# Patient Record
Sex: FEMALE | Race: WHITE | Employment: OTHER | URBAN - METROPOLITAN AREA
[De-identification: names, ages, dates, MRNs, and addresses within clinical notes are randomized per-mention and may not be internally consistent; named-entity substitution may affect disease eponyms.]

---

## 2019-05-19 ENCOUNTER — HOSPITAL ENCOUNTER (OUTPATIENT)
Age: 54
Setting detail: OBSERVATION
Discharge: HOME | End: 2019-05-20
Attending: EMERGENCY MEDICINE | Admitting: INTERNAL MEDICINE
Payer: MEDICARE

## 2019-05-19 ENCOUNTER — APPOINTMENT (OUTPATIENT)
Dept: GENERAL RADIOLOGY | Age: 54
End: 2019-05-19
Payer: MEDICARE

## 2019-05-19 DIAGNOSIS — J45.901 EXACERBATION OF ASTHMA, UNSPECIFIED ASTHMA SEVERITY, UNSPECIFIED WHETHER PERSISTENT: Primary | ICD-10-CM

## 2019-05-19 DIAGNOSIS — Z78.9 FAILURE OF OUTPATIENT TREATMENT: ICD-10-CM

## 2019-05-19 DIAGNOSIS — R09.02 HYPOXIA: ICD-10-CM

## 2019-05-19 PROBLEM — J45.30 REACTIVE AIRWAY DISEASE, MILD PERSISTENT, UNCOMPLICATED: Status: ACTIVE | Noted: 2019-05-19

## 2019-05-19 LAB
A/G RATIO: 1.2 (ref 1.1–2.2)
ALBUMIN SERPL-MCNC: 4.2 G/DL (ref 3.4–5)
ALP BLD-CCNC: 110 U/L (ref 40–129)
ALT SERPL-CCNC: 19 U/L (ref 10–40)
ANION GAP SERPL CALCULATED.3IONS-SCNC: 11 MMOL/L (ref 3–16)
AST SERPL-CCNC: 21 U/L (ref 15–37)
BACTERIA: ABNORMAL /HPF
BASOPHILS ABSOLUTE: 0 K/UL (ref 0–0.2)
BASOPHILS RELATIVE PERCENT: 0.4 %
BILIRUB SERPL-MCNC: 0.3 MG/DL (ref 0–1)
BILIRUBIN URINE: ABNORMAL
BLOOD, URINE: NEGATIVE
BUN BLDV-MCNC: 13 MG/DL (ref 7–20)
CALCIUM SERPL-MCNC: 9.4 MG/DL (ref 8.3–10.6)
CHLORIDE BLD-SCNC: 108 MMOL/L (ref 99–110)
CLARITY: CLEAR
CO2: 25 MMOL/L (ref 21–32)
COLOR: YELLOW
CREAT SERPL-MCNC: <0.5 MG/DL (ref 0.6–1.1)
EKG ATRIAL RATE: 416 BPM
EKG DIAGNOSIS: NORMAL
EKG Q-T INTERVAL: 414 MS
EKG QRS DURATION: 88 MS
EKG QTC CALCULATION (BAZETT): 443 MS
EKG R AXIS: 17 DEGREES
EKG T AXIS: 9 DEGREES
EKG VENTRICULAR RATE: 69 BPM
EOSINOPHILS ABSOLUTE: 0 K/UL (ref 0–0.6)
EOSINOPHILS RELATIVE PERCENT: 0.6 %
EPITHELIAL CELLS, UA: ABNORMAL /HPF
GFR AFRICAN AMERICAN: >60
GFR NON-AFRICAN AMERICAN: >60
GLOBULIN: 3.5 G/DL
GLUCOSE BLD-MCNC: 114 MG/DL (ref 70–99)
GLUCOSE URINE: NEGATIVE MG/DL
HCT VFR BLD CALC: 40.9 % (ref 36–48)
HEMOGLOBIN: 13.7 G/DL (ref 12–16)
KETONES, URINE: ABNORMAL MG/DL
LEUKOCYTE ESTERASE, URINE: NEGATIVE
LYMPHOCYTES ABSOLUTE: 1.9 K/UL (ref 1–5.1)
LYMPHOCYTES RELATIVE PERCENT: 25 %
MAGNESIUM: 1.9 MG/DL (ref 1.8–2.4)
MCH RBC QN AUTO: 31.7 PG (ref 26–34)
MCHC RBC AUTO-ENTMCNC: 33.5 G/DL (ref 31–36)
MCV RBC AUTO: 94.7 FL (ref 80–100)
MICROSCOPIC EXAMINATION: YES
MONOCYTES ABSOLUTE: 0.5 K/UL (ref 0–1.3)
MONOCYTES RELATIVE PERCENT: 6.3 %
MUCUS: ABNORMAL /LPF
NEUTROPHILS ABSOLUTE: 5.2 K/UL (ref 1.7–7.7)
NEUTROPHILS RELATIVE PERCENT: 67.7 %
NITRITE, URINE: NEGATIVE
PDW BLD-RTO: 15 % (ref 12.4–15.4)
PH UA: 6 (ref 5–8)
PLATELET # BLD: 259 K/UL (ref 135–450)
PMV BLD AUTO: 7.2 FL (ref 5–10.5)
POTASSIUM REFLEX MAGNESIUM: 3.5 MMOL/L (ref 3.5–5.1)
PRO-BNP: 515 PG/ML (ref 0–124)
PROTEIN UA: ABNORMAL MG/DL
RBC # BLD: 4.32 M/UL (ref 4–5.2)
RBC UA: ABNORMAL /HPF (ref 0–2)
SODIUM BLD-SCNC: 144 MMOL/L (ref 136–145)
SPECIFIC GRAVITY UA: 1.02 (ref 1–1.03)
TOTAL PROTEIN: 7.7 G/DL (ref 6.4–8.2)
URINE TYPE: ABNORMAL
UROBILINOGEN, URINE: 0.2 E.U./DL
WBC # BLD: 7.7 K/UL (ref 4–11)
WBC UA: ABNORMAL /HPF (ref 0–5)

## 2019-05-19 PROCEDURE — 94761 N-INVAS EAR/PLS OXIMETRY MLT: CPT

## 2019-05-19 PROCEDURE — 71046 X-RAY EXAM CHEST 2 VIEWS: CPT

## 2019-05-19 PROCEDURE — 83880 ASSAY OF NATRIURETIC PEPTIDE: CPT

## 2019-05-19 PROCEDURE — G0378 HOSPITAL OBSERVATION PER HR: HCPCS

## 2019-05-19 PROCEDURE — 83735 ASSAY OF MAGNESIUM: CPT

## 2019-05-19 PROCEDURE — 81001 URINALYSIS AUTO W/SCOPE: CPT

## 2019-05-19 PROCEDURE — 96376 TX/PRO/DX INJ SAME DRUG ADON: CPT

## 2019-05-19 PROCEDURE — 94669 MECHANICAL CHEST WALL OSCILL: CPT

## 2019-05-19 PROCEDURE — 2580000003 HC RX 258: Performed by: INTERNAL MEDICINE

## 2019-05-19 PROCEDURE — 87086 URINE CULTURE/COLONY COUNT: CPT

## 2019-05-19 PROCEDURE — 6370000000 HC RX 637 (ALT 250 FOR IP): Performed by: INTERNAL MEDICINE

## 2019-05-19 PROCEDURE — 6360000002 HC RX W HCPCS: Performed by: PHYSICIAN ASSISTANT

## 2019-05-19 PROCEDURE — 94640 AIRWAY INHALATION TREATMENT: CPT

## 2019-05-19 PROCEDURE — 96372 THER/PROPH/DIAG INJ SC/IM: CPT

## 2019-05-19 PROCEDURE — 99285 EMERGENCY DEPT VISIT HI MDM: CPT

## 2019-05-19 PROCEDURE — 6360000002 HC RX W HCPCS: Performed by: EMERGENCY MEDICINE

## 2019-05-19 PROCEDURE — 93005 ELECTROCARDIOGRAM TRACING: CPT | Performed by: PHYSICIAN ASSISTANT

## 2019-05-19 PROCEDURE — 80053 COMPREHEN METABOLIC PANEL: CPT

## 2019-05-19 PROCEDURE — 96374 THER/PROPH/DIAG INJ IV PUSH: CPT

## 2019-05-19 PROCEDURE — 6370000000 HC RX 637 (ALT 250 FOR IP): Performed by: PHYSICIAN ASSISTANT

## 2019-05-19 PROCEDURE — 93010 ELECTROCARDIOGRAM REPORT: CPT | Performed by: INTERNAL MEDICINE

## 2019-05-19 PROCEDURE — 6360000002 HC RX W HCPCS: Performed by: INTERNAL MEDICINE

## 2019-05-19 PROCEDURE — 85025 COMPLETE CBC W/AUTO DIFF WBC: CPT

## 2019-05-19 RX ORDER — ALBUTEROL SULFATE 2.5 MG/3ML
5 SOLUTION RESPIRATORY (INHALATION) ONCE
Status: COMPLETED | OUTPATIENT
Start: 2019-05-19 | End: 2019-05-19

## 2019-05-19 RX ORDER — TOPIRAMATE 25 MG/1
50 TABLET ORAL 2 TIMES DAILY
Status: DISCONTINUED | OUTPATIENT
Start: 2019-05-19 | End: 2019-05-20 | Stop reason: HOSPADM

## 2019-05-19 RX ORDER — OXYBUTYNIN CHLORIDE 5 MG/1
5 TABLET ORAL 2 TIMES DAILY
COMMUNITY
End: 2020-04-28 | Stop reason: SDUPTHER

## 2019-05-19 RX ORDER — OXYBUTYNIN CHLORIDE 5 MG/1
5 TABLET ORAL 2 TIMES DAILY
Status: DISCONTINUED | OUTPATIENT
Start: 2019-05-19 | End: 2019-05-20 | Stop reason: HOSPADM

## 2019-05-19 RX ORDER — ALBUTEROL SULFATE 2.5 MG/3ML
2.5 SOLUTION RESPIRATORY (INHALATION) EVERY 6 HOURS PRN
Status: DISCONTINUED | OUTPATIENT
Start: 2019-05-19 | End: 2019-05-20 | Stop reason: HOSPADM

## 2019-05-19 RX ORDER — IPRATROPIUM BROMIDE AND ALBUTEROL SULFATE 2.5; .5 MG/3ML; MG/3ML
1 SOLUTION RESPIRATORY (INHALATION)
Status: DISCONTINUED | OUTPATIENT
Start: 2019-05-19 | End: 2019-05-19

## 2019-05-19 RX ORDER — ASPIRIN 81 MG/1
81 TABLET, CHEWABLE ORAL DAILY
Status: ON HOLD | COMMUNITY
End: 2021-09-13 | Stop reason: HOSPADM

## 2019-05-19 RX ORDER — OXYCODONE HYDROCHLORIDE 5 MG/1
5 TABLET ORAL EVERY 4 HOURS PRN
Status: DISCONTINUED | OUTPATIENT
Start: 2019-05-19 | End: 2019-05-20 | Stop reason: HOSPADM

## 2019-05-19 RX ORDER — INDOMETHACIN 50 MG/1
50 CAPSULE ORAL
COMMUNITY
End: 2019-06-11 | Stop reason: ALTCHOICE

## 2019-05-19 RX ORDER — TOPIRAMATE 25 MG/1
100 CAPSULE, COATED PELLETS ORAL NIGHTLY
COMMUNITY
End: 2021-03-16

## 2019-05-19 RX ORDER — ASPIRIN 81 MG/1
81 TABLET, CHEWABLE ORAL DAILY
Status: DISCONTINUED | OUTPATIENT
Start: 2019-05-19 | End: 2019-05-20 | Stop reason: HOSPADM

## 2019-05-19 RX ORDER — INDOMETHACIN 25 MG/1
50 CAPSULE ORAL
Status: DISCONTINUED | OUTPATIENT
Start: 2019-05-19 | End: 2019-05-20 | Stop reason: HOSPADM

## 2019-05-19 RX ORDER — TIZANIDINE 4 MG/1
4 TABLET ORAL EVERY 6 HOURS PRN
Status: DISCONTINUED | OUTPATIENT
Start: 2019-05-19 | End: 2019-05-20 | Stop reason: HOSPADM

## 2019-05-19 RX ORDER — DULOXETIN HYDROCHLORIDE 60 MG/1
120 CAPSULE, DELAYED RELEASE ORAL DAILY
Status: DISCONTINUED | OUTPATIENT
Start: 2019-05-19 | End: 2019-05-20 | Stop reason: HOSPADM

## 2019-05-19 RX ORDER — IPRATROPIUM BROMIDE AND ALBUTEROL SULFATE 2.5; .5 MG/3ML; MG/3ML
1 SOLUTION RESPIRATORY (INHALATION) ONCE
Status: COMPLETED | OUTPATIENT
Start: 2019-05-19 | End: 2019-05-19

## 2019-05-19 RX ORDER — ALBUTEROL SULFATE 90 UG/1
2 AEROSOL, METERED RESPIRATORY (INHALATION) EVERY 6 HOURS PRN
COMMUNITY
End: 2020-02-20 | Stop reason: SDUPTHER

## 2019-05-19 RX ORDER — SODIUM CHLORIDE 0.9 % (FLUSH) 0.9 %
10 SYRINGE (ML) INJECTION PRN
Status: DISCONTINUED | OUTPATIENT
Start: 2019-05-19 | End: 2019-05-20 | Stop reason: HOSPADM

## 2019-05-19 RX ORDER — GABAPENTIN 400 MG/1
400 CAPSULE ORAL 4 TIMES DAILY
Status: DISCONTINUED | OUTPATIENT
Start: 2019-05-19 | End: 2019-05-20 | Stop reason: HOSPADM

## 2019-05-19 RX ORDER — METHYLPREDNISOLONE SODIUM SUCCINATE 125 MG/2ML
125 INJECTION, POWDER, LYOPHILIZED, FOR SOLUTION INTRAMUSCULAR; INTRAVENOUS ONCE
Status: COMPLETED | OUTPATIENT
Start: 2019-05-19 | End: 2019-05-19

## 2019-05-19 RX ORDER — MORPHINE SULFATE 30 MG/1
30 TABLET, FILM COATED, EXTENDED RELEASE ORAL EVERY 12 HOURS SCHEDULED
Status: DISCONTINUED | OUTPATIENT
Start: 2019-05-19 | End: 2019-05-20 | Stop reason: HOSPADM

## 2019-05-19 RX ORDER — METHYLPREDNISOLONE SODIUM SUCCINATE 40 MG/ML
40 INJECTION, POWDER, LYOPHILIZED, FOR SOLUTION INTRAMUSCULAR; INTRAVENOUS EVERY 6 HOURS
Status: DISCONTINUED | OUTPATIENT
Start: 2019-05-19 | End: 2019-05-20 | Stop reason: HOSPADM

## 2019-05-19 RX ORDER — ALBUTEROL SULFATE 2.5 MG/3ML
2.5 SOLUTION RESPIRATORY (INHALATION) EVERY 6 HOURS PRN
Status: ON HOLD | COMMUNITY
End: 2019-05-19

## 2019-05-19 RX ORDER — ACETAMINOPHEN 325 MG/1
650 TABLET ORAL EVERY 4 HOURS PRN
Status: DISCONTINUED | OUTPATIENT
Start: 2019-05-19 | End: 2019-05-20 | Stop reason: HOSPADM

## 2019-05-19 RX ORDER — SODIUM CHLORIDE 0.9 % (FLUSH) 0.9 %
10 SYRINGE (ML) INJECTION EVERY 12 HOURS SCHEDULED
Status: DISCONTINUED | OUTPATIENT
Start: 2019-05-19 | End: 2019-05-20 | Stop reason: HOSPADM

## 2019-05-19 RX ORDER — PREDNISONE 20 MG/1
40 TABLET ORAL DAILY
Status: DISCONTINUED | OUTPATIENT
Start: 2019-05-22 | End: 2019-05-20 | Stop reason: HOSPADM

## 2019-05-19 RX ORDER — PANTOPRAZOLE SODIUM 40 MG/1
40 TABLET, DELAYED RELEASE ORAL
Status: DISCONTINUED | OUTPATIENT
Start: 2019-05-20 | End: 2019-05-20 | Stop reason: HOSPADM

## 2019-05-19 RX ORDER — DOXYCYCLINE HYCLATE 100 MG
100 TABLET ORAL EVERY 12 HOURS
Status: DISCONTINUED | OUTPATIENT
Start: 2019-05-19 | End: 2019-05-20 | Stop reason: HOSPADM

## 2019-05-19 RX ORDER — IPRATROPIUM BROMIDE AND ALBUTEROL SULFATE 2.5; .5 MG/3ML; MG/3ML
1 SOLUTION RESPIRATORY (INHALATION) EVERY 4 HOURS
Status: DISCONTINUED | OUTPATIENT
Start: 2019-05-19 | End: 2019-05-20 | Stop reason: HOSPADM

## 2019-05-19 RX ORDER — ONDANSETRON 2 MG/ML
4 INJECTION INTRAMUSCULAR; INTRAVENOUS EVERY 6 HOURS PRN
Status: DISCONTINUED | OUTPATIENT
Start: 2019-05-19 | End: 2019-05-20 | Stop reason: HOSPADM

## 2019-05-19 RX ORDER — GABAPENTIN 400 MG/1
400 CAPSULE ORAL 2 TIMES DAILY
Status: ON HOLD | COMMUNITY
End: 2021-09-13 | Stop reason: HOSPADM

## 2019-05-19 RX ORDER — MAGNESIUM 30 MG
30 TABLET ORAL DAILY
COMMUNITY
End: 2022-01-19 | Stop reason: CLARIF

## 2019-05-19 RX ADMIN — IPRATROPIUM BROMIDE AND ALBUTEROL SULFATE 1 AMPULE: .5; 3 SOLUTION RESPIRATORY (INHALATION) at 16:38

## 2019-05-19 RX ADMIN — TOPIRAMATE 50 MG: 25 TABLET, FILM COATED ORAL at 22:37

## 2019-05-19 RX ADMIN — IPRATROPIUM BROMIDE AND ALBUTEROL SULFATE 1 AMPULE: .5; 3 SOLUTION RESPIRATORY (INHALATION) at 19:44

## 2019-05-19 RX ADMIN — OXYBUTYNIN CHLORIDE 5 MG: 5 TABLET ORAL at 22:37

## 2019-05-19 RX ADMIN — MORPHINE SULFATE 30 MG: 30 TABLET, FILM COATED, EXTENDED RELEASE ORAL at 22:37

## 2019-05-19 RX ADMIN — METHYLPREDNISOLONE SODIUM SUCCINATE 40 MG: 40 INJECTION, POWDER, FOR SOLUTION INTRAMUSCULAR; INTRAVENOUS at 18:53

## 2019-05-19 RX ADMIN — GABAPENTIN 400 MG: 400 CAPSULE ORAL at 22:38

## 2019-05-19 RX ADMIN — IPRATROPIUM BROMIDE AND ALBUTEROL SULFATE 1 AMPULE: .5; 3 SOLUTION RESPIRATORY (INHALATION) at 12:15

## 2019-05-19 RX ADMIN — Medication 10 ML: at 22:38

## 2019-05-19 RX ADMIN — DOXYCYCLINE HYCLATE 100 MG: 100 TABLET, COATED ORAL at 18:53

## 2019-05-19 RX ADMIN — IPRATROPIUM BROMIDE AND ALBUTEROL SULFATE 1 AMPULE: .5; 3 SOLUTION RESPIRATORY (INHALATION) at 23:42

## 2019-05-19 RX ADMIN — ALBUTEROL SULFATE 5 MG: 2.5 SOLUTION RESPIRATORY (INHALATION) at 13:19

## 2019-05-19 RX ADMIN — INDOMETHACIN 50 MG: 25 CAPSULE ORAL at 18:53

## 2019-05-19 RX ADMIN — ENOXAPARIN SODIUM 40 MG: 40 INJECTION SUBCUTANEOUS at 18:53

## 2019-05-19 RX ADMIN — METHYLPREDNISOLONE SODIUM SUCCINATE 125 MG: 125 INJECTION, POWDER, FOR SOLUTION INTRAMUSCULAR; INTRAVENOUS at 12:15

## 2019-05-19 ASSESSMENT — PAIN DESCRIPTION - LOCATION: LOCATION: BACK

## 2019-05-19 ASSESSMENT — PAIN DESCRIPTION - PAIN TYPE: TYPE: CHRONIC PAIN

## 2019-05-19 ASSESSMENT — PAIN SCALES - GENERAL
PAINLEVEL_OUTOF10: 2
PAINLEVEL_OUTOF10: 5

## 2019-05-19 NOTE — ED NOTES
Ambulated pt approximately 70 feet. Pt started out at 94% on room air and a heart rate in the 70's. At end of walk pt was 88% on room air and heart rate jumped into 140's. Pt was very out of breath at end of walk.      Payal Grossman RN  05/19/19 1903

## 2019-05-19 NOTE — ED NOTES
Nursing report given to [facility] from 2W, pt to floor per wheel chair.      Payal Grossman RN  05/19/19 5533

## 2019-05-19 NOTE — PROGRESS NOTES
4 Eyes Skin Assessment     The patient is being assess for   Admission    I agree that 2 RN's have performed a thorough Head to Toe Skin Assessment on the patient. ALL assessment sites listed below have been assessed. Areas assessed by both nurses:   [x]   Head, Face, and Ears   [x]   Shoulders, Back, and Chest, Abdomen  [x]   Arms, Elbows, and Hands   [x]   Coccyx, Sacrum, and Ischium  [x]   Legs, Feet, and Heels        Scattered abrasions and bruises, no further skin issues     **SHARE this note so that the co-signing nurse is able to place an eSignature**    Co-signer eSignature: Electronically signed by Jorden Wilson RN on 5/19/19 at 7:41 PM    Does the Patient have Skin Breakdown?   No          George Prevention initiated:  Yes   Wound Care Orders initiated:  No      [facility] nurse consulted for Pressure Injury (Stage 3,4, Unstageable, DTI, NWPT, Complex wounds)and New or Established Ostomies:  Yes      Primary Nurse eSignature: Electronically signed by Daniel Tompkins RN on 5/19/19 at 6:07 PM

## 2019-05-19 NOTE — ED NOTES
1400- Perfect serve sent to Dr. Jenae Hudson for admission     1418- Call was returned by Jenae Maza and spoke to Dr. Flory Caceres  05/19/19 [place] [id number]  05/19/19 1415

## 2019-05-19 NOTE — PROGRESS NOTES
RESPIRATORY THERAPY ASSESSMENT    Name:  Luis Alberto Mendez  Medical Record Number:  [id number]  Age: 52 y.o. Gender: female  : 1965  Today's Date:  2019  Room:  0218/0218-01    Assessment     Is the patient being admitted for a COPD or Asthma exacerbation? Yes   (If yes the patient will be seen every 4 hours for the first 24 hours and then reassessed)    Patient Admission Diagnosis      Allergies  Allergies   Allergen Reactions    Iodides Anaphylaxis     Rash, hives, swelling    Bactrim [Sulfamethoxazole-Trimethoprim]      Renal failure       Minimum Predicted Vital Capacity:     Not performed SOB          Actual Vital Capacity:      NA              Pulmonary History:Asthma  Home Oxygen Therapy:  room air  Home Respiratory Therapy: Proair prn-   Current Respiratory Therapy:  duoneb q4 w/a  Treatment Type: HHN, Vibratory mucous clearing therapy or intervention  Medications: Albuterol/Ipratropium    Respiratory Severity Index(RSI)   Patients with orders for inhalation medications, oxygen, or any therapeutic treatment modality will be placed on Respiratory Protocol. They will be assessed with the first treatment and at least every 72 hours thereafter. The following severity scale will be used to determine frequency of treatment intervention.     Smoking History: Mild Exacerbation = 3    Social History  Social History     Tobacco Use    Smoking status: Former Smoker     Last attempt to quit:      Years since quittin.3    Smokeless tobacco: Never Used   Substance Use Topics    Alcohol use: Yes     Comment: 2 drinks/day    Drug use: No       Recent Surgical History: None = 0  Past Surgical History  Past Surgical History:   Procedure Laterality Date    CARPAL TUNNEL RELEASE      JOINT REPLACEMENT Bilateral     ROTATOR CUFF REPAIR      3x right, 2x left       Level of Consciousness: Alert, Oriented, and Cooperative = 0    Level of Activity: Mostly sedentary, minimal walking = 2    Respiratory d. Can demonstrate a 2-3 second inspiratory hold  4. Bronchodilators will be administered via Hand Held Nebulizer [facility]) to patients when ANY of the following criteria are met  a. Incognizant or uncooperative          b. Patients treated with HHN at Home        c. Unable to demonstrate proper use of MDI with spacer     d. RR > 30 bpm   5. Bronchodilators will be delivered via Metered Dose Inhaler (MDI), HHN, Aerogen to intubated patients on mechanical ventilation. 6. Inhalation medication orders will be delivered and/or substituted as outlined below. Aerosolized Medications Ordering and Administration Guidelines:    1. All Medications will be ordered by a physician, and their frequency and/or modality will be adjusted as defined by the patients Respiratory Severity Index (RSI) score. 2. If the patient does not have documented COPD, consider discontinuing anticholinergics when RSI is less than 9.  3. If the bronchospasm worsens (increased RSI), then the bronchodilator frequency can be increased to a maximum of every 4 hours. If greater than every 4 hours is required, the physician will be contacted. 4. If the bronchospasm improves, the frequency of the bronchodilator can be decreased, based on the patient's RSI, but not less than home treatment regimen frequency. 5. Bronchodilator(s) will be discontinued if patient has a RSI less than 9 and has received no scheduled or as needed treatment for 72  Hrs. Patients Ordered on a Mucolytic Agent:    1. Must always be administered with a bronchodilator. 2. Discontinue if patient experiences worsened bronchospasm, or secretions have lessened to the point that the patient is able to clear them with a cough. Anti-inflammatory and Combination Medications:    1.  If the patient lacks prior history of lung disease, is not using inhaled anti-inflammatory medication at home, and lacks wheezing by examination or by history for at least 24 hours, contact physician for possible discontinuation.

## 2019-05-19 NOTE — ED PROVIDER NOTES
Emergency Department Attending Note    Nicolette Reeves DO    Date of ED VIsit: 5/19/2019    CHIEF COMPLAINT  Shortness of Breath (pt c/o cough and shortness of breath for 6 months, pt states she has asthma, pt concerned about CA due to her father having lung CA)      HISTORY OF PRESENT ILLNESS  Alecia Nesbitt is a 52 y.o. female  With Vital signs of BP (!) 167/91   Pulse 76   Temp 98.3 °F (36.8 °C) (Oral)   Resp 16   Ht 5' 3\" (1.6 m)   Wt (!) 340 lb (154.2 kg)   LMP 12/24/2010   SpO2 95%   BMI 60.23 kg/m²  who presents to the ED with a complaint of worsening dyspnea and cough this week. She has been having intermittent cough for the past several months, more nighttime workups with shortness of breath. She has a history of asthma, but was previously just seasonal.  Chills. She has not had any chest pain. She went to her primary little less than a week ago and received a course of steroids which she has been taking. She has been using nebulizers with minimal relief approximately every 3 hours. .  No other complaints, modifying factors or associated symptoms. I have reviewed the following from the nursing documentation. Past Medical History:   Diagnosis Date    Acute renal failure (ARF) ([facility] [address] May 4, 2015    d/t IV dye    Arthritis     Degenerative disc disease     GERD (gastroesophageal reflux disease)     Hypertension      Past Surgical History:   Procedure Laterality Date    CARPAL TUNNEL RELEASE      JOINT REPLACEMENT Bilateral     ROTATOR CUFF REPAIR      3x right, 2x left     History reviewed. No pertinent family history.   Social History     Socioeconomic History    Marital status:      Spouse name: Not on file    Number of children: Not on file    Years of education: Not on file    Highest education level: Not on file   Occupational History    Not on file   Social Needs    Financial resource strain: Not on file    Food insecurity:     Worry: Not on file     Inability: Not on file    Transportation needs:     Medical: Not on file     Non-medical: Not on file   Tobacco Use    Smoking status: Former Smoker    Smokeless tobacco: Never Used   Substance and Sexual Activity    Alcohol use: Yes     Comment: 2 drinks/day    Drug use: No    Sexual activity: Yes     Partners: Male   Lifestyle    Physical activity:     Days per week: Not on file     Minutes per session: Not on file    Stress: Not on file   Relationships    Social connections:     Talks on phone: Not on file     Gets together: Not on file     Attends [employer] witness service: Not on file     Active member of club or organization: Not on file     Attends meetings of clubs or organizations: Not on file     Relationship status: Not on file    Intimate partner violence:     Fear of current or ex partner: Not on file     Emotionally abused: Not on file     Physically abused: Not on file     Forced sexual activity: Not on file   Other Topics Concern    Not on file   Social History Narrative    Not on file     No current facility-administered medications for this encounter. Current Outpatient Medications   Medication Sig Dispense Refill    morphine-naltrexone (EMBEDA) 30-1.2 MG CPCR Take 1 capsule by mouth every 12 hours.  aspirin 81 MG chewable tablet Take 81 mg by mouth daily      gabapentin (NEURONTIN) 400 MG capsule Take 400 mg by mouth 4 times daily.       indomethacin (INDOCIN) 50 MG capsule Take 50 mg by mouth 3 times daily (with meals)      magnesium 30 MG tablet Take 30 mg by mouth daily      oxybutynin (DITROPAN) 5 MG tablet Take 5 mg by mouth 2 times daily      albuterol (PROVENTIL) (2.5 MG/3ML) 0.083% nebulizer solution Take 2.5 mg by nebulization every 6 hours as needed for Wheezing      topiramate (TOPAMAX SPRINKLE) 25 MG capsule Take 50 mg by mouth 2 times daily Indications: 50 mg at noon, 100 mg at HS      tiZANidine (ZANAFLEX) 4 MG tablet Take 4 mg by mouth every 6 hours as needed      Leukocyte Esterase, Urine Negative   Microscopic Examination YES   Urine Type Not Specified [WL]   1449 Magnesium:    Magnesium 1.90 [WL]   1449 Comprehensive Metabolic Panel w/ Reflex to MG(!):    Sodium 144   Potassium 3.5   Chloride 108   CO2 25   Anion Gap 11   Glucose 114(!)   BUN 13   Creatinine <0.5(!)   GFR Non- >60   GFR African American >60   Calcium 9.4   Total Protein 7.7   Albumin 4.2   Albumin/Globulin Ratio 1.2   Bilirubin 0.3   Alk Phos 110   ALT 19   AST 21   Globulin 3.5 [WL]   1449 Brain Natriuretic Peptide(!):    Pro-(!) [WL]   1449 Atrial fibrillation at 69. Normal axis. . No acute ST-T changes. Compared to prior May 4, atrial fibrillation   CBC Auto Differential:    WBC 7.7   RBC 4.32   Hemoglobin Quant 13.7   Hematocrit 40.9   MCV 94.7   MCH 31.7   MCHC 33.5   RDW 15.0   Platelet Count 793   MPV 7.2   Neutrophils % 67.7   Lymphocyte % 25.0   Monocytes % 6.3   Eosinophils % 0.6   Basophils % 0.4   Neutrophils # 5.2   Lymphocytes # 1.9   Monocytes # 0.5   Eosinophils # 0.0   Basophils # 0.0 [WL]   1449 XR CHEST STANDARD (2 VW) [WL]   1450 Patient with asthma exacerbation, hypoxia, felt or days of outpatient steroid therapy. She'll be admitted for further stabilization. She was hypoxic to 80% with ambulation. She is significantly tachycardic at that time as well. [WL]      ED Course User Index  [WL] Juan M Chambers,        Old records were reviewed when applicable.  The ED course and plan were reviewed and results discussed with the patient, hospitalist    CLINICAL IMPRESSION and DISPOSITION  Alecia Nesbitt was stable and diagnosed with asthma exacerbation, hypoxia, failed outpatient therapy    Patient was treated with  steroids, nebs      CRITICAL CARE TIME:   N/A                      Juan M Chambers DO  05/19/19 2901

## 2019-05-19 NOTE — PLAN OF CARE
Acute asthma exacerbation  Failed outpatient treatment  Hypoxic and tachycardic with minimal ambulation

## 2019-05-19 NOTE — PROGRESS NOTES
Patient currently awake in bed, call light and personal belongings within reach. No needs voiced at this time.

## 2019-05-19 NOTE — PROGRESS NOTES
Patient admitted to [address] [place] [address]. Patient oriented to room, staff, and call light. Patient updated on current plan of care. Patient voices no concerns at this time. Patient currently awake in bed, call light and personal belongings within reach.

## 2019-05-19 NOTE — ED PROVIDER NOTES
[facility] ED  eMERGENCY dEPARTMENT eNCOUnter      Pt Name: Zuhair Lagunas  MRN: [id number]  Date of evaluation: 5/19/2019      Chief Complaint:    Chief Complaint   Patient presents with    Shortness of Breath     pt c/o cough and shortness of breath for 6 months, pt states she has asthma, pt concerned about CA due to her father having lung CA       HPI:  This is a  52 y.o. female morbidly obese, who presents to the emergency department with past medical history significant for history of acute renal failure secondary to contrast-induced nephropathy as well as former smoker now presents with acute on chronic shortness of breath. Patient states that she has seasonal asthma and allergy symptoms which appear worsening over the last 1-2 weeks and this concerned her propped her presentation emergency department today. She denies any fever chills nausea vomiting denies any chest pain does describe ongoing shortness of breath at rest and this was some what refractory to her home regimen.   She describes 6 out of 10 intermittent shortness of breath concerning to her without other radiation aggravating or relieving factors times one day      Physical Exam: (Pertinent Negatives and Positives)  ED Triage Vitals [05/19/19 1137]   BP Temp Temp Source Pulse Resp SpO2 Height Weight   (!) 167/91 98.3 °F (36.8 °C) Oral 76 16 95 % 5' 3\" (1.6 m) (!) 340 lb (154.2 kg)     Noted hypertension otherwise non-tachycardic afebrile nontachypneic satting well on room air  Patient does have active inspiratory and expiratory wheeze    PLAN:  LABS: Labs Reviewed - No data to display  Radiology Studies:   No orders to display       Patient will be evaluated by the Main ED provider however as the triage provider I will initiate her laboratory studies as well as initial imaging studies for workup of shortness of breath in the setting of morbid obesity as well as former smoker with history of contrast-induced

## 2019-05-20 ENCOUNTER — TELEPHONE (OUTPATIENT)
Dept: PULMONOLOGY | Age: 54
End: 2019-05-20

## 2019-05-20 ENCOUNTER — APPOINTMENT (OUTPATIENT)
Dept: GENERAL RADIOLOGY | Age: 54
End: 2019-05-20
Payer: MEDICARE

## 2019-05-20 VITALS
OXYGEN SATURATION: 99 % | TEMPERATURE: 97.7 F | HEIGHT: 63 IN | RESPIRATION RATE: 16 BRPM | WEIGHT: 293 LBS | SYSTOLIC BLOOD PRESSURE: 154 MMHG | HEART RATE: 70 BPM | BODY MASS INDEX: 51.91 KG/M2 | DIASTOLIC BLOOD PRESSURE: 88 MMHG

## 2019-05-20 DIAGNOSIS — J45.909 UNCOMPLICATED ASTHMA, UNSPECIFIED ASTHMA SEVERITY, UNSPECIFIED WHETHER PERSISTENT: ICD-10-CM

## 2019-05-20 DIAGNOSIS — R09.02 HYPOXIA: Primary | ICD-10-CM

## 2019-05-20 PROBLEM — J45.41 ALLERGIC ASTHMA, MODERATE PERSISTENT, WITH ACUTE EXACERBATION: Status: ACTIVE | Noted: 2019-05-20

## 2019-05-20 PROBLEM — E66.812 CLASS 2 OBESITY DUE TO EXCESS CALORIES IN ADULT: Status: ACTIVE | Noted: 2019-05-20

## 2019-05-20 PROBLEM — E66.09 CLASS 2 OBESITY DUE TO EXCESS CALORIES IN ADULT: Status: ACTIVE | Noted: 2019-05-20

## 2019-05-20 LAB
HCT VFR BLD CALC: 38.9 % (ref 36–48)
HEMOGLOBIN: 13 G/DL (ref 12–16)
MCH RBC QN AUTO: 31.6 PG (ref 26–34)
MCHC RBC AUTO-ENTMCNC: 33.4 G/DL (ref 31–36)
MCV RBC AUTO: 94.4 FL (ref 80–100)
PDW BLD-RTO: 15.6 % (ref 12.4–15.4)
PLATELET # BLD: 242 K/UL (ref 135–450)
PMV BLD AUTO: 7.7 FL (ref 5–10.5)
RBC # BLD: 4.12 M/UL (ref 4–5.2)
URINE CULTURE, ROUTINE: NORMAL
WBC # BLD: 7.7 K/UL (ref 4–11)

## 2019-05-20 PROCEDURE — 94640 AIRWAY INHALATION TREATMENT: CPT

## 2019-05-20 PROCEDURE — 6360000002 HC RX W HCPCS: Performed by: INTERNAL MEDICINE

## 2019-05-20 PROCEDURE — 96376 TX/PRO/DX INJ SAME DRUG ADON: CPT

## 2019-05-20 PROCEDURE — 36415 COLL VENOUS BLD VENIPUNCTURE: CPT

## 2019-05-20 PROCEDURE — 71046 X-RAY EXAM CHEST 2 VIEWS: CPT

## 2019-05-20 PROCEDURE — G0378 HOSPITAL OBSERVATION PER HR: HCPCS

## 2019-05-20 PROCEDURE — 94761 N-INVAS EAR/PLS OXIMETRY MLT: CPT

## 2019-05-20 PROCEDURE — 85027 COMPLETE CBC AUTOMATED: CPT

## 2019-05-20 PROCEDURE — 2580000003 HC RX 258: Performed by: INTERNAL MEDICINE

## 2019-05-20 PROCEDURE — 94669 MECHANICAL CHEST WALL OSCILL: CPT

## 2019-05-20 PROCEDURE — 6370000000 HC RX 637 (ALT 250 FOR IP): Performed by: INTERNAL MEDICINE

## 2019-05-20 PROCEDURE — 96372 THER/PROPH/DIAG INJ SC/IM: CPT

## 2019-05-20 PROCEDURE — 99217 PR OBSERVATION CARE DISCHARGE MANAGEMENT: CPT | Performed by: INTERNAL MEDICINE

## 2019-05-20 PROCEDURE — 99222 1ST HOSP IP/OBS MODERATE 55: CPT | Performed by: INTERNAL MEDICINE

## 2019-05-20 RX ORDER — BENZONATATE 200 MG/1
200 CAPSULE ORAL 3 TIMES DAILY PRN
Qty: 20 CAPSULE | Refills: 0 | Status: SHIPPED | OUTPATIENT
Start: 2019-05-20 | End: 2019-06-11 | Stop reason: ALTCHOICE

## 2019-05-20 RX ORDER — PREDNISONE 20 MG/1
TABLET ORAL
Qty: 15 TABLET | Refills: 0 | Status: SHIPPED | OUTPATIENT
Start: 2019-05-20 | End: 2019-06-11 | Stop reason: ALTCHOICE

## 2019-05-20 RX ADMIN — GABAPENTIN 400 MG: 400 CAPSULE ORAL at 13:24

## 2019-05-20 RX ADMIN — IPRATROPIUM BROMIDE AND ALBUTEROL SULFATE 1 AMPULE: .5; 3 SOLUTION RESPIRATORY (INHALATION) at 10:55

## 2019-05-20 RX ADMIN — TOPIRAMATE 50 MG: 25 TABLET, FILM COATED ORAL at 08:21

## 2019-05-20 RX ADMIN — ENOXAPARIN SODIUM 40 MG: 40 INJECTION SUBCUTANEOUS at 08:21

## 2019-05-20 RX ADMIN — METHYLPREDNISOLONE SODIUM SUCCINATE 40 MG: 40 INJECTION, POWDER, FOR SOLUTION INTRAMUSCULAR; INTRAVENOUS at 00:30

## 2019-05-20 RX ADMIN — DOXYCYCLINE HYCLATE 100 MG: 100 TABLET, COATED ORAL at 04:43

## 2019-05-20 RX ADMIN — IPRATROPIUM BROMIDE AND ALBUTEROL SULFATE 1 AMPULE: .5; 3 SOLUTION RESPIRATORY (INHALATION) at 03:04

## 2019-05-20 RX ADMIN — MAGNESIUM OXIDE TAB 400 MG (241.3 MG ELEMENTAL MG) 400 MG: 400 (241.3 MG) TAB at 08:21

## 2019-05-20 RX ADMIN — INDOMETHACIN 50 MG: 25 CAPSULE ORAL at 11:49

## 2019-05-20 RX ADMIN — Medication 10 ML: at 11:50

## 2019-05-20 RX ADMIN — METHYLPREDNISOLONE SODIUM SUCCINATE 40 MG: 40 INJECTION, POWDER, FOR SOLUTION INTRAMUSCULAR; INTRAVENOUS at 06:01

## 2019-05-20 RX ADMIN — TIZANIDINE 4 MG: 4 TABLET ORAL at 01:37

## 2019-05-20 RX ADMIN — INDOMETHACIN 50 MG: 25 CAPSULE ORAL at 08:20

## 2019-05-20 RX ADMIN — DULOXETINE HYDROCHLORIDE 120 MG: 60 CAPSULE, DELAYED RELEASE ORAL at 08:21

## 2019-05-20 RX ADMIN — OXYBUTYNIN CHLORIDE 5 MG: 5 TABLET ORAL at 08:21

## 2019-05-20 RX ADMIN — ASPIRIN 81 MG 81 MG: 81 TABLET ORAL at 08:21

## 2019-05-20 RX ADMIN — PANTOPRAZOLE SODIUM 40 MG: 40 TABLET, DELAYED RELEASE ORAL at 06:01

## 2019-05-20 RX ADMIN — GABAPENTIN 400 MG: 400 CAPSULE ORAL at 08:21

## 2019-05-20 RX ADMIN — METHYLPREDNISOLONE SODIUM SUCCINATE 40 MG: 40 INJECTION, POWDER, FOR SOLUTION INTRAMUSCULAR; INTRAVENOUS at 11:50

## 2019-05-20 RX ADMIN — MORPHINE SULFATE 30 MG: 30 TABLET, FILM COATED, EXTENDED RELEASE ORAL at 08:21

## 2019-05-20 RX ADMIN — OXYCODONE HYDROCHLORIDE 5 MG: 5 TABLET ORAL at 01:37

## 2019-05-20 RX ADMIN — Medication 10 ML: at 08:22

## 2019-05-20 RX ADMIN — IPRATROPIUM BROMIDE AND ALBUTEROL SULFATE 1 AMPULE: .5; 3 SOLUTION RESPIRATORY (INHALATION) at 07:38

## 2019-05-20 ASSESSMENT — PAIN SCALES - GENERAL
PAINLEVEL_OUTOF10: 3
PAINLEVEL_OUTOF10: 7
PAINLEVEL_OUTOF10: 4
PAINLEVEL_OUTOF10: 3

## 2019-05-20 ASSESSMENT — PAIN DESCRIPTION - PAIN TYPE: TYPE: CHRONIC PAIN

## 2019-05-20 ASSESSMENT — PAIN DESCRIPTION - LOCATION: LOCATION: BACK

## 2019-05-20 NOTE — PROGRESS NOTES
H/p dictation id [id number]. Date of service 05/19/2019. Reactive airway disease. Asthma exacerbation. Chronic pain. Chronic narcotic use. Morbid obesity.

## 2019-05-20 NOTE — PROGRESS NOTES
PM assessment completed, see flow sheet. Pt is alert and oriented. No complaints voiced. Pt denies needs at this time. SR up x 2, and bed in low position. Call light is within reach.

## 2019-05-20 NOTE — PLAN OF CARE
Problem: Falls - Risk of:  Goal: Will remain free from falls  Description  Will remain free from falls  Outcome: Ongoing     Problem: Daily Care:  Goal: Daily care needs are met  Description  Daily care needs are met  Outcome: Ongoing     Problem: Pain:  Goal: Patient's pain/discomfort is manageable  Description  Patient's pain/discomfort is manageable  Outcome: Ongoing

## 2019-05-20 NOTE — H&P
[address]                 [address] [place] [address]                              HISTORY AND PHYSICAL    PATIENT NAME: Miroslava Mccracken                      :        1965  MED REC NO:   [id number]                          ROOM:       3035  ACCOUNT NO:   [de-identified]                           ADMIT DATE: 2019  PROVIDER:     Kaycee Linares MD    I obtained a history and performed physical exam on the patient on the  medical floor on 2019. CHIEF COMPLAINT:  Cough with shortness of breath. HISTORY OF PRESENT ILLNESS:  A 24-year-old morbidly obese   female presented to the hospital with a chief complaint of what she  describes as months of persistent cough that she states keeps her up at  night, more when she lies flat in bed along with persistent shortness of  breath. That happened more so in the past two weeks. The patient notes  that she has gone to her doctor many times for this, but nobody has been  able to find out why she is coughing so much. No fevers or chills. No  other constitutional symptoms. The shortness of breath and her chest  tightness is also more when she lies flat in bed without any nausea or  vomiting. PAST MEDICAL HISTORY:  1.  Morbid obesity with a BMI of 59.2 kg/m2.  2.  Gastroesophageal reflux disease. 3.  Hypertension, but patient states that it is now treated and she does  not need any anti-hypertensive medications. PAST SURGICAL HISTORY:  1. Carpal tunnel release. 2.  Joint replacement. 3.  Rotator cuff repair. FAMILY HISTORY:  Lung cancer in the father. SOCIAL HISTORY:  Lives at home. Stopped smoking around 20 years or so,  according to her. CURRENT MEDICATIONS:  The patient's home medication list has been  reviewed. It includes Morphine, naltrexone, aspirin, gabapentin,  indomethacin, magnesium, oxybutynin, Topamax, albuterol, tizanidine,  oxycodone, Cymbalta, omeprazole. The patient does have chronic pain and  chronic prescription of narcotic dependent/use. ALLERGIC HISTORY:  IODIZED, BACTRIM. REVIEW OF SYSTEMS:  The patient's review of systems is significant for  the shortness of breath with the cough and per the history of present  illness. All other systems have been reviewed and are negative for the  history of present illness. PHYSICAL EXAMINATION:  VITAL SIGNS:  Temperature 98.3, respiratory rate 16, pulse 96, blood  pressure 161/91, and saturating 95%. CNS:  Alert, awake, and oriented. PSYCH:  The patient is cooperative. EYES:  Pupils are reactive to light. ENT:  Extraocular muscle movements are intact. RESPIRATORY SYSTEM:  The patient at the time of my exam does not have  any rales, rubs, rhonchi, or wheezes. Breath sounds are vesicular and  clear bilaterally. CVS:  S1, S2 are heard. No murmurs or rubs. ABDOMEN:  Soft. Morbidly obese. MUSCULOSKELETAL:  No acute deformity. SKIN:  Without rashes or lesions. DIAGNOSTIC DATA:  EKG independently reviewed by me shows normal sinus  rhythm with a ventricular rate of 69 beats per minute. UA shows 4+  mucus, 1+ of bacteria traits. No evidence of active UTI. Magnesium  1.90. BNP is 515. BUN 13, creatinine is less than 0.5, glucose 114. CBC showed white count of 7.7. The chest x-ray shows no significant change. Clear lungs. CONSULTATIONS:  1. Pulmonology. 2.  Previous medical record review shows a 2D echo from May 2015 that  shows LVF of 55% with mild pulmonary arterial hypertension. IMPRESSION:  1. Reactive airway disease, persistent mild with asthma exacerbation. 2.  Morbid obesity with the BMI of 59 kg/m2. 3.  Chronic pain with chronic narcotic dependence/use. PLAN OF CARE:  The patient has been admitted to observation. The  patient will be continued on oral steroids, breathing treatment, and  supplemental oxygen.   The patient has been started on oral doxycycline;  however, I do not believe the patient is without any evidence of active  infection at this point in time. I think it may be discontinued if  there is no further evidence of infection. The patient's OARRS report was accessed. The patient's home dose for  her narcotic analgesic will be continued per her OARRS report. Pulmonology consultation has been requested, and we will follow the  recommendations. The patient will be continued on 40 mg twice a day  subcutaneously for Lovenox for DVT prophylaxis. CODE STATUS:  Full. EXPECTED LENGTH OF STAY:  Less than two midnights based on the plan of  care above. DISPOSITION:  Observation.       Patricia Martinez MD    D: 05/20/2019 2:34:25       T: 05/20/2019 4:46:07     [email or web address]  Job#: [id number]     Doc#: [id number]    CC:

## 2019-05-20 NOTE — DISCHARGE SUMMARY
Name:  Shoaib Lujan  Room:  0218/0218-01  MRN:    [id number]    Discharge Summary      This discharge summary is in conjunction with a complete physical exam done on the day of discharge. Discharging Physician: Dr. Dave Kirby: 5/19/2019  Discharge:  5/20/2019    HPI taken from admission H&P:      A 64-year-old morbidly obese   female presented to the hospital with a chief complaint of what she  describes as months of persistent cough that she states keeps her up at  night, more when she lies flat in bed along with persistent shortness of  breath. That happened more so in the past two weeks. The patient notes  that she has gone to her doctor many times for this, but nobody has been  able to find out why she is coughing so much. No fevers or chills. No  other constitutional symptoms. The shortness of breath and her chest  tightness is also more when she lies flat in bed without any nausea or  vomiting. Diagnoses this Admission and Hospital Course     Asthma with exacerbation  - treated with IV steroids and inhaled bronchodilators  - improved  - dc on prednisone and inhalers, see med list below    Chronic pain with opiate dependence    Morbid Obesity  - Body mass index is 59.38 kg/m². - Complicating assessment and treatment. Placing patient at risk for multiple co-morbidities as well as early death and contributing to the patient's presentation.   - Counseled on weight loss. Procedures (Please Review Full Report for Details)  None     Consults    Pulm     Physical Exam at Discharge:    BP (!) 154/88   Pulse 70   Temp 97.7 °F (36.5 °C) (Oral)   Resp 16   Ht 5' 3\" (1.6 m)   Wt (!) 335 lb 3.2 oz (152 kg)   LMP 12/24/2010   SpO2 99%   BMI 59.38 kg/m²     Gen: No distress. Alert. Eyes: PERRL. No sclera icterus. No conjunctival injection. ENT: No discharge. Pharynx clear. Neck: No JVD. Trachea midline. Resp: No accessory muscle use. No crackles. No wheezes.  No patient:  Prednisone  Use: treat asthma and COPD     Side effects: upset stomach, high blood sugars, weight     gain, mood changes, and increased chances of infection        CHANGE how you take these medications    EMBEDA 30-1.2 MG Cpcr  Generic drug:  morphine-naltrexone  What changed:  Another medication with the same name was removed. Continue taking this medication, and follow the directions you see here. CONTINUE taking these medications    albuterol sulfate  (90 Base) MCG/ACT inhaler     aspirin 81 MG chewable tablet     DULoxetine 60 MG extended release capsule  Commonly known as:  CYMBALTA     gabapentin 400 MG capsule  Commonly known as:  NEURONTIN     indomethacin 50 MG capsule  Commonly known as:  INDOCIN     magnesium 30 MG tablet     omeprazole 20 MG delayed release capsule  Commonly known as:  PRILOSEC     oxybutynin 5 MG tablet  Commonly known as:  DITROPAN     oxyCODONE 5 MG immediate release tablet  Commonly known as:  ROXICODONE     tiZANidine 4 MG tablet  Commonly known as:  ZANAFLEX     topiramate 25 MG capsule  Commonly known as:  TOPAMAX SPRINKLE           Where to Get Your Medications      These medications were sent to [address], [address]  [place] [address] [place] [id number]    Phone:  [phone]   · benzonatate 200 MG capsule  · fluticasone-salmeterol 250-50 MCG/DOSE Aepb  · predniSONE 20 MG tablet           Discharged in stable condition to home     Follow Up: Follow up with PCP in 1 week      DIANA Chaudhry.

## 2019-05-20 NOTE — TELEPHONE ENCOUNTER
Inpatient Notes   Received:  Today   Message Contents   MD Isaias Armenta MA             Please arrange f/u with our office in 8 weeks with PFTs

## 2019-05-20 NOTE — PROGRESS NOTES
Bedside report given to Judi DOBBINS at this time. Pt in stable condition and has no needs at this time.  Racquel Cardona RN

## 2019-05-20 NOTE — PROGRESS NOTES
Nutrition Education    Type and Reason for Visit: Positive Nutrition Screen(+ screen for patient requested to see a dietitian and diet education)    Patient stated that she desired weight loss nutrition education because she has had 2 hip replacements, she needs 2 knee replacements, and her back is not good either. Patient wants to lose weight so she can have a better quality of life. Patient reported that she has never been a small size, she has always been on the larger side, even as a child. This RD and patient reviewed: Mediterranean Nutrition, Portion Distortion, Label Reading, Low Impact Physical Activity, and Sample Menus. Patient asked a lot of good questions during education session and she was very engaged in conversation. Encouraged patient to call/email with any nutrition-related questions/concerns after d/c, if need be. · Verbally reviewed following information with PATIENT. · Written educational materials provided. · Contact name and number provided. · Refer to Patient Education activity for more details.     Electronically signed by Martine Goncalves RD, DELICIA on 5/20/19 at 2:59 PM    Contact Number: [phone]

## 2019-05-20 NOTE — CONSULTS
Patient is being seen at the request of Dr. Mel Quintero for a consultation for asthma with acute exacerbation    HISTORY OF PRESENT ILLNESS: The patient is a 53 yo woman with hx of chronic pain syndrome, obesity who was in the [facility] with chief complaint of persistent cough over the last 6 months. Patient noted that her cough has been worse over the last 2 weeks and is largely nonproductive. She has been treated with nasal Astelin for possible postnasal drip without relief. She is not on any lisinopril or other ACE inhibitor. She is a distant former smoker approximately 15 years ×1.5 packs per day, quit in her mid 35s. She has used inhaled bronchodilators in the past but is not on any maintenance inhaled cortisone steroid. She says she has been having associated wheezing and shortness of breath with any exertion. She estimates she cannot walk across a room without stopping due to shortness of breath. PAST MEDICAL HISTORY:  Past Medical History:   Diagnosis Date    Acute renal failure (ARF) ([facility] [address] May 4, 2015    d/t IV dye    Arthritis     Asthma     DDD (degenerative disc disease), cervical     Degenerative disc disease     GERD (gastroesophageal reflux disease)     Hypertension     Scoliosis     Spinal stenosis      PAST SURGICAL HISTORY:  Past Surgical History:   Procedure Laterality Date    CARPAL TUNNEL RELEASE      JOINT REPLACEMENT Bilateral     ROTATOR CUFF REPAIR      3x right, 2x left       FAMILY HISTORY:  family history is not on file. SOCIAL HISTORY:   reports that she quit smoking about 20 years ago.  She has never used smokeless tobacco.    Scheduled Meds:   [START ON 5/21/2019] enoxaparin  40 mg Subcutaneous Daily    pantoprazole  40 mg Oral QAM AC    DULoxetine  120 mg Oral Daily    morphine  30 mg Oral 2 times per day    aspirin  81 mg Oral Daily    gabapentin  400 mg Oral 4x Daily    indomethacin  50 mg Oral TID WC    magnesium oxide  400 mg Oral Daily  oxybutynin  5 mg Oral BID    topiramate  50 mg Oral BID    sodium chloride flush  10 mL Intravenous 2 times per day    methylPREDNISolone  40 mg Intravenous Q6H    Followed by   Leodis Brittle ON 5/22/2019] predniSONE  40 mg Oral Daily    doxycycline hyclate  100 mg Oral Q12H    ipratropium-albuterol  1 ampule Inhalation Q4H     Continuous Infusions:    PRN Meds:  tiZANidine, oxyCODONE, albuterol, sodium chloride flush, magnesium hydroxide, ondansetron, acetaminophen    ALLERGIES:  Patient is allergic to iodides and bactrim [sulfamethoxazole-trimethoprim]. REVIEW OF SYSTEMS:  Constitutional: Negative for fever  HENT: Negative for sore throat  Eyes: Negative for redness   Respiratory: + for dyspnea,++  cough  Cardiovascular: Negative for chest pain  Gastrointestinal: Negative for vomiting, diarrhea   Genitourinary: Negative for hematuria   Musculoskeletal: Negative for arthralgias   Skin: Negative for rash  Neurological: Negative for syncope  Hematological: Negative for adenopathy  Psychiatric/Behavorial: Negative for anxiety    PHYSICAL EXAM:  Blood pressure (!) 154/88, pulse 70, temperature 97.7 °F (36.5 °C), temperature source Oral, resp. rate 16, height 5' 3\" (1.6 m), weight (!) 335 lb 3.2 oz (152 kg), last menstrual period 12/24/2010, SpO2 99 %.' on RA  Gen: No distress. Obese. Eyes: PERRL. No sclera icterus. No conjunctival injection. ENT: No discharge. Pharynx clear. Neck: Trachea midline. No obvious mass. Resp: No accessory muscle use. No crackles. Scattered exp wheezes. No rhonchi. No dullness on percussion. CV: Regular rate. Regular rhythm. No murmur or rub. No edema. Peripheral pulses are 2+. Capillary refill is less than 3 seconds. GI: Non-tender. Non-distended. No hernia. Skin: Warm and dry. No nodule on exposed extremities. Lymph: No cervical LAD. No supraclavicular LAD. M/S: No cyanosis. No joint deformity. No clubbing. Neuro: Awake. Alert. Moves all four extremities.    Psych: Oriented x 3. No anxiety. LABS:  CBC:   Recent Labs     05/19/19  1200 05/20/19  0557   WBC 7.7 7.7   HGB 13.7 13.0   HCT 40.9 38.9   MCV 94.7 94.4    242     BMP:   Recent Labs     05/19/19  1200      K 3.5      CO2 25   BUN 13   CREATININE <0.5*     LIVER PROFILE:   Recent Labs     05/19/19  1200   AST 21   ALT 19   BILITOT 0.3   ALKPHOS 110     PT/INR: No results for input(s): PROTIME, INR in the last 72 hours. APTT: No results for input(s): APTT in the last 72 hours. UA:  Recent Labs     05/19/19  1310   COLORU Yellow   PHUR 6.0   WBCUA 3-5   RBCUA 0-2   MUCUS 4+*   BACTERIA 1+*   CLARITYU Clear   SPECGRAV 1.025   LEUKOCYTESUR Negative   UROBILINOGEN 0.2   BILIRUBINUR SMALL*   BLOODU Negative   GLUCOSEU Negative     No results for input(s): PHART, NED3JGR, PO2ART in the last 72 hours. Chest imaging was reviewed by me and showed: There is bibasilar atelectasis.  The lungs are mildly hyperexpanded.  The  cardiac silhouette is within normal limits.  There is no pneumothorax or  pleural effusion. Mclaughlin Racer is mild dextroscoliosis of the thoracic spine. ASSESSMENT:  ·  Probable moderate persistent asthma with acute exacerbation  · Obesity  · Chronic pain syndrome  · Hypoxia- improving  · Former smoker    PLAN:   Continue inhaled bronchodilator therapy (albuterol)  Recommend Start inhaled corticosteroid twice daily- flovent or Qvar  Outpatient PFTs  - Advised to avoid smoking.  - Asthma education. · - Peak flow meter monitoring. Advised to keep a diary and will provide with Asthma action plan next visit.

## 2019-05-29 ENCOUNTER — OFFICE VISIT (OUTPATIENT)
Dept: INTERNAL MEDICINE CLINIC | Age: 54
End: 2019-05-29

## 2019-05-29 VITALS — WEIGHT: 293 LBS | HEIGHT: 63 IN | BODY MASS INDEX: 51.91 KG/M2

## 2019-05-29 DIAGNOSIS — K21.9 GASTROESOPHAGEAL REFLUX DISEASE WITHOUT ESOPHAGITIS: ICD-10-CM

## 2019-05-29 DIAGNOSIS — Z09 HOSPITAL DISCHARGE FOLLOW-UP: ICD-10-CM

## 2019-05-29 DIAGNOSIS — M19.90 ARTHRITIS: ICD-10-CM

## 2019-05-29 DIAGNOSIS — Z83.2 FAMILY HISTORY OF FACTOR V LEIDEN MUTATION: ICD-10-CM

## 2019-05-29 DIAGNOSIS — Z86.718 HISTORY OF DVT (DEEP VEIN THROMBOSIS): ICD-10-CM

## 2019-05-29 DIAGNOSIS — J45.30 REACTIVE AIRWAY DISEASE, MILD PERSISTENT, UNCOMPLICATED: ICD-10-CM

## 2019-05-29 DIAGNOSIS — E66.01 MORBID OBESITY WITH BMI OF 60.0-69.9, ADULT (HCC): ICD-10-CM

## 2019-05-29 DIAGNOSIS — Z76.89 ENCOUNTER TO ESTABLISH CARE: Primary | ICD-10-CM

## 2019-05-29 DIAGNOSIS — R03.0 ELEVATED BP WITHOUT DIAGNOSIS OF HYPERTENSION: ICD-10-CM

## 2019-05-29 DIAGNOSIS — G89.4 CHRONIC PAIN SYNDROME: ICD-10-CM

## 2019-05-29 PROBLEM — J45.901 EXACERBATION OF ASTHMA: Status: RESOLVED | Noted: 2019-05-19 | Resolved: 2019-05-29

## 2019-05-29 PROCEDURE — 1111F DSCHRG MED/CURRENT MED MERGE: CPT | Performed by: PHYSICIAN ASSISTANT

## 2019-05-29 PROCEDURE — 99214 OFFICE O/P EST MOD 30 MIN: CPT | Performed by: PHYSICIAN ASSISTANT

## 2019-05-29 NOTE — PROGRESS NOTES
Chief Complaint   Patient presents with   [address] 1905 Carlos Chadwick is a 52 y.o. female who presents to [facility] care    -Previously saw Dr. Sedrick Telles  -Has several orthopedic chronic issues and is in pain management  -Has been coughing for the past 6 months, was admitted at [facility] as cough worsened and became dyspneic. Seen by pulm. Suspected asthma.  dc'd on prednisone and Advair. She is seeing pulm for PFT soon. Breathing better. 3 days left of prednisone     Sees Dr. Fide Linares- GYN  HPV +- due for repeat pap now, has appt scheduled    Mammogram was normal- per her report- in 2018. Will see Annia Yoder soon who orders her mammograms    C scope and EGD 2017  Findings:  Duodenum: Normal - biopsied  Stomach: Normal  Esophagus: Normal  Colon: as above  Retroflexion: as above    Small bowel biopsy:  -          Benign small bowel mucosa with no significant diagnostic abnormality;       see comment. Comment(s)  The small bowel biopsy shows normal villous architecture and no blunting or  atrophy.  There is no significant increase of mucosal inflammation or  intraepithelial lymphocytes.  No histiocytic infiltrate is seen and no parasitic  organisms are identified.      Review of Systems      Allergies  Iodides and Bactrim [sulfamethoxazole-trimethoprim]      Vitals  Ht 5' 3\" (1.6 m)   Wt (!) 344 lb (156 kg)   LMP 12/24/2010   BMI 60.94 kg/m²     Current Medications  Current Outpatient Medications   Medication Sig Dispense Refill    predniSONE (DELTASONE) 20 MG tablet 2 qd- 3 days, 1 q/2 qd- 3 days,1 qd- 3 days,1/2 qd- 3 days 15 tablet 0    fluticasone-salmeterol (ADVAIR DISKUS) 250-50 MCG/DOSE AEPB Inhale 1 puff into the lungs every 12 hours 1 Inhaler 0    benzonatate (TESSALON) 200 MG capsule Take 1 capsule by mouth 3 times daily as needed for Cough 20 capsule 0    morphine-naltrexone (EMBEDA) 30-1.2 MG CPCR Take 1 capsule by mouth every 12 hours.       aspirin 81 MG chewable tablet Take 81 mg by mouth daily      gabapentin (NEURONTIN) 400 MG capsule Take 400 mg by mouth 4 times daily.  indomethacin (INDOCIN) 50 MG capsule Take 50 mg by mouth 3 times daily (with meals)      magnesium 30 MG tablet Take 30 mg by mouth daily      oxybutynin (DITROPAN) 5 MG tablet Take 5 mg by mouth 2 times daily      topiramate (TOPAMAX SPRINKLE) 25 MG capsule Take 100 mg by mouth nightly Indications: 50 mg at noon, 100 mg at HS       albuterol sulfate  (90 Base) MCG/ACT inhaler Inhale 2 puffs into the lungs every 6 hours as needed for Wheezing      tiZANidine (ZANAFLEX) 4 MG tablet Take 4-8 mg by mouth nightly       oxyCODONE (ROXICODONE) 5 MG immediate release tablet Take 5 mg by mouth every 12 hours as needed for Pain.  DULoxetine (CYMBALTA) 60 MG capsule Take 120 mg by mouth daily       omeprazole (PRILOSEC) 20 MG capsule Take 20 mg by mouth daily. No current facility-administered medications for this visit.         Past Medical History  Past Medical History:   Diagnosis Date    Acute renal failure (ARF) ([facility] [address] May 4, 2015    d/t IV dye    Arthritis     Asthma     DDD (degenerative disc disease), cervical     Degenerative disc disease     GERD (gastroesophageal reflux disease)     Hypertension     Scoliosis     Spinal stenosis        Social History  Social History     Socioeconomic History    Marital status:      Spouse name: Not on file    Number of children: Not on file    Years of education: Not on file    Highest education level: Not on file   Occupational History    Not on file   Social Needs    Financial resource strain: Not on file    Food insecurity:     Worry: Not on file     Inability: Not on file    Transportation needs:     Medical: Not on file     Non-medical: Not on file   Tobacco Use    Smoking status: Former Smoker     Last attempt to quit:      Years since quittin.4    Smokeless tobacco: Never Used   Substance and Sexual reviewed. Assessment/Plan     1. Encounter to establish care  2. Hospital discharge follow-up    3. Reactive airway disease, mild persistent, uncomplicated  - no issues. Has pulm appt and PFTs scheduled   - OK DISCHARGE MEDS RECONCILED W/ CURRENT OUTPATIENT MED LIST    4. History of DVT (deep vein thrombosis)  - treated with eliquis remotely, no recurrence     5. Gastroesophageal reflux disease without esophagitis  - on PPI    6. Arthritis  - multiple joints involved, follows with [facility], has had many operations  - takes indomethacin TID as it is the only NSAID that works    - weight loss would help- see below     7. Morbid obesity with BMI of 60.0-69.9, adult [facility])  - referral to bariatric surgery   - oClt Daley MD, Bariatric Surgery, [facility]  - BASIC METABOLIC PANEL; Future  - TSH with Reflex; Future  - LIPID PANEL; Future    8. Elevated BP without diagnosis of hypertension  - well controlled during admission. Obese arm may lead to inaccurate readings. Repeat in 2 weeks     9. Family history of Factor V Leiden  - she states she is negative, but has remained on ASA 81 mg daily   - testing was done by prior PCP, will need to obtain results     10.  Chronic pain with opiate dependence  - sees pain management     Reviewed CBC and liver function from recent admission  Check fasting labs now   See GYN for PAP and mammogram  C scope in 2017 see HPI    Referral to bariatric surgery     Jacky Rascon PA-C  5/29/2019 5:33 PM

## 2019-05-31 ENCOUNTER — TELEPHONE (OUTPATIENT)
Dept: INTERNAL MEDICINE CLINIC | Age: 54
End: 2019-05-31

## 2019-05-31 RX ORDER — FUROSEMIDE 20 MG/1
20 TABLET ORAL DAILY
Qty: 5 TABLET | Refills: 0 | Status: SHIPPED | OUTPATIENT
Start: 2019-05-31 | End: 2019-06-01 | Stop reason: SDUPTHER

## 2019-05-31 NOTE — TELEPHONE ENCOUNTER
----- Message from Viet Agudelo PA-C sent at 5/31/2019  3:06 PM EDT -----  Contact: pt- [phone]  Can you please send rx to her pharmacy:    Furosemide 20 mg daily for 5 days, dispense 5 with zero refills    Tell her to eat a banana each day while taking the lasix.        ----- Message -----  From: Melody Aragon  Sent: 5/31/2019   9:37 AM  To: Viet Agudelo PA-C    She was a new office pt for you on 5-29-19-has new pt appt with dr chester on 8-12-19-she said she has gained 14lbs of fluid weight since she has been on the prednisone for her breathing- wanted to know if you would prescribe her furosemide,which she had been on previously-[facility] in [place] pharm on [address]-please let her know if you will do this at [phone]

## 2019-06-03 RX ORDER — FUROSEMIDE 20 MG/1
TABLET ORAL
Qty: 5 TABLET | Refills: 0 | Status: SHIPPED | OUTPATIENT
Start: 2019-06-03 | End: 2019-06-13 | Stop reason: SDUPTHER

## 2019-06-11 ENCOUNTER — OFFICE VISIT (OUTPATIENT)
Dept: INTERNAL MEDICINE CLINIC | Age: 54
End: 2019-06-11

## 2019-06-11 VITALS
BODY MASS INDEX: 51.91 KG/M2 | HEIGHT: 63 IN | RESPIRATION RATE: 18 BRPM | HEART RATE: 70 BPM | WEIGHT: 293 LBS | DIASTOLIC BLOOD PRESSURE: 75 MMHG | SYSTOLIC BLOOD PRESSURE: 120 MMHG

## 2019-06-11 DIAGNOSIS — L23.7 POISON IVY DERMATITIS: Primary | ICD-10-CM

## 2019-06-11 PROCEDURE — 99213 OFFICE O/P EST LOW 20 MIN: CPT | Performed by: INTERNAL MEDICINE

## 2019-06-11 RX ORDER — PREDNISONE 10 MG/1
TABLET ORAL
Qty: 18 TABLET | Refills: 0 | Status: SHIPPED | OUTPATIENT
Start: 2019-06-11 | End: 2019-07-09 | Stop reason: CLARIF

## 2019-06-11 NOTE — PROGRESS NOTES
Subjective:      Patient ID: Arin Nicole is a 52 y.o. female. HPI     52 y.o. female with known hx of asthma, morbid obesity , chronic pain syndrome on opiates here for new skin rash    Reports she came into contact with poison ivy few days ago and now has erythematous patchy itchy rash on face, neck and chest  Has similar hx in the past  Tried OTC med with no benefit    Has several orthopedic chronic issues and is in pain management  -Has been coughing for the past 6 months, was admitted at [facility] as cough worsened and became dyspneic. Seen by pulm. Suspected asthma.  dc'd on prednisone and Advair. She is seeing pulm for PFT soon. Breathing better    Hx of left LE DVT - 2015 - used NOAC for 6 months    Review of Systems    As above  INITIAL PLAN OF CARE CERTIFICATION:  Routed evaluation note to referring physician. Therapy evaluation and resulting plan of care must be approved. Please co-sign evaluation to indicate your approval.    Objective:   Physical Exam   Vitals:    06/11/19 1451   BP: 120/75   Pulse: 70   Resp: 18         General:  Awake, alert and oriented. Appears to be not in any distress  Mucous Membranes:  Pink , anicteric  Neck: No JVD, no carotid bruit, no thyromegaly  Chest:  Clear to auscultation bilaterally, no added sounds  Cardiovascular:  RRR S1S2 heard, no murmurs or gallops  Abdomen:  Soft, undistended, non tender, no organomegaly, BS present  Extremities: patchy erthematous macules on face, neck and chest noted  No edema or cyanosis. Distal pulses well felt  Neurological : grossly normal        Assessment:       Diagnosis Orders   1. Poison ivy dermatitis             Plan:       Poison IVY dermatitis   - start prednsione taper  - benadryl prn   - lasix as needed for prednisone induced peripheral edema        Reactive airway disease, mild persistent, uncomplicated  - no issues.   Has pulm appt and PFTs scheduled         History of DVT (deep vein thrombosis)  - treated with eliquis  In 2015- no recurrence      Gastroesophageal reflux disease without esophagitis  - on PPI      Arthritis  - multiple joints involved, follows with [facility], has had many operations  - takes indomethacin TID as it is the only NSAID that works    - weight loss would help- see below      Morbid obesity with BMI of 60.0-69.9, adult ([facility] [address]  - referral to bariatric surgery given last time                Dieter Yanez MD

## 2019-06-13 ENCOUNTER — TELEPHONE (OUTPATIENT)
Dept: INTERNAL MEDICINE CLINIC | Age: 54
End: 2019-06-13

## 2019-06-13 RX ORDER — FUROSEMIDE 20 MG/1
40 TABLET ORAL SEE ADMIN INSTRUCTIONS
Qty: 30 TABLET | Refills: 0 | Status: SHIPPED | OUTPATIENT
Start: 2019-06-13 | End: 2019-06-29 | Stop reason: SDUPTHER

## 2019-06-13 NOTE — TELEPHONE ENCOUNTER
----- Message from Ata Davis sent at 6/13/2019 11:08 AM EDT -----  Contact: [phone]      ----- Message -----  From: Camille Blackwood MD  Sent: 6/13/2019   9:54 AM  To: Abner Chakraborty    I changed to 40 mg prn edema    ----- Message -----  From: Abner Chakraborty  Sent: 6/12/2019  12:44 PM  To: Camille Blackwood MD    Pt thought you said you wanted her on a stronger dose of lasix 20 mg but nothing was sent in. Please advise.

## 2019-06-19 RX ORDER — INDOMETHACIN 50 MG/1
50 CAPSULE ORAL 3 TIMES DAILY PRN
Qty: 90 CAPSULE | Refills: 0 | Status: SHIPPED | OUTPATIENT
Start: 2019-06-19 | End: 2019-07-19 | Stop reason: SDUPTHER

## 2019-06-19 NOTE — TELEPHONE ENCOUNTER
----- Message from Kwaku Gomez MD sent at 6/19/2019  8:54 AM EDT -----  Contact: pt called [phone]  50 mg tid prn # 80 NRF   ----- Message -----  From: Mindi Rodriguez  Sent: 6/18/2019   2:47 PM  To: Kwaku Gomez MD    Pt is a new pt of yours -she's seen [place] and dr. Jaja Lewis. She needs a refill of her indomethacin. She would like that sent to [facility] in [facility] [address] (Phone). Next meredith 8/12.

## 2019-07-01 RX ORDER — FUROSEMIDE 20 MG/1
TABLET ORAL
Qty: 30 TABLET | Refills: 0 | Status: SHIPPED | OUTPATIENT
Start: 2019-07-01 | End: 2019-08-12 | Stop reason: SDUPTHER

## 2019-07-05 DIAGNOSIS — Z76.89 ENCOUNTER TO ESTABLISH CARE: ICD-10-CM

## 2019-07-05 DIAGNOSIS — E66.01 MORBID OBESITY WITH BMI OF 60.0-69.9, ADULT (HCC): ICD-10-CM

## 2019-07-06 LAB
ANION GAP SERPL CALCULATED.3IONS-SCNC: 15 MMOL/L (ref 3–16)
BUN BLDV-MCNC: 11 MG/DL (ref 7–20)
CALCIUM SERPL-MCNC: 9.6 MG/DL (ref 8.3–10.6)
CHLORIDE BLD-SCNC: 101 MMOL/L (ref 99–110)
CHOLESTEROL, TOTAL: 200 MG/DL (ref 0–199)
CO2: 25 MMOL/L (ref 21–32)
CREAT SERPL-MCNC: 0.5 MG/DL (ref 0.6–1.1)
GFR AFRICAN AMERICAN: >60
GFR NON-AFRICAN AMERICAN: >60
GLUCOSE BLD-MCNC: 103 MG/DL (ref 70–99)
HDLC SERPL-MCNC: 95 MG/DL (ref 40–60)
LDL CHOLESTEROL CALCULATED: 92 MG/DL
POTASSIUM SERPL-SCNC: 4 MMOL/L (ref 3.5–5.1)
SODIUM BLD-SCNC: 141 MMOL/L (ref 136–145)
TRIGL SERPL-MCNC: 66 MG/DL (ref 0–150)
TSH REFLEX: 1.98 UIU/ML (ref 0.27–4.2)
VLDLC SERPL CALC-MCNC: 13 MG/DL

## 2019-07-08 ENCOUNTER — HOSPITAL ENCOUNTER (OUTPATIENT)
Dept: PULMONOLOGY | Age: 54
Discharge: HOME | End: 2019-07-08
Payer: MEDICARE

## 2019-07-08 VITALS — OXYGEN SATURATION: 95 %

## 2019-07-08 LAB
DLCO %PRED: 88 %
DLCO PRED: NORMAL ML/MIN/MMHG
DLCO/VA %PRED: NORMAL %
DLCO/VA PRED: NORMAL ML/MIN/MMHG
DLCO/VA: NORMAL ML/MIN/MMHG
DLCO: NORMAL ML/MIN/MMHG
EXPIRATORY TIME: NORMAL SEC
FEF 25-75% %PRED-PRE: NORMAL L/SEC
FEF 25-75% PRED: NORMAL L/SEC
FEF 25-75%-PRE: NORMAL L/SEC
FEV1 %PRED-PRE: 84 %
FEV1 PRED: NORMAL L
FEV1/FVC %PRED-PRE: NORMAL %
FEV1/FVC PRED: NORMAL %
FEV1/FVC: 87 %
FEV1: NORMAL L
FVC %PRED-PRE: NORMAL %
FVC PRED: NORMAL L
FVC: NORMAL L
GAW %PRED: NORMAL %
GAW PRED: NORMAL L/S/CMH2O
GAW: NORMAL L/S/CMH2O
IC %PRED: NORMAL %
IC PRED: NORMAL L
IC: NORMAL L
MVV %PRED-PRE: NORMAL %
MVV PRED: NORMAL L/MIN
MVV-PRE: NORMAL L/MIN
PEF %PRED-PRE: NORMAL L/SEC
PEF PRED: NORMAL L/SEC
PEF-PRE: NORMAL L/SEC
RAW %PRED: NORMAL %
RAW PRED: NORMAL CMH2O/L/S
RAW: NORMAL CMH2O/L/S
RV %PRED: NORMAL %
RV PRED: NORMAL L
RV: NORMAL L
SVC %PRED: NORMAL %
SVC PRED: NORMAL L
SVC: NORMAL L
TLC %PRED: 81 %
TLC PRED: NORMAL L
TLC: NORMAL L
VA %PRED: NORMAL %
VA PRED: NORMAL L
VA: NORMAL L
VTG %PRED: NORMAL %
VTG PRED: NORMAL L
VTG: NORMAL L

## 2019-07-08 PROCEDURE — 94729 DIFFUSING CAPACITY: CPT

## 2019-07-08 PROCEDURE — 94727 GAS DIL/WSHOT DETER LNG VOL: CPT

## 2019-07-08 PROCEDURE — 94760 N-INVAS EAR/PLS OXIMETRY 1: CPT

## 2019-07-08 PROCEDURE — 94010 BREATHING CAPACITY TEST: CPT

## 2019-07-08 ASSESSMENT — PULMONARY FUNCTION TESTS
FEV1_PERCENT_PREDICTED_PRE: 84
FEV1/FVC: 87

## 2019-07-09 ENCOUNTER — OFFICE VISIT (OUTPATIENT)
Dept: PULMONOLOGY | Age: 54
End: 2019-07-09
Payer: MEDICARE

## 2019-07-09 VITALS
OXYGEN SATURATION: 93 % | WEIGHT: 293 LBS | DIASTOLIC BLOOD PRESSURE: 80 MMHG | HEIGHT: 63 IN | SYSTOLIC BLOOD PRESSURE: 138 MMHG | RESPIRATION RATE: 18 BRPM | HEART RATE: 76 BPM | BODY MASS INDEX: 51.91 KG/M2

## 2019-07-09 DIAGNOSIS — J45.40 MODERATE PERSISTENT ASTHMA WITHOUT COMPLICATION: Primary | ICD-10-CM

## 2019-07-09 PROCEDURE — 99214 OFFICE O/P EST MOD 30 MIN: CPT | Performed by: INTERNAL MEDICINE

## 2019-07-10 ENCOUNTER — TELEPHONE (OUTPATIENT)
Dept: INTERNAL MEDICINE CLINIC | Age: 54
End: 2019-07-10

## 2019-07-10 NOTE — TELEPHONE ENCOUNTER
----- Message from Mikaela Ya sent at 7/10/2019  1:07 PM EDT -----  Contact: Patient [phone]  Pt checking to see when she can come in and plans to call back. ----- Message -----  From: Cordelia Toribio MD  Sent: 7/10/2019  11:53 AM  To: Arlene Ahumada, MD    Make appt with Nicolasa Arguello   ----- Message -----  From: Arlene Ahumada, MD  Sent: 7/10/2019  10:15 AM  To: Cordelia Toribio MD        ----- Message -----  From: Maria Eugenia Baltazar  Sent: 7/9/2019   3:50 PM  To: Arlene Ahumada, MD    Patient saw you recently you did an EKG on patient and told her that she had an irregular heart beat, you asked her if she ever had chest pain and she told you no. Patient states that she is on Omeprazole for GERD and occasionally gets indigestion in her chest.  Patient states yesterday she had an intense chest pain that didn't seem like indigestion. Patient wants to know what you think? Should she schedule an appointment now?   Has an appointment scheduled with Dr Mary Jane Chavez for 8/12

## 2019-07-11 ENCOUNTER — OFFICE VISIT (OUTPATIENT)
Dept: INTERNAL MEDICINE CLINIC | Age: 54
End: 2019-07-11

## 2019-07-11 VITALS
HEIGHT: 63 IN | DIASTOLIC BLOOD PRESSURE: 60 MMHG | SYSTOLIC BLOOD PRESSURE: 115 MMHG | WEIGHT: 293 LBS | BODY MASS INDEX: 51.91 KG/M2 | HEART RATE: 70 BPM

## 2019-07-11 DIAGNOSIS — R07.9 CHEST PAIN, UNSPECIFIED TYPE: Primary | ICD-10-CM

## 2019-07-11 PROCEDURE — 99213 OFFICE O/P EST LOW 20 MIN: CPT | Performed by: PHYSICIAN ASSISTANT

## 2019-07-11 PROCEDURE — 93000 ELECTROCARDIOGRAM COMPLETE: CPT | Performed by: PHYSICIAN ASSISTANT

## 2019-07-11 ASSESSMENT — ENCOUNTER SYMPTOMS
TROUBLE SWALLOWING: 0
NAUSEA: 0
SHORTNESS OF BREATH: 1
SORE THROAT: 0
RHINORRHEA: 0
ABDOMINAL PAIN: 0
VOMITING: 0

## 2019-07-11 NOTE — PROGRESS NOTES
file   Relationships    Social connections:     Talks on phone: Not on file     Gets together: Not on file     Attends Taoist service: Not on file     Active member of club or organization: Not on file     Attends meetings of clubs or organizations: Not on file     Relationship status: Not on file    Intimate partner violence:     Fear of current or ex partner: Not on file     Emotionally abused: Not on file     Physically abused: Not on file     Forced sexual activity: Not on file   Other Topics Concern    Not on file   Social History Narrative    Not on file       Surgical History  Past Surgical History:   Procedure Laterality Date    CARPAL TUNNEL RELEASE      JOINT REPLACEMENT Bilateral     [address]      3x right, 2x left       Physical Exam   Constitutional: She is oriented to person, place, and time. She appears well-developed and well-nourished. HENT:   Head: Normocephalic and atraumatic. Eyes: Conjunctivae are normal. Right eye exhibits no discharge. Left eye exhibits no discharge. Neck: Normal range of motion. Neck supple. Cardiovascular: Normal rate and regular rhythm. Pulmonary/Chest: Effort normal and breath sounds normal. She exhibits no tenderness. Musculoskeletal: Normal range of motion. Neurological: She is alert and oriented to person, place, and time. Skin: Skin is warm and dry. Psychiatric: She has a normal mood and affect. Vitals reviewed. Assessment/Plan     1. Chest pain, unspecified type  - EKG is SR with PAC  - Stress test, myoview; Future  - go to ER for severe symptoms.   Further w/u pending results of stress susie t    Mitra Fields PA-C  7/11/2019 3:58 PM

## 2019-07-19 RX ORDER — INDOMETHACIN 50 MG/1
50 CAPSULE ORAL 3 TIMES DAILY PRN
Qty: 90 CAPSULE | Refills: 0 | Status: SHIPPED | OUTPATIENT
Start: 2019-07-19 | End: 2019-08-20 | Stop reason: SDUPTHER

## 2019-08-12 ENCOUNTER — OFFICE VISIT (OUTPATIENT)
Dept: INTERNAL MEDICINE CLINIC | Age: 54
End: 2019-08-12

## 2019-08-12 VITALS
HEART RATE: 84 BPM | WEIGHT: 293 LBS | DIASTOLIC BLOOD PRESSURE: 80 MMHG | BODY MASS INDEX: 51.91 KG/M2 | HEIGHT: 63 IN | SYSTOLIC BLOOD PRESSURE: 124 MMHG

## 2019-08-12 DIAGNOSIS — E66.01 MORBID OBESITY WITH BMI OF 60.0-69.9, ADULT (HCC): ICD-10-CM

## 2019-08-12 DIAGNOSIS — N39.41 URINARY INCONTINENCE, URGE: ICD-10-CM

## 2019-08-12 DIAGNOSIS — K21.9 GASTROESOPHAGEAL REFLUX DISEASE WITHOUT ESOPHAGITIS: ICD-10-CM

## 2019-08-12 DIAGNOSIS — J45.41 ALLERGIC ASTHMA, MODERATE PERSISTENT, WITH ACUTE EXACERBATION: Primary | ICD-10-CM

## 2019-08-12 PROCEDURE — 99214 OFFICE O/P EST MOD 30 MIN: CPT | Performed by: INTERNAL MEDICINE

## 2019-08-12 RX ORDER — FUROSEMIDE 20 MG/1
TABLET ORAL
Qty: 30 TABLET | Refills: 2 | Status: SHIPPED | OUTPATIENT
Start: 2019-08-12 | End: 2019-11-15 | Stop reason: SDUPTHER

## 2019-08-12 ASSESSMENT — ENCOUNTER SYMPTOMS
NAUSEA: 0
BLOOD IN STOOL: 0
VOMITING: 0
WHEEZING: 0
COUGH: 0
DIARRHEA: 0
ABDOMINAL PAIN: 0
SHORTNESS OF BREATH: 0
CHEST TIGHTNESS: 0

## 2019-08-12 NOTE — PROGRESS NOTES
Urinary incontinence, urge             Plan:      Asthma is stable. Continue inhalers. Sees pulm. Advised to cut down on calories. Lose weight. GERD is stable. Continue PPI. OA/chronic pain with opiate dependence  Continue indomethacin. Sees pain physician and takes multiple other meds. Urinary urge incontinence. Continue ditropan.     Last colonoscopy 11/2017    Mammogram 7/2018          Naomi Ibrahim MD

## 2019-08-21 RX ORDER — INDOMETHACIN 50 MG/1
CAPSULE ORAL
Qty: 90 CAPSULE | Refills: 0 | Status: SHIPPED | OUTPATIENT
Start: 2019-08-21 | End: 2019-09-20 | Stop reason: SDUPTHER

## 2019-09-18 ENCOUNTER — TELEPHONE (OUTPATIENT)
Dept: INTERNAL MEDICINE CLINIC | Age: 54
End: 2019-09-18

## 2019-09-18 NOTE — TELEPHONE ENCOUNTER
----- Message from Sandhya Woody MD sent at 9/18/2019  2:21 PM EDT -----  Contact: Patient [phone]  Ask her to find plastic surgeons in her insurance book  ----- Message -----  From: Leandra Newman MA  Sent: 9/18/2019   1:35 PM EDT  To: Sandhya Woody MD    Patient states she has lost a lot of weight and is starting to sag. She says she is going to need skin removal surgery at some point in time. Wants to know who she would see for this? Please advise.

## 2019-09-20 RX ORDER — INDOMETHACIN 50 MG/1
CAPSULE ORAL
Qty: 90 CAPSULE | Refills: 1 | Status: SHIPPED | OUTPATIENT
Start: 2019-09-20 | End: 2019-10-23 | Stop reason: SDUPTHER

## 2019-10-23 RX ORDER — INDOMETHACIN 50 MG/1
CAPSULE ORAL
Qty: 90 CAPSULE | Refills: 2 | Status: SHIPPED | OUTPATIENT
Start: 2019-10-23 | End: 2020-04-07

## 2019-11-15 RX ORDER — FUROSEMIDE 20 MG/1
TABLET ORAL
Qty: 30 TABLET | Refills: 2 | Status: SHIPPED | OUTPATIENT
Start: 2019-11-15 | End: 2020-02-17

## 2019-11-18 ENCOUNTER — OFFICE VISIT (OUTPATIENT)
Dept: INTERNAL MEDICINE CLINIC | Age: 54
End: 2019-11-18

## 2019-11-18 VITALS
HEART RATE: 80 BPM | WEIGHT: 293 LBS | BODY MASS INDEX: 51.91 KG/M2 | HEIGHT: 63 IN | SYSTOLIC BLOOD PRESSURE: 130 MMHG | DIASTOLIC BLOOD PRESSURE: 82 MMHG

## 2019-11-18 DIAGNOSIS — E66.01 MORBID OBESITY WITH BMI OF 60.0-69.9, ADULT (HCC): ICD-10-CM

## 2019-11-18 DIAGNOSIS — N39.41 URINARY INCONTINENCE, URGE: ICD-10-CM

## 2019-11-18 DIAGNOSIS — Z00.00 ROUTINE GENERAL MEDICAL EXAMINATION AT A HEALTH CARE FACILITY: Primary | ICD-10-CM

## 2019-11-18 DIAGNOSIS — G89.4 CHRONIC PAIN SYNDROME: ICD-10-CM

## 2019-11-18 DIAGNOSIS — J45.41 ALLERGIC ASTHMA, MODERATE PERSISTENT, WITH ACUTE EXACERBATION: ICD-10-CM

## 2019-11-18 PROBLEM — J45.30 REACTIVE AIRWAY DISEASE, MILD PERSISTENT, UNCOMPLICATED: Status: RESOLVED | Noted: 2019-05-19 | Resolved: 2019-11-18

## 2019-11-18 PROCEDURE — G0439 PPPS, SUBSEQ VISIT: HCPCS | Performed by: INTERNAL MEDICINE

## 2019-11-18 ASSESSMENT — PATIENT HEALTH QUESTIONNAIRE - PHQ9: SUM OF ALL RESPONSES TO PHQ QUESTIONS 1-9: 6

## 2019-11-18 ASSESSMENT — LIFESTYLE VARIABLES
HOW OFTEN DO YOU HAVE A DRINK CONTAINING ALCOHOL: 3
HOW MANY STANDARD DRINKS CONTAINING ALCOHOL DO YOU HAVE ON A TYPICAL DAY: 0
HOW OFTEN DO YOU HAVE SIX OR MORE DRINKS ON ONE OCCASION: 0
AUDIT-C TOTAL SCORE: 3

## 2019-11-26 ENCOUNTER — OFFICE VISIT (OUTPATIENT)
Dept: PULMONOLOGY | Age: 54
End: 2019-11-26
Payer: MEDICARE

## 2019-11-26 VITALS
BODY MASS INDEX: 51.91 KG/M2 | HEART RATE: 98 BPM | DIASTOLIC BLOOD PRESSURE: 90 MMHG | RESPIRATION RATE: 17 BRPM | WEIGHT: 293 LBS | OXYGEN SATURATION: 95 % | HEIGHT: 63 IN | SYSTOLIC BLOOD PRESSURE: 136 MMHG

## 2019-11-26 DIAGNOSIS — J45.20 INTERMITTENT ASTHMA WITHOUT COMPLICATION, UNSPECIFIED ASTHMA SEVERITY: Primary | ICD-10-CM

## 2019-11-26 PROCEDURE — 99213 OFFICE O/P EST LOW 20 MIN: CPT | Performed by: INTERNAL MEDICINE

## 2019-12-11 RX ORDER — OMEPRAZOLE 20 MG/1
20 CAPSULE, DELAYED RELEASE ORAL
Qty: 30 CAPSULE | Refills: 1 | Status: SHIPPED | OUTPATIENT
Start: 2019-12-11 | End: 2020-02-05

## 2019-12-20 ENCOUNTER — OFFICE VISIT (OUTPATIENT)
Dept: INTERNAL MEDICINE CLINIC | Age: 54
End: 2019-12-20

## 2019-12-20 VITALS
SYSTOLIC BLOOD PRESSURE: 124 MMHG | BODY MASS INDEX: 51.91 KG/M2 | DIASTOLIC BLOOD PRESSURE: 78 MMHG | HEART RATE: 68 BPM | HEIGHT: 63 IN | WEIGHT: 293 LBS

## 2019-12-20 DIAGNOSIS — Z01.818 PRE-OPERATIVE GENERAL PHYSICAL EXAMINATION: ICD-10-CM

## 2019-12-20 DIAGNOSIS — J45.41 ALLERGIC ASTHMA, MODERATE PERSISTENT, WITH ACUTE EXACERBATION: ICD-10-CM

## 2019-12-20 DIAGNOSIS — E66.01 MORBID OBESITY WITH BMI OF 60.0-69.9, ADULT (HCC): ICD-10-CM

## 2019-12-20 DIAGNOSIS — M17.11 PRIMARY OSTEOARTHRITIS OF RIGHT KNEE: Primary | ICD-10-CM

## 2019-12-20 PROCEDURE — 99214 OFFICE O/P EST MOD 30 MIN: CPT | Performed by: INTERNAL MEDICINE

## 2019-12-20 ASSESSMENT — PATIENT HEALTH QUESTIONNAIRE - PHQ9
SUM OF ALL RESPONSES TO PHQ9 QUESTIONS 1 & 2: 0
1. LITTLE INTEREST OR PLEASURE IN DOING THINGS: 0
SUM OF ALL RESPONSES TO PHQ QUESTIONS 1-9: 0
2. FEELING DOWN, DEPRESSED OR HOPELESS: 0
SUM OF ALL RESPONSES TO PHQ QUESTIONS 1-9: 0

## 2020-02-05 RX ORDER — OMEPRAZOLE 20 MG/1
CAPSULE, DELAYED RELEASE ORAL
Qty: 30 CAPSULE | Refills: 2 | Status: SHIPPED | OUTPATIENT
Start: 2020-02-05 | End: 2020-05-14

## 2020-02-17 RX ORDER — FUROSEMIDE 20 MG/1
TABLET ORAL
Qty: 30 TABLET | Refills: 1 | Status: SHIPPED | OUTPATIENT
Start: 2020-02-17 | End: 2020-04-27

## 2020-02-19 ENCOUNTER — TELEPHONE (OUTPATIENT)
Dept: INTERNAL MEDICINE CLINIC | Age: 55
End: 2020-02-19

## 2020-02-19 RX ORDER — AMOXICILLIN AND CLAVULANATE POTASSIUM 500; 125 MG/1; MG/1
1 TABLET, FILM COATED ORAL 3 TIMES DAILY
Qty: 15 TABLET | Refills: 0 | Status: SHIPPED | OUTPATIENT
Start: 2020-02-19 | End: 2020-02-24

## 2020-02-19 NOTE — TELEPHONE ENCOUNTER
----- Message from Tiff Paul MD sent at 2/19/2020  2:44 PM EST -----  Contact: [phone]  Augmentin 500 tid # 15   ----- Message -----  From: Matt Bajwa  Sent: 2/19/2020   2:05 PM EST  To: Tiff Paul MD    Pt called because her cat bit her last Thursday 2/13 on the back of her hand and it is swollen, there has been some drainage and its sore. She has not had a fever and she stated there is no streaking. The home care nurse checked her vitals today and everything was fine but advised her to call the office and let you know what had happened. Pt wanted to know if she should take an antibiotic for this? Please advise.      [address] [facility], [address] [place] [address],[facility] [address]

## 2020-02-20 RX ORDER — ALBUTEROL SULFATE 90 UG/1
2 AEROSOL, METERED RESPIRATORY (INHALATION) EVERY 6 HOURS PRN
Qty: 1 INHALER | Refills: 5 | Status: SHIPPED | OUTPATIENT
Start: 2020-02-20 | End: 2021-03-18

## 2020-04-07 RX ORDER — INDOMETHACIN 50 MG/1
CAPSULE ORAL
Qty: 90 CAPSULE | Refills: 0 | Status: SHIPPED | OUTPATIENT
Start: 2020-04-07 | End: 2020-04-18

## 2020-04-08 ENCOUNTER — TELEPHONE (OUTPATIENT)
Dept: INTERNAL MEDICINE CLINIC | Age: 55
End: 2020-04-08

## 2020-04-08 RX ORDER — BENZONATATE 100 MG/1
100 CAPSULE ORAL 3 TIMES DAILY PRN
Qty: 21 CAPSULE | Refills: 0 | Status: SHIPPED | OUTPATIENT
Start: 2020-04-08 | End: 2020-04-15

## 2020-04-17 ENCOUNTER — VIRTUAL VISIT (OUTPATIENT)
Dept: INTERNAL MEDICINE CLINIC | Age: 55
End: 2020-04-17

## 2020-04-17 PROCEDURE — 99213 OFFICE O/P EST LOW 20 MIN: CPT | Performed by: INTERNAL MEDICINE

## 2020-04-17 RX ORDER — METHYLPREDNISOLONE 4 MG/1
TABLET ORAL
Qty: 1 KIT | Refills: 0 | Status: SHIPPED | OUTPATIENT
Start: 2020-04-17 | End: 2020-06-15

## 2020-04-17 RX ORDER — AMOXICILLIN 500 MG/1
500 CAPSULE ORAL 3 TIMES DAILY
Qty: 15 CAPSULE | Refills: 0 | Status: SHIPPED | OUTPATIENT
Start: 2020-04-17 | End: 2020-04-22

## 2020-04-17 NOTE — PROGRESS NOTES
(ROXICODONE) 5 MG immediate release tablet Take 5 mg by mouth every 12 hours as needed for Pain. Historical Provider, MD   DULoxetine (CYMBALTA) 60 MG capsule Take 120 mg by mouth daily   Historical Provider, MD       Social History     Tobacco Use    Smoking status: Former Smoker     Packs/day: 1.50     Years: 20.00     Pack years: 30.00     Types: Cigarettes     Last attempt to quit:      Years since quittin.3    Smokeless tobacco: Never Used   Substance Use Topics    Alcohol use: Yes     Comment: 2 drinks/day    Drug use: No        Allergies   Allergen Reactions    Iodides Anaphylaxis     Rash, hives, swelling    Bactrim [Sulfamethoxazole-Trimethoprim]      Renal failure   ,   Past Medical History:   Diagnosis Date    [facility]    d/t IV dye    Arthritis     Asthma     DDD (degenerative disc disease), cervical     Degenerative disc disease     GERD (gastroesophageal reflux disease)     Hypertension     Scoliosis     Spinal stenosis        PHYSICAL EXAMINATION:  [ INSTRUCTIONS:  \"[x]\" Indicates a positive item  \"[]\" Indicates a negative item  -- DELETE ALL ITEMS NOT EXAMINED]  Vital Signs: (As obtained by patient/caregiver or practitioner observation)    Blood pressure-  Heart rate-    Respiratory rate-    Temperature-  Pulse oximetry-     Constitutional: [x] Appears well-developed and well-nourished [] No apparent distress      [] Abnormal-   Mental status  [x] Alert and awake  [x] Oriented to person/place/time [x]Able to follow commands      Eyes:  EOM    [x]  Normal  [] Abnormal-  Sclera  [x]  Normal  [] Abnormal -         Discharge [x]  None visible  [] Abnormal -    HENT:   [x] Normocephalic, atraumatic.   [] Abnormal   [] Mouth/Throat: Mucous membranes are moist.     External Ears [x] Normal  [] Abnormal-     Neck: [] No visualized mass     Pulmonary/Chest: [x] Respiratory effort normal.  [] No visualized signs of difficulty breathing or respiratory Patient and provider were located at their individual homes. --Dav James MD on 4/17/2020 at 1:57 PM    An electronic signature was used to authenticate this note.

## 2020-04-18 RX ORDER — INDOMETHACIN 50 MG/1
CAPSULE ORAL
Qty: 90 CAPSULE | Refills: 0 | Status: SHIPPED | OUTPATIENT
Start: 2020-04-18 | End: 2020-06-11

## 2020-04-27 RX ORDER — FUROSEMIDE 20 MG/1
TABLET ORAL
Qty: 30 TABLET | Refills: 0 | Status: SHIPPED | OUTPATIENT
Start: 2020-04-27 | End: 2020-05-28

## 2020-04-28 RX ORDER — FLUCONAZOLE 100 MG/1
100 TABLET ORAL DAILY
Qty: 3 TABLET | Refills: 0 | Status: SHIPPED | OUTPATIENT
Start: 2020-04-28 | End: 2020-05-01

## 2020-04-28 RX ORDER — OXYBUTYNIN CHLORIDE 5 MG/1
5 TABLET ORAL 2 TIMES DAILY
Qty: 180 TABLET | Refills: 0 | Status: SHIPPED | OUTPATIENT
Start: 2020-04-28 | End: 2020-07-24

## 2020-05-14 RX ORDER — OMEPRAZOLE 20 MG/1
CAPSULE, DELAYED RELEASE ORAL
Qty: 30 CAPSULE | Refills: 1 | Status: SHIPPED | OUTPATIENT
Start: 2020-05-14 | End: 2020-07-13

## 2020-05-28 RX ORDER — FUROSEMIDE 20 MG/1
TABLET ORAL
Qty: 30 TABLET | Refills: 1 | Status: SHIPPED | OUTPATIENT
Start: 2020-05-28 | End: 2020-08-17

## 2020-06-11 RX ORDER — INDOMETHACIN 50 MG/1
CAPSULE ORAL
Qty: 90 CAPSULE | Refills: 0 | Status: SHIPPED | OUTPATIENT
Start: 2020-06-11 | End: 2020-08-17

## 2020-06-15 ENCOUNTER — VIRTUAL VISIT (OUTPATIENT)
Dept: INTERNAL MEDICINE CLINIC | Age: 55
End: 2020-06-15

## 2020-06-15 PROCEDURE — 99214 OFFICE O/P EST MOD 30 MIN: CPT | Performed by: INTERNAL MEDICINE

## 2020-06-15 RX ORDER — AMOXICILLIN 500 MG/1
500 CAPSULE ORAL 3 TIMES DAILY
Qty: 21 CAPSULE | Refills: 0 | Status: SHIPPED | OUTPATIENT
Start: 2020-06-15 | End: 2020-06-22

## 2020-06-15 RX ORDER — FLUCONAZOLE 150 MG/1
150 TABLET ORAL ONCE
Qty: 1 TABLET | Refills: 0 | Status: SHIPPED | OUTPATIENT
Start: 2020-06-15 | End: 2020-06-15

## 2020-06-15 ASSESSMENT — ENCOUNTER SYMPTOMS
VOMITING: 0
DIARRHEA: 0
COUGH: 0
BLOOD IN STOOL: 0
WHEEZING: 0
SHORTNESS OF BREATH: 0
NAUSEA: 0
ABDOMINAL PAIN: 0
CHEST TIGHTNESS: 0

## 2020-06-15 NOTE — PROGRESS NOTES
6/15/2020    TELEHEALTH EVALUATION -- Audio/Visual (During [facility] public health emergency)    HPI:    Chelly Nesbitt (:  1965) has requested an audio/video evaluation for the following concern(s):    Is here for follow up. She has a prior h/o DVT,reactive airway disease,morbid obesity,OA,chronic pain and GERD. She sees a pain physician and is on Embeda,percocet,topamax,tizanidine,gabapentin and Cymbalta.     Asthma is stable on inhalers. uses albuterol infrequently. C/o sorethroat with white patches in the tonsils. n ofevers  She knows it is not thrush as she had that before        Review of Systems   Constitutional: Negative for fatigue, fever and unexpected weight change. Respiratory: Negative for cough, chest tightness, shortness of breath and wheezing. Cardiovascular: Negative for chest pain, palpitations and leg swelling. Gastrointestinal: Negative for abdominal pain, blood in stool, diarrhea, nausea and vomiting. Neurological: Negative for light-headedness. Prior to Visit Medications    Medication Sig Taking?  Authorizing Provider   indomethacin (INDOCIN) 50 MG capsule TAKE ONE CAPSULE BY MOUTH THREE TIMES A DAY AS NEEDED FOR PAIN  Renetta Wu MD   furosemide (LASIX) 20 MG tablet TAKE TWO TABLETS BY MOUTH DAILY AS NEEDED  Renetta Wu MD   omeprazole (PRILOSEC) 20 MG delayed release capsule TAKE ONE CAPSULE BY MOUTH EVERY MORNING Mj Grande MD   oxybutynin (DITROPAN) 5 MG tablet Take 1 tablet by mouth 2 times daily Take 5 mg by mouth 2 times daily  Renetta Wu MD   fluticasone-salmeterol (ADVAIR DISKUS) 250-50 MCG/DOSE AEPB Inhale 1 puff into the lungs every 12 hours  Jonna Boss MD   albuterol sulfate  (90 Base) MCG/ACT inhaler Inhale 2 puffs into the lungs every 6 hours as needed for Wheezing or Shortness of Breath  Cesar Mantilla MD   aspirin 81 MG chewable tablet Take 81 mg by mouth daily

## 2020-06-25 ENCOUNTER — TELEPHONE (OUTPATIENT)
Dept: INTERNAL MEDICINE CLINIC | Age: 55
End: 2020-06-25

## 2020-06-29 ENCOUNTER — OFFICE VISIT (OUTPATIENT)
Dept: INTERNAL MEDICINE CLINIC | Age: 55
End: 2020-06-29

## 2020-06-29 VITALS
WEIGHT: 293 LBS | SYSTOLIC BLOOD PRESSURE: 142 MMHG | HEART RATE: 80 BPM | BODY MASS INDEX: 51.91 KG/M2 | DIASTOLIC BLOOD PRESSURE: 78 MMHG | HEIGHT: 63 IN

## 2020-06-29 PROBLEM — J45.41 ALLERGIC ASTHMA, MODERATE PERSISTENT, WITH ACUTE EXACERBATION: Status: RESOLVED | Noted: 2019-05-20 | Resolved: 2020-06-29

## 2020-06-29 PROCEDURE — 99214 OFFICE O/P EST MOD 30 MIN: CPT | Performed by: INTERNAL MEDICINE

## 2020-07-13 RX ORDER — OMEPRAZOLE 20 MG/1
CAPSULE, DELAYED RELEASE ORAL
Qty: 30 CAPSULE | Refills: 2 | Status: SHIPPED | OUTPATIENT
Start: 2020-07-13 | End: 2020-08-27 | Stop reason: SDUPTHER

## 2020-07-24 RX ORDER — OXYBUTYNIN CHLORIDE 5 MG/1
TABLET ORAL
Qty: 180 TABLET | Refills: 0 | Status: SHIPPED | OUTPATIENT
Start: 2020-07-24 | End: 2020-10-21

## 2020-08-17 RX ORDER — FUROSEMIDE 20 MG/1
TABLET ORAL
Qty: 30 TABLET | Refills: 0 | Status: SHIPPED | OUTPATIENT
Start: 2020-08-17 | End: 2020-08-27 | Stop reason: SDUPTHER

## 2020-08-17 RX ORDER — INDOMETHACIN 50 MG/1
CAPSULE ORAL
Qty: 90 CAPSULE | Refills: 0 | Status: SHIPPED | OUTPATIENT
Start: 2020-08-17 | End: 2020-09-17

## 2020-08-27 RX ORDER — FUROSEMIDE 20 MG/1
TABLET ORAL
Qty: 180 TABLET | Refills: 0 | Status: SHIPPED | OUTPATIENT
Start: 2020-08-27 | End: 2021-02-15

## 2020-08-27 RX ORDER — OMEPRAZOLE 20 MG/1
CAPSULE, DELAYED RELEASE ORAL
Qty: 90 CAPSULE | Refills: 0 | Status: SHIPPED | OUTPATIENT
Start: 2020-08-27 | End: 2021-01-12

## 2020-09-17 RX ORDER — INDOMETHACIN 50 MG/1
CAPSULE ORAL
Qty: 90 CAPSULE | Refills: 0 | Status: SHIPPED | OUTPATIENT
Start: 2020-09-17 | End: 2020-10-27

## 2020-09-18 ENCOUNTER — TELEPHONE (OUTPATIENT)
Dept: INTERNAL MEDICINE CLINIC | Age: 55
End: 2020-09-18

## 2020-09-18 NOTE — TELEPHONE ENCOUNTER
----- Message from Markus Hansen MD sent at 9/18/2020  1:55 PM EDT -----  Contact: ju- [phone]   ----- Message -----  From: Cortes Gamboa  Sent: 9/17/2020   3:32 PM EDT  To: Markus Hansen MD    Okay to do?  ----- Message -----  From: Uri Aragon  Sent: 9/17/2020   2:36 PM EDT  To: Cortes Gamboa    She is requesting  a new script for a handicap placard- hers will be expiring in nov.-last appt- 6-29-20-if so,she would like it mailed to her -please let her know at [phone]

## 2020-10-21 RX ORDER — OXYBUTYNIN CHLORIDE 5 MG/1
TABLET ORAL
Qty: 60 TABLET | Refills: 0 | Status: SHIPPED | OUTPATIENT
Start: 2020-10-21 | End: 2020-11-23

## 2020-10-22 ENCOUNTER — TELEPHONE (OUTPATIENT)
Dept: BARIATRICS/WEIGHT MGMT | Age: 55
End: 2020-10-22

## 2020-10-22 NOTE — TELEPHONE ENCOUNTER
Seminar Date: online    Presenter: xavi    Insurance Type: a[employer]na medicare    BWLS Covered:y    Medically Supervised Diet Req:n    Length of time:     Has patient had prev bariatric or gastric surgeries : n    Is patient's BMI lower than 35 : n    If yes, BMI :    Does patient have dx of diabetes :    *If previous bariatric or gastric surgeries, please do not schedule until speaking with the provider*     appt 10/27/20

## 2020-10-27 ENCOUNTER — TELEPHONE (OUTPATIENT)
Dept: PULMONOLOGY | Age: 55
End: 2020-10-27

## 2020-10-27 ENCOUNTER — OFFICE VISIT (OUTPATIENT)
Dept: BARIATRICS/WEIGHT MGMT | Age: 55
End: 2020-10-27
Payer: MEDICARE

## 2020-10-27 VITALS
WEIGHT: 293 LBS | OXYGEN SATURATION: 98 % | HEIGHT: 63 IN | DIASTOLIC BLOOD PRESSURE: 84 MMHG | RESPIRATION RATE: 18 BRPM | BODY MASS INDEX: 51.91 KG/M2 | HEART RATE: 62 BPM | SYSTOLIC BLOOD PRESSURE: 145 MMHG

## 2020-10-27 PROBLEM — Z13.21 ENCOUNTER FOR VITAMIN DEFICIENCY SCREENING: Status: ACTIVE | Noted: 2020-10-27

## 2020-10-27 PROBLEM — R06.83 SNORING: Status: ACTIVE | Noted: 2020-10-27

## 2020-10-27 PROBLEM — Z01.818 PREOPERATIVE CLEARANCE: Status: ACTIVE | Noted: 2020-10-27

## 2020-10-27 PROBLEM — K43.9 VENTRAL HERNIA WITHOUT OBSTRUCTION OR GANGRENE: Status: ACTIVE | Noted: 2020-10-27

## 2020-10-27 PROCEDURE — 99205 OFFICE O/P NEW HI 60 MIN: CPT | Performed by: SURGERY

## 2020-10-27 RX ORDER — INDOMETHACIN 50 MG/1
CAPSULE ORAL
Qty: 90 CAPSULE | Refills: 0 | Status: SHIPPED | OUTPATIENT
Start: 2020-10-27 | End: 2020-12-03

## 2020-10-27 NOTE — PROGRESS NOTES
Esther Preston is a 60 y.o. female with a date of birth of 1965. Vitals:    10/27/20 1136   BP: (!) 152/75   Pulse: 62   Resp: 18   SpO2: 98%    BMI: Body mass index is 62.18 kg/m². Obesity Classification: Class III    Weight History: Wt Readings from Last 3 Encounters:   10/27/20 (!) 351 lb (159.2 kg)   06/29/20 (!) 317 lb (143.8 kg)   12/20/19 298 lb (135.2 kg)       Patient's lowest adult weight was 275 lbs at age 14. Patient's highest adult weight was 460 lbs at age 20. Patient has participated in the following weight loss programs: Atkins Diet, , LF, Grapefruit, [address], [place], Calorie Restriction, Keto and LC. Patient has participated in meal replacement/liquid diets. Patient has participated in weight loss medications. Patient is not lactose intolerant. Patient does not have Congregation/cultural food concerns. Patient does not have food allergies. Patient does tolerate artificial sweeteners. 24 hour recall/food frequency chart:  Gets up 8-9a  Breakfast: no.   Snack: no.   Lunch: no.   Snack: 2p - CC   Dinner: yes. brauswager & crackers  Snack: no.   Drinks throughout the day: water / unsweetened ice tea / rarely coffee  Do you drink alcohol? Yes. - occasionally, 1x/wk 2 vodka sodas OR rum w/ splash of coke    Patient does not meet the criteria for binge eating disorder. Patient does not have grazing. Patient does not have night eating. Patient does not have a history of emotional eating or eating out of boredom. Surgery  Patient does feel confident in her ability to make these changes. The patient's expectations of post-surgical eating habits - voices understanding. Patient states she does understand the consequences of not complying with post-op food guidelines. Patient states she does understands the long term changes in food intake that will be necessary for all occasions after surgery for the rest of her life.       Patient is deemed nutritionally appropriate to proceed. Goals  Weight: 150 lb  Health Improvement: back pain / maintain hip & knee replacements    Assessment  Nutritional Needs: RMR=(9.99 x 160) + (6.25 x 160) - (4.92 x 55 y.o.) -161 = 2157 kcal x 1.4 (sedentary activity factor)= 3020 kcal - 1000 (for 2 lb weight loss/week)= 2020 kcal.    Plan  Plan/Recommendations: Start presurgical guidelines. Goals:   -Eat 4-5 times daily  -Avoid high fat and high sugar foods  -Include protein with all meals and snacks  -Avoid carbonation and caffeine  -Avoid calorie containing beverages  -Increase physical activity as tolerated    PES Statement:  Overweight/Obesity related to lack of exercise, sedentary lifestyle, unhealthy eating habits, and unsuccessful diet attempts as evidenced by BMI. Body mass index is 62.18 kg/m². Will follow up as necessary.     Neto Jones

## 2020-10-27 NOTE — TELEPHONE ENCOUNTER
Pt called office stating that she saw Dr. Izzy Kat today and needs preop pulm and sleep clearance. Pt asking to have this done prior to her follow up with Dr. Izzy Kat 11/24/20. Please advise, as no available openings. OV 11/26/19:    Assessment:  · Asthma, moderate intermittent        · Chronic pain syndrome  · Remote h/o DVT, provoked  · Heterozygous mutation in prothrombin gene  · H/O tobacco abuse, 20 pack year, quit 2004     Plan:   · Advair 250/50 BID has been stopped.   Can use this seasonally as needed  · Albuterol on an as-needed basis  · ASA 81 mg daily per WARFASA trial, h/o DVT  · See me PRN

## 2020-10-27 NOTE — PATIENT INSTRUCTIONS
Patient received dietary handouts and education. Pre-operative work up Ordered:    - [employer]. - Psych Evaluation.   - Cardiac Clearance. - Pulmonary Clearance. - Sleep Study & CPAP Compliance. - EGD (Upper Endoscopy). - Support Group Attendance. - Obtain letter of medical necessity (PCP Letter). - Quit Smoking,  Alcohol, Caffeine and Carbonated Drinks  - Obtain records for Weight History 2 yrs. - F/U in 4 weeks. - Hematology Clearance            Patient advised that its their responsibility to follow up for studies, referrals and/or labs ordered today.

## 2020-10-27 NOTE — PROGRESS NOTES
[facility]) Physicians   [facility]  Roderick Melendrez MD, [address] [facility], [address] [place] [address]    [place]  [address] . Phone: [phone]  Fax: [phone]       Chief Complaint   Patient presents with    Bariatric, Initial Visit     NP, WLS, Medicare           HPI:    Ra Koenig is a very pleasant 60 y.o. obese female ,   Body mass index is 62.18 kg/m². And multiple medical problems who is presenting for weight loss surgery evaluation and consultation by Dr. Marco A Mcneil. Patient has been struggling for several years now with obesity. Patient feels the weight is an obstacle to achieve and perform things in daily living as well risk on health. Tries to diet, and exercise but can't keep the weight off. Patient tried Surya Polite Diet, , LF, Grapefruit, Cabbage Soup, [place], Calorie Restriction, Keto and LC. Patient has participated in meal replacement/liquid diets. Patient has participated in weight loss medications and other regimens, but with no sustainable weight loss. Patient  is very determined to lose weight and be healthy, and is interested in surgical weight loss for future weight loss. .    Otherwise patient denies any nausea, vomiting, fevers, chills, shortness of breath, chest pain, constipation or urinary symptoms.         Obesity related problems Mary Crystalia is dealing with:  Patient Active Problem List   Diagnosis    Arthritis    Chronic GERD    Umbilical abnormality    History of DVT (deep vein thrombosis)    Chronic pain syndrome    Morbid obesity with BMI of 60.0-69.9, adult ([facility] [address]    Family history of factor V Leiden mutation    Urinary incontinence, urge    Preoperative clearance    Snoring    Encounter for vitamin deficiency screening    Ventral hernia without obstruction or gangrene           Pain Assessment   Denies any abdominal pain     Past Medical History:   Diagnosis Date    Acute renal failure (ARF) ([facility] [address] May 4,     d/t IV dye    Arthritis     Asthma     DDD (degenerative disc disease), cervical     Degenerative disc disease     GERD (gastroesophageal reflux disease)     Hypertension     Scoliosis     Spinal stenosis      Past Surgical History:   Procedure Laterality Date    CARPAL TUNNEL RELEASE      JOINT REPLACEMENT Bilateral     ROTATOR CUFF REPAIR      3x right, 2x left     Family History   Problem Relation Age of Onset    Other Sister     Stroke Sister     Arthritis Sister     Asthma Sister     Mental Illness Sister     Other Brother     High Blood Pressure Brother     Diabetes Brother     Arthritis Brother     Cancer Sister     Arthritis Sister     Arthritis Mother     High Blood Pressure Father     Arthritis Father      Social History     Tobacco Use    Smoking status: Former Smoker     Packs/day: 1.50     Years: 20.00     Pack years: 30.00     Types: Cigarettes     Last attempt to quit:      Years since quittin.8    Smokeless tobacco: Never Used   Substance Use Topics    Alcohol use: Yes     Comment: 2 drinks/day     I counseled the patient on the importance of not smoking and risks of ETOH. Allergies   Allergen Reactions    Iodides Anaphylaxis     Rash, hives, swelling     Vitals:    10/27/20 1136 10/27/20 1203   BP: (!) 152/75 (!) 145/84   Pulse: 62    Resp: 18    SpO2: 98%    Weight: (!) 351 lb (159.2 kg)    Height: 5' 3\" (1.6 m)        Body mass index is 62.18 kg/m².       Current Outpatient Medications:     oxybutynin (DITROPAN) 5 MG tablet, TAKE ONE TABLET BY MOUTH TWICE A DAY, Disp: 60 tablet, Rfl: 0    indomethacin (INDOCIN) 50 MG capsule, TAKE ONE CAPSULE BY MOUTH THREE TIMES A DAY AS NEEDED FOR PAIN, Disp: 90 capsule, Rfl: 0    furosemide (LASIX) 20 MG tablet, TAKE TWO TABLETS BY MOUTH DAILY AS NEEDED, Disp: 180 tablet, Rfl: 0    omeprazole (PRILOSEC) 20 MG delayed release capsule, TAKE ONE CAPSULE BY MOUTH EVERY MORNING BEFORE BREAKFAST, Disp: 90 capsule, Rfl: 0    fluticasone-salmeterol (ADVAIR DISKUS) 250-50 MCG/DOSE AEPB, Inhale 1 puff into the lungs every 12 hours, Disp: 1 Inhaler, Rfl: 5    albuterol sulfate  (90 Base) MCG/ACT inhaler, Inhale 2 puffs into the lungs every 6 hours as needed for Wheezing or Shortness of Breath, Disp: 1 Inhaler, Rfl: 5    aspirin 81 MG chewable tablet, Take 81 mg by mouth daily, Disp: , Rfl:     gabapentin (NEURONTIN) 400 MG capsule, Take 400 mg by mouth 4 times daily. , Disp: , Rfl:     magnesium 30 MG tablet, Take 30 mg by mouth daily, Disp: , Rfl:     topiramate (TOPAMAX SPRINKLE) 25 MG capsule, Take 100 mg by mouth nightly Indications: 50 mg at noon, 100 mg at HS , Disp: , Rfl:     tiZANidine (ZANAFLEX) 4 MG tablet, Take 4-8 mg by mouth nightly , Disp: , Rfl:     oxyCODONE (ROXICODONE) 5 MG immediate release tablet, Take 5 mg by mouth every 12 hours as needed for Pain. , Disp: , Rfl:     DULoxetine (CYMBALTA) 60 MG capsule, Take 120 mg by mouth daily , Disp: , Rfl:       Review of Systems - History obtained from the patient  General ROS: overweight   Psychological ROS: negative  Ophthalmic ROS: negative  Neurological ROS: negative  ENT ROS: negative  Allergy and Immunology ROS: negative  Hematological and Lymphatic ROS: negative  Endocrine ROS: overweight  Breast ROS: negative  Respiratory ROS: negative  Cardiovascular ROS: negative  Gastrointestinal ROS:negative  Genito-Urinary ROS: negative  Musculoskeletal ROS: weight effects on joints  Skin ROS: negative    Physical Exam   Constitutional: Patient is oriented to person, place, and time. Vital signs are normal. Patient  appears well-developed and well-nourished. Patient  is active and cooperative. Non-toxic appearance. No distress. HENT:   Head: Normocephalic and atraumatic. Head is without laceration. Right Ear: External ear normal. No lacerations. No drainage, swelling or tenderness. Left Ear: External ear normal. No lacerations.  No drainage, swelling or tenderness. Nose: Nose normal. No nose lacerations or nasal deformity. Mouth/Throat: Patient is wearing mask due to Covid-19 pandemic precautions, following CDC and health authorities guidelines. Eyes: Conjunctivae, EOM and lids are normal. Pupils are equal, round, and reactive to light. Right eye exhibits no discharge. No foreign body present in the right eye. Left eye exhibits no discharge. No foreign body present in the left eye. No scleral icterus. Neck: Trachea normal and normal range of motion. Neck supple. No JVD present. No tracheal tenderness present. Carotid bruit is not present. No rigidity. No tracheal deviation and no edema present. No thyromegaly present. Cardiovascular: Normal rate, regular rhythm, normal heart sounds, intact distal pulses and normal pulses. Pulmonary/Chest: Effort normal and breath sounds normal. No stridor. No respiratory distress. Patient  has no wheezes. Patient has no rales. Patient exhibits no tenderness and no crepitus. Abdominal: Soft. Normal appearance and bowel sounds are normal. Patient exhibits no distension, no abdominal bruit, no ascites and no mass. There is no hepatosplenomegaly. There is no tenderness. There is no rigidity, no rebound, no guarding and no CVA tenderness. No hernia. Hernia confirmed negative in the ventral area. Musculoskeletal: Normal range of motion. Patient exhibits no edema or tenderness. Lymphadenopathy:        Head (right side): No submental, no submandibular, no preauricular, no posterior auricular and no occipital adenopathy present. Head (left side): No submental, no submandibular, no preauricular, no posterior auricular and no occipital adenopathy present. Patient  has no cervical adenopathy. Right: No supraclavicular adenopathy present. Left: No supraclavicular adenopathy present. Neurological: Patient is alert and oriented to person, place, and time. Patient has normal strength.  Coordination and gait normal. GCS eye subscore is 4. GCS verbal subscore is 5. GCS motor subscore is 6. Skin: Skin is warm and dry. No abrasion and no rash noted. Patient  is not diaphoretic. No cyanosis or erythema. Psychiatric: Patient has a normal mood and affect. speech is normal and behavior is normal. Cognition and memory are normal.         Mau Lane was seen today for bariatric, initial visit. Diagnoses and all orders for this visit:    Morbid obesity with BMI of 60.0-69.9, adult ([facility] [address]  -     CBC Auto Differential; Future  -     Comprehensive Metabolic Panel; Future  -     Hemoglobin A1C; Future  -     Iron and TIBC; Future  -     Lipid Panel; Future  -     TSH with Reflex; Future  -     Vitamin A; Future  -     Vitamin B1, Whole Blood; Future  -     Vitamin B12 & Folate; Future  -     Vitamin D 25 Hydroxy; Future  -     Vitamin E; Future  -     Protime-INR; Future  -     Ambulatory referral to Cardiology  -     Ambulatory referral to Sleep Medicine  -     Georgia Luke MD, Pulmonary, [facility]     Kylie Lake MD, Oncology, [facility]    Preoperative clearance  -     CBC Auto Differential; Future  -     Comprehensive Metabolic Panel; Future  -     Hemoglobin A1C; Future  -     Iron and TIBC; Future  -     Lipid Panel; Future  -     TSH with Reflex; Future  -     Vitamin A; Future  -     Vitamin B1, Whole Blood; Future  -     Vitamin B12 & Folate; Future  -     Vitamin D 25 Hydroxy; Future  -     Vitamin E; Future  -     Protime-INR; Future  -     Ambulatory referral to Cardiology  -     Ambulatory referral to Sleep Medicine  -     Georgia Luke MD, Pulmonary, [facility] Kylie Chung MD, Oncology, [facility]    History of DVT (deep vein thrombosis)  -     CBC Auto Differential; Future  -     Comprehensive Metabolic Panel; Future  -     Hemoglobin A1C; Future  -     Iron and TIBC; Future  -     Lipid Panel; Future  -     TSH with Reflex;  Future  -     Vitamin A; Future  -     Vitamin E; Future  -     Protime-INR; Future  -     Ambulatory referral to Cardiology  -     Ambulatory referral to Sleep Medicine  -     Marcella Hardin MD, Pulmonary, [facility]  -     Karen Lake MD, Oncology, [facility]    Ventral hernia without obstruction or gangrene  -     Karen Lake MD, Oncology, [facility]          A/P  Wilner Nesbitt is a very pleasant 60 y.o. female with Obesity,  Body mass index is 62.18 kg/m². and multiple obesity related co-morbidities. Wilner Nesbitt is very motivated to lose weight and being more healthy. We discussed how her weight affects her overall health including:  Patient Active Problem List   Diagnosis    Arthritis    Chronic GERD    Umbilical abnormality    History of DVT (deep vein thrombosis)    Chronic pain syndrome    Morbid obesity with BMI of 60.0-69.9, adult ([address]    Family history of factor V Leiden mutation    Urinary incontinence, urge    Preoperative clearance    Snoring    Encounter for vitamin deficiency screening    Ventral hernia without obstruction or gangrene      The patient underwent extensive dietary counseling. I have reviewed, discussed and agree with the dietary plan. Medical weight loss and different surgical options were discussed in details with patient. Peng Davis is interested in surgical weight loss for future weight loss. Patient is interested in Laparoscopic Sleeve Gastrectomy, which I believe is an excellent option. We will proceed with pre-operative work up labs and studies. Will also petition patient's  insurance for approval for this procedure. I advised the patient that we can't guarantee final insurance approval.    Patient received dietary handouts and education. Patient advised that its their responsibility to follow up for studies, referrals and/or labs ordered today.    Also discussed in details the importance of follow up, as well following the recommendations and completing the whole program to improve outcomes when it comes to healthier lifestyle as well weight loss. Patient also advised about risks and benefits being on a strict dietary regimen as well using supplements. Patient agrees and wants to proceed with weight loss planning. I had a long discussion with the patient given her extensive medical history and limited mobility. I advised her that she is definitely at the higher risk of complications after bariatric surgery. Patient still wants to proceed with it. I believe the benefits outweighs the risk. However patient is going have to stay as mobile as she can as well get preoperative clearances from pulmonary, cardiology as well as hematology given her prior history of DVT and if there is a need for perioperative Lovenox. Obesity as a disease is considered a high risk to patients overall health and should therefore be considered a high risk disease state. Now with Covid-19 pandemic, CDC and health authorities does classify obese patients as vulnerable and high risk as well. Which makes weight loss a priority for improvement of their wellbeing and overall health. CDC has issued the following statement as far Obese patients being at Increased Risk of being critically ill from SARS-Cov-2  \"Severe obesity increases the risk of a serious breathing problem called acute respiratory distress syndrome (ARDS), which is a major complication of TGRLO-20 and can cause difficulties with a doctors ability to provide respiratory support for seriously ill patients. People living with severe obesity can have multiple serious chronic diseases and underlying health conditions that can increase the risk of severe illness from COVID-19. \"       Patient Instructions   Patient received dietary handouts and education. Pre-operative work up Ordered:    - [employer]. - Psych Evaluation.   - Cardiac Clearance. - Pulmonary Clearance.    - Sleep Study & CPAP Compliance. - EGD (Upper Endoscopy). - Support Group Attendance. - Obtain letter of medical necessity (PCP Letter). - Quit Smoking,  Alcohol, Caffeine and Carbonated Drinks  - Obtain records for Weight History 2 yrs. - F/U in 4 weeks. - Hematology Clearance            Patient advised that its their responsibility to follow up for studies, referrals and/or labs ordered today. Please note that some or all of this report was generated using voice recognition software. Please notify me in case of any questions about the content of this document, as some errors in transcription may have occurred .

## 2020-10-30 ENCOUNTER — TELEPHONE (OUTPATIENT)
Dept: BARIATRICS/WEIGHT MGMT | Age: 55
End: 2020-10-30

## 2020-11-02 ENCOUNTER — OFFICE VISIT (OUTPATIENT)
Dept: CARDIOLOGY CLINIC | Age: 55
End: 2020-11-02
Payer: MEDICARE

## 2020-11-02 VITALS
WEIGHT: 293 LBS | DIASTOLIC BLOOD PRESSURE: 80 MMHG | HEART RATE: 73 BPM | BODY MASS INDEX: 51.91 KG/M2 | SYSTOLIC BLOOD PRESSURE: 120 MMHG | HEIGHT: 63 IN | OXYGEN SATURATION: 98 %

## 2020-11-02 PROCEDURE — 99204 OFFICE O/P NEW MOD 45 MIN: CPT | Performed by: INTERNAL MEDICINE

## 2020-11-02 PROCEDURE — 93000 ELECTROCARDIOGRAM COMPLETE: CPT | Performed by: INTERNAL MEDICINE

## 2020-11-02 ASSESSMENT — ENCOUNTER SYMPTOMS
CONSTIPATION: 0
PHOTOPHOBIA: 0
NAUSEA: 0
RHINORRHEA: 1
COUGH: 0
ABDOMINAL PAIN: 0
EYE PAIN: 0
BLOOD IN STOOL: 0
BACK PAIN: 1
DIARRHEA: 0
VOMITING: 0
SORE THROAT: 0
SHORTNESS OF BREATH: 1

## 2020-11-02 NOTE — LETTER
[address] Cardiology - [address]  Phone: [phone]  Fax: [phone]    Rambo Tillman DO        November 2, 2020     [address] [place] [id number]      Dr. Denilson Mirza. [address] [place] 1965 may proceed with weight loss surgery as planned. No further cardiac testing is necessary. If you have any questions or concerns, please don't hesitate to call.     Sincerely,        Bindu Allen, DO

## 2020-11-02 NOTE — LETTER
disease), cervical, Degenerative disc disease, GERD (gastroesophageal reflux disease), Hypertension, Scoliosis, and Spinal stenosis. Surgical History:   has a past surgical history that includes Rotator cuff repair; Carpal tunnel release; and joint replacement (Bilateral). Social History:   reports that she quit smoking about 21 years ago. Her smoking use included cigarettes. She has a 30.00 pack-year smoking history. She has never used smokeless tobacco. She reports previous alcohol use. She reports that she does not use drugs. Family History:  No evidence for sudden cardiac death or premature CAD. Home Medications:  Reviewed and are listed in nursing record. and/or listed below  Current Outpatient Medications   Medication Sig Dispense Refill    indomethacin (INDOCIN) 50 MG capsule TAKE ONE CAPSULE BY MOUTH THREE TIMES A DAY AS NEEDED FOR PAIN 90 capsule 0    oxybutynin (DITROPAN) 5 MG tablet TAKE ONE TABLET BY MOUTH TWICE A DAY 60 tablet 0    furosemide (LASIX) 20 MG tablet TAKE TWO TABLETS BY MOUTH DAILY AS NEEDED 180 tablet 0    omeprazole (PRILOSEC) 20 MG delayed release capsule TAKE ONE CAPSULE BY MOUTH EVERY MORNING BEFORE BREAKFAST 90 capsule 0    fluticasone-salmeterol (ADVAIR DISKUS) 250-50 MCG/DOSE AEPB Inhale 1 puff into the lungs every 12 hours 1 Inhaler 5    albuterol sulfate  (90 Base) MCG/ACT inhaler Inhale 2 puffs into the lungs every 6 hours as needed for Wheezing or Shortness of Breath 1 Inhaler 5    aspirin 81 MG chewable tablet Take 81 mg by mouth daily      gabapentin (NEURONTIN) 400 MG capsule Take 400 mg by mouth 4 times daily.       magnesium 30 MG tablet Take 30 mg by mouth daily      topiramate (TOPAMAX SPRINKLE) 25 MG capsule Take 100 mg by mouth nightly Indications: 50 mg at noon, 100 mg at HS       tiZANidine (ZANAFLEX) 4 MG tablet Take 4-8 mg by mouth nightly       oxyCODONE (ROXICODONE) 5 MG immediate release tablet Take 5 mg by mouth every 12 hours as needed for Pain.  DULoxetine (CYMBALTA) 60 MG capsule Take 120 mg by mouth daily        No current facility-administered medications for this visit. Allergies: Iodides     Review of Systems:   A 14 point review of symptoms completed. Pertinent positives identified in the HPI, all other review of symptoms negative as below. Review of Systems   Constitutional: Negative for chills and fever. HENT: Positive for rhinorrhea. Negative for congestion and sore throat. Eyes: Negative for photophobia, pain and visual disturbance. Respiratory: Positive for shortness of breath. Negative for cough. Cardiovascular: Negative for chest pain and leg swelling. Positive for rare palpitations. Gastrointestinal: Negative for abdominal pain, blood in stool, constipation, diarrhea, nausea and vomiting. Endocrine: Negative for cold intolerance and heat intolerance. Genitourinary: Negative for difficulty urinating, dysuria and hematuria. Musculoskeletal: Positive for arthralgias, back pain and myalgias. Skin: Negative for rash and wound. Allergic/Immunologic: Negative for environmental allergies and food allergies. Neurological: Negative for dizziness and light-headedness. Hematological: Negative for adenopathy. Does not bruise/bleed easily. Psychiatric/Behavioral: Negative for dysphoric mood. The patient is not nervous/anxious. Objective:   PHYSICAL EXAM:    Vitals:    11/02/20 1425   BP: 120/80   Pulse: 73   SpO2: 98%    Weight: (!) 354 lb (160.6 kg)     Wt Readings from Last 3 Encounters:   11/02/20 (!) 354 lb (160.6 kg)   10/27/20 (!) 351 lb (159.2 kg)   06/29/20 (!) 317 lb (143.8 kg)       General: Adult female in no acute distress. Pleasant and interactive on exam.  HEENT: Normocephalic, atraumatic, non-icteric, hearing intact, nares normal, mucous membranes moist.  Neck: Thick, trachea midline. No adenopathy. No thyromegaly. No JVD. Heart: Regular rate and rhythm.  Normal S1 and S2.  Grade I/VI holosystolic murmur. No rubs or gallops. Lungs: Normal respiratory effort. Clear to auscultation bilaterally. No wheezes, rales, or rhonchi. Abdomen: Soft, non-tender. Normoactive bowel sounds. No masses or organomegaly. Skin: No rashes, wounds, or lesions. Pulses: 2+ and symmetric. Extremities: No clubbing, cyanosis, or edema. Musculoskeletal: 5/5 strength in upper and lower extremities. Psych: Normal mood and affect. Neuro: Alert and oriented to person, place, and time. No focal deficits noted. LABS   CBC:      Lab Results   Component Value Date    WBC 7.7 05/20/2019    RBC 4.12 05/20/2019    HGB 13.0 05/20/2019    HCT 38.9 05/20/2019    MCV 94.4 05/20/2019    RDW 15.6 05/20/2019     05/20/2019     CMP:  Lab Results   Component Value Date     07/05/2019    K 4.0 07/05/2019    K 3.5 05/19/2019     07/05/2019    CO2 25 07/05/2019    BUN 11 07/05/2019    CREATININE 0.5 07/05/2019    GFRAA >60 07/05/2019    AGRATIO 1.2 05/19/2019    LABGLOM >60 07/05/2019    GLUCOSE 103 07/05/2019    PROT 7.7 05/19/2019    CALCIUM 9.6 07/05/2019    BILITOT 0.3 05/19/2019    ALKPHOS 110 05/19/2019    AST 21 05/19/2019    ALT 19 05/19/2019     PT/INR:   No results found for: PTINR  Liver:  No components found for: CHLPL  Lab Results   Component Value Date    ALT 19 05/19/2019    AST 21 05/19/2019    ALKPHOS 110 05/19/2019    BILITOT 0.3 05/19/2019     No results found for: LABA1C  Lipids:         Lab Results   Component Value Date    TRIG 66 07/05/2019            Lab Results   Component Value Date    HDL 95 (H) 07/05/2019            Lab Results   Component Value Date    LDLCALC 92 07/05/2019            Lab Results   Component Value Date    LABVLDL 13 07/05/2019         CARDIAC DATA   EKG 11/2/20:  Normal sinus rhythm, non-specific ST changes. ECHO:  TTE /2015:   Summary   Normal left ventricle size, wall thickness and systolic function with an   estimated ejection fraction of 55%. No regional wall motion abnormalities   are seen. There is mild tricuspid regurgitation. Systolic pulmonary artery pressure (SPAP) is estimated at 45mmHg consistent   with mild pulmonary hypertension (RA pressure 8 mmHg). STRESS TEST: N/A    CARDIAC CATH: N/A      Assessment:     1. Pre-operative cardiac risk assessment - RCRI is 0. Exercise capacity is somewhat limited by chronic pain issues but she is able to perform 4 METs. Overall, she is likely low risk for an adverse perioperative cardiovascular event in the setting of an intermediate risk laparoscopic sleeve gastrectomy. 2. Morbid obesity  3. Body habitus concerning for obstructive sleep apnea  4. Mild tricuspid regurgitation  5. Remote DVT  6. GERD  7. Chronic pain syndrome    Patient Plan:  1. No additional cardiac evaluation is currently indicated prior to proceeding with planned gastric sleeve procedure. 2. Continue current medications as previously prescribed. 3. Has already been referred for gastric sleeve surgery and should be started on CPAP if found to have sleep apnea. 4. Encouraged continued pre-operative efforts at weight loss through diet and regular physical exercise for at least 30 minutes 3-5 times per week. 5. Follow-up as needed. It is a pleasure to assist in the care of Shania Jacobs. Please call with any questions. It is a pleasure to assist in the care of Juan Gonzalez. Stem. Please call with any questions. The scribes documentation has been prepared under my direction and personally reviewed by me in its entirety. I confirm that the note above accurately reflects all work, treatment, procedures, and medical decision making performed by me. I, Dr. Haseeb Mcnair, personally performed the services described in this documentation as scribed by Kyle Mcintosh RN in my presence, and it is both accurate and complete to the best of our ability.        Haseeb Mcnair, [address] [facility]  [phone] [facility]

## 2020-11-02 NOTE — PATIENT INSTRUCTIONS
Patient Plan:  1. I do not feel that any further testing is needed at this time. 2. Okay to proceed with bariatric surgery as planned. 3. Follow up with me as needed.

## 2020-11-02 NOTE — PROGRESS NOTES
disease), cervical, Degenerative disc disease, GERD (gastroesophageal reflux disease), Hypertension, Scoliosis, and Spinal stenosis. Surgical History:   has a past surgical history that includes Rotator cuff repair; Carpal tunnel release; and joint replacement (Bilateral). Social History:   reports that she quit smoking about 21 years ago. Her smoking use included cigarettes. She has a 30.00 pack-year smoking history. She has never used smokeless tobacco. She reports previous alcohol use. She reports that she does not use drugs. Family History:  No evidence for sudden cardiac death or premature CAD. Home Medications:  Reviewed and are listed in nursing record. and/or listed below  Current Outpatient Medications   Medication Sig Dispense Refill    indomethacin (INDOCIN) 50 MG capsule TAKE ONE CAPSULE BY MOUTH THREE TIMES A DAY AS NEEDED FOR PAIN 90 capsule 0    oxybutynin (DITROPAN) 5 MG tablet TAKE ONE TABLET BY MOUTH TWICE A DAY 60 tablet 0    furosemide (LASIX) 20 MG tablet TAKE TWO TABLETS BY MOUTH DAILY AS NEEDED 180 tablet 0    omeprazole (PRILOSEC) 20 MG delayed release capsule TAKE ONE CAPSULE BY MOUTH EVERY MORNING BEFORE BREAKFAST 90 capsule 0    fluticasone-salmeterol (ADVAIR DISKUS) 250-50 MCG/DOSE AEPB Inhale 1 puff into the lungs every 12 hours 1 Inhaler 5    albuterol sulfate  (90 Base) MCG/ACT inhaler Inhale 2 puffs into the lungs every 6 hours as needed for Wheezing or Shortness of Breath 1 Inhaler 5    aspirin 81 MG chewable tablet Take 81 mg by mouth daily      gabapentin (NEURONTIN) 400 MG capsule Take 400 mg by mouth 4 times daily.       magnesium 30 MG tablet Take 30 mg by mouth daily      topiramate (TOPAMAX SPRINKLE) 25 MG capsule Take 100 mg by mouth nightly Indications: 50 mg at noon, 100 mg at HS       tiZANidine (ZANAFLEX) 4 MG tablet Take 4-8 mg by mouth nightly       oxyCODONE (ROXICODONE) 5 MG immediate release tablet Take 5 mg by mouth every 12 hours as needed for Pain.  DULoxetine (CYMBALTA) 60 MG capsule Take 120 mg by mouth daily        No current facility-administered medications for this visit. Allergies: Iodides     Review of Systems:   A 14 point review of symptoms completed. Pertinent positives identified in the HPI, all other review of symptoms negative as below. Review of Systems   Constitutional: Negative for chills and fever. HENT: Positive for rhinorrhea. Negative for congestion and sore throat. Eyes: Negative for photophobia, pain and visual disturbance. Respiratory: Positive for shortness of breath. Negative for cough. Cardiovascular: Negative for chest pain and leg swelling. Positive for rare palpitations. Gastrointestinal: Negative for abdominal pain, blood in stool, constipation, diarrhea, nausea and vomiting. Endocrine: Negative for cold intolerance and heat intolerance. Genitourinary: Negative for difficulty urinating, dysuria and hematuria. Musculoskeletal: Positive for arthralgias, back pain and myalgias. Skin: Negative for rash and wound. Allergic/Immunologic: Negative for environmental allergies and food allergies. Neurological: Negative for dizziness and light-headedness. Hematological: Negative for adenopathy. Does not bruise/bleed easily. Psychiatric/Behavioral: Negative for dysphoric mood. The patient is not nervous/anxious. Objective:   PHYSICAL EXAM:    Vitals:    11/02/20 1425   BP: 120/80   Pulse: 73   SpO2: 98%    Weight: (!) 354 lb (160.6 kg)     Wt Readings from Last 3 Encounters:   11/02/20 (!) 354 lb (160.6 kg)   10/27/20 (!) 351 lb (159.2 kg)   06/29/20 (!) 317 lb (143.8 kg)       General: Adult female in no acute distress. Pleasant and interactive on exam.  HEENT: Normocephalic, atraumatic, non-icteric, hearing intact, nares normal, mucous membranes moist.  Neck: Thick, trachea midline. No adenopathy. No thyromegaly. No JVD. Heart: Regular rate and rhythm.  Normal S1 No regional wall motion abnormalities   are seen. There is mild tricuspid regurgitation. Systolic pulmonary artery pressure (SPAP) is estimated at 45mmHg consistent   with mild pulmonary hypertension (RA pressure 8 mmHg). STRESS TEST: N/A    CARDIAC CATH: N/A      Assessment:     1. Pre-operative cardiac risk assessment - RCRI is 0. Exercise capacity is somewhat limited by chronic pain issues but she is able to perform 4 METs. Overall, she is likely low risk for an adverse perioperative cardiovascular event in the setting of an intermediate risk laparoscopic sleeve gastrectomy. 2. Morbid obesity  3. Body habitus concerning for obstructive sleep apnea  4. Mild tricuspid regurgitation  5. Remote DVT  6. GERD  7. Chronic pain syndrome    Patient Plan:  1. No additional cardiac evaluation is currently indicated prior to proceeding with planned gastric sleeve procedure. 2. Continue current medications as previously prescribed. 3. Has already been referred for gastric sleeve surgery and should be started on CPAP if found to have sleep apnea. 4. Encouraged continued pre-operative efforts at weight loss through diet and regular physical exercise for at least 30 minutes 3-5 times per week. 5. Follow-up as needed. It is a pleasure to assist in the care of Angel Hamilton. Please call with any questions. It is a pleasure to assist in the care of Mamie Narayan. Stem. Please call with any questions. The scribes documentation has been prepared under my direction and personally reviewed by me in its entirety. I confirm that the note above accurately reflects all work, treatment, procedures, and medical decision making performed by me. I, Dr. Anders Cardoso, personally performed the services described in this documentation as scribed by Castillo Rodriguez RN in my presence, and it is both accurate and complete to the best of our ability.        Anders Cardoso, [address] [facility]  [phone] [place] Office  [phone] [facility] No

## 2020-11-06 ENCOUNTER — TELEPHONE (OUTPATIENT)
Dept: PULMONOLOGY | Age: 55
End: 2020-11-06

## 2020-11-06 NOTE — TELEPHONE ENCOUNTER
Patient cancelled appointment on 11/11/20 with Dr. Christy April for pre op clearance. Reason: pt can not keep appt due to personal reasons    Patient did not reschedule appointment. Patient has an appt with Dr. Liya Silva on 11/24/20 and wants to have her clearance before then. Patient has an appt with Dr. David Castro on 11/18/20 at 1:30 and was hoping to do it the same day    Last OV 11/26/19      Assessment:  · Asthma, moderate intermittent        · Chronic pain syndrome  · Remote h/o DVT, provoked  · Heterozygous mutation in prothrombin gene  · H/O tobacco abuse, 20 pack year, quit 2004     Plan:   · Advair 250/50 BID has been stopped.   Can use this seasonally as needed  · Albuterol on an as-needed basis  · ASA 81 mg daily per WARFASA trial, h/o DVT  · See me PRN

## 2020-11-18 ENCOUNTER — OFFICE VISIT (OUTPATIENT)
Dept: INTERNAL MEDICINE CLINIC | Age: 55
End: 2020-11-18

## 2020-11-18 ENCOUNTER — TELEPHONE (OUTPATIENT)
Dept: PULMONOLOGY | Age: 55
End: 2020-11-18

## 2020-11-18 ENCOUNTER — OFFICE VISIT (OUTPATIENT)
Dept: PULMONOLOGY | Age: 55
End: 2020-11-18
Payer: MEDICARE

## 2020-11-18 VITALS
DIASTOLIC BLOOD PRESSURE: 70 MMHG | WEIGHT: 293 LBS | TEMPERATURE: 98 F | BODY MASS INDEX: 51.91 KG/M2 | HEART RATE: 92 BPM | SYSTOLIC BLOOD PRESSURE: 122 MMHG | OXYGEN SATURATION: 96 % | RESPIRATION RATE: 18 BRPM | HEIGHT: 63 IN

## 2020-11-18 VITALS
HEIGHT: 63 IN | BODY MASS INDEX: 51.91 KG/M2 | SYSTOLIC BLOOD PRESSURE: 136 MMHG | WEIGHT: 293 LBS | HEART RATE: 72 BPM | DIASTOLIC BLOOD PRESSURE: 78 MMHG | TEMPERATURE: 97.9 F

## 2020-11-18 PROCEDURE — 99214 OFFICE O/P EST MOD 30 MIN: CPT | Performed by: INTERNAL MEDICINE

## 2020-11-18 ASSESSMENT — SLEEP AND FATIGUE QUESTIONNAIRES
HOW LIKELY ARE YOU TO NOD OFF OR FALL ASLEEP WHILE SITTING AND READING: 0
HOW LIKELY ARE YOU TO NOD OFF OR FALL ASLEEP IN A CAR, WHILE STOPPED FOR A FEW MINUTES IN TRAFFIC: 0
HOW LIKELY ARE YOU TO NOD OFF OR FALL ASLEEP WHILE WATCHING TV: 0
HOW LIKELY ARE YOU TO NOD OFF OR FALL ASLEEP WHEN YOU ARE A PASSENGER IN A CAR FOR AN HOUR WITHOUT A BREAK: 0
HOW LIKELY ARE YOU TO NOD OFF OR FALL ASLEEP WHILE LYING DOWN TO REST IN THE AFTERNOON WHEN CIRCUMSTANCES PERMIT: 1
ESS TOTAL SCORE: 2
HOW LIKELY ARE YOU TO NOD OFF OR FALL ASLEEP WHILE SITTING QUIETLY AFTER LUNCH WITHOUT ALCOHOL: 1
HOW LIKELY ARE YOU TO NOD OFF OR FALL ASLEEP WHILE SITTING AND TALKING TO SOMEONE: 0
HOW LIKELY ARE YOU TO NOD OFF OR FALL ASLEEP WHILE SITTING INACTIVE IN A PUBLIC PLACE: 0
NECK CIRCUMFERENCE (INCHES): 17.25

## 2020-11-18 NOTE — PROGRESS NOTES
Subjective:      Philomena Bolton is a 60 y.o. female who presents to the office today for a preoperative consultation at the request of surgeon Dr. Gabriele Hauser who plans on performing gastric sleeve procedure  .    Planned anesthesia is general.     Past Medical History:   Diagnosis Date    Acute renal failure (ARF) ([address] May 4, 2015    d/t IV dye    Arthritis     Asthma     DDD (degenerative disc disease), cervical     Degenerative disc disease     GERD (gastroesophageal reflux disease)     Hypertension     Scoliosis     Spinal stenosis      Past Surgical History:   Procedure Laterality Date    CARPAL TUNNEL RELEASE      JOINT REPLACEMENT Bilateral     ROTATOR CUFF REPAIR      3x right, 2x left     Family History   Problem Relation Age of Onset    Other Sister     Stroke Sister     Arthritis Sister     Asthma Sister     Mental Illness Sister     Other Brother     High Blood Pressure Brother     Diabetes Brother     Arthritis Brother     Cancer Sister     Arthritis Sister     Arthritis Mother     High Blood Pressure Father     Arthritis Father      Social History     Socioeconomic History    Marital status:      Spouse name: None    Number of children: None    Years of education: None    Highest education level: None   Occupational History    None   Social Needs    Financial resource strain: None    Food insecurity     Worry: None     Inability: None    Transportation needs     Medical: None     Non-medical: None   Tobacco Use    Smoking status: Former Smoker     Packs/day: 1.50     Years: 20.00     Pack years: 30.00     Types: Cigarettes     Last attempt to quit:      Years since quittin.8    Smokeless tobacco: Never Used   Substance and Sexual Activity    Alcohol use: Not Currently     Comment: 2 drinks/day    Drug use: No    Sexual activity: Yes     Partners: Male   Lifestyle    Physical activity     Days per week: None     Minutes per session: None    Stress: None Relationships    Social connections     Talks on phone: None     Gets together: None     Attends Worship service: None     Active member of club or organization: None     Attends meetings of clubs or organizations: None     Relationship status: None    Intimate partner violence     Fear of current or ex partner: None     Emotionally abused: None     Physically abused: None     Forced sexual activity: None   Other Topics Concern    None   Social History Narrative    None     Current Outpatient Medications   Medication Sig Dispense Refill    indomethacin (INDOCIN) 50 MG capsule TAKE ONE CAPSULE BY MOUTH THREE TIMES A DAY AS NEEDED FOR PAIN 90 capsule 0    oxybutynin (DITROPAN) 5 MG tablet TAKE ONE TABLET BY MOUTH TWICE A DAY 60 tablet 0    furosemide (LASIX) 20 MG tablet TAKE TWO TABLETS BY MOUTH DAILY AS NEEDED 180 tablet 0    omeprazole (PRILOSEC) 20 MG delayed release capsule TAKE ONE CAPSULE BY MOUTH EVERY MORNING BEFORE BREAKFAST 90 capsule 0    fluticasone-salmeterol (ADVAIR DISKUS) 250-50 MCG/DOSE AEPB Inhale 1 puff into the lungs every 12 hours 1 Inhaler 5    albuterol sulfate  (90 Base) MCG/ACT inhaler Inhale 2 puffs into the lungs every 6 hours as needed for Wheezing or Shortness of Breath 1 Inhaler 5    aspirin 81 MG chewable tablet Take 81 mg by mouth daily      gabapentin (NEURONTIN) 400 MG capsule Take 400 mg by mouth 4 times daily.  magnesium 30 MG tablet Take 30 mg by mouth daily      topiramate (TOPAMAX SPRINKLE) 25 MG capsule Take 100 mg by mouth nightly Indications: 50 mg at noon, 100 mg at HS       tiZANidine (ZANAFLEX) 4 MG tablet Take 4-8 mg by mouth nightly       oxyCODONE (ROXICODONE) 5 MG immediate release tablet Take 5 mg by mouth every 12 hours as needed for Pain.  DULoxetine (CYMBALTA) 60 MG capsule Take 120 mg by mouth daily        No current facility-administered medications for this visit.       Allergies   Allergen Reactions    Iodides Anaphylaxis Rash, hives, swelling     Review of Systems  Constitutional: negative for fevers  Respiratory: negative for cough and shortness of breath  Cardiovascular: negative for chest pain, irregular heart beat and palpitations  Gastrointestinal: negative for abdominal pain, diarrhea, nausea and vomiting  Genitourinary:negative for dysuria and hematuria     Planned anesthesia: general.  Known anesthesia problems: no  Bleeding risk:  low  Personal or FH of DVT/PE:  Yes  Patient objection to receiving blood products: no     Objective:      /78   Temp 97.9 °F (36.6 °C)   Ht 5' 3\" (1.6 m)   Wt (!) 351 lb (159.2 kg)   LMP 12/24/2010   BMI 62.18 kg/m²     General Appearance:  Alert, cooperative, no distress, appears stated age   Head:  Normocephalic, without obvious abnormality, atraumatic   Eyes:  PERRL, conjunctiva/corneas clear, EOM's intact,   Ears:  Normal TM's and external ear canals, both ears       Throat: Lips, mucosa, and tongue normal; teeth and gums normal   Neck: Supple, symmetrical, trachea midline, no adenopathy;  thyroid: not enlarged, symmetric, no tenderness/mass/nodules; no carotid bruit or JVD       Lungs:   Clear to auscultation bilaterally, respirations unlabored       Heart:  Regular rate and rhythm, S1 and S2 normal, no murmur, rub, or gallop   Abdomen:   Soft, non-tender, bowel sounds active all four quadrants,  no masses, no organomegaly   Pelvic: Deferred   Extremities: Extremities normal, atraumatic, no cyanosis or edema       Skin: Skin color, texture, turgor normal, no rashes or lesions   Lymph nodes: Cervical, supraclavicular, and axillary nodes normal                    Assessment:       Diagnosis Orders   1. Morbid obesity ([address]     2. Preop general physical exam     3. Family history of factor V Leiden mutation     4. History of DVT (deep vein thrombosis)           60 y.o. female with planned surgery as above.          Plan:   She already obtained cardiology clearance  Will need DVT prophylaxis - lovenox 40 mg bid SC  Patient medically cleared for above planned procedure.

## 2020-11-18 NOTE — TELEPHONE ENCOUNTER
[facility] to call [facility] to scheduled HST. Watch for appt. Plan: 11/18/20  · Advair 250/50 BID seasonally, fall generally   · Albuterol on an as-needed basis  · ASA 81 mg daily per WARFASA trial, h/o DVT  · HST   · Will need preop clearance level after HST if negative. Please make reminder to look for result and send note to [id number] [id number].

## 2020-11-18 NOTE — PROGRESS NOTES
Pulmonary Follow-up Note  Chief Complaint: shortness of breath, cough  Referring Provider: hospital f/u    Interval history:   Asthma is well controlled. Uses advair BID during fall, which is her allergic season. Other times of year, often comes off completely. Rare use of albuterol     One episode of witnessed sleep apnea after upper and lower endoscopy. Presenting history:  Seen by me remotely in ICU for reaction to bone scan dye per patient, umbilical bleed. Recently seen by [facility] in hospital for chronic cough. reports that she quit smoking about 21 years ago. Her smoking use included cigarettes. She has a 30.00 pack-year smoking history. She has never used smokeless tobacco.     Review of Systems:  [place] is 2     Physical Exam:  Blood pressure 122/70, pulse 92, temperature 98 °F (36.7 °C), temperature source Temporal, resp. rate 18, height 5' 3\" (1.6 m), weight (!) 351 lb (159.2 kg), last menstrual period 12/24/2010, SpO2 96 %. Constitutional:  No acute distress. HENT:  Oropharynx is clear and moist. Class I airway  Eyes: Pupils equal, round, and reactive to light. No scleral icterus. Neck: No tracheal deviation present.  17.25 inches  Cardiovascular: Normal heart sounds. No appreciable lower extremity edema. Pulmonary/Chest: No wheezes. No rhonchi. No rales. No decreased breath sounds. No accessory muscle usage or stridor. Musculoskeletal: No cyanosis. No clubbing. Skin: Skin is warm and dry. Psychiatric: Normal mood and affect.      Data:   5/20/19 CXR no acute abnormality    7/4/15 Acute left popliteal DVT    7/9/19 PFT FEV1 2.17 L 84% TLC 4.12 L 81% DLCO 20.25 88%    Assessment:  · Asthma, moderate intermittent  · Witnessed sleep apnea      · Chronic pain syndrome  · Remote h/o DVT, provoked  · Heterozygous mutation in prothrombin gene  · H/O tobacco abuse, 20 pack year, quit 2004    Plan:   · Advair 250/50 BID seasonally, fall generally   · Albuterol on an as-needed basis  · ASA 81 mg daily per WARFASA trial, h/o DVT  · HST   · Will need preop clearance level after HST if negative. Please make reminder to look for result and send note to Rafat Mckeon.

## 2020-11-23 RX ORDER — OXYBUTYNIN CHLORIDE 5 MG/1
TABLET ORAL
Qty: 60 TABLET | Refills: 0 | Status: SHIPPED | OUTPATIENT
Start: 2020-11-23 | End: 2020-12-28

## 2020-11-24 ENCOUNTER — TELEMEDICINE (OUTPATIENT)
Dept: BARIATRICS/WEIGHT MGMT | Age: 55
End: 2020-11-24
Payer: MEDICARE

## 2020-11-24 PROCEDURE — 99213 OFFICE O/P EST LOW 20 MIN: CPT | Performed by: SURGERY

## 2020-11-24 NOTE — PROGRESS NOTES
Lavena Laughter Stem stable / unchanged. Breakfast: greek yogurt    Snack: none    Lunch: salad with cottage cheese OR turkey/ham     Snack: small apple with pb    Dinner: same as lunch    Snack: fruit or yogurt    Is pt consuming smaller portions? yes she is mindful of portions    Is pt consuming at least 64 oz of fluids per day? yes water, uns iced tea - up to 64oz    Is pt consuming carbonated, caffeinated, or sugary beverages? yes she is drinking some uns tea with caffeine    Has pt sampled Unjury and/or Nectar protein?  Yes; tried and tolerated    Exercise: none organized because of back pain    Plan/Recommendations: eliminate caffeine    Handouts: none    Chin Pardo

## 2020-11-24 NOTE — PROGRESS NOTES
3x right, 2x left     Family History   Problem Relation Age of Onset    Other Sister     Stroke Sister    [place] G[place] Arthritis Sister     Asthma Sister     Mental Illness Sister     Other Brother     High Blood Pressure Brother     Diabetes Brother     Arthritis Brother     Cancer Sister     Arthritis Sister     Arthritis Mother     High Blood Pressure Father     Arthritis Father      Social History     Tobacco Use    Smoking status: Former Smoker     Packs/day: 1.50     Years: 20.00     Pack years: 30.00     Types: Cigarettes     Last attempt to quit:      Years since quittin.9    Smokeless tobacco: Never Used   Substance Use Topics    Alcohol use: Not Currently     Comment: 2 drinks/day     I counseled the patient on the importance of not smoking and risks of ETOH. Allergies   Allergen Reactions    Iodides Anaphylaxis     Rash, hives, swelling     There were no vitals filed for this visit. There is no height or weight on file to calculate BMI.     Lab Results   Component Value Date    WBC 7.7 2019    RBC 4.12 2019    HGB 13.0 2019    HCT 38.9 2019    MCV 94.4 2019    MCH 31.6 2019    MCHC 33.4 2019    MPV 7.7 2019    NEUTOPHILPCT 67.7 2019    LYMPHOPCT 25.0 2019    MONOPCT 6.3 2019    EOSRELPCT 0.6 2019    BASOPCT 0.4 2019    NEUTROABS 5.2 2019    LYMPHSABS 1.9 2019    MONOSABS 0.5 2019    EOSABS 0.0 2019     Lab Results   Component Value Date     2019    K 4.0 2019    K 3.5 2019     2019    CO2 25 2019    ANIONGAP 15 2019    GLUCOSE 103 2019    BUN 11 2019    CREATININE 0.5 2019    LABGLOM >60 2019    GFRAA >60 2019    CALCIUM 9.6 2019    PROT 7.7 2019    LABALBU 4.2 2019    AGRATIO 1.2 2019    BILITOT 0.3 2019    ALKPHOS 110 2019    ALT 2019    AST 2019    GLOB 3.5 05/19/2019     Lab Results   Component Value Date    CHOL 200 07/05/2019    TRIG 66 07/05/2019    HDL 95 07/05/2019    LDLCALC 92 07/05/2019    LABVLDL 13 07/05/2019     Lab Results   Component Value Date    TSHREFLEX 1.98 07/05/2019     No results found for: IRON, TIBC, LABIRON  No results found for: [id number], FOLATE  No results found for: VITD25  No results found for: LABA1C, EAG      Current Outpatient Medications:     oxybutynin (DITROPAN) 5 MG tablet, TAKE ONE TABLET BY MOUTH TWICE A DAY, Disp: 60 tablet, Rfl: 0    indomethacin (INDOCIN) 50 MG capsule, TAKE ONE CAPSULE BY MOUTH THREE TIMES A DAY AS NEEDED FOR PAIN, Disp: 90 capsule, Rfl: 0    furosemide (LASIX) 20 MG tablet, TAKE TWO TABLETS BY MOUTH DAILY AS NEEDED, Disp: 180 tablet, Rfl: 0    omeprazole (PRILOSEC) 20 MG delayed release capsule, TAKE ONE CAPSULE BY MOUTH EVERY MORNING BEFORE BREAKFAST, Disp: 90 capsule, Rfl: 0    fluticasone-salmeterol (ADVAIR DISKUS) 250-50 MCG/DOSE AEPB, Inhale 1 puff into the lungs every 12 hours, Disp: 1 Inhaler, Rfl: 5    albuterol sulfate  (90 Base) MCG/ACT inhaler, Inhale 2 puffs into the lungs every 6 hours as needed for Wheezing or Shortness of Breath, Disp: 1 Inhaler, Rfl: 5    aspirin 81 MG chewable tablet, Take 81 mg by mouth daily, Disp: , Rfl:     gabapentin (NEURONTIN) 400 MG capsule, Take 400 mg by mouth 4 times daily. , Disp: , Rfl:     magnesium 30 MG tablet, Take 30 mg by mouth daily, Disp: , Rfl:     topiramate (TOPAMAX SPRINKLE) 25 MG capsule, Take 100 mg by mouth nightly Indications: 50 mg at noon, 100 mg at HS , Disp: , Rfl:     tiZANidine (ZANAFLEX) 4 MG tablet, Take 4-8 mg by mouth nightly , Disp: , Rfl:     oxyCODONE (ROXICODONE) 5 MG immediate release tablet, Take 5 mg by mouth every 12 hours as needed for Pain. , Disp: , Rfl:     DULoxetine (CYMBALTA) 60 MG capsule, Take 120 mg by mouth daily , Disp: , Rfl:     Review of Systems - History obtained from the patient  General 50% of the time was spent in counseling, answering questions, addressing concerns, reviewing labs and/or other studies with the patient as well as coordinating care and discussing treatment plan with dietitian. Elina Geronimo is overall doing well. She already saw Dr. Brady Turk for pulmonary clearance and sleep study was ordered for home. Patient also had her cardiac clearance done. Evaluation scheduled for December 7. Try the protein sample and tolerated it without any problems. Still working on scheduling her hematology follow-up. Will schedule her endoscopy today. RTC in 4 weeks  Obtain rest of pre-op work up / clearances  Healthy Diet and Exercise      Patient advised that its their responsibility to follow up for their care, studies, referrals and/or labs ordered today. Pursuant to the emergency declaration under the [facility], [facility]5 waiver authority and the [employer] Act, this Virtual Visit was conducted, with patient's consent, to reduce the patient's risk of exposure to COVID-19 and provide continuity of care for an established patient. Services were provided through a video synchronous discussion virtually to substitute for in-person clinic visit. Please note that some or all of this report was generated using voice recognition software. Please notify me in case of any questions about the content of this document, as some errors in transcription may have occurred .

## 2020-11-26 PROBLEM — Z13.21 ENCOUNTER FOR VITAMIN DEFICIENCY SCREENING: Status: RESOLVED | Noted: 2020-10-27 | Resolved: 2020-11-26

## 2020-11-26 PROBLEM — Z01.818 PREOPERATIVE CLEARANCE: Status: RESOLVED | Noted: 2020-10-27 | Resolved: 2020-11-26

## 2020-12-03 RX ORDER — INDOMETHACIN 50 MG/1
CAPSULE ORAL
Qty: 90 CAPSULE | Refills: 0 | Status: SHIPPED | OUTPATIENT
Start: 2020-12-03 | End: 2021-01-14 | Stop reason: SDUPTHER

## 2020-12-07 ENCOUNTER — OFFICE VISIT (OUTPATIENT)
Dept: PRIMARY CARE CLINIC | Age: 55
End: 2020-12-07

## 2020-12-07 ENCOUNTER — HOSPITAL ENCOUNTER (OUTPATIENT)
Dept: SLEEP CENTER | Age: 55
Discharge: HOME | End: 2020-12-09
Payer: MEDICARE

## 2020-12-07 PROCEDURE — 95806 SLEEP STUDY UNATT&RESP EFFT: CPT

## 2020-12-07 NOTE — PATIENT INSTRUCTIONS

## 2020-12-07 NOTE — PROGRESS NOTES
Augustina Nesbitt received a viral test for COVID-19. They were educated on isolation and quarantine as appropriate. For any symptoms, they were directed to seek care from their PCP, given contact information to establish with a doctor, directed to an urgent care or the emergency room.

## 2020-12-08 ENCOUNTER — HOSPITAL ENCOUNTER (OUTPATIENT)
Age: 55
Discharge: HOME | End: 2020-12-08
Payer: MEDICARE

## 2020-12-08 LAB
BASOPHILS ABSOLUTE: 0.1 K/UL (ref 0–0.2)
BASOPHILS RELATIVE PERCENT: 1.1 %
EOSINOPHILS ABSOLUTE: 0.2 K/UL (ref 0–0.6)
EOSINOPHILS RELATIVE PERCENT: 3.4 %
HCT VFR BLD CALC: 39.1 % (ref 36–48)
HEMOGLOBIN: 12.8 G/DL (ref 12–16)
INR BLD: 1.02 (ref 0.86–1.14)
LYMPHOCYTES ABSOLUTE: 1.1 K/UL (ref 1–5.1)
LYMPHOCYTES RELATIVE PERCENT: 18.3 %
MCH RBC QN AUTO: 30 PG (ref 26–34)
MCHC RBC AUTO-ENTMCNC: 32.8 G/DL (ref 31–36)
MCV RBC AUTO: 91.7 FL (ref 80–100)
MONOCYTES ABSOLUTE: 0.5 K/UL (ref 0–1.3)
MONOCYTES RELATIVE PERCENT: 7.8 %
NEUTROPHILS ABSOLUTE: 4 K/UL (ref 1.7–7.7)
NEUTROPHILS RELATIVE PERCENT: 69.4 %
PDW BLD-RTO: 15.1 % (ref 12.4–15.4)
PLATELET # BLD: 254 K/UL (ref 135–450)
PMV BLD AUTO: 7.8 FL (ref 5–10.5)
PROTHROMBIN TIME: 11.8 SEC (ref 10–13.2)
RBC # BLD: 4.27 M/UL (ref 4–5.2)
SARS-COV-2: NOT DETECTED
WBC # BLD: 5.8 K/UL (ref 4–11)

## 2020-12-08 PROCEDURE — 83550 IRON BINDING TEST: CPT

## 2020-12-08 PROCEDURE — 82746 ASSAY OF FOLIC ACID SERUM: CPT

## 2020-12-08 PROCEDURE — 84446 ASSAY OF VITAMIN E: CPT

## 2020-12-08 PROCEDURE — 83036 HEMOGLOBIN GLYCOSYLATED A1C: CPT

## 2020-12-08 PROCEDURE — 36415 COLL VENOUS BLD VENIPUNCTURE: CPT

## 2020-12-08 PROCEDURE — 84443 ASSAY THYROID STIM HORMONE: CPT

## 2020-12-08 PROCEDURE — 82607 VITAMIN B-12: CPT

## 2020-12-08 PROCEDURE — 84425 ASSAY OF VITAMIN B-1: CPT

## 2020-12-08 PROCEDURE — 85025 COMPLETE CBC W/AUTO DIFF WBC: CPT

## 2020-12-08 PROCEDURE — 83540 ASSAY OF IRON: CPT

## 2020-12-08 PROCEDURE — 80053 COMPREHEN METABOLIC PANEL: CPT

## 2020-12-08 PROCEDURE — 85610 PROTHROMBIN TIME: CPT

## 2020-12-08 PROCEDURE — 82306 VITAMIN D 25 HYDROXY: CPT

## 2020-12-08 PROCEDURE — 84590 ASSAY OF VITAMIN A: CPT

## 2020-12-08 PROCEDURE — 80061 LIPID PANEL: CPT

## 2020-12-09 LAB
A/G RATIO: 1.1 (ref 1.1–2.2)
ALBUMIN SERPL-MCNC: 4 G/DL (ref 3.4–5)
ALP BLD-CCNC: 115 U/L (ref 40–129)
ALT SERPL-CCNC: 9 U/L (ref 10–40)
ANION GAP SERPL CALCULATED.3IONS-SCNC: 12 MMOL/L (ref 3–16)
AST SERPL-CCNC: 14 U/L (ref 15–37)
BILIRUB SERPL-MCNC: 0.3 MG/DL (ref 0–1)
BUN BLDV-MCNC: 17 MG/DL (ref 7–20)
CALCIUM SERPL-MCNC: 9.5 MG/DL (ref 8.3–10.6)
CHLORIDE BLD-SCNC: 105 MMOL/L (ref 99–110)
CHOLESTEROL, TOTAL: 180 MG/DL (ref 0–199)
CO2: 22 MMOL/L (ref 21–32)
CREAT SERPL-MCNC: <0.5 MG/DL (ref 0.6–1.1)
ESTIMATED AVERAGE GLUCOSE: 99.7 MG/DL
FOLATE: 10.56 NG/ML (ref 4.78–24.2)
GFR AFRICAN AMERICAN: >60
GFR NON-AFRICAN AMERICAN: >60
GLOBULIN: 3.6 G/DL
GLUCOSE BLD-MCNC: 94 MG/DL (ref 70–99)
HBA1C MFR BLD: 5.1 %
HDLC SERPL-MCNC: 66 MG/DL (ref 40–60)
IRON SATURATION: 18 % (ref 15–50)
IRON: 52 UG/DL (ref 37–145)
LDL CHOLESTEROL CALCULATED: 104 MG/DL
POTASSIUM SERPL-SCNC: 3.8 MMOL/L (ref 3.5–5.1)
SODIUM BLD-SCNC: 139 MMOL/L (ref 136–145)
TOTAL IRON BINDING CAPACITY: 293 UG/DL (ref 260–445)
TOTAL PROTEIN: 7.6 G/DL (ref 6.4–8.2)
TRIGL SERPL-MCNC: 49 MG/DL (ref 0–150)
TSH REFLEX: 1.45 UIU/ML (ref 0.27–4.2)
VITAMIN B-12: 339 PG/ML (ref 211–911)
VITAMIN D 25-HYDROXY: 17.3 NG/ML
VLDLC SERPL CALC-MCNC: 10 MG/DL

## 2020-12-10 ENCOUNTER — TELEPHONE (OUTPATIENT)
Dept: INTERNAL MEDICINE CLINIC | Age: 55
End: 2020-12-10

## 2020-12-10 ASSESSMENT — ENCOUNTER SYMPTOMS
GASTROINTESTINAL NEGATIVE: 1
EYES NEGATIVE: 1
RESPIRATORY NEGATIVE: 1
ALLERGIC/IMMUNOLOGIC NEGATIVE: 1

## 2020-12-10 NOTE — PATIENT INSTRUCTIONS
? Frying foods adds too much fat and calories, but you could use an air fryer as it requires significantly less oil. Baking, broiling, or grilling meats add flavor without unhealthy fats. Using cooking oil spray and spray butter products are also healthy options that will aid in your weight loss. Foods high in added sugars are often also high in calories and low in nutrients. Eating habits after surgery need to be a long-term change. Eating habits are often so ingrained that it can be difficult to change. It is important to practice new eating habits prior to surgery to mentally prepare yourself for the challenge ahead. Also, remember that overall health, age, and genetics make each person's weight loss progress different. Do not compare your progress (pre- or post-operatively), the amount you eat, or your exercise to other patients. In addition, it is the responsibility of the patient to schedule and follow up on labs and tests completed during the pre-surgical period. Results will be reviewed at each visit. **IT IS IMPORTANT TO KNOW THAT YOU MUST COMPLETE ALL REQUIRED DIETARY CHANGES, TESTS, CLEARANCES AND ACTIVITIES BEFORE A SURGERY DATE CAN BE GIVEN. IF YOU HAVE NOT MET ANY OF THESE REQUIREMENTS THEN YOU WILL NOT BE GIVEN A SURGERY DATE UNTIL THEY HAVE BEEN MET TO OUR SATISFACTION. THIS IS NOT ONLY DONE TO HELP YOU BE SUCCESSFUL AFTER SURGERY, BUT TO BE SAFE AS WELL.**    Patient received dietary handouts and education.     Plan/Recommendations:   - Continue plan

## 2020-12-10 NOTE — TELEPHONE ENCOUNTER
----- Message from Mabel Tamez MD sent at 12/9/2020  3:28 PM EST -----  Contact: Pt- [phone] [facility] [place]   ----- Message -----  From: Verner Prince  Sent: 12/9/2020   2:45 PM EST  To: Mabel Tamez MD    Pt called stating that she is approved for gastric sleeve surgery and her insurance said it was covered. Her surgeon, Dr. Rajan Maxwell, said he would like a letter of medical necessity faxed over to him. His office is faxing over 2 sample letters of what needs to be in the letter. Dr. Rajan Maxwell said you could call his office if needed. Please advise.       Pt- [phone]

## 2020-12-11 ENCOUNTER — PATIENT MESSAGE (OUTPATIENT)
Dept: BARIATRICS/WEIGHT MGMT | Age: 55
End: 2020-12-11

## 2020-12-11 LAB
ALPHA-TOCOPHEROL: 5.4 MG/L (ref 5.5–18)
GAMMA-TOCOPHEROL: 2.7 MG/L (ref 0–6)
RETINYL PALMITATE: <0.02 MG/L (ref 0–0.1)
VITAMIN A LEVEL: 0.39 MG/L (ref 0.3–1.2)
VITAMIN A, INTERP: NORMAL

## 2020-12-12 LAB — VITAMIN B1 WHOLE BLOOD: 165 NMOL/L (ref 70–180)

## 2020-12-16 ENCOUNTER — TELEMEDICINE (OUTPATIENT)
Dept: BARIATRICS/WEIGHT MGMT | Age: 55
End: 2020-12-16
Payer: MEDICARE

## 2020-12-16 VITALS — BODY MASS INDEX: 51.91 KG/M2 | WEIGHT: 293 LBS | HEIGHT: 63 IN

## 2020-12-16 PROCEDURE — 99213 OFFICE O/P EST LOW 20 MIN: CPT | Performed by: NURSE PRACTITIONER

## 2020-12-16 RX ORDER — ERGOCALCIFEROL 1.25 MG/1
50000 CAPSULE ORAL WEEKLY
Qty: 4 CAPSULE | Refills: 2 | Status: SHIPPED | OUTPATIENT
Start: 2020-12-16 | End: 2021-03-08 | Stop reason: ALTCHOICE

## 2020-12-16 ASSESSMENT — ENCOUNTER SYMPTOMS: BACK PAIN: 1

## 2020-12-16 NOTE — PROGRESS NOTES
Ary London Stem lost 3 lbs over past ~ month, per pt report. Is pt eating at least 4 times everyday? yes     B- greek yogurt   L- bed of greens w/ scoop of LF CC OR chicken & grape tomatoes  S- small apple w/ tbsp PB  D- similar to lunch OR protein (chicken OR tenderloin OR lean beef) w/ zucchini or squash or green beans    Is pt eating a lean protein source with all meals and snacks? yes    Has pt decreased their portions using the plate method? yes - very mindful & using 9\" plate    Is pt choosing low fat/sugar free options? yes    Is pt drinking at least 64 oz of clear liquids everyday? yes - water     Has pt stopped drinking carbonation, caffeinated, and sugar sweetened beverages? yes    Has pt sampled Unjury and/or Nectar protein? yes - tried & tolerated    Participating in intentional exercise? goes up and down the stairs more, doing laundry more frequently    Plan/Recommendations:   - Continue plan  - Complete Support Group    Handouts: emailed SG: Characteristics of Successful Dieters    Due to the COVID-19 restrictions on close contact interactions the patient's visit was conducted via telephone in jason of a face to face visit. The patient is here through telemedicine for their 3rd presurgical visit.     Ronald Kitchen Patient with DISC CUPPING C/W glaucoma.

## 2020-12-23 ENCOUNTER — OFFICE VISIT (OUTPATIENT)
Dept: PRIMARY CARE CLINIC | Age: 55
End: 2020-12-23
Payer: MEDICARE

## 2020-12-23 LAB — SARS-COV-2: NOT DETECTED

## 2020-12-23 PROCEDURE — 99211 OFF/OP EST MAY X REQ PHY/QHP: CPT | Performed by: NURSE PRACTITIONER

## 2020-12-23 NOTE — PATIENT INSTRUCTIONS
People: As much as possible, you should stay in a specific room and away from other people in your home. Also, you should use a separate bathroom, if available. Animals: You should restrict contact with pets and other animals while you are sick with COVID-19, just like you would around other people. Although there have not been reports of pets or other animals becoming sick with COVID-19, it is still recommended that people sick with COVID-19 limit contact with animals until more information is known about the virus. When possible, have another member of your household care for your animals while you are sick. If you are sick with COVID-19, avoid contact with your pet, including petting, snuggling, being kissed or licked, and sharing food. If you must care for your pet or be around animals while you are sick, wash your hands before and after you interact with pets and wear a facemask. Call ahead before visiting your doctor  If you have a medical appointment, call the healthcare provider and tell them that you have or may have COVID-19. This will help the healthcare providers office take steps to keep other people from getting infected or exposed. Wear a facemask  You should wear a facemask when you are around other people (e.g., sharing a room or vehicle) or pets and before you enter a healthcare providers office. If you are not able to wear a facemask (for example, because it causes trouble breathing), then people who live with you should not stay in the same room with you, or they should wear a facemask if they enter your room. Cover your coughs and sneezes  Cover your mouth and nose with a tissue when you cough or sneeze. Throw used tissues in a lined trash can. Immediately wash your hands with soap and water for at least 20 seconds or, if soap and water are not available, clean your hands with an alcohol-based hand  that contains at least 60% alcohol.   Clean your hands often

## 2020-12-28 ENCOUNTER — ANESTHESIA EVENT (OUTPATIENT)
Dept: ENDOSCOPY | Age: 55
End: 2020-12-28
Payer: MEDICARE

## 2020-12-28 ENCOUNTER — HOSPITAL ENCOUNTER (OUTPATIENT)
Age: 55
Setting detail: OUTPATIENT SURGERY
Discharge: HOME | End: 2020-12-28
Attending: SURGERY | Admitting: SURGERY
Payer: MEDICARE

## 2020-12-28 ENCOUNTER — ANESTHESIA (OUTPATIENT)
Dept: ENDOSCOPY | Age: 55
End: 2020-12-28
Payer: MEDICARE

## 2020-12-28 VITALS
BODY MASS INDEX: 51.91 KG/M2 | OXYGEN SATURATION: 97 % | HEART RATE: 73 BPM | SYSTOLIC BLOOD PRESSURE: 119 MMHG | WEIGHT: 293 LBS | RESPIRATION RATE: 17 BRPM | HEIGHT: 63 IN | TEMPERATURE: 97.2 F | DIASTOLIC BLOOD PRESSURE: 79 MMHG

## 2020-12-28 VITALS
DIASTOLIC BLOOD PRESSURE: 57 MMHG | SYSTOLIC BLOOD PRESSURE: 107 MMHG | OXYGEN SATURATION: 100 % | RESPIRATION RATE: 12 BRPM

## 2020-12-28 PROCEDURE — 43239 EGD BIOPSY SINGLE/MULTIPLE: CPT | Performed by: SURGERY

## 2020-12-28 PROCEDURE — 3609012400 HC EGD TRANSORAL BIOPSY SINGLE/MULTIPLE: Performed by: SURGERY

## 2020-12-28 PROCEDURE — 7100000000 HC PACU RECOVERY - FIRST 15 MIN: Performed by: SURGERY

## 2020-12-28 PROCEDURE — 88305 TISSUE EXAM BY PATHOLOGIST: CPT

## 2020-12-28 PROCEDURE — 6360000002 HC RX W HCPCS: Performed by: NURSE ANESTHETIST, CERTIFIED REGISTERED

## 2020-12-28 PROCEDURE — 7100000011 HC PHASE II RECOVERY - ADDTL 15 MIN: Performed by: SURGERY

## 2020-12-28 PROCEDURE — 88342 IMHCHEM/IMCYTCHM 1ST ANTB: CPT

## 2020-12-28 PROCEDURE — 7100000010 HC PHASE II RECOVERY - FIRST 15 MIN: Performed by: SURGERY

## 2020-12-28 PROCEDURE — 2500000003 HC RX 250 WO HCPCS: Performed by: NURSE ANESTHETIST, CERTIFIED REGISTERED

## 2020-12-28 PROCEDURE — 2709999900 HC NON-CHARGEABLE SUPPLY: Performed by: SURGERY

## 2020-12-28 PROCEDURE — 2580000003 HC RX 258: Performed by: SURGERY

## 2020-12-28 PROCEDURE — 3700000000 HC ANESTHESIA ATTENDED CARE: Performed by: SURGERY

## 2020-12-28 PROCEDURE — 7100000001 HC PACU RECOVERY - ADDTL 15 MIN: Performed by: SURGERY

## 2020-12-28 PROCEDURE — 3700000001 HC ADD 15 MINUTES (ANESTHESIA): Performed by: SURGERY

## 2020-12-28 RX ORDER — GLYCOPYRROLATE 1 MG/5 ML
SYRINGE (ML) INTRAVENOUS PRN
Status: DISCONTINUED | OUTPATIENT
Start: 2020-12-28 | End: 2020-12-28 | Stop reason: SDUPTHER

## 2020-12-28 RX ORDER — PROPOFOL 10 MG/ML
INJECTION, EMULSION INTRAVENOUS PRN
Status: DISCONTINUED | OUTPATIENT
Start: 2020-12-28 | End: 2020-12-28 | Stop reason: SDUPTHER

## 2020-12-28 RX ORDER — OXYBUTYNIN CHLORIDE 5 MG/1
TABLET ORAL
Qty: 60 TABLET | Refills: 1 | Status: SHIPPED | OUTPATIENT
Start: 2020-12-28 | End: 2021-02-15

## 2020-12-28 RX ORDER — LIDOCAINE HYDROCHLORIDE 20 MG/ML
INJECTION, SOLUTION EPIDURAL; INFILTRATION; INTRACAUDAL; PERINEURAL PRN
Status: DISCONTINUED | OUTPATIENT
Start: 2020-12-28 | End: 2020-12-28 | Stop reason: SDUPTHER

## 2020-12-28 RX ORDER — KETAMINE HYDROCHLORIDE 10 MG/ML
INJECTION, SOLUTION INTRAMUSCULAR; INTRAVENOUS PRN
Status: DISCONTINUED | OUTPATIENT
Start: 2020-12-28 | End: 2020-12-28 | Stop reason: SDUPTHER

## 2020-12-28 RX ORDER — SODIUM CHLORIDE 9 MG/ML
INJECTION, SOLUTION INTRAVENOUS CONTINUOUS
Status: DISCONTINUED | OUTPATIENT
Start: 2020-12-28 | End: 2020-12-28 | Stop reason: HOSPADM

## 2020-12-28 RX ADMIN — KETAMINE HYDROCHLORIDE 10 MG: 10 INJECTION, SOLUTION INTRAMUSCULAR; INTRAVENOUS at 13:27

## 2020-12-28 RX ADMIN — PROPOFOL 50 MG: 10 INJECTION, EMULSION INTRAVENOUS at 13:26

## 2020-12-28 RX ADMIN — PROPOFOL 50 MG: 10 INJECTION, EMULSION INTRAVENOUS at 13:27

## 2020-12-28 RX ADMIN — SODIUM CHLORIDE: 9 INJECTION, SOLUTION INTRAVENOUS at 11:59

## 2020-12-28 RX ADMIN — PROPOFOL 50 MG: 10 INJECTION, EMULSION INTRAVENOUS at 13:29

## 2020-12-28 RX ADMIN — PROPOFOL 50 MG: 10 INJECTION, EMULSION INTRAVENOUS at 13:28

## 2020-12-28 RX ADMIN — LIDOCAINE HYDROCHLORIDE 100 MG: 20 INJECTION, SOLUTION EPIDURAL; INFILTRATION; INTRACAUDAL; PERINEURAL at 13:25

## 2020-12-28 RX ADMIN — Medication 0.2 MG: at 13:24

## 2020-12-28 RX ADMIN — PROPOFOL 100 MG: 10 INJECTION, EMULSION INTRAVENOUS at 13:25

## 2020-12-28 RX ADMIN — KETAMINE HYDROCHLORIDE 10 MG: 10 INJECTION, SOLUTION INTRAMUSCULAR; INTRAVENOUS at 13:25

## 2020-12-28 ASSESSMENT — COPD QUESTIONNAIRES: CAT_SEVERITY: MODERATE

## 2020-12-28 NOTE — PROGRESS NOTES
Pt arrived from OR to PACU bay 8. Report received from OR staff. Pt arouses easily to voice. RA, NSR, VSS. Will continue to monitor.

## 2020-12-28 NOTE — H&P
Department of General Surgery - Adult [address] [facility]   [employer]  Attending Pre-operative History and Physical      DIAGNOSIS:  Obesity    INDICATION:  Pre-op    PROCEDURE:  EGD    CHIEF COMPLAINT:  Obesity    History Obtained From:  patient    HISTORY OF PRESENT ILLNESS:    The patient is a 60 y.o. female with significant past medical history of   Patient Active Problem List   Diagnosis    Arthritis    Chronic GERD    Umbilical abnormality    History of DVT (deep vein thrombosis)    Chronic pain syndrome    Morbid obesity with BMI of 60.0-69.9, adult ([facility] [address]    Family history of factor V Leiden mutation    Urinary incontinence, urge    Snoring    Ventral hernia without obstruction or gangrene      who presents for pre-op EGD    Past Medical History:        Diagnosis Date    Acute renal failure (ARF) ([facility] [address] May 4, 2015    d/t IV dye    Arthritis     Asthma     DDD (degenerative disc disease), cervical     Degenerative disc disease     GERD (gastroesophageal reflux disease)     Hypertension     Scoliosis     Spinal stenosis      Past Surgical History:        Procedure Laterality Date    CARPAL TUNNEL RELEASE      JOINT REPLACEMENT Bilateral     ROTATOR CUFF REPAIR      3x right, 2x left     Medications Prior to Admission:   Medications Prior to Admission: oxybutynin (DITROPAN) 5 MG tablet, TAKE ONE TABLET BY MOUTH TWICE A DAY  vitamin D (ERGOCALCIFEROL) 1.25 MG (83644 UT) CAPS capsule, Take 1 capsule by mouth once a week  indomethacin (INDOCIN) 50 MG capsule, TAKE ONE CAPSULE BY MOUTH THREE TIMES A DAY AS NEEDED FOR PAIN  furosemide (LASIX) 20 MG tablet, TAKE TWO TABLETS BY MOUTH DAILY AS NEEDED  omeprazole (PRILOSEC) 20 MG delayed release capsule, TAKE ONE CAPSULE BY MOUTH EVERY MORNING BEFORE BREAKFAST  fluticasone-salmeterol (ADVAIR DISKUS) 250-50 MCG/DOSE AEPB, Inhale 1 puff into the lungs every 12 hours BP (!) 140/68   Pulse 79   Temp 98.4 °F (36.9 °C) (Temporal)   Resp 16   Ht 5' 3\" (1.6 m)   Wt (!) 349 lb (158.3 kg)   LMP 12/24/2010   SpO2 98%   BMI 61.82 kg/m²  I      Physical Exam   Vitals Reviewed   Constitutional: Patient is oriented to person, place, and time. Patient appears well-developed and well-nourished. Patient is active and cooperative. Non-toxic appearance. No distress. HENT:   Head: Normocephalic and atraumatic. Head is without abrasion and without laceration. Hair is normal.   Right Ear: External ear normal. No lacerations. No drainage, swelling . Left Ear: External ear normal. No lacerations. No drainage, swelling. Nose: Nose normal. No nose lacerations or nasal deformity. Eyes: Conjunctivae, EOM and lids are normal. Right eye exhibits no discharge. No foreign body present in the right eye. Left eye exhibits no discharge. No foreign body present in the left eye. No scleral icterus. Neck: Trachea normal and normal range of motion. No JVD present. Pulmonary/Chest: Effort normal. No accessory muscle usage or stridor. No apnea. No respiratory distress. Cardiovascular: Normal rate and no JVD. Abdominal: Normal appearance. Patient exhibits no distension. Musculoskeletal: Normal range of motion. Patient exhibits no edema. Neurological: Patient is alert and oriented to person, place, and time. Patient has normal strength. GCS eye subscore is 4. GCS verbal subscore is 5. GCS motor subscore is 6. Skin: Skin is warm and dry. No abrasion and no rash noted. Patient is not diaphoretic. No cyanosis or erythema. Psychiatric: Patient has a normal mood and affect.  Speech is normal and behavior is normal. Cognition and memory are normal.       DATA:  CBC:   Lab Results   Component Value Date    WBC 5.8 12/08/2020    RBC 4.27 12/08/2020    HGB 12.8 12/08/2020    HCT 39.1 12/08/2020    MCV 91.7 12/08/2020    MCH 30.0 12/08/2020    MCHC 32.8 12/08/2020    RDW 15.1 12/08/2020  12/08/2020    MPV 7.8 12/08/2020     CMP:    Lab Results   Component Value Date     12/08/2020    K 3.8 12/08/2020    K 3.5 05/19/2019     12/08/2020    CO2 22 12/08/2020    BUN 17 12/08/2020    CREATININE <0.5 12/08/2020    GFRAA >60 12/08/2020    AGRATIO 1.1 12/08/2020    LABGLOM >60 12/08/2020    GLUCOSE 94 12/08/2020    PROT 7.6 12/08/2020    LABALBU 4.0 12/08/2020    CALCIUM 9.5 12/08/2020    BILITOT 0.3 12/08/2020    ALKPHOS 115 12/08/2020    AST 14 12/08/2020    ALT 9 12/08/2020       ASSESSMENT AND PLAN:    1. Patient is a 60 y.o. female with above specified procedure planned EGD with deep sedation  2. Procedure options, risks and benefits reviewed with patient. Patient expresses understanding.

## 2020-12-28 NOTE — ANESTHESIA PRE PROCEDURE
Department of Anesthesiology  Preprocedure Note       Name:  Esther Preston   Age:  60 y.o.  :  1965                                          MRN:  [id number]         Date:  2020      Surgeon: Talib Morales): Amalia Cochran MD    Procedure: Procedure(s):  EGD ESOPHAGOGASTRODUODENOSCOPY    Medications prior to admission:   Prior to Admission medications    Medication Sig Start Date End Date Taking? Authorizing Provider   oxybutynin (DITROPAN) 5 MG tablet TAKE ONE TABLET BY MOUTH TWICE A DAY 20  Yes Sonia Telles MD   vitamin D (ERGOCALCIFEROL) 1.25 MG (77778 UT) CAPS capsule Take 1 capsule by mouth once a week 20  Yes BANDAR Bess - CNP   indomethacin (INDOCIN) 50 MG capsule TAKE ONE CAPSULE BY MOUTH THREE TIMES A DAY AS NEEDED FOR PAIN 12/3/20  Yes Sonia Telles MD   furosemide (LASIX) 20 MG tablet TAKE TWO TABLETS BY MOUTH DAILY AS NEEDED 20  Yes Sonia Telles MD   omeprazole (PRILOSEC) 20 MG delayed release capsule TAKE ONE CAPSULE BY MOUTH EVERY MORNING BEFORE BREAKFAST 20  Yes Sonia Telles MD   fluticasone-salmeterol (ADVAIR DISKUS) 250-50 MCG/DOSE AEPB Inhale 1 puff into the lungs every 12 hours 20  Yes Missael Coronado MD   albuterol sulfate  (90 Base) MCG/ACT inhaler Inhale 2 puffs into the lungs every 6 hours as needed for Wheezing or Shortness of Breath 20  Yes Avelino Cash MD   aspirin 81 MG chewable tablet Take 81 mg by mouth daily   Yes Historical Provider, MD   gabapentin (NEURONTIN) 400 MG capsule Take 400 mg by mouth 4 times daily.    Yes Historical Provider, MD   magnesium 30 MG tablet Take 30 mg by mouth daily   Yes Historical Provider, MD   topiramate (TOPAMAX SPRINKLE) 25 MG capsule Take 100 mg by mouth nightly Indications: 50 mg at noon, 100 mg at HS    Yes Historical Provider, MD   tiZANidine (ZANAFLEX) 4 MG tablet Take 4-8 mg by mouth nightly    Yes Historical Provider, MD   oxyCODONE 12/28/20 1147   BP:  (!) 140/68   Pulse:  79   Resp:  16   Temp:  98.4 °F (36.9 °C)   TempSrc:  Temporal   SpO2:  98%   Weight: (!) 349 lb (158.3 kg)    Height: 5' 3\" (1.6 m)                                               BP Readings from Last 3 Encounters:   12/28/20 (!) 140/68   11/18/20 136/78   11/18/20 122/70       NPO Status: Time of last liquid consumption: 2300                        Time of last solid consumption: 2000                        Date of last liquid consumption: 12/27/20                        Date of last solid food consumption: 12/26/20    BMI:   Wt Readings from Last 3 Encounters:   12/28/20 (!) 349 lb (158.3 kg)   12/16/20 (!) 348 lb (157.9 kg)   11/18/20 (!) 351 lb (159.2 kg)     Body mass index is 61.82 kg/m². CBC:   Lab Results   Component Value Date    WBC 5.8 12/08/2020    RBC 4.27 12/08/2020    HGB 12.8 12/08/2020    HCT 39.1 12/08/2020    MCV 91.7 12/08/2020    RDW 15.1 12/08/2020     12/08/2020       CMP:   Lab Results   Component Value Date     12/08/2020    K 3.8 12/08/2020    K 3.5 05/19/2019     12/08/2020    CO2 22 12/08/2020    BUN 17 12/08/2020    CREATININE <0.5 12/08/2020    GFRAA >60 12/08/2020    AGRATIO 1.1 12/08/2020    LABGLOM >60 12/08/2020    GLUCOSE 94 12/08/2020    PROT 7.6 12/08/2020    CALCIUM 9.5 12/08/2020    BILITOT 0.3 12/08/2020    ALKPHOS 115 12/08/2020    AST 14 12/08/2020    ALT 9 12/08/2020       POC Tests: No results for input(s): POCGLU, POCNA, POCK, POCCL, POCBUN, POCHEMO, POCHCT in the last 72 hours.     Coags:   Lab Results   Component Value Date    PROTIME 11.8 12/08/2020    INR 1.02 12/08/2020       HCG (If Applicable): No results found for: PREGTESTUR, PREGSERUM, HCG, HCGQUANT     ABGs:   Lab Results   Component Value Date    PHART 7.204 05/04/2015    PO2ART 61.1 05/04/2015    VVY3SXJ 51.1 05/04/2015    ZTO2GXY 19.7 05/04/2015    BEART -8.2 05/04/2015    [id number] 85.8 05/04/2015        Type & Screen (If Applicable):  No results found for: Trinity Bleacher    Drug/Infectious Status (If Applicable):  No results found for: HIV, HEPCAB    COVID-19 Screening (If Applicable):   Lab Results   Component Value Date    COVID19 Not Detected 12/23/2020         Anesthesia Evaluation  Patient summary reviewed and Nursing notes reviewed  Airway: Mallampati: II  TM distance: >3 FB   Neck ROM: full  Mouth opening: > = 3 FB Dental:          Pulmonary:   (+) COPD: moderate,  asthma:                            Cardiovascular:  Exercise tolerance: good (>4 METS),   (+) hypertension:,                   Neuro/Psych:               GI/Hepatic/Renal:   (+) GERD: well controlled, morbid obesity          Endo/Other:                      ROS comment: Factor v deficiency Abdominal:           Vascular:   + DVT, . Anesthesia Plan      MAC     ASA 2       Induction: intravenous. MIPS: Prophylactic antiemetics administered. Anesthetic plan and risks discussed with patient. Plan discussed with CRNA.                   Yelitza Soliz MD   12/28/2020

## 2020-12-28 NOTE — ANESTHESIA POSTPROCEDURE EVALUATION
Department of Anesthesiology  Postprocedure Note    Patient: Jennifer Gorman  MRN: [id number]  YOB: 1965  Date of evaluation: 12/28/2020  Time:  1:39 PM     Procedure Summary     Date: 12/28/20 Room / Location: [address] [place] [address]    Anesthesia Start: 1320 Anesthesia Stop: 1125    Procedure: EGD BIOPSY (N/A ) Diagnosis: (GERD K21.9)    Surgeons: Linda Watson MD Responsible Provider: Mirella Iverson MD    Anesthesia Type: MAC ASA Status: 2          Anesthesia Type: MAC    Starr Phase I: Starr Score: 9    Starr Phase II:      Last vitals: Reviewed and per EMR flowsheets.        Anesthesia Post Evaluation    Patient location during evaluation: PACU  Patient participation: complete - patient participated  Level of consciousness: awake and alert  Pain score: 2  Airway patency: patent  Nausea & Vomiting: no vomiting  Complications: no  Cardiovascular status: blood pressure returned to baseline  Respiratory status: acceptable  Hydration status: euvolemic

## 2020-12-28 NOTE — PROGRESS NOTES
Reviewed pt problem list, history, H&P and assessment preoperatively. Scope verified using 2 person system. Family in waiting room.

## 2021-01-12 RX ORDER — OMEPRAZOLE 20 MG/1
CAPSULE, DELAYED RELEASE ORAL
Qty: 30 CAPSULE | Refills: 0 | Status: SHIPPED | OUTPATIENT
Start: 2021-01-12 | End: 2021-02-09

## 2021-01-14 RX ORDER — INDOMETHACIN 50 MG/1
CAPSULE ORAL
Qty: 90 CAPSULE | Refills: 1 | Status: SHIPPED | OUTPATIENT
Start: 2021-01-14 | End: 2021-03-22

## 2021-01-14 NOTE — TELEPHONE ENCOUNTER
----- Message from [employer] sent at 1/14/2021 11:30 AM EST -----  Contact: [phone]  Patient is requesting refill for indomethacin (INDOCIN) 50 MG capsule. She is also asking if you can give her refills on this medication.  Thank you    [place]ey [address] [place] [address]

## 2021-01-21 PROBLEM — G47.33 OBSTRUCTIVE SLEEP APNEA: Status: ACTIVE | Noted: 2021-01-21

## 2021-01-21 ASSESSMENT — ENCOUNTER SYMPTOMS
EYES NEGATIVE: 1
ALLERGIC/IMMUNOLOGIC NEGATIVE: 1
BACK PAIN: 1
RESPIRATORY NEGATIVE: 1
GASTROINTESTINAL NEGATIVE: 1

## 2021-01-21 NOTE — PROGRESS NOTES
[employer]   [facility]    1/25/2021    TELEHEALTH EVALUATION -- Audio/Visual (During JRTRB-98 public health emergency)    Subjective:      Patient ID: Mary Alice Hoff is a 60 y.o. female has requested an audio/video evaluation. HPI    Due to the COVID-19 restrictions on close contact interactions the patient's monthly presurgical visit was conducted via audio/video in jason of a face to face visit. Patient has consented to have this visit conducted via audio/video and I am conducting it from the office. The patient is here through telemedicine for their bariatric surgery presurgical visit for future weight loss. She has made several attempts at weight loss in the past without success and now wishes to pursue bariatric surgery. She is working to change her dietary behaviors and lose weight to improve comorbid conditions such as obstructive sleep apnea and GERD. Namoi Nesbitt is a 60 y.o. female with Body mass index is 59.87 kg/m².     Past Medical History:   Diagnosis Date    Acute renal failure (ARF) ([facility] [address] May 4, 2015    d/t IV dye    Arthritis     Asthma     DDD (degenerative disc disease), cervical     Degenerative disc disease     GERD (gastroesophageal reflux disease)     Hypertension     Scoliosis     Spinal stenosis      Past Surgical History:   Procedure Laterality Date    CARPAL TUNNEL RELEASE      JOINT REPLACEMENT Bilateral     ROTATOR CUFF REPAIR      3x right, 2x left    UPPER GASTROINTESTINAL ENDOSCOPY N/A 12/28/2020    EGD BIOPSY performed by Charity Rose MD at [address]     Family History   Problem Relation Age of Onset    Other Sister     Stroke Sister     Arthritis Sister     Asthma Sister     Mental Illness Sister     Other Brother     High Blood Pressure Brother     Diabetes Brother     Arthritis Brother     Cancer Sister     Arthritis Sister     Arthritis Mother     High Blood Pressure Father     Arthritis Father Social History     Tobacco Use    Smoking status: Former Smoker     Packs/day: 1.50     Years: 20.00     Pack years: 30.00     Types: Cigarettes     Quit date:      Years since quittin.0    Smokeless tobacco: Never Used   Substance Use Topics    Alcohol use: Not Currently     Comment: 2 drinks/day     I counseled the patient on the importance of not smoking and risks of ETOH. Allergies   Allergen Reactions    Iodides Anaphylaxis     Rash, hives, swelling     Vitals:    21 1552   Temp: 97.1 °F (36.2 °C)   Weight: (!) 338 lb (153.3 kg)   Height: 5' 3\" (1.6 m)     Body mass index is 59.87 kg/m². Current Outpatient Medications:     indomethacin (INDOCIN) 50 MG capsule, TAKE ONE CAPSULE BY MOUTH THREE TIMES A DAY AS NEEDED FOR PAIN, Disp: 90 capsule, Rfl: 1    omeprazole (PRILOSEC) 20 MG delayed release capsule, TAKE ONE CAPSULE BY MOUTH EVERY MORNING BEFORE BREAKFAST, Disp: 30 capsule, Rfl: 0    oxybutynin (DITROPAN) 5 MG tablet, TAKE ONE TABLET BY MOUTH TWICE A DAY, Disp: 60 tablet, Rfl: 1    vitamin D (ERGOCALCIFEROL) 1.25 MG (85818 UT) CAPS capsule, Take 1 capsule by mouth once a week, Disp: 4 capsule, Rfl: 2    furosemide (LASIX) 20 MG tablet, TAKE TWO TABLETS BY MOUTH DAILY AS NEEDED, Disp: 180 tablet, Rfl: 0    fluticasone-salmeterol (ADVAIR DISKUS) 250-50 MCG/DOSE AEPB, Inhale 1 puff into the lungs every 12 hours, Disp: 1 Inhaler, Rfl: 5    albuterol sulfate  (90 Base) MCG/ACT inhaler, Inhale 2 puffs into the lungs every 6 hours as needed for Wheezing or Shortness of Breath, Disp: 1 Inhaler, Rfl: 5    aspirin 81 MG chewable tablet, Take 81 mg by mouth daily, Disp: , Rfl:     gabapentin (NEURONTIN) 400 MG capsule, Take 400 mg by mouth 4 times daily. , Disp: , Rfl:     magnesium 30 MG tablet, Take 30 mg by mouth daily, Disp: , Rfl:     topiramate (TOPAMAX SPRINKLE) 25 MG capsule, Take 100 mg by mouth nightly Indications: 50 mg at noon, 100 mg at HS , Disp: , Rfl:   tiZANidine (ZANAFLEX) 4 MG tablet, Take 4-8 mg by mouth nightly , Disp: , Rfl:     oxyCODONE (ROXICODONE) 5 MG immediate release tablet, Take 5 mg by mouth every 12 hours as needed for Pain. , Disp: , Rfl:     DULoxetine (CYMBALTA) 60 MG capsule, Take 120 mg by mouth daily , Disp: , Rfl:     Lab Results   Component Value Date    WBC 5.8 12/08/2020    RBC 4.27 12/08/2020    HGB 12.8 12/08/2020    HCT 39.1 12/08/2020    MCV 91.7 12/08/2020    MCH 30.0 12/08/2020    MCHC 32.8 12/08/2020    MPV 7.8 12/08/2020    NEUTOPHILPCT 69.4 12/08/2020    LYMPHOPCT 18.3 12/08/2020    MONOPCT 7.8 12/08/2020    EOSRELPCT 3.4 12/08/2020    BASOPCT 1.1 12/08/2020    NEUTROABS 4.0 12/08/2020    LYMPHSABS 1.1 12/08/2020    MONOSABS 0.5 12/08/2020    EOSABS 0.2 12/08/2020     Lab Results   Component Value Date     12/08/2020    K 3.8 12/08/2020    K 3.5 05/19/2019     12/08/2020    CO2 22 12/08/2020    ANIONGAP 12 12/08/2020    GLUCOSE 94 12/08/2020    BUN 17 12/08/2020    CREATININE <0.5 12/08/2020    LABGLOM >60 12/08/2020    GFRAA >60 12/08/2020    CALCIUM 9.5 12/08/2020    PROT 7.6 12/08/2020    LABALBU 4.0 12/08/2020    AGRATIO 1.1 12/08/2020    BILITOT 0.3 12/08/2020    ALKPHOS 115 12/08/2020    ALT 9 12/08/2020    AST 14 12/08/2020    GLOB 3.6 12/08/2020     Lab Results   Component Value Date    CHOL 180 12/08/2020    TRIG 49 12/08/2020    HDL 66 12/08/2020    LDLCALC 104 12/08/2020    LABVLDL 10 12/08/2020     Lab Results   Component Value Date    TSHREFLEX 1.45 12/08/2020     Lab Results   Component Value Date    IRON 52 12/08/2020    TIBC 293 12/08/2020    LABIRON 18 12/08/2020     Lab Results   Component Value Date    KACCLJUB33 339 12/08/2020    FOLATE 10.56 12/08/2020     Lab Results   Component Value Date    VITD25 17.3 12/08/2020     Lab Results   Component Value Date    LABA1C 5.1 12/08/2020    EAG 99.7 12/08/2020       Review of Systems   Constitutional: Negative. HENT: Negative. Eyes: Negative. Respiratory: Negative. Cardiovascular: Negative. Gastrointestinal: Negative. Endocrine: Negative. Genitourinary: Negative. Musculoskeletal: Positive for arthralgias and back pain. Skin: Negative. Allergic/Immunologic: Negative. Neurological: Negative. Hematological: Negative. Psychiatric/Behavioral: Negative. PHYSICAL EXAMINATION:    Constitutional: [x] Appears well-developed and well-nourished [x] No apparent distress      [] Abnormal-   Mental status  [x] Alert and awake  [x] Oriented to person/place/time [x]Able to follow commands      Eyes:  EOM    [x]  Normal  [] Abnormal-  Sclera  [x]  Normal  [] Abnormal -         Discharge [x]  None visible  [] Abnormal -    HENT:   [x] Normocephalic, atraumatic. [] Abnormal     Neck: [x] No visualized mass     Pulmonary/Chest: [x] Respiratory effort normal.  [x] No visualized signs of difficulty breathing or respiratory distress        [] Abnormal-      Musculoskeletal:   [] Normal gait with no signs of ataxia         [x] Normal range of motion of neck        [] Abnormal-     Neurological:        [x] No Facial Asymmetry (Cranial nerve 7 motor function) (limited exam to video visit)          [x] No gaze palsy        [] Abnormal-         Skin:        [x] No significant exanthematous lesions or discoloration noted on facial skin         [] Abnormal-            Psychiatric:       [x] Normal Affect [x] No Hallucinations        [] Abnormal-     Other pertinent observable physical exam findings-     Due to this being a TeleHealth encounter, evaluation of the following organ systems is limited: Vitals/Constitutional/EENT/Resp/CV/GI//MS/Neuro/Skin/Heme-Lymph-Imm.     Assessment and Plan: Patient is here for their 4th presurgery visit for sleeve via telemedicine, down 10 lbs. The patient's current Body mass index is 59.87 kg/m². (1/25/21). She is making dietary and behavior modifications. She talked with the registered dietitian for continued follow up. I agree with recommendations and plan. She is not exercising due to back pain. Encouraged physical activity as able. Patient is postmenopausal so did not need to receive instruction that it is recommended to avoid pregnancy following bariatric surgery for at least 2 years to allow them to have stable weight loss and to help avoid increased risk of vitamin deficiencies and malnutrition. Spoke with patient regarding recent EGD biopsy results which showed stomach, antrum, biopsy: mild chronic gastritis. Negative for H. pylori-like organisms on H&E and immunohistochemical stained sections. Negative for intestinal metaplasia. . Patient will continue her Prilosec as prescribed. Discussed preop work up which still needs sleep clearance (scheduled 2/9/2021) and support group (scheduled for Zoom one on 1/28/2021). If the patient is able to maintain consistency with their dietary behaviors and has completed the remainder of their preoperative requirements by their next visit they should be ready for a surgery date. We will see her back in 1 month for continued follow up or through telemedicine. Total encounter time: 21 minutes, including any number of the following: Bariatric Preoperative work up/protocols, review of labs, imaging, provider notes, outside hospital records, performing examination/evaluation, counseling patient and/or family, ordering medications/tests, placing referrals and communication with referring physicians, coordination of care; discussing exercise and physical activity; discussing dietary plan/recall with the patient as well with registered dietitian and documentation in the EHR. Of note, the above was done during same day of the actual patient encounter. An electronic signature was used to authenticate this note. Pursuant to the emergency declaration under the [facility], [facility] waiver authority and the [employer] and [employer] Act, this Virtual  Visit was conducted, with patient's consent, to reduce the patient's risk of exposure to COVID-19 and provide continuity of care for an established patient. Services were provided through a video synchronous discussion virtually to substitute for in-person clinic visit. Obesity, as a disease, is considered high risk to a patients overall health and should therefore be considered a high risk disease state. Now with Covid-19 pandemic, CDC and health authorities do classify obese patients as vulnerable and high risk as well.

## 2021-01-25 ENCOUNTER — TELEMEDICINE (OUTPATIENT)
Dept: BARIATRICS/WEIGHT MGMT | Age: 56
End: 2021-01-25
Payer: MEDICARE

## 2021-01-25 VITALS — TEMPERATURE: 97.1 F | WEIGHT: 293 LBS | HEIGHT: 63 IN | BODY MASS INDEX: 51.91 KG/M2

## 2021-01-25 DIAGNOSIS — K21.9 CHRONIC GERD: Primary | ICD-10-CM

## 2021-01-25 DIAGNOSIS — E66.01 MORBID OBESITY WITH BMI OF 50.0-59.9, ADULT (HCC): ICD-10-CM

## 2021-01-25 DIAGNOSIS — G47.33 OBSTRUCTIVE SLEEP APNEA: ICD-10-CM

## 2021-01-25 PROCEDURE — 99213 OFFICE O/P EST LOW 20 MIN: CPT | Performed by: NURSE PRACTITIONER

## 2021-01-26 ENCOUNTER — TELEPHONE (OUTPATIENT)
Dept: PULMONOLOGY | Age: 56
End: 2021-01-26

## 2021-01-26 NOTE — TELEPHONE ENCOUNTER
the provision of medical care and treatment via Telehealth and the Service and you may not be able to receive diagnosis and/or treatment through the Service for every condition for which you seek diagnosis and/or treatment. 11. There are potential risks to the use of Telehealth, including but not limited to the risks described in this Telehealth Consent. 12. Your Provider(s) have discussed the use of Telehealth and the Service with you, including the benefits and risks of such and you have provided oral consent to your Provider(s) for the use of Telehealth and the Service. 15. You understand that it is your duty to provide your Provider(s) truthful, accurate and complete information, including all relevant information regarding care that you may have received or may be receiving from other healthcare providers outside of the Service. 14. You understand that each of your Provider(s) may determine in his or sole discretion that your condition is not suitable for diagnosis and/or treatment using the Service, and that you may need to seek medical care and treatment a specialist or other healthcare provider, outside of the Service. 15. You understand that you are fully responsible for payment for all services provided by Provider(s) or through use of the Service and that you may not be able to use third-party insurance. 16. You represent that (a) you have read this Telehealth Consent carefully, (b) you understand the risks and benefits of the Service and the use of Telehealth in the medical care and treatment provided to you by Provider(s) using the Service, and (c) you have the legal capacity and authority to provide this consent for yourself and/or the minor for which you are consenting under applicable federal and state laws, including laws relating to the age of [de-identified] and/or parental/guardian consent.    17. You give your informed consent to the use of Telehealth by Provider(s) using the Service under the terms described in the Terms of Service and this Telehealth Consent. The patient was read the following statement and has consented to the visit as of 1/26/21. The patient has been scheduled for their first telehealth visit on 2/9/21 with Dr Christiano Gutierrez.

## 2021-01-29 ENCOUNTER — TELEPHONE (OUTPATIENT)
Dept: BARIATRICS/WEIGHT MGMT | Age: 56
End: 2021-01-29

## 2021-01-29 NOTE — TELEPHONE ENCOUNTER
Patient was unable to log in in time for the 1/28/21 support group. Can patient please get this sent to email. [email or web address]

## 2021-02-04 ENCOUNTER — TELEPHONE (OUTPATIENT)
Dept: INTERNAL MEDICINE CLINIC | Age: 56
End: 2021-02-04

## 2021-02-04 RX ORDER — BENZONATATE 100 MG/1
100 CAPSULE ORAL 3 TIMES DAILY PRN
Qty: 21 CAPSULE | Refills: 0 | Status: SHIPPED | OUTPATIENT
Start: 2021-02-04 | End: 2021-02-11

## 2021-02-04 NOTE — TELEPHONE ENCOUNTER
----- Message from Andi Santos MD sent at 2/4/2021  3:05 PM EST -----  Contact: [facility] [phone]  Tessalon perles 100 tid prn # 21  ----- Message -----  From: Juliette Arriola  Sent: 2/4/2021   1:26 PM EST  To: Andi Santos MD    Patient states she had cold symptoms, runny nose, stuffy head and cough 3 weeks ago. All symptoms have cleared up except the cough. It is dry and non-productive. No fever or any other symptoms. She is asking is there something you can prescribe her for the cough?  Thank you     [employer]

## 2021-02-09 ENCOUNTER — TELEPHONE (OUTPATIENT)
Dept: PULMONOLOGY | Age: 56
End: 2021-02-09

## 2021-02-09 ENCOUNTER — VIRTUAL VISIT (OUTPATIENT)
Dept: PULMONOLOGY | Age: 56
End: 2021-02-09
Payer: MEDICARE

## 2021-02-09 DIAGNOSIS — G47.33 OSA (OBSTRUCTIVE SLEEP APNEA): Primary | ICD-10-CM

## 2021-02-09 PROCEDURE — 99442 PR PHYS/QHP TELEPHONE EVALUATION 11-20 MIN: CPT | Performed by: INTERNAL MEDICINE

## 2021-02-09 RX ORDER — PREDNISONE 20 MG/1
40 TABLET ORAL DAILY
Qty: 10 TABLET | Refills: 0 | Status: SHIPPED | OUTPATIENT
Start: 2021-02-09 | End: 2021-02-14

## 2021-02-09 RX ORDER — OMEPRAZOLE 20 MG/1
CAPSULE, DELAYED RELEASE ORAL
Qty: 30 CAPSULE | Refills: 0 | Status: SHIPPED | OUTPATIENT
Start: 2021-02-09 | End: 2021-03-10

## 2021-02-09 ASSESSMENT — SLEEP AND FATIGUE QUESTIONNAIRES
HOW LIKELY ARE YOU TO NOD OFF OR FALL ASLEEP WHILE LYING DOWN TO REST IN THE AFTERNOON WHEN CIRCUMSTANCES PERMIT: 1
HOW LIKELY ARE YOU TO NOD OFF OR FALL ASLEEP WHILE SITTING INACTIVE IN A PUBLIC PLACE: 0
ESS TOTAL SCORE: 5
HOW LIKELY ARE YOU TO NOD OFF OR FALL ASLEEP IN A CAR, WHILE STOPPED FOR A FEW MINUTES IN TRAFFIC: 0
HOW LIKELY ARE YOU TO NOD OFF OR FALL ASLEEP WHEN YOU ARE A PASSENGER IN A CAR FOR AN HOUR WITHOUT A BREAK: 1
HOW LIKELY ARE YOU TO NOD OFF OR FALL ASLEEP WHILE SITTING AND READING: 1

## 2021-02-09 NOTE — PROGRESS NOTES
Pulmonary Follow-up Note  Chief Complaint: shortness of breath, cough  Referring Provider: hospital f/u    Interval history:   Asthma is well controlled. Uses advair BID during fall, which is her allergic season. Other times of year, often comes off completely. Rare use of albuterol     Had HST which showed severe FLORIAN. Started CPAP and she thinks it is [place]. \"  It wakes her up sometimes. Presenting history:  Seen by me remotely in ICU for reaction to bone scan dye per patient, umbilical bleed. Recently seen by [facility] in hospital for chronic cough. reports that she quit smoking about 22 years ago. Her smoking use included cigarettes. She has a 30.00 pack-year smoking history. She has never used smokeless tobacco.     Review of Systems:  [place] is 5    Physical Exam:  Last menstrual period 12/24/2010.    VIRTUAL  PHONE        Data:   5/20/19 CXR no acute abnormality    7/4/15 Acute left popliteal DVT    7/9/19 PFT FEV1 2.17 L 84% TLC 4.12 L 81% DLCO 20.25 88%    HST 12/7/20 AHI 38    Assessment:  · Asthma, moderate intermittent  · Severe FLORIAN      · Chronic pain syndrome  · Remote h/o DVT, provoked  · Heterozygous mutation in prothrombin gene  · H/O tobacco abuse, 20 pack year, quit 2004    Plan:   · Advair 250/50 BID seasonally, fall generally   · Albuterol on an as-needed basis  · ASA 81 mg daily per WARFASA trial, h/o DVT  · Change APAP to 7-10, download in 30 days  · Okay to proceed with bariatric surgery, Dr. Mary Beth Ramirez   · See me in Sept/Oct for asthma, FLORIAN Jacobo Nieves is a 60 y.o. female evaluated via telephone on 2/9/2021. Consent: She and/or health care decision maker is aware that that she may receive a bill for this telephone service, depending on her insurance coverage, and has provided verbal consent to proceed: YES. I communicated with the patient and/or health care decision maker about: see interval history above. Details of this discussion including any medical advice provided: see plan above. Total Time: minutes: 11-20 minutes. I affirm this is a Patient Initiated Episode with a Patient who has not had a related appointment within my department in the past 7 days or scheduled within the next 24 hours. Patient identification was verified at the start of the visit: YES  Pursuant to the emergency declaration under the [facility], [address] [place] [address] waiver authority and the [employer] and [employer] Act, this Telephone Visit was conducted with patient's (and/or legal guardian's) consent, to reduce the patient's risk of exposure to COVID-19 and provide necessary medical care.   The patient (and/or legal guardian) his advised to contact this office for worsening conditions or problems, and seek emergency medical treatment and/or call 911 if urgent care needed,

## 2021-02-09 NOTE — TELEPHONE ENCOUNTER
Request compliance report in 30 days from [facility] 3/10/21.     Plan:   · Advair 250/50 BID seasonally, fall generally   · Albuterol on an as-needed basis  · ASA 81 mg daily per WARFASA trial, h/o DVT  · Change APAP to 7-10, download in 30 days  · Okay to proceed with bariatric surgery, Dr. Marcella Levy   · See me in Sept/Oct for asthma, FLORIAN

## 2021-02-09 NOTE — LETTER
[facility] Pulmonary, Critical Care, and Sleep  [address] [facility] [address]  Phone: [phone]  Fax: [phone]        February 9, 2021       Patient: Paola Harper   MR Number: [id number]   YOB: 1965   Date of Visit: 2/9/2021       Dear Dr. Arlet Aguilar:    [facility] sees me for mild intermittent asthma and severe Obstructive Sleep Apnea. She has started on CPAP and is doing well with regards to FLORIAN. Her asthma is generally well controlled -- she uses advair seasonally when allergic symptoms trigger her to have more issues with breathing. From a pulmonary perspective, she is cleared for surgery. .For patients with Obstructive Sleep Apnea undergoing general anesthesia, I recommend that airway extubation be performed only when the patient is fully awake and alert. The patient should be maintained in the semiupright or lateral, not supine, position and should undergo continuous cardiorespiratory monitoring. Immediate application of CPAP with or without a nasopharyngeal airway will help prevent upper airway collapse. Systemic opioids and benzodiazepines should be used cautiously because of their ability to depress respiration. If you have questions, please do not hesitate to call me. I look forward to following Barber Littlejohn along with you.     Sincerely,        Ac Keane MD

## 2021-02-12 ASSESSMENT — ENCOUNTER SYMPTOMS
ALLERGIC/IMMUNOLOGIC NEGATIVE: 1
RESPIRATORY NEGATIVE: 1
GASTROINTESTINAL NEGATIVE: 1
EYES NEGATIVE: 1

## 2021-02-12 NOTE — PATIENT INSTRUCTIONS
Goals in preparing for bariatric surgery  You should be giving up all beverages that have carbonation, sugar, and caffeine (Refer to the approved liquids list provided at initial visit). ? You should be drinking 64 ounces of low calorie (5 calories or less per serving) fluids per day. Suggestions include:  o Water (you may add fresh lemon or lime)  o Crystal Light  o Xavier Liquid Water Enhancer  o Propel Zero  o Powerade Zero/Gatorade Zero  o Isopure  o [id number]  o SOBE Lifewater Zero  o Vitamin Water Zero  o Sugar Free Fede-Aid  You should be eating 4-6 times per day. ? Three small meals plus 1-2 snacks per day is your goal. This balances your calories and nutrients evenly throughout the day and helps to boost your metabolism. Refer to the snack list provided at your initial visit. Aim for a protein at every snack, plus a fruit, vegetable or starch. You should be eating protein at every meal and snack. ? Protein is typically found in animal sources, i.e. chicken, lean beef, lean pork, fish, seafood and eggs. It is also found in low-fat dairy sources such as skim or 1% milk, low-fat yogurt, low-fat cheese, and low-fat cottage cheese. Plant based sources of protein include peanut butter, beans, and soy. You should be utilizing the 9-inch plate method. ? Eating on a smaller plate will help you control portion size, but what you put on your plate counts also. Make ¼ of your plate lean protein, ¼ carbohydrate (fruit, grain or starchy vegetables) and ½ the plate non-starchy vegetables. You should eliminate caffeine. ? Caffeine is dehydrating. After surgery, it's very important to stay hydrated. Giving up caffeine before surgery will help you focus on the changes necessary to be successful after surgery. There are many decaffeinated coffee and tea products available in grocery stores. You should eliminate alcohol. ? You must abstain from alcohol prior to surgery and for at least the first 6-9 months after surgery. Although you may have alcohol in the future it is important to understand, as a bariatric surgery patient, there is a risk of it negatively affecting your weight loss goals and it can pose a risk for addiction. You should be reducing added fat and sugar in your diet. ? Frying foods adds too much fat and calories, but you could use an air fryer as it requires significantly less oil. Baking, broiling, or grilling meats add flavor without unhealthy fats. Using cooking oil spray and spray butter products are also healthy options that will aid in your weight loss. Foods high in added sugars are often also high in calories and low in nutrients. If you are a smoker or use e-cigarettes/vaping, you must eliminate. ? You must be nicotine free and/or not vaping for at least 8 weeks before your surgery can be scheduled. You will be screened for nicotine through lab work. Eating habits after surgery need to be a long-term change. Eating habits are often so ingrained that it can be difficult to change. It is important to practice new eating habits prior to surgery to mentally prepare yourself for the challenge ahead. Also, remember that overall health, age, and genetics make each person's weight loss progress different. Do not compare your progress (pre- or post-operatively), the amount you eat, or your exercise to other patients. In addition, it is the responsibility of the patient to schedule and follow up on labs and tests completed during the pre-surgical period. Results will be reviewed at each visit.

## 2021-02-12 NOTE — PROGRESS NOTES
[facility]) Physicians   [employer]    2/15/2021    TELEHEALTH EVALUATION -- Audio/Visual (During YTDHX-30 public health emergency)    Subjective:      Patient ID: Maureen Hairston is a 60 y.o. female has requested an audio/video evaluation. HPI    Due to the COVID-19 restrictions on close contact interactions the patient's monthly presurgical visit was conducted via audio/video in jason of a face to face visit. Patient has consented to have this visit conducted via audio/video and I am conducting it from the office. The patient is here through telemedicine for their bariatric surgery presurgical visit for future weight loss. She has made several attempts at weight loss in the past without success and now wishes to pursue bariatric surgery. She is working to change her dietary behaviors and lose weight to improve comorbid conditions such as obstructive sleep apnea and GERD. Maureen Hairston is a 60 y.o. female with Body mass index is 59.34 kg/m².     Past Medical History:   Diagnosis Date    Acute renal failure (ARF) ([facility] [address] May 4, 2015    d/t IV dye    Arthritis     Asthma     DDD (degenerative disc disease), cervical     Degenerative disc disease     GERD (gastroesophageal reflux disease)     Hypertension     Scoliosis     Sleep apnea     Spinal stenosis      Past Surgical History:   Procedure Laterality Date    CARPAL TUNNEL RELEASE      JOINT REPLACEMENT Bilateral     ROTATOR CUFF REPAIR      3x right, 2x left    UPPER GASTROINTESTINAL ENDOSCOPY N/A 12/28/2020    EGD BIOPSY performed by Crow Wilcox MD at [address] [place] [address]     Family History   Problem Relation Age of Onset    Other Sister     Stroke Sister     Arthritis Sister     Asthma Sister     Mental Illness Sister     Other Brother     High Blood Pressure Brother     Diabetes Brother     Arthritis Brother     Cancer Sister     Arthritis Sister     Arthritis Mother     High Blood Pressure Father  Arthritis Father      Social History     Tobacco Use    Smoking status: Former Smoker     Packs/day: 1.50     Years: 20.00     Pack years: 30.00     Types: Cigarettes     Quit date:      Years since quittin.1    Smokeless tobacco: Never Used   Substance Use Topics    Alcohol use: Not Currently     Comment: 2 drinks/day     I counseled the patient on the importance of not smoking and risks of ETOH. Allergies   Allergen Reactions    Iodides Anaphylaxis     Rash, hives, swelling     Vitals:    02/15/21 1558   Weight: (!) 335 lb (152 kg)   Height: 5' 3\" (1.6 m)     Body mass index is 59.34 kg/m². Current Outpatient Medications:     oxybutynin (DITROPAN) 5 MG tablet, TAKE ONE TABLET BY MOUTH TWICE A DAY, Disp: 60 tablet, Rfl: 0    furosemide (LASIX) 20 MG tablet, TAKE TWO TABLETS BY MOUTH DAILY AS NEEDED, Disp: 180 tablet, Rfl: 0    omeprazole (PRILOSEC) 20 MG delayed release capsule, TAKE ONE CAPSULE BY MOUTH EVERY MORNING BEFORE BREAKFAST, Disp: 30 capsule, Rfl: 0    indomethacin (INDOCIN) 50 MG capsule, TAKE ONE CAPSULE BY MOUTH THREE TIMES A DAY AS NEEDED FOR PAIN, Disp: 90 capsule, Rfl: 1    vitamin D (ERGOCALCIFEROL) 1.25 MG (74985 UT) CAPS capsule, Take 1 capsule by mouth once a week, Disp: 4 capsule, Rfl: 2    fluticasone-salmeterol (ADVAIR DISKUS) 250-50 MCG/DOSE AEPB, Inhale 1 puff into the lungs every 12 hours, Disp: 1 Inhaler, Rfl: 5    albuterol sulfate  (90 Base) MCG/ACT inhaler, Inhale 2 puffs into the lungs every 6 hours as needed for Wheezing or Shortness of Breath, Disp: 1 Inhaler, Rfl: 5    aspirin 81 MG chewable tablet, Take 81 mg by mouth daily, Disp: , Rfl:     gabapentin (NEURONTIN) 400 MG capsule, Take 400 mg by mouth 4 times daily. , Disp: , Rfl:     magnesium 30 MG tablet, Take 30 mg by mouth daily, Disp: , Rfl:     topiramate (TOPAMAX SPRINKLE) 25 MG capsule, Take 100 mg by mouth nightly Indications: 50 mg at noon, 100 mg at HS , Disp: , Rfl:   tiZANidine (ZANAFLEX) 4 MG tablet, Take 4-8 mg by mouth nightly , Disp: , Rfl:     oxyCODONE (ROXICODONE) 5 MG immediate release tablet, Take 5 mg by mouth every 12 hours as needed for Pain. , Disp: , Rfl:     DULoxetine (CYMBALTA) 60 MG capsule, Take 120 mg by mouth daily , Disp: , Rfl:     Lab Results   Component Value Date    WBC 5.8 12/08/2020    RBC 4.27 12/08/2020    HGB 12.8 12/08/2020    HCT 39.1 12/08/2020    MCV 91.7 12/08/2020    MCH 30.0 12/08/2020    MCHC 32.8 12/08/2020    MPV 7.8 12/08/2020    NEUTOPHILPCT 69.4 12/08/2020    LYMPHOPCT 18.3 12/08/2020    MONOPCT 7.8 12/08/2020    EOSRELPCT 3.4 12/08/2020    BASOPCT 1.1 12/08/2020    NEUTROABS 4.0 12/08/2020    LYMPHSABS 1.1 12/08/2020    MONOSABS 0.5 12/08/2020    EOSABS 0.2 12/08/2020     Lab Results   Component Value Date     12/08/2020    K 3.8 12/08/2020    K 3.5 05/19/2019     12/08/2020    CO2 22 12/08/2020    ANIONGAP 12 12/08/2020    GLUCOSE 94 12/08/2020    BUN 17 12/08/2020    CREATININE <0.5 12/08/2020    LABGLOM >60 12/08/2020    GFRAA >60 12/08/2020    CALCIUM 9.5 12/08/2020    PROT 7.6 12/08/2020    LABALBU 4.0 12/08/2020    AGRATIO 1.1 12/08/2020    BILITOT 0.3 12/08/2020    ALKPHOS 115 12/08/2020    ALT 9 12/08/2020    AST 14 12/08/2020    GLOB 3.6 12/08/2020     Lab Results   Component Value Date    CHOL 180 12/08/2020    TRIG 49 12/08/2020    HDL 66 12/08/2020    LDLCALC 104 12/08/2020    LABVLDL 10 12/08/2020     Lab Results   Component Value Date    TSHREFLEX 1.45 12/08/2020     Lab Results   Component Value Date    IRON 52 12/08/2020    TIBC 293 12/08/2020    LABIRON 18 12/08/2020     Lab Results   Component Value Date    UDWZDTBH01 339 12/08/2020    FOLATE 10.56 12/08/2020     Lab Results   Component Value Date    VITD25 17.3 12/08/2020     Lab Results   Component Value Date    LABA1C 5.1 12/08/2020    EAG 99.7 12/08/2020       Review of Systems   Constitutional: Negative. HENT: Negative. Eyes: Negative. Respiratory: Negative. Cardiovascular: Negative. Gastrointestinal: Negative. Endocrine: Negative. Genitourinary: Negative. Musculoskeletal: Positive for arthralgias and back pain. Skin: Negative. Allergic/Immunologic: Negative. Neurological: Negative. Hematological: Negative. Psychiatric/Behavioral: Negative. PHYSICAL EXAMINATION:    Constitutional: [x] Appears well-developed and well-nourished [x] No apparent distress      [] Abnormal-   Mental status  [x] Alert and awake  [x] Oriented to person/place/time [x]Able to follow commands      Eyes:  EOM    [x]  Normal  [] Abnormal-  Sclera  [x]  Normal  [] Abnormal -         Discharge [x]  None visible  [] Abnormal -    HENT:   [x] Normocephalic, atraumatic. [] Abnormal     Neck: [x] No visualized mass     Pulmonary/Chest: [x] Respiratory effort normal.  [x] No visualized signs of difficulty breathing or respiratory distress        [] Abnormal-      Musculoskeletal:   [] Normal gait with no signs of ataxia         [x] Normal range of motion of neck        [] Abnormal-     Neurological:        [x] No Facial Asymmetry (Cranial nerve 7 motor function) (limited exam to video visit)          [x] No gaze palsy        [] Abnormal-         Skin:        [x] No significant exanthematous lesions or discoloration noted on facial skin         [] Abnormal-            Psychiatric:       [x] Normal Affect [x] No Hallucinations        [] Abnormal-     Other pertinent observable physical exam findings-     Due to this being a TeleHealth encounter, evaluation of the following organ systems is limited: Vitals/Constitutional/EENT/Resp/CV/GI//MS/Neuro/Skin/Heme-Lymph-Imm.     Assessment and Plan: Patient is here for their 6th presurgery visit for sleeve via telemedicine, down 3 lbs. The patient's current Body mass index is 59.34 kg/m². (2/15/21). She is making good dietary and behavior modifications. She talked with the registered dietitian for continued follow up. I agree with recommendations and plan. She is not exercising due to weather, but made goal to utilize stairs in house and walk as weather permits. Encouraged physical activity as able. Patient is postmenopausal so did not need to receive instruction that it is recommended to avoid pregnancy following bariatric surgery for at least 2 years to allow them to have stable weight loss and to help avoid increased risk of vitamin deficiencies and malnutrition. Discussed preop work up which is complete. Dietitian and I have spoken and agree the patient is ready for a surgical date. Have cautioned patient about weight gain going forward and maintaining healthy behaviors as to not risk surgery being cancelled or denied by insurance. Medications preoperatively and postoperatively reviewed. Did discuss with patient that she will need to avoid steroids moving forward, but we could give her a date based on her last dose of steroids (had prednisone from 2/9/2021-2/14/2021). Patient's earliest date could be the week of April 12th. (April 12th or 14th). She could come in March for preoperative class and then surgery in April. We will see her back in 1 month for continued follow up or through telemedicine.

## 2021-02-15 ENCOUNTER — TELEMEDICINE (OUTPATIENT)
Dept: BARIATRICS/WEIGHT MGMT | Age: 56
End: 2021-02-15
Payer: MEDICARE

## 2021-02-15 VITALS — BODY MASS INDEX: 51.91 KG/M2 | HEIGHT: 63 IN | WEIGHT: 293 LBS

## 2021-02-15 DIAGNOSIS — E66.01 MORBID OBESITY WITH BMI OF 50.0-59.9, ADULT (HCC): ICD-10-CM

## 2021-02-15 DIAGNOSIS — G47.33 OBSTRUCTIVE SLEEP APNEA: ICD-10-CM

## 2021-02-15 DIAGNOSIS — K21.9 CHRONIC GERD: Primary | ICD-10-CM

## 2021-02-15 PROCEDURE — 99213 OFFICE O/P EST LOW 20 MIN: CPT | Performed by: NURSE PRACTITIONER

## 2021-02-15 RX ORDER — OXYBUTYNIN CHLORIDE 5 MG/1
TABLET ORAL
Qty: 60 TABLET | Refills: 0 | Status: SHIPPED | OUTPATIENT
Start: 2021-02-15 | End: 2021-03-15

## 2021-02-15 RX ORDER — FUROSEMIDE 20 MG/1
TABLET ORAL
Qty: 180 TABLET | Refills: 0 | Status: SHIPPED | OUTPATIENT
Start: 2021-02-15 | End: 2021-10-25 | Stop reason: SDUPTHER

## 2021-02-15 ASSESSMENT — ENCOUNTER SYMPTOMS: BACK PAIN: 1

## 2021-02-15 NOTE — PROGRESS NOTES
Juan Pablo Nesbitt lost 3 lbs over 3 weeks with self-reported wt 335#. Is pt eating at least 4 times everyday? Eating 4x day - having to remind self  B - greek yogurt  L - CC with salad   S - apple with PB  D - chix breast with veggies and sometimes slice of rye bread / open faced sandwich with lean turkey or ham    Is pt eating a lean protein source with all meals and snacks? yes    Has pt decreased their portions using the plate method? yes, being mindful of portions    Is pt choosing low fat/sugar free options? yes    Is pt drinking at least 64 oz of clear liquids everyday? Trying - 50-64oz water     Has pt stopped drinking carbonation, caffeinated, and sugar sweetened beverages? no    Has pt sampled Unjury and/or Nectar protein? Yes, tried and tolerated    Participating in intentional exercise? None structured    Plan/Recommendations:   - Continue to eat 4-5x day focusing on protein and produce  - Consistent with 64oz fluids daily  - Try to move more throughout day - stairs / body weight exercises    Handouts: none    Due to the COVID-19 restrictions on close contact interactions the patient's visit was conducted via telephone in jason of a face to face visit. The patient is here through telemedicine for their pre-surgical visit.       [address]

## 2021-02-15 NOTE — Clinical Note
Palmer Pa,  This patient is ready for a surgery date. However, she had steroids recently for some bronchitis/asthma flare up so earliest could not be until week of April 12th. Please give her a call and get her scheduled for preoperative class and surgery so she has something to look forward to and make plans.    Thank you,  Angus Lomas

## 2021-02-25 ASSESSMENT — ENCOUNTER SYMPTOMS
SHORTNESS OF BREATH: 0
COUGH: 0
GASTROINTESTINAL NEGATIVE: 1
EYES NEGATIVE: 1
ALLERGIC/IMMUNOLOGIC NEGATIVE: 1
RESPIRATORY NEGATIVE: 1

## 2021-02-25 NOTE — PROGRESS NOTES
[facility]) Physicians   [employer]    Subjective:      Patient ID: Maureen Hairston is a 60 y.o. female    HPI    The patient is here for their bariatric surgery preoperative education group visit. She has made several attempts at weight loss in the past without success and now has been scheduled to have bariatric surgery on 2021 for future weight loss. She is working to change her dietary behaviors and lose weight to improve comorbid conditions such as obstructive sleep apnea and GERD. Mary Nesbitt is a very pleasant 60 y.o. female with Body mass index is 59.34 kg/m².     Past Medical History:   Diagnosis Date    Acute renal failure (ARF) ([facility] [place] [address] May 4, 2015    d/t IV dye    Arthritis     Asthma     DDD (degenerative disc disease), cervical     Degenerative disc disease     GERD (gastroesophageal reflux disease)     Hypertension     Scoliosis     Sleep apnea     Spinal stenosis      Past Surgical History:   Procedure Laterality Date    CARPAL TUNNEL RELEASE      JOINT REPLACEMENT Bilateral     ROTATOR CUFF REPAIR      3x right, 2x left    UPPER GASTROINTESTINAL ENDOSCOPY N/A 2020    EGD BIOPSY performed by Crow Wilcox MD at [address] [place] [address]     Family History   Problem Relation Age of Onset    Other Sister     Stroke Sister     Arthritis Sister     Asthma Sister     Mental Illness Sister     Other Brother     High Blood Pressure Brother     Diabetes Brother     Arthritis Brother     Cancer Sister     Arthritis Sister     Arthritis Mother     High Blood Pressure Father     Arthritis Father      Social History     Tobacco Use    Smoking status: Former Smoker     Packs/day: 1.50     Years: 20.00     Pack years: 30.00     Types: Cigarettes     Quit date:      Years since quittin.2    Smokeless tobacco: Never Used   Substance Use Topics    Alcohol use: Not Currently     Comment: 2 drinks/day     I counseled the patient on the importance of not smoking and risks of ETOH. Allergies   Allergen Reactions    Iodides Anaphylaxis     Rash, hives, swelling     Vitals:    03/11/21 1253   Weight: (!) 335 lb (152 kg)   Height: 5' 3\" (1.6 m)     Body mass index is 59.34 kg/m². Current Outpatient Medications:     omeprazole (PRILOSEC) 20 MG delayed release capsule, TAKE ONE CAPSULE BY MOUTH EVERY MORNING BEFORE BREAKFAST, Disp: 30 capsule, Rfl: 0    Cholecalciferol 50 MCG (2000 UT) TABS, Take 1 tablet by mouth daily Take 1 tablet by mouth daily. , Disp: 90 tablet, Rfl: 2    oxybutynin (DITROPAN) 5 MG tablet, TAKE ONE TABLET BY MOUTH TWICE A DAY, Disp: 60 tablet, Rfl: 0    furosemide (LASIX) 20 MG tablet, TAKE TWO TABLETS BY MOUTH DAILY AS NEEDED, Disp: 180 tablet, Rfl: 0    indomethacin (INDOCIN) 50 MG capsule, TAKE ONE CAPSULE BY MOUTH THREE TIMES A DAY AS NEEDED FOR PAIN, Disp: 90 capsule, Rfl: 1    fluticasone-salmeterol (ADVAIR DISKUS) 250-50 MCG/DOSE AEPB, Inhale 1 puff into the lungs every 12 hours, Disp: 1 Inhaler, Rfl: 5    albuterol sulfate  (90 Base) MCG/ACT inhaler, Inhale 2 puffs into the lungs every 6 hours as needed for Wheezing or Shortness of Breath, Disp: 1 Inhaler, Rfl: 5    aspirin 81 MG chewable tablet, Take 81 mg by mouth daily, Disp: , Rfl:     gabapentin (NEURONTIN) 400 MG capsule, Take 400 mg by mouth 4 times daily. , Disp: , Rfl:     magnesium 30 MG tablet, Take 30 mg by mouth daily, Disp: , Rfl:     topiramate (TOPAMAX SPRINKLE) 25 MG capsule, Take 100 mg by mouth nightly Indications: 50 mg at noon, 100 mg at HS , Disp: , Rfl:     tiZANidine (ZANAFLEX) 4 MG tablet, Take 4-8 mg by mouth nightly , Disp: , Rfl:     oxyCODONE (ROXICODONE) 5 MG immediate release tablet, Take 5 mg by mouth every 12 hours as needed for Pain. , Disp: , Rfl:     DULoxetine (CYMBALTA) 60 MG capsule, Take 120 mg by mouth daily , Disp: , Rfl:     Lab Results   Component Value Date    WBC 5.8 12/08/2020    RBC 4.27 12/08/2020    HGB 12.8 12/08/2020    HCT 39.1 12/08/2020    MCV 91.7 12/08/2020    MCH 30.0 12/08/2020    MCHC 32.8 12/08/2020    MPV 7.8 12/08/2020    NEUTOPHILPCT 69.4 12/08/2020    LYMPHOPCT 18.3 12/08/2020    MONOPCT 7.8 12/08/2020    EOSRELPCT 3.4 12/08/2020    BASOPCT 1.1 12/08/2020    NEUTROABS 4.0 12/08/2020    LYMPHSABS 1.1 12/08/2020    MONOSABS 0.5 12/08/2020    EOSABS 0.2 12/08/2020     Lab Results   Component Value Date     12/08/2020    K 3.8 12/08/2020    K 3.5 05/19/2019     12/08/2020    CO2 22 12/08/2020    ANIONGAP 12 12/08/2020    GLUCOSE 94 12/08/2020    BUN 17 12/08/2020    CREATININE <0.5 12/08/2020    LABGLOM >60 12/08/2020    GFRAA >60 12/08/2020    CALCIUM 9.5 12/08/2020    PROT 7.6 12/08/2020    LABALBU 4.0 12/08/2020    AGRATIO 1.1 12/08/2020    BILITOT 0.3 12/08/2020    ALKPHOS 115 12/08/2020    ALT 9 12/08/2020    AST 14 12/08/2020    GLOB 3.6 12/08/2020     Lab Results   Component Value Date    CHOL 180 12/08/2020    TRIG 49 12/08/2020    HDL 66 12/08/2020    LDLCALC 104 12/08/2020    LABVLDL 10 12/08/2020     Lab Results   Component Value Date    TSHREFLEX 1.45 12/08/2020     Lab Results   Component Value Date    IRON 52 12/08/2020    TIBC 293 12/08/2020    LABIRON 18 12/08/2020     Lab Results   Component Value Date    CIOMIUNG16 339 12/08/2020    FOLATE 10.56 12/08/2020     Lab Results   Component Value Date    VITD25 17.3 12/08/2020     Lab Results   Component Value Date    LABA1C 5.1 12/08/2020    EAG 99.7 12/08/2020       Review of Systems   Constitutional: Negative. Negative for chills, fatigue and fever. HENT: Negative. Eyes: Negative. Respiratory: Negative. Negative for cough and shortness of breath. Cardiovascular: Negative. Gastrointestinal: Negative. Endocrine: Negative. Genitourinary: Negative. Musculoskeletal: Negative. Skin: Negative. Allergic/Immunologic: Negative. Neurological: Negative. Hematological: Negative.     Psychiatric/Behavioral: Negative. Objective:     Physical Exam  Vitals signs reviewed. Constitutional:       Appearance: She is well-developed. HENT:      Head: Normocephalic and atraumatic. Eyes:      Conjunctiva/sclera: Conjunctivae normal.      Pupils: Pupils are equal, round, and reactive to light. Neck:      Musculoskeletal: Normal range of motion and neck supple. Pulmonary:      Effort: Pulmonary effort is normal.   Abdominal:      Palpations: Abdomen is soft. Musculoskeletal: Normal range of motion. Skin:     General: Skin is warm and dry. Neurological:      Mental Status: She is alert and oriented to person, place, and time. Psychiatric:         Behavior: Behavior normal.         Thought Content: Thought content normal.         Judgment: Judgment normal.       Assessment and Plan:   Patient is here for their preoperative education group visit for sleeve gastrectomy. The patient with no change in weight today. The patient's current Body mass index is 59.34 kg/m². (3/11/21). She is making good dietary and behavior modifications and is considered to be a good surgical candidate. Patient has a diagnosis of obstructive sleep apnea and uses a CPAP for sleep. Discussed that weight loss can assist with improving sleep apnea symptoms. Explained to patient to make sure they bring their CPAP with them to the hospital for their surgery. Patient has a diagnosis of chronic GERD and takes a PPI. Discussed the benefits of weight loss and dietary changes on acid reflux. However, they will most likely need to continue their medication short term after surgery as they adjust to the new diet. Discussed the fact that they will be required to crush or open their medications for the first two weeks after surgery and reviewed those medications that can not be crushed.     Patient is postmenopausal so did not need to receive instruction that it is recommended to avoid pregnancy following bariatric surgery for at least 2 years to allow them to have stable weight loss and to help avoid increased risk of vitamin deficiencies and malnutrition. Patient received instructions from the registered dietitian in reference to the two week preoperative diet and the four phases of their postoperative diet. In addition I reviewed these instructions and stressed the importance of following these recommendations for their safety. Patient completed the preoperative class where they were provided with education related to their bariatric surgery, common surgical complications, medications preoperatively & postoperatively, special concerns related to bariatric surgery postoperatively, vitamin supplementation, patient agreement, PAT & scheduling, hospital course, wellness discovery program and what to do in the case of an emergency postoperatively. The dietitians reviewed all preoperative and postoperative diet instructions. Patient was given the opportunity to ask questions during the group visit and these questions were answered by myself and/or the dietitian. I spent a total of 40 minutes on the day of the visit and over half of that time was spent counseling the patient on proper dietary behaviors, exercise and surgery protocols.

## 2021-03-03 NOTE — PROGRESS NOTES
Patient Name:   Nemesio Nesbitt       Type of Surgery:  Sleeve         Date of Surgery:  4/12/21       Start Pre-Op Diet On:  3/30/21       Start Clear Liquids On:  4/11/21       If you are having a gastric bypass, start Bowel Prep between 2-4pm the day prior to surgery. NPO after midnight the night before your surgery! Take morning medications with a small amount of water.     NOTES:_______________________________________  ______________________________________________

## 2021-03-08 DIAGNOSIS — E55.9 VITAMIN D DEFICIENCY: Primary | ICD-10-CM

## 2021-03-08 NOTE — PROGRESS NOTES
Patient completed weekly course of Vitamin D 50,000 IU. Started patient on Vitamin D 2,000 IU daily.   Thank you,  SUZETTE TomlinsonC

## 2021-03-10 ENCOUNTER — TELEPHONE (OUTPATIENT)
Dept: BARIATRICS/WEIGHT MGMT | Age: 56
End: 2021-03-10

## 2021-03-10 RX ORDER — OMEPRAZOLE 20 MG/1
CAPSULE, DELAYED RELEASE ORAL
Qty: 30 CAPSULE | Refills: 0 | Status: SHIPPED | OUTPATIENT
Start: 2021-03-10 | End: 2021-04-07

## 2021-03-11 ENCOUNTER — OFFICE VISIT (OUTPATIENT)
Dept: BARIATRICS/WEIGHT MGMT | Age: 56
End: 2021-03-11
Payer: MEDICARE

## 2021-03-11 VITALS — HEIGHT: 63 IN | BODY MASS INDEX: 51.91 KG/M2 | WEIGHT: 293 LBS

## 2021-03-11 DIAGNOSIS — G47.33 OBSTRUCTIVE SLEEP APNEA: ICD-10-CM

## 2021-03-11 DIAGNOSIS — K21.9 CHRONIC GERD: Primary | ICD-10-CM

## 2021-03-11 DIAGNOSIS — E66.01 MORBID OBESITY WITH BMI OF 50.0-59.9, ADULT (HCC): ICD-10-CM

## 2021-03-11 PROCEDURE — 99214 OFFICE O/P EST MOD 30 MIN: CPT | Performed by: NURSE PRACTITIONER

## 2021-03-11 NOTE — TELEPHONE ENCOUNTER
Pt L/M returning call at 4:45 pm.  Pt states that she is doing well, but the cushion is rattling and can't get it to settle. Pt is going to try to adjust the mask and will let office know if this doesn't help.

## 2021-03-15 RX ORDER — OXYBUTYNIN CHLORIDE 5 MG/1
TABLET ORAL
Qty: 60 TABLET | Refills: 0 | Status: ON HOLD | OUTPATIENT
Start: 2021-03-15 | End: 2021-04-13

## 2021-03-16 RX ORDER — TOPIRAMATE 100 MG/1
100 TABLET, FILM COATED ORAL NIGHTLY
Status: ON HOLD | COMMUNITY
End: 2021-08-30 | Stop reason: ALTCHOICE

## 2021-03-16 NOTE — PROGRESS NOTES
Name_______________________________________Printed:____________________  Date and time of surgery___4/12/21 @ 0830_____________________Arrival Time:___0630 main hosp_____________   1. The instructions given regarding when and if a patient needs to stop oral intake prior to surgery varies. Follow the specific instructions you were given                  __x_Nothing to eat or to drink after Midnight the night before.                   ____Carbo loading or ERAS instructions will be given to select patients-if you have been given those instructions -please do the following                           The evening before your surgery after dinner before midnight drink 40 ounces of gatorade. If you are diabetic use sugar free. The morning of surgery drink 40 ounces of water. This needs to be finished 3 hours prior to your surgery start time. 2. Take the following pills with a small sip of water on the morning of surgery_____prilosec_____________________________________________                  Do not take blood pressure medications ending in pril or sartan the mikayla prior to surgery or the morning of surgery_   3. Aspirin, Ibuprofen, Advil, Naproxen, Vitamin E and other Anti-inflammatory products and supplements should be stopped for 5 -7days before surgery or as directed by your physician. 4. Check with your Doctor regarding stopping Plavix, Coumadin,Eliquis, Lovenox,Effient,Pradaxa,Xarelto, Fragmin or other blood thinners and follow their instructions. 5. Do not smoke, and do not drink any alcoholic beverages 24 hours prior to surgery. This includes NA Beer. Refrain from the usage of any recreational drugs. 6. You may brush your teeth and gargle the morning of surgery. DO NOT SWALLOW WATER   7. You MUST make arrangements for a responsible adult to stay on site while you are here and take you home after your surgery. You will not be allowed to leave alone or drive yourself home.   It is strongly suggested someone stay with you the first 24 hrs. Your surgery will be cancelled if you do not have a ride home. 8. A parent/legal guardian must accompany a child scheduled for surgery and plan to stay at the hospital until the child is discharged. Please do not bring other children with you. 9. Please wear simple, loose fitting clothing to the hospital.  [place] not bring valuables (money, credit cards, checkbooks, etc.) Do not wear any makeup (including no eye makeup) or nail polish on your fingers or toes. 10. DO NOT wear any jewelry or piercings on day of surgery. All body piercing jewelry must be removed. 11. If you have ___dentures, they will be removed before going to the OR; we will provide you a container. If you wear ___contact lenses or __x_glasses, they will be removed; please bring a case for them. 12. Please see your family doctor/pediatrician for a history & physical and/or concerning medications. Bring any test results/reports from your physician's office. PCP___cammy_______________Phone___________H&P Appt. Date___3/18____             13 If you  have a Living Will and Durable Power of  for Healthcare, please bring in a copy. 15. Notify your Surgeon if you develop any illness between now and surgery  time, cough, cold, fever, sore throat, nausea, vomiting, etc.  Please notify your surgeon if you experience dizziness, shortness of breath or blurred vision between now & the time of your surgery             15. DO NOT shave your operative site 96 hours prior to surgery. For face & neck surgery, men may use an electric razor 48 hours prior to surgery. 16. Shower the night before or morning of surgery using an antibacterial soap or as you have been instructed. 17. To provide excellent care visitors will be limited to one in the room at any given time. 18.  Please bring picture ID and insurance card.              19.  Visit our web site for additional information:  [email or web address]              20.During flu season no children under the age of 15 are permitted in the hospital for the safety of all patients. 21. If you take a long acting insulin in the evening only  take half of your usual  dose the night  before your procedure              22. If you use a c-pap please bring DOS if staying overnight,             23.For your convenience [facility] has a pharmacy on site to fill your prescriptions. 24. If you use oxygen and have a portable tank please bring it  with you the DOS             25. Bring a complete list of all your medications with name and dose include any supplements. 26. Other__________________________________________   *Please call pre admission testing if you any further questions   Lexi Salgado   [address]    [phone]  [phone]   [facility]    __ [employer] _____  _x_ Scheduled _4/6__ Where _mff_   __ Other __________      Valere Montserrat POLICY(subject to change)    There is a one visitor policy at [facility] for all surgeries and endoscopies. Whether the visitor can stay or will be asked to wait in the car will depend on the current policy and if social distancing can be maintained. The policy is subject to change at any time. Please make sure the visitor has a cell phone that is on,charged and able to accept calls, as this may be the way that the staff communicates with them. Pain management is NO VISITOR policyThe patients ride is expected to remain in the car with a cell phone for communication. If the ride is leaving the hospital grounds please make sure they are back in time for pickup. Have the patient inform the staff on arrival what their rides plans are while the patient is in the facility. At the MAIN there is one visitor allowed. Please note that the visitor policy is subject to change. All above information reviewed with patient in person or by phone. Patient verbalizes understanding. All questions and concerns addressed.                                                                                                  Patient/Rep_____pt_______________                                                                                                                                    PRE OP INSTRUCTIONS

## 2021-03-18 ENCOUNTER — OFFICE VISIT (OUTPATIENT)
Dept: INTERNAL MEDICINE CLINIC | Age: 56
End: 2021-03-18

## 2021-03-18 VITALS
SYSTOLIC BLOOD PRESSURE: 108 MMHG | HEART RATE: 72 BPM | WEIGHT: 293 LBS | HEIGHT: 63 IN | TEMPERATURE: 97.9 F | DIASTOLIC BLOOD PRESSURE: 76 MMHG | BODY MASS INDEX: 51.91 KG/M2

## 2021-03-18 DIAGNOSIS — G47.33 OBSTRUCTIVE SLEEP APNEA: ICD-10-CM

## 2021-03-18 DIAGNOSIS — Z01.818 PREOP GENERAL PHYSICAL EXAM: ICD-10-CM

## 2021-03-18 DIAGNOSIS — J45.909 MODERATE ASTHMA WITHOUT COMPLICATION, UNSPECIFIED WHETHER PERSISTENT: ICD-10-CM

## 2021-03-18 DIAGNOSIS — E66.01 MORBID OBESITY WITH BMI OF 50.0-59.9, ADULT (HCC): Primary | ICD-10-CM

## 2021-03-18 PROCEDURE — 99214 OFFICE O/P EST MOD 30 MIN: CPT | Performed by: INTERNAL MEDICINE

## 2021-03-18 RX ORDER — NYSTATIN 100000 [USP'U]/G
POWDER TOPICAL
Qty: 1 BOTTLE | Refills: 1 | Status: ON HOLD
Start: 2021-03-18 | End: 2021-09-13 | Stop reason: HOSPADM

## 2021-03-18 NOTE — PROGRESS NOTES
Subjective:      Jose Martin Middleton is a 60 y.o. female who presents to the office today for a preoperative consultation at the request of surgeon Dr. Grabiel Perez who plans on performing a laparoscopic sleeve gastrectomy . Planned anesthesia is general.      Past Medical History:   Diagnosis Date    Acute renal failure (ARF) ([address] May 4, 2015    d/t IV dye    Arthritis     Asthma     DDD (degenerative disc disease), cervical     Degenerative disc disease     Family history of factor V deficiency     brother and sister; pt tested does not have    GERD (gastroesophageal reflux disease)     Hypertension     no longer has after quitting stressful job    Scoliosis     Sleep apnea     uses c-pap    Spinal stenosis      Past Surgical History:   Procedure Laterality Date    CARPAL TUNNEL RELEASE      JOINT REPLACEMENT Bilateral     hips and knees    ROTATOR CUFF REPAIR      3x right, 2x left    UPPER GASTROINTESTINAL ENDOSCOPY N/A 12/28/2020    EGD BIOPSY performed by Chikis Mccray MD at [address] [place] [address]     Family History   Problem Relation Age of Onset    Other Sister     Stroke Sister     Arthritis Sister     Asthma Sister     Mental Illness Sister     Other Brother     High Blood Pressure Brother     Diabetes Brother     Arthritis Brother     Cancer Sister     Arthritis Sister     Arthritis Mother     High Blood Pressure Father     Arthritis Father      Social History     Socioeconomic History    Marital status:       Spouse name: None    Number of children: None    Years of education: None    Highest education level: None   Occupational History    None   Social Needs    Financial resource strain: None    Food insecurity     Worry: None     Inability: None    Transportation needs     Medical: None     Non-medical: None   Tobacco Use    Smoking status: Former Smoker     Packs/day: 1.50     Years: 20.00     Pack years: 30.00     Types: Cigarettes     Quit date: 1999 Years since quittin.2    Smokeless tobacco: Never Used   Substance and Sexual Activity    Alcohol use: Not Currently     Comment: 2 drinks/day    Drug use: No    Sexual activity: Yes     Partners: Male   Lifestyle    Physical activity     Days per week: None     Minutes per session: None    Stress: None   Relationships    Social connections     Talks on phone: None     Gets together: None     Attends Congregation service: None     Active member of club or organization: None     Attends meetings of clubs or organizations: None     Relationship status: None    Intimate partner violence     Fear of current or ex partner: None     Emotionally abused: None     Physically abused: None     Forced sexual activity: None   Other Topics Concern    None   Social History Narrative    None     Current Outpatient Medications   Medication Sig Dispense Refill    topiramate (TOPAMAX) 100 MG tablet Take 100 mg by mouth nightly      oxybutynin (DITROPAN) 5 MG tablet TAKE ONE TABLET BY MOUTH TWICE A DAY 60 tablet 0    omeprazole (PRILOSEC) 20 MG delayed release capsule TAKE ONE CAPSULE BY MOUTH EVERY MORNING BEFORE BREAKFAST 30 capsule 0    Cholecalciferol 50 MCG (2000 UT) TABS Take 1 tablet by mouth daily Take 1 tablet by mouth daily. 90 tablet 2    furosemide (LASIX) 20 MG tablet TAKE TWO TABLETS BY MOUTH DAILY AS NEEDED 180 tablet 0    indomethacin (INDOCIN) 50 MG capsule TAKE ONE CAPSULE BY MOUTH THREE TIMES A DAY AS NEEDED FOR PAIN 90 capsule 1    aspirin 81 MG chewable tablet Take 81 mg by mouth daily      gabapentin (NEURONTIN) 400 MG capsule Take 400 mg by mouth 3 times daily.  magnesium 30 MG tablet Take 30 mg by mouth daily      tiZANidine (ZANAFLEX) 4 MG tablet Take 4-8 mg by mouth nightly       oxyCODONE (ROXICODONE) 5 MG immediate release tablet Take 5 mg by mouth every 12 hours as needed for Pain.        DULoxetine (CYMBALTA) 60 MG capsule Take 120 mg by mouth daily        No current facility-administered medications for this visit. Allergies   Allergen Reactions    Iodides Anaphylaxis     Rash, hives, swelling     Review of Systems  Constitutional: negative for chills and fevers  Respiratory: negative for cough and shortness of breath  Cardiovascular: negative for chest pain, irregular heart beat and palpitations  Gastrointestinal: negative for abdominal pain, diarrhea, nausea and vomiting  Genitourinary:negative for dysuria and nocturia     Planned anesthesia: general.  Known anesthesia problems: no  Bleeding risk:  no  Personal or FH of DVT/PE:  yes  Patient objection to receiving blood products: no     Objective:      /76   Pulse 72   Temp 97.9 °F (36.6 °C)   Ht 5' 3\" (1.6 m)   Wt (!) 336 lb (152.4 kg)   LMP 12/24/2010   BMI 59.52 kg/m²     General Appearance:  Alert, cooperative, no distress, appears stated age   Head:  Normocephalic, without obvious abnormality, atraumatic   Eyes:  PERRL, conjunctiva/corneas clear, EOM's intact   Ears:  Normal TM's and external ear canals, both ears       Throat: Lips, mucosa, and tongue normal; teeth and gums normal   Neck: Supple, symmetrical, trachea midline, no adenopathy;  thyroid: not enlarged, symmetric, no tenderness/mass/nodules; no carotid bruit or JVD       Lungs:   Clear to auscultation bilaterally, respirations unlabored       Heart:  Regular rate and rhythm, S1 and S2 normal, no murmur, rub, or gallop   Abdomen:   Soft, non-tender, bowel sounds active all four quadrants,  no masses, no organomegaly   Pelvic: Deferred   Extremities: Extremities normal, atraumatic, no cyanosis or edema   Pulses: 2+ and symmetric   Skin: Skin color, texture, turgor normal, no rashes or lesions   Lymph nodes: Cervical, supraclavicular, and axillary nodes normal                    Assessment:       Diagnosis Orders   1. Morbid obesity with BMI of 50.0-59.9, adult ([facility])  EKG 12 Lead   2. Preop general physical exam  EKG 12 Lead   3.  Obstructive sleep apnea     4. Moderate asthma without complication, unspecified whether persistent           60 y.o. female with planned surgery as above.          Plan:   H/o DVT in the past. Lovenox shots after surgery for prophylaxis  Patient medically cleared for above planned procedure pending labs and EKG

## 2021-03-22 RX ORDER — INDOMETHACIN 50 MG/1
CAPSULE ORAL
Qty: 90 CAPSULE | Refills: 0 | Status: ON HOLD
Start: 2021-03-22 | End: 2021-04-13 | Stop reason: HOSPADM

## 2021-04-06 ENCOUNTER — OFFICE VISIT (OUTPATIENT)
Dept: PRIMARY CARE CLINIC | Age: 56
End: 2021-04-06
Payer: MEDICARE

## 2021-04-06 ENCOUNTER — HOSPITAL ENCOUNTER (OUTPATIENT)
Age: 56
Discharge: HOME | End: 2021-04-06
Payer: MEDICARE

## 2021-04-06 DIAGNOSIS — Z01.818 PREOP TESTING: ICD-10-CM

## 2021-04-06 DIAGNOSIS — Z20.828 EXPOSURE TO SARS-ASSOCIATED CORONAVIRUS: Primary | ICD-10-CM

## 2021-04-06 DIAGNOSIS — E66.01 MORBID OBESITY WITH BMI OF 50.0-59.9, ADULT (HCC): ICD-10-CM

## 2021-04-06 DIAGNOSIS — Z01.818 PREOP GENERAL PHYSICAL EXAM: ICD-10-CM

## 2021-04-06 LAB
A/G RATIO: 1 (ref 1.1–2.2)
ABO/RH: NORMAL
ALBUMIN SERPL-MCNC: 4.3 G/DL (ref 3.4–5)
ALP BLD-CCNC: 109 U/L (ref 40–129)
ALT SERPL-CCNC: 8 U/L (ref 10–40)
ANION GAP SERPL CALCULATED.3IONS-SCNC: 13 MMOL/L (ref 3–16)
ANTIBODY SCREEN: NORMAL
AST SERPL-CCNC: 16 U/L (ref 15–37)
BILIRUB SERPL-MCNC: 0.4 MG/DL (ref 0–1)
BUN BLDV-MCNC: 22 MG/DL (ref 7–20)
CALCIUM SERPL-MCNC: 9.6 MG/DL (ref 8.3–10.6)
CHLORIDE BLD-SCNC: 100 MMOL/L (ref 99–110)
CO2: 23 MMOL/L (ref 21–32)
CREAT SERPL-MCNC: 0.6 MG/DL (ref 0.6–1.1)
EKG ATRIAL RATE: 71 BPM
EKG DIAGNOSIS: NORMAL
EKG P AXIS: 41 DEGREES
EKG P-R INTERVAL: 136 MS
EKG Q-T INTERVAL: 384 MS
EKG QRS DURATION: 90 MS
EKG QTC CALCULATION (BAZETT): 417 MS
EKG R AXIS: 13 DEGREES
EKG T AXIS: 39 DEGREES
EKG VENTRICULAR RATE: 71 BPM
GFR AFRICAN AMERICAN: >60
GFR NON-AFRICAN AMERICAN: >60
GLOBULIN: 4.3 G/DL
GLUCOSE BLD-MCNC: 99 MG/DL (ref 70–99)
HCT VFR BLD CALC: 42.2 % (ref 36–48)
HEMOGLOBIN: 14.3 G/DL (ref 12–16)
MCH RBC QN AUTO: 29.7 PG (ref 26–34)
MCHC RBC AUTO-ENTMCNC: 33.9 G/DL (ref 31–36)
MCV RBC AUTO: 87.6 FL (ref 80–100)
PDW BLD-RTO: 14.8 % (ref 12.4–15.4)
PLATELET # BLD: 280 K/UL (ref 135–450)
PMV BLD AUTO: 8.4 FL (ref 5–10.5)
POTASSIUM SERPL-SCNC: 3.8 MMOL/L (ref 3.5–5.1)
RBC # BLD: 4.81 M/UL (ref 4–5.2)
SARS-COV-2: NOT DETECTED
SODIUM BLD-SCNC: 136 MMOL/L (ref 136–145)
TOTAL PROTEIN: 8.6 G/DL (ref 6.4–8.2)
WBC # BLD: 7.2 K/UL (ref 4–11)

## 2021-04-06 PROCEDURE — 86900 BLOOD TYPING SEROLOGIC ABO: CPT

## 2021-04-06 PROCEDURE — 80053 COMPREHEN METABOLIC PANEL: CPT

## 2021-04-06 PROCEDURE — 86850 RBC ANTIBODY SCREEN: CPT

## 2021-04-06 PROCEDURE — 85027 COMPLETE CBC AUTOMATED: CPT

## 2021-04-06 PROCEDURE — 93005 ELECTROCARDIOGRAM TRACING: CPT

## 2021-04-06 PROCEDURE — 86901 BLOOD TYPING SEROLOGIC RH(D): CPT

## 2021-04-06 PROCEDURE — 93010 ELECTROCARDIOGRAM REPORT: CPT | Performed by: INTERNAL MEDICINE

## 2021-04-06 PROCEDURE — 99211 OFF/OP EST MAY X REQ PHY/QHP: CPT | Performed by: NURSE PRACTITIONER

## 2021-04-06 NOTE — PROGRESS NOTES
Hilary López Laz received a viral test for COVID-19. They were educated on isolation and quarantine as appropriate. For any symptoms, they were directed to seek care from their PCP, given contact information to establish with a doctor, directed to an urgent care or the emergency room.

## 2021-04-07 RX ORDER — OMEPRAZOLE 20 MG/1
CAPSULE, DELAYED RELEASE ORAL
Qty: 30 CAPSULE | Refills: 0 | Status: SHIPPED | OUTPATIENT
Start: 2021-04-07 | End: 2021-05-07

## 2021-04-12 ENCOUNTER — ANESTHESIA (OUTPATIENT)
Dept: OPERATING ROOM | Age: 56
DRG: 620 | End: 2021-04-12
Payer: MEDICARE

## 2021-04-12 ENCOUNTER — HOSPITAL ENCOUNTER (INPATIENT)
Age: 56
LOS: 1 days | Discharge: HOME | DRG: 620 | End: 2021-04-13
Attending: SURGERY | Admitting: SURGERY
Payer: MEDICARE

## 2021-04-12 ENCOUNTER — ANESTHESIA EVENT (OUTPATIENT)
Dept: OPERATING ROOM | Age: 56
DRG: 620 | End: 2021-04-12
Payer: MEDICARE

## 2021-04-12 VITALS
TEMPERATURE: 96.1 F | DIASTOLIC BLOOD PRESSURE: 66 MMHG | SYSTOLIC BLOOD PRESSURE: 124 MMHG | RESPIRATION RATE: 2 BRPM | OXYGEN SATURATION: 100 %

## 2021-04-12 DIAGNOSIS — Z01.818 PREOP TESTING: Primary | ICD-10-CM

## 2021-04-12 DIAGNOSIS — Z98.84 S/P LAPAROSCOPIC SLEEVE GASTRECTOMY: ICD-10-CM

## 2021-04-12 PROBLEM — K43.6 INCARCERATED VENTRAL HERNIA: Status: ACTIVE | Noted: 2020-10-27

## 2021-04-12 LAB
ABO/RH: NORMAL
ANTIBODY SCREEN: NORMAL
GLUCOSE BLD-MCNC: 92 MG/DL (ref 70–99)
PERFORMED ON: NORMAL

## 2021-04-12 PROCEDURE — 0DB64ZZ EXCISION OF STOMACH, PERCUTANEOUS ENDOSCOPIC APPROACH: ICD-10-PCS | Performed by: SURGERY

## 2021-04-12 PROCEDURE — 6360000002 HC RX W HCPCS: Performed by: SURGERY

## 2021-04-12 PROCEDURE — 3700000000 HC ANESTHESIA ATTENDED CARE: Performed by: SURGERY

## 2021-04-12 PROCEDURE — 2709999900 HC NON-CHARGEABLE SUPPLY: Performed by: SURGERY

## 2021-04-12 PROCEDURE — 6370000000 HC RX 637 (ALT 250 FOR IP): Performed by: NURSE ANESTHETIST, CERTIFIED REGISTERED

## 2021-04-12 PROCEDURE — 3700000001 HC ADD 15 MINUTES (ANESTHESIA): Performed by: SURGERY

## 2021-04-12 PROCEDURE — 94761 N-INVAS EAR/PLS OXIMETRY MLT: CPT

## 2021-04-12 PROCEDURE — C1776 JOINT DEVICE (IMPLANTABLE): HCPCS | Performed by: SURGERY

## 2021-04-12 PROCEDURE — 7100000001 HC PACU RECOVERY - ADDTL 15 MIN: Performed by: SURGERY

## 2021-04-12 PROCEDURE — 2500000003 HC RX 250 WO HCPCS: Performed by: NURSE ANESTHETIST, CERTIFIED REGISTERED

## 2021-04-12 PROCEDURE — 86900 BLOOD TYPING SEROLOGIC ABO: CPT

## 2021-04-12 PROCEDURE — P9045 ALBUMIN (HUMAN), 5%, 250 ML: HCPCS | Performed by: NURSE ANESTHETIST, CERTIFIED REGISTERED

## 2021-04-12 PROCEDURE — 2580000003 HC RX 258: Performed by: SURGERY

## 2021-04-12 PROCEDURE — 6360000002 HC RX W HCPCS: Performed by: NURSE ANESTHETIST, CERTIFIED REGISTERED

## 2021-04-12 PROCEDURE — 7100000000 HC PACU RECOVERY - FIRST 15 MIN: Performed by: SURGERY

## 2021-04-12 PROCEDURE — 2720000010 HC SURG SUPPLY STERILE: Performed by: SURGERY

## 2021-04-12 PROCEDURE — 88307 TISSUE EXAM BY PATHOLOGIST: CPT

## 2021-04-12 PROCEDURE — 36415 COLL VENOUS BLD VENIPUNCTURE: CPT

## 2021-04-12 PROCEDURE — 6370000000 HC RX 637 (ALT 250 FOR IP): Performed by: SURGERY

## 2021-04-12 PROCEDURE — 86901 BLOOD TYPING SEROLOGIC RH(D): CPT

## 2021-04-12 PROCEDURE — C9113 INJ PANTOPRAZOLE SODIUM, VIA: HCPCS | Performed by: SURGERY

## 2021-04-12 PROCEDURE — 3600000005 HC SURGERY LEVEL 5 BASE: Performed by: SURGERY

## 2021-04-12 PROCEDURE — 1200000000 HC SEMI PRIVATE

## 2021-04-12 PROCEDURE — 3600000015 HC SURGERY LEVEL 5 ADDTL 15MIN: Performed by: SURGERY

## 2021-04-12 PROCEDURE — 6360000002 HC RX W HCPCS: Performed by: ANESTHESIOLOGY

## 2021-04-12 PROCEDURE — 6370000000 HC RX 637 (ALT 250 FOR IP)

## 2021-04-12 PROCEDURE — 43775 LAP SLEEVE GASTRECTOMY: CPT | Performed by: SURGERY

## 2021-04-12 PROCEDURE — 2700000000 HC OXYGEN THERAPY PER DAY

## 2021-04-12 PROCEDURE — 86850 RBC ANTIBODY SCREEN: CPT

## 2021-04-12 RX ORDER — SODIUM CHLORIDE 9 MG/ML
25 INJECTION, SOLUTION INTRAVENOUS PRN
Status: DISCONTINUED | OUTPATIENT
Start: 2021-04-12 | End: 2021-04-13 | Stop reason: HOSPADM

## 2021-04-12 RX ORDER — SCOLOPAMINE TRANSDERMAL SYSTEM 1 MG/1
1 PATCH, EXTENDED RELEASE TRANSDERMAL ONCE
Status: DISCONTINUED | OUTPATIENT
Start: 2021-04-12 | End: 2021-04-12

## 2021-04-12 RX ORDER — ROCURONIUM BROMIDE 10 MG/ML
INJECTION, SOLUTION INTRAVENOUS PRN
Status: DISCONTINUED | OUTPATIENT
Start: 2021-04-12 | End: 2021-04-12 | Stop reason: SDUPTHER

## 2021-04-12 RX ORDER — FENTANYL CITRATE 50 UG/ML
INJECTION, SOLUTION INTRAMUSCULAR; INTRAVENOUS PRN
Status: DISCONTINUED | OUTPATIENT
Start: 2021-04-12 | End: 2021-04-12 | Stop reason: SDUPTHER

## 2021-04-12 RX ORDER — HYDRALAZINE HYDROCHLORIDE 20 MG/ML
10 INJECTION INTRAMUSCULAR; INTRAVENOUS
Status: DISCONTINUED | OUTPATIENT
Start: 2021-04-12 | End: 2021-04-13 | Stop reason: HOSPADM

## 2021-04-12 RX ORDER — SODIUM CHLORIDE 0.9 % (FLUSH) 0.9 %
10 SYRINGE (ML) INJECTION EVERY 12 HOURS SCHEDULED
Status: DISCONTINUED | OUTPATIENT
Start: 2021-04-12 | End: 2021-04-13 | Stop reason: HOSPADM

## 2021-04-12 RX ORDER — SUCCINYLCHOLINE/SOD CL,ISO/PF 200MG/10ML
SYRINGE (ML) INTRAVENOUS PRN
Status: DISCONTINUED | OUTPATIENT
Start: 2021-04-12 | End: 2021-04-12 | Stop reason: SDUPTHER

## 2021-04-12 RX ORDER — LIDOCAINE HYDROCHLORIDE 10 MG/ML
0.5 INJECTION, SOLUTION EPIDURAL; INFILTRATION; INTRACAUDAL; PERINEURAL ONCE
Status: DISCONTINUED | OUTPATIENT
Start: 2021-04-12 | End: 2021-04-12 | Stop reason: HOSPADM

## 2021-04-12 RX ORDER — NALOXONE HYDROCHLORIDE 0.4 MG/ML
0.4 INJECTION, SOLUTION INTRAMUSCULAR; INTRAVENOUS; SUBCUTANEOUS PRN
Status: DISCONTINUED | OUTPATIENT
Start: 2021-04-12 | End: 2021-04-13

## 2021-04-12 RX ORDER — PROPOFOL 10 MG/ML
INJECTION, EMULSION INTRAVENOUS PRN
Status: DISCONTINUED | OUTPATIENT
Start: 2021-04-12 | End: 2021-04-12 | Stop reason: SDUPTHER

## 2021-04-12 RX ORDER — HYDRALAZINE HYDROCHLORIDE 20 MG/ML
5 INJECTION INTRAMUSCULAR; INTRAVENOUS EVERY 10 MIN PRN
Status: DISCONTINUED | OUTPATIENT
Start: 2021-04-12 | End: 2021-04-12 | Stop reason: HOSPADM

## 2021-04-12 RX ORDER — MAGNESIUM HYDROXIDE 1200 MG/15ML
LIQUID ORAL CONTINUOUS PRN
Status: COMPLETED | OUTPATIENT
Start: 2021-04-12 | End: 2021-04-12

## 2021-04-12 RX ORDER — DIPHENHYDRAMINE HYDROCHLORIDE 50 MG/ML
12.5 INJECTION INTRAMUSCULAR; INTRAVENOUS EVERY 6 HOURS PRN
Status: DISCONTINUED | OUTPATIENT
Start: 2021-04-12 | End: 2021-04-13 | Stop reason: HOSPADM

## 2021-04-12 RX ORDER — APREPITANT 80 MG/1
80 CAPSULE ORAL ONCE
Status: COMPLETED | OUTPATIENT
Start: 2021-04-12 | End: 2021-04-12

## 2021-04-12 RX ORDER — BUPIVACAINE HYDROCHLORIDE AND EPINEPHRINE 5; 5 MG/ML; UG/ML
INJECTION, SOLUTION PERINEURAL
Status: COMPLETED | OUTPATIENT
Start: 2021-04-12 | End: 2021-04-12

## 2021-04-12 RX ORDER — LABETALOL HYDROCHLORIDE 5 MG/ML
5 INJECTION, SOLUTION INTRAVENOUS EVERY 10 MIN PRN
Status: DISCONTINUED | OUTPATIENT
Start: 2021-04-12 | End: 2021-04-12 | Stop reason: HOSPADM

## 2021-04-12 RX ORDER — SCOLOPAMINE TRANSDERMAL SYSTEM 1 MG/1
1 PATCH, EXTENDED RELEASE TRANSDERMAL
Status: DISCONTINUED | OUTPATIENT
Start: 2021-04-12 | End: 2021-04-13 | Stop reason: HOSPADM

## 2021-04-12 RX ORDER — KETAMINE HCL IN NACL, ISO-OSM 100MG/10ML
SYRINGE (ML) INJECTION PRN
Status: DISCONTINUED | OUTPATIENT
Start: 2021-04-12 | End: 2021-04-12 | Stop reason: SDUPTHER

## 2021-04-12 RX ORDER — SODIUM CHLORIDE 0.9 % (FLUSH) 0.9 %
10 SYRINGE (ML) INJECTION PRN
Status: DISCONTINUED | OUTPATIENT
Start: 2021-04-12 | End: 2021-04-13 | Stop reason: HOSPADM

## 2021-04-12 RX ORDER — LIDOCAINE 4 G/G
1 PATCH TOPICAL DAILY
Status: DISCONTINUED | OUTPATIENT
Start: 2021-04-12 | End: 2021-04-13 | Stop reason: HOSPADM

## 2021-04-12 RX ORDER — HYDROMORPHONE HCL IN WATER/PF 6 MG/30 ML
PATIENT CONTROLLED ANALGESIA SYRINGE INTRAVENOUS CONTINUOUS
Status: DISCONTINUED | OUTPATIENT
Start: 2021-04-12 | End: 2021-04-13

## 2021-04-12 RX ORDER — HYOSCYAMINE SULFATE 0.5 MG/ML
250 INJECTION, SOLUTION SUBCUTANEOUS EVERY 4 HOURS PRN
Status: DISCONTINUED | OUTPATIENT
Start: 2021-04-12 | End: 2021-04-13 | Stop reason: HOSPADM

## 2021-04-12 RX ORDER — LIDOCAINE HYDROCHLORIDE 40 MG/ML
SOLUTION TOPICAL PRN
Status: DISCONTINUED | OUTPATIENT
Start: 2021-04-12 | End: 2021-04-12 | Stop reason: SDUPTHER

## 2021-04-12 RX ORDER — GLYCOPYRROLATE 1 MG/5 ML
SYRINGE (ML) INTRAVENOUS PRN
Status: DISCONTINUED | OUTPATIENT
Start: 2021-04-12 | End: 2021-04-12 | Stop reason: SDUPTHER

## 2021-04-12 RX ORDER — SODIUM CHLORIDE, SODIUM LACTATE, POTASSIUM CHLORIDE, CALCIUM CHLORIDE 600; 310; 30; 20 MG/100ML; MG/100ML; MG/100ML; MG/100ML
INJECTION, SOLUTION INTRAVENOUS CONTINUOUS
Status: DISCONTINUED | OUTPATIENT
Start: 2021-04-12 | End: 2021-04-12

## 2021-04-12 RX ORDER — LABETALOL HYDROCHLORIDE 5 MG/ML
10 INJECTION, SOLUTION INTRAVENOUS
Status: DISCONTINUED | OUTPATIENT
Start: 2021-04-12 | End: 2021-04-13 | Stop reason: HOSPADM

## 2021-04-12 RX ORDER — ALBUMIN, HUMAN INJ 5% 5 %
SOLUTION INTRAVENOUS PRN
Status: DISCONTINUED | OUTPATIENT
Start: 2021-04-12 | End: 2021-04-12 | Stop reason: SDUPTHER

## 2021-04-12 RX ORDER — METOCLOPRAMIDE HYDROCHLORIDE 5 MG/ML
10 INJECTION INTRAMUSCULAR; INTRAVENOUS EVERY 6 HOURS PRN
Status: DISCONTINUED | OUTPATIENT
Start: 2021-04-12 | End: 2021-04-13 | Stop reason: HOSPADM

## 2021-04-12 RX ORDER — ONDANSETRON 2 MG/ML
INJECTION INTRAMUSCULAR; INTRAVENOUS PRN
Status: DISCONTINUED | OUTPATIENT
Start: 2021-04-12 | End: 2021-04-12 | Stop reason: SDUPTHER

## 2021-04-12 RX ORDER — OXYCODONE HYDROCHLORIDE AND ACETAMINOPHEN 5; 325 MG/1; MG/1
1 TABLET ORAL
Status: DISCONTINUED | OUTPATIENT
Start: 2021-04-12 | End: 2021-04-12 | Stop reason: HOSPADM

## 2021-04-12 RX ORDER — SODIUM CHLORIDE 9 MG/ML
10 INJECTION INTRAVENOUS DAILY
Status: DISCONTINUED | OUTPATIENT
Start: 2021-04-12 | End: 2021-04-13 | Stop reason: HOSPADM

## 2021-04-12 RX ORDER — PANTOPRAZOLE SODIUM 40 MG/10ML
40 INJECTION, POWDER, LYOPHILIZED, FOR SOLUTION INTRAVENOUS DAILY
Status: DISCONTINUED | OUTPATIENT
Start: 2021-04-12 | End: 2021-04-13 | Stop reason: HOSPADM

## 2021-04-12 RX ORDER — HYDROMORPHONE HYDROCHLORIDE 1 MG/ML
0.5 INJECTION, SOLUTION INTRAMUSCULAR; INTRAVENOUS; SUBCUTANEOUS
Status: DISCONTINUED | OUTPATIENT
Start: 2021-04-12 | End: 2021-04-13 | Stop reason: HOSPADM

## 2021-04-12 RX ORDER — SODIUM CHLORIDE, SODIUM LACTATE, POTASSIUM CHLORIDE, CALCIUM CHLORIDE 600; 310; 30; 20 MG/100ML; MG/100ML; MG/100ML; MG/100ML
INJECTION, SOLUTION INTRAVENOUS CONTINUOUS
Status: DISCONTINUED | OUTPATIENT
Start: 2021-04-12 | End: 2021-04-13 | Stop reason: HOSPADM

## 2021-04-12 RX ORDER — ONDANSETRON 2 MG/ML
4 INJECTION INTRAMUSCULAR; INTRAVENOUS EVERY 6 HOURS PRN
Status: DISCONTINUED | OUTPATIENT
Start: 2021-04-12 | End: 2021-04-13 | Stop reason: HOSPADM

## 2021-04-12 RX ORDER — METHYLENE BLUE 10 MG/ML
INJECTION INTRAVENOUS
Status: COMPLETED | OUTPATIENT
Start: 2021-04-12 | End: 2021-04-12

## 2021-04-12 RX ORDER — ONDANSETRON 2 MG/ML
4 INJECTION INTRAMUSCULAR; INTRAVENOUS
Status: DISCONTINUED | OUTPATIENT
Start: 2021-04-12 | End: 2021-04-12 | Stop reason: HOSPADM

## 2021-04-12 RX ORDER — DEXAMETHASONE SODIUM PHOSPHATE 4 MG/ML
INJECTION, SOLUTION INTRA-ARTICULAR; INTRALESIONAL; INTRAMUSCULAR; INTRAVENOUS; SOFT TISSUE PRN
Status: DISCONTINUED | OUTPATIENT
Start: 2021-04-12 | End: 2021-04-12 | Stop reason: SDUPTHER

## 2021-04-12 RX ORDER — MAGNESIUM SULFATE HEPTAHYDRATE 500 MG/ML
INJECTION, SOLUTION INTRAMUSCULAR; INTRAVENOUS PRN
Status: DISCONTINUED | OUTPATIENT
Start: 2021-04-12 | End: 2021-04-12 | Stop reason: SDUPTHER

## 2021-04-12 RX ORDER — MIDAZOLAM HYDROCHLORIDE 1 MG/ML
INJECTION INTRAMUSCULAR; INTRAVENOUS PRN
Status: DISCONTINUED | OUTPATIENT
Start: 2021-04-12 | End: 2021-04-12 | Stop reason: SDUPTHER

## 2021-04-12 RX ORDER — LIDOCAINE HYDROCHLORIDE 20 MG/ML
INJECTION, SOLUTION EPIDURAL; INFILTRATION; INTRACAUDAL; PERINEURAL PRN
Status: DISCONTINUED | OUTPATIENT
Start: 2021-04-12 | End: 2021-04-12 | Stop reason: SDUPTHER

## 2021-04-12 RX ORDER — MEPERIDINE HYDROCHLORIDE 25 MG/ML
12.5 INJECTION INTRAMUSCULAR; INTRAVENOUS; SUBCUTANEOUS EVERY 5 MIN PRN
Status: DISCONTINUED | OUTPATIENT
Start: 2021-04-12 | End: 2021-04-12 | Stop reason: HOSPADM

## 2021-04-12 RX ORDER — PROMETHAZINE HYDROCHLORIDE 25 MG/ML
6.25 INJECTION, SOLUTION INTRAMUSCULAR; INTRAVENOUS EVERY 6 HOURS PRN
Status: DISCONTINUED | OUTPATIENT
Start: 2021-04-12 | End: 2021-04-13 | Stop reason: HOSPADM

## 2021-04-12 RX ORDER — HYDROMORPHONE HCL 110MG/55ML
0.5 PATIENT CONTROLLED ANALGESIA SYRINGE INTRAVENOUS EVERY 5 MIN PRN
Status: DISCONTINUED | OUTPATIENT
Start: 2021-04-12 | End: 2021-04-12 | Stop reason: HOSPADM

## 2021-04-12 RX ORDER — HYDROMORPHONE HCL 110MG/55ML
PATIENT CONTROLLED ANALGESIA SYRINGE INTRAVENOUS PRN
Status: DISCONTINUED | OUTPATIENT
Start: 2021-04-12 | End: 2021-04-12 | Stop reason: SDUPTHER

## 2021-04-12 RX ADMIN — HYDROMORPHONE HYDROCHLORIDE 0.5 MG: 2 INJECTION, SOLUTION INTRAMUSCULAR; INTRAVENOUS; SUBCUTANEOUS at 10:29

## 2021-04-12 RX ADMIN — CEFAZOLIN SODIUM 3000 MG: 10 INJECTION, POWDER, FOR SOLUTION INTRAVENOUS at 18:04

## 2021-04-12 RX ADMIN — APREPITANT 80 MG: 80 CAPSULE ORAL at 08:01

## 2021-04-12 RX ADMIN — HYDROMORPHONE HYDROCHLORIDE 0.6 MG: 2 INJECTION, SOLUTION INTRAMUSCULAR; INTRAVENOUS; SUBCUTANEOUS at 10:02

## 2021-04-12 RX ADMIN — HYDRALAZINE HYDROCHLORIDE 5 MG: 20 INJECTION INTRAMUSCULAR; INTRAVENOUS at 10:59

## 2021-04-12 RX ADMIN — HYDROMORPHONE HYDROCHLORIDE 0.5 MG: 2 INJECTION, SOLUTION INTRAMUSCULAR; INTRAVENOUS; SUBCUTANEOUS at 10:21

## 2021-04-12 RX ADMIN — LIDOCAINE HYDROCHLORIDE 100 MG: 20 INJECTION, SOLUTION EPIDURAL; INFILTRATION; INTRACAUDAL; PERINEURAL at 08:32

## 2021-04-12 RX ADMIN — HYDROMORPHONE HYDROCHLORIDE 0.2 MG: 2 INJECTION, SOLUTION INTRAMUSCULAR; INTRAVENOUS; SUBCUTANEOUS at 09:51

## 2021-04-12 RX ADMIN — LIDOCAINE HYDROCHLORIDE 20 MG: 20 INJECTION, SOLUTION EPIDURAL; INFILTRATION; INTRACAUDAL; PERINEURAL at 09:50

## 2021-04-12 RX ADMIN — Medication 3000 MG: at 08:26

## 2021-04-12 RX ADMIN — Medication 10 MG: at 09:14

## 2021-04-12 RX ADMIN — LIDOCAINE HYDROCHLORIDE 3 ML: 40 SOLUTION TOPICAL at 08:34

## 2021-04-12 RX ADMIN — ROCURONIUM BROMIDE 10 MG: 10 INJECTION, SOLUTION INTRAVENOUS at 09:16

## 2021-04-12 RX ADMIN — HYDRALAZINE HYDROCHLORIDE 5 MG: 20 INJECTION INTRAMUSCULAR; INTRAVENOUS at 11:09

## 2021-04-12 RX ADMIN — Medication 0.2 MG: at 08:49

## 2021-04-12 RX ADMIN — HYDROMORPHONE HYDROCHLORIDE 0.2 MG: 2 INJECTION, SOLUTION INTRAMUSCULAR; INTRAVENOUS; SUBCUTANEOUS at 09:37

## 2021-04-12 RX ADMIN — MIDAZOLAM 1 MG: 1 INJECTION INTRAMUSCULAR; INTRAVENOUS at 10:02

## 2021-04-12 RX ADMIN — HYDRALAZINE HYDROCHLORIDE 5 MG: 20 INJECTION INTRAMUSCULAR; INTRAVENOUS at 10:28

## 2021-04-12 RX ADMIN — Medication 120 MG: at 08:33

## 2021-04-12 RX ADMIN — ALBUMIN (HUMAN) 250 ML: 12.5 INJECTION, SOLUTION INTRAVENOUS at 08:57

## 2021-04-12 RX ADMIN — MAGNESIUM SULFATE HEPTAHYDRATE 1 G: 500 INJECTION, SOLUTION INTRAMUSCULAR; INTRAVENOUS at 08:41

## 2021-04-12 RX ADMIN — Medication 10 MG: at 09:47

## 2021-04-12 RX ADMIN — HYDROMORPHONE HYDROCHLORIDE 0.4 MG: 2 INJECTION, SOLUTION INTRAMUSCULAR; INTRAVENOUS; SUBCUTANEOUS at 08:58

## 2021-04-12 RX ADMIN — SODIUM CHLORIDE, POTASSIUM CHLORIDE, SODIUM LACTATE AND CALCIUM CHLORIDE: 600; 310; 30; 20 INJECTION, SOLUTION INTRAVENOUS at 08:01

## 2021-04-12 RX ADMIN — FENTANYL CITRATE 50 MCG: 50 INJECTION, SOLUTION INTRAMUSCULAR; INTRAVENOUS at 08:49

## 2021-04-12 RX ADMIN — DEXAMETHASONE SODIUM PHOSPHATE 8 MG: 4 INJECTION, SOLUTION INTRAMUSCULAR; INTRAVENOUS at 08:41

## 2021-04-12 RX ADMIN — HYDRALAZINE HYDROCHLORIDE 5 MG: 20 INJECTION INTRAMUSCULAR; INTRAVENOUS at 10:42

## 2021-04-12 RX ADMIN — FENTANYL CITRATE 50 MCG: 50 INJECTION, SOLUTION INTRAMUSCULAR; INTRAVENOUS at 08:32

## 2021-04-12 RX ADMIN — SUGAMMADEX 300 MG: 100 INJECTION, SOLUTION INTRAVENOUS at 09:45

## 2021-04-12 RX ADMIN — Medication 10 MG: at 10:21

## 2021-04-12 RX ADMIN — PROPOFOL 200 MG: 10 INJECTION, EMULSION INTRAVENOUS at 08:32

## 2021-04-12 RX ADMIN — ROCURONIUM BROMIDE 40 MG: 10 INJECTION, SOLUTION INTRAVENOUS at 08:41

## 2021-04-12 RX ADMIN — ONDANSETRON 4 MG: 2 INJECTION INTRAMUSCULAR; INTRAVENOUS at 08:41

## 2021-04-12 RX ADMIN — ENOXAPARIN SODIUM 40 MG: 40 INJECTION SUBCUTANEOUS at 08:01

## 2021-04-12 RX ADMIN — SODIUM CHLORIDE 10 ML: 9 INJECTION INTRAMUSCULAR; INTRAVENOUS; SUBCUTANEOUS at 15:37

## 2021-04-12 RX ADMIN — PANTOPRAZOLE SODIUM 40 MG: 40 INJECTION, POWDER, FOR SOLUTION INTRAVENOUS at 15:37

## 2021-04-12 RX ADMIN — Medication 20 MG: at 08:42

## 2021-04-12 RX ADMIN — SODIUM CHLORIDE, POTASSIUM CHLORIDE, SODIUM LACTATE AND CALCIUM CHLORIDE: 600; 310; 30; 20 INJECTION, SOLUTION INTRAVENOUS at 15:37

## 2021-04-12 RX ADMIN — HYDROMORPHONE HYDROCHLORIDE 0.4 MG: 2 INJECTION, SOLUTION INTRAMUSCULAR; INTRAVENOUS; SUBCUTANEOUS at 09:46

## 2021-04-12 RX ADMIN — MIDAZOLAM 1 MG: 1 INJECTION INTRAMUSCULAR; INTRAVENOUS at 08:27

## 2021-04-12 RX ADMIN — HYDROMORPHONE HYDROCHLORIDE 0.2 MG: 2 INJECTION, SOLUTION INTRAMUSCULAR; INTRAVENOUS; SUBCUTANEOUS at 09:04

## 2021-04-12 RX ADMIN — ROCURONIUM BROMIDE 10 MG: 10 INJECTION, SOLUTION INTRAVENOUS at 08:32

## 2021-04-12 RX ADMIN — PROPOFOL 30 MG: 10 INJECTION, EMULSION INTRAVENOUS at 09:40

## 2021-04-12 RX ADMIN — Medication 10 MG: at 08:55

## 2021-04-12 ASSESSMENT — PULMONARY FUNCTION TESTS
PIF_VALUE: 23
PIF_VALUE: 19
PIF_VALUE: 22
PIF_VALUE: 26
PIF_VALUE: 14
PIF_VALUE: 31
PIF_VALUE: 2
PIF_VALUE: 28
PIF_VALUE: 30
PIF_VALUE: 19
PIF_VALUE: 30
PIF_VALUE: 33
PIF_VALUE: 3
PIF_VALUE: 31
PIF_VALUE: 26
PIF_VALUE: 29
PIF_VALUE: 32
PIF_VALUE: 28
PIF_VALUE: 22
PIF_VALUE: 29
PIF_VALUE: 19
PIF_VALUE: 23
PIF_VALUE: 29
PIF_VALUE: 32
PIF_VALUE: 0
PIF_VALUE: 19
PIF_VALUE: 2
PIF_VALUE: 21
PIF_VALUE: 29
PIF_VALUE: 19
PIF_VALUE: 26
PIF_VALUE: 25
PIF_VALUE: 31
PIF_VALUE: 29
PIF_VALUE: 1
PIF_VALUE: 32
PIF_VALUE: 31
PIF_VALUE: 29
PIF_VALUE: 33
PIF_VALUE: 30
PIF_VALUE: 21
PIF_VALUE: 31
PIF_VALUE: 23
PIF_VALUE: 29
PIF_VALUE: 23
PIF_VALUE: 22
PIF_VALUE: 31
PIF_VALUE: 29
PIF_VALUE: 31
PIF_VALUE: 29
PIF_VALUE: 30
PIF_VALUE: 24

## 2021-04-12 ASSESSMENT — PAIN SCALES - GENERAL: PAINLEVEL_OUTOF10: 7

## 2021-04-12 ASSESSMENT — COPD QUESTIONNAIRES: CAT_SEVERITY: MODERATE

## 2021-04-12 NOTE — PROGRESS NOTES
Incentive Spirometry education and demonstration completed by Respiratory Therapy Yes      Response to education: Very Good     Teaching Time: 5 minutes    Minimum Predicted Vital Capacity - 570 mL. Patient's Actual Vital Capacity - 1200   mL. Turning over to Nursing for routine follow-up Yes.     Comments: continue spirometer q2h w/a    Electronically signed by Ernie Casas RCP on 4/12/2021 at 4:29 PM

## 2021-04-12 NOTE — OP NOTE
Operative Note      Patient: Mannie Phipps  YOB: 1965  MRN: [id number]    Date of Procedure: 4/12/2021      [facility]) Physicians   [facility]  [address] [place] [id number] . Phone: [phone]  Fax: [phone]             Procedure Note    Indications: The patient was evaluated by our multidisciplinary team and was found to be a good candidate for weight loss surgery for future weight loss. Body mass index is 57.91 kg/m². Howie Brittle Risks and benefits explained and Ermalene Pale Stem wants to proceed. Pre-operative Diagnosis:   Patient Active Problem List   Diagnosis    Arthritis    Chronic GERD    Umbilical abnormality    History of DVT (deep vein thrombosis)    Chronic pain syndrome    Morbid obesity with BMI of 50.0-59.9, adult ([address]    Family history of factor V Leiden mutation    Urinary incontinence, urge    Snoring    Incarcerated ventral hernia    Obstructive sleep apnea         Post-operative Diagnosis:   Same      Procedure:    - Laparoscopic Sleeve Gastrectomy. Surgeon: Mima Krishnamurthy MD, FACS. Anesthesia: General endotracheal anesthesia        Procedure Details   The patient was seen again in the Holding Room. The risks, benefits, complications, treatment options, and expected outcomes were discussed with the patient and/or family. Including but not limited to; The possibilities of reaction to medication, pulmonary aspiration, perforation of viscus, bleeding, recurrent infection, strictures, leaks, failure to lose weight, regaining weight,  malnutrition, the need for additional procedures, failure to diagnose a condition, and creating a complication requiring transfusion or operation were discussed. There was concurrence with the proposed plan and informed consent was obtained. The site of surgery was properly noted/marked.  The patient was taken to Operating Room, identified as Mannie Phipps and the procedure verified as Laparoscopic Sleeve Gastrectomy & other indicated procedures. A Time Out was held and the above information confirmed. The patient was placed in the supine position and general anesthesia was induced, along with placement of orogastric tube, Venodyne boots. Lovenox SQ, Emend and IV Antibiotics given pre-operatively. All pressure points were padded properly. Patient prepped and draped in sterile fashion. A left upper quadrant incision was made and a veres needle was inserted after confirming with saline drop test.  After adequate pneumoperitoneum was obtained, a 5 mm trocar was inserted. This was followed by a endoscope which confirmed intra-abdominal placement and there was no injury on initial trocar placement. Additional ports were placed in the standard positions under direct vision. The abdomen was initially explored and noted known ventral hernia in the periumbilical region with incarcerated omentum. After examining the hernia I believe the opening is more than 4 cm and primary repair would not hold given the patient high BMI as well. I think the best option would be fixing it once her BMI is less than 40. As well, going to definitely need mesh repair. A liver retractor was inserted through a small incision in the upper midline, lifted the liver upward, and was then secured to the OR table. The pylorus was identified and measurement was taken approximately 4-6 cm from the pylorus along the greater curvature of the stomach. The harmonic scalpel / ligasure was used to take down the attachments and short gastric vessels along the greater curve of the stomach. This was continued until all attachments were taken down and continued to the gastro-esophageal junction. A 34 Vietnamese dilator was placed along the lesser curvature and into the pylorus. A stapler was fired along the dilator to create an appropriate sized gastric sleeve pouch.  Purple followed Tan firings were used to create the sleeve. The staple line looked very healthy and no bleeding from staple line. The stomach was confirmed to be completely divided with uniform shape,  no twist in the sleeve and wide patent incisura. A 2-0 vicryl suture was used to over-sew and imbricate the sleeve staple line. The staple line was completely over-sewn. The dilator was removed and an Edlich tube was advanced across the sleeve to ensure patency mainly at incisura, then retracted to just above the GE junction. The stomach distal to the staple line was clamped and methylene blue saline was injected under pressure confirming No obstruction (flow noted to duodenum) and No leak. The abdomen was carefully inspected and there was no bleeding or any other abnormality. The stomach was brought out through the RUQ incision. Hemostasis was confirmed. Snow applied along suture line and/or biopsy sites to ensure hemostasis. The 15 and 12 port sites was closed using 0.0 Vicryl  suture at the level of the fascia and that was done under direct vision with the laparoscope to ensure proper closure and nothing entrapped. Local Anesthetic used at port sites. The trocar site skin wounds were closed using 4.0 Vicryl after copious Irrigation of the wounds. Dermabond / or steri strips applied. Instrument, sponge, and needle counts were correct at the conclusion of the case. Findings:  As above. Estimated Blood Loss:  Minimal           Drains: none           Total IV Fluids: 750 ml           Specimens: Stomach (Subtotal)            Complications:  None; patient tolerated the procedure well. Disposition: PACU - hemodynamically stable. Condition: stable    Attending Attestation: I was present and scrubbed for the entire procedure.             Electronically signed by Mg Valdez MD on 4/12/2021 at 9:52 AM

## 2021-04-12 NOTE — PROGRESS NOTES
Pt arrived to pre op, assessment completed, vitals stable. Pre op meds given, pt had 9 lb weight loss. Teaching / education initiated regarding perioperative experience, expectations, and pain management during stay. Patient verbalized understanding.

## 2021-04-12 NOTE — ANESTHESIA PRE PROCEDURE
Department of Anesthesiology  Preprocedure Note       Name:  Aletha Gomez   Age:  60 y.o.  :  1965                                          MRN:  [id number]         Date:  2021      Surgeon: Noemy Camejo): Sheliah Sandhoff, MD    Procedure: Procedure(s):  LAPAROSCOPIC SLEEVE GASTRECTOMY - ETHICON    Medications prior to admission:   Prior to Admission medications    Medication Sig Start Date End Date Taking? Authorizing Provider   omeprazole (PRILOSEC) 20 MG delayed release capsule TAKE ONE CAPSULE BY MOUTH EVERY MORNING BEFORE BREAKFAST 21   Milagro Goldberg MD   indomethacin (INDOCIN) 50 MG capsule TAKE ONE CAPSULE BY MOUTH THREE TIMES A DAY AS NEEDED FOR PAIN 3/22/21   Milagro Goldberg MD   nystatin (MYCOSTATIN) 458791 UNIT/GM powder Apply 3 times daily. 3/18/21   Milagro Goldberg MD   topiramate (TOPAMAX) 100 MG tablet Take 100 mg by mouth nightly    Historical Provider, MD   oxybutynin (DITROPAN) 5 MG tablet TAKE ONE TABLET BY MOUTH TWICE A DAY 3/15/21   Milagro Goldberg MD   Cholecalciferol 50 MCG (2000 UT) TABS Take 1 tablet by mouth daily Take 1 tablet by mouth daily. 3/8/21   BANDAR Salcido - CNP   furosemide (LASIX) 20 MG tablet TAKE TWO TABLETS BY MOUTH DAILY AS NEEDED 2/15/21   Milagro Goldberg MD   aspirin 81 MG chewable tablet Take 81 mg by mouth daily    Historical Provider, MD   gabapentin (NEURONTIN) 400 MG capsule Take 400 mg by mouth 3 times daily. Historical Provider, MD   magnesium 30 MG tablet Take 30 mg by mouth daily    Historical Provider, MD   tiZANidine (ZANAFLEX) 4 MG tablet Take 4-8 mg by mouth nightly     Historical Provider, MD   oxyCODONE (ROXICODONE) 5 MG immediate release tablet Take 5 mg by mouth every 12 hours as needed for Pain.      Historical Provider, MD   DULoxetine (CYMBALTA) 60 MG capsule Take 120 mg by mouth daily     Historical Provider, MD       Current medications:    No current  ROTATOR CUFF REPAIR      3x right, 2x left    UPPER GASTROINTESTINAL ENDOSCOPY N/A 2020    EGD BIOPSY performed by Mg Valdez MD at [address] History:    Social History     Tobacco Use    Smoking status: Former Smoker     Packs/day: 1.50     Years: 20.00     Pack years: 30.00     Types: Cigarettes     Quit date:      Years since quittin.2    Smokeless tobacco: Never Used   Substance Use Topics    Alcohol use: Not Currently     Comment: 2 drinks/day                                Counseling given: Not Answered      Vital Signs (Current): There were no vitals filed for this visit.                                            BP Readings from Last 3 Encounters:   21 133/63   21 108/76   20 119/79       NPO Status:                                                                                 BMI:   Wt Readings from Last 3 Encounters:   21 (!) 326 lb 14.4 oz (148.3 kg)   21 (!) 336 lb (152.4 kg)   21 (!) 335 lb (152 kg)     There is no height or weight on file to calculate BMI.    CBC:   Lab Results   Component Value Date    WBC 7.2 2021    RBC 4.81 2021    HGB 14.3 2021    HCT 42.2 2021    MCV 87.6 2021    RDW 14.8 2021     2021       CMP:   Lab Results   Component Value Date     2021    K 3.8 2021    K 3.5 2019     2021    CO2 23 2021    BUN 22 2021    CREATININE 0.6 2021    GFRAA >60 2021    AGRATIO 1.0 2021    LABGLOM >60 2021    GLUCOSE 99 2021    PROT 8.6 2021    CALCIUM 9.6 2021    BILITOT 0.4 2021    ALKPHOS 109 2021    AST 16 2021    ALT 8 2021       POC Tests:   Recent Labs     21  0748   POCGLU 92       Coags:   Lab Results   Component Value Date    PROTIME 11.8 2020    INR 1.02 2020       HCG (If Applicable): No results found for: PREGTESTUR, PREGSERUM, HCG, HCGQUANT     ABGs:   Lab Results   Component Value Date    PHART 7.204 05/04/2015    PO2ART 61.1 05/04/2015    AHH9DUW 51.1 05/04/2015    TSP3OEL 19.7 05/04/2015    BEART -8.2 05/04/2015    V6OYRBOV 85.8 05/04/2015        Type & Screen (If Applicable):  No results found for: LABABO, LABRH    Drug/Infectious Status (If Applicable):  No results found for: HIV, HEPCAB    COVID-19 Screening (If Applicable):   Lab Results   Component Value Date    COVID19 Not Detected 04/06/2021         Anesthesia Evaluation  Patient summary reviewed and Nursing notes reviewed  Airway: Mallampati: II  TM distance: >3 FB   Neck ROM: full  Mouth opening: > = 3 FB Dental:          Pulmonary:   (+) COPD: moderate,  asthma:                            Cardiovascular:  Exercise tolerance: good (>4 METS),   (+) hypertension:,                   Neuro/Psych:               GI/Hepatic/Renal:   (+) GERD: well controlled, morbid obesity          Endo/Other:                      ROS comment: Factor v deficiency Abdominal:           Vascular:   + DVT, . Anesthesia Plan      general     ASA 2       Induction: intravenous. MIPS: Prophylactic antiemetics administered. Anesthetic plan and risks discussed with patient. Plan discussed with CRNA.                   Jolene Toure MD   4/12/2021

## 2021-04-12 NOTE — ANESTHESIA POSTPROCEDURE EVALUATION
Department of Anesthesiology  Postprocedure Note    Patient: Nikita Leonardo  MRN: [id number]  YOB: 1965  Date of evaluation: 4/12/2021  Time:  10:04 AM     Procedure Summary     Date: 04/12/21 Room / Location: [address]    Anesthesia Start: 3670 Anesthesia Stop:     Procedure: [address] (N/A Abdomen) Diagnosis: (E66.01  MORBID OBESITY)    Surgeons: Kaitlyn Edwards MD Responsible Provider: Yajaira Gomez MD    Anesthesia Type: general ASA Status: 2          Anesthesia Type: No value filed. Starr Phase I: Starr Score: 10    Starr Phase II:      Last vitals: Reviewed and per EMR flowsheets.        Anesthesia Post Evaluation    Patient location during evaluation: PACU  Patient participation: complete - patient participated  Level of consciousness: awake  Airway patency: patent  Nausea & Vomiting: no nausea and no vomiting  Complications: no  Cardiovascular status: blood pressure returned to baseline  Respiratory status: acceptable  Hydration status: euvolemic

## 2021-04-12 NOTE — H&P
TABLETS BY MOUTH DAILY AS NEEDED  aspirin 81 MG chewable tablet, Take 81 mg by mouth daily  gabapentin (NEURONTIN) 400 MG capsule, Take 400 mg by mouth 3 times daily. magnesium 30 MG tablet, Take 30 mg by mouth daily  tiZANidine (ZANAFLEX) 4 MG tablet, Take 4-8 mg by mouth nightly   oxyCODONE (ROXICODONE) 5 MG immediate release tablet, Take 5 mg by mouth every 12 hours as needed for Pain. DULoxetine (CYMBALTA) 60 MG capsule, Take 120 mg by mouth daily     Allergies: Iodides    Social History:   TOBACCO:   reports that she quit smoking about 22 years ago. Her smoking use included cigarettes. She has a 30.00 pack-year smoking history. She has never used smokeless tobacco.  ETOH:   reports previous alcohol use. Family History:       Problem Relation Age of Onset    Other Sister     Stroke Sister     Arthritis Sister     Asthma Sister     Mental Illness Sister     Other Brother     High Blood Pressure Brother     Diabetes Brother     Arthritis Brother     Cancer Sister     Arthritis Sister     Arthritis Mother     High Blood Pressure Father     Arthritis Father          REVIEW OF SYSTEMS:    Review of Systems - History obtained from the patient  General ROS: obesity  Psychological ROS: negative  Ophthalmic ROS: negative  Neurological ROS: negative  ENT ROS: negative  Allergy and Immunology ROS: negative  Hematological and Lymphatic ROS: negative  Endocrine ROS: negative  Breast ROS: negative  Respiratory ROS: negative  Cardiovascular ROS: negative  Gastrointestinal ROS:negative  Genito-Urinary ROS: negative  Musculoskeletal ROS: negative   Skin ROS: negative      PHYSICAL EXAM:      /63   Pulse 74   Temp 97.7 °F (36.5 °C) (Temporal)   Resp 16   Ht 5' 3\" (1.6 m)   Wt (!) 326 lb 14.4 oz (148.3 kg)   LMP 12/24/2010   SpO2 95%   BMI 57.91 kg/m²  I      Physical Exam   Vitals Reviewed   Constitutional: Patient is oriented to person, place, and time.  Patient appears well-developed and well-nourished. Patient is active and cooperative. Non-toxic appearance. No distress. HENT:   Head: Normocephalic and atraumatic. Head is without abrasion and without laceration. Hair is normal.   Right Ear: External ear normal. No lacerations. No drainage, swelling . Left Ear: External ear normal. No lacerations. No drainage, swelling. Nose: Nose normal. No nose lacerations or nasal deformity. Eyes: Conjunctivae, EOM and lids are normal. Right eye exhibits no discharge. No foreign body present in the right eye. Left eye exhibits no discharge. No foreign body present in the left eye. No scleral icterus. Neck: Trachea normal and normal range of motion. No JVD present. Pulmonary/Chest: Effort normal. No accessory muscle usage or stridor. No apnea. No respiratory distress. Cardiovascular: Normal rate and no JVD. Abdominal: Normal appearance. Patient exhibits no distension. Musculoskeletal: Normal range of motion. Patient exhibits no edema. Neurological: Patient is alert and oriented to person, place, and time. Patient has normal strength. GCS eye subscore is 4. GCS verbal subscore is 5. GCS motor subscore is 6. Skin: Skin is warm and dry. No abrasion and no rash noted. Patient is not diaphoretic. No cyanosis or erythema. Psychiatric: Patient has a normal mood and affect.  Speech is normal and behavior is normal. Cognition and memory are normal.       DATA:  CBC:   Lab Results   Component Value Date    WBC 7.2 04/06/2021    RBC 4.81 04/06/2021    HGB 14.3 04/06/2021    HCT 42.2 04/06/2021    MCV 87.6 04/06/2021    MCH 29.7 04/06/2021    MCHC 33.9 04/06/2021    RDW 14.8 04/06/2021     04/06/2021    MPV 8.4 04/06/2021     CMP:    Lab Results   Component Value Date     04/06/2021    K 3.8 04/06/2021    K 3.5 05/19/2019     04/06/2021    CO2 23 04/06/2021    BUN 22 04/06/2021    CREATININE 0.6 04/06/2021    GFRAA >60 04/06/2021    AGRATIO 1.0 04/06/2021    LABGLOM >60 04/06/2021 GLUCOSE 99 04/06/2021    PROT 8.6 04/06/2021    LABALBU 4.3 04/06/2021    CALCIUM 9.6 04/06/2021    BILITOT 0.4 04/06/2021    ALKPHOS 109 04/06/2021    AST 16 04/06/2021    ALT 8 04/06/2021       ASSESSMENT AND PLAN:      Nikita Leonardo is a 60 y.o. female with Body mass index is 57.91 kg/m². ,  patient is deemed appropriate candidate for weight loss surgery by our multidisciplinary team.       Following The Metabolic and Bariatric Surgery Accreditation and Quality Improvement Program [facility]), and [employer] (ACS) recommendations, the Bariatric Surgical Risk/Benefit Calculator was used for Nikita Leonardo. Report was discussed with Edie Sheldon and patient wishes to proceed with surgery, fully understanding the risks and benefits. Of note, [facility] Bariatric Surgical Risk/Benefit Calculator estimates the chance of an unfavorable outcome (such as a complication or death), the chance of remission of weight-related comorbidities, and the patient's BMI, weight change, and percent total weight change after surgery. These quantities are estimated based upon information the patient gives the healthcare provider about prior health history. The estimates are calculated using data from a large number of patients who had a primary bariatric surgical procedure similar to the one the patient may have. Please note the risk percentages, remission percentages, BMI, weight change, and percent total weight change provided to you by the risk/benefit calculator are only estimates. These estimates only take certain information into account. There may be other factors that are not included in the estimate which may increase or decrease the risk of a complication, the chance of remission of a weight-related comorbidity, or the amount of weight the patient loses. These estimates are not a guarantee of results.  A complication after surgery may happen even if the risk is low, a weight-related comorbidity may not go into remission even if the chances are high, and a patient may lose more or less weight than predicted. This information is not intended to replace the advice of a doctor or healthcare provider about the diagnosis, treatment, or potential outcomes. The Provider is not responsible for medical decisions that may be made by the patient based on the risk/benefit calculator estimates, since these estimates are provided for informational purposes. Patients should always consult their doctor or other health care provider before deciding on a treatment plan. Both open and laparoscopic approach were explained in details. Benefits and risks explained. Informed consent obtained. Risks including but not limited to; Infection, bleeding, gastric leak or obstruction, persistent nausea, vomiting, or reflux, injury to surrounding structures, risks of anesthesia, stricture, delayed gastric emptying, staple line leak, incisional hernia, malnutrition , hair loss, and/ or Conversion to Open surgery may be necessary. Failure to lose or maintain weight loss, Gallstones or Kidney Stones, Deep Venous Thrombosis , pulmonary embolism and / or death. With obesity and/or Fatty liver , I may elect to perform liver core biopsy to have  Baseline idea on liver pathology if there is abnormal Liver Functions or if there is hepatomegaly &/or lesion, risks include but not limited to bile leak, liver hematoma or failure to diagnose a condition. Riley Apley understands that there may be a need to perform other urgent or necessary procedures that were unanticipated. [facility] consent to the performance of any additional procedures determined during the original procedure to be in their best interests and where delay might cause additional harm or put patient at risk for additional anesthesia risk for required by a second procedure and that will be determined by the surgeon.     I did explain thoroughly to the patient that compliance with pre- and

## 2021-04-12 NOTE — PROGRESS NOTES
Patient assessment, H&P, problem list, and check list has been reviewed. PreOp diet confirmed with patient.

## 2021-04-13 ENCOUNTER — APPOINTMENT (OUTPATIENT)
Dept: GENERAL RADIOLOGY | Age: 56
DRG: 620 | End: 2021-04-13
Attending: SURGERY
Payer: MEDICARE

## 2021-04-13 VITALS
WEIGHT: 293 LBS | SYSTOLIC BLOOD PRESSURE: 121 MMHG | BODY MASS INDEX: 51.91 KG/M2 | DIASTOLIC BLOOD PRESSURE: 74 MMHG | HEIGHT: 63 IN | OXYGEN SATURATION: 96 % | TEMPERATURE: 97.9 F | HEART RATE: 72 BPM | RESPIRATION RATE: 16 BRPM

## 2021-04-13 LAB
ANION GAP SERPL CALCULATED.3IONS-SCNC: 14 MMOL/L (ref 3–16)
BUN BLDV-MCNC: 13 MG/DL (ref 7–20)
CALCIUM SERPL-MCNC: 9.2 MG/DL (ref 8.3–10.6)
CHLORIDE BLD-SCNC: 106 MMOL/L (ref 99–110)
CO2: 20 MMOL/L (ref 21–32)
CREAT SERPL-MCNC: 0.6 MG/DL (ref 0.6–1.1)
GFR AFRICAN AMERICAN: >60
GFR NON-AFRICAN AMERICAN: >60
GLUCOSE BLD-MCNC: 101 MG/DL (ref 70–99)
HCT VFR BLD CALC: 39.2 % (ref 36–48)
HEMOGLOBIN: 12.6 G/DL (ref 12–16)
MCH RBC QN AUTO: 28.8 PG (ref 26–34)
MCHC RBC AUTO-ENTMCNC: 32.1 G/DL (ref 31–36)
MCV RBC AUTO: 89.5 FL (ref 80–100)
PDW BLD-RTO: 15.6 % (ref 12.4–15.4)
PLATELET # BLD: 307 K/UL (ref 135–450)
PMV BLD AUTO: 8.3 FL (ref 5–10.5)
POTASSIUM SERPL-SCNC: 3.9 MMOL/L (ref 3.5–5.1)
RBC # BLD: 4.39 M/UL (ref 4–5.2)
SODIUM BLD-SCNC: 140 MMOL/L (ref 136–145)
WBC # BLD: 10.6 K/UL (ref 4–11)

## 2021-04-13 PROCEDURE — APPSS180 APP SPLIT SHARED TIME > 60 MINUTES: Performed by: NURSE PRACTITIONER

## 2021-04-13 PROCEDURE — 6370000000 HC RX 637 (ALT 250 FOR IP): Performed by: NURSE PRACTITIONER

## 2021-04-13 PROCEDURE — C9113 INJ PANTOPRAZOLE SODIUM, VIA: HCPCS | Performed by: SURGERY

## 2021-04-13 PROCEDURE — 74240 X-RAY XM UPR GI TRC 1CNTRST: CPT

## 2021-04-13 PROCEDURE — 6360000002 HC RX W HCPCS: Performed by: SURGERY

## 2021-04-13 PROCEDURE — 2580000003 HC RX 258: Performed by: SURGERY

## 2021-04-13 PROCEDURE — 80048 BASIC METABOLIC PNL TOTAL CA: CPT

## 2021-04-13 PROCEDURE — 99024 POSTOP FOLLOW-UP VISIT: CPT | Performed by: NURSE PRACTITIONER

## 2021-04-13 PROCEDURE — 85027 COMPLETE CBC AUTOMATED: CPT

## 2021-04-13 PROCEDURE — 94761 N-INVAS EAR/PLS OXIMETRY MLT: CPT

## 2021-04-13 PROCEDURE — 36415 COLL VENOUS BLD VENIPUNCTURE: CPT

## 2021-04-13 PROCEDURE — 6370000000 HC RX 637 (ALT 250 FOR IP): Performed by: SURGERY

## 2021-04-13 RX ORDER — ONDANSETRON 4 MG/1
8 TABLET, ORALLY DISINTEGRATING ORAL EVERY 8 HOURS PRN
Qty: 30 TABLET | Refills: 0 | Status: SHIPPED | OUTPATIENT
Start: 2021-04-13 | End: 2021-05-25

## 2021-04-13 RX ORDER — OXYCODONE HYDROCHLORIDE AND ACETAMINOPHEN 5; 325 MG/1; MG/1
1 TABLET ORAL EVERY 6 HOURS PRN
Qty: 28 TABLET | Refills: 0 | Status: SHIPPED | OUTPATIENT
Start: 2021-04-13 | End: 2021-04-20

## 2021-04-13 RX ORDER — PROMETHAZINE HYDROCHLORIDE 6.25 MG/5ML
12.5 SYRUP ORAL EVERY 6 HOURS PRN
Status: DISCONTINUED | OUTPATIENT
Start: 2021-04-13 | End: 2021-04-13 | Stop reason: HOSPADM

## 2021-04-13 RX ORDER — OXYCODONE HYDROCHLORIDE AND ACETAMINOPHEN 5; 325 MG/1; MG/1
2 TABLET ORAL EVERY 4 HOURS PRN
Status: DISCONTINUED | OUTPATIENT
Start: 2021-04-13 | End: 2021-04-13 | Stop reason: HOSPADM

## 2021-04-13 RX ORDER — OXYBUTYNIN CHLORIDE 5 MG/1
TABLET ORAL
Qty: 60 TABLET | Refills: 1 | Status: SHIPPED | OUTPATIENT
Start: 2021-04-13 | End: 2021-06-11

## 2021-04-13 RX ORDER — ONDANSETRON 4 MG/1
8 TABLET, ORALLY DISINTEGRATING ORAL EVERY 8 HOURS PRN
Qty: 30 TABLET | Refills: 1 | Status: SHIPPED | OUTPATIENT
Start: 2021-04-13 | End: 2021-05-25

## 2021-04-13 RX ORDER — ONDANSETRON 8 MG/1
8 TABLET, ORALLY DISINTEGRATING ORAL EVERY 8 HOURS PRN
Status: DISCONTINUED | OUTPATIENT
Start: 2021-04-13 | End: 2021-04-13 | Stop reason: HOSPADM

## 2021-04-13 RX ORDER — OXYCODONE HYDROCHLORIDE AND ACETAMINOPHEN 5; 325 MG/1; MG/1
1 TABLET ORAL EVERY 4 HOURS PRN
Status: DISCONTINUED | OUTPATIENT
Start: 2021-04-13 | End: 2021-04-13 | Stop reason: HOSPADM

## 2021-04-13 RX ADMIN — SODIUM CHLORIDE 10 ML: 9 INJECTION INTRAMUSCULAR; INTRAVENOUS; SUBCUTANEOUS at 09:04

## 2021-04-13 RX ADMIN — OXYCODONE AND ACETAMINOPHEN 2 TABLET: 5; 325 TABLET ORAL at 15:15

## 2021-04-13 RX ADMIN — Medication 10 ML: at 01:38

## 2021-04-13 RX ADMIN — ENOXAPARIN SODIUM 40 MG: 40 INJECTION SUBCUTANEOUS at 08:58

## 2021-04-13 RX ADMIN — CEFAZOLIN SODIUM 3000 MG: 10 INJECTION, POWDER, FOR SOLUTION INTRAVENOUS at 02:32

## 2021-04-13 RX ADMIN — PANTOPRAZOLE SODIUM 40 MG: 40 INJECTION, POWDER, FOR SOLUTION INTRAVENOUS at 08:58

## 2021-04-13 RX ADMIN — ENOXAPARIN SODIUM 40 MG: 40 INJECTION SUBCUTANEOUS at 01:39

## 2021-04-13 RX ADMIN — DIPHENHYDRAMINE HYDROCHLORIDE 12.5 MG: 50 INJECTION, SOLUTION INTRAMUSCULAR; INTRAVENOUS at 08:58

## 2021-04-13 NOTE — PROGRESS NOTES
2200: Shift assessment complete. Meds given per MAR. Marquita Yo Pt alert and orientated x4  Dressing: to abdomen clean dry and intact  VS: Bp elevated, see flowsheet  Neurovascular Assessment: WNL  Nausea/vomiting: Pt denies at this time  Pain: PCA PUMP in place  Diet: NPO   Patient expressed no other needs at this time, will continue to educate patient as needed. Fall precautions in place, hourly rounding, call light and belongings in reach, bed in lowest position, wheels locked in place, side rails up x 2, walkways free of clutter    The care plan and education has been reviewed and mutually agreed upon with the patient.

## 2021-04-13 NOTE — PROGRESS NOTES
CLINICAL PHARMACY NOTE: MEDS TO [address] Select Patient?: No  Total # of Prescriptions Filled: 3   The following medications were delivered to the patient:  · Oxycodone 5/325mg  · Ondansetron ODT 4mg  · Enoxaparin 40mg/0.4ml  Total # of Interventions Completed: 0  Time Spent (min): 30    Additional Documentation:  Medications delivered- patient signed  Linda Villagomez

## 2021-04-13 NOTE — PROGRESS NOTES
Pt ambulated the hill x1. Walked about 50 feet. Pt felt short of breath. Assisted pt back to bed. Pt had no other needs at this time.

## 2021-04-13 NOTE — PROGRESS NOTES
[facility]) Physicians   General & Laparoscopic Surgery  [employer]       Pt seen and examined    S/p laparoscopic sleeve gastrectomy     The patient is ambulating some in hill. Pain is well controlled. There is no nausea and/or vomiting. There are no other complaints or questions. Vitals:    04/13/21 0422 04/13/21 0624 04/13/21 0845 04/13/21 0930   BP: (!) 159/78 132/72 120/78    Pulse: 83 71 71    Resp: 18 19 19 17   Temp: 97.3 °F (36.3 °C) 98.2 °F (36.8 °C) 97.9 °F (36.6 °C)    TempSrc: Infrared Infrared Oral    SpO2: 91% 93% 92% 96%   Weight:       Height:         Abdomen is soft, appropriately tender, incisions stable with no erythema. Breath sounds are clear bilaterally. Bowel sounds are hypoactive in all quadrants.     Data  CBC:   Lab Results   Component Value Date    WBC 10.6 04/13/2021    RBC 4.39 04/13/2021    HGB 12.6 04/13/2021    HCT 39.2 04/13/2021    MCV 89.5 04/13/2021    MCH 28.8 04/13/2021    MCHC 32.1 04/13/2021    RDW 15.6 04/13/2021     04/13/2021    MPV 8.3 04/13/2021     BMP:    Lab Results   Component Value Date     04/13/2021    K 3.9 04/13/2021    K 3.5 05/19/2019     04/13/2021    CO2 20 04/13/2021    BUN 13 04/13/2021    LABALBU 4.3 04/06/2021    CREATININE 0.6 04/13/2021    CALCIUM 9.2 04/13/2021    GFRAA >60 04/13/2021    LABGLOM >60 04/13/2021    GLUCOSE 101 04/13/2021     Current Inpatient Medications    Current Facility-Administered Medications: diphenhydrAMINE (BENADRYL) injection 12.5 mg, 12.5 mg, Intravenous, Q6H PRN  hydrALAZINE (APRESOLINE) injection 10 mg, 10 mg, Intravenous, Q2H PRN  hyoscyamine (LEVSIN) 500 MCG/ML injection 250 mcg, 250 mcg, Intravenous, Q4H PRN  labetalol (NORMODYNE;TRANDATE) injection 10 mg, 10 mg, Intravenous, Q2H PRN  lidocaine 4 % external patch 1 patch, 1 patch, Transdermal, Daily  HYDROmorphone HCl (DILAUDID) 0.2 mg/mL PCA 50 mL, , Intravenous, Continuous  naloxone (NARCAN) injection 0.4 mg, 0.4 mg, NP-C

## 2021-04-13 NOTE — PROGRESS NOTES
04/12/21 2204   Incentive Spirometry Tx   Treatment Tolerance Well   Incentive Spirometry Goal (mL) 524 mL   Incentive Spirometry Achieved (mL) 2000 mL

## 2021-04-13 NOTE — PROGRESS NOTES
Incentive Spirometry education and demonstration completed by Respiratory Therapy Yes      Response to education: Excellent     Teaching Time: 5 minutes    Minimum Predicted Vital Capacity - 514 mL. Patient's Actual Vital Capacity - 2000 mL. Turning over to Nursing for routine follow-up Yes. Comments: .     Electronically signed by Preet King RCP on 4/12/2021 at 10:05 PM

## 2021-04-15 ENCOUNTER — TELEPHONE (OUTPATIENT)
Dept: BARIATRICS/WEIGHT MGMT | Age: 56
End: 2021-04-15

## 2021-04-15 NOTE — TELEPHONE ENCOUNTER
Patient is s/p sleeve 4/12/21 Lucien. She calls the office c/o a huge itching rash on her back spreading down near her buttock. She stated it just appeared last night. Denies n/v/f. Patient requesting call back from a provider. Did advise patient to use Benadryl.      # [phone]

## 2021-04-16 ENCOUNTER — TELEPHONE (OUTPATIENT)
Dept: INTERNAL MEDICINE CLINIC | Age: 56
End: 2021-04-16

## 2021-04-17 RX ORDER — OFLOXACIN 3 MG/ML
1 SOLUTION/ DROPS OPHTHALMIC 4 TIMES DAILY
Qty: 1 BOTTLE | Refills: 0 | Status: SHIPPED | OUTPATIENT
Start: 2021-04-17 | End: 2021-04-24

## 2021-04-19 ENCOUNTER — TELEPHONE (OUTPATIENT)
Dept: BARIATRICS/WEIGHT MGMT | Age: 56
End: 2021-04-19

## 2021-04-19 ENCOUNTER — TELEPHONE (OUTPATIENT)
Dept: INTERNAL MEDICINE CLINIC | Age: 56
End: 2021-04-19

## 2021-04-19 RX ORDER — FLUCONAZOLE 100 MG/1
100 TABLET ORAL DAILY
Qty: 5 TABLET | Refills: 0 | Status: SHIPPED | OUTPATIENT
Start: 2021-04-19 | End: 2021-04-24

## 2021-04-19 NOTE — TELEPHONE ENCOUNTER
Surgery Type: Sleeve gastrectomy    Surgery Date: 4/12/21    Surgeon: Dr Harjeet Castro    The patient was contacted to follow up on their recent bariatric surgery. The following topics were reviewed:    [x] Hydration is Adequate 64oz - water, decaf tea           --Patient is getting at least 48-64 oz of fluids a day, not including protein shakes. [x]Consuming Adequate Protein - 2 Nectar with 8oz skim milk daily         [x]Consuming 2 protein shakes a day                [x]Consuming 60-80 grams of protein a day    [x] Food intake is appropriate - Trying pureed foods 1-2x day, 2oz at a time - has had SF applesauce, yogurt  [x]Adequately pureeing foods, so that there are no chunks left. [x]Taking in 1-2 oz at a time  [x] Eating 4-6 times a day  [x] Following the 30-30-30 rule  [x] Reminded patient to keep food diary to bring to their 2 week follow up appointment. - tracking in [facility]    [x] Pain relief techniques utilized   [x] Taking pain medication as prescribed - used as needed  [] Utilizing Lidoderm patches (if prescribed)  [x]Taking Tylenol instead of prescription pain medication - reviewed crushed or liquids as option  [] Wearing abdominal binder - stopped using  [x] Using ice for incisional pain    [x] Activity is appropriate   [x] Walking 10 minutes out of every hour  [x]Avoiding heavy lifting (>10lbs)  [] Utilizing their incentive spirometer - has not been using     [] Issues with Nausea/Vomiting/Reflux - denies issues, taking Prilosec daily  [] Using Zofran PRN for nausea/vomiting   [x]Taking Prilosec for reflux    [] Issues with Constipation - pt has had BM  []Tried Colace  []Tried Miralax    Overall, pt is doing well, meeting hydration and protein goals. Encouraged pt to try a variety of pureed foods - can consume purees 3-4x day no more than 2oz at a time. Pt not wearing abdominal binder d/t uncomfortable sitting with it on - encouraged to wear when up and about and as tolerated.  Pt not using IS - reviewed and encouraged 10 breaths every hour, no more than 2 at once. Reminded pt to start Fusion today. All questions and concerns. Patient was asked to please fill out hospital survey if she receives one in the mail.

## 2021-04-27 ENCOUNTER — OFFICE VISIT (OUTPATIENT)
Dept: BARIATRICS/WEIGHT MGMT | Age: 56
End: 2021-04-27

## 2021-04-27 VITALS
DIASTOLIC BLOOD PRESSURE: 78 MMHG | SYSTOLIC BLOOD PRESSURE: 137 MMHG | OXYGEN SATURATION: 95 % | HEART RATE: 76 BPM | WEIGHT: 293 LBS | RESPIRATION RATE: 18 BRPM | BODY MASS INDEX: 51.91 KG/M2 | HEIGHT: 63 IN

## 2021-04-27 DIAGNOSIS — Z98.84 S/P LAPAROSCOPIC SLEEVE GASTRECTOMY: Primary | ICD-10-CM

## 2021-04-27 PROCEDURE — 99024 POSTOP FOLLOW-UP VISIT: CPT | Performed by: SURGERY

## 2021-04-27 NOTE — PROGRESS NOTES
Dietary Assessment Note      Vitals:   Vitals:    21 1008   Height: 5' 3\" (1.6 m)    Patient lost 23 lbs over past 6 weeks. Total Weight Loss: 39 lbs    Labs reviewed:     Results for Michael Schuster (MRN [id number]) as of 2021 10:15   Ref. Range 2021 05:59   Sodium Latest Ref Range: 136 - 145 mmol/L 140   Potassium Latest Ref Range: 3.5 - 5.1 mmol/L 3.9   Chloride Latest Ref Range: 99 - 110 mmol/L 106   CO2 Latest Ref Range: 21 - 32 mmol/L 20 (L)   BUN Latest Ref Range: 7 - 20 mg/dL 13   Creatinine Latest Ref Range: 0.6 - 1.1 mg/dL 0.6   Anion Gap Latest Ref Range: 3 - 16  14   GFR Non- Latest Ref Range: >60  >60   GFR  Latest Ref Range: >60  >60   Glucose Latest Ref Range: 70 - 99 mg/dL 101 (H)   Calcium Latest Ref Range: 8.3 - 10.6 mg/dL 9.2     Protein intake: 60-80 grams/day     Fluid intake: 48-64 oz/day    Multivitamin/mineral intake: 4 fusion     Calcium intake: none    Other: none    Exercise: Walking with cane     Nutrition Assessment: 2 week s/p sleeve post-op visit. Eats 1-2 pureed foods per day. Breakfast: Nectar shake made with 8 oz skim milk     Snack: nothing     Lunch: FF cottage cheese OR lemon pepper tuna with miracle whip and tsp of vinaigrette OR yogurt     Snack: Nectar shake made with 8 oz skim milk     Dinner: sometimes - FF cottage cheese OR lemon pepper tuna with miracle whip and tsp of vinaigrette     Snack: nothing     Fluids: mostly water, unsweetened decaf tea     Is pt consuming only full liquid/Pureed foods?  Yes     Amount able to eat per sittin oz/sitting      Following  rule: Yes    Food Intolerances/issues: None     Client Concerns: none     Goals:   Start Phase 3 at 3 weeks post op - handout provided and reviewed  Eat pureed food 2 x day consistently     Plan: F/U per Provider    Adilene Billing

## 2021-04-27 NOTE — PROGRESS NOTES
The patient is a 60 y.o. female who returns today for follow up.    Neda Johns Stem is s/p     Laparoscopic sleeve gastrectomy    We discussed how her weight affects her overall health including:  Patient Active Problem List   Diagnosis    Arthritis    Chronic GERD    Umbilical abnormality    History of DVT (deep vein thrombosis)    Chronic pain syndrome    Morbid obesity with BMI of 50.0-59.9, adult ([address]    Family history of factor V Leiden mutation    Urinary incontinence, urge    Snoring    Incarcerated ventral hernia    Obstructive sleep apnea    S/P laparoscopic sleeve gastrectomy        Vitals:    04/27/21 1008   BP: 137/78   Pulse: 76   Resp: 18   SpO2: 95%   Weight: (!) 312 lb (141.5 kg)   Height: 5' 3\" (1.6 m)       Lab Results   Component Value Date    WBC 10.6 04/13/2021    RBC 4.39 04/13/2021    HGB 12.6 04/13/2021    HCT 39.2 04/13/2021    MCV 89.5 04/13/2021    MCH 28.8 04/13/2021    MCHC 32.1 04/13/2021    MPV 8.3 04/13/2021    NEUTOPHILPCT 69.4 12/08/2020    LYMPHOPCT 18.3 12/08/2020    MONOPCT 7.8 12/08/2020    EOSRELPCT 3.4 12/08/2020    BASOPCT 1.1 12/08/2020    NEUTROABS 4.0 12/08/2020    LYMPHSABS 1.1 12/08/2020    MONOSABS 0.5 12/08/2020    EOSABS 0.2 12/08/2020     Lab Results   Component Value Date     04/13/2021    K 3.9 04/13/2021    K 3.5 05/19/2019     04/13/2021    CO2 20 04/13/2021    ANIONGAP 14 04/13/2021    GLUCOSE 101 04/13/2021    BUN 13 04/13/2021    CREATININE 0.6 04/13/2021    LABGLOM >60 04/13/2021    GFRAA >60 04/13/2021    CALCIUM 9.2 04/13/2021    PROT 8.6 04/06/2021    LABALBU 4.3 04/06/2021    AGRATIO 1.0 04/06/2021    BILITOT 0.4 04/06/2021    ALKPHOS 109 04/06/2021    ALT 8 04/06/2021    AST 16 04/06/2021    GLOB 4.3 04/06/2021     Lab Results   Component Value Date    CHOL 180 12/08/2020    TRIG 49 12/08/2020    HDL 66 12/08/2020    LDLCALC 104 12/08/2020    LABVLDL 10 12/08/2020     Lab Results   Component Value Date    TSHREFLEX 1.45 12/08/2020

## 2021-05-06 ENCOUNTER — TELEPHONE (OUTPATIENT)
Dept: INTERNAL MEDICINE CLINIC | Age: 56
End: 2021-05-06

## 2021-05-06 DIAGNOSIS — G89.4 CHRONIC PAIN SYNDROME: Primary | ICD-10-CM

## 2021-05-06 NOTE — TELEPHONE ENCOUNTER
----- Message from Jimmy Isabel MD sent at 5/6/2021  3:44 PM EDT -----  Contact: Mely Sarah [phone]    ----- Message -----  From: Gino Valadez  Sent: 5/6/2021   2:43 PM EDT  To: Jimmy Isabel MD    Patient states her pain management doctor, Dr. Peggy Berger has been suspended for 6 months by the Board of Medicine. Patient is asking for a new referral for a pain management doctor. She also wanted to inform you she has had her Gastric Sleeve and is down 46 lbs.  Thank you

## 2021-05-07 RX ORDER — OMEPRAZOLE 20 MG/1
CAPSULE, DELAYED RELEASE ORAL
Qty: 30 CAPSULE | Refills: 0 | Status: SHIPPED | OUTPATIENT
Start: 2021-05-07 | End: 2021-06-07

## 2021-05-25 ENCOUNTER — TELEMEDICINE (OUTPATIENT)
Dept: BARIATRICS/WEIGHT MGMT | Age: 56
End: 2021-05-25

## 2021-05-25 DIAGNOSIS — E66.01 MORBID OBESITY WITH BMI OF 50.0-59.9, ADULT (HCC): ICD-10-CM

## 2021-05-25 DIAGNOSIS — K21.9 CHRONIC GERD: ICD-10-CM

## 2021-05-25 DIAGNOSIS — Z98.84 S/P LAPAROSCOPIC SLEEVE GASTRECTOMY: Primary | ICD-10-CM

## 2021-05-25 DIAGNOSIS — G47.33 OBSTRUCTIVE SLEEP APNEA: ICD-10-CM

## 2021-05-25 PROCEDURE — 99024 POSTOP FOLLOW-UP VISIT: CPT | Performed by: NURSE PRACTITIONER

## 2021-05-25 ASSESSMENT — ENCOUNTER SYMPTOMS
EYES NEGATIVE: 1
GASTROINTESTINAL NEGATIVE: 1
RESPIRATORY NEGATIVE: 1

## 2021-05-25 NOTE — PROGRESS NOTES
[facility]) Physicians   [facility]    5/25/2021    TELEHEALTH EVALUATION -- Audio/Visual (During [place] [employer] public health emergency)    Subjective:      Patient ID: Cira Luna is a 60 y.o. female    HPI    6 weeks s/p sleeve gastrectomy    Cira Luna is a 60 y.o. female , current BMI of 52.8, current weight of 298 pounds. Due to the COVID-19 restrictions on close contact interactions the patient's visit was conducted via audio/video in jason of a face to face visit. Patient has consented to have this visit conducted via audio/video. The patient is here through telemedicine for their post op bariatric surgery visit. Patient denies any nausea, vomiting, fevers, chills, shortness of breath, chest pain, constipation or urinary symptoms. Denies any heartburn nor dysphagia. She does report some feelings of generalized weakness. Denies dizziness, chest pain, shortness of breath, persistent hiccups or shoulder pain.      Past Medical History:   Diagnosis Date    Acute [facility] failure ([facility]F) ([facility] May 4, 2015    d/t IV dye    Arthritis     Asthma     DDD (degenerative disc disease), cervical     Deep vein thrombosis (DVT) ([facility] 07/2015    Degenerative disc disease     Family history of factor V deficiency     brother and sister; pt tested does not have    GERD (gastroesophageal reflux disease)     Hypertension     no longer has after quitting stressful job    Scoliosis     Sleep apnea     uses c-pap    Spinal stenosis      Past Surgical History:   Procedure Laterality Date    CARPAL TUNNEL RELEASE      JOINT REPLACEMENT Bilateral     hips and knees    ROTATOR CUFF REPAIR      3x right, 2x left    SLEEVE GASTRECTOMY N/A 4/12/2021    LAPAROSCOPIC SLEEVE GASTRECTOMY - ETHICON performed by Mg Valdez MD at [facility] N/A 12/28/2020    EGD BIOPSY performed by Mg Valdez MD at [address] [place] [address]     Family History   Problem Relation Age of Onset    Lab Results   Component Value Date    VITD25 17.3 12/08/2020     Lab Results   Component Value Date    LABA1C 5.1 12/08/2020    EAG 99.7 12/08/2020       Review of Systems   Constitutional:        Generalized weakness   HENT: Negative. Eyes: Negative. Respiratory: Negative. Cardiovascular: Negative. Gastrointestinal: Negative. Skin: Negative. Neurological: Negative. PHYSICAL EXAMINATION:    Constitutional: [x] Appears well-developed and well-nourished [x] No apparent distress      [] Abnormal-   Mental status  [x] Alert and awake  [x] Oriented to person/place/time [x]Able to follow commands      Eyes:  EOM    [x]  Normal  [] Abnormal-  Sclera  [x]  Normal  [] Abnormal -         Discharge [x]  None visible  [] Abnormal -    HENT:   [x] Normocephalic, atraumatic. [] Abnormal   [x] Mouth/Throat: Mucous membranes are moist.     External Ears [x] Normal  [] Abnormal-     Neck: [x] No visualized mass     Pulmonary/Chest: [x] Respiratory effort normal.  [x] No visualized signs of difficulty breathing or respiratory distress        [] Abnormal-      Musculoskeletal:   [] Normal gait with no signs of ataxia         [x] Normal range of motion of neck        [] Abnormal-     Neurological:        [x] No Facial Asymmetry (Cranial nerve 7 motor function) (limited exam to video visit)          [x] No gaze palsy        [] Abnormal-         Skin:        [x] No significant exanthematous lesions or discoloration noted on facial skin         [x] Abnormal- surgical incisions on abdomen healing well. Left incision reported has blister that opened while she was in the shower. I advised her to place PSO on this incision daily for 7 days.            Psychiatric:       [x] Normal Affect [x] No Hallucinations        [] Abnormal-     Other pertinent observable physical exam findings-     Due to this being a TeleHealth encounter, evaluation of the following organ systems is limited: Vitals/Constitutional/EENT/Resp/CV/GI//MS/Neuro/Skin/Heme-Lymph-Imm. Assessment and Plan:   Patient is here via telemedicine and is 6 weeks s/p sleeve gastrectomy, down 14lbs with a total weight loss of 53lbs. She is doing well, denies n/v/dysphagia or reflux. She is tolerating diet, getting adequate protein. She is low on fluids, not keeping track some days. She may also be low on protein on days that she does not utilize a protein shake. I believe her feelings of weakness are likely from being low on fluid/protein. She was encouraged to keep track of her intakes and ensure she is meeting her goals. She denies any functional reason why she cannot meet these goals. Bowels and bladder functioning. She is taking vitamins as instructed. She did speak with the registered dietitian for continued follow up. I agree with recommendations and plan. She is exercising with walking, encouraged her to continue physical activity and the importance of exercise and weight loss. Incisions healing well. She will apply PSO to left incision where blister was daily for 7 days. If appears red, warm or has drainage, she was advised to call our office. No lifting restrictions, she declined to use the Zostel. We will see her back in 8 weeks for continued follow up or via telemedicine depending on COVID-19 restrictions at the time of their next appointment. Total encounter time: 22 minutes, including any number of the following: Bariatric Pre/Post operative work up/protocols, review of labs, imaging, provider notes, outside hospital records, performing examination/evaluation, counseling patient and/or family, ordering medications/tests, placing referrals and communication with referring physicians, coordination of care; discussing dietary plan/recall with the patient as well with registered dietitian and documentation in the EHR. Of note, the above was done during same day of the actual patient encounter.        An electronic

## 2021-05-25 NOTE — PATIENT INSTRUCTIONS
Diet tips to help make you successful postoperatively  Eating habits after surgery will have to be a permanent and long-term change. Eating habits are so ingrained that it can be difficult to change. It is important to maintain these new eating habits after surgery. Also remember that overall health, age, and genetics make each persons weight loss progress different. Do not compare your progress, the amount you eat, or exercise to other patients.  Protein first at every meal- Eat the protein portion of your meal first. Eating protein helps the body feel full and sends a signal to stop eating. Protein is very important in building tissue in the body.  Eat at least 4 times per day- This includes protein supplements and small meals with a high amount of protein   Chewing your food thoroughly- Eating too quickly and improper chewing can cause pain and vomiting after surgery.  Slowing down the speed at which you eat- Refill your fork only after you swallow. Adopt a new pattern of eating by taking a bite of food and putting your utensil down between bites. This will help to reduce the feeling of food being stuck.    Drink water and start drinking fluids slowly- Drink at least 48 ounces per day minimum. Sip fluids as if they were hot beverages. If you find it difficult to stop gulping liquids, try using a sippy cup or a sport top water bottle.  Make sure you are eating meals without drinking fluids- After surgery you will not be allowed to drink fluids 30 minutes before, during, or 30 minutes after your meal (30/30/30 rule). This will be a life-long behavior change. The reason for the rule is to keep food from passing through your smaller stomach more rapidly. This will cause you to feel hungry shortly after your meal.   Continue to avoid caffeine and carbonated beverages- Caffeine acts as a diuretic and can be dehydrating as well as irritating to the lining of the stomach.  Carbonated beverages release gas and can expand the stomach.  Continue to keep temptation from your kitchen- Keep your pantry and kitchen cabinets cleaned out of those dangerous foods that might tempt you after surgery (chips, cookies, candy, etc.).  Continue to increase your exercise program- Increase your daily physical activity. Aim for 5-6 days per week for 30 minutes. Walking is an easy way to get started with exercising. Exercise is going to be a regular part of your life after surgery.  Make sure you have a good support system- There will be many changes and adjustments to make after surgery. It is important to have a supportive friend, family member or co-worker, etc. with whom you can talk. Continue to attend [facility]) Weight Management support groups as they can be helpful in maintaining behaviors. In addition, it is the responsibility of the patient to schedule and follow up on labs and tests completed after surgery. Results will be reviewed at each visit. Patient received dietary handouts and education.

## 2021-05-25 NOTE — PROGRESS NOTES
Dietary Assessment Note      Vitals: Patient lost 14 lbs over ~ 1 month. Total Weight Loss: 53 lbs    Labs reviewed: no new lab studies available for review at time of visit    Protein intake: 60-80g    Fluid intake: Patient not tracking as much - ~ 40oz     Multivitamin/mineral intake: Yes - 4 Fusion    Calcium intake: none    Other: Vitamin D3    Exercise: Yes - walking with cane    Nutrition Assessment: 6 weeks post-op visit. Pt reports not having much of appetite, a little weak. Wakes 8a  B - 10a - carb master yogurt   S - noon - Protein Shake with 8oz skim  L - 2p -1/4c CC + sometimes peaches / pears  D - 7p - 2oz packet tuna + couple Triscuits    Amount able to eat per sitting: ~ 4oz    Following 30/30/30 rule: Yes    Food Intolerances/issues: chinese food - lean pork and more all dente veggies    Client Concerns: none    Goals:  - Try to keep a consistent meal schedule, eating 5x day focusing on protein foods first  - Track intake to 800-1000kcal daily and protein to 60-80g daily  - Increase fluids to 48oz daily  - Phase 4 diet emailed and reviewed    Plan: F/U at 14 weeks post-op and per Provider    Due to the COVID-19 restrictions on close contact interactions the patient's visit was conducted via telephone in jason of a face to face visit. The patient is here through telemedicine for their post-op visit.       Johnson Castro RD, LD

## 2021-06-07 RX ORDER — OMEPRAZOLE 20 MG/1
CAPSULE, DELAYED RELEASE ORAL
Qty: 30 CAPSULE | Refills: 0 | Status: SHIPPED | OUTPATIENT
Start: 2021-06-07 | End: 2021-07-12

## 2021-06-11 RX ORDER — OXYBUTYNIN CHLORIDE 5 MG/1
TABLET ORAL
Qty: 60 TABLET | Refills: 0 | Status: SHIPPED | OUTPATIENT
Start: 2021-06-11 | End: 2021-07-12

## 2021-06-29 ENCOUNTER — TELEPHONE (OUTPATIENT)
Dept: INTERNAL MEDICINE CLINIC | Age: 56
End: 2021-06-29

## 2021-06-29 RX ORDER — INDOMETHACIN 50 MG/1
CAPSULE ORAL
Qty: 90 CAPSULE | Refills: 0 | Status: SHIPPED | OUTPATIENT
Start: 2021-06-29 | End: 2021-08-05

## 2021-07-09 ENCOUNTER — OFFICE VISIT (OUTPATIENT)
Dept: INTERNAL MEDICINE CLINIC | Age: 56
End: 2021-07-09

## 2021-07-09 VITALS
SYSTOLIC BLOOD PRESSURE: 126 MMHG | HEIGHT: 63 IN | BODY MASS INDEX: 50.14 KG/M2 | HEART RATE: 76 BPM | DIASTOLIC BLOOD PRESSURE: 84 MMHG | WEIGHT: 283 LBS

## 2021-07-09 DIAGNOSIS — E66.01 MORBID OBESITY WITH BMI OF 50.0-59.9, ADULT (HCC): ICD-10-CM

## 2021-07-09 DIAGNOSIS — J45.30 MILD PERSISTENT ASTHMA WITHOUT COMPLICATION: Primary | ICD-10-CM

## 2021-07-09 DIAGNOSIS — K21.9 CHRONIC GERD: ICD-10-CM

## 2021-07-09 DIAGNOSIS — M19.91 PRIMARY OSTEOARTHRITIS, UNSPECIFIED SITE: ICD-10-CM

## 2021-07-09 DIAGNOSIS — Z12.31 ENCOUNTER FOR SCREENING MAMMOGRAM FOR BREAST CANCER: ICD-10-CM

## 2021-07-09 DIAGNOSIS — N39.41 URINARY INCONTINENCE, URGE: ICD-10-CM

## 2021-07-09 PROCEDURE — 99214 OFFICE O/P EST MOD 30 MIN: CPT | Performed by: INTERNAL MEDICINE

## 2021-07-09 RX ORDER — INDOMETHACIN 50 MG/1
CAPSULE ORAL
Qty: 90 CAPSULE | Refills: 1 | Status: CANCELLED | OUTPATIENT
Start: 2021-07-09

## 2021-07-09 ASSESSMENT — ENCOUNTER SYMPTOMS
VOMITING: 0
SHORTNESS OF BREATH: 0
WHEEZING: 0
CHEST TIGHTNESS: 0
BLOOD IN STOOL: 0
COUGH: 0
NAUSEA: 0
ABDOMINAL PAIN: 0
DIARRHEA: 0

## 2021-07-09 NOTE — PROGRESS NOTES
Shaquille Nesbitt (:  1965) is a 60 y.o. female,Established patient, here for evaluation of the following chief complaint(s):  Follow-up         ASSESSMENT/PLAN:      Diagnosis Orders   1. Mild persistent asthma without complication     2. Chronic GERD     3. Morbid obesity with BMI of 50.0-59.9, adult ([facility]     4. Urinary incontinence, urge     5. Primary osteoarthritis, unspecified site     6. Encounter for screening mammogram for breast cancer  Mammography screening bilateral         Asthma is stable. Continue inhalers. Sees pulm.     Morbid obesity  S/p gastric sleeve procedure   Advised to cut down on calories. Lose weight.     GERD is stable. Continue PPI.     OA/chronic pain with opiate dependence  Continue indomethacin. continue PPI  Sees pain physician and takes multiple other meds.     Pedal edema  Continue lasix prn     Urinary urge incontinence. Continue ditropan. Stable.     Last colonoscopy 2017     Schedule mammogram- referral made      Subjective   SUBJECTIVE/OBJECTIVE:  HPI  Is here for follow up. She has a prior h/o DVT,reactive airway disease,morbid obesity,OA,chronic pain and GERD. She sees a pain physician and is on oxycodone,topamax,tizanidine,gabapentin and Cymbalta. She also takes indomethacin.     Asthma is stable on inhalers. uses albuterol  Very infrequently. Had gastric sleeve procedure 2021         Review of Systems   Constitutional: Negative for fatigue, fever and unexpected weight change. Respiratory: Negative for cough, chest tightness, shortness of breath and wheezing. Cardiovascular: Negative for chest pain, palpitations and leg swelling. Gastrointestinal: Negative for abdominal pain, blood in stool, diarrhea, nausea and vomiting. Genitourinary: Negative for hematuria. Neurological: Negative for light-headedness. Objective   Physical Exam  Constitutional:       Appearance: She is well-developed.    HENT:      Head: Normocephalic and atraumatic. Eyes:      Pupils: Pupils are equal, round, and reactive to light. Neck:      Thyroid: No thyromegaly. Cardiovascular:      Rate and Rhythm: Normal rate and regular rhythm. Heart sounds: Normal heart sounds. No murmur heard. No friction rub. No gallop. Comments: No carotid bruit  Pulmonary:      Effort: Pulmonary effort is normal. No respiratory distress. Breath sounds: Normal breath sounds. No wheezing or rales. Chest:      Chest wall: No tenderness. Abdominal:      General: Bowel sounds are normal. There is no distension. Palpations: Abdomen is soft. There is no mass. Tenderness: There is no abdominal tenderness. There is no guarding or rebound. Musculoskeletal:      Cervical back: Normal range of motion and neck supple. Neurological:      Mental Status: She is alert and oriented to person, place, and time. An electronic signature was used to authenticate this note.     --Gloria Menendez MD

## 2021-07-21 ENCOUNTER — TELEMEDICINE (OUTPATIENT)
Dept: BARIATRICS/WEIGHT MGMT | Age: 56
End: 2021-07-21
Payer: MEDICARE

## 2021-07-21 DIAGNOSIS — E66.01 MORBID OBESITY WITH BMI OF 45.0-49.9, ADULT (HCC): ICD-10-CM

## 2021-07-21 DIAGNOSIS — Z98.84 S/P LAPAROSCOPIC SLEEVE GASTRECTOMY: Primary | ICD-10-CM

## 2021-07-21 DIAGNOSIS — R53.83 FATIGUE, UNSPECIFIED TYPE: ICD-10-CM

## 2021-07-21 DIAGNOSIS — K21.9 CHRONIC GERD: ICD-10-CM

## 2021-07-21 PROCEDURE — 99214 OFFICE O/P EST MOD 30 MIN: CPT | Performed by: NURSE PRACTITIONER

## 2021-07-21 ASSESSMENT — ENCOUNTER SYMPTOMS
EYES NEGATIVE: 1
GASTROINTESTINAL NEGATIVE: 1
RESPIRATORY NEGATIVE: 1

## 2021-07-21 NOTE — PROGRESS NOTES
[facility]) Physicians   [employer]    7/21/2021    TELEHEALTH EVALUATION -- Audio/Visual (During GUQJS-34 public health emergency)    Subjective:      Patient ID: Karin Candelario is a 60 y.o. female    HPI    12 weeks s/p sleeve gastrectomy    Karin Candelario is a 60 y.o. female , current BMI of 49.1, current weight of 277 pounds. Due to the COVID-19 restrictions on close contact interactions the patient's visit was conducted via audio/video in jason of a face to face visit. Patient has consented to have this visit conducted via audio/video. The patient is here through telemedicine for their post op bariatric surgery visit. Patient denies any nausea, vomiting, fevers, chills, shortness of breath, chest pain, constipation or urinary symptoms. Denies any heartburn nor dysphagia. She is still experiencing significant fatigue and some weakness in her legs. She denies any balance issues. She is not having any other issues related to her sleeve.     Past Medical History:   Diagnosis Date    Acute renal failure (ARF) ([facility] [address] May 4, 2015    d/t IV dye    Arthritis     Asthma     DDD (degenerative disc disease), cervical     Deep vein thrombosis (DVT) ([facility] 07/2015    Degenerative disc disease     Family history of factor V deficiency     brother and sister; pt tested does not have    GERD (gastroesophageal reflux disease)     Hypertension     no longer has after quitting stressful job    Scoliosis     Sleep apnea     uses c-pap    Spinal stenosis      Past Surgical History:   Procedure Laterality Date    CARPAL TUNNEL RELEASE      JOINT REPLACEMENT Bilateral     hips and knees    ROTATOR CUFF REPAIR      3x right, 2x left    SLEEVE GASTRECTOMY N/A 4/12/2021    LAPAROSCOPIC SLEEVE GASTRECTOMY - ETHICON performed by Cecy Fortune MD at [facility] N/A 12/28/2020    EGD BIOPSY performed by Cecy Fortune MD at [address] [facility] ENDOSCOPY     Family History   Problem Relation Age of Onset    Other Sister     Stroke Sister     Arthritis Sister     Asthma Sister     Mental Illness Sister     Other Brother     High Blood Pressure Brother     Diabetes Brother     Arthritis Brother     Cancer Sister     Arthritis Sister     Arthritis Mother     High Blood Pressure Father     Arthritis Father      Social History     Tobacco Use    Smoking status: Former Smoker     Packs/day: 1.50     Years: 20.00     Pack years: 30.00     Types: Cigarettes     Quit date:      Years since quittin.5    Smokeless tobacco: Never Used   Substance Use Topics    Alcohol use: Not Currently     Comment: 2 drinks/day     I counseled the patient on the importance of not smoking and risks of ETOH. Allergies   Allergen Reactions    Iodides Anaphylaxis     Rash, hives, swelling    Of note on patient's admission 2021 she stated that allergy to iodides was actually her mother and allergy for shellfish was based on her father's allergies and not something she has herself experienced.  Scopolamine Hives     There were no vitals filed for this visit. There is no height or weight on file to calculate BMI. Current Outpatient Medications:     oxybutynin (DITROPAN) 5 MG tablet, TAKE ONE TABLET BY MOUTH TWICE A DAY, Disp: 60 tablet, Rfl: 5    omeprazole (PRILOSEC) 20 MG delayed release capsule, TAKE ONE CAPSULE BY MOUTH EVERY MORNING BEFORE BREAKFAST, Disp: 30 capsule, Rfl: 5    indomethacin (INDOCIN) 50 MG capsule, TAKE ONE CAPSULE BY MOUTH THREE TIMES A DAY AS NEEDED FOR PAIN, Disp: 90 capsule, Rfl: 0    nystatin (MYCOSTATIN) 208599 UNIT/GM powder, Apply 3 times daily. , Disp: 1 Bottle, Rfl: 1    topiramate (TOPAMAX) 100 MG tablet, Take 100 mg by mouth nightly, Disp: , Rfl:     Cholecalciferol 50 MCG (2000) TABS, Take 1 tablet by mouth daily Take 1 tablet by mouth daily. , Disp: 90 tablet, Rfl: 2    furosemide (LASIX) 20 MG tablet, TAKE TWO TABLETS BY MOUTH DAILY AS NEEDED, Disp: 180 tablet, Rfl: 0    aspirin 81 MG chewable tablet, Take 81 mg by mouth daily, Disp: , Rfl:     gabapentin (NEURONTIN) 400 MG capsule, Take 400 mg by mouth 3 times daily.  , Disp: , Rfl:     magnesium 30 MG tablet, Take 30 mg by mouth daily, Disp: , Rfl:     tiZANidine (ZANAFLEX) 4 MG tablet, Take 4-8 mg by mouth nightly , Disp: , Rfl:     DULoxetine (CYMBALTA) 60 MG capsule, Take 120 mg by mouth daily , Disp: , Rfl:     Lab Results   Component Value Date    WBC 10.6 04/13/2021    RBC 4.39 04/13/2021    HGB 12.6 04/13/2021    HCT 39.2 04/13/2021    MCV 89.5 04/13/2021    MCH 28.8 04/13/2021    MCHC 32.1 04/13/2021    MPV 8.3 04/13/2021    NEUTOPHILPCT 69.4 12/08/2020    LYMPHOPCT 18.3 12/08/2020    MONOPCT 7.8 12/08/2020    EOSRELPCT 3.4 12/08/2020    BASOPCT 1.1 12/08/2020    NEUTROABS 4.0 12/08/2020    LYMPHSABS 1.1 12/08/2020    MONOSABS 0.5 12/08/2020    EOSABS 0.2 12/08/2020     Lab Results   Component Value Date     04/13/2021    K 3.9 04/13/2021    K 3.5 05/19/2019     04/13/2021    CO2 20 04/13/2021    ANIONGAP 14 04/13/2021    GLUCOSE 101 04/13/2021    BUN 13 04/13/2021    CREATININE 0.6 04/13/2021    LABGLOM >60 04/13/2021    GFRAA >60 04/13/2021    CALCIUM 9.2 04/13/2021    PROT 8.6 04/06/2021    LABALBU 4.3 04/06/2021    AGRATIO 1.0 04/06/2021    BILITOT 0.4 04/06/2021    ALKPHOS 109 04/06/2021    ALT 8 04/06/2021    AST 16 04/06/2021    GLOB 4.3 04/06/2021     Lab Results   Component Value Date    CHOL 180 12/08/2020    TRIG 49 12/08/2020    HDL 66 12/08/2020    LDLCALC 104 12/08/2020    LABVLDL 10 12/08/2020     Lab Results   Component Value Date    TSHREFLEX 1.45 12/08/2020     Lab Results   Component Value Date    IRON 52 12/08/2020    TIBC 293 12/08/2020    LABIRON 18 12/08/2020     Lab Results   Component Value Date    DDUOBOZX57 339 12/08/2020    FOLATE 10.56 12/08/2020     Lab Results   Component Value Date    VITD25 17.3 12/08/2020     Lab Results   Component Value Date LABA1C 5.1 12/08/2020    EAG 99.7 12/08/2020       Review of Systems   Constitutional: Positive for fatigue. HENT: Negative. Eyes: Negative. Respiratory: Negative. Cardiovascular: Negative. Gastrointestinal: Negative. Skin: Negative. Neurological: Negative. PHYSICAL EXAMINATION:    Constitutional: [x] Appears well-developed and well-nourished [x] No apparent distress      [] Abnormal-   Mental status  [x] Alert and awake  [x] Oriented to person/place/time [x]Able to follow commands      Eyes:  EOM    [x]  Normal  [] Abnormal-  Sclera  [x]  Normal  [] Abnormal -         Discharge [x]  None visible  [] Abnormal -    HENT:   [x] Normocephalic, atraumatic. [] Abnormal   [x] Mouth/Throat: Mucous membranes are moist.     External Ears [x] Normal  [] Abnormal-     Neck: [x] No visualized mass     Pulmonary/Chest: [x] Respiratory effort normal.  [x] No visualized signs of difficulty breathing or respiratory distress        [] Abnormal-      Musculoskeletal:   [] Normal gait with no signs of ataxia         [x] Normal range of motion of neck        [] Abnormal-     Neurological:        [x] No Facial Asymmetry (Cranial nerve 7 motor function) (limited exam to video visit)          [x] No gaze palsy        [] Abnormal-         Skin:        [x] No significant exanthematous lesions or discoloration noted on facial skin         [] Abnormal-            Psychiatric:       [x] Normal Affect [x] No Hallucinations        [] Abnormal-     Other pertinent observable physical exam findings-     Due to this being a TeleHealth encounter, evaluation of the following organ systems is limited: Vitals/Constitutional/EENT/Resp/CV/GI//MS/Neuro/Skin/Heme-Lymph-Imm. Assessment and Plan:   Patient is here via telemedicine and is 14 weeks s/p sleeve gastrectomy, down 21lbs with a total weight loss of 74lbs. She is doing well, denies n/v/dysphagia or reflux. She is tolerating diet, getting adequate fluids and protein. She is going to work on increasing her calories by adding another eating instance. She will continue to track her intakes. She has had some alcohol since her surgery. We discussed importance of avoiding alcohol until she is 1 year post op. She is not currently exercising due to fatigue. She is taking vitamins as instructed. She would like to switch vitamins, we will provide her with list of alternatives. She did speak with the registered dietitian for continued follow up. I agree with recommendations and plan. In regards to fatigue, hopefully she will see improvement with increase in calories. We will also check labs. Labs were ordered at this visit. Patient understands it is their responsibility to have these completed and to obtain results from our office. she gives me permission to leave test results on her voicemail. If labs do not show cause of fatigue, I will have her follow up with her PCP for further evaluation. We will see her back in 2 months for continued follow up or via telemedicine depending on COVID-19 restrictions at the time of their next appointment. Chronic GERD:   [x] Continue to make dietary and lifestyle modifications per our recommendations. [] Continue PPI. [] Continue H2 Blocker  [] Wean PPI. Take every other day for two weeks. If no issues with heartburn/reflux you may decrease to every third day for two weeks. If no issues with heartburn/reflux you may stop the Prilosec. Recommend that you get OTC Pepcid to take should you have occasional heartburn/reflux. Obesity:  [x] Continue to make dietary and lifestyle modifications per our recommendations. [x] Plan for Future laparoscopic sleeve gastrectomy. [x] Return for follow-up next month.       This visit included any number of the following: Bariatric Pre/Post operative work up/protocols, review of labs, imaging, provider notes, outside hospital records, performing examination/evaluation, counseling patient and/or family, ordering medications/tests, placing referrals and communication with referring physicians, coordination of care; discussing dietary plan/recall with the patient as well with registered dietitian and documentation in the EHR. Of note, the above was done during same day of the actual patient encounter. An electronic signature was used to authenticate this note. Pursuant to the emergency declaration under the [address] [place] [address], [facility] waiver authority and the [employer] Act, this Virtual  Visit was conducted, with patient's consent, to reduce the patient's risk of exposure to COVID-19 and provide continuity of care for an established patient. Services were provided through a video synchronous discussion virtually to substitute for in-person clinic visit.

## 2021-07-21 NOTE — PATIENT INSTRUCTIONS
Diet tips to help make you successful postoperatively  Eating habits after surgery will have to be a permanent and long-term change. Eating habits are so ingrained that it can be difficult to change. It is important to maintain these new eating habits after surgery. Also remember that overall health, age, and genetics make each persons weight loss progress different. Do not compare your progress, the amount you eat, or exercise to other patients.  Protein first at every meal- Eat the protein portion of your meal first. Eating protein helps the body feel full and sends a signal to stop eating. Protein is very important in building tissue in the body.  Eat at least 4 times per day- This includes protein supplements and small meals with a high amount of protein   Chewing your food thoroughly- Eating too quickly and improper chewing can cause pain and vomiting after surgery.  Slowing down the speed at which you eat- Refill your fork only after you swallow. Adopt a new pattern of eating by taking a bite of food and putting your utensil down between bites. This will help to reduce the feeling of food being stuck.    Drink water and start drinking fluids slowly- Drink at least 48 ounces per day minimum. Sip fluids as if they were hot beverages. If you find it difficult to stop gulping liquids, try using a sippy cup or a sport top water bottle.  Make sure you are eating meals without drinking fluids- After surgery you will not be allowed to drink fluids 30 minutes before, during, or 30 minutes after your meal (30/30/30 rule). This will be a life-long behavior change. The reason for the rule is to keep food from passing through your smaller stomach more rapidly. This will cause you to feel hungry shortly after your meal.   Continue to avoid caffeine and carbonated beverages- Caffeine acts as a diuretic and can be dehydrating as well as irritating to the lining of the stomach.  Carbonated beverages release gas and can expand the stomach.  Continue to keep temptation from your kitchen- Keep your pantry and kitchen cabinets cleaned out of those dangerous foods that might tempt you after surgery (chips, cookies, candy, etc.).  Continue to increase your exercise program- Increase your daily physical activity. Aim for 5-6 days per week for 30 minutes. Walking is an easy way to get started with exercising. Exercise is going to be a regular part of your life after surgery.  Make sure you have a good support system- There will be many changes and adjustments to make after surgery. It is important to have a supportive friend, family member or co-worker, etc. with whom you can talk. Continue to attend [facility]) Weight Management support groups as they can be helpful in maintaining behaviors. In addition, it is the responsibility of the patient to schedule and follow up on labs and tests completed after surgery. Results will be reviewed at each visit. Patient received dietary handouts and education.

## 2021-07-26 ENCOUNTER — HOSPITAL ENCOUNTER (OUTPATIENT)
Age: 56
Discharge: HOME | End: 2021-07-26
Payer: MEDICARE

## 2021-07-26 DIAGNOSIS — R53.83 FATIGUE, UNSPECIFIED TYPE: ICD-10-CM

## 2021-07-26 DIAGNOSIS — E66.01 MORBID OBESITY WITH BMI OF 45.0-49.9, ADULT (HCC): ICD-10-CM

## 2021-07-26 DIAGNOSIS — Z98.84 S/P LAPAROSCOPIC SLEEVE GASTRECTOMY: ICD-10-CM

## 2021-07-26 LAB
A/G RATIO: 1.1 (ref 1.1–2.2)
ALBUMIN SERPL-MCNC: 3.9 G/DL (ref 3.4–5)
ALP BLD-CCNC: 94 U/L (ref 40–129)
ALT SERPL-CCNC: 13 U/L (ref 10–40)
ANION GAP SERPL CALCULATED.3IONS-SCNC: 18 MMOL/L (ref 3–16)
AST SERPL-CCNC: 16 U/L (ref 15–37)
BASOPHILS ABSOLUTE: 0 K/UL (ref 0–0.2)
BASOPHILS RELATIVE PERCENT: 0.5 %
BILIRUB SERPL-MCNC: 0.3 MG/DL (ref 0–1)
BUN BLDV-MCNC: 18 MG/DL (ref 7–20)
CALCIUM SERPL-MCNC: 9.4 MG/DL (ref 8.3–10.6)
CHLORIDE BLD-SCNC: 99 MMOL/L (ref 99–110)
CO2: 25 MMOL/L (ref 21–32)
CREAT SERPL-MCNC: 0.6 MG/DL (ref 0.6–1.1)
EOSINOPHILS ABSOLUTE: 0.2 K/UL (ref 0–0.6)
EOSINOPHILS RELATIVE PERCENT: 4.2 %
FOLATE: 17.25 NG/ML (ref 4.78–24.2)
GFR AFRICAN AMERICAN: >60
GFR NON-AFRICAN AMERICAN: >60
GLOBULIN: 3.5 G/DL
GLUCOSE BLD-MCNC: 91 MG/DL (ref 70–99)
HCT VFR BLD CALC: 41.7 % (ref 36–48)
HEMOGLOBIN: 14 G/DL (ref 12–16)
IRON SATURATION: 31 % (ref 15–50)
IRON: 56 UG/DL (ref 37–145)
LYMPHOCYTES ABSOLUTE: 1.2 K/UL (ref 1–5.1)
LYMPHOCYTES RELATIVE PERCENT: 23.5 %
MCH RBC QN AUTO: 30.6 PG (ref 26–34)
MCHC RBC AUTO-ENTMCNC: 33.6 G/DL (ref 31–36)
MCV RBC AUTO: 91.3 FL (ref 80–100)
MONOCYTES ABSOLUTE: 0.6 K/UL (ref 0–1.3)
MONOCYTES RELATIVE PERCENT: 10.9 %
NEUTROPHILS ABSOLUTE: 3.2 K/UL (ref 1.7–7.7)
NEUTROPHILS RELATIVE PERCENT: 60.9 %
PDW BLD-RTO: 16.5 % (ref 12.4–15.4)
PLATELET # BLD: 175 K/UL (ref 135–450)
PMV BLD AUTO: 9 FL (ref 5–10.5)
POTASSIUM SERPL-SCNC: 3.9 MMOL/L (ref 3.5–5.1)
RBC # BLD: 4.57 M/UL (ref 4–5.2)
SODIUM BLD-SCNC: 142 MMOL/L (ref 136–145)
TOTAL IRON BINDING CAPACITY: 181 UG/DL (ref 260–445)
TOTAL PROTEIN: 7.4 G/DL (ref 6.4–8.2)
TSH REFLEX: 2 UIU/ML (ref 0.27–4.2)
VITAMIN B-12: 744 PG/ML (ref 211–911)
VITAMIN D 25-HYDROXY: 68 NG/ML
WBC # BLD: 5.3 K/UL (ref 4–11)

## 2021-07-26 PROCEDURE — 82306 VITAMIN D 25 HYDROXY: CPT

## 2021-07-26 PROCEDURE — 85025 COMPLETE CBC W/AUTO DIFF WBC: CPT

## 2021-07-26 PROCEDURE — 84443 ASSAY THYROID STIM HORMONE: CPT

## 2021-07-26 PROCEDURE — 36415 COLL VENOUS BLD VENIPUNCTURE: CPT

## 2021-07-26 PROCEDURE — 82746 ASSAY OF FOLIC ACID SERUM: CPT

## 2021-07-26 PROCEDURE — 80053 COMPREHEN METABOLIC PANEL: CPT

## 2021-07-26 PROCEDURE — 82607 VITAMIN B-12: CPT

## 2021-07-26 PROCEDURE — 83550 IRON BINDING TEST: CPT

## 2021-07-26 PROCEDURE — 83540 ASSAY OF IRON: CPT

## 2021-07-27 ENCOUNTER — TELEPHONE (OUTPATIENT)
Dept: BARIATRICS/WEIGHT MGMT | Age: 56
End: 2021-07-27

## 2021-07-28 NOTE — TELEPHONE ENCOUNTER
Patient received VM. She just wanted to update [address] Denilson Roger NP, since starting new regime with 2 MVI daily & calcium supplement her fatigue has greatly improved. Pt reports feeling better with lots of energy.

## 2021-07-28 NOTE — TELEPHONE ENCOUNTER
I left voicemail for patient with lab results, okay per last office visit. I explained that labs overall looked good and did not give explanation for her fatigue. She was advised to reach out to her PCP to schedule an appt to discuss her fatigue, if fatigue is still continuing after making dietary changes discussed at [address] [place] [address]. she was encouraged to call our office with any questions or concerns.

## 2021-08-20 ENCOUNTER — TELEPHONE (OUTPATIENT)
Dept: INTERNAL MEDICINE CLINIC | Age: 56
End: 2021-08-20

## 2021-08-20 RX ORDER — METHYLPREDNISOLONE 4 MG/1
TABLET ORAL
Qty: 1 KIT | Refills: 0 | Status: SHIPPED | OUTPATIENT
Start: 2021-08-20 | End: 2021-08-26

## 2021-08-30 ENCOUNTER — TELEPHONE (OUTPATIENT)
Dept: PULMONOLOGY | Age: 56
End: 2021-08-30

## 2021-08-30 ENCOUNTER — HOSPITAL ENCOUNTER (INPATIENT)
Age: 56
LOS: 14 days | Discharge: NURSING, REHAB OR LONG-TERM CARE | DRG: 329 | End: 2021-09-13
Attending: EMERGENCY MEDICINE | Admitting: INTERNAL MEDICINE
Payer: MEDICARE

## 2021-08-30 ENCOUNTER — APPOINTMENT (OUTPATIENT)
Dept: CT IMAGING | Age: 56
DRG: 329 | End: 2021-08-30
Payer: MEDICARE

## 2021-08-30 DIAGNOSIS — K35.20 ACUTE APPENDICITIS WITH PERFORATION AND GENERALIZED PERITONITIS, UNSPECIFIED WHETHER ABSCESS PRESENT, UNSPECIFIED WHETHER GANGRENE PRESENT: Primary | ICD-10-CM

## 2021-08-30 PROBLEM — K37 APPENDICITIS: Status: ACTIVE | Noted: 2021-08-30

## 2021-08-30 LAB
A/G RATIO: 1.2 (ref 1.1–2.2)
ABO/RH: NORMAL
ALBUMIN SERPL-MCNC: 3.7 G/DL (ref 3.4–5)
ALP BLD-CCNC: 96 U/L (ref 40–129)
ALT SERPL-CCNC: 17 U/L (ref 10–40)
ANION GAP SERPL CALCULATED.3IONS-SCNC: 16 MMOL/L (ref 3–16)
ANISOCYTOSIS: ABNORMAL
ANTIBODY SCREEN: NORMAL
AST SERPL-CCNC: 15 U/L (ref 15–37)
BASOPHILS ABSOLUTE: 0 K/UL (ref 0–0.2)
BASOPHILS RELATIVE PERCENT: 0 %
BILIRUB SERPL-MCNC: 0.7 MG/DL (ref 0–1)
BILIRUBIN URINE: ABNORMAL
BLOOD, URINE: NEGATIVE
BUN BLDV-MCNC: 14 MG/DL (ref 7–20)
CALCIUM SERPL-MCNC: 9.1 MG/DL (ref 8.3–10.6)
CHLORIDE BLD-SCNC: 101 MMOL/L (ref 99–110)
CLARITY: CLEAR
CO2: 23 MMOL/L (ref 21–32)
COLOR: YELLOW
CREAT SERPL-MCNC: <0.5 MG/DL (ref 0.6–1.1)
EKG ATRIAL RATE: 95 BPM
EKG DIAGNOSIS: NORMAL
EKG P AXIS: 53 DEGREES
EKG P-R INTERVAL: 130 MS
EKG Q-T INTERVAL: 450 MS
EKG QRS DURATION: 94 MS
EKG QTC CALCULATION (BAZETT): 565 MS
EKG R AXIS: -13 DEGREES
EKG T AXIS: -18 DEGREES
EKG VENTRICULAR RATE: 95 BPM
EOSINOPHILS ABSOLUTE: 0.3 K/UL (ref 0–0.6)
EOSINOPHILS RELATIVE PERCENT: 2 %
GFR AFRICAN AMERICAN: >60
GFR NON-AFRICAN AMERICAN: >60
GLOBULIN: 3.2 G/DL
GLUCOSE BLD-MCNC: 115 MG/DL (ref 70–99)
GLUCOSE URINE: NEGATIVE MG/DL
HCT VFR BLD CALC: 43.2 % (ref 36–48)
HEMATOLOGY PATH CONSULT: YES
HEMOGLOBIN: 14.3 G/DL (ref 12–16)
KETONES, URINE: 15 MG/DL
LACTIC ACID: 1.8 MMOL/L (ref 0.4–2)
LEUKOCYTE ESTERASE, URINE: NEGATIVE
LIPASE: 9 U/L (ref 13–60)
LYMPHOCYTES ABSOLUTE: 0.7 K/UL (ref 1–5.1)
LYMPHOCYTES RELATIVE PERCENT: 5 %
MAGNESIUM: 1.5 MG/DL (ref 1.8–2.4)
MCH RBC QN AUTO: 31.5 PG (ref 26–34)
MCHC RBC AUTO-ENTMCNC: 33.2 G/DL (ref 31–36)
MCV RBC AUTO: 95.1 FL (ref 80–100)
MICROSCOPIC EXAMINATION: ABNORMAL
MONOCYTES ABSOLUTE: 3.3 K/UL (ref 0–1.3)
MONOCYTES RELATIVE PERCENT: 23 %
NEUTROPHILS ABSOLUTE: 10 K/UL (ref 1.7–7.7)
NEUTROPHILS RELATIVE PERCENT: 70 %
NITRITE, URINE: NEGATIVE
PDW BLD-RTO: 16.1 % (ref 12.4–15.4)
PH UA: 6 (ref 5–8)
PLATELET # BLD: 156 K/UL (ref 135–450)
PLATELET SLIDE REVIEW: ADEQUATE
PMV BLD AUTO: 8.2 FL (ref 5–10.5)
POTASSIUM REFLEX MAGNESIUM: 3.1 MMOL/L (ref 3.5–5.1)
PROTEIN UA: NEGATIVE MG/DL
RBC # BLD: 4.55 M/UL (ref 4–5.2)
SARS-COV-2, NAAT: NOT DETECTED
SLIDE REVIEW: ABNORMAL
SODIUM BLD-SCNC: 140 MMOL/L (ref 136–145)
SPECIFIC GRAVITY UA: <=1.005 (ref 1–1.03)
TOTAL PROTEIN: 6.9 G/DL (ref 6.4–8.2)
TOXIC GRANULATION: PRESENT
URINE REFLEX TO CULTURE: ABNORMAL
URINE TYPE: ABNORMAL
UROBILINOGEN, URINE: 0.2 E.U./DL
WBC # BLD: 14.3 K/UL (ref 4–11)

## 2021-08-30 PROCEDURE — 85025 COMPLETE CBC W/AUTO DIFF WBC: CPT

## 2021-08-30 PROCEDURE — 96366 THER/PROPH/DIAG IV INF ADDON: CPT

## 2021-08-30 PROCEDURE — 80053 COMPREHEN METABOLIC PANEL: CPT

## 2021-08-30 PROCEDURE — 74176 CT ABD & PELVIS W/O CONTRAST: CPT

## 2021-08-30 PROCEDURE — 6370000000 HC RX 637 (ALT 250 FOR IP): Performed by: EMERGENCY MEDICINE

## 2021-08-30 PROCEDURE — 86850 RBC ANTIBODY SCREEN: CPT

## 2021-08-30 PROCEDURE — 86901 BLOOD TYPING SEROLOGIC RH(D): CPT

## 2021-08-30 PROCEDURE — 1200000000 HC SEMI PRIVATE

## 2021-08-30 PROCEDURE — 87635 SARS-COV-2 COVID-19 AMP PRB: CPT

## 2021-08-30 PROCEDURE — 99222 1ST HOSP IP/OBS MODERATE 55: CPT | Performed by: PHYSICIAN ASSISTANT

## 2021-08-30 PROCEDURE — 2580000003 HC RX 258: Performed by: NURSE PRACTITIONER

## 2021-08-30 PROCEDURE — 83690 ASSAY OF LIPASE: CPT

## 2021-08-30 PROCEDURE — 96365 THER/PROPH/DIAG IV INF INIT: CPT

## 2021-08-30 PROCEDURE — C9113 INJ PANTOPRAZOLE SODIUM, VIA: HCPCS | Performed by: NURSE PRACTITIONER

## 2021-08-30 PROCEDURE — 86900 BLOOD TYPING SEROLOGIC ABO: CPT

## 2021-08-30 PROCEDURE — 6360000002 HC RX W HCPCS: Performed by: NURSE PRACTITIONER

## 2021-08-30 PROCEDURE — 2580000003 HC RX 258: Performed by: EMERGENCY MEDICINE

## 2021-08-30 PROCEDURE — 83735 ASSAY OF MAGNESIUM: CPT

## 2021-08-30 PROCEDURE — 99223 1ST HOSP IP/OBS HIGH 75: CPT | Performed by: SURGERY

## 2021-08-30 PROCEDURE — 99283 EMERGENCY DEPT VISIT LOW MDM: CPT

## 2021-08-30 PROCEDURE — 96375 TX/PRO/DX INJ NEW DRUG ADDON: CPT

## 2021-08-30 PROCEDURE — 93005 ELECTROCARDIOGRAM TRACING: CPT | Performed by: EMERGENCY MEDICINE

## 2021-08-30 PROCEDURE — 83605 ASSAY OF LACTIC ACID: CPT

## 2021-08-30 PROCEDURE — 6360000002 HC RX W HCPCS

## 2021-08-30 PROCEDURE — 93010 ELECTROCARDIOGRAM REPORT: CPT | Performed by: INTERNAL MEDICINE

## 2021-08-30 PROCEDURE — 6370000000 HC RX 637 (ALT 250 FOR IP): Performed by: NURSE PRACTITIONER

## 2021-08-30 PROCEDURE — 6360000002 HC RX W HCPCS: Performed by: EMERGENCY MEDICINE

## 2021-08-30 PROCEDURE — 36415 COLL VENOUS BLD VENIPUNCTURE: CPT

## 2021-08-30 PROCEDURE — 81003 URINALYSIS AUTO W/O SCOPE: CPT

## 2021-08-30 RX ORDER — ACETAMINOPHEN 325 MG/1
650 TABLET ORAL EVERY 6 HOURS PRN
Status: DISCONTINUED | OUTPATIENT
Start: 2021-08-30 | End: 2021-09-13 | Stop reason: HOSPADM

## 2021-08-30 RX ORDER — ACETAMINOPHEN 650 MG/1
650 SUPPOSITORY RECTAL EVERY 6 HOURS PRN
Status: DISCONTINUED | OUTPATIENT
Start: 2021-08-30 | End: 2021-09-13 | Stop reason: HOSPADM

## 2021-08-30 RX ORDER — ACETAMINOPHEN 500 MG
1000 TABLET ORAL ONCE
Status: COMPLETED | OUTPATIENT
Start: 2021-08-30 | End: 2021-08-30

## 2021-08-30 RX ORDER — MORPHINE SULFATE 4 MG/ML
INJECTION, SOLUTION INTRAMUSCULAR; INTRAVENOUS
Status: COMPLETED
Start: 2021-08-30 | End: 2021-08-30

## 2021-08-30 RX ORDER — SODIUM CHLORIDE, SODIUM LACTATE, POTASSIUM CHLORIDE, CALCIUM CHLORIDE 600; 310; 30; 20 MG/100ML; MG/100ML; MG/100ML; MG/100ML
1000 INJECTION, SOLUTION INTRAVENOUS ONCE
Status: COMPLETED | OUTPATIENT
Start: 2021-08-30 | End: 2021-08-30

## 2021-08-30 RX ORDER — SODIUM CHLORIDE 0.9 % (FLUSH) 0.9 %
5-40 SYRINGE (ML) INJECTION EVERY 12 HOURS SCHEDULED
Status: DISCONTINUED | OUTPATIENT
Start: 2021-08-30 | End: 2021-09-13 | Stop reason: HOSPADM

## 2021-08-30 RX ORDER — POTASSIUM CHLORIDE 20 MEQ/1
TABLET, EXTENDED RELEASE ORAL
Status: DISPENSED
Start: 2021-08-30 | End: 2021-08-31

## 2021-08-30 RX ORDER — MAGNESIUM SULFATE 1 G/100ML
1000 INJECTION INTRAVENOUS ONCE
Status: COMPLETED | OUTPATIENT
Start: 2021-08-30 | End: 2021-08-30

## 2021-08-30 RX ORDER — GABAPENTIN 400 MG/1
400 CAPSULE ORAL 3 TIMES DAILY
Status: DISCONTINUED | OUTPATIENT
Start: 2021-08-30 | End: 2021-09-06

## 2021-08-30 RX ORDER — MORPHINE SULFATE 4 MG/ML
4 INJECTION, SOLUTION INTRAMUSCULAR; INTRAVENOUS
Status: DISCONTINUED | OUTPATIENT
Start: 2021-08-30 | End: 2021-09-01

## 2021-08-30 RX ORDER — KETOROLAC TROMETHAMINE 30 MG/ML
30 INJECTION, SOLUTION INTRAMUSCULAR; INTRAVENOUS EVERY 6 HOURS PRN
Status: DISCONTINUED | OUTPATIENT
Start: 2021-08-30 | End: 2021-09-01

## 2021-08-30 RX ORDER — SODIUM CHLORIDE 9 MG/ML
25 INJECTION, SOLUTION INTRAVENOUS PRN
Status: DISCONTINUED | OUTPATIENT
Start: 2021-08-30 | End: 2021-09-13 | Stop reason: HOSPADM

## 2021-08-30 RX ORDER — OXYBUTYNIN CHLORIDE 5 MG/1
5 TABLET ORAL 2 TIMES DAILY
Status: DISCONTINUED | OUTPATIENT
Start: 2021-08-30 | End: 2021-09-06

## 2021-08-30 RX ORDER — SODIUM CHLORIDE 9 MG/ML
INJECTION, SOLUTION INTRAVENOUS CONTINUOUS
Status: DISCONTINUED | OUTPATIENT
Start: 2021-08-30 | End: 2021-09-03

## 2021-08-30 RX ORDER — SODIUM CHLORIDE 0.9 % (FLUSH) 0.9 %
5-40 SYRINGE (ML) INJECTION PRN
Status: DISCONTINUED | OUTPATIENT
Start: 2021-08-30 | End: 2021-09-13 | Stop reason: HOSPADM

## 2021-08-30 RX ORDER — ONDANSETRON 4 MG/1
4 TABLET, ORALLY DISINTEGRATING ORAL EVERY 8 HOURS PRN
Status: DISCONTINUED | OUTPATIENT
Start: 2021-08-30 | End: 2021-09-06

## 2021-08-30 RX ORDER — MORPHINE SULFATE 2 MG/ML
2 INJECTION, SOLUTION INTRAMUSCULAR; INTRAVENOUS
Status: DISCONTINUED | OUTPATIENT
Start: 2021-08-30 | End: 2021-09-01

## 2021-08-30 RX ORDER — TIZANIDINE 4 MG/1
4 TABLET ORAL NIGHTLY
Status: DISCONTINUED | OUTPATIENT
Start: 2021-08-30 | End: 2021-09-05

## 2021-08-30 RX ORDER — TOPIRAMATE 100 MG/1
100 TABLET, FILM COATED ORAL NIGHTLY
Status: DISCONTINUED | OUTPATIENT
Start: 2021-08-30 | End: 2021-09-13 | Stop reason: HOSPADM

## 2021-08-30 RX ORDER — POTASSIUM CHLORIDE 20 MEQ/1
40 TABLET, EXTENDED RELEASE ORAL PRN
Status: DISCONTINUED | OUTPATIENT
Start: 2021-08-30 | End: 2021-09-13 | Stop reason: HOSPADM

## 2021-08-30 RX ORDER — MAGNESIUM SULFATE IN WATER 40 MG/ML
2000 INJECTION, SOLUTION INTRAVENOUS PRN
Status: DISCONTINUED | OUTPATIENT
Start: 2021-08-30 | End: 2021-09-06

## 2021-08-30 RX ORDER — PANTOPRAZOLE SODIUM 40 MG/10ML
40 INJECTION, POWDER, LYOPHILIZED, FOR SOLUTION INTRAVENOUS DAILY
Status: DISCONTINUED | OUTPATIENT
Start: 2021-08-30 | End: 2021-09-02

## 2021-08-30 RX ORDER — MORPHINE SULFATE 4 MG/ML
4 INJECTION, SOLUTION INTRAMUSCULAR; INTRAVENOUS
Status: DISCONTINUED | OUTPATIENT
Start: 2021-08-30 | End: 2021-08-30

## 2021-08-30 RX ORDER — POTASSIUM CHLORIDE 7.45 MG/ML
10 INJECTION INTRAVENOUS PRN
Status: DISCONTINUED | OUTPATIENT
Start: 2021-08-30 | End: 2021-09-13 | Stop reason: HOSPADM

## 2021-08-30 RX ORDER — ONDANSETRON 2 MG/ML
4 INJECTION INTRAMUSCULAR; INTRAVENOUS EVERY 6 HOURS PRN
Status: DISCONTINUED | OUTPATIENT
Start: 2021-08-30 | End: 2021-09-06

## 2021-08-30 RX ORDER — MAGNESIUM 30 MG
30 TABLET ORAL DAILY
Status: DISCONTINUED | OUTPATIENT
Start: 2021-08-30 | End: 2021-08-30 | Stop reason: CLARIF

## 2021-08-30 RX ORDER — POTASSIUM CHLORIDE 20 MEQ/1
40 TABLET, EXTENDED RELEASE ORAL ONCE
Status: DISCONTINUED | OUTPATIENT
Start: 2021-08-30 | End: 2021-08-30

## 2021-08-30 RX ORDER — DULOXETIN HYDROCHLORIDE 60 MG/1
120 CAPSULE, DELAYED RELEASE ORAL DAILY
Status: DISCONTINUED | OUTPATIENT
Start: 2021-08-30 | End: 2021-09-06

## 2021-08-30 RX ORDER — POLYETHYLENE GLYCOL 3350 17 G/17G
17 POWDER, FOR SOLUTION ORAL DAILY PRN
Status: DISCONTINUED | OUTPATIENT
Start: 2021-08-30 | End: 2021-09-06

## 2021-08-30 RX ADMIN — OXYBUTYNIN CHLORIDE 5 MG: 5 TABLET ORAL at 20:40

## 2021-08-30 RX ADMIN — SODIUM CHLORIDE, POTASSIUM CHLORIDE, SODIUM LACTATE AND CALCIUM CHLORIDE 1000 ML: 600; 310; 30; 20 INJECTION, SOLUTION INTRAVENOUS at 13:43

## 2021-08-30 RX ADMIN — SODIUM CHLORIDE: 9 INJECTION, SOLUTION INTRAVENOUS at 15:39

## 2021-08-30 RX ADMIN — MORPHINE SULFATE 4 MG: 4 INJECTION INTRAVENOUS at 15:02

## 2021-08-30 RX ADMIN — POTASSIUM CHLORIDE 40 MEQ: 1500 TABLET, EXTENDED RELEASE ORAL at 15:36

## 2021-08-30 RX ADMIN — MORPHINE SULFATE 4 MG: 4 INJECTION INTRAVENOUS at 13:15

## 2021-08-30 RX ADMIN — ACETAMINOPHEN 650 MG: 325 TABLET ORAL at 17:11

## 2021-08-30 RX ADMIN — MAGNESIUM GLUCONATE 500 MG ORAL TABLET 100 MG: 500 TABLET ORAL at 17:12

## 2021-08-30 RX ADMIN — OXYBUTYNIN CHLORIDE 5 MG: 5 TABLET ORAL at 15:57

## 2021-08-30 RX ADMIN — ENOXAPARIN SODIUM 40 MG: 40 INJECTION SUBCUTANEOUS at 15:58

## 2021-08-30 RX ADMIN — PIPERACILLIN SODIUM AND TAZOBACTAM SODIUM 3375 MG: 3; .375 INJECTION, POWDER, LYOPHILIZED, FOR SOLUTION INTRAVENOUS at 20:44

## 2021-08-30 RX ADMIN — MORPHINE SULFATE 4 MG: 4 INJECTION, SOLUTION INTRAMUSCULAR; INTRAVENOUS at 15:02

## 2021-08-30 RX ADMIN — GABAPENTIN 400 MG: 400 CAPSULE ORAL at 20:40

## 2021-08-30 RX ADMIN — KETOROLAC TROMETHAMINE 30 MG: 30 INJECTION, SOLUTION INTRAMUSCULAR; INTRAVENOUS at 15:40

## 2021-08-30 RX ADMIN — MAGNESIUM SULFATE HEPTAHYDRATE 1000 MG: 1 INJECTION, SOLUTION INTRAVENOUS at 13:16

## 2021-08-30 RX ADMIN — GABAPENTIN 400 MG: 400 CAPSULE ORAL at 15:57

## 2021-08-30 RX ADMIN — TIZANIDINE 4 MG: 4 TABLET ORAL at 20:40

## 2021-08-30 RX ADMIN — PANTOPRAZOLE SODIUM 40 MG: 40 INJECTION, POWDER, FOR SOLUTION INTRAVENOUS at 15:58

## 2021-08-30 RX ADMIN — ACETAMINOPHEN 1000 MG: 500 TABLET ORAL at 13:16

## 2021-08-30 RX ADMIN — MORPHINE SULFATE 4 MG: 4 INJECTION INTRAVENOUS at 20:49

## 2021-08-30 RX ADMIN — DULOXETINE HYDROCHLORIDE 120 MG: 60 CAPSULE, DELAYED RELEASE ORAL at 15:57

## 2021-08-30 RX ADMIN — PIPERACILLIN SODIUM AND TAZOBACTAM SODIUM 3375 MG: 3; .375 INJECTION, POWDER, LYOPHILIZED, FOR SOLUTION INTRAVENOUS at 12:53

## 2021-08-30 ASSESSMENT — ENCOUNTER SYMPTOMS
DIARRHEA: 0
VOMITING: 0
WHEEZING: 0
ABDOMINAL PAIN: 1
SHORTNESS OF BREATH: 0
BACK PAIN: 0
COUGH: 0
RHINORRHEA: 0
ABDOMINAL DISTENTION: 0
PHOTOPHOBIA: 0
NAUSEA: 1

## 2021-08-30 ASSESSMENT — PAIN DESCRIPTION - ORIENTATION: ORIENTATION: RIGHT

## 2021-08-30 ASSESSMENT — PAIN SCALES - GENERAL
PAINLEVEL_OUTOF10: 4
PAINLEVEL_OUTOF10: 6
PAINLEVEL_OUTOF10: 4
PAINLEVEL_OUTOF10: 8
PAINLEVEL_OUTOF10: 7
PAINLEVEL_OUTOF10: 6
PAINLEVEL_OUTOF10: 5

## 2021-08-30 ASSESSMENT — PAIN DESCRIPTION - LOCATION: LOCATION: ABDOMEN

## 2021-08-30 ASSESSMENT — PAIN DESCRIPTION - PAIN TYPE: TYPE: ACUTE PAIN

## 2021-08-30 NOTE — ED NOTES
Report given to [facility]. No further questions at this time. Pt to go up in stretcher per RN.        Abhinav Mims RN  08/30/21 [id number]

## 2021-08-30 NOTE — ED NOTES
1411- Perfect serve sent to Dr. Marlene Davalos for admission      Call was returned by Edith Grimaldo NP and spoke to Dr. Jim Valentine  08/30/21 [address] [place] [address]  08/30/21 1427

## 2021-08-30 NOTE — ED NOTES
Report given to [facility]. Pt is moving to room 21 when ready.      [facility]  08/30/21 [id number]

## 2021-08-30 NOTE — ED PROVIDER NOTES
Emergency Department Provider Note  Location: [place] [address] ED  8/30/2021     Patient Identification  Gael Blanca is a 60 y.o. female    Chief Complaint  Abdominal Pain (c/o RLQ pain and fever that started yesterday morning. patient reports normal BM yesterday)          HPI  (History provided by patient)  Patient is a 77-year-old female history of gastric sleeve, who presents with right lower quadrant pain nausea and fevers. Symptoms started yesterday. Patient reports she has a moderate to severe right lower quadrant pain that is exacerbated with some movements and was painful going over bumps on the right ovary. Patient reports \"I knew something was wrong I spike a fever. I never get fevers\". Nausea no vomiting passing stool passing flatus. No chest pain or shortness of breath or back pain no pelvic symptoms. No urinary symptoms. I have reviewed the following nursing documentation:  Allergies: Allergies   Allergen Reactions    Iodides Anaphylaxis     Rash, hives, swelling    Of note on patient's admission 4/12/2021 she stated that allergy to iodides was actually her mother and allergy for shellfish was based on her father's allergies and not something she has herself experienced.  Scopolamine Hives       Past medical history:  has a past medical history of Acute renal failure (ARF) ([facility] [place] [address] (May 4, 2015), Arthritis, Asthma, DDD (degenerative disc disease), cervical, Deep vein thrombosis (DVT) ([facility] [place] [address] (07/2015), Degenerative disc disease, Family history of factor V deficiency, GERD (gastroesophageal reflux disease), Hypertension, Scoliosis, Sleep apnea, and Spinal stenosis. Past surgical history:  has a past surgical history that includes Rotator cuff repair; Carpal tunnel release; Upper gastrointestinal endoscopy (N/A, 12/28/2020); joint replacement (Bilateral); and Sleeve Gastrectomy (N/A, 4/12/2021).     Home medications:   Prior to Admission medications    Medication Sig Start Date End Date Taking? Authorizing Provider   indomethacin (INDOCIN) 50 MG capsule TAKE ONE CAPSULE BY MOUTH THREE TIMES A DAY AS NEEDED FOR PAIN 8/5/21   Mabel Tamez MD   oxybutynin (DITROPAN) 5 MG tablet TAKE ONE TABLET BY MOUTH TWICE A DAY 7/12/21   Mabel Tamez MD   omeprazole (PRILOSEC) 20 MG delayed release capsule TAKE ONE CAPSULE BY MOUTH EVERY MORNING BEFORE BREAKFAST 7/12/21   Mabel Tamez MD   nystatin (MYCOSTATIN) 767408 UNIT/GM powder Apply 3 times daily. 3/18/21   Mabel Tamez MD   topiramate (TOPAMAX) 100 MG tablet Take 100 mg by mouth nightly    Historical Provider, MD   Cholecalciferol 50 MCG (2000 UT) TABS Take 1 tablet by mouth daily Take 1 tablet by mouth daily. 3/8/21   BANDAR Starks CNP   furosemide (LASIX) 20 MG tablet TAKE TWO TABLETS BY MOUTH DAILY AS NEEDED 2/15/21   Mabel Tamez MD   aspirin 81 MG chewable tablet Take 81 mg by mouth daily    Historical Provider, MD   gabapentin (NEURONTIN) 400 MG capsule Take 400 mg by mouth 3 times daily. Historical Provider, MD   magnesium 30 MG tablet Take 30 mg by mouth daily    Historical Provider, MD   tiZANidine (ZANAFLEX) 4 MG tablet Take 4-8 mg by mouth nightly     Historical Provider, MD   DULoxetine (CYMBALTA) 60 MG capsule Take 120 mg by mouth daily     Historical Provider, MD       Social history:  reports that she quit smoking about 22 years ago. Her smoking use included cigarettes. She has a 30.00 pack-year smoking history. She has never used smokeless tobacco. She reports previous alcohol use. She reports that she does not use drugs.     Family history:    Family History   Problem Relation Age of Onset    Other Sister     Stroke Sister     Arthritis Sister     Asthma Sister     Mental Illness Sister     Other Brother     High Blood Pressure Brother     Diabetes Brother     Arthritis Brother     Cancer Sister     Arthritis Sister     Arthritis Mother  High Blood Pressure Father     Arthritis Father          ROS  Review of Systems   Constitutional: Positive for chills and fever. HENT: Negative for congestion and rhinorrhea. Eyes: Negative for photophobia and visual disturbance. Respiratory: Negative for cough, shortness of breath and wheezing. Cardiovascular: Negative for chest pain and palpitations. Gastrointestinal: Positive for abdominal pain and nausea. Negative for abdominal distention, diarrhea and vomiting. Genitourinary: Negative for dysuria and hematuria. Musculoskeletal: Negative for back pain and neck pain. Skin: Negative for rash and wound. Neurological: Negative for syncope and weakness. Psychiatric/Behavioral: Negative for agitation and confusion. Exam  ED Triage Vitals [08/30/21 1043]   BP Temp Temp Source Pulse Resp SpO2 Height Weight   113/79 100.4 °F (38 °C) Oral 93 16 96 % 5' 3\" (1.6 m) 265 lb (120.2 kg)       Physical Exam  Vitals and nursing note reviewed. Constitutional:       General: She is not in acute distress. Appearance: She is well-developed. She is obese. She is not ill-appearing. HENT:      Head: Normocephalic and atraumatic. Nose: Nose normal. No congestion. Eyes:      Extraocular Movements: Extraocular movements intact. Pupils: Pupils are equal, round, and reactive to light. Cardiovascular:      Rate and Rhythm: Normal rate and regular rhythm. Heart sounds: No murmur heard. Pulmonary:      Effort: Pulmonary effort is normal.      Breath sounds: Normal breath sounds. Abdominal:      General: There is no distension. Palpations: Abdomen is soft. Tenderness: There is abdominal tenderness. Comments: Obese abdomen there is right lower quadrant tenderness without guarding there is no rebound. Negative Lerner sign no CVA tenderness   Musculoskeletal:         General: No deformity. Normal range of motion.       Cervical back: Normal range of motion and neck supple. Skin:     General: Skin is warm. Findings: No rash. Neurological:      Mental Status: She is alert and oriented to person, place, and time. Motor: No abnormal muscle tone.       Coordination: Coordination normal.   Psychiatric:         Mood and Affect: Mood normal.         Behavior: Behavior normal.           ED Course    ED Medication Orders (From admission, onward)    Start Ordered     Status Ordering Provider    08/30/21 2100 08/30/21 1443  tiZANidine (ZANAFLEX) tablet 4 mg  NIGHTLY      Acknowledged JOJO BALLARD    08/30/21 2100 08/30/21 1443  topiramate (TOPAMAX) tablet 100 mg  NIGHTLY      Acknowledged JOJO BALLARD    08/30/21 2100 08/30/21 1443  sodium chloride flush 0.9 % injection 5-40 mL  2 times per day      Acknowledged JOJO BALLARD    08/30/21 2100 08/30/21 1443  piperacillin-tazobactam (ZOSYN) 3,375 mg in sodium chloride 0.9 % 100 mL IVPB extended infusion (mini-bag)  EVERY 8 HOURS     Question:  Antimicrobial Indications  Answer:  Intra-Abdominal Infection    Acknowledged Lamar Ponce    08/30/21 1530 08/30/21 1523  magnesium oxide (MAG-OX) tablet 100 mg  DAILY      Acknowledged JOJO BALLARD    08/30/21 1451 08/30/21 1451  potassium chloride (KLOR-CON M) 20 MEQ extended release tablet     Note to Pharmacy: Rod Tomlinson: cabinet override    Ordered     08/30/21 1445 08/30/21 1443  DULoxetine (CYMBALTA) extended release capsule 120 mg  DAILY      Last MAR action: Given - by Quinton Bank on 08/30/21 at [address] [place] [address]    08/30/21 1445 08/30/21 1443  gabapentin (NEURONTIN) capsule 400 mg  3 TIMES DAILY      Last MAR action: Given - by Dwight Bank on 08/30/21 at [address] [place] [address]    08/30/21 1445 08/30/21 1443  pantoprazole (PROTONIX) injection 40 mg  DAILY      Last MAR action: Given - by Quinton Bank on 08/30/21 at [address] [place] [address]    08/30/21 1445 08/30/21 1443  oxybutynin (DITROPAN) tablet 5 mg  2 TIMES DAILY      Last MAR action: Given - by Sadie Hall on 08/30/21 at [address] [place] [address]    08/30/21 1445 08/30/21 1443  enoxaparin (LOVENOX) injection 40 mg  DAILY      Last MAR action: Given - by Sadie Hall on 08/30/21 at [address] [place] [address], [place]    08/30/21 1445 08/30/21 1443  0.9 % sodium chloride infusion  CONTINUOUS      Last MAR action: New Bag - by Sadie Hall on 08/30/21 at [facility]    08/30/21 1443 08/30/21 1443  morphine (PF) injection 2 mg  EVERY 2 HOURS PRN      Last MAR action: See Alternative - by Karl Gitelman on 08/30/21 at [address] [place] [address]    08/30/21 1443 08/30/21 1443  morphine sulfate (PF) injection 4 mg  EVERY 2 HOURS PRN      Last MAR action: Given - by Karl Gitelman on 08/30/21 at [place] [address]    08/30/21 1443 08/30/21 1443  [Held by provider]  ondansetron (ZOFRAN-ODT) disintegrating tablet 4 mg  EVERY 8 HOURS PRN     (Held by provider since Mon 8/30/2021 at 1513 by BANDAR Hutchins CNP. Hold Reason: Other. Hold Comments: QTc = 565 (8/30/21))    Last MAR action: Held by provider - by Tierra Merida on 08/30/21 at [address] [place] [address], [address] [place] [address],  [address]    08/30/21 1443 08/30/21 1443  [Held by provider]  ondansetron (ZOFRAN) injection 4 mg  EVERY 6 HOURS PRN     (Held by provider since Mon 8/30/2021 at 21  by BANDAR Hutchins CNP. Hold Reason: Other. Hold Comments: Qtc = 565 (8/30/21))    Last MAR action: Held by provider - by Tierra Merida on 08/30/21 at [address] [place] [address]    08/30/21 1443 08/30/21 1443  acetaminophen (TYLENOL) tablet 650 mg  EVERY 6 HOURS PRN      Acknowledged NELLIE [place]    08/30/21 1443 08/30/21 1443  acetaminophen (TYLENOL) suppository 650 mg  EVERY 6 HOURS PRN      Acknowledged JOJO BALLARD    08/30/21 1443 08/30/21 1443  potassium chloride (KLOR-CON M) extended release tablet 40 mEq  PRN      Last MAR action: [facility]    08/30/21 1443 08/30/21 1443  potassium bicarb-citric acid (EFFER-K) effervescent tablet 40 mEq  PRN      Last MAR action: See Alternative - by Jacoboe Nishant on 08/30/21 at [address] HATTIE PatelLEY    08/30/21 1443 08/30/21 1443  potassium chloride 10 mEq/100 mL IVPB (Peripheral Line)  PRN      Last MAR action: See Alternative - by Avcarlyne Halo on 08/30/21 at [address] JOJO Patel    08/30/21 1443 08/30/21 1443  sodium chloride flush 0.9 % injection 5-40 mL  PRN      Acknowledged JOJO BALLARD    08/30/21 1443 08/30/21 1443  polyethylene glycol (GLYCOLAX) packet 17 g  DAILY PRN      Acknowledged JOJO BALLARD    08/30/21 1443 08/30/21 1443  0.9 % sodium chloride infusion  PRN      Acknowledged JOJO BALLARD    08/30/21 1443 08/30/21 1443  magnesium sulfate 2000 mg in 50 mL IVPB premix  PRN      Acknowledged JOJO BALLARD    08/30/21 1423 08/30/21 1454  ketorolac (TORADOL) injection 30 mg  EVERY 6 HOURS PRN      Last MAR action: Given - by Michael Dillard on 08/30/21 at [place] [id number], GABRIELLA HARRIS    08/30/21 1300 08/30/21 1246  lactated ringers infusion 1,000 mL  ONCE      Last MAR action: Stopped - by Michael Dillard on 08/30/21 at [address] [facility], DORIS     08/30/21 1300 08/30/21 1246  piperacillin-tazobactam (ZOSYN) 3,375 mg in sodium chloride 0.9 % 100 mL IVPB (mini-bag)  ONCE     Question:  Antimicrobial Indications  Answer:  Intra-Abdominal Infection    Last MAR action: Stopped - by Fernando eVlasquez on 08/30/21 at [address] [facility], DORIS L    08/30/21 1300 08/30/21 1248  magnesium sulfate 1000 mg in dextrose 5% 100 mL IVPB  ONCE      Last MAR action: Stopped - by Fernando Velasquez [facility]30[facility]    08/30/21 1300 08/30/21 1248  acetaminophen (TYLENOL) tablet 1,000 mg  ONCE      Last MAR action: Given - by Fernando Velasquez on 08/30/21 at 1316 DORIS MARISCAL          EKG  Sinus rhythm with sinus arrhythmia versus PACs, rate 95 normal axis QT prolongation 565, no diagnostic ischemic changes noted.       Radiology  CT ABDOMEN PELVIS WO CONTRAST Additional Contrast? Radiologist Recommendation (iodine allergy)    Result Date: 8/30/2021  EXAMINATION: CT OF THE ABDOMEN AND PELVIS WITHOUT CONTRAST 8/30/2021 12:02 pm TECHNIQUE: CT of the abdomen and pelvis was performed without the administration of intravenous contrast. Multiplanar reformatted images are provided for review. Dose modulation, iterative reconstruction, and/or weight based adjustment of the mA/kV was utilized to reduce the radiation dose to as low as reasonably achievable. COMPARISON: None. HISTORY: ORDERING SYSTEM PROVIDED HISTORY: rlq pain, iodine allergy TECHNOLOGIST PROVIDED HISTORY: Reason for exam:->rlq pain, iodine allergy Additional Contrast?->Radiologist Recommendation Decision Support Exception - unselect if not a suspected or confirmed emergency medical condition->Emergency Medical Condition (MA) Reason for Exam: RLQ PAIN SINCE YESTERDAY, GETTING WORSE, FEVERS FINDINGS: Lower Chest:  The lung bases are clear. The base of the heart is normal. Organs: The liver and biliary ducts are normal.  Increased density in the dependent portion of the gallbladder lumen may represent underlying sludge or stones. No inflammation. The pancreas and spleen are unremarkable with calcified granulomata in the spleen. Kidneys and adrenal glands are normal. GI/Bowel: Prior surgical change of sleeve gastrectomy. The duodenum and small bowel are normal.  Abnormal appendix. The appendix is dilated measuring 18 mm with severe mucosal thickening. There is an appendicoliths within the lumen near the base. Severe surrounding inflammation with a mild amount of free fluid in a tiny focus of free air. Pattern indicates perforated appendicitis. The colon is normal. Pelvis: Decompressed bladder. Pelvic structures are obscured due to artifact from hip prostheses. Peritoneum/Retroperitoneum: Umbilical hernia containing peritoneal fat only. Normal aorta. No lymph node enlargement.  Bones/Soft Tissues: No significant skeletal abnormalities. 1. Acute appendicitis with focal perforation. Tiny amount of free air with no focal fluid to suggest abscess. Severe inflammatory changes in the right lower quadrant. 2. Probable cholelithiasis. No evidence of acute cholecystitis.          Labs  Results for orders placed or performed during the hospital encounter of 08/30/21   COVID-19, Rapid    Specimen: Nasopharyngeal Swab; Throat   Result Value Ref Range    SARS-CoV-2, NAAT Not Detected Not Detected   CBC auto differential   Result Value Ref Range    WBC 14.3 (H) 4.0 - 11.0 K/uL    RBC 4.55 4.00 - 5.20 M/uL    Hemoglobin 14.3 12.0 - 16.0 g/dL    Hematocrit 43.2 36.0 - 48.0 %    MCV 95.1 80.0 - 100.0 fL    MCH 31.5 26.0 - 34.0 pg    MCHC 33.2 31.0 - 36.0 g/dL    RDW 16.1 (H) 12.4 - 15.4 %    Platelets 849 861 - 297 K/uL    MPV 8.2 5.0 - 10.5 fL    PLATELET SLIDE REVIEW Adequate     SLIDE REVIEW see below     Path Consult Yes     Neutrophils % 70.0 %    Lymphocytes % 5.0 %    Monocytes % 23.0 %    Eosinophils % 2.0 %    Basophils % 0.0 %    Neutrophils Absolute 10.0 (H) 1.7 - 7.7 K/uL    Lymphocytes Absolute 0.7 (L) 1.0 - 5.1 K/uL    Monocytes Absolute 3.3 (H) 0.0 - 1.3 K/uL    Eosinophils Absolute 0.3 0.0 - 0.6 K/uL    Basophils Absolute 0.0 0.0 - 0.2 K/uL    Toxic Granulation Present (A)     Anisocytosis Occasional (A)    Comprehensive Metabolic Panel w/ Reflex to MG   Result Value Ref Range    Sodium 140 136 - 145 mmol/L    Potassium reflex Magnesium 3.1 (L) 3.5 - 5.1 mmol/L    Chloride 101 99 - 110 mmol/L    CO2 23 21 - 32 mmol/L    Anion Gap 16 3 - 16    Glucose 115 (H) 70 - 99 mg/dL    BUN 14 7 - 20 mg/dL    CREATININE <0.5 (L) 0.6 - 1.1 mg/dL    GFR Non-African American >60 >60    GFR African American >60 >60    Calcium 9.1 8.3 - 10.6 mg/dL    Total Protein 6.9 6.4 - 8.2 g/dL    Albumin 3.7 3.4 - 5.0 g/dL    Albumin/Globulin Ratio 1.2 1.1 - 2.2    Total Bilirubin 0.7 0.0 - 1.0 mg/dL    Alkaline Phosphatase 96 40 - Her pain is adequately controlled. She has been given antimedic and antipyretic. Discussed the case with surgery, Tricia who reports that she has discussed the case with surgeon and secondary perforation and they would like to hold on operative management for now and recommend admission to medicine service. I then discussed the case with medicine who will admit for further management. She is hemodynamically stable at the time of admission. Clinical Impression:  1. Acute appendicitis with perforation and generalized peritonitis, unspecified whether abscess present, unspecified whether gangrene present          Disposition:  Admit to med/surg floor in guarded condition. Blood pressure 129/77, pulse 92, temperature 100.4 °F (38 °C), temperature source Oral, resp. rate 20, height 5' 3\" (1.6 m), weight 265 lb (120.2 kg), last menstrual period 12/24/2010, SpO2 95 %. Patient was given scripts for the following medications. I counseled patient how to take these medications. New Prescriptions    No medications on file       Disposition referral (if applicable):  No follow-up provider specified. Total critical care time is 30 minutes, which excludes separately billable procedures and updating family. Time spent is specifically for management of the presenting complaint and symptoms initially, direct bedside care, reevaluation, review of records, and consultation. There was a high probability of clinically significant life-threatening deterioration in the patient's condition, which required my urgent intervention. This chart was generated in part by using Dragon Dictation system and may contain errors related to that system including errors in grammar, punctuation, and spelling, as well as words and phrases that may be inappropriate. If there are any questions or concerns please feel free to contact the dictating provider for clarification.      Shannon Aguilar MD   Acute Care [facility]       Michael GARZA Sonia Medina MD  08/30/21 3938

## 2021-08-30 NOTE — H&P
Hospital Medicine History & Physical      PCP: Rene Mahoney MD    Date of Admission: 8/30/2021    Date of Service: Pt seen/examined on 8/30/2021    Chief Complaint:    Chief Complaint   Patient presents with    Abdominal Pain     c/o RLQ pain and fever that started yesterday morning. patient reports normal BM yesterday         History Of Present Illness: The patient is a 60 y.o. female with a PMH of DVT, asthma, GERD, FLORIAN, morbid obesity, history of laparoscopic sleeve gastrectomy who presented to [facility] ED with complaint of RLQ abdominal pain that started yesterday. Initially thought symptoms were related to gas pains. She had a BM yesterday which was soft, brown stool. Symptoms did not get any better. Woke up early this morning with sharp right lower quadrant abdominal pain and a fever. Denies any nausea or vomiting. Reports shaking chills. She decided to come to the ER. Work-up in the ED showed: Patient's temperature was 100.4. Blood pressures were stable. Potassium and magnesium were low. She had a leukocytosis of 14.3. Urine was negative. Covid was negative. CT of the abdomen showed acute appendicitis with focal perforation and inflammatory changes in the right lower quadrant. Patient was admitted to the medical surgical unit and general surgery was consulted.     Past Medical History:        Diagnosis Date    Acute renal failure (ARF) ([address] May 4, 2015    d/t IV dye    Arthritis     Asthma     DDD (degenerative disc disease), cervical     Deep vein thrombosis (DVT) ([facility] [address] 07/2015    Degenerative disc disease     Family history of factor V deficiency     brother and sister; pt tested does not have    GERD (gastroesophageal reflux disease)     Hypertension     no longer has after quitting stressful job    Scoliosis     Sleep apnea     uses c-pap    Spinal stenosis        Past Surgical History:        Procedure Laterality Date    CARPAL TUNNEL RELEASE      JOINT REPLACEMENT Bilateral     hips and knees    ROTATOR CUFF REPAIR      3x right, 2x left    SLEEVE GASTRECTOMY N/A 4/12/2021    LAPAROSCOPIC SLEEVE GASTRECTOMY - ETHICON performed by Kelly Rausch MD at [facility] 12/28/2020    EGD BIOPSY performed by Kelly Rausch MD at [facility]       Medications Prior to Admission:    Prior to Admission medications    Medication Sig Start Date End Date Taking? Authorizing Provider   indomethacin (INDOCIN) 50 MG capsule TAKE ONE CAPSULE BY MOUTH THREE TIMES A DAY AS NEEDED FOR PAIN 8/5/21   Jesus Rasmussen MD   oxybutynin (DITROPAN) 5 MG tablet TAKE ONE TABLET BY MOUTH TWICE A DAY 7/12/21   Jesus Rasmussen MD   omeprazole (PRILOSEC) 20 MG delayed release capsule TAKE ONE CAPSULE BY MOUTH EVERY MORNING BEFORE BREAKFAST 7/12/21   Jesus Rasmussen MD   nystatin (MYCOSTATIN) 165052 UNIT/GM powder Apply 3 times daily. 3/18/21   Jesus Rasmussen MD   topiramate (TOPAMAX) 100 MG tablet Take 100 mg by mouth nightly    Historical Provider, MD   Cholecalciferol 50 MCG (2000 UT) TABS Take 1 tablet by mouth daily Take 1 tablet by mouth daily. 3/8/21   [facility], APRN - CNP   furosemide (LASIX) 20 MG tablet TAKE TWO TABLETS BY MOUTH DAILY AS NEEDED 2/15/21   Jesus Rasmussen MD   aspirin 81 MG chewable tablet Take 81 mg by mouth daily    Historical Provider, MD   gabapentin (NEURONTIN) 400 MG capsule Take 400 mg by mouth 3 times daily. Historical Provider, MD   magnesium 30 MG tablet Take 30 mg by mouth daily    Historical Provider, MD   tiZANidine (ZANAFLEX) 4 MG tablet Take 4-8 mg by mouth nightly     Historical Provider, MD   DULoxetine (CYMBALTA) 60 MG capsule Take 120 mg by mouth daily     Historical Provider, MD       Allergies: Iodides and Scopolamine    Social History:  The patient currently lives at home. TOBACCO:   reports that she quit smoking about 22 years ago.  Her smoking use included cigarettes. She has a 30.00 pack-year smoking history. She has never used smokeless tobacco.  ETOH:   reports previous alcohol use. Family History:   Positive as follows:        Problem Relation Age of Onset    Other Sister     Stroke Sister     Arthritis Sister     Asthma Sister     Mental Illness Sister     Other Brother     High Blood Pressure Brother     Diabetes Brother     Arthritis Brother     Cancer Sister     Arthritis Sister     Arthritis Mother     High Blood Pressure Father     Arthritis Father        REVIEW OF SYSTEMS:     Constitutional: Negative for fever, + chills   HENT: Negative for sore throat   Eyes: Negative for redness   Respiratory: Negative  for dyspnea, cough   Cardiovascular: Negative for chest pain   Gastrointestinal: Negative for vomiting, diarrhea, + RLQ Abdominal pain   Genitourinary: Negative for hematuria   Musculoskeletal: Negative for arthralgias   Skin: Negative for rash   Neurological: Negative for syncope   Hematological: Negative for adenopathy   Psychiatric/Behavorial: Negative for anxiety    PHYSICAL EXAM:    /69   Pulse 90   Temp 100.4 °F (38 °C) (Oral)   Resp 12   Ht 5' 3\" (1.6 m)   Wt 265 lb (120.2 kg)   LMP 12/24/2010   SpO2 (!) 89%   BMI 46.94 kg/m²   Gen: No distress. Alert. Middle-aged,  female, morbidly obese, resting comfortably in ER bed  Eyes: No sclera icterus. No conjunctival injection. Neck: Trachea midline. Resp: No accessory muscle use. No crackles. No wheezes. No rhonchi. Limited to anterior and lateral auscultation secondary to pain, on RA  CV: Regular rate. Regular rhythm. No murmur. No rub. No edema. Peripheral Pulses: +2 palpable, equal bilaterally   GI: Soft, obese, significant RLQ tenderness. . Non-distended. Normal bowel sounds. Laparoscopic scars noted  Skin: Warm and dry. No rash on exposed extremities. Neuro: Awake. Grossly nonfocal    Psych: Oriented x 4.  No anxiety or agitation. CBC:   Recent Labs     08/30/21  1040   WBC 14.3*   HGB 14.3   HCT 43.2   MCV 95.1        BMP:   Recent Labs     08/30/21  1040      K 3.1*      CO2 23   BUN 14   CREATININE <0.5*     LIVER PROFILE:   Recent Labs     08/30/21  1040   AST 15   ALT 17   LIPASE 9.0*   BILITOT 0.7   ALKPHOS 96     UA:  Recent Labs     08/30/21  1117   COLORU Yellow   PHUR 6.0   CLARITYU Clear   SPECGRAV <=1.005   LEUKOCYTESUR Negative   UROBILINOGEN 0.2   BILIRUBINUR SMALL*   BLOODU Negative   GLUCOSEU Negative      U/A:    Lab Results   Component Value Date    COLORU Yellow 08/30/2021    WBCUA 3-5 05/19/2019    RBCUA 0-2 05/19/2019    MUCUS 4+ 05/19/2019    BACTERIA 1+ 05/19/2019    CLARITYU Clear 08/30/2021    SPECGRAV <=1.005 08/30/2021    LEUKOCYTESUR Negative 08/30/2021    BLOODU Negative 08/30/2021    GLUCOSEU Negative 08/30/2021       ABG    Lab Results   Component Value Date    GEG6OSG 19.7 05/04/2015    BEART -8.2 05/04/2015    D7DDAIEX 85.8 05/04/2015    PHART 7.204 05/04/2015    HTA0LDD 51.1 05/04/2015    PO2ART 61.1 05/04/2015    YYY6LDB 21.3 05/04/2015       CULTURES    SARS-COV-2 - Rapid: Not detected     EKG:  I have reviewed the EKG with the following interpretation:     Sinus rhythm with Premature supraventricular complexes rate of 95  Inferior infarct , age undetermined  Cannot rule out Anterior infarct , age undetermined  Abnormal ECG  No previous ECGs available    RADIOLOGY    CT ABDOMEN PELVIS WO CONTRAST Additional Contrast? Radiologist Recommendation (iodine allergy)   Final Result   1. Acute appendicitis with focal perforation. Tiny amount of free air with   no focal fluid to suggest abscess. Severe inflammatory changes in the right   lower quadrant. 2. Probable cholelithiasis. No evidence of acute cholecystitis.            ASSESSMENT/PLAN:    Acute Appendicitis with Focal Perforation  - CT showed appendicitis with focal perforation, tiny amount of free air with no focal fluid, severe inflammatory changed in RLQ  - Admitted to Med Surg  - clear liquid diet for now, NPO after midnight  - start IVF, abx: Zosyn D#1, PRN - Morphine and Toradol  - Gen Sx consult    Hypokalemia  Hypomagnesemia  - replete and repeat BMP & Mg tomorrow    Prolonged QTc  - 565  - replete electrolytes, avoid QT prolonging agents  - repeat EKG, tele    Leukocytosis  - 2/2 acute appendicitis  - mgmt as above  - repeat CBC tomorrow    Hx of DVT  - in 2015  - hold ASA, on prophylactic lovenox here    GERD  - cont Protonix    Chronic Pain Syndrome  - cont Cymbalta and Neurontin    FLORIAN  - Continue home CPAP    Morbid Obesity  S/p Laparoscopic Sleeve Gastrectomy  - Body mass index is 46.94 kg/m². - Complicating assessment and treatment. Placing patient at risk for multiple co-morbidities as well as early death and contributing to the patient's presentation.   - Counseled on weight loss.      DVT Prophylaxis: Lovenox  Diet: Diet NPO  Code Status: Full Code    HÉCTOR Reyes PA-C 3:43 PM 8/30/2021

## 2021-08-30 NOTE — ED NOTES
1312 Phoned answering service  [id number] EUNICE Clarke returned call      Debo Boudreaux RN  08/30/21 0886

## 2021-08-30 NOTE — CONSULTS
Surgery Consult Note     Tricia Reyes, APRN - CNP, CNP  Pt Name: Karin Candelario  MRN: [id number]  YOB: 1965  Date of evaluation: 8/30/2021  Primary Care Physician: Ronal Handy MD  Patient evaluated at the request of  Dr. Martha Garcia  Reason for evaluation: Acute appe with perf   IMPRESSIONS:   1. CT abd and pelvis: acute appe with focal perforation, tiny amount of free air with no focal fluid collection to suggest abscess, severe inflammatory changes in the RLQ. Appendix measuring 1.8 cm with severe mucosal thickening and appendicoliths at the base. Cecum appears inflamed upon review. 2. ABD: morbidly obese, yeast in groin folds, no N/V, + flatus, + flatus, unsure of last BM, no distention  3. GERD: prilosec  4. DVT 2015: on baby aspirin only  5. Chronic back pain  6. Morbidly obese s/p lap gastric sleeve 4/21  7. does not have any pertinent problems on file. PLANS:   1. Labs in the am   2. Zosyn  3. IVF  4. Morphine/Toradol PRN  5. DVT proph Lovenox   6. Pt with perf appendicitis. Admit for IV antibiotics, IV pain control and serial abdominal examinations. Goals are to cool off the acute infection in hopes of greatly reducing the risk of an open procedure which is more risky. The patient is aware if she fails to improve or worsens then surgical intervention may be warranted. She agrees with the plan of care. SUBJECTIVE:   History of Chief Complaint:    Karin Candelario is a 60 y.o. female who presented to the hospital with severe, persistent RLQ abdominal pain which started yesterday morning. The patient states Saturday night she ate some lettuce. She felt fine. She went to bed and woke yesterday morning with right sided abdominal pain. She thought it was an ovarian cyst or gas. She tolerated the pain. She denies nausea or vomiting. She states she started getting chills and had a fever of 100.1.  She states the pain became more focal in the RLQ and she became concerned it was appendicitis. She denies having pain like this before. In the ER, the patient was noted to have a leukocytosis. A CT of the abdomen and pelvis was performed and demonstrated findings of acute perforated appendicitis. We were consulted to treat this patient's acute appendicitis. Past Medical History   has a past medical history of Acute renal failure (ARF) ([facility] [address] Arthritis, Asthma, DDD (degenerative disc disease), cervical, Deep vein thrombosis (DVT) ([facility] [address] Degenerative disc disease, Family history of factor V deficiency, GERD (gastroesophageal reflux disease), Hypertension, Scoliosis, Sleep apnea, and Spinal stenosis. Past Surgical History   has a past surgical history that includes Rotator cuff repair; Carpal tunnel release; Upper gastrointestinal endoscopy (N/A, 12/28/2020); joint replacement (Bilateral); and Sleeve Gastrectomy (N/A, 4/12/2021). Medications  Prior to Admission medications    Medication Sig Start Date End Date Taking? Authorizing Provider   indomethacin (INDOCIN) 50 MG capsule TAKE ONE CAPSULE BY MOUTH THREE TIMES A DAY AS NEEDED FOR PAIN 8/5/21   Brady Valles MD   oxybutynin (DITROPAN) 5 MG tablet TAKE ONE TABLET BY MOUTH TWICE A DAY 7/12/21   Brady Valles MD   omeprazole (PRILOSEC) 20 MG delayed release capsule TAKE ONE CAPSULE BY MOUTH EVERY MORNING BEFORE BREAKFAST 7/12/21   Brady Valles MD   nystatin (MYCOSTATIN) 566279 UNIT/GM powder Apply 3 times daily. 3/18/21   Brady Valles MD   topiramate (TOPAMAX) 100 MG tablet Take 100 mg by mouth nightly    Historical Provider, MD   Cholecalciferol 50 MCG (2000 UT) TABS Take 1 tablet by mouth daily Take 1 tablet by mouth daily.  3/8/21   BANDAR Vallecillo - CNP   furosemide (LASIX) 20 MG tablet TAKE TWO TABLETS BY MOUTH DAILY AS NEEDED 2/15/21   Brady Valles MD   aspirin 81 MG chewable tablet Take 81 mg by mouth daily    Historical Provider, MD   gabapentin (NEURONTIN) 400 MG capsule Take 400 mg by mouth 3 times daily. Historical Provider, MD   magnesium 30 MG tablet Take 30 mg by mouth daily    Historical Provider, MD   tiZANidine (ZANAFLEX) 4 MG tablet Take 4-8 mg by mouth nightly     Historical Provider, MD   DULoxetine (CYMBALTA) 60 MG capsule Take 120 mg by mouth daily     Historical Provider, MD    Scheduled Meds:   potassium chloride  40 mEq Oral Once     Continuous Infusions:   lactated ringers 1,000 mL (08/30/21 1343)     PRN Meds:.morphine    Allergies  is allergic to iodides and scopolamine. Family History  family history includes Arthritis in her brother, father, mother, sister, and sister; Asthma in her sister; Cancer in her sister; Diabetes in her brother; High Blood Pressure in her brother and father; Mental Illness in her sister; Other in her brother and sister; Stroke in her sister. Social History   reports that she quit smoking about 22 years ago. Her smoking use included cigarettes. She has a 30.00 pack-year smoking history. She has never used smokeless tobacco. She reports previous alcohol use. She reports that she does not use drugs. Review of Systems:  General positive for  fevers and chills  HEENT Denies any diplopia, tinnitus or vertigo  Resp Denies any shortness of breath, cough or wheezing  Cardiac Denies any chest pain, palpitations, claudication or edema  GI RLQ abdominal pain. Denies any melena, hematochezia, hematemesis or pyrosis   Denies any frequency, urgency, hesitancy or incontinence  Heme Denies bruising or bleeding easily  Endocrine Denies any history of diabetes or thyroid disease  Neuro Denies any focal motor or sensory deficits    OBJECTIVE:   VITALS:  height is 5' 3\" (1.6 m) and weight is 265 lb (120.2 kg). Her oral temperature is 100.4 °F (38 °C). Her blood pressure is 103/69 and her pulse is 90. Her respiration is 12 and oxygen saturation is 89% (abnormal).    CONSTITUTIONAL: Alert and oriented times 3, no acute distress and cooperative to examination with proper mood and affect. SKIN: Skin color, texture, turgor normal. No rashes or lesions. , yeast in groin folds  HEENT: Head is normocephalic, atraumatic. EOMI, PERRLA. NECK: supple, symmetrical, trachea midline. CHEST/LUNGS: chest symmetric with normal A/P diameter, normal respiratory rate and rhythm, lungs clear to auscultation without wheezes, rales or rhonchi. No accessory muscle use. CARDIOVASCULAR: Tachy rate, regular rhythm Normal S1 and S2.  ABDOMEN: obese Laparoscopic scar(s) present. Abnormal bowel sounds: absent. As above. umbilical hernia, without bowel involvement. Tenderness: RLQ. RECTAL: deferred, not clinically indicated  NEUROLOGIC: There are no focalizing motor or sensory deficits. CN II-XII are grossly intact. Lorean Snare EXTREMITIES: no cyanosis, no clubbing and no edema.     LABS:     Recent Labs     08/30/21  1040 08/30/21  1117   WBC 14.3*  --    HGB 14.3  --    HCT 43.2  --      --      --    K 3.1*  --      --    CO2 23  --    BUN 14  --    CREATININE <0.5*  --    MG 1.50*  --    CALCIUM 9.1  --    AST 15  --    ALT 17  --    BILITOT 0.7  --    NITRU  --  Negative   COLORU  --  Yellow     Recent Labs     08/30/21  1040   ALKPHOS 96   ALT 17   AST 15   BILITOT 0.7   LABALBU 3.7   LIPASE 9.0*     CBC with Differential:    Lab Results   Component Value Date    WBC 14.3 08/30/2021    RBC 4.55 08/30/2021    HGB 14.3 08/30/2021    HCT 43.2 08/30/2021     08/30/2021    MCV 95.1 08/30/2021    MCH 31.5 08/30/2021    MCHC 33.2 08/30/2021    RDW 16.1 08/30/2021    LYMPHOPCT 5.0 08/30/2021    MONOPCT 23.0 08/30/2021    BASOPCT 0.0 08/30/2021    MONOSABS 3.3 08/30/2021    LYMPHSABS 0.7 08/30/2021    EOSABS 0.3 08/30/2021    BASOSABS 0.0 08/30/2021     CMP:    Lab Results   Component Value Date     08/30/2021    K 3.1 08/30/2021     08/30/2021    CO2 23 08/30/2021    BUN 14 08/30/2021    CREATININE <0.5 08/30/2021    GFRAA >60 08/30/2021 AGRATIO 1.2 08/30/2021    LABGLOM >60 08/30/2021    GLUCOSE 115 08/30/2021    PROT 6.9 08/30/2021    LABALBU 3.7 08/30/2021    CALCIUM 9.1 08/30/2021    BILITOT 0.7 08/30/2021    ALKPHOS 96 08/30/2021    AST 15 08/30/2021    ALT 17 08/30/2021       RADIOLOGY:   I have personally reviewed the following films:    CT scan abd/pelvis: Appendicitis, See radiology report    Thank you for the interesting evaluation. Further recommendations to follow. Electronically signed by BANDAR Mccrary CNP on 8/30/2021 at 2:24 PM    Patient seen and examined. I agree with the assessment and plan from ANNI Odom. Patient with moderate RLQ tenderness. She developed pain in RLQ for past 2 days with associated N/V. Labs reviewed. CT with marked RLQ inflammation and dilated appendix and incarcerated ventral hernia. Recommend observation with IV antibiotics, IVF and serial exams. Would like to cool off acute process and transition to PO antibiotics with consideration of interval appendectomy. If labs, vitals or exam worsen then will need to consider surgery this admission. All questions answered. Greater than 50% visit spent counseling about diagnosis, treatment plan and expected course.         Rashad Cates MD

## 2021-08-30 NOTE — TELEPHONE ENCOUNTER
Patient cancelled appointment on 9/1/21 with Dr. Nikia Grove for Asthma f/u. Reason: pt LM on VM to cancel appt. Patient did not reschedule appointment. Appointment rescheduled for Left message asking patient to return call to office.      Last OV 2/9/21    Assessment:  · Asthma, moderate intermittent  · Severe FLORIAN        · Chronic pain syndrome  · Remote h/o DVT, provoked  · Heterozygous mutation in prothrombin gene  · H/O tobacco abuse, 20 pack year, quit 2004     Plan:   · Advair 250/50 BID seasonally, fall generally   · Albuterol on an as-needed basis  · ASA 81 mg daily per WARFASA trial, h/o DVT  · Change APAP to 7-10, download in 30 days  · Okay to proceed with bariatric surgery, Dr. Leslie Maher   · See me in Sept/Oct for asthma, FLORIAN

## 2021-08-31 PROBLEM — G89.29 OTHER CHRONIC PAIN: Status: ACTIVE | Noted: 2019-05-29

## 2021-08-31 LAB
ANION GAP SERPL CALCULATED.3IONS-SCNC: 16 MMOL/L (ref 3–16)
BUN BLDV-MCNC: 22 MG/DL (ref 7–20)
CALCIUM SERPL-MCNC: 8.1 MG/DL (ref 8.3–10.6)
CHLORIDE BLD-SCNC: 103 MMOL/L (ref 99–110)
CO2: 16 MMOL/L (ref 21–32)
CREAT SERPL-MCNC: 1.1 MG/DL (ref 0.6–1.1)
GFR AFRICAN AMERICAN: >60
GFR NON-AFRICAN AMERICAN: 51
GLUCOSE BLD-MCNC: 102 MG/DL (ref 70–99)
HCT VFR BLD CALC: 45.7 % (ref 36–48)
HEMATOLOGY PATH CONSULT: NORMAL
HEMOGLOBIN: 14.8 G/DL (ref 12–16)
MAGNESIUM: 1.5 MG/DL (ref 1.8–2.4)
MCH RBC QN AUTO: 32.5 PG (ref 26–34)
MCHC RBC AUTO-ENTMCNC: 32.4 G/DL (ref 31–36)
MCV RBC AUTO: 100.3 FL (ref 80–100)
PDW BLD-RTO: 16.8 % (ref 12.4–15.4)
PLATELET # BLD: 113 K/UL (ref 135–450)
PMV BLD AUTO: 8.3 FL (ref 5–10.5)
POTASSIUM REFLEX MAGNESIUM: 3.5 MMOL/L (ref 3.5–5.1)
RBC # BLD: 4.56 M/UL (ref 4–5.2)
SODIUM BLD-SCNC: 135 MMOL/L (ref 136–145)
WBC # BLD: 10 K/UL (ref 4–11)

## 2021-08-31 PROCEDURE — 6370000000 HC RX 637 (ALT 250 FOR IP): Performed by: NURSE PRACTITIONER

## 2021-08-31 PROCEDURE — 99232 SBSQ HOSP IP/OBS MODERATE 35: CPT | Performed by: INTERNAL MEDICINE

## 2021-08-31 PROCEDURE — 1200000000 HC SEMI PRIVATE

## 2021-08-31 PROCEDURE — 85027 COMPLETE CBC AUTOMATED: CPT

## 2021-08-31 PROCEDURE — 36415 COLL VENOUS BLD VENIPUNCTURE: CPT

## 2021-08-31 PROCEDURE — 99232 SBSQ HOSP IP/OBS MODERATE 35: CPT | Performed by: SURGERY

## 2021-08-31 PROCEDURE — 80048 BASIC METABOLIC PNL TOTAL CA: CPT

## 2021-08-31 PROCEDURE — 6360000002 HC RX W HCPCS: Performed by: NURSE PRACTITIONER

## 2021-08-31 PROCEDURE — C9113 INJ PANTOPRAZOLE SODIUM, VIA: HCPCS | Performed by: NURSE PRACTITIONER

## 2021-08-31 PROCEDURE — 83735 ASSAY OF MAGNESIUM: CPT

## 2021-08-31 PROCEDURE — 2580000003 HC RX 258: Performed by: NURSE PRACTITIONER

## 2021-08-31 RX ADMIN — OXYBUTYNIN CHLORIDE 5 MG: 5 TABLET ORAL at 09:48

## 2021-08-31 RX ADMIN — MORPHINE SULFATE 4 MG: 4 INJECTION INTRAVENOUS at 17:30

## 2021-08-31 RX ADMIN — SODIUM CHLORIDE: 9 INJECTION, SOLUTION INTRAVENOUS at 16:41

## 2021-08-31 RX ADMIN — MORPHINE SULFATE 4 MG: 4 INJECTION INTRAVENOUS at 08:52

## 2021-08-31 RX ADMIN — TOPIRAMATE 100 MG: 100 TABLET, FILM COATED ORAL at 20:19

## 2021-08-31 RX ADMIN — KETOROLAC TROMETHAMINE 30 MG: 30 INJECTION, SOLUTION INTRAMUSCULAR; INTRAVENOUS at 16:41

## 2021-08-31 RX ADMIN — GABAPENTIN 400 MG: 400 CAPSULE ORAL at 13:08

## 2021-08-31 RX ADMIN — TIZANIDINE 4 MG: 4 TABLET ORAL at 20:19

## 2021-08-31 RX ADMIN — SODIUM CHLORIDE, PRESERVATIVE FREE 10 ML: 5 INJECTION INTRAVENOUS at 20:19

## 2021-08-31 RX ADMIN — SODIUM CHLORIDE 25 ML: 9 INJECTION, SOLUTION INTRAVENOUS at 20:16

## 2021-08-31 RX ADMIN — SODIUM CHLORIDE, PRESERVATIVE FREE 10 ML: 5 INJECTION INTRAVENOUS at 09:48

## 2021-08-31 RX ADMIN — SODIUM CHLORIDE: 9 INJECTION, SOLUTION INTRAVENOUS at 00:45

## 2021-08-31 RX ADMIN — GABAPENTIN 400 MG: 400 CAPSULE ORAL at 20:19

## 2021-08-31 RX ADMIN — PANTOPRAZOLE SODIUM 40 MG: 40 INJECTION, POWDER, FOR SOLUTION INTRAVENOUS at 09:48

## 2021-08-31 RX ADMIN — PIPERACILLIN SODIUM AND TAZOBACTAM SODIUM 3375 MG: 3; .375 INJECTION, POWDER, LYOPHILIZED, FOR SOLUTION INTRAVENOUS at 05:13

## 2021-08-31 RX ADMIN — PIPERACILLIN SODIUM AND TAZOBACTAM SODIUM 3375 MG: 3; .375 INJECTION, POWDER, LYOPHILIZED, FOR SOLUTION INTRAVENOUS at 20:18

## 2021-08-31 RX ADMIN — SODIUM CHLORIDE: 9 INJECTION, SOLUTION INTRAVENOUS at 08:42

## 2021-08-31 RX ADMIN — MORPHINE SULFATE 4 MG: 4 INJECTION INTRAVENOUS at 05:07

## 2021-08-31 RX ADMIN — MAGNESIUM GLUCONATE 500 MG ORAL TABLET 100 MG: 500 TABLET ORAL at 09:48

## 2021-08-31 RX ADMIN — PIPERACILLIN SODIUM AND TAZOBACTAM SODIUM 3375 MG: 3; .375 INJECTION, POWDER, LYOPHILIZED, FOR SOLUTION INTRAVENOUS at 13:08

## 2021-08-31 RX ADMIN — MORPHINE SULFATE 4 MG: 4 INJECTION INTRAVENOUS at 00:48

## 2021-08-31 RX ADMIN — OXYBUTYNIN CHLORIDE 5 MG: 5 TABLET ORAL at 20:19

## 2021-08-31 RX ADMIN — GABAPENTIN 400 MG: 400 CAPSULE ORAL at 09:48

## 2021-08-31 RX ADMIN — ENOXAPARIN SODIUM 40 MG: 40 INJECTION SUBCUTANEOUS at 09:49

## 2021-08-31 RX ADMIN — KETOROLAC TROMETHAMINE 30 MG: 30 INJECTION, SOLUTION INTRAMUSCULAR; INTRAVENOUS at 09:49

## 2021-08-31 RX ADMIN — MAGNESIUM SULFATE HEPTAHYDRATE 2000 MG: 40 INJECTION, SOLUTION INTRAVENOUS at 09:00

## 2021-08-31 RX ADMIN — DULOXETINE HYDROCHLORIDE 120 MG: 60 CAPSULE, DELAYED RELEASE ORAL at 09:48

## 2021-08-31 ASSESSMENT — PAIN SCALES - GENERAL
PAINLEVEL_OUTOF10: 5
PAINLEVEL_OUTOF10: 6
PAINLEVEL_OUTOF10: 4
PAINLEVEL_OUTOF10: 7
PAINLEVEL_OUTOF10: 7
PAINLEVEL_OUTOF10: 3
PAINLEVEL_OUTOF10: 7

## 2021-08-31 ASSESSMENT — PAIN - FUNCTIONAL ASSESSMENT
PAIN_FUNCTIONAL_ASSESSMENT: ACTIVITIES ARE NOT PREVENTED
PAIN_FUNCTIONAL_ASSESSMENT: ACTIVITIES ARE NOT PREVENTED

## 2021-08-31 ASSESSMENT — PAIN DESCRIPTION - LOCATION
LOCATION: ABDOMEN

## 2021-08-31 ASSESSMENT — PAIN DESCRIPTION - PROGRESSION
CLINICAL_PROGRESSION: GRADUALLY IMPROVING
CLINICAL_PROGRESSION: NOT CHANGED

## 2021-08-31 ASSESSMENT — PAIN DESCRIPTION - DESCRIPTORS
DESCRIPTORS: DISCOMFORT;SHARP
DESCRIPTORS: SHARP
DESCRIPTORS: DISCOMFORT

## 2021-08-31 ASSESSMENT — PAIN DESCRIPTION - PAIN TYPE
TYPE: ACUTE PAIN

## 2021-08-31 ASSESSMENT — PAIN DESCRIPTION - FREQUENCY
FREQUENCY: CONTINUOUS
FREQUENCY: CONTINUOUS

## 2021-08-31 ASSESSMENT — PAIN DESCRIPTION - ORIENTATION
ORIENTATION: RIGHT;LOWER
ORIENTATION: RIGHT

## 2021-08-31 ASSESSMENT — PAIN DESCRIPTION - ONSET
ONSET: ON-GOING
ONSET: ON-GOING

## 2021-08-31 NOTE — PLAN OF CARE
Problem: Pain:  Goal: Pain level will decrease  Description: Pain level will decrease  Outcome: Ongoing  Goal: Control of acute pain  Description: Control of acute pain  Outcome: Ongoing     Problem: Skin Integrity:  Goal: Will show no infection signs and symptoms  Description: Will show no infection signs and symptoms  Outcome: Ongoing

## 2021-08-31 NOTE — ACP (ADVANCE CARE PLANNING)
Advance Care Planning   Healthcare Decision Maker:    Primary Decision Maker: Mini Araujo Domestic Partner - [phone]    Click here to complete Healthcare Decision Makers including selection of the Healthcare Decision Maker Relationship (ie \"Primary\").

## 2021-08-31 NOTE — PROGRESS NOTES
Bedside report given to [facility]  pt in stable condition no needs at this time.  Call light within reach

## 2021-08-31 NOTE — FLOWSHEET NOTE
08/30/21 2030   Vital Signs   Temp 100.2 °F (37.9 °C)   Temp Source Oral   Pulse 118   Heart Rate Source Monitor   Resp 18   /68   BP Location Right upper arm   Patient Position Semi fowlers   Level of Consciousness Alert (0)   MEWS Score 3   Oxygen Therapy   SpO2 95 %   O2 Device None (Room air)   Pt A/O x 4 assessment completed. Meds given per MAR. Pt refused Topamax pt does not take anymore. PRN Morphine given pain 7/10. Ice pack placed behind neck and fan at bedside. Pt aware of NPO status at midnight. Pt denies any other needs.  Call light within reach

## 2021-08-31 NOTE — PROGRESS NOTES
AM assessment complete. Replaced mag per protocol. Gave PRN pain med for pain level 7/10 located RLQ. See mar/pain flowsheet. Spoke with [employer] r/t NPO status. Bed locked/lowest position. Call light within reach.

## 2021-08-31 NOTE — CARE COORDINATION
Case Management Assessment  Initial Evaluation      Patient Name: Jannie Nesbitt  YOB: 1965  Diagnosis: Appendicitis [K37]  Acute appendicitis with perforation and generalized peritonitis, unspecified whether abscess present, unspecified whether gangrene present [K35.20]  Date / Time: 8/30/2021 10:47 AM    Admission status/Date: 8/30/21 inpt  Chart Reviewed: Yes      Patient Interviewed: Yes   Family Interviewed:  No      Hospitalization in the last 30 days:  No      Health Care Decision Maker :   Primary Decision Maker: Ed Grande - Domestic Partner - [phone]    (CM - must 1st enter selection under Navigator - emergency contact- [phone] Relationship and pick relationship)   Who do you trust or have selected to make healthcare decisions for you      Met with:  patient  Interview conducted  (bedside/phone):    Current PCP:  Mahesh Langley required for SNF : Y         3 night stay required -  [employer]  Support Systems/Care Needs: Spouse/Significant Other, Family Members, Friends/Neighbors  Transportation: self    Meal Preparation: self    Housing  Living Arrangements:  Lives 1 story home  Steps: 3  Intent for return to present living arrangements: Yes  Identified Issues: [address] : No Agency:(Services)  Type of Home Care Services: None  Passport/Waiver : No  :                      Phone Number:    Passport/Waiver Services: N/A          Durable Medical Equiptment   DME Provider:   Equipment:   Walker___Cane___RTS___ BSC___Shower Chair___Hospital Bed___W/C____Other________  02 at ____Liter(s)---wears(frequency)_______ HHN _x__ CPAP_x__ BiPap___   N/A____      Home O2 Use :  No    If No for home O2---if presently on O2 during hospitalization:  No  if yes CM to follow for potential DC O2 need  Informed of need for care provider to bring portable home O2 tank on day of discharge for

## 2021-08-31 NOTE — PROGRESS NOTES
Admit: 2021    Name:  Vel Yeung  Room:  0225/0225-02  MRN:    [id number]     Daily Progress Note for 2021   Acute appendicitis    Interval History:   abd pain better  Fevers resolving     Scheduled Meds:   DULoxetine  120 mg Oral Daily    gabapentin  400 mg Oral TID    pantoprazole  40 mg IntraVENous Daily    tiZANidine  4 mg Oral Nightly    oxybutynin  5 mg Oral BID    topiramate  100 mg Oral Nightly    sodium chloride flush  5-40 mL IntraVENous 2 times per day    enoxaparin  40 mg SubCUTAneous Daily    piperacillin-tazobactam  3,375 mg IntraVENous Q8H    magnesium oxide  100 mg Oral Daily       Continuous Infusions:   sodium chloride      sodium chloride 125 mL/hr at 21 0842       PRN Meds:  morphine **OR** morphine, sodium chloride flush, sodium chloride, [Held by provider] ondansetron **OR** [Held by provider] ondansetron, polyethylene glycol, acetaminophen **OR** acetaminophen, potassium chloride **OR** potassium alternative oral replacement **OR** potassium chloride, magnesium sulfate, ketorolac                  Objective:     Temp  Av °F (37.8 °C)  Min: 98.1 °F (36.7 °C)  Max: 101.3 °F (38.5 °C)  Pulse  Av.3  Min: 90  Max: 120  BP  Min: 84/56  Max: 133/89  SpO2  Av.3 %  Min: 89 %  Max: 95 %  No data found. Intake/Output Summary (Last 24 hours) at 2021 1317  Last data filed at 2021 1100  Gross per 24 hour   Intake 4120.06 ml   Output 600 ml   Net 3520.06 ml       Physical Exam:  General:  Awake, alert and oriented. Appears to be not in any distress  Mucous Membranes:  Pink , anicteric  Neck: No JVD, no carotid bruit, no thyromegaly  Chest:  Clear to auscultation bilaterally, no added sounds  Cardiovascular:  RRR S1S2 heard, no murmurs or gallops  Abdomen:  Soft, undistended, right LQ tenderness  no organomegaly, BS present  Extremities: No edema or cyanosis.  Distal pulses well felt  Neurological : no focal deficits    Lab Data:  CBC:   Recent Labs 08/30/21  1040 08/31/21  0547   WBC 14.3* 10.0   RBC 4.55 4.56   HGB 14.3 14.8   HCT 43.2 45.7   MCV 95.1 100.3*   RDW 16.1* 16.8*    113*     BMP:   Recent Labs     08/30/21  1040 08/31/21  0547    135*   K 3.1* 3.5    103   CO2 23 16*   BUN 14 22*   CREATININE <0.5* 1.1     BNP: No results for input(s): BNP in the last 72 hours. PT/INR: No results for input(s): PROTIME, INR in the last 72 hours. APTT:No results for input(s): APTT in the last 72 hours. CARDIAC ENZYMES: No results for input(s): CKMB, CKMBINDEX, TROPONINI in the last 72 hours. Invalid input(s): CKTOTAL;3  FASTING LIPID PANEL:  Lab Results   Component Value Date    CHOL 180 12/08/2020    HDL 66 12/08/2020    TRIG 49 12/08/2020     LIVER PROFILE:   Recent Labs     08/30/21  1040   AST 15   ALT 17   BILITOT 0.7   ALKPHOS 96         CT ABDOMEN PELVIS WO CONTRAST Additional Contrast? Radiologist Recommendation (iodine allergy)   Final Result   1. Acute appendicitis with focal perforation. Tiny amount of free air with   no focal fluid to suggest abscess. Severe inflammatory changes in the right   lower quadrant. 2. Probable cholelithiasis. No evidence of acute cholecystitis.                Assessment & Plan:     Patient Active Problem List    Diagnosis Date Noted    Appendicitis 08/30/2021    Leukocytosis     Hypomagnesemia     Hypokalemia     S/P laparoscopic sleeve gastrectomy 04/12/2021    Obstructive sleep apnea 01/21/2021    Snoring 10/27/2020    Incarcerated ventral hernia 10/27/2020    Urinary incontinence, urge 08/12/2019    History of DVT (deep vein thrombosis) 05/29/2019    Chronic pain syndrome 05/29/2019    Morbid obesity with BMI of 50.0-59.9, adult ([facility] [address] 05/29/2019    Family history of factor V Leiden mutation 43/85/1774    Umbilical abnormality     Arthritis     Chronic GERD      Acute Appendicitis with Focal Perforation  - CT showed appendicitis with focal perforation, tiny amount of free air with no focal fluid, severe inflammatory changed in RLQ  - Admitted to Med Surg  - clear liquid diet for now, NPO after midnight  - start IVF, abx: Zosyn D#2, PRN - Morphine and Toradol  - Gen Sx consult  Fevers and leukocytosis resolving     Hypokalemia  Hypomagnesemia  repleted      Prolonged QTc  - 565  - replete electrolytes, avoid QT prolonging agents  - repeat EKG, tele     Leukocytosis  - 2/2 acute appendicitis  - mgmt as above    Low platelets   will follow closely      Hx of DVT  - in 2015  - hold ASA, on prophylactic lovenox here     GERD  - cont Protonix     Chronic Pain Syndrome  - cont Cymbalta and Neurontin     FLORIAN  - Continue home CPAP     Morbid Obesity  S/p Laparoscopic Sleeve Gastrectomy  - Body mass index is 46.94 kg/m². - Complicating assessment and treatment.  Placing patient at risk for multiple co-morbidities as well as early death and contributing to the patient's presentation.   - Counseled on weight loss.      DVT Prophylaxis: Lovenox  Diet: clears  Code Status: Full Code      Holly Chaudhary MD

## 2021-08-31 NOTE — PROGRESS NOTES
General Surgery - Tricia Moss Pro, APRN - CNP, CNP  Daily Progress Note    Pt Name: Kiarra Davila  Medical Record Number: [id number]  Date of Birth 1965   Today's Date: 8/31/2021    ASSESSMENT  1. CT 8/30: acute appendicitis with focal perforation, tiny amount of free air no focal fluid collection to suggest abscess, severe inflammatory changes in the RLQ. Appendix measuring 1.8cm with severe mucosal thickening and appendicoliths at the base, Cecum appears inflamed upon review. 2. ABD: morbidly obese, yeast in groin folds, no N/V, no flatus, no BM, no distention, + RLQ tenderness which appears unchanged on assessment. 3. Leuks 14.3->10  4. Mg 1.5  5. K+ 3.1->3.5  6. VS: fever curve improving, slightly tachy  7. Pt states \"my pain is worse in the RLQ but, I'd like to give th antibiotics another day. \"    PLAN  1. Zosyn  2. Pain control   3. OOB/Up to chair  4. Clear liquids: go slow, sips, NPO after midnight  5. Pt appears to be slightly improved overall. Continue current therapy. Pt is aware if she worsens or fails to improve then surgical intervention may be warranted. She verbalized understanding. Hodan Santiago has slightly improved from yesterday. Pain is somewhat well controlled. She has no nausea and no vomiting. She has not passed flatus and has not had a bowel movement. She is tolerating clear liquids. Current activity is up with assistance    OBJECTIVE  VITALS:  height is 5' 3\" (1.6 m) and weight is 265 lb (120.2 kg). Her oral temperature is 98.1 °F (36.7 °C). Her blood pressure is 117/61 and her pulse is 102. Her respiration is 17 and oxygen saturation is 92%.    VITALS:  /61   Pulse 102   Temp 98.1 °F (36.7 °C) (Oral)   Resp 17   Ht 5' 3\" (1.6 m)   Wt 265 lb (120.2 kg)   LMP 12/24/2010   SpO2 92%   BMI 46.94 kg/m²   INTAKE/OUTPUT:    Intake/Output Summary (Last 24 hours) at 8/31/2021 1235  Last data filed at 8/31/2021 1100  Gross per 24 hour   Intake 4120.06 ml   Output 600 ml   Net 3520.06 ml     GENERAL: alert, cooperative, no distress    I/O last 3 completed shifts: In: 4120.1 [P.O.:880; I.V.:3148.7; IV Piggyback:91.3]  Out: -   I/O this shift:  In: -   Out: [address]     08/30/21  1040 08/30/21  1040 08/30/21  1117 08/31/21  0547   WBC 14.3*   < >  --  10.0   HGB 14.3   < >  --  14.8   HCT 43.2   < >  --  45.7      < >  --  113*      < >  --  135*   K 3.1*   < >  --  3.5      < >  --  103   CO2 23   < >  --  16*   BUN 14   < >  --  22*   CREATININE <0.5*   < >  --  1.1   MG 1.50*   < >  --  1.50*   CALCIUM 9.1   < >  --  8.1*   AST 15  --   --   --    ALT 17  --   --   --    BILITOT 0.7  --   --   --    NITRU  --   --  Negative  --    COLORU  --   --  Yellow  --     < > = values in this interval not displayed. CBC with Differential:    Lab Results   Component Value Date    WBC 10.0 08/31/2021    RBC 4.56 08/31/2021    HGB 14.8 08/31/2021    HCT 45.7 08/31/2021     08/31/2021    .3 08/31/2021    MCH 32.5 08/31/2021    MCHC 32.4 08/31/2021    RDW 16.8 08/31/2021    LYMPHOPCT 5.0 08/30/2021    MONOPCT 23.0 08/30/2021    BASOPCT 0.0 08/30/2021    MONOSABS 3.3 08/30/2021    LYMPHSABS 0.7 08/30/2021    EOSABS 0.3 08/30/2021    BASOSABS 0.0 08/30/2021     CMP:    Lab Results   Component Value Date     08/31/2021    K 3.5 08/31/2021     08/31/2021    CO2 16 08/31/2021    BUN 22 08/31/2021    CREATININE 1.1 08/31/2021    GFRAA >60 08/31/2021    AGRATIO 1.2 08/30/2021    LABGLOM 51 08/31/2021    GLUCOSE 102 08/31/2021    PROT 6.9 08/30/2021    LABALBU 3.7 08/30/2021    CALCIUM 8.1 08/31/2021    BILITOT 0.7 08/30/2021    ALKPHOS 96 08/30/2021    AST 15 08/30/2021    ALT 17 08/30/2021         BANDAR Atkins CNP  Electronically signed 8/31/2021 at 12:16 PM     Patient seen and examined. I agree with the assessment and plan from ANNI Odom. Patient feels better. Still with fever.   Abdomen tender but stable. Continue current treatment with antibiotics and plan for interval appendectomy of continues to improve. Case discussed with Dr. Fernando Eisenberg.     Yoni Zheng MD

## 2021-09-01 LAB
ANION GAP SERPL CALCULATED.3IONS-SCNC: 14 MMOL/L (ref 3–16)
BASOPHILS ABSOLUTE: 0.1 K/UL (ref 0–0.2)
BASOPHILS RELATIVE PERCENT: 0.5 %
BUN BLDV-MCNC: 29 MG/DL (ref 7–20)
CALCIUM SERPL-MCNC: 8.2 MG/DL (ref 8.3–10.6)
CHLORIDE BLD-SCNC: 99 MMOL/L (ref 99–110)
CO2: 19 MMOL/L (ref 21–32)
CREAT SERPL-MCNC: 1.2 MG/DL (ref 0.6–1.1)
EOSINOPHILS ABSOLUTE: 0.2 K/UL (ref 0–0.6)
EOSINOPHILS RELATIVE PERCENT: 1.8 %
GFR AFRICAN AMERICAN: 56
GFR NON-AFRICAN AMERICAN: 46
GLUCOSE BLD-MCNC: 89 MG/DL (ref 70–99)
HCT VFR BLD CALC: 38.1 % (ref 36–48)
HEMOGLOBIN: 12.4 G/DL (ref 12–16)
LYMPHOCYTES ABSOLUTE: 0.6 K/UL (ref 1–5.1)
LYMPHOCYTES RELATIVE PERCENT: 5 %
MAGNESIUM: 1.9 MG/DL (ref 1.8–2.4)
MCH RBC QN AUTO: 31.7 PG (ref 26–34)
MCHC RBC AUTO-ENTMCNC: 32.5 G/DL (ref 31–36)
MCV RBC AUTO: 97.4 FL (ref 80–100)
MONOCYTES ABSOLUTE: 1 K/UL (ref 0–1.3)
MONOCYTES RELATIVE PERCENT: 8 %
NEUTROPHILS ABSOLUTE: 10.6 K/UL (ref 1.7–7.7)
NEUTROPHILS RELATIVE PERCENT: 84.7 %
PDW BLD-RTO: 16.4 % (ref 12.4–15.4)
PLATELET # BLD: 124 K/UL (ref 135–450)
PMV BLD AUTO: 8.4 FL (ref 5–10.5)
POTASSIUM SERPL-SCNC: 4.4 MMOL/L (ref 3.5–5.1)
RBC # BLD: 3.91 M/UL (ref 4–5.2)
SODIUM BLD-SCNC: 132 MMOL/L (ref 136–145)
WBC # BLD: 12.5 K/UL (ref 4–11)

## 2021-09-01 PROCEDURE — C9113 INJ PANTOPRAZOLE SODIUM, VIA: HCPCS | Performed by: NURSE PRACTITIONER

## 2021-09-01 PROCEDURE — 6360000002 HC RX W HCPCS: Performed by: NURSE PRACTITIONER

## 2021-09-01 PROCEDURE — 83735 ASSAY OF MAGNESIUM: CPT

## 2021-09-01 PROCEDURE — 99233 SBSQ HOSP IP/OBS HIGH 50: CPT | Performed by: INTERNAL MEDICINE

## 2021-09-01 PROCEDURE — 6370000000 HC RX 637 (ALT 250 FOR IP): Performed by: NURSE PRACTITIONER

## 2021-09-01 PROCEDURE — 2580000003 HC RX 258: Performed by: NURSE PRACTITIONER

## 2021-09-01 PROCEDURE — 36415 COLL VENOUS BLD VENIPUNCTURE: CPT

## 2021-09-01 PROCEDURE — 1200000000 HC SEMI PRIVATE

## 2021-09-01 PROCEDURE — 2580000003 HC RX 258: Performed by: INTERNAL MEDICINE

## 2021-09-01 PROCEDURE — 6360000002 HC RX W HCPCS: Performed by: INTERNAL MEDICINE

## 2021-09-01 PROCEDURE — 99232 SBSQ HOSP IP/OBS MODERATE 35: CPT | Performed by: SURGERY

## 2021-09-01 PROCEDURE — 85025 COMPLETE CBC W/AUTO DIFF WBC: CPT

## 2021-09-01 PROCEDURE — 80048 BASIC METABOLIC PNL TOTAL CA: CPT

## 2021-09-01 RX ORDER — PROCHLORPERAZINE EDISYLATE 5 MG/ML
10 INJECTION INTRAMUSCULAR; INTRAVENOUS EVERY 6 HOURS PRN
Status: DISCONTINUED | OUTPATIENT
Start: 2021-09-01 | End: 2021-09-13 | Stop reason: HOSPADM

## 2021-09-01 RX ORDER — 0.9 % SODIUM CHLORIDE 0.9 %
500 INTRAVENOUS SOLUTION INTRAVENOUS ONCE
Status: COMPLETED | OUTPATIENT
Start: 2021-09-01 | End: 2021-09-01

## 2021-09-01 RX ORDER — MORPHINE SULFATE 2 MG/ML
2 INJECTION, SOLUTION INTRAMUSCULAR; INTRAVENOUS
Status: DISCONTINUED | OUTPATIENT
Start: 2021-09-01 | End: 2021-09-09

## 2021-09-01 RX ORDER — MORPHINE SULFATE 2 MG/ML
1 INJECTION, SOLUTION INTRAMUSCULAR; INTRAVENOUS
Status: DISCONTINUED | OUTPATIENT
Start: 2021-09-01 | End: 2021-09-09

## 2021-09-01 RX ORDER — 0.9 % SODIUM CHLORIDE 0.9 %
1000 INTRAVENOUS SOLUTION INTRAVENOUS ONCE
Status: COMPLETED | OUTPATIENT
Start: 2021-09-01 | End: 2021-09-01

## 2021-09-01 RX ORDER — 0.9 % SODIUM CHLORIDE 0.9 %
1000 INTRAVENOUS SOLUTION INTRAVENOUS ONCE
Status: DISCONTINUED | OUTPATIENT
Start: 2021-09-01 | End: 2021-09-01

## 2021-09-01 RX ORDER — CALCIUM CARBONATE 200(500)MG
500 TABLET,CHEWABLE ORAL 3 TIMES DAILY PRN
Status: DISCONTINUED | OUTPATIENT
Start: 2021-09-01 | End: 2021-09-06

## 2021-09-01 RX ADMIN — PIPERACILLIN SODIUM AND TAZOBACTAM SODIUM 3375 MG: 3; .375 INJECTION, POWDER, LYOPHILIZED, FOR SOLUTION INTRAVENOUS at 12:58

## 2021-09-01 RX ADMIN — SODIUM CHLORIDE 500 ML: 9 INJECTION, SOLUTION INTRAVENOUS at 10:39

## 2021-09-01 RX ADMIN — SODIUM CHLORIDE 500 ML: 9 INJECTION, SOLUTION INTRAVENOUS at 13:02

## 2021-09-01 RX ADMIN — SODIUM CHLORIDE, PRESERVATIVE FREE 10 ML: 5 INJECTION INTRAVENOUS at 19:56

## 2021-09-01 RX ADMIN — SODIUM CHLORIDE: 9 INJECTION, SOLUTION INTRAVENOUS at 23:00

## 2021-09-01 RX ADMIN — PIPERACILLIN SODIUM AND TAZOBACTAM SODIUM 3375 MG: 3; .375 INJECTION, POWDER, LYOPHILIZED, FOR SOLUTION INTRAVENOUS at 04:51

## 2021-09-01 RX ADMIN — PIPERACILLIN SODIUM AND TAZOBACTAM SODIUM 3375 MG: 3; .375 INJECTION, POWDER, LYOPHILIZED, FOR SOLUTION INTRAVENOUS at 19:58

## 2021-09-01 RX ADMIN — GABAPENTIN 400 MG: 400 CAPSULE ORAL at 19:56

## 2021-09-01 RX ADMIN — OXYBUTYNIN CHLORIDE 5 MG: 5 TABLET ORAL at 20:03

## 2021-09-01 RX ADMIN — SODIUM CHLORIDE: 9 INJECTION, SOLUTION INTRAVENOUS at 10:39

## 2021-09-01 RX ADMIN — ENOXAPARIN SODIUM 40 MG: 40 INJECTION SUBCUTANEOUS at 10:47

## 2021-09-01 RX ADMIN — SODIUM CHLORIDE 1000 ML: 9 INJECTION, SOLUTION INTRAVENOUS at 03:48

## 2021-09-01 RX ADMIN — SODIUM CHLORIDE: 9 INJECTION, SOLUTION INTRAVENOUS at 01:04

## 2021-09-01 RX ADMIN — TIZANIDINE 4 MG: 4 TABLET ORAL at 19:56

## 2021-09-01 RX ADMIN — TOPIRAMATE 100 MG: 100 TABLET, FILM COATED ORAL at 19:56

## 2021-09-01 RX ADMIN — PANTOPRAZOLE SODIUM 40 MG: 40 INJECTION, POWDER, FOR SOLUTION INTRAVENOUS at 10:46

## 2021-09-01 RX ADMIN — MORPHINE SULFATE 2 MG: 2 INJECTION, SOLUTION INTRAMUSCULAR; INTRAVENOUS at 23:06

## 2021-09-01 RX ADMIN — KETOROLAC TROMETHAMINE 30 MG: 30 INJECTION, SOLUTION INTRAMUSCULAR; INTRAVENOUS at 01:08

## 2021-09-01 RX ADMIN — PROCHLORPERAZINE EDISYLATE 10 MG: 5 INJECTION INTRAMUSCULAR; INTRAVENOUS at 16:52

## 2021-09-01 RX ADMIN — CALCIUM CARBONATE (ANTACID) CHEW TAB 500 MG 500 MG: 500 CHEW TAB at 14:55

## 2021-09-01 ASSESSMENT — PAIN DESCRIPTION - PROGRESSION
CLINICAL_PROGRESSION: GRADUALLY WORSENING
CLINICAL_PROGRESSION: GRADUALLY WORSENING

## 2021-09-01 ASSESSMENT — PAIN SCALES - GENERAL
PAINLEVEL_OUTOF10: 7
PAINLEVEL_OUTOF10: 7
PAINLEVEL_OUTOF10: 6

## 2021-09-01 ASSESSMENT — PAIN DESCRIPTION - PAIN TYPE
TYPE: ACUTE PAIN
TYPE: ACUTE PAIN

## 2021-09-01 ASSESSMENT — PAIN DESCRIPTION - FREQUENCY
FREQUENCY: CONTINUOUS
FREQUENCY: CONTINUOUS

## 2021-09-01 ASSESSMENT — PAIN DESCRIPTION - LOCATION
LOCATION: ABDOMEN

## 2021-09-01 ASSESSMENT — PAIN DESCRIPTION - ONSET
ONSET: AWAKENED FROM SLEEP
ONSET: ON-GOING

## 2021-09-01 ASSESSMENT — PAIN DESCRIPTION - DESCRIPTORS
DESCRIPTORS: STABBING
DESCRIPTORS: STABBING

## 2021-09-01 ASSESSMENT — PAIN DESCRIPTION - ORIENTATION
ORIENTATION: MID
ORIENTATION: MID

## 2021-09-01 NOTE — PROGRESS NOTES
Bedside report given and pt care transferred to [facility]. Pt denies needs at this time. Call light within reach.

## 2021-09-01 NOTE — PROGRESS NOTES
General Surgery - Tricia Fox Re, APRN - CNP, CNP  Daily Progress Note    Pt Name: Philomena Bolton  Medical Record Number: [id number]  Date of Birth 1965   Today's Date: 9/1/2021    ASSESSMENT  1. CT abd and pelvis: acute appendicitis with focal perforation, tiny amount of free air, no focal fluid collection, severe inflammatory changes in the RLQ. Appendix measuring 1.8cm with severe mucosal thickening and appendicoliths at the base. Cecum appears inflamed up on review. 2. ABD: obese, soft, no distention, + RLQ abdominal pain which appears unchanged from yesterday, no N/V, no flatus, no BM  3. Leuks: 14.3->10->12.5  4. Cr: 1.2, /450  5. VS: fever curve improved, hypotension s/p fluid bolus is improved (last /78)  6. Pt reports overall she \"feels better\"  7. Morbidly obese s/p gastric sleeve 4/21    PLAN  1. Full liquids  2. Zosyn  3. DVT proph; lovenox  4. OOB/ambulate   5. IVF   6. Strict I&Os  7. Pain control   8. Labs in the am  9. Pt with perforated appendicitis who appears to be slightly improved. Continue current therapy. Reevaluate in the am.     Lashae Davis has improved in some ways and worsened in others from yesterday. Pain is well controlled. She has no nausea and no vomiting. She has not passed flatus and has not had a bowel movement. She is tolerating clear liquids. Current activity is up with assistance    OBJECTIVE  VITALS:  height is 5' 3\" (1.6 m) and weight is 265 lb (120.2 kg). Her oral temperature is 97.7 °F (36.5 °C). Her blood pressure is 114/78 and her pulse is 86. Her respiration is 16 and oxygen saturation is 98%.    VITALS:  /78   Pulse 86   Temp 97.7 °F (36.5 °C) (Oral)   Resp 16   Ht 5' 3\" (1.6 m)   Wt 265 lb (120.2 kg)   LMP 12/24/2010   SpO2 98%   BMI 46.94 kg/m²   INTAKE/OUTPUT:    Intake/Output Summary (Last 24 hours) at 9/1/2021 1603  Last data filed at 9/1/2021 1328  Gross per 24 hour   Intake 3165.65 ml   Output 450 ml   Net 2715.65 ml GENERAL: alert, cooperative, no distress    I/O last 3 completed shifts: In: 3165.7 [P.O.:290; I.V.:2527; IV Piggyback:348.7]  Out: 450 [Urine:450]  No intake/output data recorded. LABS  Recent Labs     08/30/21  1040 08/30/21  1117 08/31/21  0547 09/01/21  0950   WBC 14.3*  --    < > 12.5*   HGB 14.3  --    < > 12.4   HCT 43.2  --    < > 38.1     --    < > 124*     --    < > 132*   K 3.1*  --    < > 4.4     --    < > 99   CO2 23  --    < > 19*   BUN 14  --    < > 29*   CREATININE <0.5*  --    < > 1.2*   MG 1.50*  --    < > 1.90   CALCIUM 9.1  --    < > 8.2*   AST 15  --   --   --    ALT 17  --   --   --    BILITOT 0.7  --   --   --    NITRU  --  Negative  --   --    COLORU  --  Yellow  --   --     < > = values in this interval not displayed. CBC with Differential:    Lab Results   Component Value Date    WBC 12.5 09/01/2021    RBC 3.91 09/01/2021    HGB 12.4 09/01/2021    HCT 38.1 09/01/2021     09/01/2021    MCV 97.4 09/01/2021    MCH 31.7 09/01/2021    MCHC 32.5 09/01/2021    RDW 16.4 09/01/2021    LYMPHOPCT 5.0 09/01/2021    MONOPCT 8.0 09/01/2021    BASOPCT 0.5 09/01/2021    MONOSABS 1.0 09/01/2021    LYMPHSABS 0.6 09/01/2021    EOSABS 0.2 09/01/2021    BASOSABS 0.1 09/01/2021     CMP:    Lab Results   Component Value Date     09/01/2021    K 4.4 09/01/2021    K 3.5 08/31/2021    CL 99 09/01/2021    CO2 19 09/01/2021    BUN 29 09/01/2021    CREATININE 1.2 09/01/2021    GFRAA 56 09/01/2021    AGRATIO 1.2 08/30/2021    LABGLOM 46 09/01/2021    GLUCOSE 89 09/01/2021    PROT 6.9 08/30/2021    LABALBU 3.7 08/30/2021    CALCIUM 8.2 09/01/2021    BILITOT 0.7 08/30/2021    ALKPHOS 96 08/30/2021    AST 15 08/30/2021    ALT 17 08/30/2021         BANDAR Ugarte - CNP  Electronically signed 9/1/2021 at 3:50 PM     Patient seen and examined. I agree with the assessment and plan from ANNI Odom. Patient continues to feel better. Afebrile. Abdomen soft.   Mild RLQ tenderness. WBC elevated slightly. Continue with current plan of observation and antibiotics. Surgery would be involved with her body habitus, CT findings and incarcerated ventral hernia.     Deepthi Cedillo MD

## 2021-09-01 NOTE — PROGRESS NOTES
Pt resting. Resp e/e. Shift assessment completed and charted. No needs. Will monitor.  Alwin Riedel, RN

## 2021-09-01 NOTE — CARE COORDINATION
INTERDISCIPLINARY PLAN OF CARE CONFERENCE    Date/Time: 9/1/2021 4:11 PM  Completed by: Brianna Foy RN, Case Management      Patient Name:  Jenny Nesbitt  YOB: 1965  Admitting Diagnosis: Appendicitis [K37]  Acute appendicitis with perforation and generalized peritonitis, unspecified whether abscess present, unspecified whether gangrene present [K35.20]     Admit Date/Time:  8/30/2021 10:47 AM    Chart reviewed. Interdisciplinary team contacted or reviewed plan related to patient progress and discharge plans. Disciplines included Case Management, Nursing, and Dietitian. Current Status: IP 08/30/2021  PT/OT recommendation for discharge plan of care: needs order    Expected D/C Disposition:  TBD  Confirmed plan with patient   Discharge Plan Comments: Chart reviewed. Met with pt at bedside and explained the role of the CM. Sx consult, possible sx tomorrow. Unsure for DCP needs. +CM following.       Home O2 in place on admit: No  Pt informed of need to bring portable home O2 tank on day of discharge for nursing to connect prior to leaving:  No  Verbalized agreement/Understanding:  Not Indicated

## 2021-09-01 NOTE — PROGRESS NOTES
Admit: 2021    Name:  Josefa Enciso  Room:  [facility]50225-02  MRN:    [id number]     Daily Progress Note for 2021   Acute appendicitis    Interval History:   abd pain better  Fevers resolving   Tolerating clears     BP low last night   Recd 500 ml Fluid bolus    Scheduled Meds:   DULoxetine  120 mg Oral Daily    gabapentin  400 mg Oral TID    pantoprazole  40 mg IntraVENous Daily    tiZANidine  4 mg Oral Nightly    oxybutynin  5 mg Oral BID    topiramate  100 mg Oral Nightly    sodium chloride flush  5-40 mL IntraVENous 2 times per day    enoxaparin  40 mg SubCUTAneous Daily    piperacillin-tazobactam  3,375 mg IntraVENous Q8H    magnesium oxide  100 mg Oral Daily       Continuous Infusions:   sodium chloride Stopped (21 0100)    sodium chloride 125 mL/hr at 21 0110       PRN Meds:  morphine **OR** morphine, sodium chloride flush, sodium chloride, [Held by provider] ondansetron **OR** [Held by provider] ondansetron, polyethylene glycol, acetaminophen **OR** acetaminophen, potassium chloride **OR** potassium alternative oral replacement **OR** potassium chloride, magnesium sulfate, ketorolac                  Objective:     Temp  Av.3 °F (36.3 °C)  Min: 97.1 °F (36.2 °C)  Max: 97.7 °F (36.5 °C)  Pulse  Av.6  Min: 83  Max: 111  BP  Min: 72/44  Max: 92/62  SpO2  Av.5 %  Min: 94 %  Max: 96 %  Patient Vitals for the past 4 hrs:   BP Temp Temp src Pulse Resp SpO2   21 0813 92/62 97.7 °F (36.5 °C) Oral 83 16 96 %         Intake/Output Summary (Last 24 hours) at 2021 0915  Last data filed at 2021 0313  Gross per 24 hour   Intake 3715.65 ml   Output 600 ml   Net 3115.65 ml       Physical Exam:  General:  Awake, alert and oriented.  Appears to be not in any distress  Mucous Membranes:  Pink , anicteric  Neck: No JVD, no carotid bruit, no thyromegaly  Chest:  Clear to auscultation bilaterally, no added sounds  Cardiovascular:  RRR S1S2 heard, no murmurs or gallops  Abdomen:  Soft, undistended, right LQ tenderness  no organomegaly, BS present  Extremities: No edema or cyanosis. Distal pulses well felt  Neurological : no focal deficits    Lab Data:  CBC:   Recent Labs     08/30/21  1040 08/31/21  0547   WBC 14.3* 10.0   RBC 4.55 4.56   HGB 14.3 14.8   HCT 43.2 45.7   MCV 95.1 100.3*   RDW 16.1* 16.8*    113*     BMP:   Recent Labs     08/30/21  1040 08/31/21  0547    135*   K 3.1* 3.5    103   CO2 23 16*   BUN 14 22*   CREATININE <0.5* 1.1     BNP: No results for input(s): BNP in the last 72 hours. PT/INR: No results for input(s): PROTIME, INR in the last 72 hours. APTT:No results for input(s): APTT in the last 72 hours. CARDIAC ENZYMES: No results for input(s): CKMB, CKMBINDEX, TROPONINI in the last 72 hours. Invalid input(s): CKTOTAL;3  FASTING LIPID PANEL:  Lab Results   Component Value Date    CHOL 180 12/08/2020    HDL 66 12/08/2020    TRIG 49 12/08/2020     LIVER PROFILE:   Recent Labs     08/30/21  1040   AST 15   ALT 17   BILITOT 0.7   ALKPHOS 96         CT ABDOMEN PELVIS WO CONTRAST Additional Contrast? Radiologist Recommendation (iodine allergy)   Final Result   1. Acute appendicitis with focal perforation. Tiny amount of free air with   no focal fluid to suggest abscess. Severe inflammatory changes in the right   lower quadrant. 2. Probable cholelithiasis. No evidence of acute cholecystitis.                Assessment & Plan:     Patient Active Problem List    Diagnosis Date Noted    Appendicitis 08/30/2021    Leukocytosis     Hypomagnesemia     Hypokalemia     S/P laparoscopic sleeve gastrectomy 04/12/2021    Obstructive sleep apnea 01/21/2021    Snoring 10/27/2020    Incarcerated ventral hernia 10/27/2020    Urinary incontinence, urge 08/12/2019    History of DVT (deep vein thrombosis) 05/29/2019    Other chronic pain 05/29/2019    Morbid obesity ([address] 05/29/2019    Family history of factor V Leiden mutation 95/92/1086    Umbilical abnormality     Arthritis     Chronic GERD      Acute Appendicitis with Focal Perforation  - CT showed appendicitis with focal perforation, tiny amount of free air with no focal fluid, severe inflammatory changed in RLQ  - Admitted to Med Surg  - clear liquid diet for now, NPO after midnight  - start IVF, abx: Zosyn D#3, PRN - Morphine and Toradol  - Gen Sx consult  Fevers and leukocytosis resolving  Labs from today pending  Recd IV fluid bolus last night  Repeat fluid bolus this am     Hypokalemia  Hypomagnesemia  repleted      Prolonged QTc  - 565  - replete electrolytes, avoid QT prolonging agents  - repeat EKG, tele     Leukocytosis  - 2/2 acute appendicitis  Resolving     Low platelets   will follow closely      Hx of DVT  - in 2015  - hold ASA, on prophylactic lovenox here     GERD  - cont Protonix     Chronic Pain Syndrome  - cont Cymbalta and Neurontin     FLORIAN  - Continue home CPAP     Morbid Obesity  S/p Laparoscopic Sleeve Gastrectomy  - Body mass index is 46.94 kg/m². - Complicating assessment and treatment.  Placing patient at risk for multiple co-morbidities as well as early death and contributing to the patient's presentation.   - Counseled on weight loss.      DVT Prophylaxis: Lovenox  Diet: clears  Code Status: Full Code      Mir Nieves MD

## 2021-09-01 NOTE — PROGRESS NOTES
AM assessment complete. NPO. Gave lovenox/protonix. Started  ml bolus. A/O x 4. Denies any pain. Up in chair. Bed locked/lowest position. Call light within reach.

## 2021-09-01 NOTE — PROGRESS NOTES
Pt BP low stayed in room to assist. Took a series of VS continue to go up. Reported this to [place]. Pt remains A/O x4 and no s/s of pain or distress.

## 2021-09-01 NOTE — PROGRESS NOTES
Physician Progress Note      PATIENT:               Jean Paul Vásquez  CSN #:                  [id number]  :                       1965  ADMIT DATE:       2021 10:47 AM  DISCH DATE:  RESPONDING  PROVIDER #:        Cleopatra Viera MD          QUERY TEXT:    Pt admitted with perforated appendicitis. Pt noted to have wbc-14.3,   fever-100.4 up to 101.3, and tachycardia-93 to 101. If possible, please   document in the progress notes and discharge summary if you are evaluating and   /or treating any of the following: The medical record reflects the following:  Risk Factors: perforated appendicitis, morbid obesity  Clinical Indicators: wbc-14.3, fever-100.4 up to 101.3, and tachycardia-93 to   101  Treatment: lactic, continuous IVF, Tylenol, surgical consult, IV Zosyn    Thank you for your assistance,  Dania Marina RN,BSN,CCDS,CRCR  Options provided:  -- Sepsis, present on admission  -- Sepsis, present on admission, now resolved  -- Sepsis, not present on admission  -- Acute perforated appendicitis without Sepsis  -- Other - I will add my own diagnosis  -- Disagree - Not applicable / Not valid  -- Disagree - Clinically unable to determine / Unknown  -- Refer to Clinical Documentation Reviewer    PROVIDER RESPONSE TEXT:    This patient has sepsis that was not present on admission.     Query created by: Ely Jones on 2021 7:32 AM      Electronically signed by:  Cleopatra Viera MD 2021 9:14 AM

## 2021-09-01 NOTE — FLOWSHEET NOTE
09/01/21 0452   Vital Signs   Pulse 86   Heart Rate Source Monitor   BP (!) 91/59   BP Location Left leg   BP after bolus.  Grabiel Victoria, RN

## 2021-09-02 ENCOUNTER — APPOINTMENT (OUTPATIENT)
Dept: GENERAL RADIOLOGY | Age: 56
DRG: 329 | End: 2021-09-02
Payer: MEDICARE

## 2021-09-02 LAB
ANION GAP SERPL CALCULATED.3IONS-SCNC: 15 MMOL/L (ref 3–16)
BUN BLDV-MCNC: 22 MG/DL (ref 7–20)
CALCIUM SERPL-MCNC: 8.7 MG/DL (ref 8.3–10.6)
CHLORIDE BLD-SCNC: 104 MMOL/L (ref 99–110)
CO2: 18 MMOL/L (ref 21–32)
CREAT SERPL-MCNC: 0.9 MG/DL (ref 0.6–1.1)
GFR AFRICAN AMERICAN: >60
GFR NON-AFRICAN AMERICAN: >60
GLUCOSE BLD-MCNC: 86 MG/DL (ref 70–99)
GLUCOSE BLD-MCNC: 87 MG/DL (ref 70–99)
GLUCOSE BLD-MCNC: 87 MG/DL (ref 70–99)
HCT VFR BLD CALC: 39.9 % (ref 36–48)
HEMOGLOBIN: 13.1 G/DL (ref 12–16)
MCH RBC QN AUTO: 31.9 PG (ref 26–34)
MCHC RBC AUTO-ENTMCNC: 32.8 G/DL (ref 31–36)
MCV RBC AUTO: 97.2 FL (ref 80–100)
PDW BLD-RTO: 16.3 % (ref 12.4–15.4)
PERFORMED ON: NORMAL
PERFORMED ON: NORMAL
PLATELET # BLD: 174 K/UL (ref 135–450)
PMV BLD AUTO: 8.4 FL (ref 5–10.5)
POTASSIUM SERPL-SCNC: 3.7 MMOL/L (ref 3.5–5.1)
RBC # BLD: 4.11 M/UL (ref 4–5.2)
SODIUM BLD-SCNC: 137 MMOL/L (ref 136–145)
WBC # BLD: 13 K/UL (ref 4–11)

## 2021-09-02 PROCEDURE — 74019 RADEX ABDOMEN 2 VIEWS: CPT

## 2021-09-02 PROCEDURE — 2580000003 HC RX 258: Performed by: NURSE PRACTITIONER

## 2021-09-02 PROCEDURE — 1200000000 HC SEMI PRIVATE

## 2021-09-02 PROCEDURE — C9113 INJ PANTOPRAZOLE SODIUM, VIA: HCPCS | Performed by: INTERNAL MEDICINE

## 2021-09-02 PROCEDURE — 80048 BASIC METABOLIC PNL TOTAL CA: CPT

## 2021-09-02 PROCEDURE — C9113 INJ PANTOPRAZOLE SODIUM, VIA: HCPCS | Performed by: NURSE PRACTITIONER

## 2021-09-02 PROCEDURE — 85027 COMPLETE CBC AUTOMATED: CPT

## 2021-09-02 PROCEDURE — 6370000000 HC RX 637 (ALT 250 FOR IP): Performed by: NURSE PRACTITIONER

## 2021-09-02 PROCEDURE — 99232 SBSQ HOSP IP/OBS MODERATE 35: CPT | Performed by: INTERNAL MEDICINE

## 2021-09-02 PROCEDURE — 6360000002 HC RX W HCPCS: Performed by: INTERNAL MEDICINE

## 2021-09-02 PROCEDURE — 6360000002 HC RX W HCPCS: Performed by: NURSE PRACTITIONER

## 2021-09-02 PROCEDURE — 36415 COLL VENOUS BLD VENIPUNCTURE: CPT

## 2021-09-02 PROCEDURE — 99232 SBSQ HOSP IP/OBS MODERATE 35: CPT | Performed by: SURGERY

## 2021-09-02 RX ORDER — PANTOPRAZOLE SODIUM 40 MG/10ML
40 INJECTION, POWDER, LYOPHILIZED, FOR SOLUTION INTRAVENOUS 2 TIMES DAILY
Status: DISCONTINUED | OUTPATIENT
Start: 2021-09-02 | End: 2021-09-08

## 2021-09-02 RX ADMIN — SODIUM CHLORIDE: 9 INJECTION, SOLUTION INTRAVENOUS at 19:54

## 2021-09-02 RX ADMIN — PIPERACILLIN SODIUM AND TAZOBACTAM SODIUM 3375 MG: 3; .375 INJECTION, POWDER, LYOPHILIZED, FOR SOLUTION INTRAVENOUS at 04:22

## 2021-09-02 RX ADMIN — ENOXAPARIN SODIUM 40 MG: 40 INJECTION SUBCUTANEOUS at 09:20

## 2021-09-02 RX ADMIN — SODIUM CHLORIDE: 9 INJECTION, SOLUTION INTRAVENOUS at 12:43

## 2021-09-02 RX ADMIN — PIPERACILLIN SODIUM AND TAZOBACTAM SODIUM 3375 MG: 3; .375 INJECTION, POWDER, LYOPHILIZED, FOR SOLUTION INTRAVENOUS at 19:55

## 2021-09-02 RX ADMIN — SODIUM CHLORIDE, PRESERVATIVE FREE 10 ML: 5 INJECTION INTRAVENOUS at 09:19

## 2021-09-02 RX ADMIN — CALCIUM CARBONATE (ANTACID) CHEW TAB 500 MG 500 MG: 500 CHEW TAB at 12:49

## 2021-09-02 RX ADMIN — SODIUM CHLORIDE: 9 INJECTION, SOLUTION INTRAVENOUS at 04:25

## 2021-09-02 RX ADMIN — CALCIUM CARBONATE (ANTACID) CHEW TAB 500 MG 500 MG: 500 CHEW TAB at 04:24

## 2021-09-02 RX ADMIN — SODIUM CHLORIDE, PRESERVATIVE FREE 10 ML: 5 INJECTION INTRAVENOUS at 19:53

## 2021-09-02 RX ADMIN — SODIUM CHLORIDE 25 ML: 9 INJECTION, SOLUTION INTRAVENOUS at 12:45

## 2021-09-02 RX ADMIN — PANTOPRAZOLE SODIUM 40 MG: 40 INJECTION, POWDER, FOR SOLUTION INTRAVENOUS at 19:53

## 2021-09-02 RX ADMIN — PIPERACILLIN SODIUM AND TAZOBACTAM SODIUM 3375 MG: 3; .375 INJECTION, POWDER, LYOPHILIZED, FOR SOLUTION INTRAVENOUS at 12:46

## 2021-09-02 RX ADMIN — PANTOPRAZOLE SODIUM 40 MG: 40 INJECTION, POWDER, FOR SOLUTION INTRAVENOUS at 09:19

## 2021-09-02 RX ADMIN — PROCHLORPERAZINE EDISYLATE 10 MG: 5 INJECTION INTRAMUSCULAR; INTRAVENOUS at 12:50

## 2021-09-02 NOTE — PROGRESS NOTES
Admit: 2021    Name:  Julita Baron  Room:  [address] [place], [place] [address]-  MRN:    [id number]     Daily Progress Note for 2021   Acute appendicitis    Interval History:   Started vomiting yesterday  abd pain is better     Recd 1 L IV fluid bolus yesterday    Scheduled Meds:   DULoxetine  120 mg Oral Daily    gabapentin  400 mg Oral TID    pantoprazole  40 mg IntraVENous Daily    tiZANidine  4 mg Oral Nightly    oxybutynin  5 mg Oral BID    topiramate  100 mg Oral Nightly    sodium chloride flush  5-40 mL IntraVENous 2 times per day    enoxaparin  40 mg SubCUTAneous Daily    piperacillin-tazobactam  3,375 mg IntraVENous Q8H    magnesium oxide  100 mg Oral Daily       Continuous Infusions:   sodium chloride Stopped (21 0100)    sodium chloride 125 mL/hr at 21 0426       PRN Meds:  morphine **OR** morphine, calcium carbonate, prochlorperazine, sodium chloride flush, sodium chloride, [Held by provider] ondansetron **OR** [Held by provider] ondansetron, polyethylene glycol, acetaminophen **OR** acetaminophen, potassium chloride **OR** potassium alternative oral replacement **OR** potassium chloride, magnesium sulfate                  Objective:     Temp  Av.7 °F (36.5 °C)  Min: 97.5 °F (36.4 °C)  Max: 98.3 °F (36.8 °C)  Pulse  Av  Min: 72  Max: 92  BP  Min: 92/59  Max: 114/78  SpO2  Av.4 %  Min: 91 %  Max: 98 %  Patient Vitals for the past 4 hrs:   BP Temp Temp src Pulse Resp SpO2   21 0745 104/66 97.5 °F (36.4 °C) Oral 72 16 97 %         Intake/Output Summary (Last 24 hours) at 2021 0912  Last data filed at 2021 0426  Gross per 24 hour   Intake 5056.07 ml   Output 1600 ml   Net 3456.07 ml       Physical Exam:  General:  Awake, alert and oriented.  Appears to be not in any distress  Mucous Membranes:  Pink , anicteric  Neck: No JVD, no carotid bruit, no thyromegaly  Chest:  Clear to auscultation bilaterally, no added sounds  Cardiovascular:  RRR S1S2 heard, no murmurs or gallops  Abdomen:  Soft, undistended, right LQ tenderness  no organomegaly, BS present  Extremities: No edema or cyanosis. Distal pulses well felt  Neurological : no focal deficits    Lab Data:  CBC:   Recent Labs     08/31/21  0547 09/01/21  0950 09/02/21  0608   WBC 10.0 12.5* 13.0*   RBC 4.56 3.91* 4.11   HGB 14.8 12.4 13.1   HCT 45.7 38.1 39.9   .3* 97.4 97.2   RDW 16.8* 16.4* 16.3*   * 124* 174     BMP:   Recent Labs     08/31/21  0547 09/01/21  0950 09/02/21  0608   * 132* 137   K 3.5 4.4 3.7    99 104   CO2 16* 19* 18*   BUN 22* 29* 22*   CREATININE 1.1 1.2* 0.9     BNP: No results for input(s): BNP in the last 72 hours. PT/INR: No results for input(s): PROTIME, INR in the last 72 hours. APTT:No results for input(s): APTT in the last 72 hours. CARDIAC ENZYMES: No results for input(s): CKMB, CKMBINDEX, TROPONINI in the last 72 hours. Invalid input(s): CKTOTAL;3  FASTING LIPID PANEL:  Lab Results   Component Value Date    CHOL 180 12/08/2020    HDL 66 12/08/2020    TRIG 49 12/08/2020     LIVER PROFILE:   Recent Labs     08/30/21  1040   AST 15   ALT 17   BILITOT 0.7   ALKPHOS 96         CT ABDOMEN PELVIS WO CONTRAST Additional Contrast? Radiologist Recommendation (iodine allergy)   Final Result   1. Acute appendicitis with focal perforation. Tiny amount of free air with   no focal fluid to suggest abscess. Severe inflammatory changes in the right   lower quadrant. 2. Probable cholelithiasis. No evidence of acute cholecystitis.                Assessment & Plan:     Patient Active Problem List    Diagnosis Date Noted    Hypotension due to hypovolemia     Appendicitis 08/30/2021    Leukocytosis     Hypomagnesemia     Hypokalemia     S/P laparoscopic sleeve gastrectomy 04/12/2021    Obstructive sleep apnea 01/21/2021    Snoring 10/27/2020    Incarcerated ventral hernia 10/27/2020    Urinary incontinence, urge 08/12/2019    History of DVT (deep vein thrombosis) 05/29/2019    Other chronic pain 05/29/2019    Morbid obesity ([address] 05/29/2019    Family history of factor V Leiden mutation 19/10/2682    Umbilical abnormality     Arthritis     Chronic GERD      Acute Appendicitis with Focal Perforation  - CT showed appendicitis with focal perforation, tiny amount of free air with no focal fluid, severe inflammatory changed in RLQ  - Admitted to Med Surg  - clear liquid diet for now, NPO after midnight  - start IVF, abx: Zosyn D#3, PRN - Morphine and Toradol  - Gen Sx consult  Fevers  resolving  Recd IV fluid boluses yesterday  BP better today  Vomiting now  Mild leucocytosis again  NPO     Hypokalemia  Hypomagnesemia  repleted      Prolonged QTc  - 565  - replete electrolytes, avoid QT prolonging agents  - repeat EKG, tele     Leukocytosis  - 2/2 acute appendicitis  Resolving     Low platelets   will follow closely   Resolved      Hx of DVT  - in 2015  - hold ASA, on prophylactic lovenox here     GERD  - cont Protonix     Chronic Pain Syndrome  - cont Cymbalta and Neurontin     FLORIAN  - Continue home CPAP     Morbid Obesity  S/p Laparoscopic Sleeve Gastrectomy  - Body mass index is 46.94 kg/m². - Complicating assessment and treatment.  Placing patient at risk for multiple co-morbidities as well as early death and contributing to the patient's presentation.   - Counseled on weight loss.      DVT Prophylaxis: Lovenox  Diet: NPO  Code Status: Full [place] MD

## 2021-09-02 NOTE — PROGRESS NOTES
Pt resting. Resp e/e. Shift assessment completed and charted. No needs. Will monitor.  Richar Rojas RN

## 2021-09-02 NOTE — PLAN OF CARE
Problem: Pain:  Description: Pain management should include both nonpharmacologic and pharmacologic interventions.   Goal: Pain level will decrease  Description: Pain level will decrease  Outcome: Ongoing  Goal: Control of acute pain  Description: Control of acute pain  Outcome: Ongoing  Goal: Control of chronic pain  Description: Control of chronic pain  Outcome: Ongoing     Problem: Skin Integrity:  Goal: Will show no infection signs and symptoms  Description: Will show no infection signs and symptoms  Outcome: Ongoing  Goal: Absence of new skin breakdown  Description: Absence of new skin breakdown  Outcome: Ongoing     Problem: Falls - Risk of:  Goal: Will remain free from falls  Description: Will remain free from falls  Outcome: Ongoing  Goal: Absence of physical injury  Description: Absence of physical injury  Outcome: Ongoing     Problem: Nausea/Vomiting:  Goal: Absence of nausea/vomiting  Description: Absence of nausea/vomiting  Outcome: Ongoing  Goal: Able to drink  Description: Able to drink  Outcome: Ongoing  Goal: Able to eat  Description: Able to eat  Outcome: Ongoing  Goal: Ability to achieve adequate nutritional intake will improve  Description: Ability to achieve adequate nutritional intake will improve  Outcome: Ongoing

## 2021-09-02 NOTE — PROGRESS NOTES
16f ng placed in l nares, per md order. p tolerated well, immediately extracted 500 cc green liquid. Ng to low wall suction. Madeline Daugherty RN\

## 2021-09-02 NOTE — PROGRESS NOTES
Am assessment completed. See flowsheet. Pt up in chair at this time. Pt denies needs at this time. Call light within reach. Estefania Wilcox RN\

## 2021-09-03 ENCOUNTER — APPOINTMENT (OUTPATIENT)
Dept: GENERAL RADIOLOGY | Age: 56
DRG: 329 | End: 2021-09-03
Payer: MEDICARE

## 2021-09-03 LAB
ALBUMIN SERPL-MCNC: 2.6 G/DL (ref 3.4–5)
ANION GAP SERPL CALCULATED.3IONS-SCNC: 18 MMOL/L (ref 3–16)
BUN BLDV-MCNC: 11 MG/DL (ref 7–20)
CALCIUM SERPL-MCNC: 8.8 MG/DL (ref 8.3–10.6)
CHLORIDE BLD-SCNC: 103 MMOL/L (ref 99–110)
CO2: 16 MMOL/L (ref 21–32)
CREAT SERPL-MCNC: 0.7 MG/DL (ref 0.6–1.1)
GFR AFRICAN AMERICAN: >60
GFR NON-AFRICAN AMERICAN: >60
GLUCOSE BLD-MCNC: 101 MG/DL (ref 70–99)
GLUCOSE BLD-MCNC: 104 MG/DL (ref 70–99)
GLUCOSE BLD-MCNC: 113 MG/DL (ref 70–99)
GLUCOSE BLD-MCNC: 97 MG/DL (ref 70–99)
HCT VFR BLD CALC: 39.8 % (ref 36–48)
HEMOGLOBIN: 13 G/DL (ref 12–16)
LACTIC ACID: 0.9 MMOL/L (ref 0.4–2)
MAGNESIUM: 1.7 MG/DL (ref 1.8–2.4)
MCH RBC QN AUTO: 32 PG (ref 26–34)
MCHC RBC AUTO-ENTMCNC: 32.8 G/DL (ref 31–36)
MCV RBC AUTO: 97.7 FL (ref 80–100)
PDW BLD-RTO: 17.1 % (ref 12.4–15.4)
PERFORMED ON: ABNORMAL
PERFORMED ON: ABNORMAL
PERFORMED ON: NORMAL
PHOSPHORUS: 3.7 MG/DL (ref 2.5–4.9)
PLATELET # BLD: 206 K/UL (ref 135–450)
PMV BLD AUTO: 8.3 FL (ref 5–10.5)
POTASSIUM SERPL-SCNC: 3.5 MMOL/L (ref 3.5–5.1)
RBC # BLD: 4.07 M/UL (ref 4–5.2)
SODIUM BLD-SCNC: 137 MMOL/L (ref 136–145)
WBC # BLD: 10.9 K/UL (ref 4–11)

## 2021-09-03 PROCEDURE — 80069 RENAL FUNCTION PANEL: CPT

## 2021-09-03 PROCEDURE — 83605 ASSAY OF LACTIC ACID: CPT

## 2021-09-03 PROCEDURE — 74019 RADEX ABDOMEN 2 VIEWS: CPT

## 2021-09-03 PROCEDURE — 6370000000 HC RX 637 (ALT 250 FOR IP): Performed by: NURSE PRACTITIONER

## 2021-09-03 PROCEDURE — C9113 INJ PANTOPRAZOLE SODIUM, VIA: HCPCS | Performed by: INTERNAL MEDICINE

## 2021-09-03 PROCEDURE — 2500000003 HC RX 250 WO HCPCS: Performed by: INTERNAL MEDICINE

## 2021-09-03 PROCEDURE — 83735 ASSAY OF MAGNESIUM: CPT

## 2021-09-03 PROCEDURE — 1200000000 HC SEMI PRIVATE

## 2021-09-03 PROCEDURE — 6360000002 HC RX W HCPCS: Performed by: INTERNAL MEDICINE

## 2021-09-03 PROCEDURE — 2580000003 HC RX 258: Performed by: NURSE PRACTITIONER

## 2021-09-03 PROCEDURE — 36415 COLL VENOUS BLD VENIPUNCTURE: CPT

## 2021-09-03 PROCEDURE — 6360000002 HC RX W HCPCS: Performed by: NURSE PRACTITIONER

## 2021-09-03 PROCEDURE — 99232 SBSQ HOSP IP/OBS MODERATE 35: CPT | Performed by: SURGERY

## 2021-09-03 PROCEDURE — 99232 SBSQ HOSP IP/OBS MODERATE 35: CPT | Performed by: INTERNAL MEDICINE

## 2021-09-03 PROCEDURE — 2580000003 HC RX 258: Performed by: INTERNAL MEDICINE

## 2021-09-03 PROCEDURE — 85027 COMPLETE CBC AUTOMATED: CPT

## 2021-09-03 RX ADMIN — MORPHINE SULFATE 2 MG: 2 INJECTION, SOLUTION INTRAMUSCULAR; INTRAVENOUS at 05:43

## 2021-09-03 RX ADMIN — PIPERACILLIN SODIUM AND TAZOBACTAM SODIUM 3375 MG: 3; .375 INJECTION, POWDER, LYOPHILIZED, FOR SOLUTION INTRAVENOUS at 05:40

## 2021-09-03 RX ADMIN — TOPIRAMATE 100 MG: 100 TABLET, FILM COATED ORAL at 20:37

## 2021-09-03 RX ADMIN — SODIUM CHLORIDE 25 ML: 9 INJECTION, SOLUTION INTRAVENOUS at 20:45

## 2021-09-03 RX ADMIN — SODIUM CHLORIDE: 9 INJECTION, SOLUTION INTRAVENOUS at 05:39

## 2021-09-03 RX ADMIN — PIPERACILLIN SODIUM AND TAZOBACTAM SODIUM 3375 MG: 3; .375 INJECTION, POWDER, LYOPHILIZED, FOR SOLUTION INTRAVENOUS at 14:04

## 2021-09-03 RX ADMIN — POTASSIUM CHLORIDE: 2 INJECTION, SOLUTION, CONCENTRATE INTRAVENOUS at 10:20

## 2021-09-03 RX ADMIN — DULOXETINE HYDROCHLORIDE 120 MG: 60 CAPSULE, DELAYED RELEASE ORAL at 08:20

## 2021-09-03 RX ADMIN — OXYBUTYNIN CHLORIDE 5 MG: 5 TABLET ORAL at 08:21

## 2021-09-03 RX ADMIN — SODIUM CHLORIDE, PRESERVATIVE FREE 10 ML: 5 INJECTION INTRAVENOUS at 20:38

## 2021-09-03 RX ADMIN — PIPERACILLIN SODIUM AND TAZOBACTAM SODIUM 3375 MG: 3; .375 INJECTION, POWDER, LYOPHILIZED, FOR SOLUTION INTRAVENOUS at 20:46

## 2021-09-03 RX ADMIN — ENOXAPARIN SODIUM 40 MG: 40 INJECTION SUBCUTANEOUS at 08:21

## 2021-09-03 RX ADMIN — PANTOPRAZOLE SODIUM 40 MG: 40 INJECTION, POWDER, FOR SOLUTION INTRAVENOUS at 20:38

## 2021-09-03 RX ADMIN — GABAPENTIN 400 MG: 400 CAPSULE ORAL at 08:21

## 2021-09-03 RX ADMIN — OXYBUTYNIN CHLORIDE 5 MG: 5 TABLET ORAL at 20:37

## 2021-09-03 RX ADMIN — GABAPENTIN 400 MG: 400 CAPSULE ORAL at 14:08

## 2021-09-03 RX ADMIN — MAGNESIUM GLUCONATE 500 MG ORAL TABLET 100 MG: 500 TABLET ORAL at 08:21

## 2021-09-03 RX ADMIN — GABAPENTIN 400 MG: 400 CAPSULE ORAL at 20:37

## 2021-09-03 RX ADMIN — TIZANIDINE 4 MG: 4 TABLET ORAL at 20:37

## 2021-09-03 RX ADMIN — PANTOPRAZOLE SODIUM 40 MG: 40 INJECTION, POWDER, FOR SOLUTION INTRAVENOUS at 08:21

## 2021-09-03 ASSESSMENT — PAIN - FUNCTIONAL ASSESSMENT: PAIN_FUNCTIONAL_ASSESSMENT: ACTIVITIES ARE NOT PREVENTED

## 2021-09-03 ASSESSMENT — PAIN SCALES - GENERAL
PAINLEVEL_OUTOF10: 4
PAINLEVEL_OUTOF10: 7
PAINLEVEL_OUTOF10: 0

## 2021-09-03 ASSESSMENT — PAIN DESCRIPTION - ORIENTATION: ORIENTATION: RIGHT;LOWER

## 2021-09-03 ASSESSMENT — PAIN DESCRIPTION - ONSET: ONSET: GRADUAL

## 2021-09-03 ASSESSMENT — PAIN DESCRIPTION - PAIN TYPE: TYPE: ACUTE PAIN

## 2021-09-03 ASSESSMENT — PAIN DESCRIPTION - PROGRESSION: CLINICAL_PROGRESSION: GRADUALLY WORSENING

## 2021-09-03 ASSESSMENT — PAIN DESCRIPTION - DESCRIPTORS: DESCRIPTORS: ACHING

## 2021-09-03 ASSESSMENT — PAIN DESCRIPTION - LOCATION: LOCATION: ABDOMEN

## 2021-09-03 ASSESSMENT — PAIN DESCRIPTION - FREQUENCY: FREQUENCY: INTERMITTENT

## 2021-09-03 NOTE — PROGRESS NOTES
Occupational/Physical Therapy  Orders received, patient held per MD this date. Per Dr Donna Hays, will be more appropriate to initiate therapy tomorrow.   Cam Zapien, OTR/L 0324  DICK SaundersT

## 2021-09-03 NOTE — PLAN OF CARE
Problem: Pain:  Goal: Pain level will decrease  Outcome: Ongoing  Goal: Control of acute pain  Outcome: Ongoing  Goal: Control of chronic pain  Outcome: Ongoing  Goal: Patient's pain/discomfort is manageable  Outcome: Ongoing     Problem: Skin Integrity:  Goal: Will show no infection signs and symptoms  Outcome: Ongoing  Goal: Absence of new skin breakdown  Outcome: Ongoing     Problem: Falls - Risk of:  Goal: Will remain free from falls  Outcome: Ongoing  Goal: Absence of physical injury  Outcome: Ongoing     Problem: Nausea/Vomiting:  Goal: Absence of nausea/vomiting  Outcome: Ongoing  Goal: Able to drink  Outcome: Ongoing  Goal: Able to eat  Outcome: Ongoing  Goal: Ability to achieve adequate nutritional intake will improve  Outcome: Ongoing     Problem: Infection:  Goal: Will remain free from infection  Outcome: Ongoing     Problem: Safety:  Goal: Free from accidental physical injury  Outcome: Ongoing  Goal: Free from intentional harm  Outcome: Ongoing     Problem: Daily Care:  Goal: Daily care needs are met  Outcome: Ongoing     Problem: Skin Integrity:  Goal: Skin integrity will stabilize  Outcome: Ongoing     Problem: Discharge Planning:  Goal: Patients continuum of care needs are met  Outcome: Ongoing

## 2021-09-03 NOTE — FLOWSHEET NOTE
09/03/21 0800   Vital Signs   Temp 96.7 °F (35.9 °C)   Temp Source Oral   Pulse 88   Heart Rate Source Monitor   Resp 18   /83   BP Location Left lower arm   Patient Position Semi fowlers   Level of Consciousness Responds to Voice (1)   MEWS Score 1   Patient Currently in Pain Denies   Oxygen Therapy   SpO2 95 %   O2 Device None (Room air)   Patient alert and oriented. Denies any pain or discomfort. NG to LWS with 750 ml green colored liquid out for nights. No complain of indigestion. Sitting up in bed watching TV. Call light within reach. Bed in low position. Will continue to monitor.

## 2021-09-03 NOTE — CARE COORDINATION
INTERDISCIPLINARY PLAN OF CARE CONFERENCE    Date/Time: 9/3/2021 1:18 PM  Completed by: Hiram Arthur RN, Case Management      Patient Name:  Nelson Nesbitt  YOB: 1965  Admitting Diagnosis: Appendicitis [K37]  Acute appendicitis with perforation and generalized peritonitis, unspecified whether abscess present, unspecified whether gangrene present [K35.20]     Admit Date/Time:  8/30/2021 10:47 AM    Chart reviewed. Interdisciplinary team contacted or reviewed plan related to patient progress and discharge plans. Disciplines included Case Management, Nursing, and Dietitian. Current Status: Stable  PT/OT recommendation for discharge plan of care: pending    Expected D/C Disposition:  TBD  Confirmed plan with patient and/or family Yes confirmed with: (name) spouse  Met with: patient and spouse at bedside  Discharge Plan Comments:  Reviewed chart and spoke with pt and spouse at bedside. Discussed poss need for STR and pt will consider. Waiting PT/OTm evals to determine need. SNF list given. Will follow up in AM for pt decision re: STR. Will follow.     Home O2 in place on admit: No  Pt informed of need to bring portable home O2 tank on day of discharge for nursing to connect prior to leaving:  Not Indicated  Verbalized agreement/Understanding:  Not Indicated

## 2021-09-03 NOTE — PROGRESS NOTES
Admit: 2021    Name:  Zac Mota  Room:  0225/0225-02  MRN:    [id number]     Daily Progress Note for 9/3/2021   Acute appendicitis with abscess     Interval History:   Started vomiting yesterday  abd pain is better     Recd 1 L IV fluid bolus yesterday    9/3  NG placed for ileus  abd pain better, but patient feels very fatigued     Scheduled Meds:   pantoprazole  40 mg IntraVENous BID    DULoxetine  120 mg Oral Daily    gabapentin  400 mg Oral TID    tiZANidine  4 mg Oral Nightly    oxybutynin  5 mg Oral BID    topiramate  100 mg Oral Nightly    sodium chloride flush  5-40 mL IntraVENous 2 times per day    enoxaparin  40 mg SubCUTAneous Daily    piperacillin-tazobactam  3,375 mg IntraVENous Q8H    magnesium oxide  100 mg Oral Daily       Continuous Infusions:   sodium chloride 25 mL (21 1245)    sodium chloride 125 mL/hr at 21 0545       PRN Meds:  morphine **OR** morphine, calcium carbonate, prochlorperazine, sodium chloride flush, sodium chloride, [Held by provider] ondansetron **OR** [Held by provider] ondansetron, polyethylene glycol, acetaminophen **OR** acetaminophen, potassium chloride **OR** potassium alternative oral replacement **OR** potassium chloride, magnesium sulfate                  Objective:     Temp  Av.3 °F (36.3 °C)  Min: 96.7 °F (35.9 °C)  Max: 98.2 °F (36.8 °C)  Pulse  Av  Min: 79  Max: 93  BP  Min: 113/63  Max: 127/83  SpO2  Av.5 %  Min: 93 %  Max: 98 %  Patient Vitals for the past 4 hrs:   BP Temp Temp src Pulse Resp SpO2   21 0800 127/83 96.7 °F (35.9 °C) Oral 88 18 95 %         Intake/Output Summary (Last 24 hours) at 9/3/2021 0915  Last data filed at 9/3/2021 0545  Gross per 24 hour   Intake 3142.99 ml   Output 2000 ml   Net 1142.99 ml       Physical Exam:  General:  Awake, alert and oriented.  Appears to be not in any distress  Mucous Membranes:  Pink , anicteric  Neck: No JVD, no carotid bruit, no thyromegaly  Chest:  Clear to auscultation bilaterally, no added sounds  Cardiovascular:  RRR S1S2 heard, no murmurs or gallops  Abdomen:  Soft, undistended, mild right LQ tenderness  no organomegaly, BS present  Extremities: No edema or cyanosis. Distal pulses well felt  Neurological : no focal deficits    Lab Data:  CBC:   Recent Labs     09/01/21  0950 09/02/21  0608 09/03/21  0551   WBC 12.5* 13.0* 10.9   RBC 3.91* 4.11 4.07   HGB 12.4 13.1 13.0   HCT 38.1 39.9 39.8   MCV 97.4 97.2 97.7   RDW 16.4* 16.3* 17.1*   * 174 206     BMP:   Recent Labs     09/01/21  0950 09/02/21  0608 09/03/21  0551   * 137 137   K 4.4 3.7 3.5   CL 99 104 103   CO2 19* 18* 16*   PHOS  --   --  3.7   BUN 29* 22* 11   CREATININE 1.2* 0.9 0.7     BNP: No results for input(s): BNP in the last 72 hours. PT/INR: No results for input(s): PROTIME, INR in the last 72 hours. APTT:No results for input(s): APTT in the last 72 hours. CARDIAC ENZYMES: No results for input(s): CKMB, CKMBINDEX, TROPONINI in the last 72 hours. Invalid input(s): CKTOTAL;3  FASTING LIPID PANEL:  Lab Results   Component Value Date    CHOL 180 12/08/2020    HDL 66 12/08/2020    TRIG 49 12/08/2020     LIVER PROFILE:   No results for input(s): AST, ALT, ALB, BILIDIR, BILITOT, ALKPHOS in the last 72 hours. XR ABDOMEN (2 VIEWS)   Final Result   Findings may be related to ileus or obstruction. CT ABDOMEN PELVIS WO CONTRAST Additional Contrast? Radiologist Recommendation (iodine allergy)   Final Result   1. Acute appendicitis with focal perforation. Tiny amount of free air with   no focal fluid to suggest abscess. Severe inflammatory changes in the right   lower quadrant. 2. Probable cholelithiasis. No evidence of acute cholecystitis.                Assessment & Plan:     Patient Active Problem List    Diagnosis Date Noted    Hypotension due to hypovolemia     Appendicitis 08/30/2021    Leukocytosis     Hypomagnesemia     Hypokalemia     S/P laparoscopic sleeve gastrectomy 04/12/2021    Obstructive sleep apnea 01/21/2021    Snoring 10/27/2020    Incarcerated ventral hernia 10/27/2020    Urinary incontinence, urge 08/12/2019    History of DVT (deep vein thrombosis) 05/29/2019    Other chronic pain 05/29/2019    Morbid obesity ([address] 05/29/2019    Family history of factor V Leiden mutation 89/92/2957    Umbilical abnormality     Arthritis     Chronic GERD      Acute Appendicitis with Focal Perforation  - CT showed appendicitis with focal perforation, tiny amount of free air with no focal fluid, severe inflammatory changed in RLQ  - Admitted to Med Surg  - clear liquid diet for now, NPO after midnight  - start IVF, abx: Zosyn D#3, PRN - Morphine and Toradol  - Gen Sx consult  Fevers  resolving  Recd IV fluid boluses . BP better now   Vomiting now  AXR- ileus   NPO,NG suction-leukocytosis resolved. Metabolic acidosis. With mild anion gap. Check lactate level. Start bicarb containing IV fluids.     Hypokalemia  Hypomagnesemia  repleted      Prolonged QTc  - 565  - replete electrolytes, avoid QT prolonging agents  - repeat EKG, tele     Leukocytosis  - 2/2 acute appendicitis  Resolving     Low platelets   will follow closely   Resolved      Hx of DVT  - in 2015  - hold ASA, on prophylactic lovenox here     GERD  - cont Protonix     Chronic Pain Syndrome  - cont Cymbalta and Neurontin     FLORIAN  - Continue home CPAP     Morbid Obesity  S/p Laparoscopic Sleeve Gastrectomy  - Body mass index is 46.94 kg/m². - Complicating assessment and treatment.  Placing patient at risk for multiple co-morbidities as well as early death and contributing to the patient's presentation.   - Counseled on weight loss.      DVT Prophylaxis: Lovenox  Diet: NPO-NG  Code Status: Full Emilie Elizabeth MD

## 2021-09-03 NOTE — PROGRESS NOTES
General Surgery - Tricia Esquivel Arm, APRN - CNP, CNP  Daily Progress Note    Pt Name: Ra Koenig  Medical Record Number: [id number]  Date of Birth 1965   Today's Date: 9/3/2021    ASSESSMENT  1. ABD xray this am: NGT needs to be advance 10 cm, ileus  2. CT abd and pelvis 8/30: acute appendicitis with focal perforation, tiny amount of free air, no focal fluid collection, severe inflammatory changes in the RLQ. Appendix measuring 1.8 cm with severe mucosal thickening and appendicoliths at the base. Cecum appears inflamed up on review. 3. ABD: obese, soft, no distention, + RLQ abdominal pain which is improvedpalpation, + mild nausea, no V since NGT placed yesterday, + flatus, no BM  4. Leuks: 14.3->10->12.5->13->10.9  5. LA normal  6. Cr: 1.7, UO good per pt  7. VSS: hypotension resolved, afebtile  8. Pt reports overall she \"feels better but, is frustrated\"  9. Morbidly obese s/p gastric sleeve 4/21    PLAN  1. NPO  2. NGT to CLWS  3. Zosyn  4. DVT proph; lovenox  5. OOB/ambulate   6.   7. Strict I&Os  8. Pain control   9. Labs in the am  10. PT/OT: ambulate/OOB  11. Pt with perforated appendicitis with ileus. She appears to be clinically improving. Conitnue currently therapy. Plans would be to transition pt to oral antibiotics with plans for interval lap appe. Pt is agreeable with the plan of care. Jazmín Sandhu has from yesterday. Pain is well controlled. She has mild nausea without vomiting. She has passed flatus buthas not had a bowel movement. She is NPO with NGT in place. Current activity is up with assistance    OBJECTIVE  VITALS:  height is 5' 3\" (1.6 m) and weight is 265 lb (120.2 kg). Her oral temperature is 96.7 °F (35.9 °C). Her blood pressure is 127/83 and her pulse is 88. Her respiration is 18 and oxygen saturation is 95%.    VITALS:  /83   Pulse 88   Temp 96.7 °F (35.9 °C) (Oral)   Resp 18   Ht 5' 3\" (1.6 m)   Wt 265 lb (120.2 kg)   LMP 12/24/2010   SpO2 95% BMI 46.94 kg/m²   INTAKE/OUTPUT:      Intake/Output Summary (Last 24 hours) at 9/3/2021 1154  Last data filed at 9/3/2021 0545  Gross per 24 hour   Intake 3142.99 ml   Output 2000 ml   Net 1142.99 ml     GENERAL: alert, cooperative, no distress    I/O last 3 completed shifts: In: 3143 [P.O.:60; I.V.:2762.8; IV Piggyback:320.2]  Out: 2000 [Urine:250; Emesis/NG output:1750]  No intake/output data recorded.     LABS  Recent Labs     09/03/21  0551   WBC 10.9   HGB 13.0   HCT 39.8         K 3.5      CO2 16*   BUN 11   CREATININE 0.7   MG 1.70*   PHOS 3.7   CALCIUM 8.8     CBC with Differential:    Lab Results   Component Value Date    WBC 10.9 09/03/2021    RBC 4.07 09/03/2021    HGB 13.0 09/03/2021    HCT 39.8 09/03/2021     09/03/2021    MCV 97.7 09/03/2021    MCH 32.0 09/03/2021    MCHC 32.8 09/03/2021    RDW 17.1 09/03/2021    LYMPHOPCT 5.0 09/01/2021    MONOPCT 8.0 09/01/2021    BASOPCT 0.5 09/01/2021    MONOSABS 1.0 09/01/2021    LYMPHSABS 0.6 09/01/2021    EOSABS 0.2 09/01/2021    BASOSABS 0.1 09/01/2021     CMP:    Lab Results   Component Value Date     09/03/2021    K 3.5 09/03/2021    K 3.5 08/31/2021     09/03/2021    CO2 16 09/03/2021    BUN 11 09/03/2021    CREATININE 0.7 09/03/2021    GFRAA >60 09/03/2021    AGRATIO 1.2 08/30/2021    LABGLOM >60 09/03/2021    GLUCOSE 104 09/03/2021    PROT 6.9 08/30/2021    LABALBU 2.6 09/03/2021    CALCIUM 8.8 09/03/2021    BILITOT 0.7 08/30/2021    ALKPHOS 96 08/30/2021    AST 15 08/30/2021    ALT 17 08/30/2021         BANDAR Garay - CNP  Electronically signed 9/3/2021 at 11:54 AM

## 2021-09-03 NOTE — PROGRESS NOTES
Patient NG advanced 10 cm as ordered from a 51 to 61 cm. NG to LWS with light green colored liquid in tubing. Patient without complains of.WIll continue to monitor.

## 2021-09-03 NOTE — FLOWSHEET NOTE
09/03/21 1345   Vital Signs   Temp 96.7 °F (35.9 °C)   Temp Source Oral   Pulse 76   Heart Rate Source Monitor   Resp 18   /85   BP Location Left lower arm   Patient Position Semi fowlers   Level of Consciousness Alert (0)   MEWS Score 1   Patient Currently in Pain Denies   Oxygen Therapy   SpO2 95 %   O2 Device None (Room air)   Patient resting in bed no complains of. NG to [facility] with returns of light green color liquid this shift. VSS. Will continue to monitor.

## 2021-09-04 ENCOUNTER — APPOINTMENT (OUTPATIENT)
Dept: GENERAL RADIOLOGY | Age: 56
DRG: 329 | End: 2021-09-04
Payer: MEDICARE

## 2021-09-04 LAB
ANION GAP SERPL CALCULATED.3IONS-SCNC: 16 MMOL/L (ref 3–16)
BUN BLDV-MCNC: 7 MG/DL (ref 7–20)
CALCIUM SERPL-MCNC: 8.7 MG/DL (ref 8.3–10.6)
CHLORIDE BLD-SCNC: 108 MMOL/L (ref 99–110)
CO2: 15 MMOL/L (ref 21–32)
CREAT SERPL-MCNC: <0.5 MG/DL (ref 0.6–1.1)
EKG ATRIAL RATE: 93 BPM
EKG DIAGNOSIS: NORMAL
EKG P AXIS: 0 DEGREES
EKG P-R INTERVAL: 136 MS
EKG Q-T INTERVAL: 406 MS
EKG QRS DURATION: 90 MS
EKG QTC CALCULATION (BAZETT): 504 MS
EKG R AXIS: 94 DEGREES
EKG T AXIS: -86 DEGREES
EKG VENTRICULAR RATE: 93 BPM
GFR AFRICAN AMERICAN: >60
GFR NON-AFRICAN AMERICAN: >60
GLUCOSE BLD-MCNC: 119 MG/DL (ref 70–99)
GLUCOSE BLD-MCNC: 126 MG/DL (ref 70–99)
GLUCOSE BLD-MCNC: 134 MG/DL (ref 70–99)
GLUCOSE BLD-MCNC: 141 MG/DL (ref 70–99)
GLUCOSE BLD-MCNC: 156 MG/DL (ref 70–99)
HCT VFR BLD CALC: 40.2 % (ref 36–48)
HEMOGLOBIN: 12.8 G/DL (ref 12–16)
MCH RBC QN AUTO: 32.4 PG (ref 26–34)
MCHC RBC AUTO-ENTMCNC: 31.9 G/DL (ref 31–36)
MCV RBC AUTO: 101.7 FL (ref 80–100)
PDW BLD-RTO: 17.8 % (ref 12.4–15.4)
PERFORMED ON: ABNORMAL
PLATELET # BLD: 205 K/UL (ref 135–450)
PMV BLD AUTO: 7.7 FL (ref 5–10.5)
POTASSIUM SERPL-SCNC: 3.8 MMOL/L (ref 3.5–5.1)
RBC # BLD: 3.95 M/UL (ref 4–5.2)
SODIUM BLD-SCNC: 139 MMOL/L (ref 136–145)
WBC # BLD: 6.5 K/UL (ref 4–11)

## 2021-09-04 PROCEDURE — 2500000003 HC RX 250 WO HCPCS: Performed by: INTERNAL MEDICINE

## 2021-09-04 PROCEDURE — 2580000003 HC RX 258: Performed by: INTERNAL MEDICINE

## 2021-09-04 PROCEDURE — 97530 THERAPEUTIC ACTIVITIES: CPT

## 2021-09-04 PROCEDURE — 93005 ELECTROCARDIOGRAM TRACING: CPT | Performed by: INTERNAL MEDICINE

## 2021-09-04 PROCEDURE — 2580000003 HC RX 258: Performed by: SURGERY

## 2021-09-04 PROCEDURE — 85027 COMPLETE CBC AUTOMATED: CPT

## 2021-09-04 PROCEDURE — 6360000002 HC RX W HCPCS: Performed by: NURSE PRACTITIONER

## 2021-09-04 PROCEDURE — 74018 RADEX ABDOMEN 1 VIEW: CPT

## 2021-09-04 PROCEDURE — 80048 BASIC METABOLIC PNL TOTAL CA: CPT

## 2021-09-04 PROCEDURE — 6370000000 HC RX 637 (ALT 250 FOR IP): Performed by: NURSE PRACTITIONER

## 2021-09-04 PROCEDURE — 6360000002 HC RX W HCPCS: Performed by: SURGERY

## 2021-09-04 PROCEDURE — 93010 ELECTROCARDIOGRAM REPORT: CPT | Performed by: INTERNAL MEDICINE

## 2021-09-04 PROCEDURE — 2580000003 HC RX 258: Performed by: NURSE PRACTITIONER

## 2021-09-04 PROCEDURE — C9113 INJ PANTOPRAZOLE SODIUM, VIA: HCPCS | Performed by: INTERNAL MEDICINE

## 2021-09-04 PROCEDURE — 6360000002 HC RX W HCPCS: Performed by: INTERNAL MEDICINE

## 2021-09-04 PROCEDURE — 2500000003 HC RX 250 WO HCPCS: Performed by: SURGERY

## 2021-09-04 PROCEDURE — 97535 SELF CARE MNGMENT TRAINING: CPT

## 2021-09-04 PROCEDURE — 36415 COLL VENOUS BLD VENIPUNCTURE: CPT

## 2021-09-04 PROCEDURE — 99232 SBSQ HOSP IP/OBS MODERATE 35: CPT | Performed by: INTERNAL MEDICINE

## 2021-09-04 PROCEDURE — 97161 PT EVAL LOW COMPLEX 20 MIN: CPT

## 2021-09-04 PROCEDURE — 97165 OT EVAL LOW COMPLEX 30 MIN: CPT

## 2021-09-04 PROCEDURE — 99232 SBSQ HOSP IP/OBS MODERATE 35: CPT | Performed by: SURGERY

## 2021-09-04 PROCEDURE — 1200000000 HC SEMI PRIVATE

## 2021-09-04 RX ADMIN — MORPHINE SULFATE 2 MG: 2 INJECTION, SOLUTION INTRAMUSCULAR; INTRAVENOUS at 06:25

## 2021-09-04 RX ADMIN — PANTOPRAZOLE SODIUM 40 MG: 40 INJECTION, POWDER, FOR SOLUTION INTRAVENOUS at 08:24

## 2021-09-04 RX ADMIN — SODIUM CHLORIDE, PRESERVATIVE FREE 10 ML: 5 INJECTION INTRAVENOUS at 19:54

## 2021-09-04 RX ADMIN — GABAPENTIN 400 MG: 400 CAPSULE ORAL at 13:43

## 2021-09-04 RX ADMIN — POTASSIUM CHLORIDE: 2 INJECTION, SOLUTION, CONCENTRATE INTRAVENOUS at 13:38

## 2021-09-04 RX ADMIN — PANTOPRAZOLE SODIUM 40 MG: 40 INJECTION, POWDER, FOR SOLUTION INTRAVENOUS at 19:54

## 2021-09-04 RX ADMIN — PIPERACILLIN SODIUM AND TAZOBACTAM SODIUM 3375 MG: 3; .375 INJECTION, POWDER, LYOPHILIZED, FOR SOLUTION INTRAVENOUS at 13:41

## 2021-09-04 RX ADMIN — PIPERACILLIN SODIUM AND TAZOBACTAM SODIUM 3375 MG: 3; .375 INJECTION, POWDER, LYOPHILIZED, FOR SOLUTION INTRAVENOUS at 06:23

## 2021-09-04 RX ADMIN — TIZANIDINE 4 MG: 4 TABLET ORAL at 19:58

## 2021-09-04 RX ADMIN — GABAPENTIN 400 MG: 400 CAPSULE ORAL at 19:55

## 2021-09-04 RX ADMIN — POTASSIUM CHLORIDE: 2 INJECTION, SOLUTION, CONCENTRATE INTRAVENOUS at 06:22

## 2021-09-04 RX ADMIN — PIPERACILLIN SODIUM AND TAZOBACTAM SODIUM 3375 MG: 3; .375 INJECTION, POWDER, LYOPHILIZED, FOR SOLUTION INTRAVENOUS at 20:03

## 2021-09-04 RX ADMIN — MORPHINE SULFATE 2 MG: 2 INJECTION, SOLUTION INTRAMUSCULAR; INTRAVENOUS at 13:46

## 2021-09-04 RX ADMIN — TOPIRAMATE 100 MG: 100 TABLET, FILM COATED ORAL at 19:55

## 2021-09-04 RX ADMIN — ENOXAPARIN SODIUM 40 MG: 40 INJECTION SUBCUTANEOUS at 08:25

## 2021-09-04 RX ADMIN — MORPHINE SULFATE 2 MG: 2 INJECTION, SOLUTION INTRAMUSCULAR; INTRAVENOUS at 16:21

## 2021-09-04 RX ADMIN — OXYBUTYNIN CHLORIDE 5 MG: 5 TABLET ORAL at 19:55

## 2021-09-04 RX ADMIN — SODIUM CHLORIDE, PRESERVATIVE FREE 10 ML: 5 INJECTION INTRAVENOUS at 08:24

## 2021-09-04 ASSESSMENT — PAIN DESCRIPTION - ORIENTATION: ORIENTATION: RIGHT

## 2021-09-04 ASSESSMENT — PAIN SCALES - GENERAL
PAINLEVEL_OUTOF10: 4
PAINLEVEL_OUTOF10: 7
PAINLEVEL_OUTOF10: 7
PAINLEVEL_OUTOF10: 8
PAINLEVEL_OUTOF10: 7
PAINLEVEL_OUTOF10: 3

## 2021-09-04 ASSESSMENT — PAIN DESCRIPTION - LOCATION: LOCATION: ABDOMEN

## 2021-09-04 ASSESSMENT — PAIN DESCRIPTION - PAIN TYPE: TYPE: ACUTE PAIN

## 2021-09-04 NOTE — FLOWSHEET NOTE
09/03/21 1912   Vital Signs   Temp 96.9 °F (36.1 °C)   Temp Source Oral   Pulse 92   Heart Rate Source Monitor   Resp 16   /77   BP Location Left lower arm   Patient Position Semi fowlers   Level of Consciousness Alert (0)   MEWS Score 1   Oxygen Therapy   SpO2 96 %   O2 Device None (Room air)   Pt A/O assessment completed. Meds given per MAR. NG clamped for 30 mins at this time. Pt denies any pain or needs. Call light within reach and bed alarm on.

## 2021-09-04 NOTE — PROGRESS NOTES
AM assessment complete. Gave am meds due see mar. A/O x 4. Denies any pain. Ice given. Helen ARREOLA CLWS. Bed locked/lowest position. Bed check. Call light within reach.

## 2021-09-04 NOTE — PLAN OF CARE
Problem: Pain:  Goal: Pain level will decrease  Description: Pain level will decrease  9/4/2021 0843 by Renetta Cabrera RN  Outcome: Ongoing  9/3/2021 2117 by Kayla Huynh RN  Outcome: Ongoing  Goal: Control of acute pain  Description: Control of acute pain  9/4/2021 0843 by Renetta Cabrera RN  Outcome: Ongoing  9/3/2021 2117 by Kayla Huynh RN  Outcome: Ongoing  Goal: Control of chronic pain  Description: Control of chronic pain  Outcome: Ongoing  Goal: Patient's pain/discomfort is manageable  Description: Patient's pain/discomfort is manageable  Outcome: Ongoing     Problem: Skin Integrity:  Goal: Will show no infection signs and symptoms  Description: Will show no infection signs and symptoms  9/4/2021 0843 by Renetta Cabrera RN  Outcome: Ongoing  9/3/2021 2117 by Kayla Huynh RN  Outcome: Ongoing  Goal: Absence of new skin breakdown  Description: Absence of new skin breakdown  Outcome: Ongoing     Problem: Falls - Risk of:  Goal: Will remain free from falls  Description: Will remain free from falls  Outcome: Ongoing  Goal: Absence of physical injury  Description: Absence of physical injury  Outcome: Ongoing     Problem: Nausea/Vomiting:  Goal: Absence of nausea/vomiting  Description: Absence of nausea/vomiting  9/4/2021 0843 by Renetta Cabrera RN  Outcome: Ongoing  9/3/2021 2117 by Kayla Huynh RN  Outcome: Ongoing  Goal: Able to drink  Description: Able to drink  9/4/2021 0843 by Renetta Cabrera RN  Outcome: Ongoing  9/3/2021 2117 by Kayla Huynh RN  Outcome: Ongoing  Goal: Able to eat  Description: Able to eat  9/4/2021 0843 by Renetta Cabrera RN  Outcome: Ongoing  9/3/2021 2117 by Kayla Huynh RN  Outcome: Ongoing  Goal: Ability to achieve adequate nutritional intake will improve  Description: Ability to achieve adequate nutritional intake will improve  Outcome: Ongoing     Problem: Infection:  Goal: Will remain free from infection  Description: Will remain free from infection  Outcome: Ongoing     Problem:

## 2021-09-04 NOTE — PROGRESS NOTES
No JVD, no carotid bruit, no thyromegaly  Chest:  Clear to auscultation bilaterally, no added sounds  Cardiovascular:  RRR S1S2 heard, no murmurs or gallops  Abdomen:  Soft obese, , undistended, mild right LQ tenderness  no organomegaly, BS present  Extremities: right forearm swelling ++  No edema or cyanosis in LE. Distal pulses well felt  Neurological : no focal deficits    Lab Data:  CBC:   Recent Labs     09/02/21  0608 09/03/21  0551 09/04/21  0517   WBC 13.0* 10.9 6.5   RBC 4.11 4.07 3.95*   HGB 13.1 13.0 12.8   HCT 39.9 39.8 40.2   MCV 97.2 97.7 101.7*   RDW 16.3* 17.1* 17.8*    206 205     BMP:   Recent Labs     09/02/21 0608 09/03/21  0551 09/04/21  0517    137 139   K 3.7 3.5 3.8    103 108   CO2 18* 16* 15*   PHOS  --  3.7  --    BUN 22* 11 7   CREATININE 0.9 0.7 <0.5*     LIVER PROFILE:   No results for input(s): AST, ALT, ALB, BILIDIR, BILITOT, ALKPHOS in the last 72 hours. XR ABDOMEN (KUB) (SINGLE AP VIEW)   Final Result   Slightly decreased severity dilated small bowel loops over the past 24 hours. Nasogastric tube in good position. XR ABDOMEN (2 VIEWS)   Final Result   1. Recommend advancement of enteric tube 10 cm.   2. Nearly identical pattern small bowel with air-fluid levels that persist.   Underlying partial bowel obstruction or ileus may be considered clinically. XR ABDOMEN (2 VIEWS)   Final Result   Findings may be related to ileus or obstruction. CT ABDOMEN PELVIS WO CONTRAST Additional Contrast? Radiologist Recommendation (iodine allergy)   Final Result   1. Acute appendicitis with focal perforation. Tiny amount of free air with   no focal fluid to suggest abscess. Severe inflammatory changes in the right   lower quadrant. 2. Probable cholelithiasis. No evidence of acute cholecystitis.                Assessment & Plan:       Acute Appendicitis with Focal Perforation  - CT showed appendicitis with focal perforation, tiny amount of free air with no focal fluid, severe inflammatory changed in RLQ  - Admitted to Med Surg    - given IVF, abx: Zosyn D#4, PRN - Morphine and Toradol for pain control  - Gen Sx consulted, abx tx for now and eventual lap appe  Fevers  resolved  Hypotension improving with IVF   clinical condition worsened with development of ileus, NG placed and kept NPO  Improving ileus, had BM. Can clamp and start clears       Metabolic acidosis. With mild anion gap. Checked lactate level. Started bicarb containing IV fluids.     Hypokalemia  Hypomagnesemia  repleted as needed     Prolonged QTc  - 565  - repleted electrolytes, avoid QT prolonging agents  - repeat EKG improving qtc. Avoid zofran  - cut down cymbalta at dc     Leukocytosis  - 2/2 acute appendicitis  Resolved    TCP  - likely with acute infection   Resolved      Hx of DVT  - in 2015  - holding ASA, on prophylactic lovenox here     GERD  - cont Protonix     Chronic Pain Syndrome  - cont Cymbalta and Neurontin     FLORIAN  - Continue home CPAP     Morbid Obesity  S/p Laparoscopic Sleeve Gastrectomy  - Body mass index is 46.94 kg/m². - Complicating assessment and treatment.  Placing patient at risk for multiple co-morbidities as well as early death and contributing to the patient's presentation.   - Counseled on weight loss.      DVT Prophylaxis: Lovenox  Diet: NPO-NG  Code Status: Full Code      Bigg Moreno MD

## 2021-09-04 NOTE — PROGRESS NOTES
Inpatient Occupational Therapy  Evaluation and Treatment    Unit: [address] [place]  Date:  9/4/2021  Patient Name:    Kd Nesbitt  Admitting diagnosis:  Appendicitis [K37]  Acute appendicitis with perforation and generalized peritonitis, unspecified whether abscess present, unspecified whether gangrene present [K35.20]  Admit Date:  8/30/2021  Precautions/Restrictions/WB Status/ Lines/ Wounds/ Oxygen: fall risk, IV, bed/chair alarm, NGT, telemetry, NPO and glasses    Treatment Time:  830-910  Treatment Number: 1     Billable Treatment Time: 30 minutes   Total Treatment Time:   40   minutes    Patient Goals for Therapy:  \" to go to rehab \"      Discharge Recommendations: SNF  DME needs for discharge: defer to facility       Therapy recommendations for staff:  Assist of 1 with use of rolling walker for all transfers and ambulation within room    History of Present Illness: ED note, 8/30/2021, [place]:   \" 24-year-old female history of gastric sleeve, who presents with right lower quadrant pain nausea and fevers. Symptoms started yesterday. Patient reports she has a moderate to severe right lower quadrant pain that is exacerbated with some movements and was painful going over bumps on the right ovary. Patient reports \"I knew something was wrong I spike a fever. I never get fevers\". Nausea no vomiting passing stool passing flatus. No chest pain or shortness of breath or back pain no pelvic symptoms. No urinary symptoms. \"     PMHx: Acute renal failure (ARF) ([facility] (May 4, 2015), Arthritis, Asthma, DDD (degenerative disc disease), cervical, Deep vein thrombosis (DVT) ([facility] [address] (07/2015), Degenerative disc disease, Family history of factor V deficiency, GERD (gastroesophageal reflux disease), Hypertension, Scoliosis, Sleep apnea, and Spinal stenosis. Home Health S4 Level Recommendation:  NA    AM-PAC Score: AM-PAC Inpatient Daily Activity Raw Score: 16  Pt scored a 16/24 on the AM-PAC ADL Inpatient form.  Current research shows that an AM-PAC score of 17 or less is typically not associated with a discharge to the patient's home setting. Preadmission Environment:    Pt. Lives boyfriend Richie Garza). [employer] at home  Pt does not have access to  assistance. Home environment:  one story home  Steps to enter first floor:  3 steps to enter    Steps to second floor:  Full flight of 12-13 to basement (laundry)   Bathroom: tub/shower combo, shower chair (bench) , grab bars (suction cup)  and standard toilet  Equipment owned:  cane (SPC) and walker (Rollator)    Preadmission Status / PLOF:  History of falls   No  Pt. Able to drive   Yes  Pt Fully independent with ADL's  Yes  Pt. Required assistance from family for: Independent PTA    Pt sleeps in recliner. Pt. Fully independent for transfers and gait and walked with: Mychal Flick    Recently started using the rollator related to pain. Pain:  None at rest  5-6/10 in RLQ seated at EOB   7/10 standing, RLQ     Cognition:    A&O Person, Place, Time and Situation   Able to follow multi step directions, consistently. Subjective:  Pt supine in bed upon therapist arrival. Pt agreeable to work with therapy this date. Upper Extremity ROM:   WFL    Upper Extremity Strength:    BUE strength WFL, but not formally assessed w/ MMT    Upper Extremity Sensation:    WNL    Upper Extremity Proprioception:  WNL    Coordination and Tone:  WNL    Balance:  Functional Sitting Balance: WNL   Comments: Good at EOB while eating ice chips   Functional Standing Balance:WFL   Comments: Good with RW     Bed mobility:    Supine to sit:    Independent  Sit to supine:   Not Tested  Rolling:    Not Tested  Scooting in sitting:  Independent  Scooting to head of bed:   Not Tested   Bridging:   No    Transfers:    Sit to stand:  CGA and with rolling walker   Stand to sit:  CGA  Bed to chair:   CGA and with rolling walker   Standard toilet: Not Tested  Bed to [facility]:  Not Tested    Activities of Daily Living:   UB Dressing:   Not Tested  LB Dressing:    maximal assistance (75%) for socks  UB Bathing:  Not Tested  LB Bathing:  Not Tested  Feeding:  Independent to eat ice chips   Grooming:   SBA and seated  Toileting:  Not Tested    Activity Tolerance:   Pt completed therapy session with Pain noted with increased activity   BP (mmHg) HR (bpm) SpO2 (%) Comments   Supine, at rest 122/81 91 96 On room air   Seated at EOB  96 96      Positioning Needs:   Up in chair, call light and needs in reach. Exercise / Activities Initiated:   NT    Patient/Family Education:   Role of OT  Recommendations for DC    Assessment of Deficits: Pt seen for Occupational therapy evaluation in acute care setting. Pt demonstrated decreased Activity tolerance, ADLs, IADLs and Transfers. Pt functioning below baseline and will likely benefit from skilled occupational therapy services to maximize safety and independence. Goal(s): To be met in 3 Visits:  1). Bed to toilet: Independent    To be met in 5 Visits:  2). Upper Body Bathing:   Independent  3). Lower Body Bathing:   Independent  4). Upper Body Dressing:  Independent  5). Lower Body Dressing:  Independent  6). Pt to demonstrate UE exs x 15 reps with minimal cues    Rehabilitation Potential:  Good for goals listed above. Strengths for achieving goals include: Pt motivated, PLOF, Family Support and Pt cooperative  Barriers to achieving goals include:  Pain     Plan: To be seen 3-5 x/wk while in acute care setting for strengthening, transfer training, family/patient education and ADL/IADL retraining.       Ayah Hernandez, MOT, OTR/L   [id number]           If patient discharges from this facility prior to next visit, this note will serve as the Discharge Summary

## 2021-09-04 NOTE — PLAN OF CARE
Problem: Pain:  Goal: Pain level will decrease  Description: Pain level will decrease  9/3/2021 2117 by Leda Goldberg RN  Outcome: Ongoing  9/3/2021 1000 by Jessica Méndez RN  Outcome: Ongoing  Goal: Control of acute pain  Description: Control of acute pain  9/3/2021 2117 by Leda Goldberg RN  Outcome: Ongoing  9/3/2021 1000 by Jessica Méndez RN  Outcome: Ongoing  Goal: Control of chronic pain  Description: Control of chronic pain  9/3/2021 1000 by Jessica Méndez RN  Outcome: Ongoing  Goal: Patient's pain/discomfort is manageable  Description: Patient's pain/discomfort is manageable  9/3/2021 1000 by Jessica Méndez RN  Outcome: Ongoing     Problem: Skin Integrity:  Goal: Will show no infection signs and symptoms  Description: Will show no infection signs and symptoms  9/3/2021 2117 by Leda Goldberg RN  Outcome: Ongoing  9/3/2021 1000 by Jessica Méndez RN  Outcome: Ongoing  Goal: Absence of new skin breakdown  Description: Absence of new skin breakdown  9/3/2021 1000 by Jessica Méndez RN  Outcome: Ongoing     Problem: Falls - Risk of:  Goal: Will remain free from falls  Description: Will remain free from falls  9/3/2021 1000 by Jessica Méndez RN  Outcome: Ongoing  Goal: Absence of physical injury  Description: Absence of physical injury  9/3/2021 1000 by Jessica Méndez RN  Outcome: Ongoing     Problem: Nausea/Vomiting:  Goal: Absence of nausea/vomiting  Description: Absence of nausea/vomiting  9/3/2021 2117 by Leda Goldberg RN  Outcome: Ongoing  9/3/2021 1000 by Jessica Méndez RN  Outcome: Ongoing  Goal: Able to drink  Description: Able to drink  9/3/2021 2117 by Leda Goldberg RN  Outcome: Ongoing  9/3/2021 1000 by Jessica Méndez RN  Outcome: Ongoing  Goal: Able to eat  Description: Able to eat  9/3/2021 2117 by Leda Goldberg RN  Outcome: Ongoing  9/3/2021 1000 by Jessica Méndez RN  Outcome: Ongoing  Goal: Ability to achieve adequate nutritional intake will improve  Description: Ability to achieve adequate nutritional intake will improve  9/3/2021 1000 by Osvaldo Lee RN  Outcome: Ongoing     Problem: Infection:  Goal: Will remain free from infection  Description: Will remain free from infection  9/3/2021 1000 by Osvaldo Lee RN  Outcome: Ongoing     Problem: Safety:  Goal: Free from accidental physical injury  Description: Free from accidental physical injury  9/3/2021 1000 by Osvaldo Lee RN  Outcome: Ongoing  Goal: Free from intentional harm  Description: Free from intentional harm  9/3/2021 1000 by Osvaldo Lee RN  Outcome: Ongoing     Problem: Daily Care:  Goal: Daily care needs are met  Description: Daily care needs are met  9/3/2021 1000 by Osvaldo Lee RN  Outcome: Ongoing     Problem: Skin Integrity:  Goal: Skin integrity will stabilize  Description: Skin integrity will stabilize  9/3/2021 1000 by Osvaldo Lee RN  Outcome: Ongoing     Problem: Discharge Planning:  Goal: Patients continuum of care needs are met  Description: Patients continuum of care needs are met  9/3/2021 1000 by Osvaldo Lee RN  Outcome: Ongoing

## 2021-09-04 NOTE — PROGRESS NOTES
[facility] GENERAL SURGERY DAILY PROGRESS NOTE    SUBJECTIVE: Awake, alert    OBJECTIVE: CURRENT VITALS:  /77   Pulse 93   Temp 96.5 °F (35.8 °C) (Oral)   Resp 16   Ht 5' 3\" (1.6 m)   Wt 265 lb (120.2 kg)   LMP 12/24/2010   SpO2 93%   BMI 46.94 kg/m²          ABD: Soft. Much less tender. Had BM. LABS:    CBC: Recent Labs     09/02/21  0608 09/03/21  0551 09/04/21  0517   WBC 13.0* 10.9 6.5   RBC 4.11 4.07 3.95*   HGB 13.1 13.0 12.8   HCT 39.9 39.8 40.2   MCV 97.2 97.7 101.7*   RDW 16.3* 17.1* 17.8*    206 205     BMP:   Recent Labs     09/02/21  0608 09/03/21  0551 09/04/21  0517    137 139   K 3.7 3.5 3.8    103 108   CO2 18* 16* 15*   PHOS  --  3.7  --    BUN 22* 11 7   CREATININE 0.9 0.7 <0.5*     Recent Labs     09/03/21  0551   MG 1.70*       XRAYS: AXR some improved.         ASSESSMENT:   Perforated appendicitis  Ileus      PLAN:   Clamp NG  Full liquids  Ambulate  Antibiotics         Sue Del Angel MD

## 2021-09-04 NOTE — PROGRESS NOTES
Inpatient Physical Therapy Evaluation and Treatment    Unit: [facility]  Date:  9/4/2021  Patient Name:    Wilner Nesbitt  Admitting diagnosis:  Appendicitis [K37]  Acute appendicitis with perforation and generalized peritonitis, unspecified whether abscess present, unspecified whether gangrene present [K35.20]  Admit Date:  8/30/2021  Precautions/Restrictions/WB Status/ Lines/ Wounds/ Oxygen: Fall risk, Lines -IV and NG tube and Telemetry    Treatment Time:  8:30-9:10  Treatment Number:  1   Timed Code Treatment Minutes: 30 minutes  Total Treatment Minutes:  40  minutes    Patient Goals for Therapy: \" to go to rehab \"          Discharge Recommendations: SNF  DME needs for discharge: defer to facility       Therapy recommendation for EMS Transport: can transport by wheelchair    Therapy recommendations for staff:   Assist of 1 with use of rolling walker (RW) and gait belt for all transfers to/from [facility]  to/from [facility]  History Of Present Illness:     per Francisco New  \"The patient is a 60 y.o. female with a PMH of DVT, asthma, GERD, FLORIAN, morbid obesity, history of laparoscopic sleeve gastrectomy who presented to [facility] ED with complaint of RLQ abdominal pain that started yesterday. Initially thought symptoms were related to gas pains. She had a BM yesterday which was soft, brown stool. Symptoms did not get any better. Woke up early this morning with sharp right lower quadrant abdominal pain and a fever. Denies any nausea or vomiting. Reports shaking chills. She decided to come to the ER.     Work-up in the ED showed: Patient's temperature was 100.4. Blood pressures were stable. Potassium and magnesium were low. She had a leukocytosis of 14.3. Urine was negative. Covid was negative. CT of the abdomen showed acute appendicitis with focal perforation and inflammatory changes in the right lower quadrant. Patient was admitted to the medical surgical unit and general surgery was consulted. \"   PMH:     Acute renal failure (ARF) ([facility] [address] May 4, 2015     d/t IV dye    Arthritis      Asthma      DDD (degenerative disc disease), cervical      [facility]    Degenerative disc disease      Family history of factor V deficiency       brother and sister; pt tested does not have    GERD (gastroesophageal reflux disease)      Hypertension       no longer has after quitting stressful job    Scoliosis      Sleep apnea       uses c-pap    Spinal stenosis        Home Health S4 Level Recommendation:  NA  AM-PAC Mobility Score    AM-PAC Inpatient Mobility Raw Score : VANESSA Montoya scored a 16/24 on the AM-PAC short mobility form. Current research shows that an AM-PAC score of 17 or less is typically not associated with a discharge to the patient's home setting. If patient discharges prior to next session this note will serve as a discharge summary. Please see below for the latest assessment towards goals. Preadmission Environment:    Pt. Lives boyfriend Zeb Major). [employer] at home  Pt does not have access to 24/7 assistance. Home environment:  one story home  Steps to enter first floor:  3 steps to enter        Steps to second floor:  Full flight of 12-13 to basement (laundry)   Bathroom: tub/shower combo, shower chair (bench) , grab bars (suction cup)  and standard toilet  Equipment owned:  cane (SPC) and walker (Rollator)     Preadmission Status / PLOF:  History of falls             No  Pt. Able to drive          Yes  Pt Fully independent with ADL's         Yes  Pt. Required assistance from family for: Independent PTA    Pt sleeps in recliner. Pt. Fully independent for transfers and gait and walked with: Inge Estrella               Recently started using the rollator related to pain.      Pain:  None at rest  5-6/10 in RLQ seated at EOB   7/10 standing, RLQ     Cognition    A&O Person, Place, Time and Situation   Able to follow multistep directions    Subjective  Patient lying supine in bed with no family present. Pt agreeable to this PT eval & tx. Upper Extremity ROM/Strength  Please see OT evaluation. Lower Extremity ROM / Strength   AROM WFL: Yes, hip flexion limited by body habitus    WFL to complete bed mobility and transfers. Lower Extremity Sensation    [facility]    Lower Extremity Proprioception:   NT    Coordination and Tone  NT    Balance  Sitting:  Normal; Independent  Comments:     Standing: Fair ; CGA, unsteady  Comments: with SPC, improved with RW    Bed mobility:    Supine to sit: Independent  Sit to supine:                           Not Tested  Rolling:                                    Not Tested  Scooting in sitting:                   Independent  Scooting to head of bed:         Not Tested        Bridging: No     Transfers:    Sit to stand:                 CGA and with rolling walker   Stand to sit:                 CGA  Bed to chair:                CGA and with rolling walker   Standard toilet:            Not Tested  Bed to [facility]:                Not Tested    Gait gait deferred due to increased pain with trnasfer; pt ambulated 0 ft. Stair Training deferred, pt unsafe/ not appropriate to complete stairs at this time  Activity Tolerance:   Pt completed therapy session with Pain noted with increased activity    BP (mmHg) HR (bpm) SpO2 (%) Comments   Supine, at rest 122/81 91 96 On room air   Seated at EOB   96 96        Positioning Needs:       Pt reclined in chair, no alarm needed, positioned in proper neutral alignment and pressure relief provided. Call light provided and all needs within reach  able ot demonstrate recline feature independently    Exercises Initiated  Adi deferred secondary to treatment focus on functional mobility and pain  NA    Other  None.      Patient/Family Education   Pt educated on role of inpatient PT, POC, importance of continued activity, DC recommendations, safety awareness, transfer techniques and calling for assist with mobility. Assessment  Pt seen for Physical Therapy evaluation in acute care setting. Pt demonstrated decreased Activity tolerance, Balance and Safety as well as decreased independence with Ambulation, Bed Mobility  and Transfers. Recommending continued PT while in acute care while in acute care  Recommending SNF upon discharge as patient functioning well below baseline, demonstrates good rehab potential and unable to return home due to limited or no family support, inability to negotiate stairs to enter home/bedroom/bathroom, burden of care beyond caregiver ability and home environment not conducive to patient recovery. Goals : To be met in 3 visits:  1). Independent with LE Ex x 10 reps  2.) Bed to chair: SBA    To be met in 6 visits:  1). Supine to/from sit: SBA  2). Sit to/from stand: Independent  3). Bed to chair: Independent  4). Gait: Ambulate 125 ft.   with  Independent and use of LRAD (least restrictive assistive device)  5). Tolerate B LE exercises 3 sets of 10-15 reps  6). Ascend/descend 3 steps with CGA with use of no handrail and LRAD (least restrictive assistive device)    Rehabilitation Potential: Good  Strengths for achieving goals include:   Pt motivated, PLOF, Family Support and Pt cooperative   Barriers to achieving goals include:    Pain and Weakness    Plan    To be seen 3-5 x / week  while in acute care setting for therapeutic exercises, bed mobility, transfers, progressive gait training, balance training, and family/patient education. Signature: Elizabeth Butcher, PT #[id number]     If patient discharges from this facility prior to next visit, this note will serve as the Discharge Summary.

## 2021-09-05 ENCOUNTER — ANESTHESIA (OUTPATIENT)
Dept: OPERATING ROOM | Age: 56
DRG: 329 | End: 2021-09-05
Payer: MEDICARE

## 2021-09-05 ENCOUNTER — APPOINTMENT (OUTPATIENT)
Dept: CT IMAGING | Age: 56
DRG: 329 | End: 2021-09-05
Payer: MEDICARE

## 2021-09-05 ENCOUNTER — ANESTHESIA EVENT (OUTPATIENT)
Dept: OPERATING ROOM | Age: 56
DRG: 329 | End: 2021-09-05
Payer: MEDICARE

## 2021-09-05 VITALS
SYSTOLIC BLOOD PRESSURE: 75 MMHG | RESPIRATION RATE: 16 BRPM | OXYGEN SATURATION: 90 % | DIASTOLIC BLOOD PRESSURE: 60 MMHG

## 2021-09-05 PROBLEM — K35.32 PERFORATED APPENDICITIS: Status: ACTIVE | Noted: 2021-09-05

## 2021-09-05 LAB
ANION GAP SERPL CALCULATED.3IONS-SCNC: 14 MMOL/L (ref 3–16)
ANISOCYTOSIS: ABNORMAL
ATYPICAL LYMPHOCYTE RELATIVE PERCENT: 1 % (ref 0–6)
BANDED NEUTROPHILS RELATIVE PERCENT: 20 % (ref 0–7)
BASOPHILS ABSOLUTE: 0 K/UL (ref 0–0.2)
BASOPHILS RELATIVE PERCENT: 0 %
BUN BLDV-MCNC: 9 MG/DL (ref 7–20)
CALCIUM SERPL-MCNC: 8.9 MG/DL (ref 8.3–10.6)
CHLORIDE BLD-SCNC: 105 MMOL/L (ref 99–110)
CO2: 21 MMOL/L (ref 21–32)
CREAT SERPL-MCNC: 0.8 MG/DL (ref 0.6–1.1)
EKG ATRIAL RATE: 122 BPM
EKG ATRIAL RATE: 143 BPM
EKG DIAGNOSIS: NORMAL
EKG DIAGNOSIS: NORMAL
EKG P AXIS: 50 DEGREES
EKG P-R INTERVAL: 136 MS
EKG P-R INTERVAL: 152 MS
EKG Q-T INTERVAL: 272 MS
EKG Q-T INTERVAL: 312 MS
EKG QRS DURATION: 86 MS
EKG QRS DURATION: 90 MS
EKG QTC CALCULATION (BAZETT): 387 MS
EKG QTC CALCULATION (BAZETT): 481 MS
EKG R AXIS: -16 DEGREES
EKG R AXIS: -24 DEGREES
EKG T AXIS: 120 DEGREES
EKG T AXIS: 132 DEGREES
EKG VENTRICULAR RATE: 122 BPM
EKG VENTRICULAR RATE: 143 BPM
EOSINOPHILS ABSOLUTE: 0 K/UL (ref 0–0.6)
EOSINOPHILS RELATIVE PERCENT: 0 %
GFR AFRICAN AMERICAN: >60
GFR NON-AFRICAN AMERICAN: >60
GLUCOSE BLD-MCNC: 108 MG/DL (ref 70–99)
GLUCOSE BLD-MCNC: 128 MG/DL (ref 70–99)
HCT VFR BLD CALC: 44.6 % (ref 36–48)
HEMOGLOBIN: 14.6 G/DL (ref 12–16)
LYMPHOCYTES ABSOLUTE: 0.8 K/UL (ref 1–5.1)
LYMPHOCYTES RELATIVE PERCENT: 6 %
MAGNESIUM: 1.3 MG/DL (ref 1.8–2.4)
MCH RBC QN AUTO: 31.5 PG (ref 26–34)
MCHC RBC AUTO-ENTMCNC: 32.7 G/DL (ref 31–36)
MCV RBC AUTO: 96.6 FL (ref 80–100)
METAMYELOCYTES RELATIVE PERCENT: 1 %
MONOCYTES ABSOLUTE: 0.2 K/UL (ref 0–1.3)
MONOCYTES RELATIVE PERCENT: 2 %
MYELOCYTE PERCENT: 1 %
NEUTROPHILS ABSOLUTE: 10.6 K/UL (ref 1.7–7.7)
NEUTROPHILS RELATIVE PERCENT: 68 %
PDW BLD-RTO: 16.6 % (ref 12.4–15.4)
PERFORMED ON: ABNORMAL
PHOSPHORUS: 1.4 MG/DL (ref 2.5–4.9)
PLATELET # BLD: 350 K/UL (ref 135–450)
PLATELET SLIDE REVIEW: ABNORMAL
PMV BLD AUTO: 7.7 FL (ref 5–10.5)
POTASSIUM SERPL-SCNC: 4.3 MMOL/L (ref 3.5–5.1)
PROMYELOCYTES PERCENT: 1 %
RBC # BLD: 4.62 M/UL (ref 4–5.2)
SLIDE REVIEW: ABNORMAL
SODIUM BLD-SCNC: 140 MMOL/L (ref 136–145)
TOXIC GRANULATION: PRESENT
VACUOLATED NEUTROPHILS: PRESENT
WBC # BLD: 11.6 K/UL (ref 4–11)

## 2021-09-05 PROCEDURE — 6370000000 HC RX 637 (ALT 250 FOR IP): Performed by: NURSE PRACTITIONER

## 2021-09-05 PROCEDURE — 80048 BASIC METABOLIC PNL TOTAL CA: CPT

## 2021-09-05 PROCEDURE — 88307 TISSUE EXAM BY PATHOLOGIST: CPT

## 2021-09-05 PROCEDURE — 3700000001 HC ADD 15 MINUTES (ANESTHESIA): Performed by: SURGERY

## 2021-09-05 PROCEDURE — 2500000003 HC RX 250 WO HCPCS: Performed by: INTERNAL MEDICINE

## 2021-09-05 PROCEDURE — 6360000002 HC RX W HCPCS: Performed by: NURSE PRACTITIONER

## 2021-09-05 PROCEDURE — 2500000003 HC RX 250 WO HCPCS: Performed by: ANESTHESIOLOGY

## 2021-09-05 PROCEDURE — 74176 CT ABD & PELVIS W/O CONTRAST: CPT

## 2021-09-05 PROCEDURE — 49020 DRAINAGE ABDOM ABSCESS OPEN: CPT | Performed by: SURGERY

## 2021-09-05 PROCEDURE — 36415 COLL VENOUS BLD VENIPUNCTURE: CPT

## 2021-09-05 PROCEDURE — 84100 ASSAY OF PHOSPHORUS: CPT

## 2021-09-05 PROCEDURE — 3700000000 HC ANESTHESIA ATTENDED CARE: Performed by: SURGERY

## 2021-09-05 PROCEDURE — 99232 SBSQ HOSP IP/OBS MODERATE 35: CPT | Performed by: INTERNAL MEDICINE

## 2021-09-05 PROCEDURE — 2580000003 HC RX 258: Performed by: NURSE PRACTITIONER

## 2021-09-05 PROCEDURE — 0WQF0ZZ REPAIR ABDOMINAL WALL, OPEN APPROACH: ICD-10-PCS | Performed by: SURGERY

## 2021-09-05 PROCEDURE — 2709999900 HC NON-CHARGEABLE SUPPLY: Performed by: SURGERY

## 2021-09-05 PROCEDURE — 44160 REMOVAL OF COLON: CPT | Performed by: SURGERY

## 2021-09-05 PROCEDURE — 2580000003 HC RX 258: Performed by: SURGERY

## 2021-09-05 PROCEDURE — 2580000003 HC RX 258: Performed by: ANESTHESIOLOGY

## 2021-09-05 PROCEDURE — 3600000004 HC SURGERY LEVEL 4 BASE: Performed by: SURGERY

## 2021-09-05 PROCEDURE — 6360000002 HC RX W HCPCS: Performed by: INTERNAL MEDICINE

## 2021-09-05 PROCEDURE — 6360000002 HC RX W HCPCS: Performed by: SURGERY

## 2021-09-05 PROCEDURE — C1892 INTRO/SHEATH,FIXED,PEEL-AWAY: HCPCS | Performed by: SURGERY

## 2021-09-05 PROCEDURE — 1200000000 HC SEMI PRIVATE

## 2021-09-05 PROCEDURE — 93010 ELECTROCARDIOGRAM REPORT: CPT | Performed by: INTERNAL MEDICINE

## 2021-09-05 PROCEDURE — 3600000014 HC SURGERY LEVEL 4 ADDTL 15MIN: Performed by: SURGERY

## 2021-09-05 PROCEDURE — 2720000010 HC SURG SUPPLY STERILE: Performed by: SURGERY

## 2021-09-05 PROCEDURE — 2500000003 HC RX 250 WO HCPCS: Performed by: SURGERY

## 2021-09-05 PROCEDURE — 6360000002 HC RX W HCPCS: Performed by: ANESTHESIOLOGY

## 2021-09-05 PROCEDURE — 0WBF0ZZ EXCISION OF ABDOMINAL WALL, OPEN APPROACH: ICD-10-PCS | Performed by: SURGERY

## 2021-09-05 PROCEDURE — C9113 INJ PANTOPRAZOLE SODIUM, VIA: HCPCS | Performed by: INTERNAL MEDICINE

## 2021-09-05 PROCEDURE — C2626 INFUSION PUMP, NON-PROG,TEMP: HCPCS | Performed by: SURGERY

## 2021-09-05 PROCEDURE — 93005 ELECTROCARDIOGRAM TRACING: CPT | Performed by: INTERNAL MEDICINE

## 2021-09-05 PROCEDURE — 0DTF0ZZ RESECTION OF RIGHT LARGE INTESTINE, OPEN APPROACH: ICD-10-PCS | Performed by: SURGERY

## 2021-09-05 PROCEDURE — 85025 COMPLETE CBC W/AUTO DIFF WBC: CPT

## 2021-09-05 PROCEDURE — 83735 ASSAY OF MAGNESIUM: CPT

## 2021-09-05 PROCEDURE — 49561 PR REPAIR INCISIONAL HERNIA,STRANG: CPT | Performed by: SURGERY

## 2021-09-05 PROCEDURE — 99233 SBSQ HOSP IP/OBS HIGH 50: CPT | Performed by: SURGERY

## 2021-09-05 PROCEDURE — 05HM33Z INSERTION OF INFUSION DEVICE INTO RIGHT INTERNAL JUGULAR VEIN, PERCUTANEOUS APPROACH: ICD-10-PCS | Performed by: ANESTHESIOLOGY

## 2021-09-05 RX ORDER — PROMETHAZINE HYDROCHLORIDE 25 MG/ML
6.25 INJECTION, SOLUTION INTRAMUSCULAR; INTRAVENOUS
Status: DISCONTINUED | OUTPATIENT
Start: 2021-09-05 | End: 2021-09-06 | Stop reason: HOSPADM

## 2021-09-05 RX ORDER — MAGNESIUM HYDROXIDE 1200 MG/15ML
LIQUID ORAL CONTINUOUS PRN
Status: COMPLETED | OUTPATIENT
Start: 2021-09-05 | End: 2021-09-05

## 2021-09-05 RX ORDER — DEXAMETHASONE SODIUM PHOSPHATE 4 MG/ML
INJECTION, SOLUTION INTRA-ARTICULAR; INTRALESIONAL; INTRAMUSCULAR; INTRAVENOUS; SOFT TISSUE PRN
Status: DISCONTINUED | OUTPATIENT
Start: 2021-09-05 | End: 2021-09-06 | Stop reason: SDUPTHER

## 2021-09-05 RX ORDER — OXYCODONE HYDROCHLORIDE AND ACETAMINOPHEN 5; 325 MG/1; MG/1
1 TABLET ORAL PRN
Status: ACTIVE | OUTPATIENT
Start: 2021-09-05 | End: 2021-09-05

## 2021-09-05 RX ORDER — SODIUM CHLORIDE AND POTASSIUM CHLORIDE .9; .15 G/100ML; G/100ML
SOLUTION INTRAVENOUS CONTINUOUS
Status: DISCONTINUED | OUTPATIENT
Start: 2021-09-05 | End: 2021-09-06

## 2021-09-05 RX ORDER — DILTIAZEM HYDROCHLORIDE 5 MG/ML
10 INJECTION INTRAVENOUS ONCE
Status: COMPLETED | OUTPATIENT
Start: 2021-09-05 | End: 2021-09-05

## 2021-09-05 RX ORDER — BUPIVACAINE HYDROCHLORIDE 5 MG/ML
INJECTION, SOLUTION EPIDURAL; INTRACAUDAL PRN
Status: DISCONTINUED | OUTPATIENT
Start: 2021-09-05 | End: 2021-09-06 | Stop reason: HOSPADM

## 2021-09-05 RX ORDER — MEPERIDINE HYDROCHLORIDE 25 MG/ML
12.5 INJECTION INTRAMUSCULAR; INTRAVENOUS; SUBCUTANEOUS EVERY 5 MIN PRN
Status: DISCONTINUED | OUTPATIENT
Start: 2021-09-05 | End: 2021-09-06 | Stop reason: HOSPADM

## 2021-09-05 RX ORDER — ONDANSETRON 2 MG/ML
INJECTION INTRAMUSCULAR; INTRAVENOUS PRN
Status: DISCONTINUED | OUTPATIENT
Start: 2021-09-05 | End: 2021-09-06 | Stop reason: SDUPTHER

## 2021-09-05 RX ORDER — PROPOFOL 10 MG/ML
5-50 INJECTION, EMULSION INTRAVENOUS
Status: DISCONTINUED | OUTPATIENT
Start: 2021-09-05 | End: 2021-09-06

## 2021-09-05 RX ORDER — 0.9 % SODIUM CHLORIDE 0.9 %
1000 INTRAVENOUS SOLUTION INTRAVENOUS ONCE
Status: COMPLETED | OUTPATIENT
Start: 2021-09-05 | End: 2021-09-06

## 2021-09-05 RX ORDER — SUCCINYLCHOLINE CHLORIDE 20 MG/ML
INJECTION INTRAMUSCULAR; INTRAVENOUS PRN
Status: DISCONTINUED | OUTPATIENT
Start: 2021-09-05 | End: 2021-09-06 | Stop reason: SDUPTHER

## 2021-09-05 RX ORDER — HYDRALAZINE HYDROCHLORIDE 20 MG/ML
5 INJECTION INTRAMUSCULAR; INTRAVENOUS EVERY 10 MIN PRN
Status: DISCONTINUED | OUTPATIENT
Start: 2021-09-05 | End: 2021-09-06 | Stop reason: HOSPADM

## 2021-09-05 RX ORDER — LABETALOL HYDROCHLORIDE 5 MG/ML
5 INJECTION, SOLUTION INTRAVENOUS EVERY 10 MIN PRN
Status: DISCONTINUED | OUTPATIENT
Start: 2021-09-05 | End: 2021-09-06 | Stop reason: HOSPADM

## 2021-09-05 RX ORDER — MORPHINE SULFATE 2 MG/ML
1 INJECTION, SOLUTION INTRAMUSCULAR; INTRAVENOUS EVERY 5 MIN PRN
Status: DISCONTINUED | OUTPATIENT
Start: 2021-09-05 | End: 2021-09-06 | Stop reason: HOSPADM

## 2021-09-05 RX ORDER — DIPHENHYDRAMINE HYDROCHLORIDE 50 MG/ML
12.5 INJECTION INTRAMUSCULAR; INTRAVENOUS
Status: ACTIVE | OUTPATIENT
Start: 2021-09-05 | End: 2021-09-05

## 2021-09-05 RX ORDER — OXYCODONE HYDROCHLORIDE AND ACETAMINOPHEN 5; 325 MG/1; MG/1
2 TABLET ORAL PRN
Status: ACTIVE | OUTPATIENT
Start: 2021-09-05 | End: 2021-09-05

## 2021-09-05 RX ORDER — ONDANSETRON 2 MG/ML
4 INJECTION INTRAMUSCULAR; INTRAVENOUS PRN
Status: DISCONTINUED | OUTPATIENT
Start: 2021-09-05 | End: 2021-09-06 | Stop reason: HOSPADM

## 2021-09-05 RX ORDER — PROPOFOL 10 MG/ML
INJECTION, EMULSION INTRAVENOUS PRN
Status: DISCONTINUED | OUTPATIENT
Start: 2021-09-05 | End: 2021-09-06 | Stop reason: SDUPTHER

## 2021-09-05 RX ORDER — PROPOFOL 10 MG/ML
INJECTION, EMULSION INTRAVENOUS CONTINUOUS PRN
Status: DISCONTINUED | OUTPATIENT
Start: 2021-09-05 | End: 2021-09-06 | Stop reason: SDUPTHER

## 2021-09-05 RX ORDER — ROCURONIUM BROMIDE 10 MG/ML
INJECTION, SOLUTION INTRAVENOUS PRN
Status: DISCONTINUED | OUTPATIENT
Start: 2021-09-05 | End: 2021-09-06 | Stop reason: SDUPTHER

## 2021-09-05 RX ORDER — KETOROLAC TROMETHAMINE 30 MG/ML
15 INJECTION, SOLUTION INTRAMUSCULAR; INTRAVENOUS EVERY 8 HOURS PRN
Status: DISCONTINUED | OUTPATIENT
Start: 2021-09-05 | End: 2021-09-06

## 2021-09-05 RX ORDER — MORPHINE SULFATE 2 MG/ML
2 INJECTION, SOLUTION INTRAMUSCULAR; INTRAVENOUS EVERY 5 MIN PRN
Status: DISCONTINUED | OUTPATIENT
Start: 2021-09-05 | End: 2021-09-06 | Stop reason: HOSPADM

## 2021-09-05 RX ORDER — SODIUM CHLORIDE, SODIUM LACTATE, POTASSIUM CHLORIDE, CALCIUM CHLORIDE 600; 310; 30; 20 MG/100ML; MG/100ML; MG/100ML; MG/100ML
INJECTION, SOLUTION INTRAVENOUS CONTINUOUS PRN
Status: DISCONTINUED | OUTPATIENT
Start: 2021-09-05 | End: 2021-09-06 | Stop reason: SDUPTHER

## 2021-09-05 RX ADMIN — PIPERACILLIN SODIUM AND TAZOBACTAM SODIUM 3375 MG: 3; .375 INJECTION, POWDER, LYOPHILIZED, FOR SOLUTION INTRAVENOUS at 13:49

## 2021-09-05 RX ADMIN — NOREPINEPHRINE BITARTRATE 4 MCG/MIN: 1 INJECTION INTRAVENOUS at 23:29

## 2021-09-05 RX ADMIN — MAGNESIUM SULFATE HEPTAHYDRATE 2000 MG: 40 INJECTION, SOLUTION INTRAVENOUS at 17:52

## 2021-09-05 RX ADMIN — DEXAMETHASONE SODIUM PHOSPHATE 8 MG: 4 INJECTION, SOLUTION INTRAMUSCULAR; INTRAVENOUS at 21:50

## 2021-09-05 RX ADMIN — SODIUM CHLORIDE 25 ML: 9 INJECTION, SOLUTION INTRAVENOUS at 13:48

## 2021-09-05 RX ADMIN — Medication 270 ML: at 23:43

## 2021-09-05 RX ADMIN — POTASSIUM CHLORIDE AND SODIUM CHLORIDE: 900; 150 INJECTION, SOLUTION INTRAVENOUS at 11:23

## 2021-09-05 RX ADMIN — PANTOPRAZOLE SODIUM 40 MG: 40 INJECTION, POWDER, FOR SOLUTION INTRAVENOUS at 09:04

## 2021-09-05 RX ADMIN — MAGNESIUM SULFATE HEPTAHYDRATE 2000 MG: 40 INJECTION, SOLUTION INTRAVENOUS at 20:10

## 2021-09-05 RX ADMIN — ROCURONIUM BROMIDE 50 MG: 10 INJECTION, SOLUTION INTRAVENOUS at 22:52

## 2021-09-05 RX ADMIN — MAGNESIUM GLUCONATE 500 MG ORAL TABLET 100 MG: 500 TABLET ORAL at 09:19

## 2021-09-05 RX ADMIN — DILTIAZEM HYDROCHLORIDE 10 MG: 5 INJECTION INTRAVENOUS at 13:26

## 2021-09-05 RX ADMIN — DULOXETINE HYDROCHLORIDE 120 MG: 60 CAPSULE, DELAYED RELEASE ORAL at 09:04

## 2021-09-05 RX ADMIN — GABAPENTIN 400 MG: 400 CAPSULE ORAL at 14:09

## 2021-09-05 RX ADMIN — ENOXAPARIN SODIUM 40 MG: 40 INJECTION SUBCUTANEOUS at 09:09

## 2021-09-05 RX ADMIN — ROCURONIUM BROMIDE 10 MG: 10 INJECTION, SOLUTION INTRAVENOUS at 21:50

## 2021-09-05 RX ADMIN — PROPOFOL 10 MCG/KG/MIN: 10 INJECTION, EMULSION INTRAVENOUS at 22:58

## 2021-09-05 RX ADMIN — SODIUM CHLORIDE, PRESERVATIVE FREE 10 ML: 5 INJECTION INTRAVENOUS at 09:04

## 2021-09-05 RX ADMIN — PROPOFOL 180 MG: 10 INJECTION, EMULSION INTRAVENOUS at 21:50

## 2021-09-05 RX ADMIN — ROCURONIUM BROMIDE 40 MG: 10 INJECTION, SOLUTION INTRAVENOUS at 21:54

## 2021-09-05 RX ADMIN — SODIUM CHLORIDE 1000 ML: 9 INJECTION, SOLUTION INTRAVENOUS at 20:29

## 2021-09-05 RX ADMIN — PIPERACILLIN SODIUM AND TAZOBACTAM SODIUM 3375 MG: 3; .375 INJECTION, POWDER, LYOPHILIZED, FOR SOLUTION INTRAVENOUS at 21:38

## 2021-09-05 RX ADMIN — GABAPENTIN 400 MG: 400 CAPSULE ORAL at 09:04

## 2021-09-05 RX ADMIN — SODIUM CHLORIDE, POTASSIUM CHLORIDE, SODIUM LACTATE AND CALCIUM CHLORIDE: 600; 310; 30; 20 INJECTION, SOLUTION INTRAVENOUS at 21:50

## 2021-09-05 RX ADMIN — ONDANSETRON 4 MG: 2 INJECTION, SOLUTION INTRAMUSCULAR; INTRAVENOUS at 21:50

## 2021-09-05 RX ADMIN — KETOROLAC TROMETHAMINE 15 MG: 30 INJECTION, SOLUTION INTRAMUSCULAR; INTRAVENOUS at 14:09

## 2021-09-05 RX ADMIN — SUCCINYLCHOLINE CHLORIDE 160 MG: 20 INJECTION, SOLUTION INTRAMUSCULAR; INTRAVENOUS at 21:50

## 2021-09-05 RX ADMIN — POTASSIUM CHLORIDE: 2 INJECTION, SOLUTION, CONCENTRATE INTRAVENOUS at 05:21

## 2021-09-05 RX ADMIN — OXYBUTYNIN CHLORIDE 5 MG: 5 TABLET ORAL at 09:08

## 2021-09-05 RX ADMIN — MORPHINE SULFATE 2 MG: 2 INJECTION, SOLUTION INTRAMUSCULAR; INTRAVENOUS at 09:09

## 2021-09-05 RX ADMIN — ROCURONIUM BROMIDE 50 MG: 10 INJECTION, SOLUTION INTRAVENOUS at 22:28

## 2021-09-05 RX ADMIN — PIPERACILLIN SODIUM AND TAZOBACTAM SODIUM 3375 MG: 3; .375 INJECTION, POWDER, LYOPHILIZED, FOR SOLUTION INTRAVENOUS at 04:18

## 2021-09-05 RX ADMIN — ACETAMINOPHEN 650 MG: 325 TABLET ORAL at 13:57

## 2021-09-05 ASSESSMENT — PULMONARY FUNCTION TESTS
PIF_VALUE: 23
PIF_VALUE: 27
PIF_VALUE: 27
PIF_VALUE: 1
PIF_VALUE: 27
PIF_VALUE: 27
PIF_VALUE: 23
PIF_VALUE: 27
PIF_VALUE: 27
PIF_VALUE: 22
PIF_VALUE: 23
PIF_VALUE: 23
PIF_VALUE: 0
PIF_VALUE: 28
PIF_VALUE: 28
PIF_VALUE: 22
PIF_VALUE: 1
PIF_VALUE: 26
PIF_VALUE: 26
PIF_VALUE: 23
PIF_VALUE: 24
PIF_VALUE: 26
PIF_VALUE: 29
PIF_VALUE: 25
PIF_VALUE: 2
PIF_VALUE: 31
PIF_VALUE: 28
PIF_VALUE: 23
PIF_VALUE: 25
PIF_VALUE: 1
PIF_VALUE: 27
PIF_VALUE: 23
PIF_VALUE: 0
PIF_VALUE: 29
PIF_VALUE: 23
PIF_VALUE: 23
PIF_VALUE: 0
PIF_VALUE: 24
PIF_VALUE: 25
PIF_VALUE: 28
PIF_VALUE: 26
PIF_VALUE: 28
PIF_VALUE: 26
PIF_VALUE: 25
PIF_VALUE: 27
PIF_VALUE: 23
PIF_VALUE: 27
PIF_VALUE: 1
PIF_VALUE: 28
PIF_VALUE: 27
PIF_VALUE: 22
PIF_VALUE: 25
PIF_VALUE: 25
PIF_VALUE: 22
PIF_VALUE: 1
PIF_VALUE: 26
PIF_VALUE: 27
PIF_VALUE: 27
PIF_VALUE: 25
PIF_VALUE: 28
PIF_VALUE: 23
PIF_VALUE: 23
PIF_VALUE: 25
PIF_VALUE: 22
PIF_VALUE: 27
PIF_VALUE: 27
PIF_VALUE: 25
PIF_VALUE: 26
PIF_VALUE: 25
PIF_VALUE: 24
PIF_VALUE: 29
PIF_VALUE: 25
PIF_VALUE: 23
PIF_VALUE: 24
PIF_VALUE: 28
PIF_VALUE: 25
PIF_VALUE: 27
PIF_VALUE: 21
PIF_VALUE: 28
PIF_VALUE: 26
PIF_VALUE: 25
PIF_VALUE: 26
PIF_VALUE: 21
PIF_VALUE: 25
PIF_VALUE: 29
PIF_VALUE: 26
PIF_VALUE: 25
PIF_VALUE: 20
PIF_VALUE: 27
PIF_VALUE: 29
PIF_VALUE: 28
PIF_VALUE: 25
PIF_VALUE: 22
PIF_VALUE: 26
PIF_VALUE: 28
PIF_VALUE: 27
PIF_VALUE: 24
PIF_VALUE: 27
PIF_VALUE: 25
PIF_VALUE: 1
PIF_VALUE: 27
PIF_VALUE: 22
PIF_VALUE: 25
PIF_VALUE: 28
PIF_VALUE: 25
PIF_VALUE: 1
PIF_VALUE: 1
PIF_VALUE: 24
PIF_VALUE: 26
PIF_VALUE: 28
PIF_VALUE: 27
PIF_VALUE: 27
PIF_VALUE: 25
PIF_VALUE: 25
PIF_VALUE: 31
PIF_VALUE: 24
PIF_VALUE: 26
PIF_VALUE: 26
PIF_VALUE: 30
PIF_VALUE: 28
PIF_VALUE: 26
PIF_VALUE: 1
PIF_VALUE: 26
PIF_VALUE: 28
PIF_VALUE: 27
PIF_VALUE: 23
PIF_VALUE: 29
PIF_VALUE: 26
PIF_VALUE: 23
PIF_VALUE: 26
PIF_VALUE: 27
PIF_VALUE: 28
PIF_VALUE: 27

## 2021-09-05 ASSESSMENT — PAIN DESCRIPTION - LOCATION
LOCATION: ABDOMEN

## 2021-09-05 ASSESSMENT — PAIN DESCRIPTION - ONSET: ONSET: ON-GOING

## 2021-09-05 ASSESSMENT — PAIN DESCRIPTION - DESCRIPTORS
DESCRIPTORS: DISCOMFORT;CRAMPING
DESCRIPTORS: SHARP

## 2021-09-05 ASSESSMENT — PAIN - FUNCTIONAL ASSESSMENT: PAIN_FUNCTIONAL_ASSESSMENT: ACTIVITIES ARE NOT PREVENTED

## 2021-09-05 ASSESSMENT — PAIN SCALES - GENERAL
PAINLEVEL_OUTOF10: 7
PAINLEVEL_OUTOF10: 8
PAINLEVEL_OUTOF10: 3
PAINLEVEL_OUTOF10: 8
PAINLEVEL_OUTOF10: 3
PAINLEVEL_OUTOF10: 7

## 2021-09-05 ASSESSMENT — PAIN DESCRIPTION - PROGRESSION: CLINICAL_PROGRESSION: NOT CHANGED

## 2021-09-05 ASSESSMENT — PAIN DESCRIPTION - ORIENTATION: ORIENTATION: RIGHT;LOWER

## 2021-09-05 ASSESSMENT — PAIN DESCRIPTION - FREQUENCY: FREQUENCY: CONTINUOUS

## 2021-09-05 ASSESSMENT — PAIN DESCRIPTION - PAIN TYPE: TYPE: ACUTE PAIN

## 2021-09-05 NOTE — PROGRESS NOTES
Dr Adia Villanueva ok to give cardizem with BP 94/61. PCU Charge Bernardo Pedersen RN/Clincal at bedside for [employer]. See mar. Vitals in flowsheet. EKG complete. Clinical Mary Beth to notify Dr Adia Villanueva results.

## 2021-09-05 NOTE — PLAN OF CARE
Problem: Pain:  Goal: Pain level will decrease  Description: Pain level will decrease  9/5/2021 0924 by Denise Buck RN  Outcome: Ongoing  9/4/2021 2223 by Natan Solares RN  Outcome: Ongoing  Goal: Control of acute pain  Description: Control of acute pain  9/5/2021 0924 by Denise Buck RN  Outcome: Ongoing  9/4/2021 2223 by Natan Solares RN  Outcome: Ongoing  Goal: Control of chronic pain  Description: Control of chronic pain  Outcome: Ongoing  Goal: Patient's pain/discomfort is manageable  Description: Patient's pain/discomfort is manageable  Outcome: Ongoing     Problem: Skin Integrity:  Goal: Will show no infection signs and symptoms  Description: Will show no infection signs and symptoms  9/5/2021 0924 by Denise Buck RN  Outcome: Ongoing  9/4/2021 2223 by Natan Solares RN  Outcome: Ongoing  Goal: Absence of new skin breakdown  Description: Absence of new skin breakdown  9/5/2021 0924 by Denise Buck RN  Outcome: Ongoing  9/4/2021 2223 by Natan Solares RN  Outcome: Ongoing     Problem: Falls - Risk of:  Goal: Will remain free from falls  Description: Will remain free from falls  9/5/2021 0924 by Denise Buck RN  Outcome: Ongoing  9/4/2021 2223 by Natan Solares RN  Outcome: Ongoing  Goal: Absence of physical injury  Description: Absence of physical injury  Outcome: Ongoing     Problem: Nausea/Vomiting:  Goal: Absence of nausea/vomiting  Description: Absence of nausea/vomiting  9/5/2021 0924 by Denise Buck RN  Outcome: Ongoing  9/4/2021 2223 by Natan Solares RN  Outcome: Ongoing  Goal: Able to drink  Description: Able to drink  9/5/2021 0924 by Denise Buck RN  Outcome: Ongoing  9/4/2021 2223 by Natan Solares RN  Outcome: Ongoing  Goal: Able to eat  Description: Able to eat  9/5/2021 0924 by Denise Buck RN  Outcome: Ongoing  9/4/2021 2223 by Natan Solares RN  Outcome: Ongoing  Goal: Ability to achieve adequate nutritional intake will improve  Description: Ability to achieve adequate nutritional intake will improve  Outcome: Ongoing     Problem: Infection:  Goal: Will remain free from infection  Description: Will remain free from infection  Outcome: Ongoing     Problem: Safety:  Goal: Free from accidental physical injury  Description: Free from accidental physical injury  Outcome: Ongoing  Goal: Free from intentional harm  Description: Free from intentional harm  Outcome: Ongoing     Problem: Daily Care:  Goal: Daily care needs are met  Description: Daily care needs are met  Outcome: Ongoing     Problem: Skin Integrity:  Goal: Skin integrity will stabilize  Description: Skin integrity will stabilize  Outcome: Ongoing     Problem: Discharge Planning:  Goal: Patients continuum of care needs are met  Description: Patients continuum of care needs are met  Outcome: Ongoing

## 2021-09-05 NOTE — PROGRESS NOTES
AM assessment complete. Gave am meds due. PRN pain med given for pain level 8/10 RLQ. See mar/pain flowsheet. A/O x 4. [place] placed. Up in chair. Chair alarm/locked in position. Call light within reach.

## 2021-09-05 NOTE — FLOWSHEET NOTE
09/05/21 1945   Vital Signs   Temp 98.5 °F (36.9 °C)   Temp Source Oral   Pulse 98   Resp 16   BP (!) 81/59   BP Location Left lower arm   Level of Consciousness Alert (0)   MEWS Score 2   Oxygen Therapy   SpO2 93 %   O2 Device None (Room air)   Pt awake/alert. Perfectserve sent to Dr. Naren Harkins and Dr. Benavides Setting regarding low b/p and poor IV access. Pt c/o abd pain, unable to medicate at this time due to low b/p Pt aware. Pt has sisters and fiance at bedside prior to OR. Ice bag given to pt for abd pain. Pt's jewelry and her glasses were given to her fiance and her sister.   Katalina Hughes RN

## 2021-09-05 NOTE — PROGRESS NOTES
Per telephone order from Dr Ellen Chapman. Order CT ABD/Pelvis WO contrast stat. CT notified.  Start oral contrast.

## 2021-09-05 NOTE — PLAN OF CARE
achieve adequate nutritional intake will improve  Description: Ability to achieve adequate nutritional intake will improve  9/4/2021 0843 by Tor Sykes RN  Outcome: Ongoing     Problem: Infection:  Goal: Will remain free from infection  Description: Will remain free from infection  9/4/2021 0843 by Tor Sykes RN  Outcome: Ongoing     Problem: Safety:  Goal: Free from accidental physical injury  Description: Free from accidental physical injury  9/4/2021 0843 by Tor Sykes RN  Outcome: Ongoing  Goal: Free from intentional harm  Description: Free from intentional harm  9/4/2021 0843 by Tor Sykes RN  Outcome: Ongoing     Problem: Daily Care:  Goal: Daily care needs are met  Description: Daily care needs are met  9/4/2021 0843 by Tor Sykes RN  Outcome: Ongoing     Problem: Skin Integrity:  Goal: Skin integrity will stabilize  Description: Skin integrity will stabilize  9/4/2021 0843 by Tor Sykes RN  Outcome: Ongoing     Problem: Discharge Planning:  Goal: Patients continuum of care needs are met  Description: Patients continuum of care needs are met  9/4/2021 0843 by Tor Sykes RN  Outcome: Ongoing

## 2021-09-05 NOTE — PROGRESS NOTES
Bedside report given to Corbin RN  pt in stable condition no needs at this time.  Call light within reach

## 2021-09-05 NOTE — CARE COORDINATION
INTERDISCIPLINARY PLAN OF CARE CONFERENCE    Date/Time: 9/5/2021 9:15 AM  Completed by: Marietta Real RN, Case Management      Patient Name:  Magdiel Nesbitt  YOB: 1965  Admitting Diagnosis: Appendicitis [K37]  Acute appendicitis with perforation and generalized peritonitis, unspecified whether abscess present, unspecified whether gangrene present [K35.20]     Admit Date/Time:  8/30/2021 10:47 AM    Chart reviewed. Interdisciplinary team contacted or reviewed plan related to patient progress and discharge plans. Disciplines included Case Management, Nursing, and Dietitian. Current Status: IP 08/30/2021  PT/OT recommendation for discharge plan of care: STR    Expected D/C Disposition:  Skilled nursing facility  Confirmed plan with patient   Discharge Plan Comments: Chart reviewed. Met with pt at bedside and explained the role of the CM.  Plans to go to rehab and wishes to go to [facility] [place] as she has been there in the past.  Referral called to Nagi Velasquez @ [facility] [place]  Faxed face sheet, H/P, MAR and therapy notes for review: Yes  Bed available?: TBD  Home O2 in place on admit: No  Pt informed of need to bring portable home O2 tank on day of discharge for nursing to connect prior to leaving:  Not Indicated  Verbalized agreement/Understanding:  Not Indicated

## 2021-09-05 NOTE — FLOWSHEET NOTE
09/04/21 1943   Vital Signs   Temp 97.8 °F (36.6 °C)   Temp Source Oral   Pulse 106   Heart Rate Source Monitor   Resp 18   /88   BP Location Right lower arm   Patient Position Semi fowlers   Level of Consciousness Alert (0)   MEWS Score 2   Oxygen Therapy   SpO2 98 %   O2 Device None (Room air)   Pt A/O x 4 assessment completed. MG clamped. Meds given per MAR. Pt first requested pain medication and then changed her mind. Pt informed that it is available. Pt denies any needs. Call light within reach and bed alarm on.

## 2021-09-05 NOTE — PROGRESS NOTES
Called to 225 to evaluate patient with elevated HR of 157 on monitor. Appears to be sinus tach. Pt dyspneic upon my arrival to room. Pt stated she feels very tired. B/P 96/50. 12 lead EKG ordered. Dr Ginger Gutiérrez updated by charge RN and order given for Cardizem bolus 10 mg. Given by PCU charge. Pt's B/p 80/50 after bolus intially, HR back up to 122. Pt currently feeling poor . Dr Bautista Syed made aware of patient change.  Last B/p 98/52

## 2021-09-05 NOTE — PROGRESS NOTES
Admit: 2021    Name:  Heron Turner  Room:  [address] [place] [address]  MRN:    [id number]     Daily Progress Note for 2021   Acute appendicitis with abscess     Interval History:     Ms. Teodora Guadarrama seen up in chair, NG in place  Reports some chills this am   Reports pain only with bending or getting up   No fevers  BP stable  Has been passing flatus but no BM since yesterday        Scheduled Meds:   pantoprazole  40 mg IntraVENous BID    DULoxetine  120 mg Oral Daily    gabapentin  400 mg Oral TID    tiZANidine  4 mg Oral Nightly    oxybutynin  5 mg Oral BID    topiramate  100 mg Oral Nightly    sodium chloride flush  5-40 mL IntraVENous 2 times per day    enoxaparin  40 mg SubCUTAneous Daily    piperacillin-tazobactam  3,375 mg IntraVENous Q8H    magnesium oxide  100 mg Oral Daily       Continuous Infusions:   IV infusion builder 60 mL/hr at 21 0521    sodium chloride 25 mL (21)       PRN Meds:  ketorolac, morphine **OR** morphine, calcium carbonate, prochlorperazine, sodium chloride flush, sodium chloride, [Held by provider] ondansetron **OR** [Held by provider] ondansetron, polyethylene glycol, acetaminophen **OR** acetaminophen, potassium chloride **OR** potassium alternative oral replacement **OR** potassium chloride, magnesium sulfate                  Objective:     Temp  Av.2 °F (36.2 °C)  Min: 96.6 °F (35.9 °C)  Max: 97.8 °F (36.6 °C)  Pulse  Av.8  Min: 88  Max: 106  BP  Min: 100/61  Max: 133/72  SpO2  Av %  Min: 94 %  Max: 98 %  Patient Vitals for the past 4 hrs:   BP Temp Temp src Pulse Resp SpO2   21 0856 133/72 -- -- -- -- --   21 0733 104/70 96.8 °F (36 °C) Oral 96 16 96 %         Intake/Output Summary (Last 24 hours) at 2021 1037  Last data filed at 2021 1920  Gross per 24 hour   Intake 3612.97 ml   Output 250 ml   Net 3362.97 ml       Physical Exam:  General: middle aged morbidly obese female up in chair   Awake, alert and oriented.  Appears to be not in any distress  Mucous Membranes:  Pink , anicteric  NG in place   Neck: No JVD, no carotid bruit, no thyromegaly  Chest:  Clear to auscultation bilaterally, no added sounds  Cardiovascular:  RRR S1S2 heard, no murmurs or gallops  Abdomen:  Soft obese, , undistended, mild right LQ tenderness  no organomegaly, BS present  Extremities: right forearm swelling ++  No edema or cyanosis in LE. Distal pulses well felt  Neurological : no focal deficits    Lab Data:  CBC:   Recent Labs     09/03/21  0551 09/04/21  0517   WBC 10.9 6.5   RBC 4.07 3.95*   HGB 13.0 12.8   HCT 39.8 40.2   MCV 97.7 101.7*   RDW 17.1* 17.8*    205     BMP:   Recent Labs     09/03/21 0551 09/04/21  0517    139   K 3.5 3.8    108   CO2 16* 15*   PHOS 3.7  --    BUN 11 7   CREATININE 0.7 <0.5*     LIVER PROFILE:   No results for input(s): AST, ALT, ALB, BILIDIR, BILITOT, ALKPHOS in the last 72 hours. XR ABDOMEN (KUB) (SINGLE AP VIEW)   Final Result   Slightly decreased severity dilated small bowel loops over the past 24 hours. Nasogastric tube in good position. XR ABDOMEN (2 VIEWS)   Final Result   1. Recommend advancement of enteric tube 10 cm.   2. Nearly identical pattern small bowel with air-fluid levels that persist.   Underlying partial bowel obstruction or ileus may be considered clinically. XR ABDOMEN (2 VIEWS)   Final Result   Findings may be related to ileus or obstruction. CT ABDOMEN PELVIS WO CONTRAST Additional Contrast? Radiologist Recommendation (iodine allergy)   Final Result   1. Acute appendicitis with focal perforation. Tiny amount of free air with   no focal fluid to suggest abscess. Severe inflammatory changes in the right   lower quadrant. 2. Probable cholelithiasis. No evidence of acute cholecystitis.                Assessment & Plan:       Acute Appendicitis with Focal Perforation  - CT showed appendicitis with focal perforation, tiny amount of free air with no focal fluid, severe inflammatory changed in RLQ  - Admitted to Med Surg    - given IVF, abx: Zosyn D#4, PRN - Morphine and Toradol for pain control  - Gen Sx consulted, abx tx for now and eventual lap appe  Fevers  resolved  Hypotension improving with IVF   clinical condition worsened with development of ileus, NG placed and kept NPO  Improving ileus, had BM. For NG removal today   Advancing diet slowly per surgery       Metabolic acidosis. With mild anion gap. Checked lactate level. Improved with  bicarb containing IV fluids. Change to NS today      Hypokalemia  Hypomagnesemia  repleted as needed     Prolonged QTc  - 565  - repleted electrolytes, avoid QT prolonging agents  - repeat EKG improving qtc. Avoid zofran  - cut down cymbalta at dc     Leukocytosis  - 2/2 acute appendicitis  Resolved    TCP  - likely with acute infection   Resolved      Hx of DVT  - in 2015  - holding ASA, on prophylactic lovenox here     GERD  - cont Protonix     Chronic Pain Syndrome  - cont Cymbalta and Neurontin     FLORIAN  - Continue home CPAP     Morbid Obesity  S/p Laparoscopic Sleeve Gastrectomy  - Body mass index is 46.94 kg/m². - Complicating assessment and treatment.  Placing patient at risk for multiple co-morbidities as well as early death and contributing to the patient's presentation.   - Counseled on weight loss.      DVT Prophylaxis: Lovenox  Diet: clears  Code Status: Full Code      Luciano Hernandez MD

## 2021-09-05 NOTE — PROGRESS NOTES
[facility]    PATIENT NAME: Shaquille Nesbitt     TODAY'S DATE: 9/5/2021    CHIEF COMPLAINT: Abdominal pain    INTERVAL HISTORY/HPI:    Pt states she did well with liquids. Denies nausea. No bowel activity. She describes musculoskeletal pain with movement. REVIEW OF SYSTEMS:  Pertinent positives and negatives as per interval history section    OBJECTIVE:  VITALS:  /72   Pulse 96   Temp 96.8 °F (36 °C) (Oral)   Resp 16   Ht 5' 3\" (1.6 m)   Wt 265 lb (120.2 kg)   LMP 12/24/2010   SpO2 96%   BMI 46.94 kg/m²     INTAKE/OUTPUT:    I/O last 3 completed shifts: In: 3613 [P.O.:1000;  I.V.:2613]  Out: -   I/O this shift:  In: -   Out: 250 [Urine:250]    CONSTITUTIONAL:  awake and alert  LUNGS:  Respirations easy and unlabored, clear to auscultation  CARD:  regular rate and rhythm  ABDOMEN:   soft, non-distended, tenderness noted RLQ that is mild     Data:  CBC: Recent Labs     09/03/21  0551 09/04/21  0517   WBC 10.9 6.5   HGB 13.0 12.8   HCT 39.8 40.2    205     BMP:    Recent Labs     09/03/21  0551 09/04/21  0517    139   K 3.5 3.8    108   CO2 16* 15*   BUN 11 7   CREATININE 0.7 <0.5*   GLUCOSE 104* 134*       Mag:      Recent Labs     09/03/21  0551   MG 1.70*      Phos:     Recent Labs     09/03/21  0551   PHOS 3.7      ASSESSMENT:   Perforated appendicitis  Ileus        PLAN:   D/C NG  Continue full liquids  Ambulate  Antibiotics   Add Toradol       Electronically signed by Michael Olmos, [address]

## 2021-09-06 ENCOUNTER — APPOINTMENT (OUTPATIENT)
Dept: GENERAL RADIOLOGY | Age: 56
DRG: 329 | End: 2021-09-06
Payer: MEDICARE

## 2021-09-06 LAB
ANION GAP SERPL CALCULATED.3IONS-SCNC: 13 MMOL/L (ref 3–16)
BANDED NEUTROPHILS RELATIVE PERCENT: 28 % (ref 0–7)
BASE EXCESS ARTERIAL: -2.8 MMOL/L (ref -3–3)
BASE EXCESS ARTERIAL: -3.3 MMOL/L (ref -3–3)
BASOPHILS ABSOLUTE: 0 K/UL (ref 0–0.2)
BASOPHILS RELATIVE PERCENT: 0 %
BUN BLDV-MCNC: 14 MG/DL (ref 7–20)
CALCIUM SERPL-MCNC: 7.6 MG/DL (ref 8.3–10.6)
CARBOXYHEMOGLOBIN ARTERIAL: 0.1 % (ref 0–1.5)
CARBOXYHEMOGLOBIN ARTERIAL: 0.3 % (ref 0–1.5)
CHLORIDE BLD-SCNC: 106 MMOL/L (ref 99–110)
CO2: 18 MMOL/L (ref 21–32)
CREAT SERPL-MCNC: 0.8 MG/DL (ref 0.6–1.1)
EOSINOPHILS ABSOLUTE: 0 K/UL (ref 0–0.6)
EOSINOPHILS RELATIVE PERCENT: 0 %
GFR AFRICAN AMERICAN: >60
GFR NON-AFRICAN AMERICAN: >60
GLUCOSE BLD-MCNC: 106 MG/DL (ref 70–99)
GLUCOSE BLD-MCNC: 110 MG/DL (ref 70–99)
GLUCOSE BLD-MCNC: 110 MG/DL (ref 70–99)
GLUCOSE BLD-MCNC: 122 MG/DL (ref 70–99)
GLUCOSE BLD-MCNC: 126 MG/DL (ref 70–99)
GLUCOSE BLD-MCNC: 131 MG/DL (ref 70–99)
GLUCOSE BLD-MCNC: 154 MG/DL (ref 70–99)
HCO3 ARTERIAL: 20.6 MMOL/L (ref 21–29)
HCO3 ARTERIAL: 20.9 MMOL/L (ref 21–29)
HCT VFR BLD CALC: 42.8 % (ref 36–48)
HEMATOLOGY PATH CONSULT: NO
HEMOGLOBIN, ART, EXTENDED: 14.7 G/DL (ref 12–16)
HEMOGLOBIN, ART, EXTENDED: 14.8 G/DL (ref 12–16)
HEMOGLOBIN: 14 G/DL (ref 12–16)
LYMPHOCYTES ABSOLUTE: 3.2 K/UL (ref 1–5.1)
LYMPHOCYTES RELATIVE PERCENT: 13 %
MAGNESIUM: 1.7 MG/DL (ref 1.8–2.4)
MCH RBC QN AUTO: 31.5 PG (ref 26–34)
MCHC RBC AUTO-ENTMCNC: 32.7 G/DL (ref 31–36)
MCV RBC AUTO: 96.2 FL (ref 80–100)
METHEMOGLOBIN ARTERIAL: 0.1 %
METHEMOGLOBIN ARTERIAL: 0.3 %
MONOCYTES ABSOLUTE: 0.2 K/UL (ref 0–1.3)
MONOCYTES RELATIVE PERCENT: 1 %
NEUTROPHILS ABSOLUTE: 21 K/UL (ref 1.7–7.7)
NEUTROPHILS RELATIVE PERCENT: 58 %
O2 SAT, ARTERIAL: 96.4 %
O2 SAT, ARTERIAL: 97.8 %
O2 THERAPY: ABNORMAL
O2 THERAPY: ABNORMAL
PCO2 ARTERIAL: 32.1 MMHG (ref 35–45)
PCO2 ARTERIAL: 35.3 MMHG (ref 35–45)
PDW BLD-RTO: 16.7 % (ref 12.4–15.4)
PERFORMED ON: ABNORMAL
PH ARTERIAL: 7.39 (ref 7.35–7.45)
PH ARTERIAL: 7.42 (ref 7.35–7.45)
PHOSPHORUS: 4.8 MG/DL (ref 2.5–4.9)
PLATELET # BLD: 361 K/UL (ref 135–450)
PLATELET SLIDE REVIEW: ADEQUATE
PMV BLD AUTO: 7.9 FL (ref 5–10.5)
PO2 ARTERIAL: 100.6 MMHG (ref 75–108)
PO2 ARTERIAL: 85.3 MMHG (ref 75–108)
POTASSIUM SERPL-SCNC: 3.9 MMOL/L (ref 3.5–5.1)
RBC # BLD: 4.45 M/UL (ref 4–5.2)
SLIDE REVIEW: ABNORMAL
SODIUM BLD-SCNC: 137 MMOL/L (ref 136–145)
TCO2 ARTERIAL: 21.6 MMOL/L
TCO2 ARTERIAL: 22 MMOL/L
TOXIC GRANULATION: PRESENT
VACUOLATED NEUTROPHILS: PRESENT
WBC # BLD: 24.4 K/UL (ref 4–11)

## 2021-09-06 PROCEDURE — 82803 BLOOD GASES ANY COMBINATION: CPT

## 2021-09-06 PROCEDURE — 6360000002 HC RX W HCPCS: Performed by: SURGERY

## 2021-09-06 PROCEDURE — 99233 SBSQ HOSP IP/OBS HIGH 50: CPT | Performed by: INTERNAL MEDICINE

## 2021-09-06 PROCEDURE — 84100 ASSAY OF PHOSPHORUS: CPT

## 2021-09-06 PROCEDURE — 2500000003 HC RX 250 WO HCPCS: Performed by: SURGERY

## 2021-09-06 PROCEDURE — 1200000000 HC SEMI PRIVATE

## 2021-09-06 PROCEDURE — 99223 1ST HOSP IP/OBS HIGH 75: CPT | Performed by: INTERNAL MEDICINE

## 2021-09-06 PROCEDURE — 2700000000 HC OXYGEN THERAPY PER DAY

## 2021-09-06 PROCEDURE — 99024 POSTOP FOLLOW-UP VISIT: CPT | Performed by: SURGERY

## 2021-09-06 PROCEDURE — C9113 INJ PANTOPRAZOLE SODIUM, VIA: HCPCS | Performed by: SURGERY

## 2021-09-06 PROCEDURE — 93005 ELECTROCARDIOGRAM TRACING: CPT | Performed by: INTERNAL MEDICINE

## 2021-09-06 PROCEDURE — 71045 X-RAY EXAM CHEST 1 VIEW: CPT

## 2021-09-06 PROCEDURE — 36592 COLLECT BLOOD FROM PICC: CPT

## 2021-09-06 PROCEDURE — 85025 COMPLETE CBC W/AUTO DIFF WBC: CPT

## 2021-09-06 PROCEDURE — 6370000000 HC RX 637 (ALT 250 FOR IP): Performed by: INTERNAL MEDICINE

## 2021-09-06 PROCEDURE — 2580000003 HC RX 258: Performed by: INTERNAL MEDICINE

## 2021-09-06 PROCEDURE — 83735 ASSAY OF MAGNESIUM: CPT

## 2021-09-06 PROCEDURE — 80048 BASIC METABOLIC PNL TOTAL CA: CPT

## 2021-09-06 PROCEDURE — 2000000000 HC ICU R&B

## 2021-09-06 PROCEDURE — 6360000002 HC RX W HCPCS: Performed by: INTERNAL MEDICINE

## 2021-09-06 PROCEDURE — 2580000003 HC RX 258: Performed by: SURGERY

## 2021-09-06 PROCEDURE — 94761 N-INVAS EAR/PLS OXIMETRY MLT: CPT

## 2021-09-06 PROCEDURE — 74018 RADEX ABDOMEN 1 VIEW: CPT

## 2021-09-06 PROCEDURE — 94002 VENT MGMT INPAT INIT DAY: CPT

## 2021-09-06 RX ORDER — FLUCONAZOLE 2 MG/ML
200 INJECTION, SOLUTION INTRAVENOUS EVERY 24 HOURS
Status: DISCONTINUED | OUTPATIENT
Start: 2021-09-06 | End: 2021-09-13 | Stop reason: HOSPADM

## 2021-09-06 RX ORDER — 0.9 % SODIUM CHLORIDE 0.9 %
500 INTRAVENOUS SOLUTION INTRAVENOUS
Status: DISCONTINUED | OUTPATIENT
Start: 2021-09-06 | End: 2021-09-13 | Stop reason: HOSPADM

## 2021-09-06 RX ORDER — MAGNESIUM SULFATE IN WATER 40 MG/ML
2000 INJECTION, SOLUTION INTRAVENOUS ONCE
Status: DISCONTINUED | OUTPATIENT
Start: 2021-09-06 | End: 2021-09-06

## 2021-09-06 RX ORDER — MAGNESIUM SULFATE 1 G/100ML
1000 INJECTION INTRAVENOUS ONCE
Status: COMPLETED | OUTPATIENT
Start: 2021-09-06 | End: 2021-09-06

## 2021-09-06 RX ORDER — SODIUM CHLORIDE 9 MG/ML
INJECTION, SOLUTION INTRAVENOUS CONTINUOUS
Status: DISCONTINUED | OUTPATIENT
Start: 2021-09-06 | End: 2021-09-08

## 2021-09-06 RX ADMIN — PROPOFOL 10 MCG/KG/MIN: 10 INJECTION, EMULSION INTRAVENOUS at 02:23

## 2021-09-06 RX ADMIN — PIPERACILLIN SODIUM AND TAZOBACTAM SODIUM 3375 MG: 3; .375 INJECTION, POWDER, LYOPHILIZED, FOR SOLUTION INTRAVENOUS at 12:44

## 2021-09-06 RX ADMIN — SODIUM CHLORIDE, PRESERVATIVE FREE 10 ML: 5 INJECTION INTRAVENOUS at 10:05

## 2021-09-06 RX ADMIN — SODIUM CHLORIDE: 9 INJECTION, SOLUTION INTRAVENOUS at 17:46

## 2021-09-06 RX ADMIN — SODIUM CHLORIDE, PRESERVATIVE FREE 10 ML: 5 INJECTION INTRAVENOUS at 01:49

## 2021-09-06 RX ADMIN — ENOXAPARIN SODIUM 40 MG: 40 INJECTION SUBCUTANEOUS at 10:04

## 2021-09-06 RX ADMIN — POTASSIUM PHOSPHATE, MONOBASIC AND POTASSIUM PHOSPHATE, DIBASIC 30 MMOL: 224; 236 INJECTION, SOLUTION, CONCENTRATE INTRAVENOUS at 02:19

## 2021-09-06 RX ADMIN — SODIUM CHLORIDE 500 ML: 9 INJECTION, SOLUTION INTRAVENOUS at 12:25

## 2021-09-06 RX ADMIN — PANTOPRAZOLE SODIUM 40 MG: 40 INJECTION, POWDER, FOR SOLUTION INTRAVENOUS at 10:05

## 2021-09-06 RX ADMIN — MUPIROCIN: 20 OINTMENT TOPICAL at 12:36

## 2021-09-06 RX ADMIN — MORPHINE SULFATE 2 MG: 2 INJECTION, SOLUTION INTRAMUSCULAR; INTRAVENOUS at 14:24

## 2021-09-06 RX ADMIN — PIPERACILLIN SODIUM AND TAZOBACTAM SODIUM 3375 MG: 3; .375 INJECTION, POWDER, LYOPHILIZED, FOR SOLUTION INTRAVENOUS at 20:23

## 2021-09-06 RX ADMIN — PANTOPRAZOLE SODIUM 40 MG: 40 INJECTION, POWDER, FOR SOLUTION INTRAVENOUS at 01:48

## 2021-09-06 RX ADMIN — PANTOPRAZOLE SODIUM 40 MG: 40 INJECTION, POWDER, FOR SOLUTION INTRAVENOUS at 20:32

## 2021-09-06 RX ADMIN — MAGNESIUM SULFATE HEPTAHYDRATE 1000 MG: 1 INJECTION, SOLUTION INTRAVENOUS at 12:30

## 2021-09-06 RX ADMIN — SODIUM CHLORIDE: 9 INJECTION, SOLUTION INTRAVENOUS at 10:05

## 2021-09-06 RX ADMIN — FLUCONAZOLE, SODIUM CHLORIDE 200 MG: 2 INJECTION INTRAVENOUS at 02:27

## 2021-09-06 RX ADMIN — SODIUM CHLORIDE, PRESERVATIVE FREE 10 ML: 5 INJECTION INTRAVENOUS at 20:33

## 2021-09-06 RX ADMIN — PIPERACILLIN SODIUM AND TAZOBACTAM SODIUM 3375 MG: 3; .375 INJECTION, POWDER, LYOPHILIZED, FOR SOLUTION INTRAVENOUS at 04:41

## 2021-09-06 RX ADMIN — MORPHINE SULFATE 2 MG: 2 INJECTION, SOLUTION INTRAMUSCULAR; INTRAVENOUS at 18:14

## 2021-09-06 RX ADMIN — MUPIROCIN: 20 OINTMENT TOPICAL at 20:34

## 2021-09-06 RX ADMIN — SODIUM CHLORIDE: 9 INJECTION, SOLUTION INTRAVENOUS at 01:45

## 2021-09-06 RX ADMIN — SODIUM CHLORIDE 500 ML: 9 INJECTION, SOLUTION INTRAVENOUS at 18:56

## 2021-09-06 RX ADMIN — PROPOFOL 20 MCG/KG/MIN: 10 INJECTION, EMULSION INTRAVENOUS at 06:42

## 2021-09-06 ASSESSMENT — PAIN DESCRIPTION - FREQUENCY
FREQUENCY: CONTINUOUS
FREQUENCY: CONTINUOUS

## 2021-09-06 ASSESSMENT — PAIN DESCRIPTION - DESCRIPTORS
DESCRIPTORS: ACHING
DESCRIPTORS: ACHING

## 2021-09-06 ASSESSMENT — PAIN DESCRIPTION - LOCATION
LOCATION: ABDOMEN
LOCATION: ABDOMEN

## 2021-09-06 ASSESSMENT — PAIN SCALES - GENERAL
PAINLEVEL_OUTOF10: 8
PAINLEVEL_OUTOF10: 0
PAINLEVEL_OUTOF10: 7
PAINLEVEL_OUTOF10: 0
PAINLEVEL_OUTOF10: 0

## 2021-09-06 ASSESSMENT — PAIN DESCRIPTION - PAIN TYPE
TYPE: ACUTE PAIN
TYPE: ACUTE PAIN

## 2021-09-06 ASSESSMENT — PAIN - FUNCTIONAL ASSESSMENT: PAIN_FUNCTIONAL_ASSESSMENT: ACTIVITIES ARE NOT PREVENTED

## 2021-09-06 ASSESSMENT — PULMONARY FUNCTION TESTS
PIF_VALUE: 9.6
PIF_VALUE: 21
PIF_VALUE: 16

## 2021-09-06 ASSESSMENT — PAIN DESCRIPTION - ONSET: ONSET: ON-GOING

## 2021-09-06 ASSESSMENT — PAIN DESCRIPTION - ORIENTATION
ORIENTATION: INNER
ORIENTATION: INNER

## 2021-09-06 ASSESSMENT — PAIN DESCRIPTION - DIRECTION: RADIATING_TOWARDS: NON

## 2021-09-06 ASSESSMENT — PAIN DESCRIPTION - PROGRESSION: CLINICAL_PROGRESSION: NOT CHANGED

## 2021-09-06 NOTE — PROGRESS NOTES
Report given to surgery RN. Pt going to OR via bed. Gave OR RN, zosyn IVPB and potassium phosphate IVPB. Unable to give due to one IV site and multiple meds to give IV.   Jaquan Villagomez, RN

## 2021-09-06 NOTE — PROGRESS NOTES
Called earlier for patient with tachycardia and increased work of breathing. Repeat labs show new bandemia. Patient with fever. Repeat CT shows worsening intra abdominal fluid, inflammation and bubbles of free air. Recommend surgical intervention acutely given worsening imaging and worsening clinical condition. Spoke with patient and explained the situation and operative plan. Spoke with patient's Lee Actis.   Case discussed with Hospitalist team.

## 2021-09-06 NOTE — PROGRESS NOTES
Occupational Therapy/Physical Therapy Attempt    Unit: ICU   Date:  9/6/2021  Patient Name:    Jenny Nesbitt  Admitting diagnosis:  Appendicitis [K37]  Acute appendicitis with perforation and generalized peritonitis, unspecified whether abscess present, unspecified whether gangrene present [K35.20]  Admit Date:  8/30/2021    Attempt @ 734: Hold. Transferred to ICU from [facility] overnight. Will need new orders to resume therapy when appropriate. Thank you.      Musa Camargo, MOT, OTR/L   [id number]  Lizette Valdez, PT #[id number]    No Charge

## 2021-09-06 NOTE — PROGRESS NOTES
09/06/21 0254   Vent Information   Vent Type 980   Vent Mode AC/VC   Vt Ordered 400 mL   Rate Set 16 bmp   Peak Flow 60 L/min   Pressure Support 0 cmH20   FiO2  50 %  (decreased to 40)   SpO2 100 %   SpO2/FiO2 ratio 200   Sensitivity 3   PEEP/CPAP 5   I Time/ I Time % 0 s   Humidification Source Heated wire   Humidification Temp 37   Humidification Temp Measured 37   Circuit Condensation Drained   Vent Patient Data   High Peep/I Pressure 0   Peak Inspiratory Pressure 21 cmH2O   Mean Airway Pressure 9.8 cmH20   Rate Measured 22 br/min   Vt Exhaled 383 mL   Minute Volume 8.6 Liters   I:E Ratio 1:2.50   Plateau Pressure 18 [id number]   Cough/Sputum   Sputum How Obtained Endotracheal;Suctioned   Cough Productive   Sputum Amount Moderate   Sputum Color Cloudy   Tenacity Thick   Spontaneous Breathing Trial (SBT) RT Doc   Pulse 100   Breath Sounds   Right Upper Lobe Diminished   Right Middle Lobe Diminished   Right Lower Lobe Diminished   Left Upper Lobe Diminished   Left Lower Lobe Diminished   Additional Respiratory  Assessments   Resp 22   Position Semi-Hill's   Alarm Settings   High Pressure Alarm 40 cmH2O   Low Minute Volume Alarm 4 L/min   Apnea (secs) 20 secs   High Respiratory Rate 40 br/min   Low Exhaled Vt  300 mL   ETT (adult)   Placement Date/Time: 09/05/21 2150   Preoxygenation: Yes  Mask Ventilation: Mask ventilation not attempted (0)  Technique: Video laryngoscopy  Type: Cuffed  Tube Size: 8 mm  Laryngoscope: Nicolasa  Blade Size: 3  Location: Oral  Grade View: Full view o. ..    Secured at 24 cm   Measured From [address] [place] [address],[address] By Commercial tube asher   Site Condition Dry

## 2021-09-06 NOTE — PROGRESS NOTES
Patient assessment complete, abdominal incision dressings CDI, FATOUMATA full x2 emptied, On Q pain ball unclamped, Potassium phosphate started, diflucan started. Lung sounds clear, BS hypoactive. Able to awaken briefly to speech, opens eyes, MEWS -1.

## 2021-09-06 NOTE — BRIEF OP NOTE
Brief Postoperative Note      Patient: Gael Blanca  YOB: 1965  MRN: [id number]    Date of Procedure: 9/5/2021    Pre-Op Diagnosis: 1. PERFORATED APPENDICITIS  2. Incarcerated ventral incisional hernia    Post-Op Diagnosis: Same       Procedure(s):  RIGHT COLECTOMY, INCARCERATED VENTRAL INCISIONAL HERNIA REPAIR, OMPHALECTOMY, DRAINAGE OF INTRA ABDOMINAL ABSCESS. Surgeon(s):  Stephanie Todd MD    Assistant:  Surgical Assistant: Jasmyn Villanueva    Anesthesia: General    Estimated Blood Loss (mL): 239     Complications: None    Specimens:   ID Type Source Tests Collected by Time Destination   A : RIGHT COLON, TERMINAL ILEUM AND APPENDIX Tissue Tissue SURGICAL PATHOLOGY Stephanie Todd MD 9/5/2021 6174        Implants:  * No implants in log *      Drains:   Closed/Suction Drain Medial;Right RLQ Bulb (Active)       Closed/Suction Drain Medial;Left LLQ Bulb (Active)       NG/OG/NJ/NE Tube Nasogastric 18 fr Right nostril (Active)       Urethral Catheter Non-latex 16 fr (Active)       [REMOVED] NG/OG/NJ/NE Tube Nasogastric 16 fr Left nostril (Removed)   Surrounding Skin Dry; Intact 09/03/21 1843   Securement device Yes 09/03/21 1843   Status Clamped 09/04/21 2005   Placement Verified by X-Ray (repeat) 09/03/21 1843   NG/OG/NJ/NE External Measurement (cm) 61 cm 09/03/21 1843   Drainage Appearance Green 09/03/21 1843   Output (mL) 450 ml 09/04/21 0610       Findings: Perforated base of the appendix with marked intra abdominal contamination    Electronically signed by Gene Frausto MD on 9/5/2021 at 11:53 PM

## 2021-09-06 NOTE — PROGRESS NOTES
Patient to room, awaiting transfer in computer, patient sedated, skin cool, abdominal incision dressing CDI, FATOUMATA drain x2 full and drained, On Q pain ball in place, infante intact, yellow clear urine, arterial line to right radial artery, CVC line to right neck, on ventilator FiO2 50%, PEEP 5. On Levophed at 10mcg/min, propofol 10mcg/kg/min, magnesium finishing, LR infusing. Transferred to PCU bed, report received per OR nurse. Family in building, brings belongings to new room.

## 2021-09-06 NOTE — PROGRESS NOTES
Pt placed on SBT 5/5, FiO2 40%         09/06/21 0838   Vent Information   Vt Ordered 0 mL   Rate Set 0 bmp   Peak Flow 0 L/min   Pressure Support 5 cmH20   FiO2  40 %   SpO2 100 %   SpO2/FiO2 ratio 250   Sensitivity 2   PEEP/CPAP 5   I Time/ I Time % 0 s   Vent Patient Data   High Peep/I Pressure 0   Peak Inspiratory Pressure 9.6 cmH2O   Mean Airway Pressure 7.4 cmH20   Rate Measured 23 br/min   Vt Exhaled 140 mL   Minute Volume 6.81 Liters   I:E Ratio 1:4.70   Alarm Settings   High Pressure Alarm 40 cmH2O   Low Minute Volume Alarm 4 L/min   High Respiratory Rate 40 br/min

## 2021-09-06 NOTE — PROGRESS NOTES
Dr. Paredes Backbone @ bedside assessing pt for interdisciplinary rounds. All labs lines assessment POC, ABG results post SBT, Tmax, WBC and Mg reviewed. Per MD pt to be extubated, 1g Mg IVPG, and RN to check CVP. See new orders.

## 2021-09-06 NOTE — PLAN OF CARE
Problem: Pain:  Goal: Pain level will decrease  Description: Pain level will decrease  9/6/2021 1104 by Félix Higginbotham RN  Outcome: Ongoing  9/6/2021 0340 by Meron Burntet RN  Outcome: Ongoing  Goal: Control of acute pain  Description: Control of acute pain  9/6/2021 1104 by Félix Higginbotham RN  Outcome: Ongoing  9/6/2021 0340 by Meron Burnett RN  Outcome: Ongoing  Goal: Control of chronic pain  Description: Control of chronic pain  9/6/2021 1104 by Félix Higginbotham RN  Outcome: Ongoing  9/6/2021 0340 by Meron Burnett RN  Outcome: Ongoing  Goal: Patient's pain/discomfort is manageable  Description: Patient's pain/discomfort is manageable  9/6/2021 1104 by Félix Higginbotham RN  Outcome: Ongoing  9/6/2021 0340 by Meron Burnett RN  Outcome: Ongoing     Problem: Skin Integrity:  Goal: Will show no infection signs and symptoms  Description: Will show no infection signs and symptoms  9/6/2021 1104 by Félix Higginbotham RN  Outcome: Ongoing  9/6/2021 0340 by Meron Burnett RN  Outcome: Ongoing  Goal: Absence of new skin breakdown  Description: Absence of new skin breakdown  9/6/2021 1104 by Félix Higginbotham RN  Outcome: Ongoing  9/6/2021 0340 by Meron Burnett RN  Outcome: Ongoing     Problem: Falls - Risk of:  Goal: Will remain free from falls  Description: Will remain free from falls  9/6/2021 1104 by Félix Higginbotham RN  Outcome: Ongoing  9/6/2021 0340 by Meron Burnett RN  Outcome: Ongoing  Goal: Absence of physical injury  Description: Absence of physical injury  9/6/2021 1104 by Félix Higginbotham RN  Outcome: Ongoing  9/6/2021 0340 by Meron Burnett RN  Outcome: Ongoing     Problem: Nausea/Vomiting:  Goal: Absence of nausea/vomiting  Description: Absence of nausea/vomiting  9/6/2021 1104 by Félix Higginbotham RN  Outcome: Ongoing  9/6/2021 0340 by Meron Burnett RN  Outcome: Ongoing  Goal: Able to drink  Description: Able to drink  9/6/2021 1104 by Huong Billingsley RN  Outcome: Ongoing  9/6/2021 0340 by Amisha Rehman RN  Outcome: Ongoing  Goal: Able to eat  Description: Able to eat  9/6/2021 1104 by Huong Billingsley RN  Outcome: Ongoing  9/6/2021 0340 by Amisha Rehman RN  Outcome: Ongoing  Goal: Ability to achieve adequate nutritional intake will improve  Description: Ability to achieve adequate nutritional intake will improve  9/6/2021 1104 by Huong Billingsley RN  Outcome: Ongoing  9/6/2021 0340 by Amisha Rehman RN  Outcome: Ongoing     Problem: Infection:  Goal: Will remain free from infection  Description: Will remain free from infection  9/6/2021 1104 by Huong Billingsley RN  Outcome: Ongoing  9/6/2021 0340 by Amisha Rehman RN  Outcome: Ongoing     Problem: Safety:  Goal: Free from accidental physical injury  Description: Free from accidental physical injury  9/6/2021 1104 by Huong Billingsley RN  Outcome: Ongoing  9/6/2021 0340 by Amisha Rehman RN  Outcome: Ongoing  Goal: Free from intentional harm  Description: Free from intentional harm  9/6/2021 1104 by Huong Billingsley RN  Outcome: Ongoing  9/6/2021 0340 by Amisha Rehman RN  Outcome: Ongoing     Problem: Daily Care:  Goal: Daily care needs are met  Description: Daily care needs are met  9/6/2021 1104 by Huong Billingsley RN  Outcome: Ongoing  9/6/2021 0340 by Amisha Rehman RN  Outcome: Ongoing     Problem: Skin Integrity:  Goal: Skin integrity will stabilize  Description: Skin integrity will stabilize  9/6/2021 1104 by Huong Billingsley RN  Outcome: Ongoing  9/6/2021 0340 by Amisha Rehman RN  Outcome: Ongoing     Problem: Non-Violent Restraints  Goal: Removal from restraints as soon as assessed to be safe  9/6/2021 1104 by Huong Billingsley RN  Outcome: Ongoing  9/6/2021 0340 by Amisha Rehman RN  Outcome: Ongoing  Goal: No harm/injury to patient while restraints in use  9/6/2021 1104 by Huong Billingsley RN  Outcome: Ongoing  9/6/2021 0340 by Jose Cruz Pizano RN  Outcome: Ongoing  Goal: Patient's dignity will be maintained  9/6/2021 1104 by Yoni Moura RN  Outcome: Ongoing  9/6/2021 0340 by Jose Cruz Pizano RN  Outcome: Ongoing   High risk vesicant drug infusing:  _____x_____    Multiple incompatible medications infusing:  ____x_____    CVP Monitoring:  _________    Extremely difficult IV access challenge:  ____x____    Continued need for central line access:  _____x_____    Addressed with physician:  ___x_____    RIGHT PATIENT, RIGHT TIME, RIGHT LINE  Patient is not able to demonstrate the ability to move from a reclining position to an upright position within the recliner due to being intubated and sedated.

## 2021-09-06 NOTE — PROGRESS NOTES
Admit: 2021    Name:  Betzy Miranda  Room:  [address] [place] [address]  MRN:    [id number]     Daily Progress Note for 2021   Acute appendicitis with abscess     Interval History:     Worsened with development of more abd free air and fevers, chills with tachycardia  Underwent emergent right colectomy with abscess drainage   Remained on vent support overnight - now extubated    Pt seen awake, alert, no pain issues  On 4 L oxygen         Scheduled Meds:   fluconazole  200 mg IntraVENous Q24H    potassium phosphate IVPB  30 mmol IntraVENous Once    pantoprazole  40 mg IntraVENous BID    topiramate  100 mg Oral Nightly    sodium chloride flush  5-40 mL IntraVENous 2 times per day    enoxaparin  40 mg SubCUTAneous Daily    piperacillin-tazobactam  3,375 mg IntraVENous Q8H       Continuous Infusions:   sodium chloride 125 mL/hr at 21 0145    bupivacaine 0.5% 270 mL (21 2343)    propofol 20 mcg/kg/min (21 0642)    norepinephrine 10 mcg/min (21 0405)    sodium chloride 25 mL (21 1348)       PRN Meds:  morphine **OR** morphine, prochlorperazine, sodium chloride flush, sodium chloride, acetaminophen **OR** acetaminophen, potassium chloride **OR** potassium alternative oral replacement **OR** potassium chloride, magnesium sulfate                  Objective:     Temp  Av.2 °F (36.8 °C)  Min: 97.4 °F (36.3 °C)  Max: 100.4 °F (38 °C)  Pulse  Av  Min: 94  Max: 142  BP  Min: 75/60  Max: 133/72  SpO2  Av %  Min: 85 %  Max: 100 %  FiO2   Av.7 %  Min: 40 %  Max: 99 %  Patient Vitals for the past 4 hrs:   BP Temp Temp src Pulse Resp SpO2   21 0652 -- -- -- 99 22 98 %   21 0651 -- -- -- 103 22 98 %   21 0650 -- -- -- 105 21 97 %   21 0635 (!) 88/57 -- -- 98 22 97 %   21 0604 105/65 -- -- 109 20 --   21 -- -- -- 105 21 98 %   21 06 -- -- -- 102 22 --   21 0545 -- -- -- 100 21 99 %   21 0535 100/68 -- -- 102 23 -- 09/06/21 0530 -- -- -- 106 22 --   09/06/21 0515 -- -- -- 102 22 --   09/06/21 0508 103/79 -- -- 105 21 --   09/06/21 0505 85/71 -- -- 100 22 --   09/06/21 0500 -- -- -- 105 22 --   09/06/21 0459 -- -- -- 104 21 97 %   09/06/21 0412 107/82 -- -- 104 21 --   09/06/21 0411 -- 97.5 °F (36.4 °C) Axillary 99 21 --   09/06/21 0406 -- -- -- 103 20 --   09/06/21 0404 92/79 -- -- 101 21 --         Intake/Output Summary (Last 24 hours) at 9/6/2021 0759  Last data filed at 9/6/2021 0392  Gross per 24 hour   Intake 3480 ml   Output 1080 ml   Net 2400 ml       Physical Exam:  General: middle aged morbidly obese female  Off vent     Appears to be not in any distress  Mucous Membranes:  Pink , anicteric  NG in place   Neck: No JVD, no carotid bruit, no thyromegaly  Chest:  Clear to auscultation bilaterally, no added sounds  Cardiovascular:  RRR S1S2 heard, no murmurs or gallops  Abdomen:  Soft obese, surgical dressing dry , drains +   No edema or cyanosis in LE. Distal pulses well felt  Neurological : non focal off vent     Lab Data:  CBC:   Recent Labs     09/04/21 0517 09/05/21  1257 09/06/21  0600   WBC 6.5 11.6* 24.4*   RBC 3.95* 4.62 4.45   HGB 12.8 14.6 14.0   HCT 40.2 44.6 42.8   .7* 96.6 96.2   RDW 17.8* 16.6* 16.7*    350 361     BMP:   Recent Labs     09/04/21 0517 09/05/21  1257 09/06/21  0600    140 137   K 3.8 4.3 3.9    105 106   CO2 15* 21 18*   PHOS  --  1.4* 4.8   BUN 7 9 14   CREATININE <0.5* 0.8 0.8     LIVER PROFILE:   No results for input(s): AST, ALT, ALB, BILIDIR, BILITOT, ALKPHOS in the last 72 hours. XR ABDOMEN FOR NG/OG/NE TUBE PLACEMENT   Final Result   1. Nasogastric tube position is acceptable. 2.  Gas distended bowel loops in the left lower abdomen could relate to   obstruction or postsurgical ileus. XR CHEST PORTABLE   Final Result   1. Endotracheal tube, nasogastric tube, and right jugular venous line   placement. No pneumothorax.       2. Congestive changes and left basilar atelectasis/pneumonia. CT ABDOMEN PELVIS WO CONTRAST Additional Contrast? None   Final Result   Increasing pneumoperitoneum and increasing free fluid within the abdomen and   pelvis as compared to prior examination dated 08/30/2021 in this patient with   history of known perforated appendicitis. The appendix is less well   visualized on the current exam without contrast and evaluation for abscess is   also limited without contrast.      Fluid within the lumen of the esophagus; correlate for gastroesophageal   reflux. Dilated small bowel within the upper abdomen, suggestive of ileus. Radiographic follow-up may be obtained as warranted. XR ABDOMEN (KUB) (SINGLE AP VIEW)   Final Result   Slightly decreased severity dilated small bowel loops over the past 24 hours. Nasogastric tube in good position. XR ABDOMEN (2 VIEWS)   Final Result   1. Recommend advancement of enteric tube 10 cm.   2. Nearly identical pattern small bowel with air-fluid levels that persist.   Underlying partial bowel obstruction or ileus may be considered clinically. XR ABDOMEN (2 VIEWS)   Final Result   Findings may be related to ileus or obstruction. CT ABDOMEN PELVIS WO CONTRAST Additional Contrast? Radiologist Recommendation (iodine allergy)   Final Result   1. Acute appendicitis with focal perforation. Tiny amount of free air with   no focal fluid to suggest abscess. Severe inflammatory changes in the right   lower quadrant. 2. Probable cholelithiasis. No evidence of acute cholecystitis.                Assessment & Plan:       Acute Appendicitis with Focal Perforation  - CT showed appendicitis with focal perforation, tiny amount of free air with no focal fluid, severe inflammatory changed in RLQ  - initially mx with IVF, pain control and IV abx but worsened with fevers, chills and tachycardia  - s.p emergent laparotomy with  right colectomy with abscess drainage POD 1    - continue  abx: Zosyn D#5, diflucan D1  -  PRN - Morphine and Toradol for pain control  - Gen Sx  Managing  - await return of Bowel function to resume diet      Metabolic acidosis. With mild anion gap. Checked lactate level. Improved with  bicarb containing IV fluids.       Hypokalemia  Hypomagnesemia  repleted as needed     Prolonged QTc  - 565  - repleted electrolytes, avoid QT prolonging agents  - repeat EKG improving qtc. Avoid zofran  - cut down cymbalta at dc     Leukocytosis  - monitor, worsened post op likley with stress reaction    TCP  - likely with acute infection   Resolved      Hx of DVT  - in 2015  - holding ASA, on prophylactic lovenox here     GERD  - cont Protonix     Chronic Pain Syndrome  - cont Cymbalta and Neurontin     FLORIAN  - Continue home CPAP     Morbid Obesity  S/p Laparoscopic Sleeve Gastrectomy  - Body mass index is 46.94 kg/m². - Complicating assessment and treatment.  Placing patient at risk for multiple co-morbidities as well as early death and contributing to the patient's presentation.   - Counseled on weight loss.      DVT Prophylaxis: Lovenox  Diet: npo  Code Status: Full Code      Tyler Mireles MD

## 2021-09-06 NOTE — PROGRESS NOTES
New order for 1000 ml NS bolus started at this time. FSBS 108. CHG wipes completed at this time. Family in room.  Benjamin Kingston RN

## 2021-09-06 NOTE — PROGRESS NOTES
Comprehensive Nutrition Assessment    Type and Reason for Visit:  Initial, RD Nutrition Re-Screen/LOS    Nutrition Recommendations/Plan:   1. Continue NPO     Nutrition Assessment:    Pt. nutritionally compromised AEB she is LOS x 7 was admitted ith acute perf appendicitis and was progressing well with adequate tolerance of FL on 9/5 until she declined and had right colectomy. At risk for further nutrition compromise r/t NPO . Will continue NPO . Malnutrition Assessment:  Malnutrition Status: At risk for malnutrition (Comment)    Context:  Acute Illness     Findings of the 6 clinical characteristics of malnutrition:  Energy Intake:  Mild decrease in energy intake (Comment)  Weight Loss:  No significant weight loss     Body Fat Loss:  Unable to assess     Muscle Mass Loss:  Unable to assess    Fluid Accumulation:  Unable to assess     Strength:  Not Performed    Estimated Daily Nutrient Needs:  Energy (kcal):  0727-1283 based ~ 22-25 kcal/kg ibw; Weight Used for Energy Requirements:  Ideal     Protein (g):  73-84 based ~ 1.4-1.6 gr/kg ibw; Weight Used for Protein Requirements:  Ideal        Fluid (ml/day):  3068-9640; Method Used for Fluid Requirements:  1 ml/kcal      Nutrition Related Findings:  admitted with dx Acute appendicitis with perforation and generalized peritonitis, unspecified whether abscess present on 8/30; had beenimprovinga tolerating Full liquids on 9/5 ; had emergent right colectony late 9/5; extubated earlier today; FATOUMATA drin inplace;       Wounds:  Surgical Incision       Current Nutrition Therapies:    Diet NPO    Anthropometric Measures:  · Height: 5' 3\" (160 cm)  · Current Body Weight: 310 lb 10 oz (140.9 kg)   · Admission Body Weight: 265 lb (120.2 kg) (stated)    · Usual Body Weight: 326 lb (147.9 kg) (april 2021)     · Ideal Body Weight: 115 lbs; % Ideal Body Weight 270.1 %   · BMI: 55  · Adjusted Body Weight:  ; No Adjustment   · BMI Categories: Obese Class 3 (BMI 40.0 or greater)       Nutrition Diagnosis:   · Inadequate oral intake related to altered GI function, altered GI structure as evidenced by NPO or clear liquid status due to medical condition      Nutrition Interventions:   Food and/or Nutrient Delivery:  Continue NPO  Nutrition Education/Counseling:  No recommendation at this time   Coordination of Nutrition Care:  Continue to monitor while inpatient    Goals:  pt will adhere to NPO orders and will be advanced to PO diet once her normal GI function returns       Nutrition Monitoring and Evaluation:   Behavioral-Environmental Outcomes:  None Identified   Food/Nutrient Intake Outcomes:  Diet Advancement/Tolerance  Physical Signs/Symptoms Outcomes:  Biochemical Data, Nutrition Focused Physical Findings, Weight, GI Status     Discharge Planning:     Too soon to determine     Electronically signed by Ni Plummer RD, LD on 9/6/21 at 5:00 PM EDT    Contact: [phone]

## 2021-09-06 NOTE — ANESTHESIA PRE PROCEDURE
Department of Anesthesiology  Preprocedure Note       Name:  Sebastian Hodgson   Age:  60 y.o.  :  1965                                          MRN:  [id number]         Date:  2021      Surgeon: Luanne Vee):  Gisell Astudillo MD    Procedure: Procedure(s):  LAPAROTOMY EXPLORATORY    Medications prior to admission:   Prior to Admission medications    Medication Sig Start Date End Date Taking? Authorizing Provider   indomethacin (INDOCIN) 50 MG capsule TAKE ONE CAPSULE BY MOUTH THREE TIMES A DAY AS NEEDED FOR PAIN  Patient taking differently: 2 times daily (with meals) TAKE ONE CAPSULE BY MOUTH THREE TIMES A DAY AS NEEDED FOR PAIN 21  Yes Jacob Solomon MD   oxybutynin (DITROPAN) 5 MG tablet TAKE ONE TABLET BY MOUTH TWICE A DAY 21  Yes Jacob Solomon MD   omeprazole (PRILOSEC) 20 MG delayed release capsule TAKE ONE CAPSULE BY MOUTH EVERY MORNING BEFORE BREAKFAST 21  Yes Jacob Solomon MD   Cholecalciferol 50 MCG (2000 UT) TABS Take 1 tablet by mouth daily Take 1 tablet by mouth daily. 3/8/21  Yes BANDAR Mckeon - CNP   furosemide (LASIX) 20 MG tablet TAKE TWO TABLETS BY MOUTH DAILY AS NEEDED 2/15/21  Yes Jacob Solomon MD   aspirin 81 MG chewable tablet Take 81 mg by mouth daily   Yes Historical Provider, MD   gabapentin (NEURONTIN) 400 MG capsule Take 400 mg by mouth 2 times daily. Yes Historical Provider, MD   magnesium 30 MG tablet Take 30 mg by mouth daily   Yes Historical Provider, MD   tiZANidine (ZANAFLEX) 4 MG tablet Take 4-8 mg by mouth nightly    Yes Historical Provider, MD   DULoxetine (CYMBALTA) 60 MG capsule Take 120 mg by mouth daily    Yes Historical Provider, MD   nystatin (MYCOSTATIN) 011352 UNIT/GM powder Apply 3 times daily.  3/18/21   Jacob Solomon MD       Current medications:    Current Facility-Administered Medications   Medication Dose Route Frequency Provider Last Rate Last Admin    ketorolac (TORADOL) injection 15 mg  15 mg IntraVENous Q8H PRN Doug Massey MD   15 mg at 09/05/21 1409    0.9% NaCl with KCl 20 mEq infusion   IntraVENous Continuous Bigg Moreno MD 75 mL/hr at 09/05/21 1123 New Bag at 09/05/21 1123    iohexol (OMNIPAQUE 240) 240 MG/ML injection             potassium phosphate 30 mmol in dextrose 5 % 250 mL IVPB  30 mmol IntraVENous Once Doug Massey MD        0.9 % sodium chloride bolus  1,000 mL IntraVENous Once Doug Massey MD 1,000 mL/hr at 09/05/21 2029 1,000 mL at 09/05/21 2029    pantoprazole (PROTONIX) injection 40 mg  40 mg IntraVENous BID Reginald Osborn MD   40 mg at 09/05/21 0904    morphine (PF) injection 2 mg  2 mg IntraVENous Q2H PRN Tricia Martin APRN - CNP   2 mg at 09/05/21 1636    Or    morphine (PF) injection 1 mg  1 mg IntraVENous Q2H PRN Tricia Martin APRN - CNP        calcium carbonate (TUMS) chewable tablet 500 mg  500 mg Oral TID PRN Beverly Sandoval APRN - CNP   500 mg at 09/02/21 1249    prochlorperazine (COMPAZINE) injection 10 mg  10 mg IntraVENous Q6H PRN Reginald Osborn MD   10 mg at 09/02/21 1250    DULoxetine (CYMBALTA) extended release capsule 120 mg  120 mg Oral Daily Beverly Sandoval, APRN - CNP   120 mg at 09/05/21 7718    gabapentin (NEURONTIN) capsule 400 mg  400 mg Oral TID Beverly Sandoval, APRN - CNP   400 mg at 09/05/21 1409    oxybutynin (DITROPAN) tablet 5 mg  5 mg Oral BID Beverly Sandoval, APRN - CNP   5 mg at 09/05/21 0908    topiramate (TOPAMAX) tablet 100 mg  100 mg Oral Nightly Beverly Sandoval, APRN - CNP   100 mg at 09/04/21 1955    sodium chloride flush 0.9 % injection 5-40 mL  5-40 mL IntraVENous 2 times per day Beverly Sandoval APRN - CNP   10 mL at 09/05/21 0904    sodium chloride flush 0.9 % injection 5-40 mL  5-40 mL IntraVENous PRN Beverly Force, APRN - CNP        0.9 % sodium chloride infusion  25 mL IntraVENous PRN Beverly Force, APRN -  mL/hr at 09/05/21 1348 25 mL at 09/05/21 1348    enoxaparin (LOVENOX) injection 40 mg  40 mg SubCUTAneous Daily BANDAR Law CNP   40 mg at 09/05/21 0909    [Held by provider] ondansetron (ZOFRAN-ODT) disintegrating tablet 4 mg  4 mg Oral Q8H PRN BANDAR Law - ANNI        Or   René Nan by provider] ondansetron (ZOFRAN) injection 4 mg  4 mg IntraVENous Q6H PRN Adrian Swift APRN - ANNI        polyethylene glycol (GLYCOLAX) packet 17 g  17 g Oral Daily PRN Adrian Swift APRN - CNP        acetaminophen (TYLENOL) tablet 650 mg  650 mg Oral Q6H PRN BANDAR Law - CNP   650 mg at 09/05/21 1357    Or    acetaminophen (TYLENOL) suppository 650 mg  650 mg Rectal Q6H PRN Adrian Swift APRN - CNP        piperacillin-tazobactam (ZOSYN) 3,375 mg in sodium chloride 0.9 % 100 mL IVPB extended infusion (mini-bag)  3,375 mg IntraVENous Q8H BANDAR Law - CNP   Stopped at 09/05/21 1749    potassium chloride (KLOR-CON M) extended release tablet 40 mEq  40 mEq Oral PRN BANDAR Law - CNP   40 mEq at 08/30/21 1536    Or    potassium bicarb-citric acid (EFFER-K) effervescent tablet 40 mEq  40 mEq Oral PRN BANDAR Law - CNP        Or    potassium chloride 10 mEq/100 mL IVPB (Peripheral Line)  10 mEq IntraVENous PRN BANDAR Law - CNP        magnesium sulfate 2000 mg in 50 mL IVPB premix  2,000 mg IntraVENous PRN Adrian Swift APRN - CNP 25 mL/hr at 09/05/21 2010 2,000 mg at 09/05/21 2010    magnesium oxide (MAG-OX) tablet 100 mg  100 mg Oral Daily Adrian Swift APRN - CNP   100 mg at 09/05/21 5850       Allergies: Allergies   Allergen Reactions    Iodides Anaphylaxis     Rash, hives, swelling    Of note on patient's admission 4/12/2021 she stated that allergy to iodides was actually her mother and allergy for shellfish was based on her father's allergies and not something she has herself experienced.     Scopolamine Hives       Problem List:    Patient Active Problem List   Diagnosis Code    Arthritis M19.90    Chronic GERD G84.8    Umbilical abnormality M07.7    History of DVT (deep vein thrombosis) Z86.718    Other chronic pain G89.29    Morbid obesity (HCC) E66.01    Family history of factor V Leiden mutation Z83.2    Urinary incontinence, urge N39.41    Snoring R06.83    Incarcerated ventral hernia K43.6    Obstructive sleep apnea G47.33    S/P laparoscopic sleeve gastrectomy Z98.84    Appendicitis K37    Leukocytosis D72.829    Hypomagnesemia E83.42    Hypokalemia E87.6    Hypotension due to hypovolemia I95.89, E86.1    Right lower quadrant abdominal pain R10.31    Ileus ([facility]) K56.7       Past Medical History:        Diagnosis Date    Acute renal failure (ARF) ([facility] [address] May 4, 2015    d/t IV dye    Arthritis     Asthma     DDD (degenerative disc disease), cervical     Deep vein thrombosis (DVT) ([facility]) 2015    Degenerative disc disease     Family history of factor V deficiency     brother and sister; pt tested does not have    GERD (gastroesophageal reflux disease)     Hypertension     no longer has after quitting stressful job    Scoliosis     Sleep apnea     uses c-pap    Spinal stenosis        Past Surgical History:        Procedure Laterality Date    CARPAL TUNNEL RELEASE      JOINT REPLACEMENT Bilateral     hips and knees    ROTATOR CUFF REPAIR      3x right, 2x left    SLEEVE GASTRECTOMY N/A 2021    LAPAROSCOPIC SLEEVE GASTRECTOMY - ETHICON performed by Lima Lang MD at [address] N/A 2020    EGD BIOPSY performed by Lima Lang MD at [address]       Social History:    Social History     Tobacco Use    Smoking status: Former Smoker     Packs/day: 1.50     Years: 20.00     Pack years: 30.00     Types: Cigarettes     Quit date:      Years since quittin.6    Smokeless tobacco: Never Used   Substance Use Topics    Alcohol use: Not Currently     Comment: has had a few vodkas since 8 weeks post op                                Counseling given: Not Answered      Vital Signs (Current):   Vitals:    09/05/21 1355 09/05/21 1447 09/05/21 1841 09/05/21 1945   BP: 98/64  94/68 (!) 81/59   Pulse: 121   98   Resp: 18   16   Temp: 100.4 °F (38 °C) 99.4 °F (37.4 °C) 97.5 °F (36.4 °C) 98.5 °F (36.9 °C)   TempSrc: Axillary Oral Oral Oral   SpO2: 96%   93%   Weight:       Height:                                                  BP Readings from Last 3 Encounters:   09/05/21 (!) 81/59   07/09/21 126/84   04/27/21 137/78       NPO Status:                                                                                 BMI:   Wt Readings from Last 3 Encounters:   08/30/21 265 lb (120.2 kg)   07/09/21 283 lb (128.4 kg)   04/27/21 (!) 312 lb (141.5 kg)     Body mass index is 46.94 kg/m².     CBC:   Lab Results   Component Value Date    WBC 11.6 09/05/2021    RBC 4.62 09/05/2021    HGB 14.6 09/05/2021    HCT 44.6 09/05/2021    MCV 96.6 09/05/2021    RDW 16.6 09/05/2021     09/05/2021       CMP:   Lab Results   Component Value Date     09/05/2021    K 4.3 09/05/2021    K 3.5 08/31/2021     09/05/2021    CO2 21 09/05/2021    BUN 9 09/05/2021    CREATININE 0.8 09/05/2021    GFRAA >60 09/05/2021    AGRATIO 1.2 08/30/2021    LABGLOM >60 09/05/2021    GLUCOSE 128 09/05/2021    PROT 6.9 08/30/2021    CALCIUM 8.9 09/05/2021    BILITOT 0.7 08/30/2021    ALKPHOS 96 08/30/2021    AST 15 08/30/2021    ALT 17 08/30/2021       POC Tests:   Recent Labs     09/05/21 2031   POCGLU 108*       Coags:   Lab Results   Component Value Date    PROTIME 11.8 12/08/2020    INR 1.02 12/08/2020       HCG (If Applicable): No results found for: PREGTESTUR, PREGSERUM, HCG, HCGQUANT     ABGs:   Lab Results   Component Value Date    PHART 7.204 05/04/2015    PO2ART 61.1 05/04/2015    EWO3SIR 51.1 05/04/2015    KRF6SZB 19.7 05/04/2015    BEART -8.2 05/04/2015    Y6CJKIJE 85.8 05/04/2015        Type & Screen (If Applicable):  No results found for: LABABO, LABRH    Drug/Infectious Status (If Applicable):  No results found for: HIV, HEPCAB    COVID-19 Screening (If Applicable):   Lab Results   Component Value Date    COVID19 Not Detected 08/30/2021    COVID19 Not Detected 04/06/2021           Anesthesia Evaluation  Patient summary reviewed and Nursing notes reviewed no history of anesthetic complications:   Airway: Mallampati: II     Neck ROM: full   Dental:          Pulmonary:Negative Pulmonary ROS and normal exam    (+) sleep apnea:  asthma:                            Cardiovascular:Negative CV ROS    (+) hypertension:,                   Neuro/Psych:   Negative Neuro/Psych ROS              GI/Hepatic/Renal: Neg GI/Hepatic/Renal ROS  (+) GERD: well controlled, renal disease: ARF,      (-) hiatal hernia       Endo/Other: Negative Endo/Other ROS   (+) blood dyscrasia: Factor V, arthritis:., .                 Abdominal:             Vascular:   + DVT, . Other Findings:           Anesthesia Plan      general     ASA 4 - emergent     (I discussed with the patient the risks and benefits of PIV, general anesthesia, IV Narcotics, PACU. All questions were answered the patient agrees with the plan and wishes to proceed. Possible postop vent, patient understands and wishes to proceed.  )  Induction: intravenous. Pre-Operative Diagnosis: Appendicitis [K37]; Acute appendicitis with perforation and generalized peritonitis, unspecified whether abscess present, unspecified whether gangrene present [K35.20]    60 y.o.   BMI:  Body mass index is 46.94 kg/m².      Vitals:    09/05/21 1355 09/05/21 1447 09/05/21 1841 09/05/21 1945   BP: 98/64  94/68 (!) 81/59   Pulse: 121   98   Resp: 18   16   Temp: 100.4 °F (38 °C) 99.4 °F (37.4 °C) 97.5 °F (36.4 °C) 98.5 °F (36.9 °C)   TempSrc: Axillary Oral Oral Oral   SpO2: 96%   93%   Weight:       Height:           Allergies   Allergen Reactions    Iodides Anaphylaxis Rash, hives, swelling    Of note on patient's admission 2021 she stated that allergy to iodides was actually her mother and allergy for shellfish was based on her father's allergies and not something she has herself experienced.  Scopolamine Hives       Social History     Tobacco Use    Smoking status: Former Smoker     Packs/day: 1.50     Years: 20.00     Pack years: 30.00     Types: Cigarettes     Quit date:      Years since quittin.6    Smokeless tobacco: Never Used   Substance Use Topics    Alcohol use: Not Currently     Comment: has had a few vodkas since 8 weeks post op       LABS:    CBC  Lab Results   Component Value Date/Time    WBC 11.6 (H) 2021 12:57 PM    HGB 14.6 2021 12:57 PM    HCT 44.6 2021 12:57 PM     2021 12:57 PM     RENAL  Lab Results   Component Value Date/Time     2021 12:57 PM    K 4.3 2021 12:57 PM    K 3.5 2021 05:47 AM     2021 12:57 PM    CO2 21 2021 12:57 PM    BUN 9 2021 12:57 PM    CREATININE 0.8 2021 12:57 PM    GLUCOSE 128 (H) 2021 12:57 PM     COAGS  Lab Results   Component Value Date/Time    PROTIME 11.8 2020 04:10 PM    INR 1.02 2020 04:10 PM     Sinus tachycardia with Premature supraventricular complexesT wave abnormality, consider lateral ischemiaAbnormal ECGWhen compared with ECG of 05-SEP-2021 12:36, (unconfirmed)Fusion complexes are no longer PresentPremature ventricular complexes are no longer PresentConfirmed by Abilio Crespo MD, Colton Moss ([id number]) on 2021 6:08:15 PM    Summary   Normal left ventricle size, wall thickness and systolic function with an   estimated ejection fraction of 55%. No regional wall motion abnormalities   are seen. There is mild tricuspid regurgitation. Systolic pulmonary artery pressure (SPAP) is estimated at 45mmHg consistent   with mild pulmonary hypertension (RA pressure 8 mmHg).       Signature ------------------------------------------------------------------   Electronically signed by Terence Lerner MD, [facility]   (Interpreting physician) on 05/06/2015 at 11:58 AM    Janine Zuniga MD   9/5/2021

## 2021-09-06 NOTE — ANESTHESIA PROCEDURE NOTES
Central Venous Line:    A central venous line was placed using ultrasound guidance and surface landmarks, in the OR for the following indication(s): central venous access and CVP monitoring. 9/5/2021 10:03 PM9/5/2021 10:05 PM    Sterility preparation included the following: hand hygiene performed prior to procedure, maximum sterile barriers used and sterile technique used to drape from head to toe. The patient was placed in Trendelenburg position. The right internal jugular vein was prepped. The site was prepped with Chloraprep. A 7 Fr (size), 16 (length), triple lumen was placed. During the procedure, the following specific steps were taken: target vein identified, needle advanced into vein and blood aspirated and guidewire advanced into vein. Intravenous verification was obtained by ultrasound and venous blood return. Post insertion care included: all ports aspirated, all ports flushed easily, guidewire removed intact, Biopatch applied, line sutured in place and dressing applied. During the procedure the patient experienced: patient tolerated procedure well with no complications.       Anesthesia type: general..No  Staffing  Performed: Anesthesiologist   Anesthesiologist: Donaldo Estrella MD  Preanesthetic Checklist  Completed: patient identified, IV checked, site marked, risks and benefits discussed, surgical consent, monitors and equipment checked, pre-op evaluation, timeout performed, anesthesia consent given, oxygen available and patient being monitored

## 2021-09-06 NOTE — PROGRESS NOTES
[facility] GENERAL SURGERY DAILY PROGRESS NOTE    SUBJECTIVE: Awake, alert. Extubated. OBJECTIVE: CURRENT VITALS:  BP 85/75   Pulse 104   Temp 97.1 °F (36.2 °C) (Axillary)   Resp 14   Ht 5' 3\" (1.6 m)   Wt (!) 310 lb 10.1 oz (140.9 kg)   LMP 12/24/2010   SpO2 97%   BMI 55.03 kg/m²          ABD: Soft.   FATOUMATA serosanguinous      LABS:    CBC: Recent Labs     09/04/21  0517 09/05/21  1257 09/06/21  0600   WBC 6.5 11.6* 24.4*   RBC 3.95* 4.62 4.45   HGB 12.8 14.6 14.0   HCT 40.2 44.6 42.8   .7* 96.6 96.2   RDW 17.8* 16.6* 16.7*    350 361     BMP:   Recent Labs     09/04/21  0517 09/05/21  1257 09/06/21  0600    140 137   K 3.8 4.3 3.9    105 106   CO2 15* 21 18*   PHOS  --  1.4* 4.8   BUN 7 9 14   CREATININE <0.5* 0.8 0.8     Recent Labs     09/05/21  1257 09/06/21  0600   MG 1.30* 1.70*             ASSESSMENT:   POD 1 R colectomy      PLAN:   Weaning levophed  Continue IVF  Antibiotics  NG  NPO         Jennifer Gaines MD

## 2021-09-06 NOTE — PROGRESS NOTES
Dr. Quiroz Alert called back and recommended metoprolol instead of cardizem for tachycardia. Updated Dr. Royal Rebolledo via [employer].  Yolette Barraza RN

## 2021-09-06 NOTE — PROGRESS NOTES
09/06/21 0826   Vent Information   Vt Ordered 400 mL   Rate Set 16 bmp   Peak Flow 60 L/min   Pressure Support 0 cmH20   FiO2  40 %   Sensitivity 3   PEEP/CPAP 5   I Time/ I Time % 0 s   Humidification Source Heated wire   Humidification Temp Measured 37   Vent Patient Data   High Peep/I Pressure 0   Peak Inspiratory Pressure 16 cmH2O   Mean Airway Pressure 8.1 cmH20   Rate Measured 24 br/min   Vt Exhaled 458 mL   Minute Volume 10 Liters   I:E Ratio 1:2.50   Plateau Pressure 13 HUG34   Static Compliance 43 mL/cmH2O   Total PEEP 5 cmH20   Cough/Sputum   Sputum How Obtained Endotracheal;Suctioned   Cough Non-productive   Spontaneous Breathing Trial (SBT) RT Doc   Pulse 102   Breath Sounds   Right Upper Lobe Clear;Diminished   Right Middle Lobe Diminished   Right Lower Lobe Diminished   Left Upper Lobe Clear;Diminished   Left Lower Lobe Diminished   Additional Respiratory  Assessments   Resp (!) 0   Position Semi-Hill's   Oral Care Completed? Yes   Oral Care Mouth suctioned   Subglottic Suction Done? Yes   Cuff Pressure (cm H2O) 27 cm H2O   Alarm Settings   High Pressure Alarm 40 cmH2O   Low Minute Volume Alarm 4 L/min   High Respiratory Rate 40 br/min   ETT (adult)   Placement Date/Time: 09/05/21 2150   Preoxygenation: Yes  Mask Ventilation: Mask ventilation not attempted (0)  Technique: Video laryngoscopy  Type: Cuffed  Tube Size: 8 mm  Laryngoscope: Baldwin  Blade Size: 3  Location: Oral  Grade View: Full view o. ..    Secured at 24 cm   Measured From [address] [place] [address],[address] By [employer] tube asher

## 2021-09-06 NOTE — FLOWSHEET NOTE
09/06/21 1200   Vital Signs   Temp 100.4 °F (38 °C)   Temp Source Axillary   Pulse 101   Heart Rate Source Monitor   Resp 11   /78   MAP (mmHg) 85   Level of Consciousness Alert (0)   MEWS Score 2   Oxygen Therapy   SpO2 96 %   O2 Device Nasal cannula   O2 Flow Rate (L/min) 4 L/min   Pt reassessed see doc flow. NSR 91 w/ frequent PAC's on ICU bedside monitor. SPO2 99% on 4L O2 via NC with CSPO2 monitoring. Pt A&O x 4. PERRL. RIJ CVC WDL  PIV x 1 WDL. Art line WDL. NG to LCWS w/ green contents in tubing per MD order. Midline incision CDI pain bail x 1 WDL, FATOUMATA drains x 2 WDL serosanguinous drainage noted to both. CVP 2-4. Cash secured draining clear yellow urine. Pt repositioned for comfort. Will continue to closely monitor.

## 2021-09-06 NOTE — PROGRESS NOTES
Zeeshan Grewal in room, angry, wants to know what is going on, mad that the doctor was not communicating with him, mad that the family (sisters) were receiving information and he was not, upset that they had her belongings, states that he feel like he has been pushed pack and that he should be her decision maker and not the sisters, states they have been together 20 years, live together, no POA completed. Threatens to have patient taken to another hospital because of the care she has received and inconsideration from doctor. Charge nurse in room also talking with patient, calms, updated, has phone number to unit. Patient able to awaken and answer yes/no questions for him, indicates she is alright and than nods yes when he asks if it is okay for him to go home. Will continue to monitor, call light in reach.

## 2021-09-06 NOTE — PROGRESS NOTES
Patient resting, abdominal dressing CDI, FATOUMATA drains drainage starting to decrease, VS WNL, on propofol at 20mcg/kg/min and levophed 10mcg/min, saline at 125ml/hr, potassium phosphate infusing while am labs drawn. Will continue to monitor.

## 2021-09-06 NOTE — CONSULTS
Patient is being seen at the request of Kelsey Elena MD for a consultation for Intra abdominal sepsis from perforated appendicitis s/p emergent R colectomy    HISTORY OF PRESENT ILLNESS:   60years old admitted 8/30/2021 with acute appendicitis/focal perforation. Has been on IV Zosyn. Repeat CT imaging yesterday showed worsening intra-abdominal fluid with free air. Was taken to the OR underwent right colectomy, hernia repair, drainage of intra-abdominal abscess. Patient is intubated nonverbal unable to obtain further HPI. Propofol 20 mcg/kg/min    cc/hr  Levophed 2 mcg/min   PEEP 5  FiO2 40%      PAST MEDICAL HISTORY:  Past Medical History:   Diagnosis Date    Acute renal failure (ARF) ([facility] [address] May 4, 2015    d/t IV dye    Arthritis     Asthma     DDD (degenerative disc disease), cervical     Deep vein thrombosis (DVT) ([facility] [address] 07/2015    Degenerative disc disease     Family history of factor V deficiency     brother and sister; pt tested does not have    GERD (gastroesophageal reflux disease)     Hypertension     no longer has after quitting stressful job    Scoliosis     Sleep apnea     uses c-pap    Spinal stenosis      PAST SURGICAL HISTORY:  Past Surgical History:   Procedure Laterality Date    CARPAL TUNNEL RELEASE      JOINT REPLACEMENT Bilateral     hips and knees    ROTATOR CUFF REPAIR      3x right, 2x left    SLEEVE GASTRECTOMY N/A 4/12/2021    LAPAROSCOPIC SLEEVE GASTRECTOMY - ETHICON performed by Chitra Rose MD at [facility] 26 N/A 12/28/2020    EGD BIOPSY performed by Chitra Rose MD at [facility] 10:  family history includes Arthritis in her brother, father, mother, sister, and sister; Asthma in her sister; Cancer in her sister; Diabetes in her brother; High Blood Pressure in her brother and father; Mental Illness in her sister; Other in her brother and sister; Stroke in her sister.     SOCIAL HISTORY: on exposed extremities. Lymph: No cervical LAD. No supraclavicular LAD. M/S: No cyanosis. No joint deformity. No clubbing. Neuro: Intubated. Alert and awake. Followed commands. Psych: Noncommunicative unable to obtain      LABS:  CBC:   Recent Labs     09/04/21  0517 09/05/21  1257 09/06/21  0600   WBC 6.5 11.6* 24.4*   HGB 12.8 14.6 14.0   HCT 40.2 44.6 42.8   .7* 96.6 96.2    350 361     BMP:   Recent Labs     09/04/21  0517 09/05/21  1257 09/06/21  0600    140 137   K 3.8 4.3 3.9    105 106   CO2 15* 21 18*   PHOS  --  1.4* 4.8   BUN 7 9 14   CREATININE <0.5* 0.8 0.8     LIVER PROFILE: No results for input(s): AST, ALT, LIPASE, BILIDIR, BILITOT, ALKPHOS in the last 72 hours. Invalid input(s): AMYLASE,  ALB  PT/INR: No results for input(s): PROTIME, INR in the last 72 hours. APTT: No results for input(s): APTT in the last 72 hours. UA:No results for input(s): NITRITE, COLORU, PHUR, LABCAST, WBCUA, RBCUA, MUCUS, TRICHOMONAS, YEAST, BACTERIA, CLARITYU, SPECGRAV, LEUKOCYTESUR, UROBILINOGEN, BILIRUBINUR, BLOODU, GLUCOSEU, AMORPHOUS in the last 72 hours. Invalid input(s): Dolores Masters  Recent Labs     09/06/21  0600   PHART 7.391   WNS3PME 35.3   PO2ART 85.3       CXR 9/6 imaging was reviewed by me and showed:  Satisfactory ETT/CVC position  LLL ASD           ASSESSMENT:  · Septic shock   · Acute appendicitis with perforation and incarcerated ventral incisional hernia post right colectomy, hernia repair and drainage of intra-abdominal abscess 9/5/2021  · Electrolytes disorder  · Leukocytosis  · History of DVT in 2015  · Chronic pain syndrome  · Morbid obesity post gastric sleeve   · FLORIAN on CPAP    PLAN:  Mechanical ventilation as per my orders.  The ventilator was adjusted by me at the bedside for unstable, life threatening respiratory failure  Follow ABG and chest x-ray while on the ventilator  Supplemental oxygen to maintain SaO2 >92%; wean as tolerated  IV Propofol for sedation, target RASS -2, with daily spontaneous awakening trial   Fentanyl and Versed PRN, gtt as needed  Head of bed 30 degrees or higher at all times  Spontaneous breathing trial for extubation today   Closely monitory airways, clinical status, cardiac rhythm, vital signs, and urine output   IV fluid resuscitation with crystalloid targeting (30 mL/kg actual body weight) targeting CVP 8-12 and SBP>90  Maintenance IVF NS at 125 cc/hr  Broad spectrum antibiotics to include Zosyn and Diflucan   IV Levophed to keep MAP > 65 mmHg or SBP>90  Follow-up cultures  Electrolytes replacement   Blood sugar control, with goal 150-180  GI prophylaxis: PPI  DVT prophylaxis: Lovenox  MRSA prophylaxis: Bactroban   Code status: Full code

## 2021-09-06 NOTE — PLAN OF CARE
Nutrition Problem #1: Inadequate oral intake  Intervention: Food and/or Nutrient Delivery: Continue NPO  Nutritional Goals: pt will adhere to NPO orders and will be advanced to PO diet once her normal GI function returns

## 2021-09-06 NOTE — ANESTHESIA POSTPROCEDURE EVALUATION
Department of Anesthesiology  Postprocedure Note    Patient: Poncho Sebastian  MRN: [id number]  YOB: 1965  Date of evaluation: 9/6/2021  Time:  12:21 AM     Procedure Summary     Date: 09/05/21 Room / Location: [address] [place] [address] / [facility]    Anesthesia Start: 2134 Anesthesia Stop: 09/06/21 0016    Procedure: RIGHT COLECTOMY, INCARCERATED VENTRAL INCISIONAL HERNIA REPAIR, OMPHALECTOMY, DRAINAGE OF INTRA ABDOMINAL ABSCESS. (N/A Abdomen) Diagnosis: (APPENDICITIS)    Surgeons: Vasquez Garcia MD Responsible Provider: Daniel Garzon MD    Anesthesia Type: general ASA Status: 4 - Emergent          Anesthesia Type: general    Starr Phase I:      Starr Phase II:      Last vitals: Reviewed and per EMR flowsheets.        Anesthesia Post Evaluation    Comments: Postoperative Anesthesia Note    Name:    Poncho Sebastian  MRN:      [id number]    Patient Vitals in the past 12 hrs:  09/05/21 1945, BP:(!) 81/59, Temp:98.5 °F (36.9 °C), Temp src:Oral, Pulse:98, Resp:16, SpO2:93 %  09/05/21 1841, BP:94/68, Temp:97.5 °F (36.4 °C), Temp src:Oral  09/05/21 1447, Temp:99.4 °F (37.4 °C), Temp src:Oral  09/05/21 1355, BP:98/64, Temp:100.4 °F (38 °C), Temp src:Axillary, Pulse:121, Resp:18, SpO2:96 %  09/05/21 1335, BP:105/70, Pulse:116  09/05/21 1325, BP:94/61, Pulse:141  09/05/21 1254, Temp:97.4 °F (36.3 °C), Temp src:Oral, Pulse:104, Resp:20, SpO2:94 %     LABS:    CBC  Lab Results       Component                Value               Date/Time                  WBC                      11.6 (H)            09/05/2021 12:57 PM        HGB                      14.6                09/05/2021 12:57 PM        HCT                      44.6                09/05/2021 12:57 PM        PLT                      350                 09/05/2021 12:57 PM   RENAL  Lab Results       Component                Value               Date/Time                  NA                       140                 09/05/2021 12:57 PM        K 4.3                 09/05/2021 12:57 PM        K                        3.5                 08/31/2021 05:47 AM        CL                       105                 09/05/2021 12:57 PM        CO2                      21                  09/05/2021 12:57 PM        BUN                      9                   09/05/2021 12:57 PM        CREATININE               0.8                 09/05/2021 12:57 PM        GLUCOSE                  128 (H)             09/05/2021 12:57 PM   COAGS  Lab Results       Component                Value               Date/Time                  PROTIME                  11.8                12/08/2020 04:10 PM        INR                      1.02                12/08/2020 04:10 PM     Intake & Output:  @22YLXP@    Nausea & Vomiting:  No    Level of Consciousness:  Sedated on vent    Pain Assessment:  Adequate analgesia    Anesthesia Complications:  No apparent anesthetic complications    SUMMARY      Vital signs stable  OK to discharge from Stage I post anesthesia care.   Care transferred from Anesthesiology department on discharge from perioperative area

## 2021-09-06 NOTE — PROGRESS NOTES
Pt extubated to O2 at 4lpm NC         09/06/21 1135   Oxygen Therapy/Pulse Ox   O2 Therapy Oxygen   O2 Device Nasal cannula   O2 Flow Rate (L/min) 4 L/min   Resp 14   SpO2 97 %

## 2021-09-07 LAB
ANION GAP SERPL CALCULATED.3IONS-SCNC: 10 MMOL/L (ref 3–16)
BASOPHILS ABSOLUTE: 0.1 K/UL (ref 0–0.2)
BASOPHILS RELATIVE PERCENT: 0.5 %
BUN BLDV-MCNC: 17 MG/DL (ref 7–20)
CALCIUM SERPL-MCNC: 7.5 MG/DL (ref 8.3–10.6)
CHLORIDE BLD-SCNC: 110 MMOL/L (ref 99–110)
CO2: 21 MMOL/L (ref 21–32)
CREAT SERPL-MCNC: 0.7 MG/DL (ref 0.6–1.1)
EKG ATRIAL RATE: 104 BPM
EKG DIAGNOSIS: NORMAL
EKG P AXIS: 49 DEGREES
EKG P-R INTERVAL: 130 MS
EKG Q-T INTERVAL: 296 MS
EKG QRS DURATION: 90 MS
EKG QTC CALCULATION (BAZETT): 389 MS
EKG R AXIS: -17 DEGREES
EKG T AXIS: 116 DEGREES
EKG VENTRICULAR RATE: 104 BPM
EOSINOPHILS ABSOLUTE: 0 K/UL (ref 0–0.6)
EOSINOPHILS RELATIVE PERCENT: 0 %
GFR AFRICAN AMERICAN: >60
GFR NON-AFRICAN AMERICAN: >60
GLUCOSE BLD-MCNC: 105 MG/DL (ref 70–99)
GLUCOSE BLD-MCNC: 105 MG/DL (ref 70–99)
GLUCOSE BLD-MCNC: 106 MG/DL (ref 70–99)
GLUCOSE BLD-MCNC: 80 MG/DL (ref 70–99)
GLUCOSE BLD-MCNC: 84 MG/DL (ref 70–99)
GLUCOSE BLD-MCNC: 88 MG/DL (ref 70–99)
HCT VFR BLD CALC: 32.4 % (ref 36–48)
HEMOGLOBIN: 10.8 G/DL (ref 12–16)
LYMPHOCYTES ABSOLUTE: 0.9 K/UL (ref 1–5.1)
LYMPHOCYTES RELATIVE PERCENT: 4.6 %
MAGNESIUM: 1.9 MG/DL (ref 1.8–2.4)
MCH RBC QN AUTO: 32.2 PG (ref 26–34)
MCHC RBC AUTO-ENTMCNC: 33.3 G/DL (ref 31–36)
MCV RBC AUTO: 96.8 FL (ref 80–100)
MONOCYTES ABSOLUTE: 0.6 K/UL (ref 0–1.3)
MONOCYTES RELATIVE PERCENT: 3.3 %
NEUTROPHILS ABSOLUTE: 17.1 K/UL (ref 1.7–7.7)
NEUTROPHILS RELATIVE PERCENT: 91.6 %
PDW BLD-RTO: 17.2 % (ref 12.4–15.4)
PERFORMED ON: ABNORMAL
PERFORMED ON: ABNORMAL
PERFORMED ON: NORMAL
PLATELET # BLD: 229 K/UL (ref 135–450)
PMV BLD AUTO: 7.4 FL (ref 5–10.5)
POTASSIUM SERPL-SCNC: 3.8 MMOL/L (ref 3.5–5.1)
RBC # BLD: 3.34 M/UL (ref 4–5.2)
SODIUM BLD-SCNC: 141 MMOL/L (ref 136–145)
WBC # BLD: 18.7 K/UL (ref 4–11)

## 2021-09-07 PROCEDURE — 2580000003 HC RX 258: Performed by: SURGERY

## 2021-09-07 PROCEDURE — 6360000002 HC RX W HCPCS: Performed by: SURGERY

## 2021-09-07 PROCEDURE — 2580000003 HC RX 258: Performed by: INTERNAL MEDICINE

## 2021-09-07 PROCEDURE — 85025 COMPLETE CBC W/AUTO DIFF WBC: CPT

## 2021-09-07 PROCEDURE — C9113 INJ PANTOPRAZOLE SODIUM, VIA: HCPCS | Performed by: INTERNAL MEDICINE

## 2021-09-07 PROCEDURE — 97164 PT RE-EVAL EST PLAN CARE: CPT

## 2021-09-07 PROCEDURE — 6360000002 HC RX W HCPCS: Performed by: INTERNAL MEDICINE

## 2021-09-07 PROCEDURE — 97535 SELF CARE MNGMENT TRAINING: CPT

## 2021-09-07 PROCEDURE — 97110 THERAPEUTIC EXERCISES: CPT

## 2021-09-07 PROCEDURE — 83735 ASSAY OF MAGNESIUM: CPT

## 2021-09-07 PROCEDURE — 93010 ELECTROCARDIOGRAM REPORT: CPT | Performed by: INTERNAL MEDICINE

## 2021-09-07 PROCEDURE — C9113 INJ PANTOPRAZOLE SODIUM, VIA: HCPCS | Performed by: SURGERY

## 2021-09-07 PROCEDURE — 99233 SBSQ HOSP IP/OBS HIGH 50: CPT | Performed by: INTERNAL MEDICINE

## 2021-09-07 PROCEDURE — 97530 THERAPEUTIC ACTIVITIES: CPT

## 2021-09-07 PROCEDURE — 99024 POSTOP FOLLOW-UP VISIT: CPT | Performed by: NURSE PRACTITIONER

## 2021-09-07 PROCEDURE — 80048 BASIC METABOLIC PNL TOTAL CA: CPT

## 2021-09-07 PROCEDURE — 1200000000 HC SEMI PRIVATE

## 2021-09-07 PROCEDURE — 97168 OT RE-EVAL EST PLAN CARE: CPT

## 2021-09-07 PROCEDURE — 97161 PT EVAL LOW COMPLEX 20 MIN: CPT

## 2021-09-07 RX ADMIN — PIPERACILLIN SODIUM AND TAZOBACTAM SODIUM 3375 MG: 3; .375 INJECTION, POWDER, LYOPHILIZED, FOR SOLUTION INTRAVENOUS at 04:38

## 2021-09-07 RX ADMIN — MORPHINE SULFATE 2 MG: 2 INJECTION, SOLUTION INTRAMUSCULAR; INTRAVENOUS at 19:10

## 2021-09-07 RX ADMIN — PANTOPRAZOLE SODIUM 40 MG: 40 INJECTION, POWDER, FOR SOLUTION INTRAVENOUS at 20:21

## 2021-09-07 RX ADMIN — ENOXAPARIN SODIUM 40 MG: 40 INJECTION SUBCUTANEOUS at 08:48

## 2021-09-07 RX ADMIN — MUPIROCIN: 20 OINTMENT TOPICAL at 10:40

## 2021-09-07 RX ADMIN — SODIUM CHLORIDE, PRESERVATIVE FREE 10 ML: 5 INJECTION INTRAVENOUS at 20:21

## 2021-09-07 RX ADMIN — SODIUM CHLORIDE, PRESERVATIVE FREE 10 ML: 5 INJECTION INTRAVENOUS at 09:01

## 2021-09-07 RX ADMIN — SODIUM CHLORIDE: 9 INJECTION, SOLUTION INTRAVENOUS at 00:03

## 2021-09-07 RX ADMIN — PIPERACILLIN SODIUM AND TAZOBACTAM SODIUM 3375 MG: 3; .375 INJECTION, POWDER, LYOPHILIZED, FOR SOLUTION INTRAVENOUS at 13:29

## 2021-09-07 RX ADMIN — PIPERACILLIN SODIUM AND TAZOBACTAM SODIUM 3375 MG: 3; .375 INJECTION, POWDER, LYOPHILIZED, FOR SOLUTION INTRAVENOUS at 20:50

## 2021-09-07 RX ADMIN — FLUCONAZOLE, SODIUM CHLORIDE 200 MG: 2 INJECTION INTRAVENOUS at 00:39

## 2021-09-07 RX ADMIN — MORPHINE SULFATE 2 MG: 2 INJECTION, SOLUTION INTRAMUSCULAR; INTRAVENOUS at 04:34

## 2021-09-07 RX ADMIN — MORPHINE SULFATE 2 MG: 2 INJECTION, SOLUTION INTRAMUSCULAR; INTRAVENOUS at 17:15

## 2021-09-07 RX ADMIN — SODIUM CHLORIDE: 9 INJECTION, SOLUTION INTRAVENOUS at 16:42

## 2021-09-07 RX ADMIN — PANTOPRAZOLE SODIUM 40 MG: 40 INJECTION, POWDER, FOR SOLUTION INTRAVENOUS at 08:48

## 2021-09-07 ASSESSMENT — PAIN - FUNCTIONAL ASSESSMENT: PAIN_FUNCTIONAL_ASSESSMENT: PREVENTS OR INTERFERES SOME ACTIVE ACTIVITIES AND ADLS

## 2021-09-07 ASSESSMENT — PAIN DESCRIPTION - LOCATION
LOCATION: ABDOMEN
LOCATION: ABDOMEN
LOCATION: ABDOMEN;SHOULDER

## 2021-09-07 ASSESSMENT — PAIN DESCRIPTION - ONSET
ONSET: ON-GOING

## 2021-09-07 ASSESSMENT — PAIN SCALES - GENERAL
PAINLEVEL_OUTOF10: 3
PAINLEVEL_OUTOF10: 7
PAINLEVEL_OUTOF10: 8
PAINLEVEL_OUTOF10: 0
PAINLEVEL_OUTOF10: 0
PAINLEVEL_OUTOF10: 3
PAINLEVEL_OUTOF10: 8

## 2021-09-07 ASSESSMENT — PAIN DESCRIPTION - FREQUENCY
FREQUENCY: CONTINUOUS

## 2021-09-07 ASSESSMENT — PAIN DESCRIPTION - DESCRIPTORS
DESCRIPTORS: ACHING

## 2021-09-07 ASSESSMENT — PAIN DESCRIPTION - ORIENTATION
ORIENTATION: MID
ORIENTATION: MID
ORIENTATION: RIGHT

## 2021-09-07 ASSESSMENT — PAIN DESCRIPTION - PAIN TYPE
TYPE: SURGICAL PAIN

## 2021-09-07 NOTE — PROGRESS NOTES
Pulmonary & Critical Care Medicine ICU Progress Note  CC: sepsis    Events of Last 24 hours: patient feels better. Tolerated extubation well. Invasive Lines:   IV Line: CVC 9/5    MV:  none  Vent Mode: AC/VC Rate Set: 0 bmp/Vt Ordered: 0 mL/ /FiO2 : 40 %  Recent Labs     09/06/21  0600 09/06/21  1028   PHART 7.391 7.425   VUC0GUR 35.3 32.1*   PO2ART 85.3 100.6       IV:   sodium chloride 125 mL/hr at 09/07/21 0003    bupivacaine 0.5% 270 mL (09/05/21 2343)    norepinephrine Stopped (09/06/21 1246)    sodium chloride 25 mL (09/05/21 1348)       EXAM:  BP 99/62   Pulse 70   Temp 97.7 °F (36.5 °C) (Oral)   Resp 17   Ht 5' 3\" (1.6 m)   Wt (!) 310 lb 10.1 oz (140.9 kg)   LMP 12/24/2010   SpO2 95%   BMI 55.03 kg/m²  on RA  Tmax: 97.9F  CVP:      Intake/Output Summary (Last 24 hours) at 9/7/2021 0741  Last data filed at 9/7/2021 0411  Gross per 24 hour   Intake 4375.2 ml   Output 1900 ml   Net 2475.2 ml     General: No distress. Alert. Eyes: PERRL. No sclera icterus. No conjunctival injection. ENT: No discharge. Pharynx clear. Neck: Trachea midline. Resp: No accessory muscle use. No crackles. No wheezing. No rhonchi. No dullness on percussion. CV: Regular rate. Regular rhythm. No mumur or rub. No edema. GI: Non-tender. Non-distended. No masses. FATOUMATA drains in place with serous drainage. Skin: Warm and dry. No nodule on exposed extremities. No rash on exposed extremities. M/S: No cyanosis. No joint deformity. No clubbing.    Neuro: awake and alert, conversant, moves all extremities    Medications:   fluconazole  200 mg IntraVENous Q24H    mupirocin   Nasal BID    pantoprazole  40 mg IntraVENous BID    topiramate  100 mg Oral Nightly    sodium chloride flush  5-40 mL IntraVENous 2 times per day    enoxaparin  40 mg SubCUTAneous Daily    piperacillin-tazobactam  3,375 mg IntraVENous Q8H     PRN Meds:  sodium chloride, morphine **OR** morphine, prochlorperazine, sodium chloride flush, sodium chloride, acetaminophen **OR** acetaminophen, potassium chloride **OR** potassium alternative oral replacement **OR** potassium chloride    Results:  CBC:   Recent Labs     09/05/21  1257 09/06/21  0600 09/07/21  0445   WBC 11.6* 24.4* 18.7*   HGB 14.6 14.0 10.8*   HCT 44.6 42.8 32.4*   MCV 96.6 96.2 96.8    361 229     BMP:   Recent Labs     09/05/21  1257 09/06/21  0600 09/07/21  0445    137 141   K 4.3 3.9 3.8    106 110   CO2 21 18* 21   PHOS 1.4* 4.8  --    BUN 9 14 17   CREATININE 0.8 0.8 0.7     LIVER PROFILE: No results for input(s): AST, ALT, LIPASE, BILIDIR, BILITOT, ALKPHOS in the last 72 hours. Invalid input(s): AMYLASE,  ALB  PT/INR: No results for input(s): PROTIME, INR in the last 72 hours. APTT: No results for input(s): APTT in the last 72 hours. UA:No results for input(s): NITRITE, COLORU, PHUR, LABCAST, WBCUA, RBCUA, MUCUS, TRICHOMONAS, YEAST, BACTERIA, CLARITYU, SPECGRAV, LEUKOCYTESUR, UROBILINOGEN, BILIRUBINUR, BLOODU, GLUCOSEU, AMORPHOUS in the last 72 hours. Invalid input(s): KETONESU    Cultures:  None      Films:  No new       ASSESSMENT:  · Septic shock   · Acute appendicitis with perforation and incarcerated ventral incisional hernia post right colectomy, hernia repair and drainage of intra-abdominal abscess 9/5/2021  · Electrolytes disorder  · Leukocytosis  · History of DVT in 2015  · Chronic pain syndrome  · Morbid obesity post gastric sleeve   · FLORIAN on CPAP     PLAN:  · Supplemental oxygen to maintain SaO2 >92%; wean as tolerated  · Head of bed 30 degrees or higher at all times  · Maintenance IVF NS- reduce to 75ml/hr  · Broad spectrum antibiotics to include: D9 Zosyn and D3 Diflucan   · Electrolytes replacement   · Blood sugar control, with goal 150-180  · GI prophylaxis: PPI  · DVT prophylaxis: Lovenox  · MRSA prophylaxis: Bactroban   · Code status: Full code   · Ok to leave ICU.

## 2021-09-07 NOTE — PROGRESS NOTES
General Surgery - Tricia Hwang Gist, APRN - CNP, CNP  Daily Progress Note    Pt Name: Patricia Puga  Medical Record Number: [id number]  Date of Birth 1965   Today's Date: 9/7/2021    ASSESSMENT  1. POD #2 S/P Right colectomy, incarcerated ventral, incisional hernia repair, omphalectomy, drainage of intraabdominal abscess. 2. ABD: morbidly obese, midline dressing removed: staples intact and look good, pain ball in place, FATOUMATA x 2 in place, NGT, + flatus x 2, no BM, no N/V  3. Leuks improved 24.4->18.7 with resolution of left shift. 4. VS: off levo, temp 99.1  5. F/C:   6.  ml out  7. FATOUMATA x 2: 105/1170: both serous  8. Pt is tearful over ice pack, she states overall she \"feels better\" than before surgery. PLAN  1. NPO NGT to CLWS, may have ice chips  2. Pain control   3. IS q 1 hour while awake   4. PT/OT  5. DVT proph: Lovenox  6. GI proph: Protonix   7. IVF at 75 ml/hr  8. Zosyn and Diflucan  9. Continue infante for strict I&Os. 10. Pt looks good POD #2: OK to transfer out of ICU from a surgical standpoint. Awaiting better return of bowel function. Nonda Oquendo has slightly improved from yesterday. Pain is well controlled. She has no nausea and no vomiting. She has passed flatus and has not had a bowel movement. She is NPO with NGT in place. Current activity is up with assistance    OBJECTIVE  VITALS:  height is 5' 3\" (1.6 m) and weight is 310 lb 10.1 oz (140.9 kg) (abnormal). Her axillary temperature is 99.1 °F (37.3 °C). Her blood pressure is 101/60 and her pulse is 73. Her respiration is 24 and oxygen saturation is 93%.    VITALS:  /60   Pulse 73   Temp 99.1 °F (37.3 °C) (Axillary)   Resp 24   Ht 5' 3\" (1.6 m)   Wt (!) 310 lb 10.1 oz (140.9 kg)   LMP 12/24/2010   SpO2 93%   BMI 55.03 kg/m²   INTAKE/OUTPUT:    Intake/Output Summary (Last 24 hours) at 9/7/2021 1418  Last data filed at 9/7/2021 1255  Gross per 24 hour   Intake 7389.89 ml   Output 1842 ml   Net 5547.89 ml     URINARY CATHETER OUTPUT (Cash):     GENERAL: alert, cooperative, crying, no distress  I/O last 3 completed shifts: In: 4375.2 [P.O.:60; I.V.:4238.8; IV Piggyback:76.4]  Out: 1900 [Urine:675; Emesis/NG output:950; Drains:275]  I/O this shift:  In: 3044.7 [I.V.:2722.7; IV Piggyback:322]  Out: 202 [Urine:200; Drains:2]    LABS  Recent Labs     09/06/21  0600 09/06/21  0600 09/07/21  0445   WBC 24.4*   < > 18.7*   HGB 14.0   < > 10.8*   HCT 42.8   < > 32.4*      < > 229      < > 141   K 3.9   < > 3.8      < > 110   CO2 18*   < > 21   BUN 14   < > 17   CREATININE 0.8   < > 0.7   MG 1.70*   < > 1.90   PHOS 4.8  --   --    CALCIUM 7.6*   < > 7.5*    < > = values in this interval not displayed.      CBC with Differential:    Lab Results   Component Value Date    WBC 18.7 09/07/2021    RBC 3.34 09/07/2021    HGB 10.8 09/07/2021    HCT 32.4 09/07/2021     09/07/2021    MCV 96.8 09/07/2021    MCH 32.2 09/07/2021    MCHC 33.3 09/07/2021    RDW 17.2 09/07/2021    BANDSPCT 28 09/06/2021    METASPCT 1 09/05/2021    LYMPHOPCT 4.6 09/07/2021    PROMYELOPCT 1 09/05/2021    MONOPCT 3.3 09/07/2021    MYELOPCT 1 09/05/2021    BASOPCT 0.5 09/07/2021    MONOSABS 0.6 09/07/2021    LYMPHSABS 0.9 09/07/2021    EOSABS 0.0 09/07/2021    BASOSABS 0.1 09/07/2021     CMP:    Lab Results   Component Value Date     09/07/2021    K 3.8 09/07/2021    K 3.5 08/31/2021     09/07/2021    CO2 21 09/07/2021    BUN 17 09/07/2021    CREATININE 0.7 09/07/2021    GFRAA >60 09/07/2021    AGRATIO 1.2 08/30/2021    LABGLOM >60 09/07/2021    GLUCOSE 106 09/07/2021    PROT 6.9 08/30/2021    LABALBU 2.6 09/03/2021    CALCIUM 7.5 09/07/2021    BILITOT 0.7 08/30/2021    ALKPHOS 96 08/30/2021    AST 15 08/30/2021    ALT 17 08/30/2021         BANDAR Enriquez - CNP  Electronically signed 9/7/2021 at 2:11 PM

## 2021-09-07 NOTE — PROGRESS NOTES
Inpatient Physical Therapy Evaluation and Treatment    Unit: ICU  Date:  9/7/2021  Patient Name:    Mike Nesbitt  Admitting diagnosis:  Appendicitis [K37]  Acute appendicitis with perforation and generalized peritonitis, unspecified whether abscess present, unspecified whether gangrene present [K35.20]  Admit Date:  8/30/2021  Precautions/Restrictions/WB Status/ Lines/ Wounds/ Oxygen: Fall risk, Lines -IV, Cash catheter, NG tube, Drains (LLQ drain) and Pain ball, Telemetry, Continuous pulse oximetry and midline abdominal incision    Treatment Time: 1435 - 1510  Treatment Number:  1   Timed Code Treatment Minutes: 25 minutes  Total Treatment Minutes: 35 minutes    Patient Goals for Therapy: \" to go to rehab \"          Discharge Recommendations: SNF  DME needs for discharge: defer to facility       Therapy recommendation for EMS Transport: requires transport by cot due to need of 2 person assist for bed to chair transfers    Therapy recommendations for staff:   Assist of 2 for bed mobility and EOB sitting    History Of Present Illness:    per Elijah Luis (8/30/2021)  \"The patient is a 60 y.o. female with a PMH of DVT, asthma, GERD, FLORIAN, morbid obesity, history of laparoscopic sleeve gastrectomy who presented to [facility] ED with complaint of RLQ abdominal pain that started yesterday. Initially thought symptoms were related to gas pains. She had a BM yesterday which was soft, brown stool. Symptoms did not get any better. Woke up early this morning with sharp right lower quadrant abdominal pain and a fever. Denies any nausea or vomiting. Reports shaking chills. She decided to come to the ER. Work-up in the ED showed: Patient's temperature was 100.4. Blood pressures were stable. Potassium and magnesium were low. She had a leukocytosis of 14.3. Urine was negative. Covid was negative. CT of the abdomen showed acute appendicitis with focal perforation and inflammatory changes in the right lower quadrant.  Patient was admitted to the medical surgical unit and general surgery was consulted. Interval history by Sonido Zabala MD (9/7/2021):  Patient underwent an emergent appendectomy for perforated appendicitis. Right colectomy was done. Op findings included an incarcerated ventral incisional hernia. In addition to the right colectomy the incarcerated ventral incisional hernia was repaired on colectomy was performed and intra-abdominal abscesses drained. She is on the ventilator postop. She has been extubated. Her present she is awake and alert. Pain is well controlled. She is started on ice chips today.     Home Health S4 Level Recommendation:  NA  AM-PAC Mobility Score    AM-PAC Inpatient Mobility Raw Score : 8    Preadmission Environment:    Pt. Lives boyfriend Delta Babb). 2 [employer] at home  Pt does not have access to 24/7 assistance. Home environment:  one story home  Steps to enter first floor:  3 steps to enter        Steps to second floor:  Full flight of 12-13 to basement (laundry)   Bathroom: tub/shower combo, shower chair (bench) , grab bars (suction cup)  and standard toilet  Equipment owned:  cane (SPC) and walker (Rollator)     Preadmission Status / PLOF:  History of falls             No  Pt. Able to drive          Yes  Pt Fully independent with ADL's         Yes  Pt. Required assistance from family for: Independent PTA    Pt sleeps in recliner. Pt. Fully independent for transfers and gait and walked with: Jeremiah Murray   Recently started using the rollator related to pain post surgery in April 2021 (laparoscopic sleeve gastrectomy)    Pain:  Yes  Location: at the surgical site  Pain level: 0/10 at rest, with activity 8/10   Pain medications requested     Cognition    A&O x4   Able to follow 2 step commands    Subjective  Patient lying supine in bed with no family present. Pt agreeable to this PT eval & tx. Upper Extremity ROM/Strength  Please see OT evaluation.       Lower Extremity ROM / Strength   AROM WFL: Yes, hip flexion limited by body habitus  WFL to complete bed mobility and transfers. Lower Extremity Sensation    WFL    Lower Extremity Proprioception:   WFL    Coordination and Tone  WFL    Balance  Sitting:  Fair -; Mod A   Comments: ~15 min at the EOB    Standing: Not tested; Not Tested  Comments: Patient refused to participate due to moderate to severe abdominal pain post sitting and SOB. Bed mobility:  using bed rails and bed controls  Supine to sit: Max A x 2 person assist  Sit to supine:                           Max A x 2  Rolling: Max A using bed rails with log rolling technique. Scooting in sitting:                  Not tested  Scooting to head of bed:         Not Tested           Transfers:    Sit to stand:                 Did not test due to increased abdominal pain levels  Stand to sit:                 Did not test due to increased abdominal pain levels  Bed to chair:                Did not test due to increased abdominal pain levels    Gait gait deferred due to increased pain with trnasfer; pt ambulated 0 ft. Stair Training deferred, pt unsafe/ not appropriate to complete stairs at this time     Activity Tolerance:   Pt completed therapy session with Pain noted with increased activity    BP (mmHg) HR (bpm) SpO2 (%) Comments   Supine, at rest 94/61 81 94 On room air   Seated at /90 85 96% On room air       Positioning Needs:   Pt in bed, alarm set, positioned in proper neutral alignment and pressure relief provided. Call light provided and all needs within reach    Exercises Initiated  all completed bilaterally unless indicated and pain  Ankle Pumps x 10 reps  Heel slides x 10 reps  LAQ x 10 reps  marching x 10 reps    Other  None.      Patient/Family Education   Pt educated on role of inpatient PT, POC, importance of continued activity, DC recommendations, safety awareness, transfer techniques, pursed lip breathing, energy conservation, pacing activity and calling for assist with mobility. Assessment  Pt seen for Physical Therapy evaluation in acute care setting. Pt demonstrated decreased Activity tolerance, Balance and Safety as well as decreased independence with Ambulation, Bed Mobility  and Transfers. Recommending SNF upon discharge as patient functioning well below baseline, demonstrates good rehab potential and unable to return home due to limited or no family support, inability to negotiate stairs to enter home/bedroom/bathroom, burden of care beyond caregiver ability and home environment not conducive to patient recovery. Goals : To be met in 3 visits:  1). Independent with LE Ex x 10 reps  2.) Bed to chair: SBA    To be met in 6 visits:  1). Supine to/from sit: Min A   2). Sit to/from stand: Min A   3). Bed to chair: Mod A   4). Gait: Ambulate 50 ft.  with  Mod A  and use of LRAD (least restrictive assistive device)  5). Tolerate B LE exercises 3 sets of 10-15 reps  6). Ascend/descend 3 steps with Mod A  with use of no handrail and LRAD (least restrictive assistive device)    Rehabilitation Potential: Good  Strengths for achieving goals include:   Pt motivated, PLOF, Family Support and Pt cooperative   Barriers to achieving goals include:    Pain and Weakness    Plan    To be seen 3-5 x / week  while in acute care setting for therapeutic exercises, bed mobility, transfers, progressive gait training, balance training, and family/patient education. Signature: Amy Carr, MS PT, # [id number]    If patient discharges from this facility prior to next visit, this note will serve as the Discharge Summary.

## 2021-09-07 NOTE — PROGRESS NOTES
Patient seen with bare hands touching surgical incision. CHG wipes completed and education. Patient verbalized understanding.

## 2021-09-07 NOTE — PROGRESS NOTES
Inpatient Occupational Therapy Re Evaluation and Treatment    Unit: ICU  Date:  9/7/2021  Patient Name:    Rosalina Nesbitt  Admitting diagnosis:  Appendicitis [K37]  Acute appendicitis with perforation and generalized peritonitis, unspecified whether abscess present, unspecified whether gangrene present [K35.20]  Admit Date:  8/30/2021  Precautions/Restrictions/WB Status/ Lines/ Wounds/ Oxygen: fall risk, IV, bed/chair alarm, NGT, telemetry, NPO and ICU monitoring, infante, 2 drains    Treatment Time: 0162-0752  Treatment Number: 1     Billable Treatment Time: 38 minutes   Total Treatment Time:   48   minutes    Patient Goals for Therapy:  \" to go to rehab \"      Discharge Recommendations: SNF  DME needs for discharge: defer to facility       Therapy recommendations for staff:  Assist of 1 with use of rolling walker for all transfers and ambulation within room    History of Present Illness: ED note, 8/30/2021, [place]:   \" 57-year-old female history of gastric sleeve, who presents with right lower quadrant pain nausea and fevers. Symptoms started yesterday. Patient reports she has a moderate to severe right lower quadrant pain that is exacerbated with some movements and was painful going over bumps on the right ovary. Patient reports \"I knew something was wrong I spike a fever. I never get fevers\". Nausea no vomiting passing stool passing flatus. No chest pain or shortness of breath or back pain no pelvic symptoms. No urinary symptoms. \"     PMHx: Acute renal failure (ARF) ([facility] [address] (May 4, 2015), Arthritis, Asthma, DDD (degenerative disc disease), cervical, Deep vein thrombosis (DVT) ([facility] [address] (07/2015), Degenerative disc disease, Family history of factor V deficiency, GERD (gastroesophageal reflux disease), Hypertension, Scoliosis, Sleep apnea, and Spinal stenosis.     Home Health S4 Level Recommendation:  NA    AM-PAC Score: AM-PAC Inpatient Daily Activity Raw Score: 12  Pt scored a 16/24 on the AM-PAC ADL Inpatient form. Current research shows that an AM-PAC score of 17 or less is typically not associated with a discharge to the patient's home setting. Preadmission Environment:    Pt. Lives boyfriend Lesvia Bess). 2 [employer] at home  Pt does not have access to 24/7 assistance. Home environment:  one story home  Steps to enter first floor:  3 steps to enter    Steps to second floor:  Full flight of 12-13 to basement (laundry)   Bathroom: tub/shower combo, shower chair (bench) , grab bars (suction cup)  and standard toilet  Equipment owned:  cane (SPC) and walker (Rollator)    Preadmission Status / PLOF:  History of falls   No  Pt. Able to drive   Yes  Pt Fully independent with ADL's  Yes  Pt. Required assistance from family for: Independent PTA    Pt sleeps in recliner. Pt. Fully independent for transfers and gait and walked with: [employer] all the time   Recently started using the rollator related to pain. Pain:  Yesne at rest  Little pain at rest  Abdomen/ incisional area    Cognition:    A&O Person, Place, Time and Situation   Able to follow multi step directions, consistently. Subjective:  Pt supine in bed upon therapist arrival. Pt agreeable to work with therapy this date.      Upper Extremity ROM:   WFL    Upper Extremity Strength:    BUE strength WFL, but not formally assessed w/ MMT    Upper Extremity Sensation:    WNL    Upper Extremity Proprioception:  WNL    Coordination and Tone:  WNL    Balance:  Functional Sitting Balance:  Impaired, Min A initially, then CGA    Comments:   Functional Standing Balance:NT   Comments:     Bed mobility:    Supine to sit:   maximal assistance (75%) and 2 people  Sit to supine:   maximal assistance (75%) and 2 people  Rolling:    Not Tested, maximal assistance (75%) and 2 people  Scooting in sitting:  maximal assistance (75%)  Scooting to head of bed:   total assistance (100%) and 2 people   Bridging:   No    Transfers:    Sit to stand:  Not Tested  Stand to sit:  Not Tested  Bed to chair:   Not Tested  Standard toilet: Not Tested  Bed to [facility]:  Not Tested    Activities of Daily Living:   UB Dressing:   Not Tested  LB Dressing:    total assistance (100%) for socks  UB Bathing:  Not Tested  LB Bathing:  Not Tested  Feeding:  Independent to eat ice chips   Grooming:   Not Tested  Toileting:  Not Tested    Activity Tolerance:   Pt completed therapy session with Pain noted with increased activity   BP (mmHg) HR (bpm) SpO2 (%) Comments   Supine, at rest 94/61 81 94 On room air   Seated at /90 85 96% On room air     Positioning Needs: In bed, call light and needs in reach. Exercise / Activities Initiated:   NT    Patient/Family Education:   Role of OT  Recommendations for DC    Assessment of Deficits: Pt seen for Occupational therapy evaluation in acute care setting. Pt demonstrated decreased Activity tolerance, ADLs, IADLs and Transfers. Pt functioning below baseline and will likely benefit from skilled occupational therapy services to maximize safety and independence. Goal(s): To be met in 3 Visits:  1). Bed to toilet: minimal assistance (25%)    To be met in 5 Visits:  2). Upper Body Bathing:   minimal assistance (25%)  3). Lower Body Bathing:   maximal assistance (75%)  4). Upper Body Dressing:  minimal assistance (25%)  5). Lower Body Dressing:  maximal assistance (75%)  6). Pt to demonstrate UE exs x 15 reps with minimal cues    Rehabilitation Potential:  Good for goals listed above. Strengths for achieving goals include: Pt motivated, PLOF, Family Support and Pt cooperative  Barriers to achieving goals include:  Pain     Plan: To be seen 3-5 x/wk while in acute care setting for strengthening, transfer training, family/patient education and ADL/IADL retraining.     Talita Lora, OTR/L [id number]            If patient discharges from this facility prior to next visit, this note will serve as the Discharge Summary

## 2021-09-07 NOTE — PROGRESS NOTES
Patient resting in bed, extubated today, currently on room air, SaO2 94%, occationally cough non-productive, using pillow over abdomen to help with coughing discomfort, lung sounds clear, denies SOB. Abdomen dressings CDI, x2 FATOUMATA drains in place, drainage decreasing, On Q pain ball slowly infusing, patient states she feels like she may have passed some flatus once today while coughing, BS hypoactive. Urine output fair, bolus complete. Bruising noted right wrist patient states bruising has been there several days, was before the arterial line was placed, bruising noted in right axilla as well, will continue to monitor for changes. Denies need for pain medication at this time. Will continue to monitor.

## 2021-09-07 NOTE — CARE COORDINATION
INTERDISCIPLINARY PLAN OF CARE CONFERENCE    Date/Time: 9/7/2021 10:31 AM  Completed by:  MAGDALENA Pope. Case Management      Patient Name:  Angel Hamilton  YOB: 1965  Admitting Diagnosis: Appendicitis [K37]  Acute appendicitis with perforation and generalized peritonitis, unspecified whether abscess present, unspecified whether gangrene present [K35.20]     Admit Date/Time:  8/30/2021 10:47 AM    Chart reviewed. Interdisciplinary team contacted or reviewed plan related to patient progress and discharge plans. Disciplines included Case Management, Nursing, and Dietitian. Current Status:Ongoing. ICU. NG Tube  PT/OT recommendation for discharge plan of care: Will need new orders when appropriate. SNF on 09/04/2021    Expected D/C Disposition:  Skilled nursing facility  Confirmed plan with patient and/or family Yes confirmed with: (name) pt  Met with:pt    Discharge Plan Comments: Chart review completed. Pt remains in the ICU. SW notes referral was sent to [facility]) on 09/05/2021. Called and spoke with Jessica Woods, admissions at [facility] who stated they are able to accept pt when appropriate and believes [employer] is waiving precerts at this time. She is aware pt is now in the ICU and will need updated notes from PT/OT when appropriate. Met with pt at bedside. She was updated on the above and confirmed she plans on going to [facility] on discharge. She stated that in the past family has transported her to SNF for rehab and she may want this again if able. She is aware CM will continue to follow and address transportation to rehab; she stated agreement. She denied needs or questions for CM.     Home O2 in place on admit: No

## 2021-09-07 NOTE — PROGRESS NOTES
Admit: 2021    Name:  Gaurang Longo  Room:  [address] [place] [address]  MRN:    [id number]     This is my first encounter with the patient. Chart reviewed briefly. Daily Progress Note for 2021   Acute appendicitis with abscess     Interval History:     Patient underwent an emergent appendectomy for perforated appendicitis. Right colectomy was done. Op findings included an incarcerated ventral incisional hernia. In addition to the right colectomy the incarcerated ventral incisional hernia was repaired on colectomy was performed and intra-abdominal abscesses drained. She is on the ventilator postop. She has been extubated. Her present she is awake and alert. Pain is well controlled. She is started on ice chips today.       Scheduled Meds:   fluconazole  200 mg IntraVENous Q24H    mupirocin   Nasal BID    pantoprazole  40 mg IntraVENous BID    topiramate  100 mg Oral Nightly    sodium chloride flush  5-40 mL IntraVENous 2 times per day    enoxaparin  40 mg SubCUTAneous Daily    piperacillin-tazobactam  3,375 mg IntraVENous Q8H       Continuous Infusions:   sodium chloride 75 mL/hr at 21 1255    bupivacaine 0.5% 270 mL (21 2343)    norepinephrine Stopped (21 1246)    sodium chloride Stopped (21 1235)       PRN Meds:  sodium chloride, morphine **OR** morphine, prochlorperazine, sodium chloride flush, sodium chloride, acetaminophen **OR** acetaminophen, potassium chloride **OR** potassium alternative oral replacement **OR** potassium chloride          Objective:     Temp  Av.3 °F (36.8 °C)  Min: 97.7 °F (36.5 °C)  Max: 99.8 °F (37.7 °C)  Pulse  Av.9  Min: 68  Max: 94  BP  Min: 81/71  Max: 109/73  SpO2  Av.6 %  Min: 93 %  Max: 100 %  Patient Vitals for the past 4 hrs:   BP Temp Temp src Pulse Resp SpO2   21 1300 105/75 -- -- 77 22 97 %   21 1200 103/70 99.1 °F (37.3 °C) Axillary 68 23 100 %   21 1100 101/70 97.7 °F (36.5 °C) Axillary 74 23 98 % Intake/Output Summary (Last 24 hours) at 9/7/2021 1401  Last data filed at 9/7/2021 1255  Gross per 24 hour   Intake 7389.89 ml   Output 1842 ml   Net 5547.89 ml       Physical Exam:  General: middle aged morbidly obese female  Off vent     Appears to be not in any distress  Mucous Membranes:  Pink , anicteric  NG in place   Neck: No JVD, no carotid bruit, no thyromegaly  Chest:  Clear to auscultation bilaterally, no added sounds  Cardiovascular:  RRR S1S2 heard, no murmurs or gallops  Abdomen:  Soft obese, surgical dressing dry , drains +   No edema or cyanosis in LE. Distal pulses well felt  Neurological : non focal off vent     Lab Data:  CBC:   Recent Labs     09/05/21  1257 09/06/21  0600 09/07/21  0445   WBC 11.6* 24.4* 18.7*   RBC 4.62 4.45 3.34*   HGB 14.6 14.0 10.8*   HCT 44.6 42.8 32.4*   MCV 96.6 96.2 96.8   RDW 16.6* 16.7* 17.2*    361 229     BMP:   Recent Labs     09/05/21  1257 09/06/21  0600 09/07/21  0445    137 141   K 4.3 3.9 3.8    106 110   CO2 21 18* 21   PHOS 1.4* 4.8  --    BUN 9 14 17   CREATININE 0.8 0.8 0.7     LIVER PROFILE:   No results for input(s): AST, ALT, ALB, BILIDIR, BILITOT, ALKPHOS in the last 72 hours. XR ABDOMEN FOR NG/OG/NE TUBE PLACEMENT   Final Result   1. Nasogastric tube position is acceptable. 2.  Gas distended bowel loops in the left lower abdomen could relate to   obstruction or postsurgical ileus. XR CHEST PORTABLE   Final Result   1. Endotracheal tube, nasogastric tube, and right jugular venous line   placement. No pneumothorax. 2.  Congestive changes and left basilar atelectasis/pneumonia. CT ABDOMEN PELVIS WO CONTRAST Additional Contrast? None   Final Result   Increasing pneumoperitoneum and increasing free fluid within the abdomen and   pelvis as compared to prior examination dated 08/30/2021 in this patient with   history of known perforated appendicitis.   The appendix is less well   visualized on the current exam without contrast and evaluation for abscess is   also limited without contrast.      Fluid within the lumen of the esophagus; correlate for gastroesophageal   reflux. Dilated small bowel within the upper abdomen, suggestive of ileus. Radiographic follow-up may be obtained as warranted. XR ABDOMEN (KUB) (SINGLE AP VIEW)   Final Result   Slightly decreased severity dilated small bowel loops over the past 24 hours. Nasogastric tube in good position. XR ABDOMEN (2 VIEWS)   Final Result   1. Recommend advancement of enteric tube 10 cm.   2. Nearly identical pattern small bowel with air-fluid levels that persist.   Underlying partial bowel obstruction or ileus may be considered clinically. XR ABDOMEN (2 VIEWS)   Final Result   Findings may be related to ileus or obstruction. CT ABDOMEN PELVIS WO CONTRAST Additional Contrast? Radiologist Recommendation (iodine allergy)   Final Result   1. Acute appendicitis with focal perforation. Tiny amount of free air with   no focal fluid to suggest abscess. Severe inflammatory changes in the right   lower quadrant. 2. Probable cholelithiasis. No evidence of acute cholecystitis. Assessment & Plan:       Acute Appendicitis with Focal Perforation  - CT showed appendicitis with focal perforation, tiny amount of free air with no focal fluid, severe inflammatory changed in RLQ  - initially mx with IVF, pain control and IV abx but worsened with fevers, chills and tachycardia  - s.p emergent laparotomy with  right colectomy with abscess drainage POD 2    - continue  abx: Zosyn D#6, diflucan D2  -  PRN - Morphine and Toradol for pain control  - Gen Sx  Managing  - await return of Bowel function to resume diet. Started on ice chips today      Metabolic acidosis. With mild anion gap. Checked lactate level.   Resolved with bicarbonate continue IV fluids       Hypokalemia  Hypomagnesemia  repleted as needed     Prolonged QTc  - 565  - repleted electrolytes, avoid QT prolonging agents  - repeat EKG improving qtc. Avoid zofran  - cut down cymbalta at dc     Leukocytosis  -Due to above. Monitor closely. TCP  - likely with acute infection   Resolved      Hx of DVT  - in 2015  - holding ASA, on prophylactic lovenox here     GERD  - cont Protonix     Chronic Pain Syndrome  - cont Cymbalta and Neurontin     FLORIAN  - Continue home CPAP     Morbid Obesity  S/p Laparoscopic Sleeve Gastrectomy  - Body mass index is 46.94 kg/m². - Complicating assessment and treatment.  Placing patient at risk for multiple co-morbidities as well as early death and contributing to the patient's presentation.   - Counseled on weight loss.      DVT Prophylaxis: Lovenox  Diet: Ice chips  Code Status: Full Code    Transfer to Florentino Huertas MD

## 2021-09-07 NOTE — PLAN OF CARE
Problem: Pain:  Goal: Pain level will decrease  Outcome: Ongoing  Goal: Control of acute pain  Outcome: Ongoing  Goal: Control of chronic pain  Outcome: Ongoing  Goal: Patient's pain/discomfort is manageable  Outcome: Ongoing

## 2021-09-07 NOTE — PLAN OF CARE
Problem: Pain:  Goal: Pain level will decrease  Description: Pain level will decrease  9/7/2021 1721 by Kathia Kong RN  Outcome: Ongoing  9/7/2021 1131 by Mohsen Venegas RN  Outcome: Ongoing  Goal: Control of acute pain  Description: Control of acute pain  9/7/2021 1721 by Kathia Kong RN  Outcome: Ongoing  9/7/2021 1131 by Mohsen Venegas RN  Outcome: Ongoing  Goal: Control of chronic pain  Description: Control of chronic pain  9/7/2021 1721 by Kathia Kong RN  Outcome: Ongoing  9/7/2021 1131 by Mohsen Venegas RN  Outcome: Ongoing  Goal: Patient's pain/discomfort is manageable  Description: Patient's pain/discomfort is manageable  9/7/2021 1721 by Kathia Kong RN  Outcome: Ongoing  9/7/2021 1131 by Mohsen Venegas RN  Outcome: Ongoing     Problem: Skin Integrity:  Goal: Will show no infection signs and symptoms  Description: Will show no infection signs and symptoms  9/7/2021 1721 by Kathia Kong RN  Outcome: Ongoing  9/7/2021 1131 by Mohsen Venegas RN  Outcome: Ongoing  Goal: Absence of new skin breakdown  Description: Absence of new skin breakdown  9/7/2021 1721 by Kathia Kong RN  Outcome: Ongoing  9/7/2021 1131 by Mohsen Venegas RN  Outcome: Ongoing     Problem: Falls - Risk of:  Goal: Will remain free from falls  Description: Will remain free from falls  9/7/2021 1721 by Kathia Kong RN  Outcome: Ongoing  9/7/2021 1131 by Mohsen Venegas RN  Outcome: Ongoing  Goal: Absence of physical injury  Description: Absence of physical injury  9/7/2021 1721 by Kathia Kong RN  Outcome: Ongoing  9/7/2021 1131 by Mohsen Venegas RN  Outcome: Ongoing     Problem: Nausea/Vomiting:  Goal: Absence of nausea/vomiting  Description: Absence of nausea/vomiting  9/7/2021 1721 by Kathia Kong RN  Outcome: Ongoing  9/7/2021 1131 by Mohsen Venegas RN  Outcome: Ongoing  Goal: Able to drink  Description: Able to drink  9/7/2021 1721 by Velora Dilan, RN  Outcome: Ongoing  9/7/2021 1131 by Mohsen Venegas RN  Outcome: Ongoing  Goal: Able to eat  Description: Able to eat  9/7/2021 1721 by Ghassan Domingo RN  Outcome: Ongoing  9/7/2021 1131 by Patt Blanca RN  Outcome: Ongoing  Goal: Ability to achieve adequate nutritional intake will improve  Description: Ability to achieve adequate nutritional intake will improve  9/7/2021 1721 by Ghassan Domingo RN  Outcome: Ongoing  9/7/2021 1131 by Patt Blanca RN  Outcome: Ongoing     Problem: Infection:  Goal: Will remain free from infection  Description: Will remain free from infection  9/7/2021 1721 by Ghassan Domingo RN  Outcome: Ongoing  9/7/2021 1131 by Patt Blanca RN  Outcome: Ongoing     Problem: Safety:  Goal: Free from accidental physical injury  Description: Free from accidental physical injury  9/7/2021 1721 by Ghassan Domingo RN  Outcome: Ongoing  9/7/2021 1131 by Patt Blanca RN  Outcome: Ongoing  Goal: Free from intentional harm  Description: Free from intentional harm  9/7/2021 1721 by Ghassan Domingo RN  Outcome: Ongoing  9/7/2021 1131 by Patt Blanca RN  Outcome: Ongoing     Problem: Daily Care:  Goal: Daily care needs are met  Description: Daily care needs are met  9/7/2021 1721 by Ghassan Domingo RN  Outcome: Ongoing  9/7/2021 1131 by Patt Blanca RN  Outcome: Ongoing     Problem: Skin Integrity:  Goal: Skin integrity will stabilize  Description: Skin integrity will stabilize  9/7/2021 1721 by Ghassan Domingo RN  Outcome: Ongoing  9/7/2021 1131 by Patt Blanca RN  Outcome: Ongoing     Problem: Discharge Planning:  Goal: Patients continuum of care needs are met  Description: Patients continuum of care needs are met  9/7/2021 1721 by Ghassan Domingo RN  Outcome: Ongoing  9/7/2021 1131 by Patt Blanca RN  Outcome: Ongoing     Problem: Non-Violent Restraints  Goal: Removal from restraints as soon as assessed to be safe  9/7/2021 1721 by Ghassan Domingo RN  Outcome: Ongoing  9/7/2021 1131 by Patt Blanca RN  Outcome: Ongoing  Goal: No harm/injury to patient while restraints in use  9/7/2021 1721 by Zoila Grewal Bryon Henry RN  Outcome: Ongoing  9/7/2021 1131 by Ata Can RN  Outcome: Ongoing  Goal: Patient's dignity will be maintained  9/7/2021 1721 by Taylor Roque RN  Outcome: Ongoing  9/7/2021 1131 by Ata Can RN  Outcome: Ongoing     Problem: Nutrition  Goal: Optimal nutrition therapy  9/7/2021 1721 by Taylor Roque RN  Outcome: Ongoing  9/7/2021 1131 by Ata Can RN  Outcome: Ongoing

## 2021-09-07 NOTE — FLOWSHEET NOTE
09/07/21 1630   Vital Signs   Temp 98.1 °F (36.7 °C)   Temp Source Oral   Pulse 62   Heart Rate Source Monitor   Resp 16   /63   Level of Consciousness Alert (0)   MEWS Score 1   Pain Assessment   Pain Assessment 0-10   RASS Score 0   Pain Level 0   POSS Score (Patient Ctrl Analgesia) Awake and Alert   Oxygen Therapy   SpO2 96 %   O2 Device None (Room air)     Transferred from ICU. Alert and oriented x4. Skin w/d. resp e/e unlabored. NG tube to Left nare skin intact. Staples midline abd with scant amount serosanguinous drainage. Pain ball and two FATOUMATA drains to left and right abd. Scattered bruising. Inter dry applied under brayan breast d/t moisture and redness and slightly open (top layer). NG to LCWS. NS IVF running at 75/hr. Denies pain unless moved. Cash with freddie clear urine. Call light in easy reach. No s/s distress noted.

## 2021-09-07 NOTE — PROGRESS NOTES
Reassessment complete, patient lungs clear, CDB encouraged, no changes to abdominal dressings, CDI, NGT 200ml clear/green drainage noted, patient swabbing mouth with ice water may account for some of the drainage. BS hypoactive. Turned to right side. Call light in reach, denies needs, will continue to monitor.

## 2021-09-07 NOTE — PROGRESS NOTES
Patient had large thick creamy secretions, coughed up secretions into t-piece, tracheal suction after retrieves large thick mucous plug clogging suction tubing, able to clear, patient resting well, no s/s distress, call light in reach.

## 2021-09-07 NOTE — PROGRESS NOTES
4 Eyes Skin Assessment     The patient is being assess for   Transfer to New Unit    I agree that 2 RN's have performed a thorough Head to Toe Skin Assessment on the patient. ALL assessment sites listed below have been assessed. Areas assessed for pressure by both nurses:   [x]   Head, Face, and Ears   [x]   Shoulders, Back, and Chest, Abdomen  [x]   Arms, Elbows, and Hands   [x]   Coccyx, Sacrum, and Ischium  [x]   Legs, Feet, and Heels        Scattered bruising, blanchable coccyx with mepilex, redness to skin folds, blanka area, and under breast.  Slight open areas under bilateral breast d/t moisture. Staples midline to abdomen and LORRIE, pain ball and 2 FATOUMATA drains to left and right abdomen. Skin Assessed Under all Medical Devices by both nurses:  infante, NG and securement devices              All Mepilex Borders were peeled back and area peeked at by both nurses:  Yes  Please list where Mepilex Borders are located:  coccyx              **SHARE this note so that the co-signing nurse is able to place an eSignature**    Co-signer eSignature: Electronically signed by Gisella Olson RN on 9/7/21 at 4:54 PM EDT    Does the Patient have Skin Breakdown related to pressure?   No     (Insert Photo here n/a )         George Prevention initiated:  Yes   Wound Care Orders initiated:  No      C nurse consulted for Pressure Injury (Stage 3,4, Unstageable, DTI, NWPT, Complex wounds)and New or Established Ostomies:  No      Primary Nurse eSignature: Electronically signed by Taylor Roque RN on 9/7/21 at 4:21 PM EDT

## 2021-09-08 LAB
ANION GAP SERPL CALCULATED.3IONS-SCNC: 12 MMOL/L (ref 3–16)
BASOPHILS ABSOLUTE: 0 K/UL (ref 0–0.2)
BASOPHILS RELATIVE PERCENT: 0 %
BUN BLDV-MCNC: 12 MG/DL (ref 7–20)
CALCIUM SERPL-MCNC: 7.6 MG/DL (ref 8.3–10.6)
CHLORIDE BLD-SCNC: 109 MMOL/L (ref 99–110)
CO2: 20 MMOL/L (ref 21–32)
CREAT SERPL-MCNC: <0.5 MG/DL (ref 0.6–1.1)
EOSINOPHILS ABSOLUTE: 0.3 K/UL (ref 0–0.6)
EOSINOPHILS RELATIVE PERCENT: 2 %
GFR AFRICAN AMERICAN: >60
GFR NON-AFRICAN AMERICAN: >60
GLUCOSE BLD-MCNC: 102 MG/DL (ref 70–99)
GLUCOSE BLD-MCNC: 112 MG/DL (ref 70–99)
GLUCOSE BLD-MCNC: 74 MG/DL (ref 70–99)
GLUCOSE BLD-MCNC: 75 MG/DL (ref 70–99)
GLUCOSE BLD-MCNC: 80 MG/DL (ref 70–99)
GLUCOSE BLD-MCNC: 85 MG/DL (ref 70–99)
GLUCOSE BLD-MCNC: 89 MG/DL (ref 70–99)
HCT VFR BLD CALC: 34.1 % (ref 36–48)
HEMATOLOGY PATH CONSULT: NO
HEMOGLOBIN: 11 G/DL (ref 12–16)
LYMPHOCYTES ABSOLUTE: 0.8 K/UL (ref 1–5.1)
LYMPHOCYTES RELATIVE PERCENT: 5 %
MAGNESIUM: 1.8 MG/DL (ref 1.8–2.4)
MCH RBC QN AUTO: 31.1 PG (ref 26–34)
MCHC RBC AUTO-ENTMCNC: 32.3 G/DL (ref 31–36)
MCV RBC AUTO: 96.5 FL (ref 80–100)
MONOCYTES ABSOLUTE: 0.5 K/UL (ref 0–1.3)
MONOCYTES RELATIVE PERCENT: 3 %
NEUTROPHILS ABSOLUTE: 14.6 K/UL (ref 1.7–7.7)
NEUTROPHILS RELATIVE PERCENT: 90 %
PDW BLD-RTO: 17.3 % (ref 12.4–15.4)
PERFORMED ON: ABNORMAL
PERFORMED ON: ABNORMAL
PERFORMED ON: NORMAL
PHOSPHORUS: 3.4 MG/DL (ref 2.5–4.9)
PLATELET # BLD: 242 K/UL (ref 135–450)
PLATELET SLIDE REVIEW: ADEQUATE
PMV BLD AUTO: 7.3 FL (ref 5–10.5)
POTASSIUM SERPL-SCNC: 3.8 MMOL/L (ref 3.5–5.1)
RBC # BLD: 3.53 M/UL (ref 4–5.2)
SLIDE REVIEW: ABNORMAL
SODIUM BLD-SCNC: 141 MMOL/L (ref 136–145)
WBC # BLD: 16.2 K/UL (ref 4–11)

## 2021-09-08 PROCEDURE — 85025 COMPLETE CBC W/AUTO DIFF WBC: CPT

## 2021-09-08 PROCEDURE — 6360000002 HC RX W HCPCS: Performed by: INTERNAL MEDICINE

## 2021-09-08 PROCEDURE — 99232 SBSQ HOSP IP/OBS MODERATE 35: CPT | Performed by: INTERNAL MEDICINE

## 2021-09-08 PROCEDURE — C9113 INJ PANTOPRAZOLE SODIUM, VIA: HCPCS | Performed by: INTERNAL MEDICINE

## 2021-09-08 PROCEDURE — 97110 THERAPEUTIC EXERCISES: CPT

## 2021-09-08 PROCEDURE — 97530 THERAPEUTIC ACTIVITIES: CPT

## 2021-09-08 PROCEDURE — 2580000003 HC RX 258: Performed by: INTERNAL MEDICINE

## 2021-09-08 PROCEDURE — 1200000000 HC SEMI PRIVATE

## 2021-09-08 PROCEDURE — 99024 POSTOP FOLLOW-UP VISIT: CPT | Performed by: NURSE PRACTITIONER

## 2021-09-08 PROCEDURE — 2500000003 HC RX 250 WO HCPCS: Performed by: INTERNAL MEDICINE

## 2021-09-08 PROCEDURE — 83735 ASSAY OF MAGNESIUM: CPT

## 2021-09-08 PROCEDURE — 80048 BASIC METABOLIC PNL TOTAL CA: CPT

## 2021-09-08 PROCEDURE — 84100 ASSAY OF PHOSPHORUS: CPT

## 2021-09-08 PROCEDURE — 36592 COLLECT BLOOD FROM PICC: CPT

## 2021-09-08 PROCEDURE — 6370000000 HC RX 637 (ALT 250 FOR IP): Performed by: INTERNAL MEDICINE

## 2021-09-08 PROCEDURE — 97535 SELF CARE MNGMENT TRAINING: CPT

## 2021-09-08 RX ORDER — DEXTROSE, SODIUM CHLORIDE, AND POTASSIUM CHLORIDE 5; .45; .15 G/100ML; G/100ML; G/100ML
INJECTION INTRAVENOUS CONTINUOUS
Status: DISCONTINUED | OUTPATIENT
Start: 2021-09-08 | End: 2021-09-11

## 2021-09-08 RX ORDER — PANTOPRAZOLE SODIUM 40 MG/10ML
40 INJECTION, POWDER, LYOPHILIZED, FOR SOLUTION INTRAVENOUS DAILY
Status: DISCONTINUED | OUTPATIENT
Start: 2021-09-09 | End: 2021-09-12

## 2021-09-08 RX ADMIN — MORPHINE SULFATE 2 MG: 2 INJECTION, SOLUTION INTRAMUSCULAR; INTRAVENOUS at 21:00

## 2021-09-08 RX ADMIN — PIPERACILLIN SODIUM AND TAZOBACTAM SODIUM 3375 MG: 3; .375 INJECTION, POWDER, LYOPHILIZED, FOR SOLUTION INTRAVENOUS at 03:44

## 2021-09-08 RX ADMIN — TOPIRAMATE 100 MG: 100 TABLET, FILM COATED ORAL at 21:00

## 2021-09-08 RX ADMIN — SODIUM CHLORIDE, PRESERVATIVE FREE 10 ML: 5 INJECTION INTRAVENOUS at 21:00

## 2021-09-08 RX ADMIN — ENOXAPARIN SODIUM 40 MG: 40 INJECTION SUBCUTANEOUS at 09:21

## 2021-09-08 RX ADMIN — MORPHINE SULFATE 2 MG: 2 INJECTION, SOLUTION INTRAMUSCULAR; INTRAVENOUS at 03:43

## 2021-09-08 RX ADMIN — PIPERACILLIN SODIUM AND TAZOBACTAM SODIUM 3375 MG: 3; .375 INJECTION, POWDER, LYOPHILIZED, FOR SOLUTION INTRAVENOUS at 20:55

## 2021-09-08 RX ADMIN — FLUCONAZOLE, SODIUM CHLORIDE 200 MG: 2 INJECTION INTRAVENOUS at 01:51

## 2021-09-08 RX ADMIN — PANTOPRAZOLE SODIUM 40 MG: 40 INJECTION, POWDER, FOR SOLUTION INTRAVENOUS at 09:21

## 2021-09-08 RX ADMIN — MORPHINE SULFATE 2 MG: 2 INJECTION, SOLUTION INTRAMUSCULAR; INTRAVENOUS at 09:14

## 2021-09-08 RX ADMIN — MORPHINE SULFATE 2 MG: 2 INJECTION, SOLUTION INTRAMUSCULAR; INTRAVENOUS at 15:44

## 2021-09-08 RX ADMIN — PIPERACILLIN SODIUM AND TAZOBACTAM SODIUM 3375 MG: 3; .375 INJECTION, POWDER, LYOPHILIZED, FOR SOLUTION INTRAVENOUS at 13:58

## 2021-09-08 RX ADMIN — MORPHINE SULFATE 2 MG: 2 INJECTION, SOLUTION INTRAMUSCULAR; INTRAVENOUS at 06:28

## 2021-09-08 RX ADMIN — MORPHINE SULFATE 2 MG: 2 INJECTION, SOLUTION INTRAMUSCULAR; INTRAVENOUS at 01:40

## 2021-09-08 RX ADMIN — POTASSIUM CHLORIDE, DEXTROSE MONOHYDRATE AND SODIUM CHLORIDE: 150; 5; 450 INJECTION, SOLUTION INTRAVENOUS at 09:20

## 2021-09-08 ASSESSMENT — PAIN DESCRIPTION - ORIENTATION: ORIENTATION: MID

## 2021-09-08 ASSESSMENT — PAIN SCALES - GENERAL
PAINLEVEL_OUTOF10: 7
PAINLEVEL_OUTOF10: 8
PAINLEVEL_OUTOF10: 8
PAINLEVEL_OUTOF10: 5
PAINLEVEL_OUTOF10: 8
PAINLEVEL_OUTOF10: 2
PAINLEVEL_OUTOF10: 8
PAINLEVEL_OUTOF10: 8

## 2021-09-08 ASSESSMENT — PAIN DESCRIPTION - DESCRIPTORS: DESCRIPTORS: SHARP

## 2021-09-08 ASSESSMENT — PAIN DESCRIPTION - PAIN TYPE: TYPE: SURGICAL PAIN

## 2021-09-08 ASSESSMENT — PAIN DESCRIPTION - LOCATION: LOCATION: ABDOMEN

## 2021-09-08 NOTE — CARE COORDINATION
INTERDISCIPLINARY PLAN OF CARE CONFERENCE    Date/Time: 9/8/2021 3:01 PM  Completed by: Rd Marrero RN, Case Management      Patient Name:  Shaquille Nesbitt  YOB: 1965  Admitting Diagnosis: Appendicitis [K37]  Acute appendicitis with perforation and generalized peritonitis, unspecified whether abscess present, unspecified whether gangrene present [K35.20]     Admit Date/Time:  8/30/2021 10:47 AM    Chart reviewed. Interdisciplinary team contacted or reviewed plan related to patient progress and discharge plans. Disciplines included Case Management, Nursing, and Dietitian. Current Status: IP 08/30/2021  PT/OT recommendation for discharge plan of care: SNF    Expected D/C Disposition:  Skilled nursing facility  Confirmed plan  Discharge Plan Comments:  Plans STR at [facility] [place]. Pre-cert submitted 8/7 and is in process. + bed at [facility] [place]. +NGT/Clamping. Await bowel fx.  +CM following    Home O2 in place on admit: No  Pt informed of need to bring portable home O2 tank on day of discharge for nursing to connect prior to leaving:  No  Verbalized agreement/Understanding:  No

## 2021-09-08 NOTE — PROGRESS NOTES
Admit: 2021    Name:  Alyl Guevara  Room:  0224/0224-01  MRN:    [id number]        Daily Progress Note for 2021     Acute appendicitis with abscess s/p laparotomy with right colectomy, abscess drainage     Interval History:       Pt seen up in chair today . Off oxygen, worked with PT and feels hungry  Did not pass flatus , no BM yet  NG at 800 ml yesterday   Pain controlled      Scheduled Meds:   fluconazole  200 mg IntraVENous Q24H    mupirocin   Nasal BID    pantoprazole  40 mg IntraVENous BID    topiramate  100 mg Oral Nightly    sodium chloride flush  5-40 mL IntraVENous 2 times per day    enoxaparin  40 mg SubCUTAneous Daily    piperacillin-tazobactam  3,375 mg IntraVENous Q8H       Continuous Infusions:   sodium chloride 75 mL/hr at 21 1642    bupivacaine 0.5% 270 mL (21 2343)    sodium chloride Stopped (21 1235)       PRN Meds:  sodium chloride, morphine **OR** morphine, prochlorperazine, sodium chloride flush, sodium chloride, acetaminophen **OR** acetaminophen, potassium chloride **OR** potassium alternative oral replacement **OR** potassium chloride          Objective:     Temp  Av.1 °F (36.7 °C)  Min: 97.7 °F (36.5 °C)  Max: 99.1 °F (37.3 °C)  Pulse  Av  Min: 62  Max: 100  BP  Min: 97/67  Max: 131/90  SpO2  Av.9 %  Min: 90 %  Max: 100 %  No data found. Intake/Output Summary (Last 24 hours) at 2021 0743  Last data filed at 2021 0346  Gross per 24 hour   Intake 3044.69 ml   Output 1547 ml   Net 1497.69 ml       Physical Exam:  General: middle aged morbidly obese female  Up in chair   Appears to be not in any distress  Mucous Membranes:  Pink , anicteric  NG in place   Right neck TLC  Neck: No JVD, no carotid bruit, no thyromegaly  Chest:  Clear to auscultation bilaterally, no added sounds  Cardiovascular:  RRR S1S2 heard, no murmurs or gallops  Abdomen:  Soft obese, surgical dressing dry , drains +   No edema or cyanosis in LE.  Distal pulses well felt  Neurological : non focal today     Lab Data:  CBC:   Recent Labs     09/05/21  1257 09/06/21  0600 09/07/21  0445   WBC 11.6* 24.4* 18.7*   RBC 4.62 4.45 3.34*   HGB 14.6 14.0 10.8*   HCT 44.6 42.8 32.4*   MCV 96.6 96.2 96.8   RDW 16.6* 16.7* 17.2*    361 229     BMP:   Recent Labs     09/05/21  1257 09/06/21  0600 09/07/21  0445    137 141   K 4.3 3.9 3.8    106 110   CO2 21 18* 21   PHOS 1.4* 4.8  --    BUN 9 14 17   CREATININE 0.8 0.8 0.7     LIVER PROFILE:   No results for input(s): AST, ALT, ALB, BILIDIR, BILITOT, ALKPHOS in the last 72 hours. XR ABDOMEN FOR NG/OG/NE TUBE PLACEMENT   Final Result   1. Nasogastric tube position is acceptable. 2.  Gas distended bowel loops in the left lower abdomen could relate to   obstruction or postsurgical ileus. XR CHEST PORTABLE   Final Result   1. Endotracheal tube, nasogastric tube, and right jugular venous line   placement. No pneumothorax. 2.  Congestive changes and left basilar atelectasis/pneumonia. CT ABDOMEN PELVIS WO CONTRAST Additional Contrast? None   Final Result   Increasing pneumoperitoneum and increasing free fluid within the abdomen and   pelvis as compared to prior examination dated 08/30/2021 in this patient with   history of known perforated appendicitis. The appendix is less well   visualized on the current exam without contrast and evaluation for abscess is   also limited without contrast.      Fluid within the lumen of the esophagus; correlate for gastroesophageal   reflux. Dilated small bowel within the upper abdomen, suggestive of ileus. Radiographic follow-up may be obtained as warranted. XR ABDOMEN (KUB) (SINGLE AP VIEW)   Final Result   Slightly decreased severity dilated small bowel loops over the past 24 hours. Nasogastric tube in good position. XR ABDOMEN (2 VIEWS)   Final Result   1.  Recommend advancement of enteric tube 10 cm.   2. Nearly identical death and contributing to the patient's presentation.   - Counseled on weight loss.      DVT Prophylaxis: Lovenox  Diet: Ice chips  Code Status: Full Code    Ongoing PT    Honey Mejia MD

## 2021-09-08 NOTE — PROGRESS NOTES
Pulmonary & Critical Care Medicine Progress Note  CC: sepsis    Events of Last 24 hours: patient feels discouraged and weak. Invasive Lines:   IV Line: CVC 9/5      EXAM:  BP (!) 133/90   Pulse 96   Temp 97.8 °F (36.6 °C) (Oral)   Resp 16   Ht 5' 3\" (1.6 m)   Wt (!) 310 lb 10.1 oz (140.9 kg)   LMP 12/24/2010   SpO2 92%   BMI 55.03 kg/m²  on RA  Tmax: 98F  CVP:      Intake/Output Summary (Last 24 hours) at 9/8/2021 1502  Last data filed at 9/8/2021 1417  Gross per 24 hour   Intake 620.43 ml   Output 1160 ml   Net -539.57 ml     General: No distress. Alert. Eyes: PERRL. No sclera icterus. No conjunctival injection. ENT: No discharge. Pharynx clear. Neck: Trachea midline. Resp: No accessory muscle use. No crackles. No wheezing. No rhonchi. No dullness on percussion. CV: Regular rate. Regular rhythm. No mumur or rub. No edema. GI: Non-tender. Non-distended. No masses. FATOUMATA drains in place with serous drainage. Skin: Warm and dry. No nodule on exposed extremities. No rash on exposed extremities. M/S: No cyanosis. No joint deformity. No clubbing.    Neuro: awake and alert, conversant, moves all extremities    Medications:   [START ON 9/9/2021] pantoprazole  40 mg IntraVENous Daily    fluconazole  200 mg IntraVENous Q24H    topiramate  100 mg Oral Nightly    sodium chloride flush  5-40 mL IntraVENous 2 times per day    enoxaparin  40 mg SubCUTAneous Daily    piperacillin-tazobactam  3,375 mg IntraVENous Q8H     PRN Meds:  sodium chloride, morphine **OR** morphine, prochlorperazine, sodium chloride flush, sodium chloride, acetaminophen **OR** acetaminophen, potassium chloride **OR** potassium alternative oral replacement **OR** potassium chloride    Results:  CBC:   Recent Labs     09/06/21  0600 09/07/21  0445 09/08/21  1020   WBC 24.4* 18.7* 16.2*   HGB 14.0 10.8* 11.0*   HCT 42.8 32.4* 34.1*   MCV 96.2 96.8 96.5    229 242     BMP:   Recent Labs     09/06/21  0600 09/07/21  0440 09/08/21  1020    141 141   K 3.9 3.8 3.8    110 109   CO2 18* 21 20*   PHOS 4.8  --  3.4   BUN 14 17 12   CREATININE 0.8 0.7 <0.5*     LIVER PROFILE: No results for input(s): AST, ALT, LIPASE, BILIDIR, BILITOT, ALKPHOS in the last 72 hours. Invalid input(s): AMYLASE,  ALB  PT/INR: No results for input(s): PROTIME, INR in the last 72 hours. APTT: No results for input(s): APTT in the last 72 hours. UA:No results for input(s): NITRITE, COLORU, PHUR, LABCAST, WBCUA, RBCUA, MUCUS, TRICHOMONAS, YEAST, BACTERIA, CLARITYU, SPECGRAV, LEUKOCYTESUR, UROBILINOGEN, BILIRUBINUR, BLOODU, GLUCOSEU, AMORPHOUS in the last 72 hours.     Invalid input(s): KETONESU    Cultures:  None      Films:  No new       ASSESSMENT:  · Septic shock   · Acute appendicitis with perforation and incarcerated ventral incisional hernia post right colectomy, hernia repair and drainage of intra-abdominal abscess 9/5/2021  · Electrolytes disorder  · Leukocytosis  · History of DVT in 2015  · Chronic pain syndrome  · Morbid obesity post gastric sleeve   · FLORIAN on CPAP     PLAN:  · Supplemental oxygen to maintain SaO2 >92%; wean as tolerated  · Head of bed 30 degrees or higher at all times  · Broad spectrum antibiotics to include: D10 Zosyn and D4 Diflucan   · Code status: Full code

## 2021-09-08 NOTE — PLAN OF CARE
Problem: Pain:  Goal: Pain level will decrease  Description: Pain level will decrease  9/7/2021 2026 by Honorio Adame RN  Outcome: Ongoing  9/7/2021 1721 by Fausto Tracy RN  Outcome: Ongoing  9/7/2021 1131 by Alisha Hackett RN  Outcome: Ongoing  Goal: Control of acute pain  Description: Control of acute pain  9/7/2021 2026 by Honorio Adame RN  Outcome: Ongoing  9/7/2021 1721 by Fausto rTacy RN  Outcome: Ongoing  9/7/2021 1131 by Alisha Hackett RN  Outcome: Ongoing  Goal: Control of chronic pain  Description: Control of chronic pain  9/7/2021 2026 by Honorio Adame RN  Outcome: Ongoing  9/7/2021 1721 by Fausto Tracy RN  Outcome: Ongoing  9/7/2021 1131 by Alisha Hackett RN  Outcome: Ongoing  Goal: Patient's pain/discomfort is manageable  Description: Patient's pain/discomfort is manageable  9/7/2021 2026 by Honorio Adame RN  Outcome: Ongoing  9/7/2021 1721 by Fausto Tracy RN  Outcome: Ongoing  9/7/2021 1131 by Alisha Hackett RN  Outcome: Ongoing     Problem: Skin Integrity:  Goal: Will show no infection signs and symptoms  Description: Will show no infection signs and symptoms  9/7/2021 2026 by Honorio Adame RN  Outcome: Ongoing  9/7/2021 1721 by Fausto Tracy RN  Outcome: Ongoing  9/7/2021 1131 by Alisha Hackett RN  Outcome: Ongoing  Goal: Absence of new skin breakdown  Description: Absence of new skin breakdown  9/7/2021 2026 by Honorio Adame RN  Outcome: Ongoing  9/7/2021 1721 by Fuasto Tracy RN  Outcome: Ongoing  9/7/2021 1131 by Alisha Hackett RN  Outcome: Ongoing     Problem: Falls - Risk of:  Goal: Will remain free from falls  Description: Will remain free from falls  9/7/2021 2026 by Honorio Adame RN  Outcome: Ongoing  9/7/2021 1721 by Fausto Tracy RN  Outcome: Ongoing  9/7/2021 1131 by Alisha Hackett RN  Outcome: Ongoing  Goal: Absence of physical injury  Description: Absence of physical injury  9/7/2021 2026 by Honorio Adame RN  Outcome: Ongoing  9/7/2021 1721 by Fausto Tracy, RN  Outcome: Ongoing  9/7/2021 1131 by Ni Rae RN  Outcome: Ongoing     Problem: Nausea/Vomiting:  Goal: Absence of nausea/vomiting  Description: Absence of nausea/vomiting  9/7/2021 2026 by Parth Rodríguez RN  Outcome: Ongoing  9/7/2021 1721 by Adeline Randle RN  Outcome: Ongoing  9/7/2021 1131 by Ni Rae RN  Outcome: Ongoing  Goal: Able to drink  Description: Able to drink  9/7/2021 2026 by Parth Rodríguez RN  Outcome: Ongoing  9/7/2021 1721 by Adeline Randle RN  Outcome: Ongoing  9/7/2021 1131 by Ni Rae RN  Outcome: Ongoing  Goal: Able to eat  Description: Able to eat  9/7/2021 2026 by Parth Rodríguez RN  Outcome: Ongoing  9/7/2021 1721 by Adeline Randle RN  Outcome: Ongoing  9/7/2021 1131 by Ni Rae RN  Outcome: Ongoing  Goal: Ability to achieve adequate nutritional intake will improve  Description: Ability to achieve adequate nutritional intake will improve  9/7/2021 2026 by Parth Rodríguez RN  Outcome: Ongoing  9/7/2021 1721 by Adeline Randle RN  Outcome: Ongoing  9/7/2021 1131 by Ni Rae RN  Outcome: Ongoing     Problem: Infection:  Goal: Will remain free from infection  Description: Will remain free from infection  9/7/2021 2026 by Parth Rodríguez RN  Outcome: Ongoing  9/7/2021 1721 by Adeline Randle RN  Outcome: Ongoing  9/7/2021 1131 by Ni Rae RN  Outcome: Ongoing     Problem: Safety:  Goal: Free from accidental physical injury  Description: Free from accidental physical injury  9/7/2021 2026 by Parth Rodríguez RN  Outcome: Ongoing  9/7/2021 1721 by Adeline Randle RN  Outcome: Ongoing  9/7/2021 1131 by Ni Rae RN  Outcome: Ongoing  Goal: Free from intentional harm  Description: Free from intentional harm  9/7/2021 2026 by Parth Rodríguez RN  Outcome: Ongoing  9/7/2021 1721 by Adeline Randle RN  Outcome: Ongoing  9/7/2021 1131 by Ni Rea RN  Outcome: Ongoing     Problem: Daily Care:  Goal: Daily care needs are met  Description: Daily care needs are met  9/7/2021 2026 by Honorio Adame RN  Outcome: Ongoing  9/7/2021 1721 by Fausto Tracy RN  Outcome: Ongoing  9/7/2021 1131 by Alisha Hackett RN  Outcome: Ongoing     Problem: Skin Integrity:  Goal: Skin integrity will stabilize  Description: Skin integrity will stabilize  9/7/2021 2026 by Honorio Adame RN  Outcome: Ongoing  9/7/2021 1721 by Fausto Tracy RN  Outcome: Ongoing  9/7/2021 1131 by Alisha Hackett RN  Outcome: Ongoing     Problem: Discharge Planning:  Goal: Patients continuum of care needs are met  Description: Patients continuum of care needs are met  9/7/2021 2026 by Honorio Adame RN  Outcome: Ongoing  9/7/2021 1721 by Fausto Tracy RN  Outcome: Ongoing  9/7/2021 1131 by Alisha Hackett RN  Outcome: Ongoing     Problem: Non-Violent Restraints  Goal: Removal from restraints as soon as assessed to be safe  9/7/2021 2026 by Honorio Adame RN  Outcome: Ongoing  9/7/2021 1721 by Fausto Tracy RN  Outcome: Ongoing  9/7/2021 1131 by Alisha Hackett RN  Outcome: Ongoing  Goal: No harm/injury to patient while restraints in use  9/7/2021 2026 by Honorio Adame RN  Outcome: Ongoing  9/7/2021 1721 by Fausto Tracy RN  Outcome: Ongoing  9/7/2021 1131 by Alisha Hackett RN  Outcome: Ongoing  Goal: Patient's dignity will be maintained  9/7/2021 2026 by Honorio Adame RN  Outcome: Ongoing  9/7/2021 1721 by Fausto Tracy RN  Outcome: Ongoing  9/7/2021 1131 by Alisha Hackett RN  Outcome: Ongoing     Problem: Nutrition  Goal: Optimal nutrition therapy  9/7/2021 2026 by Honorio Adame RN  Outcome: Ongoing  9/7/2021 1721 by Fausto Tracy RN  Outcome: Ongoing  9/7/2021 1131 by Alisha Hackett RN  Outcome: Ongoing

## 2021-09-08 NOTE — PROGRESS NOTES
Occupational Therapy Daily Treatment Note    Unit: [address] [place]  Date:  9/8/2021  Patient Name:    Naa Nesbitt  Admitting diagnosis:  Appendicitis [K37]  Acute appendicitis with perforation and generalized peritonitis, unspecified whether abscess present, unspecified whether gangrene present [K35.20]  Admit Date:  8/30/2021  Precautions/Restrictions:    Appendicitis [K37]  Acute appendicitis with perforation and generalized peritonitis, unspecified whether abscess present, unspecified whether gangrene present [K35.20]  Abdominal binder     Discharge Recommendations: SNF  DME needs for discharge: defer to facility       Therapy recommendations for staff:   Assist of 2 for bed mobility and transfers with Rw and gait belt   AM-PAC Score: AM-PAC Inpatient Daily Activity Raw Score: 12  Home Health S4 Level: NA       Treatment Time:  9267-1708   Treatment number:  2    Timed code treatment minutes: 70 minutes  Total treatment minutes:   70 minutes    History of Present Illness:   ED note, 8/30/2021, [place]:   \" 64-year-old female history of gastric sleeve, who presents with right lower quadrant pain nausea and fevers.  Symptoms started yesterday.  Patient reports she has a moderate to severe right lower quadrant pain that is exacerbated with some movements and was painful going over bumps on the right ovary.  Patient reports \"I knew something was wrong I spike a fever.  I never get fevers\".  Nausea no vomiting passing stool passing flatus.  No chest pain or shortness of breath or back pain no pelvic symptoms.  No urinary symptoms.  \"      PMHx: Acute renal failure (ARF) ([facility] [address] (May 4, 2015), Arthritis, Asthma, DDD (degenerative disc disease), cervical, Deep vein thrombosis (DVT) ([facility] [address] (07/2015), Degenerative disc disease, Family history of factor V deficiency, GERD (gastroesophageal reflux disease), Hypertension, Scoliosis, Sleep apnea, and Spinal stenosis.     Subjective:  Pt in the bed and willing to work with OT    Pain Yes, when breathing in  Location: at the surgical site  Pain level: 0/10 at rest, moderate pain with deep breaths. No request made. Bed Mobility:   Supine to Sit:  Max A  and 2 persons  Sit to Supine:  Not Tested  Rolling:           Not Tested  Scooting: Mod A  to sit edge of bed     Transfer Training:   Sit to stand:   Min A of two with RW  Stand to sit:  CGA  Bed to Chair:  Min A , 2 persons and RW  Bed to [facility]:   Not Tested  Standard toilet:   Not Tested    Activity Tolerance   Pt completed therapy session with pain, decreased endurance, fatigue   BP (mmHg) HR (bpm) SpO2 (%) Comments    Supine, at rest 126/77 95 93% On room air   Seated at EOB   108 93        ADL Training:   Upper body dressing: Mod A   Upper body bathing:  SBA  Lower body dressing:  Max A   Lower body bathing:  Max A   Toileting:   Not Tested  Grooming/Hygiene:  Not Tested    Therapeutic Exercise:   Shoulder shrugs 15x     Patient Education:   Role of OT    Positioning Needs:   Pt reclined in chair, alarm set, positioned in proper neutral alignment and pressure relief provided. Family Present:  No    Assessment: Pt initally refusing abdominal binder however nurse stated the pts NP stated that the pt should wear it. The pt was max assist of two for supine to sit and min assist of two for sit to stand and to transfer to the chair with RW. The pt was max assist for LE ADLS and SBA for pt to bathe UB in front and max to bathe back. The pt was mod assist to don gown due to lines. Pt needs further OT to increase functional IND. GOALS    To be met in 3 Visits:  1). Bed to toilet: minimal assistance (25%)     To be met in 5 Visits:  2). Upper Body Bathing:         minimal assistance (25%)  3). Lower Body Bathing:         maximal assistance (75%)  4). Upper Body Dressing:       minimal assistance (25%)  5). Lower Body Dressing:       maximal assistance (75%)  6).  Pt to demonstrate UE exs x 15 reps with minimal cues    Plan: cont with MARCI Pro OTR/L [id number]      If patient discharges from this facility prior to next visit, this note will serve as the Discharge Summary

## 2021-09-08 NOTE — FLOWSHEET NOTE
09/08/21 0913   Pain Assessment   Pain Level 8   Patient's Stated Pain Goal 4   Pain Type Surgical pain   Pain Location Abdomen   Pain Orientation Mid   Pain Descriptors Sharp     Am assessment completed. See flowsheet. Pt c/o abdominal pain. Medicated with morphine. See mar. Pt denies other needs at this time. Call light within reach. Alli Fernandez RN\

## 2021-09-08 NOTE — PROGRESS NOTES
General Surgery - Tricia Hsu, APRN - CNP, CNP  Daily Progress Note    Pt Name: Nimisha Campos  Medical Record Number: [id number]  Date of Birth 1965   Today's Date: 9/8/2021    ASSESSMENT  1. POD #3 S/P Right colectomy, incarcerated ventral, incisional hernia repair, omphalectomy, drainage of intraabdominal abscess. 2. ABD: morbidly obese, staples intact and look good, + incisional pain, pain ball removed, FATOUMATA x 2 in place, NGT, + flatus x 2, no BM, no N/V  3. Leuks improved 24.4->18.7 with resolution of left shift->16.2  4. VSS: afebrile, BP normal  5. F/C:   6.  ml out  7. FATOUMATA x 2: 41/31 ml: both serous  8. Pt is tearful, complaints of ICU RN, reports seh \"is feel better everyday but, is overwhelmed. \"      PLAN  1. NPO NGT to CLWS, may have ice chips and sips of clears   2. Pain control   3. IS q 1 hour while awake   4. PT/OT  5. DVT proph: Lovenox  6. GI proph: Protonix   7. IVF at 75 ml/hr  8. Zosyn and Diflucan  9. Continue infante for strict I&Os. 10. Pt looks good POD #3: Awaiting better return of bowel function. Jennifer Villalpando has slightly improved from yesterday. Pain is well controlled. She has no nausea and no vomiting. She has passed flatus and has not had a bowel movement. She is NPO with NGT in place. Current activity is up with assistance    OBJECTIVE  VITALS:  height is 5' 3\" (1.6 m) and weight is 310 lb 10.1 oz (140.9 kg) (abnormal). Her oral temperature is 97.8 °F (36.6 °C). Her blood pressure is 133/90 (abnormal) and her pulse is 96. Her respiration is 16 and oxygen saturation is 92%.    VITALS:  BP (!) 133/90   Pulse 96   Temp 97.8 °F (36.6 °C) (Oral)   Resp 16   Ht 5' 3\" (1.6 m)   Wt (!) 310 lb 10.1 oz (140.9 kg)   LMP 12/24/2010   SpO2 92%   BMI 55.03 kg/m²   INTAKE/OUTPUT:      Intake/Output Summary (Last 24 hours) at 9/8/2021 1445  Last data filed at 9/8/2021 1417  Gross per 24 hour   Intake 620.43 ml   Output 1160 ml   Net -539.57 ml     URINARY CATHETER OUTPUT (Cash):     GENERAL: alert, cooperative, crying, no distress  I/O last 3 completed shifts: In: 3044.7 [I.V.:2722.7; IV Piggyback:322]  Out: 4129 [Urine:675; Emesis/NG output:800; Drains:72]  I/O this shift:  In: 620.4 [P.O.:240;  I.V.:0.1; IV Piggyback:380.4]  Out: 215 [Emesis/NG output:200; Drains:15]    LABS  Recent Labs     09/08/21  1020   WBC 16.2*   HGB 11.0*   HCT 34.1*         K 3.8      CO2 20*   BUN 12   CREATININE <0.5*   MG 1.80   PHOS 3.4   CALCIUM 7.6*     CBC with Differential:    Lab Results   Component Value Date    WBC 16.2 09/08/2021    RBC 3.53 09/08/2021    HGB 11.0 09/08/2021    HCT 34.1 09/08/2021     09/08/2021    MCV 96.5 09/08/2021    MCH 31.1 09/08/2021    MCHC 32.3 09/08/2021    RDW 17.3 09/08/2021    BANDSPCT 28 09/06/2021    METASPCT 1 09/05/2021    LYMPHOPCT 5.0 09/08/2021    PROMYELOPCT 1 09/05/2021    MONOPCT 3.0 09/08/2021    MYELOPCT 1 09/05/2021    BASOPCT 0.0 09/08/2021    MONOSABS 0.5 09/08/2021    LYMPHSABS 0.8 09/08/2021    EOSABS 0.3 09/08/2021    BASOSABS 0.0 09/08/2021     CMP:    Lab Results   Component Value Date     09/08/2021    K 3.8 09/08/2021    K 3.5 08/31/2021     09/08/2021    CO2 20 09/08/2021    BUN 12 09/08/2021    CREATININE <0.5 09/08/2021    GFRAA >60 09/08/2021    AGRATIO 1.2 08/30/2021    LABGLOM >60 09/08/2021    GLUCOSE 85 09/08/2021    PROT 6.9 08/30/2021    LABALBU 2.6 09/03/2021    CALCIUM 7.6 09/08/2021    BILITOT 0.7 08/30/2021    ALKPHOS 96 08/30/2021    AST 15 08/30/2021    ALT 17 08/30/2021         Tricia Ivey APRN - CNP  Electronically signed 9/8/2021 at 2:45 PM

## 2021-09-08 NOTE — PROGRESS NOTES
Inpatient Physical Therapy Daily Treatment Note    Unit: [facility]  Date:  9/8/2021  Patient Name:    Cameron Nesbitt  Admitting diagnosis:  Appendicitis [K37]  Acute appendicitis with perforation and generalized peritonitis, unspecified whether abscess present, unspecified whether gangrene present [K35.20]  Admit Date:  8/30/2021  Precautions/Restrictions/WB Status/ Lines/ Wounds/ Oxygen: Fall risk, Lines -IV, Cash catheter, NG tube, Drains (LLQ drain) and Pain ball, Telemetry, Continuous pulse oximetry and midline abdominal incision. Apply abd binder per RN. OK for popsicle and iced tea per RN. Treatment Time: 11:10-12:18  Treatment Number:  1   Timed Code Treatment Minutes: 68 minutes  Total Treatment Minutes: 68 minutes    Patient Goals for Therapy: \" to go to rehab \"          Discharge Recommendations: SNF  DME needs for discharge: defer to facility       Therapy recommendation for EMS Transport: requires transport by cot due to need of 2 person assist for bed to chair transfers. Therapy recommendations for staff:   Assist of 2 for bed mobility and EOB sitting. Assist of 2 for stand pivot transfer bed<>chair with bariatric rolling walker. History Of Present Illness:    per HÉCTOR Escamilla (8/30/2021)  \"The patient is a 60 y.o. female with a PMH of DVT, asthma, GERD, FLORIAN, morbid obesity, history of laparoscopic sleeve gastrectomy who presented to [facility] ED with complaint of RLQ abdominal pain that started yesterday. Initially thought symptoms were related to gas pains. She had a BM yesterday which was soft, brown stool. Symptoms did not get any better. Woke up early this morning with sharp right lower quadrant abdominal pain and a fever. Denies any nausea or vomiting. Reports shaking chills. She decided to come to the ER. Work-up in the ED showed: Patient's temperature was 100.4. Blood pressures were stable. Potassium and magnesium were low. She had a leukocytosis of 14.3. Urine was negative.   Covid was negative. CT of the abdomen showed acute appendicitis with focal perforation and inflammatory changes in the right lower quadrant. Patient was admitted to the medical surgical unit and general surgery was consulted. Interval history by Rhonda Malhotra MD (9/7/2021):  Patient underwent an emergent appendectomy for perforated appendicitis. Right colectomy was done. Op findings included an incarcerated ventral incisional hernia. In addition to the right colectomy the incarcerated ventral incisional hernia was repaired on colectomy was performed and intra-abdominal abscesses drained. She is on the ventilator postop. She has been extubated. Her present she is awake and alert. Pain is well controlled. She is started on ice chips today.     Home Health S4 Level Recommendation:  NA  AM-PAC Mobility Score    AM-PAC Inpatient Mobility Raw Score : 10    Pain:  Yes, when breathing in  Location: at the surgical site  Pain level: 0/10 at rest, moderate pain with deep breaths. No request made. Cognition    A&O x4   Able to follow 2 step commands    Subjective  Patient lying supine in bed with spouse present initially, left before start of mobility. Pt agreeable to this PT eval & tx. States therapy yesterday was \"rough\". Requests bath wipes to clean up. Balance  Sitting:  Fair +; SBA to Supervision  Comments: ~10 min at the EOB. Therapists assisting with gown change and cleanup. Standing: Fair ; Min A   Comments: With RW. Pt stood statically for ~1 minute with Min A from OT for balance while PT adjusted abd binder in standing (per pt request - pt adamantly against putting binder on in supine). Bed mobility:  using bed rails and bed controls  Supine to sit: Max A x 2 person assist  Sit to supine:                           Not Tested  Rolling:                                     Not Tested.   Scooting in sitting:                  SBA with cues   Scooting to head of bed:         Not Tested home/bedroom/bathroom, burden of care beyond caregiver ability and home environment not conducive to patient recovery. Goals : To be met in 3 visits:  1). Independent with LE Ex x 10 reps   2.) Bed to chair: SBA    To be met in 6 visits:  1). Supine to/from sit: Min A   2). Sit to/from stand: Min A   3). Bed to chair: Mod A   4). Gait: Ambulate 50 ft.  with  Mod A  and use of LRAD (least restrictive assistive device)  5). Tolerate B LE exercises 3 sets of 10-15 reps  6). Ascend/descend 3 steps with Mod A  with use of no handrail and LRAD (least restrictive assistive device)    Rehabilitation Potential: Good  Strengths for achieving goals include:   Pt motivated, PLOF, Family Support and Pt cooperative   Barriers to achieving goals include:    Pain and Weakness    Plan    To be seen 3-5 x / week  while in acute care setting for therapeutic exercises, bed mobility, transfers, progressive gait training, balance training, and family/patient education. Signature: Josie Paulson, PT, DPT    If patient discharges from this facility prior to next visit, this note will serve as the Discharge Summary.

## 2021-09-08 NOTE — PLAN OF CARE
Problem: Pain:  Description: Pain management should include both nonpharmacologic and pharmacologic interventions.   Goal: Pain level will decrease  Description: Pain level will decrease  Outcome: Ongoing  Goal: Control of acute pain  Description: Control of acute pain  Outcome: Ongoing  Goal: Control of chronic pain  Description: Control of chronic pain  Outcome: Ongoing  Goal: Patient's pain/discomfort is manageable  Description: Patient's pain/discomfort is manageable  Outcome: Ongoing     Problem: Skin Integrity:  Goal: Will show no infection signs and symptoms  Description: Will show no infection signs and symptoms  Outcome: Ongoing  Goal: Absence of new skin breakdown  Description: Absence of new skin breakdown  Outcome: Ongoing     Problem: Falls - Risk of:  Goal: Will remain free from falls  Description: Will remain free from falls  Outcome: Ongoing  Goal: Absence of physical injury  Description: Absence of physical injury  Outcome: Ongoing     Problem: Nausea/Vomiting:  Goal: Absence of nausea/vomiting  Description: Absence of nausea/vomiting  Outcome: Ongoing  Goal: Able to drink  Description: Able to drink  Outcome: Ongoing  Goal: Able to eat  Description: Able to eat  Outcome: Ongoing  Goal: Ability to achieve adequate nutritional intake will improve  Description: Ability to achieve adequate nutritional intake will improve  Outcome: Ongoing     Problem: Infection:  Goal: Will remain free from infection  Description: Will remain free from infection  Outcome: Ongoing     Problem: Safety:  Goal: Free from accidental physical injury  Description: Free from accidental physical injury  Outcome: Ongoing  Goal: Free from intentional harm  Description: Free from intentional harm  Outcome: Ongoing     Problem: Daily Care:  Goal: Daily care needs are met  Description: Daily care needs are met  Outcome: Ongoing     Problem: Skin Integrity:  Goal: Skin integrity will stabilize  Description: Skin integrity will stabilize  Outcome: Ongoing     Problem: Discharge Planning:  Goal: Patients continuum of care needs are met  Description: Patients continuum of care needs are met  Outcome: Ongoing     Problem: Non-Violent Restraints  Goal: Removal from restraints as soon as assessed to be safe  Outcome: Ongoing  Goal: No harm/injury to patient while restraints in use  Outcome: Ongoing  Goal: Patient's dignity will be maintained  Outcome: Ongoing     Problem: Nutrition  Goal: Optimal nutrition therapy  Outcome: Ongoing

## 2021-09-08 NOTE — PROGRESS NOTES
Bedside Mobility Assessment Tool (BMAT):     Assessment Level 1- Sit and Shake    1. From a semi-reclined position, ask patient to sit up and rotate to a seated position at the side of the bed. Can use the bedrail. 2. Ask patient to reach out and grab your hand and shake making sure patient reaches across his/her midline. Pass- Patient is able to come to a seated position, maintain core strength. Maintains seated balance while reaching across midline. Move on to Assessment Level 2. Assessment Level 2- Stretch and Point   1. With patient in seated position at the side of the bed, have patient place both feet on the floor (or stool) with knees no higher than hips. 2. Ask patient to stretch one leg and straighten the knee, then bend the ankle/flex and point the toes. If appropriate, repeat with the other leg. Pass- Patient is able to demonstrate appropriate quad strength on intended weight bearing limb(s). Move onto Assessment Level 3. Assessment Level 3- Stand   1. Ask patient to elevate off the bed or chair (seated to standing) using an assistive device (cane, bedrail). 2. Patient should be able to raise buttocks off be and hold for a count of five. May repeat once. Fail- Patient unable to demonstrate standing stability. Patient is MOBILITY LEVEL 3. Assessment Level 4- Walk   1. Ask patient to march in place at bedside. 2. Then ask patient to advance step and return each foot. Some medical conditions may render a patient from stepping backwards, use your best clinical judgement. Fail- Patient not able to complete tasks OR requires use of assistive device. Patient is MOBILITY LEVEL 3.        Mobility Level- 2     Patient is not able to demonstrate the ability to move from a reclining position to an upright position within the recliner due to surgical incision to abdominal.

## 2021-09-09 LAB
BASOPHILS ABSOLUTE: 0.1 K/UL (ref 0–0.2)
BASOPHILS RELATIVE PERCENT: 0.9 %
EOSINOPHILS ABSOLUTE: 0.1 K/UL (ref 0–0.6)
EOSINOPHILS RELATIVE PERCENT: 0.6 %
GLUCOSE BLD-MCNC: 104 MG/DL (ref 70–99)
GLUCOSE BLD-MCNC: 105 MG/DL (ref 70–99)
GLUCOSE BLD-MCNC: 111 MG/DL (ref 70–99)
GLUCOSE BLD-MCNC: 112 MG/DL (ref 70–99)
GLUCOSE BLD-MCNC: 88 MG/DL (ref 70–99)
GLUCOSE BLD-MCNC: 95 MG/DL (ref 70–99)
HCT VFR BLD CALC: 32.1 % (ref 36–48)
HEMOGLOBIN: 10.6 G/DL (ref 12–16)
LYMPHOCYTES ABSOLUTE: 1.3 K/UL (ref 1–5.1)
LYMPHOCYTES RELATIVE PERCENT: 9.5 %
MCH RBC QN AUTO: 32 PG (ref 26–34)
MCHC RBC AUTO-ENTMCNC: 33 G/DL (ref 31–36)
MCV RBC AUTO: 96.8 FL (ref 80–100)
MONOCYTES ABSOLUTE: 0.8 K/UL (ref 0–1.3)
MONOCYTES RELATIVE PERCENT: 5.9 %
NEUTROPHILS ABSOLUTE: 11.6 K/UL (ref 1.7–7.7)
NEUTROPHILS RELATIVE PERCENT: 83.1 %
PDW BLD-RTO: 17.3 % (ref 12.4–15.4)
PERFORMED ON: ABNORMAL
PERFORMED ON: NORMAL
PERFORMED ON: NORMAL
PLATELET # BLD: 261 K/UL (ref 135–450)
PMV BLD AUTO: 7.6 FL (ref 5–10.5)
RBC # BLD: 3.32 M/UL (ref 4–5.2)
WBC # BLD: 14 K/UL (ref 4–11)

## 2021-09-09 PROCEDURE — 99232 SBSQ HOSP IP/OBS MODERATE 35: CPT | Performed by: INTERNAL MEDICINE

## 2021-09-09 PROCEDURE — 87077 CULTURE AEROBIC IDENTIFY: CPT

## 2021-09-09 PROCEDURE — 6360000002 HC RX W HCPCS: Performed by: INTERNAL MEDICINE

## 2021-09-09 PROCEDURE — 87075 CULTR BACTERIA EXCEPT BLOOD: CPT

## 2021-09-09 PROCEDURE — 97530 THERAPEUTIC ACTIVITIES: CPT

## 2021-09-09 PROCEDURE — 99024 POSTOP FOLLOW-UP VISIT: CPT | Performed by: NURSE PRACTITIONER

## 2021-09-09 PROCEDURE — 87205 SMEAR GRAM STAIN: CPT

## 2021-09-09 PROCEDURE — 1200000000 HC SEMI PRIVATE

## 2021-09-09 PROCEDURE — 85025 COMPLETE CBC W/AUTO DIFF WBC: CPT

## 2021-09-09 PROCEDURE — 2580000003 HC RX 258: Performed by: INTERNAL MEDICINE

## 2021-09-09 PROCEDURE — C9113 INJ PANTOPRAZOLE SODIUM, VIA: HCPCS | Performed by: INTERNAL MEDICINE

## 2021-09-09 PROCEDURE — 6370000000 HC RX 637 (ALT 250 FOR IP): Performed by: INTERNAL MEDICINE

## 2021-09-09 PROCEDURE — 97110 THERAPEUTIC EXERCISES: CPT

## 2021-09-09 PROCEDURE — 36592 COLLECT BLOOD FROM PICC: CPT

## 2021-09-09 PROCEDURE — 87186 SC STD MICRODIL/AGAR DIL: CPT

## 2021-09-09 PROCEDURE — 2500000003 HC RX 250 WO HCPCS: Performed by: INTERNAL MEDICINE

## 2021-09-09 PROCEDURE — 87070 CULTURE OTHR SPECIMN AEROBIC: CPT

## 2021-09-09 RX ORDER — MORPHINE SULFATE 2 MG/ML
1 INJECTION, SOLUTION INTRAMUSCULAR; INTRAVENOUS
Status: DISCONTINUED | OUTPATIENT
Start: 2021-09-09 | End: 2021-09-10

## 2021-09-09 RX ORDER — FUROSEMIDE 10 MG/ML
20 INJECTION INTRAMUSCULAR; INTRAVENOUS ONCE
Status: COMPLETED | OUTPATIENT
Start: 2021-09-09 | End: 2021-09-09

## 2021-09-09 RX ADMIN — FLUCONAZOLE, SODIUM CHLORIDE 200 MG: 2 INJECTION INTRAVENOUS at 01:42

## 2021-09-09 RX ADMIN — SODIUM CHLORIDE, PRESERVATIVE FREE 10 ML: 5 INJECTION INTRAVENOUS at 11:12

## 2021-09-09 RX ADMIN — MORPHINE SULFATE 2 MG: 2 INJECTION, SOLUTION INTRAMUSCULAR; INTRAVENOUS at 00:19

## 2021-09-09 RX ADMIN — PANTOPRAZOLE SODIUM 40 MG: 40 INJECTION, POWDER, FOR SOLUTION INTRAVENOUS at 11:10

## 2021-09-09 RX ADMIN — ENOXAPARIN SODIUM 40 MG: 40 INJECTION SUBCUTANEOUS at 11:09

## 2021-09-09 RX ADMIN — PIPERACILLIN SODIUM AND TAZOBACTAM SODIUM 3375 MG: 3; .375 INJECTION, POWDER, LYOPHILIZED, FOR SOLUTION INTRAVENOUS at 06:33

## 2021-09-09 RX ADMIN — POTASSIUM CHLORIDE, DEXTROSE MONOHYDRATE AND SODIUM CHLORIDE: 150; 5; 450 INJECTION, SOLUTION INTRAVENOUS at 14:08

## 2021-09-09 RX ADMIN — MORPHINE SULFATE 2 MG: 2 INJECTION, SOLUTION INTRAMUSCULAR; INTRAVENOUS at 06:29

## 2021-09-09 RX ADMIN — TOPIRAMATE 100 MG: 100 TABLET, FILM COATED ORAL at 21:37

## 2021-09-09 RX ADMIN — SODIUM CHLORIDE, PRESERVATIVE FREE 10 ML: 5 INJECTION INTRAVENOUS at 22:46

## 2021-09-09 RX ADMIN — PIPERACILLIN SODIUM AND TAZOBACTAM SODIUM 3375 MG: 3; .375 INJECTION, POWDER, LYOPHILIZED, FOR SOLUTION INTRAVENOUS at 14:02

## 2021-09-09 RX ADMIN — PIPERACILLIN SODIUM AND TAZOBACTAM SODIUM 3375 MG: 3; .375 INJECTION, POWDER, LYOPHILIZED, FOR SOLUTION INTRAVENOUS at 21:35

## 2021-09-09 RX ADMIN — FUROSEMIDE 20 MG: 10 INJECTION, SOLUTION INTRAMUSCULAR; INTRAVENOUS at 16:57

## 2021-09-09 RX ADMIN — MORPHINE SULFATE 2 MG: 2 INJECTION, SOLUTION INTRAMUSCULAR; INTRAVENOUS at 08:20

## 2021-09-09 RX ADMIN — ACETAMINOPHEN 650 MG: 325 TABLET ORAL at 20:01

## 2021-09-09 ASSESSMENT — PAIN SCALES - GENERAL
PAINLEVEL_OUTOF10: 8
PAINLEVEL_OUTOF10: 7
PAINLEVEL_OUTOF10: 7
PAINLEVEL_OUTOF10: 8

## 2021-09-09 NOTE — PROGRESS NOTES
Inpatient Physical Therapy Daily Treatment Note    Unit: [facility]  Date:  9/9/2021  Patient Name:    Naa Nesbitt  Admitting diagnosis:  Appendicitis [K37]  Acute appendicitis with perforation and generalized peritonitis, unspecified whether abscess present, unspecified whether gangrene present [K35.20]  Admit Date:  8/30/2021  Precautions/Restrictions/WB Status/ Lines/ Wounds/ Oxygen: Fall risk, Lines -IV, Cash catheter, NG tube and Drains (LLQ drain), Telemetry, Continuous pulse oximetry and midline abdominal incision. Abdominal binder in room but not in use. Treatment Time: 15:05-15:58  Treatment Number:  3   Timed Code Treatment Minutes: 53 minutes  Total Treatment Minutes: 53 minutes     Patient Goals for Therapy: \" to go to rehab \"          Discharge Recommendations: SNF  DME needs for discharge: defer to facility       Therapy recommendation for EMS Transport: requires transport by cot due to need of 2 person assist for bed to chair transfers. Therapy recommendations for staff:   Assist of 2 for bed mobility and EOB sitting. Assist of 2 for stand pivot transfer bed<>chair with bariatric rolling walker. History Of Present Illness:    per HÉCTOR Escamilla (8/30/2021)  \"The patient is a 60 y.o. female with a PMH of DVT, asthma, GERD, FLORIAN, morbid obesity, history of laparoscopic sleeve gastrectomy who presented to [facility] ED with complaint of RLQ abdominal pain that started yesterday. Initially thought symptoms were related to gas pains. She had a BM yesterday which was soft, brown stool. Symptoms did not get any better. Woke up early this morning with sharp right lower quadrant abdominal pain and a fever. Denies any nausea or vomiting. Reports shaking chills. She decided to come to the ER. Work-up in the ED showed: Patient's temperature was 100.4. Blood pressures were stable. Potassium and magnesium were low. She had a leukocytosis of 14.3. Urine was negative. Covid was negative.   CT of the abdomen showed acute appendicitis with focal perforation and inflammatory changes in the right lower quadrant. Patient was admitted to the medical surgical unit and general surgery was consulted. Interval history by Diego Domingo MD (9/7/2021):  Patient underwent an emergent appendectomy for perforated appendicitis. Right colectomy was done. Op findings included an incarcerated ventral incisional hernia. In addition to the right colectomy the incarcerated ventral incisional hernia was repaired on colectomy was performed and intra-abdominal abscesses drained. She is on the ventilator postop. She has been extubated. Her present she is awake and alert. Pain is well controlled. She is started on ice chips today.     Home Health S4 Level Recommendation:  NA  AM-PAC Mobility Score    AM-PAC Inpatient Mobility Raw Score : 10    Pain:  Yes,   Location: flank  Pain level: mild/10  No request made. Cognition    A&O x4   Able to follow 2 step commands    Subjective  Patient reclined in chair since 7am with no family present. Pt agreeable to this PT tx. Has been doing UE/LE exercises today. Is fatigued from today's activities but willing to do some OOB activity. Balance  Sitting:  Fair +; Supervision   Comments: Pt sat at edge of recliner without back support for ~10 minutes to facilitate trunk control and ROM activities. Pt can reach outside KARL anteriorly and laterally ~12 inches, contralaterally <12\". Standing: Fair ; CGA for static stance and weight shifting with BUE support  Comments: With RW. Pt stood for 2 bouts (3 minutes + 1.5 minutes), limited by fatigue. Bed mobility:  Pt sitting in recliner to start and end session per pt preference. Supine to sit:                           Not tested  Sit to supine:                           Not Tested  Rolling:                                     Not Tested. Scooting in sitting:                  Supervision in recliner to scoot to edge.    Scooting to head of bed:         Not Tested           Transfers:    Sit to stand: Mod A x 2 persons from recliner with walker on 1st attempt. Min A x 2 persons from recliner with walker on 2nd attempt. Cues for hand placement. Stand to sit:                 CGA, reduced eccentric control when fatigued. Bed to chair:                Pt declined. Gait pt declined to ambulate; pt ambulated 0 ft. Stair Training deferred, pt unsafe/ not appropriate to complete stairs at this time     Activity Tolerance:   Pt completed therapy session with No adverse symptoms. Positioning Needs:   Pt reclined in chair, alarm set, positioned in proper neutral alignment and pressure relief provided. Call light provided and all needs within reach    Exercises Initiated  all completed bilaterally unless indicated  Ankle Pumps x 15 reps  LAQ x 15 reps  Standing lateral weight shifts -> heel lifts  x 90 seconds per activity  Standing hip flex/ext to elicit glut contraction and abdominal engagement  x 90 seconds  Verbally reviewed heel slides, glut sets, and bridges for hourly HEP. Other  None. Patient/Family Education   Pt educated on role of inpatient PT, POC, importance of continued activity, DC recommendations, safety awareness, transfer techniques, pursed lip breathing, energy conservation, pacing activity and calling for assist with mobility. Assessment  Pt seen for Physical Therapy follow-up treatment. Pt is progressing slowly but steadily. She tolerated unsupported sitting for long duration and was able to initiate some trunk mobility without additional pain. Pt tolerated 2 bouts of static standing with weight shifting and pre-gait activities to promote functional strength and endurance. Pt then declined further ambulation/transfer trials. Pt was again encouraged to complete hourly UE/LE AROM.      Recommending SNF upon discharge as patient functioning well below baseline, demonstrates good rehab potential and unable to return home due to limited or no family support, inability to negotiate stairs to enter home/bedroom/bathroom, burden of care beyond caregiver ability and home environment not conducive to patient recovery. Goals : To be met in 3 visits:  1). Independent with LE Ex x 10 reps - 9/9 goal met  2.) Bed to chair: SBA - 9/9 DNT, pt declined    To be met in 6 visits:  1). Supine to/from sit: Min A   2). Sit to/from stand: Min A   3). Bed to chair: Mod A   4). Gait: Ambulate 50 ft.  with  Mod A  and use of LRAD (least restrictive assistive device)  5). Tolerate B LE exercises 3 sets of 10-15 reps  6). Ascend/descend 3 steps with Mod A  with use of no handrail and LRAD (least restrictive assistive device)    Rehabilitation Potential: Good  Strengths for achieving goals include:   Pt motivated, PLOF, Family Support and Pt cooperative   Barriers to achieving goals include:    Pain and Weakness    Plan    To be seen 3-5 x / week  while in acute care setting for therapeutic exercises, bed mobility, transfers, progressive gait training, balance training, and family/patient education. Signature: Pritesh Sena, PT, DPT    If patient discharges from this facility prior to next visit, this note will serve as the Discharge Summary.

## 2021-09-09 NOTE — PROGRESS NOTES
Occupational Therapy Daily Treatment Note    Unit: [facility] [place]  Date:  9/9/2021  Patient Name:    Alber Nesbitt  Admitting diagnosis:  Appendicitis [K37]  Acute appendicitis with perforation and generalized peritonitis, unspecified whether abscess present, unspecified whether gangrene present [K35.20]  Admit Date:  8/30/2021  Precautions/Restrictions:    Appendicitis [K37]  Acute appendicitis with perforation and generalized peritonitis, unspecified whether abscess present, unspecified whether gangrene present [K35.20]  Abdominal binder     Discharge Recommendations: SNF  DME needs for discharge: defer to facility       Therapy recommendations for staff:   Assist of 2 for bed mobility and transfers with Rw and gait belt   AM-PAC Score: AM-PAC Inpatient Daily Activity Raw Score: 12  Home Health S4 Level: NA       Treatment Time:  4576-4955  Treatment number:  3  Timed code treatment minutes: 55 minutes  Total treatment minutes:  55  minutes    History of Present Illness:   ED note, 8/30/2021, [place]:   \" 49-year-old female history of gastric sleeve, who presents with right lower quadrant pain nausea and fevers.  Symptoms started yesterday.  Patient reports she has a moderate to severe right lower quadrant pain that is exacerbated with some movements and was painful going over bumps on the right ovary.  Patient reports \"I knew something was wrong I spike a fever.  I never get fevers\".  Nausea no vomiting passing stool passing flatus.  No chest pain or shortness of breath or back pain no pelvic symptoms.  No urinary symptoms.  \"      PMHx: Acute renal failure (ARF) ([facility] [address] (May 4, 2015), Arthritis, Asthma, DDD (degenerative disc disease), cervical, Deep vein thrombosis (DVT) ([facility] [address] (07/2015), Degenerative disc disease, Family history of factor V deficiency, GERD (gastroesophageal reflux disease), Hypertension, Scoliosis, Sleep apnea, and Spinal stenosis.     Subjective:  Pt in the bed and willing to work with OT    Pain Yes, when breathing in  Location: at the surgical site  Pain level: 0/10 at rest, moderate pain with deep breaths. No request made. Bed Mobility:   Supine to Sit:  NT  Sit to Supine:  Not Tested  Rolling:           Not Tested  Scooting:        min to scoot back in the chair      Transfer Training: Pt stood for 3 mins and shifted wt back and forth then sat in chair. The pt stood a 2nd time for 1.5 mins with CGA and holding onto the RW   Sit to stand:   Min A of two with RW  Stand to sit:  CGA  Bed to Chair:  NT - Pt up in the chair   Bed to [facility]:   Not Tested  Standard toilet:   Not Tested    Activity Tolerance   Pt completed therapy session with pain, decreased endurance, fatigue     ADL Training:   Upper body dressing: NT  Upper body bathing:  NT   Lower body dressing:  NT  Lower body bathing:  NT  Toileting:   Not Tested  Grooming/Hygiene:  Not Tested    Therapeutic Exercise:   Shoulder shrugs 15x   Shoulder reaching with trunk movement 10x - bilaterally sitting in recliner   Yellow theraband  Horizontal abduction 15x  Shoulder punches 15x    Patient Education:   Role of OT    Positioning Needs:   Pt reclined in chair, alarm set, positioned in proper neutral alignment and pressure relief provided. Family Present:  No    Assessment: Pt stated the  abdominal binder does not work for her. The pt was mod assist of two to stand from the chair and min of two on the second stand. The pt participated in UE exercises when sitting . Pt requires frequent rest periods and becomes SOB. Sp02 was at 99% . Pt needs further OT to increase functional IND. GOALS    To be met in 3 Visits:  1). Bed to toilet: minimal assistance (25%)     To be met in 5 Visits:  2). Upper Body Bathing:         minimal assistance (25%)  3). Lower Body Bathing:         maximal assistance (75%)  4). Upper Body Dressing:       minimal assistance (25%)  5). Lower Body Dressing:       maximal assistance (75%)  6).  Pt to demonstrate UE exs x 15 reps with minimal cues    Plan: cont with [address] [place] [address]      If patient discharges from this facility prior to next visit, this note will serve as the Discharge Summary

## 2021-09-09 NOTE — PLAN OF CARE
Problem: Pain:  Goal: Pain level will decrease  Description: Pain level will decrease  9/8/2021 2058 by Angela Watt RN  Outcome: Ongoing  9/8/2021 1108 by Sofia Guallpa RN  Outcome: Ongoing  Goal: Control of acute pain  Description: Control of acute pain  9/8/2021 2058 by Angela Watt RN  Outcome: Ongoing  9/8/2021 1108 by Sofia Guallpa RN  Outcome: Ongoing  Goal: Control of chronic pain  Description: Control of chronic pain  9/8/2021 2058 by Angela Watt RN  Outcome: Ongoing  9/8/2021 1108 by Sofia Guallpa RN  Outcome: Ongoing  Goal: Patient's pain/discomfort is manageable  Description: Patient's pain/discomfort is manageable  9/8/2021 2058 by Angela Watt RN  Outcome: Ongoing  9/8/2021 1108 by Sofia Guallpa RN  Outcome: Ongoing     Problem: Skin Integrity:  Goal: Will show no infection signs and symptoms  Description: Will show no infection signs and symptoms  9/8/2021 2058 by Angela Watt RN  Outcome: Ongoing  9/8/2021 1108 by Sofia Guallpa RN  Outcome: Ongoing  Goal: Absence of new skin breakdown  Description: Absence of new skin breakdown  9/8/2021 2058 by Angela Watt RN  Outcome: Ongoing  9/8/2021 1108 by Sofia Guallpa RN  Outcome: Ongoing     Problem: Falls - Risk of:  Goal: Will remain free from falls  Description: Will remain free from falls  9/8/2021 2058 by Angela Watt RN  Outcome: Ongoing  9/8/2021 1108 by Sofia Guallpa RN  Outcome: Ongoing  Goal: Absence of physical injury  Description: Absence of physical injury  9/8/2021 2058 by Angela Watt RN  Outcome: Ongoing  9/8/2021 1108 by Sofia Guallpa RN  Outcome: Ongoing     Problem: Nausea/Vomiting:  Goal: Absence of nausea/vomiting  Description: Absence of nausea/vomiting  9/8/2021 2058 by Angela Watt RN  Outcome: Ongoing  9/8/2021 1108 by Sofia Guallpa RN  Outcome: Ongoing  Goal: Able to drink  Description: Able to drink  9/8/2021 2058 by Angela Watt RN  Outcome: Ongoing  9/8/2021 1108 by Александр Lopez RN  Outcome: Ongoing  Goal: Able to eat  Description: Able to eat  9/8/2021 2058 by Frederick Canales RN  Outcome: Ongoing  9/8/2021 1108 by Александр Lopez RN  Outcome: Ongoing  Goal: Ability to achieve adequate nutritional intake will improve  Description: Ability to achieve adequate nutritional intake will improve  9/8/2021 2058 by Frederick Canales RN  Outcome: Ongoing  9/8/2021 1108 by Александр Lopez RN  Outcome: Ongoing     Problem: Infection:  Goal: Will remain free from infection  Description: Will remain free from infection  9/8/2021 2058 by Frederick Canales RN  Outcome: Ongoing  9/8/2021 1108 by Александр Lopez RN  Outcome: Ongoing     Problem: Safety:  Goal: Free from accidental physical injury  Description: Free from accidental physical injury  9/8/2021 2058 by Frederick Canales RN  Outcome: Ongoing  9/8/2021 1108 by Александр Lopez RN  Outcome: Ongoing  Goal: Free from intentional harm  Description: Free from intentional harm  9/8/2021 2058 by Frederick Canales RN  Outcome: Ongoing  9/8/2021 1108 by Александр Lopez RN  Outcome: Ongoing     Problem: Daily Care:  Goal: Daily care needs are met  Description: Daily care needs are met  9/8/2021 2058 by Frederick Canales RN  Outcome: Ongoing  9/8/2021 1108 by Александр Lopez RN  Outcome: Ongoing     Problem: Skin Integrity:  Goal: Skin integrity will stabilize  Description: Skin integrity will stabilize  9/8/2021 2058 by Frederick Canales RN  Outcome: Ongoing  9/8/2021 1108 by Александр Lopez RN  Outcome: Ongoing     Problem: Discharge Planning:  Goal: Patients continuum of care needs are met  Description: Patients continuum of care needs are met  9/8/2021 2058 by Frederick Canales RN  Outcome: Ongoing  9/8/2021 1108 by Александр Lopez RN  Outcome: Ongoing     Problem: Non-Violent Restraints  Goal: Removal from restraints as soon as assessed to be safe  9/8/2021 2058 by Frederick Canales RN  Outcome: Ongoing  9/8/2021 1108 by Jailene Pollack Janis Campo RN  Outcome: Ongoing  Goal: No harm/injury to patient while restraints in use  9/8/2021 2058 by Rosalinda Davison RN  Outcome: Ongoing  9/8/2021 1108 by Edgar Kim RN  Outcome: Ongoing  Goal: Patient's dignity will be maintained  9/8/2021 2058 by Rosalinda Davison RN  Outcome: Ongoing  9/8/2021 1108 by Edgar Kim RN  Outcome: Ongoing     Problem: Nutrition  Goal: Optimal nutrition therapy  9/8/2021 2058 by Rosalinda Davison RN  Outcome: Ongoing  9/8/2021 1108 by Edgar Kim RN  Outcome: Ongoing

## 2021-09-09 NOTE — PROGRESS NOTES
Pulmonary & Critical Care Medicine Progress Note  CC: sepsis    Events of Last 24 hours: patient feels about the same. Invasive Lines:   IV Line: CVC 9/5      EXAM:  /78   Pulse 88   Temp 97.9 °F (36.6 °C) (Oral)   Resp 16   Ht 5' 3\" (1.6 m)   Wt (!) 310 lb 10.1 oz (140.9 kg)   LMP 12/24/2010   SpO2 97%   BMI 55.03 kg/m²  on RA  Tmax: 98F  CVP:      Intake/Output Summary (Last 24 hours) at 9/9/2021 1401  Last data filed at 9/9/2021 0448  Gross per 24 hour   Intake 360 ml   Output 1720 ml   Net -1360 ml     General: No distress. Alert. NCAT  Eyes: PERRL. No sclera icterus. No conjunctival injection. ENT: No discharge. Pharynx clear. Neck: Trachea midline. Resp: No accessory muscle use. No crackles. No wheezing. No rhonchi. No dullness on percussion. CV: Regular rate. Regular rhythm. No mumur or rub. No edema. GI: Non-tender. Non-distended. Skin: Warm and dry. No nodule on exposed extremities. No rash on exposed extremities. M/S: No cyanosis. No joint deformity. No clubbing.    Neuro: awake and alert, conversant, moves all extremities    Medications:   pantoprazole  40 mg IntraVENous Daily    fluconazole  200 mg IntraVENous Q24H    topiramate  100 mg Oral Nightly    sodium chloride flush  5-40 mL IntraVENous 2 times per day    enoxaparin  40 mg SubCUTAneous Daily    piperacillin-tazobactam  3,375 mg IntraVENous Q8H     PRN Meds:  morphine **OR** [DISCONTINUED] morphine, sodium chloride, prochlorperazine, sodium chloride flush, sodium chloride, acetaminophen **OR** acetaminophen, potassium chloride **OR** potassium alternative oral replacement **OR** potassium chloride    Results:  CBC:   Recent Labs     09/07/21  0445 09/08/21  1020 09/09/21  0625   WBC 18.7* 16.2* 14.0*   HGB 10.8* 11.0* 10.6*   HCT 32.4* 34.1* 32.1*   MCV 96.8 96.5 96.8    242 261     BMP:   Recent Labs     09/07/21  0445 09/08/21  1020    141   K 3.8 3.8    109   CO2 21 20*   PHOS  --  3.4 BUN 17 12   CREATININE 0.7 <0.5*     LIVER PROFILE: No results for input(s): AST, ALT, LIPASE, BILIDIR, BILITOT, ALKPHOS in the last 72 hours. Invalid input(s): AMYLASE,  ALB  PT/INR: No results for input(s): PROTIME, INR in the last 72 hours. APTT: No results for input(s): APTT in the last 72 hours. UA:No results for input(s): NITRITE, COLORU, PHUR, LABCAST, WBCUA, RBCUA, MUCUS, TRICHOMONAS, YEAST, BACTERIA, CLARITYU, SPECGRAV, LEUKOCYTESUR, UROBILINOGEN, BILIRUBINUR, BLOODU, GLUCOSEU, AMORPHOUS in the last 72 hours.     Invalid input(s): KETONESU    Cultures:  None      Films:  No new       ASSESSMENT:  · Septic shock - improved  · Acute appendicitis with perforation and incarcerated ventral incisional hernia post right colectomy, hernia repair and drainage of intra-abdominal abscess 9/5/2021  · Electrolytes disorder  · Leukocytosis  · History of DVT in 2015  · Chronic pain syndrome  · Morbid obesity post gastric sleeve   · FLORIAN on CPAP     PLAN:  · Supplemental oxygen to maintain SaO2 >92%; wean as tolerated  · Head of bed 30 degrees or higher at all times  · Broad spectrum antibiotics to include: D11 Zosyn and D5 Diflucan   · Code status: Full code

## 2021-09-09 NOTE — FLOWSHEET NOTE
09/09/21 0715   Vitals   Temp 98.8 °F (37.1 °C)   Temp Source Oral   Pulse 81   Heart Rate Source Monitor   Resp 16   /87   BP Location Right lower arm   Patient Position Semi fowlers   Level of Consciousness Alert (0)   MEWS Score 1   Oxygen Therapy   SpO2 96 %   Pulse Oximeter Device Location Finger   O2 Device None (Room air)   Shift assessment complete. See flow sheet. Patients head-toe complete, pt NG in place to R nares to CLWS, J.P. x 2 in place to ABD dressing intact , and midline dressing in place intact. pt call light in reach able to make needs know. Call light and bedside table are within reach. The bed is locked and is in the lowest position.       Brian Martinez RN

## 2021-09-09 NOTE — OP NOTE
[address]                 [address] [address]                                OPERATIVE REPORT    PATIENT NAME: Glenwood Hashimoto                      :        1965  MED REC NO:   [id number]                          ROOM:         ACCOUNT NO:   [de-identified]                           ADMIT DATE: 2021  PROVIDER:     Beckie Mike MD    DATE OF PROCEDURE:  2021    PREOPERATIVE DIAGNOSES:  1. Perforated appendicitis. 2.  Incarcerated ventral incisional hernia. POSTOPERATIVE DIAGNOSES:  1. Perforated appendicitis. 2.  Incarcerated ventral incisional hernia. OPERATIONS PERFORMED:  1. Right colectomy. 2.  Primary repair of incarcerated ventral incisional hernia. 3.  Drainage of intra-abdominal abscess via laparotomy. 4.  Omphalectomy    SURGEON:  Beckie Mike MD    ANESTHESIA:  General.    COMPLICATIONS:  None. ESTIMATED BLOOD LOSS:  300 mL. DRAINS:  Chetan-Ramey x2 in both lateral peritoneal cutters. INDICATIONS FOR OPERATION:  A 68-year-old female who was admitted here  about a week ago with lower abdominal pain in the right lower quadrant. She had imaging that suggested perforated appendicitis. She was stable. She was morbidly obese and had incarcerated ventral incisional hernia in  addition to the primary problem of perforated appendicitis. It was  recommend that we try to cool off the acute process in an effort to  allow her to transition to oral antibiotics and then come back  electively for her laparoscopy and attempt at appendectomy alone. The  patient initially improved. She then developed an ileus. A gastric  decompression was instituted. She then again felt better. On the  morning of surgery, we actually discontinued her NG tube, as she was  overall feeling better. During the course of the day of surgery, she  became tachycardic. Her blood pressure became lower. She complained of  more pain. the proximal  transverse colon as well as the terminal ileum. Combination of silk  suture ligatures and the LigaSure were used to divide the mesentery. There was abscess in the right lower quadrant that was drained during  the course of the dissection. This was in addition to uncontained  perforation and fecal contamination throughout the abdomen. At this  point, I copiously irrigated the abdomen with a couple of liters of  irrigation. I suctioned out the irrigant. I aligned the terminal ileum  with the proximal transverse colon, and I reloaded the ANDREW-75, was used  to do a side-to-side, but functional end-to-end anastomosis. The  end-hole was closed with a TX stapler. Staple lines were oversewn with  silk sutures. The anastomosis was laid back in the abdomen. FATOUMATA drains  were placed in both lateral peritoneal gutters. Dual-lumen On-Q  PainBuster system was placed on both sides of the incision. The midline  fascia was closed with #1 Prolene starting superiorly and inferiorly and  tying in the middle. This included the extra incision that we made to  facilitate repair of the incarcerated ventral incisional hernia. I also  had to extend the incision down and include excision of the patient's  umbilicus as the skin was attenuated from the incarcerated contents. This completed an omphalectomy. Once the fascia was closed, I irrigated  the subcutaneous tissues and reapproximated it with Vicryl suture. Skin  clips were used to reapproximate the skin. Dressings were placed. DISPOSITION:  The patient tolerated these extensive procedures without  acute complication. She did require some blood pressure support  throughout the case. The patient was transferred to the Intensive Care  Unit in critical, but stable condition.         Meghan Rivera MD    D: 09/09/2021 10:53:21       T: 09/09/2021 10:57:36     [email or web address]  Job#: [id number]     Doc#: [id number]    CC:

## 2021-09-09 NOTE — PROGRESS NOTES
Admit: 2021    Name:  Tor Carrera  Room:  0224/0224-01  MRN:    [id number]        Daily Progress Note for 2021     Acute appendicitis with abscess s/p laparotomy with right colectomy, abscess drainage     Interval History:       Pt seen up in chair today .  Off oxygen, worked with PT and feels hungry  Did not pass flatus , no BM yet  NG at 1100 ml yesterday   Pain controlled      Scheduled Meds:   pantoprazole  40 mg IntraVENous Daily    fluconazole  200 mg IntraVENous Q24H    topiramate  100 mg Oral Nightly    sodium chloride flush  5-40 mL IntraVENous 2 times per day    enoxaparin  40 mg SubCUTAneous Daily    piperacillin-tazobactam  3,375 mg IntraVENous Q8H       Continuous Infusions:   dextrose 5% and 0.45% NaCl with KCl 20 mEq 75 mL/hr at 21 0921    sodium chloride Stopped (21 1235)       PRN Meds:  sodium chloride, morphine **OR** morphine, prochlorperazine, sodium chloride flush, sodium chloride, acetaminophen **OR** acetaminophen, potassium chloride **OR** potassium alternative oral replacement **OR** potassium chloride          Objective:     Temp  Av.3 °F (36.8 °C)  Min: 96.4 °F (35.8 °C)  Max: 99.1 °F (37.3 °C)  Pulse  Av.5  Min: 81  Max: 99  BP  Min: 123/76  Max: 138/87  SpO2  Av %  Min: 90 %  Max: 96 %  Patient Vitals for the past 4 hrs:   BP Temp Temp src Pulse Resp SpO2   21 0715 126/87 98.8 °F (37.1 °C) Oral 81 16 96 %   21 0448 130/80 98.4 °F (36.9 °C) Oral 83 16 94 %         Intake/Output Summary (Last 24 hours) at 2021 0735  Last data filed at 2021 0448  Gross per 24 hour   Intake 740.43 ml   Output 1935 ml   Net -1194.57 ml       Physical Exam:  General: middle aged morbidly obese female  Up in chair   Appears to be not in any distress  Mucous Membranes:  Pink , anicteric  NG in place   Right neck TLC  Neck: No JVD, no carotid bruit, no thyromegaly  Chest:  Clear to auscultation bilaterally, no added sounds  Cardiovascular:  RRR S1S2 heard, no murmurs or gallops  Abdomen:  Soft obese, surgical dressing dry , drains +   2+ lower ext brayan   Distal pulses well felt  Neurological : non focal today     Lab Data:  CBC:   Recent Labs     09/07/21  0445 09/08/21  1020 09/09/21  0625   WBC 18.7* 16.2* 14.0*   RBC 3.34* 3.53* 3.32*   HGB 10.8* 11.0* 10.6*   HCT 32.4* 34.1* 32.1*   MCV 96.8 96.5 96.8   RDW 17.2* 17.3* 17.3*    242 261     BMP:   Recent Labs     09/07/21  0445 09/08/21  1020    141   K 3.8 3.8    109   CO2 21 20*   PHOS  --  3.4   BUN 17 12   CREATININE 0.7 <0.5*     LIVER PROFILE:   No results for input(s): AST, ALT, ALB, BILIDIR, BILITOT, ALKPHOS in the last 72 hours. XR ABDOMEN FOR NG/OG/NE TUBE PLACEMENT   Final Result   1. Nasogastric tube position is acceptable. 2.  Gas distended bowel loops in the left lower abdomen could relate to   obstruction or postsurgical ileus. XR CHEST PORTABLE   Final Result   1. Endotracheal tube, nasogastric tube, and right jugular venous line   placement. No pneumothorax. 2.  Congestive changes and left basilar atelectasis/pneumonia. CT ABDOMEN PELVIS WO CONTRAST Additional Contrast? None   Final Result   Increasing pneumoperitoneum and increasing free fluid within the abdomen and   pelvis as compared to prior examination dated 08/30/2021 in this patient with   history of known perforated appendicitis. The appendix is less well   visualized on the current exam without contrast and evaluation for abscess is   also limited without contrast.      Fluid within the lumen of the esophagus; correlate for gastroesophageal   reflux. Dilated small bowel within the upper abdomen, suggestive of ileus. Radiographic follow-up may be obtained as warranted. XR ABDOMEN (KUB) (SINGLE AP VIEW)   Final Result   Slightly decreased severity dilated small bowel loops over the past 24 hours. Nasogastric tube in good position.          XR ABDOMEN (2 VIEWS) Final Result   1. Recommend advancement of enteric tube 10 cm.   2. Nearly identical pattern small bowel with air-fluid levels that persist.   Underlying partial bowel obstruction or ileus may be considered clinically. XR ABDOMEN (2 VIEWS)   Final Result   Findings may be related to ileus or obstruction. CT ABDOMEN PELVIS WO CONTRAST Additional Contrast? Radiologist Recommendation (iodine allergy)   Final Result   1. Acute appendicitis with focal perforation. Tiny amount of free air with   no focal fluid to suggest abscess. Severe inflammatory changes in the right   lower quadrant. 2. Probable cholelithiasis. No evidence of acute cholecystitis. covid neg    Assessment & Plan:       Acute Appendicitis with Focal Perforation  - CT showed appendicitis with focal perforation, tiny amount of free air with no focal fluid, severe inflammatory changed in RLQ  - initially mx with IVF, pain control and IV abx but worsened with fevers, chills and tachycardia  - s.p emergent laparotomy with  right colectomy with abscess drainage POD 4    - continue  abx: Zosyn D#8, diflucan D4  -  PRN - Morphine and Toradol for pain control  - Gen Sx  Managing  - for ice chips today   - changed IVF to dextrose given borderline low sugars    Metabolic acidosis. With mild anion gap. Resolved with bicarbonate fluids        Hypokalemia  Hypomagnesemia  repleted as needed     Prolonged QTc  - 565  - repleted electrolytes, avoid QT prolonging agents  - repeat EKG improving qtc. Avoid zofran  - cut down cymbalta at dc     Leukocytosis  -Due to above. wbc down from 24 to 14    TCP  - likely with acute infection   Resolved      Hx of DVT  - in 2015  - holding ASA, on prophylactic lovenox here     GERD  - cont Protonix     Chronic Pain Syndrome  - cont Cymbalta and Neurontin     FLORIAN  - Continue home CPAP     Morbid Obesity  S/p Laparoscopic Sleeve Gastrectomy  - Body mass index is 46.94 kg/m².   - Complicating assessment and treatment.  Placing patient at risk for multiple co-morbidities as well as early death and contributing to the patient's presentation.   - Counseled on weight loss.      DVT Prophylaxis: Lovenox  Diet: Ice chips  Code Status: Full Code    Ongoing PT    Doug Meyer MD

## 2021-09-09 NOTE — PROGRESS NOTES
pt. is requesting Toradol states that way she does not  use as much  morphine perfect serve sent to [facility] per pt request.  Spoke with Tricia DAWSON no new orders given .

## 2021-09-09 NOTE — PROGRESS NOTES
General Surgery - Tricia Buckley, APRN - CNP, CNP  Daily Progress Note    Pt Name: Fabricio Cervantes  Medical Record Number: [id number]  Date of Birth 1965   Today's Date: 9/9/2021    ASSESSMENT  1. POD #4 S/P Right colectomy, incarcerated ventral, incisional hernia repair, omphalectomy, drainage of intraabdominal abscess. 2. ABD: morbidly obese, staples: cloudy drainage from lower incision: 3 staples removed, 2.2cm deep tunnel, slight odor, packed with Aquacel AG, + incisional pain, pain ball removed, FATOUMATA x 2 in place, NGT, + flatus x 2, no BM, no N/V  3. Leuks improved 14 no left shift,   4. VSS: afebrile, BP normal  5. F/C:   6. NGT 1.1L: pt is taking sips of clears. 7. FATOUMATA x 2: 20/15 ml: both serous  8. Buttocks: heart barrier in place, right gluteus with dime size ecchymotic area 2/2 DTI. Explained to pt she is AAO x3: she can offload, participate in PT, shift hips from side to side, ask for assistance to be moved. She verbalized understanding. RN to get waffle pad for seat  9. Pt is tearful today stating she \"just wants to have a bowel movement\"      PLAN  1. NPO NGT to CLWS, may have ice chips and sips of clears   2. Pain control: decrease amount   3. IS q 1 hour while awake   4. PT/OT  5. DVT proph: Lovenox  6. GI proph: Protonix   7. IVF at 75 ml/hr  8. Zosyn and Diflucan  9. Continue infante for strict I&Os for 1 more day. 10. Pt looks good POD #4: Awaiting better return of bowel function. Brian Tolentino is virtually unachanged from yesterday. Pain is well controlled. She has no nausea and no vomiting. She has passed flatus and has not had a bowel movement. She is tolerating sips of clears with her NGT in place. Current activity is up with assistance    OBJECTIVE  VITALS:  height is 5' 3\" (1.6 m) and weight is 310 lb 10.1 oz (140.9 kg) (abnormal). Her oral temperature is 98.8 °F (37.1 °C). Her blood pressure is 126/87 and her pulse is 81.  Her respiration is 16 and oxygen saturation is 96%. VITALS:  /87   Pulse 81   Temp 98.8 °F (37.1 °C) (Oral)   Resp 16   Ht 5' 3\" (1.6 m)   Wt (!) 310 lb 10.1 oz (140.9 kg)   LMP 12/24/2010   SpO2 96%   BMI 55.03 kg/m²   INTAKE/OUTPUT:      Intake/Output Summary (Last 24 hours) at 9/9/2021 1132  Last data filed at 9/9/2021 0448  Gross per 24 hour   Intake 360 ml   Output 1720 ml   Net -1360 ml     URINARY CATHETER OUTPUT (Cash):     GENERAL: alert, cooperative, crying intermittently, no distress  I/O last 3 completed shifts: In: 740.4 [P.O.:360; I.V.:0.1; IV Piggyback:380.4]  Out: 1935 [Urine:800; Emesis/NG output:1100; Drains:35]  No intake/output data recorded. LABS  Recent Labs     09/08/21  1020 09/08/21  1020 09/09/21  0625   WBC 16.2*   < > 14.0*   HGB 11.0*   < > 10.6*   HCT 34.1*   < > 32.1*      < > 261     --   --    K 3.8  --   --      --   --    CO2 20*  --   --    BUN 12  --   --    CREATININE <0.5*  --   --    MG 1.80  --   --    PHOS 3.4  --   --    CALCIUM 7.6*  --   --     < > = values in this interval not displayed.      CBC with Differential:    Lab Results   Component Value Date    WBC 14.0 09/09/2021    RBC 3.32 09/09/2021    HGB 10.6 09/09/2021    HCT 32.1 09/09/2021     09/09/2021    MCV 96.8 09/09/2021    MCH 32.0 09/09/2021    MCHC 33.0 09/09/2021    RDW 17.3 09/09/2021    BANDSPCT 28 09/06/2021    METASPCT 1 09/05/2021    LYMPHOPCT 9.5 09/09/2021    PROMYELOPCT 1 09/05/2021    MONOPCT 5.9 09/09/2021    MYELOPCT 1 09/05/2021    BASOPCT 0.9 09/09/2021    MONOSABS 0.8 09/09/2021    LYMPHSABS 1.3 09/09/2021    EOSABS 0.1 09/09/2021    BASOSABS 0.1 09/09/2021     CMP:    Lab Results   Component Value Date     09/08/2021    K 3.8 09/08/2021    K 3.5 08/31/2021     09/08/2021    CO2 20 09/08/2021    BUN 12 09/08/2021    CREATININE <0.5 09/08/2021    GFRAA >60 09/08/2021    AGRATIO 1.2 08/30/2021    LABGLOM >60 09/08/2021    GLUCOSE 85 09/08/2021    PROT 6.9 08/30/2021 LABALBU 2.6 09/03/2021    CALCIUM 7.6 09/08/2021    BILITOT 0.7 08/30/2021    ALKPHOS 96 08/30/2021    AST 15 08/30/2021    ALT 17 08/30/2021         Tricia Martin, APRN - CNP  Electronically signed 9/9/2021 at 11:32 AM

## 2021-09-09 NOTE — FLOWSHEET NOTE
Pt has been recovering from surgery, slow process, 10 days LOS. Pt has 21 yr partnership with [facility]. 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 Pt expressed deep emotion regarding her earlier time in ICU and what she described as \"horrible treatment. \" She is much happier on this floor. Pt said, with tears: \"Sometimes I just feel like giving up. There have been so many things happening on top of each other. \"  listened, validated feelings, and encouraged expression. During visit, pt enjoyed a frozen treat (only her 2nd food since surgery), and spoke about her recent successful efforts toward weight loss (including surgery). Pt welcomed spiritual support and \"the power of prayer,\" which  offered. 09/09/21 1148   Encounter Summary   Services provided to: Patient   Referral/Consult From: [address] [place] [address] Significant other;Family members   Continue Visiting   (9/9 - Spiritual support, conversation. prayer)   Complexity of Encounter Moderate   Length of Encounter 30 minutes   Spiritual Assessment Completed Yes   Spiritual/Congregation   Type Spiritual support   Assessment Approachable;Tearful;Coping   Intervention Active listening;Explored feelings, thoughts, concerns;Nurtured hope;Prayer   Outcome Expressed gratitude;Engaged in conversation; Shared life review; Hopeful;Receptive;Venting emotion   .

## 2021-09-10 PROBLEM — E44.0 MODERATE PROTEIN-CALORIE MALNUTRITION (HCC): Status: ACTIVE | Noted: 2021-09-10

## 2021-09-10 LAB
A/G RATIO: 0.5 (ref 1.1–2.2)
ALBUMIN SERPL-MCNC: 1.8 G/DL (ref 3.4–5)
ALP BLD-CCNC: 100 U/L (ref 40–129)
ALT SERPL-CCNC: 8 U/L (ref 10–40)
ANION GAP SERPL CALCULATED.3IONS-SCNC: 9 MMOL/L (ref 3–16)
AST SERPL-CCNC: 10 U/L (ref 15–37)
BASOPHILS ABSOLUTE: 0 K/UL (ref 0–0.2)
BASOPHILS RELATIVE PERCENT: 0 %
BILIRUB SERPL-MCNC: 0.7 MG/DL (ref 0–1)
BUN BLDV-MCNC: 5 MG/DL (ref 7–20)
CALCIUM SERPL-MCNC: 7.7 MG/DL (ref 8.3–10.6)
CHLORIDE BLD-SCNC: 107 MMOL/L (ref 99–110)
CO2: 23 MMOL/L (ref 21–32)
CREAT SERPL-MCNC: <0.5 MG/DL (ref 0.6–1.1)
EOSINOPHILS ABSOLUTE: 0 K/UL (ref 0–0.6)
EOSINOPHILS RELATIVE PERCENT: 0 %
GFR AFRICAN AMERICAN: >60
GFR NON-AFRICAN AMERICAN: >60
GLOBULIN: 3.6 G/DL
GLUCOSE BLD-MCNC: 104 MG/DL (ref 70–99)
GLUCOSE BLD-MCNC: 104 MG/DL (ref 70–99)
GLUCOSE BLD-MCNC: 50 MG/DL (ref 70–99)
GLUCOSE BLD-MCNC: 82 MG/DL (ref 70–99)
GLUCOSE BLD-MCNC: 82 MG/DL (ref 70–99)
GLUCOSE BLD-MCNC: 89 MG/DL (ref 70–99)
GLUCOSE BLD-MCNC: 89 MG/DL (ref 70–99)
HCT VFR BLD CALC: 32.1 % (ref 36–48)
HEMOGLOBIN: 10.7 G/DL (ref 12–16)
LYMPHOCYTES ABSOLUTE: 1.2 K/UL (ref 1–5.1)
LYMPHOCYTES RELATIVE PERCENT: 8 %
MCH RBC QN AUTO: 31.9 PG (ref 26–34)
MCHC RBC AUTO-ENTMCNC: 33.3 G/DL (ref 31–36)
MCV RBC AUTO: 95.8 FL (ref 80–100)
MONOCYTES ABSOLUTE: 1.5 K/UL (ref 0–1.3)
MONOCYTES RELATIVE PERCENT: 10 %
NEUTROPHILS ABSOLUTE: 12.5 K/UL (ref 1.7–7.7)
NEUTROPHILS RELATIVE PERCENT: 82 %
PDW BLD-RTO: 16.9 % (ref 12.4–15.4)
PERFORMED ON: ABNORMAL
PERFORMED ON: ABNORMAL
PERFORMED ON: NORMAL
PLATELET # BLD: 328 K/UL (ref 135–450)
PMV BLD AUTO: 7.6 FL (ref 5–10.5)
POTASSIUM SERPL-SCNC: 3.4 MMOL/L (ref 3.5–5.1)
RBC # BLD: 3.35 M/UL (ref 4–5.2)
RBC # BLD: NORMAL 10*6/UL
SODIUM BLD-SCNC: 139 MMOL/L (ref 136–145)
TOTAL PROTEIN: 5.4 G/DL (ref 6.4–8.2)
WBC # BLD: 15.3 K/UL (ref 4–11)

## 2021-09-10 PROCEDURE — 99024 POSTOP FOLLOW-UP VISIT: CPT | Performed by: NURSE PRACTITIONER

## 2021-09-10 PROCEDURE — 6360000002 HC RX W HCPCS: Performed by: INTERNAL MEDICINE

## 2021-09-10 PROCEDURE — 99232 SBSQ HOSP IP/OBS MODERATE 35: CPT | Performed by: INTERNAL MEDICINE

## 2021-09-10 PROCEDURE — 85025 COMPLETE CBC W/AUTO DIFF WBC: CPT

## 2021-09-10 PROCEDURE — 97535 SELF CARE MNGMENT TRAINING: CPT

## 2021-09-10 PROCEDURE — 6370000000 HC RX 637 (ALT 250 FOR IP): Performed by: NURSE PRACTITIONER

## 2021-09-10 PROCEDURE — 2580000003 HC RX 258: Performed by: INTERNAL MEDICINE

## 2021-09-10 PROCEDURE — 1200000000 HC SEMI PRIVATE

## 2021-09-10 PROCEDURE — C9113 INJ PANTOPRAZOLE SODIUM, VIA: HCPCS | Performed by: INTERNAL MEDICINE

## 2021-09-10 PROCEDURE — 80053 COMPREHEN METABOLIC PANEL: CPT

## 2021-09-10 PROCEDURE — 6370000000 HC RX 637 (ALT 250 FOR IP): Performed by: INTERNAL MEDICINE

## 2021-09-10 PROCEDURE — 97530 THERAPEUTIC ACTIVITIES: CPT

## 2021-09-10 RX ORDER — MORPHINE SULFATE 2 MG/ML
1 INJECTION, SOLUTION INTRAMUSCULAR; INTRAVENOUS
Status: DISCONTINUED | OUTPATIENT
Start: 2021-09-10 | End: 2021-09-13 | Stop reason: HOSPADM

## 2021-09-10 RX ORDER — OXYCODONE HYDROCHLORIDE 5 MG/1
5 TABLET ORAL EVERY 4 HOURS PRN
Status: DISCONTINUED | OUTPATIENT
Start: 2021-09-10 | End: 2021-09-13 | Stop reason: HOSPADM

## 2021-09-10 RX ORDER — OXYCODONE HYDROCHLORIDE 5 MG/1
10 TABLET ORAL EVERY 4 HOURS PRN
Status: DISCONTINUED | OUTPATIENT
Start: 2021-09-10 | End: 2021-09-13 | Stop reason: HOSPADM

## 2021-09-10 RX ADMIN — ENOXAPARIN SODIUM 40 MG: 40 INJECTION SUBCUTANEOUS at 08:52

## 2021-09-10 RX ADMIN — PIPERACILLIN SODIUM AND TAZOBACTAM SODIUM 3375 MG: 3; .375 INJECTION, POWDER, LYOPHILIZED, FOR SOLUTION INTRAVENOUS at 21:20

## 2021-09-10 RX ADMIN — TOPIRAMATE 100 MG: 100 TABLET, FILM COATED ORAL at 21:23

## 2021-09-10 RX ADMIN — PANTOPRAZOLE SODIUM 40 MG: 40 INJECTION, POWDER, FOR SOLUTION INTRAVENOUS at 08:52

## 2021-09-10 RX ADMIN — OXYCODONE HYDROCHLORIDE 10 MG: 5 TABLET ORAL at 21:23

## 2021-09-10 RX ADMIN — PIPERACILLIN SODIUM AND TAZOBACTAM SODIUM 3375 MG: 3; .375 INJECTION, POWDER, LYOPHILIZED, FOR SOLUTION INTRAVENOUS at 14:55

## 2021-09-10 RX ADMIN — PIPERACILLIN SODIUM AND TAZOBACTAM SODIUM 3375 MG: 3; .375 INJECTION, POWDER, LYOPHILIZED, FOR SOLUTION INTRAVENOUS at 04:25

## 2021-09-10 RX ADMIN — SODIUM CHLORIDE, PRESERVATIVE FREE 10 ML: 5 INJECTION INTRAVENOUS at 08:53

## 2021-09-10 RX ADMIN — FLUCONAZOLE, SODIUM CHLORIDE 200 MG: 2 INJECTION INTRAVENOUS at 05:09

## 2021-09-10 ASSESSMENT — PAIN SCALES - GENERAL
PAINLEVEL_OUTOF10: 7
PAINLEVEL_OUTOF10: 0

## 2021-09-10 ASSESSMENT — PAIN DESCRIPTION - LOCATION: LOCATION: ABDOMEN

## 2021-09-10 ASSESSMENT — PAIN DESCRIPTION - PAIN TYPE: TYPE: ACUTE PAIN

## 2021-09-10 NOTE — PLAN OF CARE
Problem: Pain:  Description: Pain management should include both nonpharmacologic and pharmacologic interventions.   Goal: Pain level will decrease  9/10/2021 0048 by Melissa Herndon RN  Outcome: Ongoing  9/9/2021 1641 by Melisa Gallagher RN  Outcome: Ongoing  Goal: Control of acute pain  Outcome: Ongoing  Goal: Control of chronic pain  Outcome: Ongoing  Goal: Patient's pain/discomfort is manageable  Outcome: Ongoing     Problem: Skin Integrity:  Goal: Will show no infection signs and symptoms  9/10/2021 0048 by Melissa Herndon RN  Outcome: Ongoing  9/9/2021 1641 by Melisa Gallagher RN  Outcome: Ongoing  Goal: Absence of new skin breakdown  Outcome: Ongoing     Problem: Falls - Risk of:  Goal: Will remain free from falls  9/10/2021 0048 by Melissa Herndon RN  Outcome: Ongoing  9/9/2021 1641 by Melisa Gallagher RN  Outcome: Ongoing  Goal: Absence of physical injury  Outcome: Ongoing     Problem: Nausea/Vomiting:  Goal: Absence of nausea/vomiting  Outcome: Ongoing  Goal: Able to drink  Outcome: Ongoing  Goal: Able to eat  Outcome: Ongoing  Goal: Ability to achieve adequate nutritional intake will improve  Outcome: Ongoing     Problem: Infection:  Goal: Will remain free from infection  Outcome: Ongoing     Problem: Safety:  Goal: Free from accidental physical injury  Outcome: Ongoing  Goal: Free from intentional harm  Outcome: Ongoing     Problem: Daily Care:  Goal: Daily care needs are met  Outcome: Ongoing     Problem: Skin Integrity:  Goal: Skin integrity will stabilize  Outcome: Ongoing     Problem: Discharge Planning:  Goal: Patients continuum of care needs are met  Outcome: Ongoing     Problem: Non-Violent Restraints  Goal: Removal from restraints as soon as assessed to be safe  Outcome: Ongoing  Goal: No harm/injury to patient while restraints in use  Outcome: Ongoing  Goal: Patient's dignity will be maintained  Outcome: Ongoing     Problem: Nutrition  Goal: Optimal nutrition therapy  Outcome: Ongoing

## 2021-09-10 NOTE — PROGRESS NOTES
Bedside Mobility Assessment Tool (BMAT):     Assessment Level 1- Sit and Shake    1. From a semi-reclined position, ask patient to sit up and rotate to a seated position at the side of the bed. Can use the bedrail. 2. Ask patient to reach out and grab your hand and shake making sure patient reaches across his/her midline. Pass- Patient is able to come to a seated position, maintain core strength. Maintains seated balance while reaching across midline. Move on to Assessment Level 2. Assessment Level 2- Stretch and Point   1. With patient in seated position at the side of the bed, have patient place both feet on the floor (or stool) with knees no higher than hips. 2. Ask patient to stretch one leg and straighten the knee, then bend the ankle/flex and point the toes. If appropriate, repeat with the other leg. Fail- Patient is unable to complete task. Patient is MOBILITY LEVEL 2. Assessment Level 3- Stand   1. Ask patient to elevate off the bed or chair (seated to standing) using an assistive device (cane, bedrail). 2. Patient should be able to raise buttocks off be and hold for a count of five. May repeat once. Fail- Patient unable to demonstrate standing stability. Patient is MOBILITY LEVEL 3. Assessment Level 4- Walk   1. Ask patient to march in place at bedside. 2. Then ask patient to advance step and return each foot. Some medical conditions may render a patient from stepping backwards, use your best clinical judgement. Fail- Patient not able to complete tasks OR requires use of assistive device. Patient is MOBILITY LEVEL 3.        Mobility Level- 3

## 2021-09-10 NOTE — PROGRESS NOTES
Occupational Therapy Daily Treatment Note    Unit: 2 [place]  Date:  9/10/2021  Patient Name:    Romana Gale Stem  Admitting diagnosis:  Appendicitis [K37]  Acute appendicitis with perforation and generalized peritonitis, unspecified whether abscess present, unspecified whether gangrene present [K35.20]  Admit Date:  8/30/2021  Precautions/Restrictions:    Appendicitis [K37]  Acute appendicitis with perforation and generalized peritonitis, unspecified whether abscess present, unspecified whether gangrene present [K35.20]  Abdominal binder     Discharge Recommendations: SNF  DME needs for discharge: defer to facility       Therapy recommendations for staff:   Assist of 2 for bed mobility and transfers with Rw and gait belt   AM-PAC Score: AM-PAC Inpatient Daily Activity Raw Score: 12  Home Health S4 Level: NA       Treatment Time:  1769-9041  Treatment number:  4  Timed code treatment minutes: 25 minutes  Total treatment minutes:  25  minutes    History of Present Illness:   ED note, 8/30/2021, [place]:   \" 64-year-old female history of gastric sleeve, who presents with right lower quadrant pain nausea and fevers.  Symptoms started yesterday.  Patient reports she has a moderate to severe right lower quadrant pain that is exacerbated with some movements and was painful going over bumps on the right ovary.  Patient reports \"I knew something was wrong I spike a fever.  I never get fevers\".  Nausea no vomiting passing stool passing flatus.  No chest pain or shortness of breath or back pain no pelvic symptoms.  No urinary symptoms.  \"      PMHx: Acute renal failure (ARF) ([facility] [address] (May 4, 2015), Arthritis, Asthma, DDD (degenerative disc disease), cervical, Deep vein thrombosis (DVT) ([facility] [address] (07/2015), Degenerative disc disease, Family history of factor V deficiency, GERD (gastroesophageal reflux disease), Hypertension, Scoliosis, Sleep apnea, and Spinal stenosis.     Subjective:  Pt in the bed and willing to work with OT to get to BSC and chair    Pain   No    Location:   No request made. Bed Mobility:   Supine to Sit:  Min A  Sit to Supine:  Not Tested  Rolling:           Not Tested  Scooting:        min to scoot back in the chair      Transfer Training:    Sit to stand:   Min A x2  with RW  Stand to sit:  CGA   Bed to Chair:  Min A  with RW   Bed to BSC:   Min A  and 2 persons with RW  Standard toilet:   Not Tested    Activity Tolerance   Pt completed therapy session with pain, decreased endurance, fatigue     ADL Training:   Upper body dressing: NT  Upper body bathing:  NT   Lower body dressing:  NT  Lower body bathing:  NT  Toileting: Max A   Grooming/Hygiene:  Not Tested    Therapeutic Exercise:   NA    Patient Education:   Role of OT    Positioning Needs:   Pt reclined in chair, alarm set, positioned in proper neutral alignment and pressure relief provided. Family Present:  No    Assessment: Pt agreeable to therapy to get up to [facility] and chair and increased activity tolerance today. Pt able to perform functional transfers with assist x1-2 with RW. Pt needs further OT to increase functional IND. GOALS    To be met in 3 Visits:  1). Bed to toilet: minimal assistance (25%)     To be met in 5 Visits:  2). Upper Body Bathing:         minimal assistance (25%)  3). Lower Body Bathing:         maximal assistance (75%)  4). Upper Body Dressing:       minimal assistance (25%)  5). Lower Body Dressing:       maximal assistance (75%)  6).  Pt to demonstrate UE exs x 15 reps with minimal cues    Plan: cont with POC    Talita Lora, OTR/L [id number]        If patient discharges from this facility prior to next visit, this note will serve as the Discharge Summary

## 2021-09-10 NOTE — PROGRESS NOTES
Patient tolerating clear liquid diet well. Tolerating NGT clamping. Up to [facility], voided  clear freddie urine without difficulty unable to measure mixed with BM. Will continue to monitor.

## 2021-09-10 NOTE — PROGRESS NOTES
Inpatient Physical Therapy Daily Treatment Note    Unit: [facility]  Date:  9/10/2021  Patient Name:    Lizzette Nesbitt  Admitting diagnosis:  Appendicitis [K37]  Acute appendicitis with perforation and generalized peritonitis, unspecified whether abscess present, unspecified whether gangrene present [K35.20]  Admit Date:  8/30/2021  Precautions/Restrictions/WB Status/ Lines/ Wounds/ Oxygen: Fall risk, Lines -IV, Cash catheter, NG tube and Drains (LLQ drain x 2), Telemetry, Continuous pulse oximetry and midline abdominal incision. Abdominal binder in room but not in use. Treatment Time: 10:54-11:22  Treatment Number:  4  Timed Code Treatment Minutes: 28 minutes  Total Treatment Minutes: 28  minutes     Patient Goals for Therapy: \" to go to rehab \"          Discharge Recommendations: SNF  DME needs for discharge: defer to facility       Therapy recommendation for EMS Transport: can transport by wheelchair. Therapy recommendations for staff:   Assist of 1 for bed mobility and EOB sitting. Assist of 2 for transfer bed<>chair and BSC with bariatric rolling walker. History Of Present Illness:    per HÉCTOR Escamilla (8/30/2021)  \"The patient is a 60 y.o. female with a PMH of DVT, asthma, GERD, FLORIAN, morbid obesity, history of laparoscopic sleeve gastrectomy who presented to [facility] ED with complaint of RLQ abdominal pain that started yesterday. Initially thought symptoms were related to gas pains. She had a BM yesterday which was soft, brown stool. Symptoms did not get any better. Woke up early this morning with sharp right lower quadrant abdominal pain and a fever. Denies any nausea or vomiting. Reports shaking chills. She decided to come to the ER. Work-up in the ED showed: Patient's temperature was 100.4. Blood pressures were stable. Potassium and magnesium were low. She had a leukocytosis of 14.3. Urine was negative. Covid was negative.   CT of the abdomen showed acute appendicitis with focal perforation and inflammatory changes in the right lower quadrant. Patient was admitted to the medical surgical unit and general surgery was consulted. Interval history by Caden Little MD (9/7/2021):  Patient underwent an emergent appendectomy for perforated appendicitis. Right colectomy was done. Op findings included an incarcerated ventral incisional hernia. In addition to the right colectomy the incarcerated ventral incisional hernia was repaired on colectomy was performed and intra-abdominal abscesses drained. She is on the ventilator postop. She has been extubated. Her present she is awake and alert. Pain is well controlled. She is started on ice chips today.     Home Health S4 Level Recommendation:  NA  AM-PAC Mobility Score    AM-PAC Inpatient Mobility Raw Score : 10    Pain:  Yes,   Location: flank  Pain level: mild/10  No request made. Cognition    A&O x4   Able to follow 2 step commands    Subjective  Patient lying supine in bed with PCA in room. Pt agreeable to this PT tx. Requesting assistance to [facility]. She is happy to have had a large BM this morning. Eager to get Cash out later today. Balance  Sitting:  Fair +; Supervision at EOB and BSC  Comments: Improved posture today. Standing: Fair +; CGA for static stance with BUE support on walker  Comments: Improved weight shifting today to facilitate steps. Bed mobility:    Supine to sit:                           Min A  Sit to supine:                           Not Tested  Rolling:                                     Not Tested. Scooting in sitting:                  Min A to scoot back in recliner  Scooting to head of bed:         Not Tested           Transfers:    Sit to stand:                Min A with RW from EOB and BSC  Stand to sit:                 CGA, reduced but improving eccentric control.   Bed to [facility]:               Min A with RW    Gait gait completed as indicated below  Distance:      2 ft with 90* turn (bed>BSC) and 5 ft with 180* turn (BSC>chair)  Deviations (firm surface/linoleum):  decreased scottie and decreased step length bilaterally  Assistive Device Used:    rolling walker (RW)  Level of Assist:    Min A   Comment: Pt shows improved step initiation today, still with minimal foot clearance. Stair Training deferred, pt unsafe/ not appropriate to complete stairs at this time     Activity Tolerance:   Pt completed therapy session with dizziness upon standing from EOB, resolved with a few minutes rest at [facility]. KV=834/95, HR=55. Positioning Needs:   Pt reclined in chair, alarm set, positioned in proper neutral alignment and pressure relief provided. Call light provided and all needs within reach    Exercises Initiated  Adi deferred secondary to treatment focus on functional mobility  NA       Other  None. Patient/Family Education   Pt educated on role of inpatient PT, POC, importance of continued activity, DC recommendations, safety awareness, transfer techniques, pursed lip breathing, energy conservation, pacing activity and calling for assist with mobility. Assessment  Pt seen for Physical Therapy follow-up treatment. Pt is progressing slowly but steadily. She completed sup>sit with Min A and bed>BSC>chair transfers using the walker at [address] [place] [address]. Today she tolerated increased ambulation distance during her transfers with improved step initiation and sequencing. Further activity deferred for today given pt's bowels have recently returned and pt expects to be up to [facility] often today. Recommending SNF upon discharge as patient functioning well below baseline, demonstrates good rehab potential and unable to return home due to limited or no family support, inability to negotiate stairs to enter home/bedroom/bathroom, burden of care beyond caregiver ability and home environment not conducive to patient recovery. Goals : To be met in 3 visits:  1).  Independent with LE Ex x 10 reps - 9/9 goal met  2.) Bed to chair: SBA - 9/9 DNT, pt

## 2021-09-10 NOTE — PROGRESS NOTES
General Surgery - Tricia Ulrich, APRN - CNP, CNP  Daily Progress Note    Pt Name: Jazmyne Corona  Medical Record Number: [id number]  Date of Birth 1965   Today's Date: 9/10/2021    ASSESSMENT  1. POD #5 S/P Right colectomy, incarcerated ventral, incisional hernia repair, omphalectomy, drainage of intraabdominal abscess. 2. ABD: morbidly obese, staples remaining intact, lower incision wound packed with Aqu[employer] and covered with gauze, + incisional pain, FATOUMATA x 2 in place, NGT, + flatus, + BM, no N/V  3. Leuks 14->15.3  4. Midline wound gram + cocci  5. VSS: afebrile, BP normal  6. F/C:   7. NGT 1.1L: pt is taking sips of clears. 8. FATOUMATA x 2: 20/15 ml: both serous  9. Pt states she \"feels better after a bowel movement and is excited to drink. \"      PLAN  1. Clamp NGT and trial clears. TID clear Ensure  2. Pain control: decrease amount of IV, start PO pain meds  3. D/C infante  4. IS q 1 hour while awake   5. PT/OT: recommending SNF  6. DVT proph: Lovenox  7. GI proph: Protonix   8. IVF decrease to 50 ml/hr  9. Zosyn and Diflucan. 10. BID midline dressing change  11. Pt looks good POD #5: Pt much improved. Discussed with medicine, hopefully can go to SNF in 1-2 days if tolerating liquids and urinating without infante. Pablo Smith is improved from yesterday. Pain is well controlled. She has no nausea and no vomiting. She has passed flatus and has had a bowel movement. She is tolerating sips of clears with her NGT in place. Current activity is up with assistance    OBJECTIVE  VITALS:  height is 5' 3\" (1.6 m) and weight is 310 lb 10.1 oz (140.9 kg) (abnormal). Her oral temperature is 98.1 °F (36.7 °C). Her blood pressure is 143/85 (abnormal) and her pulse is 68. Her respiration is 18 and oxygen saturation is 98%.    VITALS:  BP (!) 143/85   Pulse 68   Temp 98.1 °F (36.7 °C) (Oral)   Resp 18   Ht 5' 3\" (1.6 m)   Wt (!) 310 lb 10.1 oz (140.9 kg)   LMP 12/24/2010   SpO2 98%   BMI 55.03 kg/m²   INTAKE/OUTPUT:      Intake/Output Summary (Last 24 hours) at 9/10/2021 1247  Last data filed at 9/10/2021 0908  Gross per 24 hour   Intake 2008.47 ml   Output 4500 ml   Net -2491.53 ml     URINARY CATHETER OUTPUT (Cash):     GENERAL: alert, cooperative, crying intermittently, no distress  I/O last 3 completed shifts: In: 2008.5 [I.V.:2008.5]  Out: 5914 [Urine:2750; Emesis/NG output:1000; Drains:25]  I/O this shift:  In: -   Out: 725 [Urine:700; Drains:25]    LABS  Recent Labs     09/08/21  1020 09/09/21  0625 09/10/21  0530   WBC 16.2*   < > 15.3*   HGB 11.0*   < > 10.7*   HCT 34.1*   < > 32.1*      < > 328     --  139   K 3.8  --  3.4*     --  107   CO2 20*  --  23   BUN 12  --  5*   CREATININE <0.5*  --  <0.5*   MG 1.80  --   --    PHOS 3.4  --   --    CALCIUM 7.6*  --  7.7*   AST  --   --  10*   ALT  --   --  8*   BILITOT  --   --  0.7    < > = values in this interval not displayed.      CBC with Differential:    Lab Results   Component Value Date    WBC 15.3 09/10/2021    RBC 3.35 09/10/2021    HGB 10.7 09/10/2021    HCT 32.1 09/10/2021     09/10/2021    MCV 95.8 09/10/2021    MCH 31.9 09/10/2021    MCHC 33.3 09/10/2021    RDW 16.9 09/10/2021    BANDSPCT 28 09/06/2021    METASPCT 1 09/05/2021    LYMPHOPCT 8.0 09/10/2021    PROMYELOPCT 1 09/05/2021    MONOPCT 10.0 09/10/2021    MYELOPCT 1 09/05/2021    BASOPCT 0.0 09/10/2021    MONOSABS 1.5 09/10/2021    LYMPHSABS 1.2 09/10/2021    EOSABS 0.0 09/10/2021    BASOSABS 0.0 09/10/2021     CMP:    Lab Results   Component Value Date     09/10/2021    K 3.4 09/10/2021    K 3.5 08/31/2021     09/10/2021    CO2 23 09/10/2021    BUN 5 09/10/2021    CREATININE <0.5 09/10/2021    GFRAA >60 09/10/2021    AGRATIO 0.5 09/10/2021    LABGLOM >60 09/10/2021    GLUCOSE 104 09/10/2021    PROT 5.4 09/10/2021    LABALBU 1.8 09/10/2021    CALCIUM 7.7 09/10/2021    BILITOT 0.7 09/10/2021    ALKPHOS 100 09/10/2021    AST 10 09/10/2021    ALT

## 2021-09-10 NOTE — CONSULTS
GASTROINTESTINAL ENDOSCOPY N/A 2020    EGD BIOPSY performed by Oscar Schuler MD at [address]    Family History   Problem Relation Age of Onset    Other Sister     Stroke Sister     Arthritis Sister     Asthma Sister     Mental Illness Sister     Other Brother     High Blood Pressure Brother     Diabetes Brother     Arthritis Brother     Cancer Sister     Arthritis Sister     Arthritis Mother     High Blood Pressure Father     Arthritis Father        SOCIAL HISTORY    Social History     Tobacco Use    Smoking status: Former Smoker     Packs/day: 1.50     Years: 20.00     Pack years: 30.00     Types: Cigarettes     Quit date:      Years since quittin.7    Smokeless tobacco: Never Used   Vaping Use    Vaping Use: Never used   Substance Use Topics    Alcohol use: Not Currently     Comment: has had a few vodkas since 8 weeks post op    Drug use: No       ALLERGIES    Allergies   Allergen Reactions    Iodides Anaphylaxis     Rash, hives, swelling    Of note on patient's admission 2021 she stated that allergy to iodides was actually her mother and allergy for shellfish was based on her father's allergies and not something she has herself experienced.  Scopolamine Hives       MEDICATIONS    No current facility-administered medications on file prior to encounter.      Current Outpatient Medications on File Prior to Encounter   Medication Sig Dispense Refill    indomethacin (INDOCIN) 50 MG capsule TAKE ONE CAPSULE BY MOUTH THREE TIMES A DAY AS NEEDED FOR PAIN (Patient taking differently: 2 times daily (with meals) TAKE ONE CAPSULE BY MOUTH THREE TIMES A DAY AS NEEDED FOR PAIN) 90 capsule 2    oxybutynin (DITROPAN) 5 MG tablet TAKE ONE TABLET BY MOUTH TWICE A DAY 60 tablet 5    omeprazole (PRILOSEC) 20 MG delayed release capsule TAKE ONE CAPSULE BY MOUTH EVERY MORNING BEFORE BREAKFAST 30 capsule 5    Cholecalciferol 50 MCG ( UT) TABS Take 1 tablet by mouth daily Take 1 tablet by mouth daily. 90 tablet 2    furosemide (LASIX) 20 MG tablet TAKE TWO TABLETS BY MOUTH DAILY AS NEEDED 180 tablet 0    aspirin 81 MG chewable tablet Take 81 mg by mouth daily      gabapentin (NEURONTIN) 400 MG capsule Take 400 mg by mouth 2 times daily.  magnesium 30 MG tablet Take 30 mg by mouth daily      tiZANidine (ZANAFLEX) 4 MG tablet Take 4-8 mg by mouth nightly       DULoxetine (CYMBALTA) 60 MG capsule Take 120 mg by mouth daily       nystatin (MYCOSTATIN) 165620 UNIT/GM powder Apply 3 times daily.  1 Bottle 1       Objective    /83   Pulse 67   Temp 98.1 °F (36.7 °C) (Oral)   Resp 16   Ht 5' 3\" (1.6 m)   Wt (!) 310 lb 10.1 oz (140.9 kg)   LMP 12/24/2010   SpO2 94%   BMI 55.03 kg/m²     LABS:  WBC:    Lab Results   Component Value Date    WBC 15.3 09/10/2021     H/H:    Lab Results   Component Value Date    HGB 10.7 09/10/2021    HCT 32.1 09/10/2021     PTT:  No results found for: APTT, PTT[APTT}  PT/INR:    Lab Results   Component Value Date    PROTIME 11.8 12/08/2020    INR 1.02 12/08/2020     HgBA1c:    Lab Results   Component Value Date    LABA1C 5.1 12/08/2020       Assessment   George Risk Score: George Scale Score: 19    Patient Active Problem List   Diagnosis Code    Arthritis M19.90    Chronic GERD D86.3    Umbilical abnormality V01.5    History of DVT (deep vein thrombosis) Z86.718    Other chronic pain G89.29    Morbid obesity ([facility] [address] E66.01    Family history of factor V Leiden mutation Z83.2    Urinary incontinence, urge N39.41    Snoring R06.83    Incarcerated ventral hernia K43.6    Obstructive sleep apnea G47.33    S/P laparoscopic sleeve gastrectomy Z98.84    Appendicitis K37    Leukocytosis D72.829    Hypomagnesemia E83.42    Hypokalemia E87.6    Hypotension due to hypovolemia I95.89, E86.1    Right lower quadrant abdominal pain R10.31    Ileus (HCC) K56.7    Perforated appendicitis K35.32    Septic shock (HCC) A41.9, R65.21    Electrolyte disorder E87.8    Acute respiratory failure with hypoxia ([facility]) J96.01       Measurements:  Wound Coccyx (Active)   Wound Etiology Pressure Stage  1 09/10/21 0836   Dressing Status Clean;Dry 09/10/21 0836   Dressing/Treatment Foam 09/10/21 0836   Number of days:      Incision 04/12/21 Abdomen Medial;Upper (Active)   Number of days: 151       Incision 09/05/21 Abdomen Lower;Medial (Active)   Dressing Status Breakthrough drainage noted;New dressing applied 09/10/21 0836   Incision Cleansed Cleansed with saline 09/10/21 0836   Dressing/Treatment Dry dressing;Tape/Soft cloth adhesive tape 09/07/21 1200   Closure Staples 09/10/21 0836   Margins Approximated 09/10/21 0836   Drainage Amount Small 09/10/21 0836   Drainage Description Thin;Clear 09/10/21 0836   Odor None 09/07/21 2025   Number of days: 4   Sacrum, gluteal cleft:  Dry partial thickness skin loss on left buttock (stage 2 x2 , POA) and moist partial thickness in gluteal cleft related to friction and moisture. Response to treatment:  Well tolerated by patient. Pain Assessment:  Severity:  0 / 10  Quality of pain: N/A  Wound Pain Timing/Severity: none  Premedicated: N/A    Plan  Mepilex sacral dressing, change every 3 days and prn   Plan of Care: Wound Coccyx-Dressing/Treatment: Foam    Specialty Bed Required : No   [] Low Air Loss   [] Pressure Redistribution  [] Fluid Immersion  [] Bariatric  [] Total Pressure Relief  [] Other:     Current Diet: Adult Oral Nutrition Supplement; Clear Liquid Oral Supplement  ADULT DIET;  Clear Liquid  Dietician consult:  Yes    Discharge Plan:  Placement for patient upon discharge: skilled nursing    Patient appropriate for Outpatient [address] [facility]: No    Referrals:  [x]   [] [facility]  [] Supplies  [] Other    Patient/Caregiver Teaching:  Level of patient/caregiver understanding able to:   [] Indicates understanding       [] Needs reinforcement  [] Unsuccessful      [x] Verbal Understanding  [] Demonstrated understanding       [] No evidence of learning  [] Refused teaching         [] N/A       Electronically signed by Patt Guidry RN, Cristi Kumar on 9/10/2021 at 5:14 PM

## 2021-09-10 NOTE — CARE COORDINATION
INTERDISCIPLINARY PLAN OF CARE CONFERENCE    Date/Time: 9/10/2021 3:04 PM  Completed by:  MAGDALENA Linder. Case Management      Patient Name:  Enrique Flaherty  YOB: 1965  Admitting Diagnosis: Appendicitis [K37]  Acute appendicitis with perforation and generalized peritonitis, unspecified whether abscess present, unspecified whether gangrene present [K35.20]     Admit Date/Time:  8/30/2021 10:47 AM    Chart reviewed. Interdisciplinary team contacted or reviewed plan related to patient progress and discharge plans. Disciplines included Case Management, Nursing, and Dietitian. Current Status:Ongoing. Clamping NGT and trialing clears per note from [employer] APRN-CNP on this date  PT/OT recommendation for discharge plan of care: SNF    Expected D/C Disposition:  Skilled nursing facility  Confirmed plan with patient and/or family Yes confirmed with: (name) pt  Met with:pt    Discharge Plan Comments: Chart review completed. Met with pt at bedside with RN present. She confirmed she wants to go to [facility] on discharge. She denied needs or questions for CM. Message left for Carol Almaguer at [facility] requesting a call back to inquire about pending precert. Home O2 in place on admit: No    Addendum at 3:11pm: Received call from Mendoza De,  with [facility] stating she will check on pending precert and call writer back      Addendum at 3:24pm: Received call from Mendoza De stating pt's precert has been obtained and is good through the 15th. Mendoza De also stated she spoke with the  at [facility] and they CAN accept pt with an NG Tube.

## 2021-09-10 NOTE — PROGRESS NOTES
Patient lower abdominal drsg changed, with moderate amount of clear drng, JESSIE Spain has removed staples to lower abdomen. Upper staples intact, skin edges well approximated but are slightly red, Chlorhexidine wipes used, Above reported to JESSIE ANAYA.

## 2021-09-10 NOTE — PLAN OF CARE
Problem: Pain:  Goal: Pain level will decrease  9/10/2021 1133 by Osvaldo Lee RN  Outcome: Ongoing  9/10/2021 0048 by Brian Amin RN  Outcome: Ongoing  Goal: Control of acute pain  9/10/2021 1133 by Osvaldo Lee RN  Outcome: Ongoing  9/10/2021 0048 by Brian Amin RN  Outcome: Ongoing  Goal: Control of chronic pain  9/10/2021 1133 by Osvaldo Lee RN  Outcome: Ongoing  9/10/2021 0048 by Brian Amin RN  Outcome: Ongoing  Goal: Patient's pain/discomfort is manageable  9/10/2021 1133 by Osvaldo Lee RN  Outcome: Ongoing  9/10/2021 0048 by Brian Amin RN  Outcome: Ongoing     Problem: Skin Integrity:  Goal: Will show no infection signs and symptoms  9/10/2021 1133 by Osvaldo Lee RN  Outcome: Ongoing  9/10/2021 0048 by Brian Amin RN  Outcome: Ongoing  Goal: Absence of new skin breakdown  9/10/2021 1133 by Osvaldo Lee RN  Outcome: Ongoing  9/10/2021 0048 by Brian Amin RN  Outcome: Ongoing     Problem: Falls - Risk of:  Goal: Will remain free from falls  9/10/2021 1133 by Osvaldo Lee RN  Outcome: Ongoing  9/10/2021 0048 by Brian Amin RN  Outcome: Ongoing  Goal: Absence of physical injury  9/10/2021 1133 by Osvaldo Lee RN  Outcome: Ongoing  9/10/2021 0048 by Brian Amin RN  Outcome: Ongoing     Problem: Nausea/Vomiting:  Goal: Absence of nausea/vomiting  9/10/2021 1133 by Osvaldo Lee RN  Outcome: Ongoing  9/10/2021 0048 by Brian Amin RN  Outcome: Ongoing  Goal: Able to drink  9/10/2021 1133 by Osvaldo Lee RN  Outcome: Ongoing  9/10/2021 0048 by Brian Amin RN  Outcome: Ongoing  Goal: Able to eat  9/10/2021 1133 by Osvaldo Lee RN  Outcome: Ongoing  9/10/2021 0048 by Brian Amin RN  Outcome: Ongoing  Goal: Ability to achieve adequate nutritional intake will improve  9/10/2021 1133 by Osvaldo Lee RN  Outcome: Ongoing  9/10/2021 0048 by Brian Amin RN  Outcome: Ongoing     Problem: Infection:  Goal: Will remain free from infection  9/10/2021 1133 by Adrianna Streeter RN  Outcome: Ongoing  9/10/2021 0048 by Ila Noguera RN  Outcome: Ongoing     Problem: Safety:  Goal: Free from accidental physical injury  9/10/2021 1133 by Adrianna Streeter RN  Outcome: Ongoing  9/10/2021 0048 by Ila Noguera RN  Outcome: Ongoing  Goal: Free from intentional harm  9/10/2021 1133 by Adrianna Streeter RN  Outcome: Ongoing  9/10/2021 0048 by Ila Noguera RN  Outcome: Ongoing     Problem: Daily Care:  Goal: Daily care needs are met  9/10/2021 1133 by Adrianna Streeter RN  Outcome: Ongoing  9/10/2021 0048 by Ila Noguera RN  Outcome: Ongoing     Problem: Skin Integrity:  Goal: Skin integrity will stabilize  9/10/2021 1133 by Adrianna Streeter RN  Outcome: Ongoing  9/10/2021 0048 by Ila Noguera RN  Outcome: Ongoing     Problem: Discharge Planning:  Goal: Patients continuum of care needs are met  9/10/2021 1133 by Adrianna Streeter RN  Outcome: Ongoing  9/10/2021 0048 by Ila Noguera RN  Outcome: Ongoing     Problem: Non-Violent Restraints  Goal: Removal from restraints as soon as assessed to be safe  9/10/2021 1133 by Adrianna Streeter RN  Outcome: Ongoing  9/10/2021 0048 by Ila Noguera RN  Outcome: Ongoing  Goal: No harm/injury to patient while restraints in use  9/10/2021 1133 by Adrianna Streeter RN  Outcome: Ongoing  9/10/2021 0048 by Ila Noguera RN  Outcome: Ongoing  Goal: Patient's dignity will be maintained  9/10/2021 1133 by Adrianna Streeter RN  Outcome: Ongoing  9/10/2021 0048 by Ila Noguera RN  Outcome: Ongoing     Problem: Nutrition  Goal: Optimal nutrition therapy  9/10/2021 1133 by Adrianna Streeter RN  Outcome: Ongoing  9/10/2021 0048 by Ila Noguera RN  Outcome: Ongoing

## 2021-09-10 NOTE — PROGRESS NOTES
Pulmonary & Critical Care Medicine Progress Note  CC: sepsis    Events of Last 24 hours: patient feels better    Invasive Lines:   IV Line: CVC 9/5      EXAM:  /83   Pulse 67   Temp 98.1 °F (36.7 °C) (Oral)   Resp 16   Ht 5' 3\" (1.6 m)   Wt (!) 310 lb 10.1 oz (140.9 kg)   LMP 12/24/2010   SpO2 94%   BMI 55.03 kg/m²  on RA  Tmax: 98F  CVP:      Intake/Output Summary (Last 24 hours) at 9/10/2021 1500  Last data filed at 9/10/2021 0908  Gross per 24 hour   Intake --   Output 3825 ml   Net -3825 ml     General: No distress. Alert. NCAT, comfortable  Eyes: PERRL. No sclera icterus. No conjunctival injection. ENT: No discharge. Pharynx clear. Neck: Trachea midline. Resp: No accessory muscle use. No crackles. No wheezing. No rhonchi. No dullness on percussion. CV: Regular rate. Regular rhythm. No mumur or rub. No edema. GI: Non-tender. Non-distended. Skin: Warm and dry. No nodule on exposed extremities. No rash on exposed extremities. M/S: No cyanosis. No joint deformity. No clubbing.    Neuro: awake and alert, conversant, moves all extremities    Medications:   pantoprazole  40 mg IntraVENous Daily    fluconazole  200 mg IntraVENous Q24H    topiramate  100 mg Oral Nightly    sodium chloride flush  5-40 mL IntraVENous 2 times per day    enoxaparin  40 mg SubCUTAneous Daily    piperacillin-tazobactam  3,375 mg IntraVENous Q8H     PRN Meds:  morphine **OR** [DISCONTINUED] morphine, oxyCODONE **OR** oxyCODONE, sodium chloride, prochlorperazine, sodium chloride flush, sodium chloride, acetaminophen **OR** acetaminophen, potassium chloride **OR** potassium alternative oral replacement **OR** potassium chloride    Results:  CBC:   Recent Labs     09/08/21  1020 09/09/21  0625 09/10/21  0530   WBC 16.2* 14.0* 15.3*   HGB 11.0* 10.6* 10.7*   HCT 34.1* 32.1* 32.1*   MCV 96.5 96.8 95.8    261 328     BMP:   Recent Labs     09/08/21  1020 09/10/21  0530    139   K 3.8 3.4*    107 CO2 20* 23   PHOS 3.4  --    BUN 12 5*   CREATININE <0.5* <0.5*     LIVER PROFILE:   Recent Labs     09/10/21  0530   AST 10*   ALT 8*   BILITOT 0.7   ALKPHOS 100     PT/INR: No results for input(s): PROTIME, INR in the last 72 hours. APTT: No results for input(s): APTT in the last 72 hours. UA:No results for input(s): NITRITE, COLORU, PHUR, LABCAST, WBCUA, RBCUA, MUCUS, TRICHOMONAS, YEAST, BACTERIA, CLARITYU, SPECGRAV, LEUKOCYTESUR, UROBILINOGEN, BILIRUBINUR, BLOODU, GLUCOSEU, AMORPHOUS in the last 72 hours.     Invalid input(s): KETONESU    Cultures:  None      Films:  No new       ASSESSMENT:  · Septic shock - improved  · Acute appendicitis with perforation and incarcerated ventral incisional hernia post right colectomy, hernia repair and drainage of intra-abdominal abscess 9/5/2021  · Electrolytes disorder  · Leukocytosis  · History of DVT in 2015  · Chronic pain syndrome  · Morbid obesity post gastric sleeve   · FLORIAN on CPAP     PLAN:  · Supplemental oxygen to maintain SaO2 >92%; wean as tolerated  · Head of bed 30 degrees or higher at all times  · Broad spectrum antibiotics to include: D12 Zosyn and D6 Diflucan   · I will sign off, please call with questions

## 2021-09-10 NOTE — FLOWSHEET NOTE
09/10/21 0836   Vital Signs   Temp 98.1 °F (36.7 °C)   Temp Source Oral   Pulse 68   Heart Rate Source Monitor   Resp 18   BP (!) 143/85   BP Location Right lower arm   Patient Position Semi fowlers   Level of Consciousness Alert (0)   MEWS Score 1   Patient Currently in Pain Denies   Pain Assessment   Pain Assessment 0-10   Pain Level 0   Oxygen Therapy   SpO2 98 %   O2 Device None (Room air)   Patient alert and oriented, up to [facility] this AM, large formed BM. Put back to bed. Abdominal drsg with moderate amount of clear drng on lower half, drsg changed after cleansing area with Normal saline, Staples intact to midline incision skin edges slightly red, well approximated with clear drng noted on lower half of incision. Aquacel AG remains intact to lower abdominal incision. FATOUMATA X 2 bulbs compressed drsg CDI. Cash patent and drng clear freddie colored urine, cath care given. Patient wiped down with Chlorhexidine wipes. No redness noted in abdominal folds. NG to LWS with green colored liquid in tubing.

## 2021-09-10 NOTE — PROGRESS NOTES
Cash discontinued without difficulty with 400 ml of tea colored urine in bag. Duyen care given. Patient up in chair and tolerating well. Patient tolerating clear liquids well, NGT clamped. Will continue to monitor.

## 2021-09-10 NOTE — PROGRESS NOTES
Shift assessment complete. See flow sheet. Scheduled meds given. See MAR. Patients head-toe complete, VS are logged, active bowel sound noted in all four quadrants. Lungs sounds diminished at bases/ No Pain ball present. FATOUMATA on both sides post sx. NG to CLWS. Rt TLC IJ. No needs are noted at this time. Call light and bedside table are within reach. The bed is locked and is in the lowest position.     Tamra Nieves RN

## 2021-09-10 NOTE — PROGRESS NOTES
Admit: 2021    Name:  Gaurang Lowe  Room:  0224/0224-01  MRN:    [id number]        Daily Progress Note for 9/10/2021     Acute appendicitis with abscess s/p laparotomy with right colectomy, abscess drainage     Interval History:       Pt seen up in chair today .had multiple BM overnight and this am  Clamped NG, started on clears     Pain controlled      Scheduled Meds:   pantoprazole  40 mg IntraVENous Daily    fluconazole  200 mg IntraVENous Q24H    topiramate  100 mg Oral Nightly    sodium chloride flush  5-40 mL IntraVENous 2 times per day    enoxaparin  40 mg SubCUTAneous Daily    piperacillin-tazobactam  3,375 mg IntraVENous Q8H       Continuous Infusions:   dextrose 5% and 0.45% NaCl with KCl 20 mEq 75 mL/hr at 21 1408    sodium chloride Stopped (21 1235)       PRN Meds:  morphine **OR** [DISCONTINUED] morphine, sodium chloride, prochlorperazine, sodium chloride flush, sodium chloride, acetaminophen **OR** acetaminophen, potassium chloride **OR** potassium alternative oral replacement **OR** potassium chloride          Objective:     Temp  Av.8 °F (36.6 °C)  Min: 97.5 °F (36.4 °C)  Max: 98.1 °F (36.7 °C)  Pulse  Av  Min: 88  Max: 88  BP  Min: 123/78  Max: 130/78  SpO2  Av %  Min: 97 %  Max: 97 %  Patient Vitals for the past 4 hrs:   BP Temp Temp src Pulse Resp   09/10/21 0500 125/85 97.5 °F (36.4 °C) Oral 88 17         Intake/Output Summary (Last 24 hours) at 9/10/2021 0737  Last data filed at 2021 2050  Gross per 24 hour   Intake 2008.47 ml   Output 3775 ml   Net -1766.53 ml       Physical Exam:  General: middle aged morbidly obese female  Up in chair   Appears to be not in any distress  Mucous Membranes:  Pink , anicteric  NG in place   Right neck TLC  Neck: No JVD, no carotid bruit, no thyromegaly  Chest:  Clear to auscultation bilaterally, no added sounds  Cardiovascular:  RRR S1S2 heard, no murmurs or gallops  Abdomen:  Soft obese, surgical dressing dry , drains +   2+ lower ext brayan   Distal pulses well felt  Neurological : non focal today     Lab Data:  CBC:   Recent Labs     09/08/21  1020 09/09/21  0625 09/10/21  0530   WBC 16.2* 14.0* 15.3*   RBC 3.53* 3.32* 3.35*   HGB 11.0* 10.6* 10.7*   HCT 34.1* 32.1* 32.1*   MCV 96.5 96.8 95.8   RDW 17.3* 17.3* 16.9*    261 328     BMP:   Recent Labs     09/08/21  1020 09/10/21  0530    139   K 3.8 3.4*    107   CO2 20* 23   PHOS 3.4  --    BUN 12 5*   CREATININE <0.5* <0.5*     LIVER PROFILE:   Recent Labs     09/10/21  0530   AST 10*   ALT 8*   BILITOT 0.7   ALKPHOS 100         XR ABDOMEN FOR NG/OG/NE TUBE PLACEMENT   Final Result   1. Nasogastric tube position is acceptable. 2.  Gas distended bowel loops in the left lower abdomen could relate to   obstruction or postsurgical ileus. XR CHEST PORTABLE   Final Result   1. Endotracheal tube, nasogastric tube, and right jugular venous line   placement. No pneumothorax. 2.  Congestive changes and left basilar atelectasis/pneumonia. CT ABDOMEN PELVIS WO CONTRAST Additional Contrast? None   Final Result   Increasing pneumoperitoneum and increasing free fluid within the abdomen and   pelvis as compared to prior examination dated 08/30/2021 in this patient with   history of known perforated appendicitis. The appendix is less well   visualized on the current exam without contrast and evaluation for abscess is   also limited without contrast.      Fluid within the lumen of the esophagus; correlate for gastroesophageal   reflux. Dilated small bowel within the upper abdomen, suggestive of ileus. Radiographic follow-up may be obtained as warranted. XR ABDOMEN (KUB) (SINGLE AP VIEW)   Final Result   Slightly decreased severity dilated small bowel loops over the past 24 hours. Nasogastric tube in good position. XR ABDOMEN (2 VIEWS)   Final Result   1.  Recommend advancement of enteric tube 10 cm.   2. Nearly identical pattern small bowel with air-fluid levels that persist.   Underlying partial bowel obstruction or ileus may be considered clinically. XR ABDOMEN (2 VIEWS)   Final Result   Findings may be related to ileus or obstruction. CT ABDOMEN PELVIS WO CONTRAST Additional Contrast? Radiologist Recommendation (iodine allergy)   Final Result   1. Acute appendicitis with focal perforation. Tiny amount of free air with   no focal fluid to suggest abscess. Severe inflammatory changes in the right   lower quadrant. 2. Probable cholelithiasis. No evidence of acute cholecystitis. covid neg    Assessment & Plan:       Acute Appendicitis with Focal Perforation  - CT showed appendicitis with focal perforation, tiny amount of free air with no focal fluid, severe inflammatory changed in RLQ  - initially mx with IVF, pain control and IV abx but worsened with fevers, chills and tachycardia  - s.p emergent laparotomy with  right colectomy with abscess drainage POD 4    - continue  abx: Zosyn D#9, diflucan D5  -  PRN - Morphine and Toradol for pain control  - Gen Sx  Managing  - had BM and diet initiated. Stop IVF      Metabolic acidosis. With mild anion gap. Resolved with bicarbonate fluids        Hypokalemia  Hypomagnesemia  repleted as needed     Prolonged QTc  - 565  - repleted electrolytes, avoid QT prolonging agents  - repeat EKG improving qtc. Avoid zofran  - cut down cymbalta at dc     Leukocytosis  -Due to above. wbc down from 24 to 15    TCP  - likely with acute infection   Resolved      Hx of DVT  - in 2015  - holding ASA, on prophylactic lovenox here     GERD  - cont Protonix     Chronic Pain Syndrome  - cont Cymbalta and Neurontin     FLORIAN  - Continue home CPAP     Morbid Obesity  S/p Laparoscopic Sleeve Gastrectomy  - Body mass index is 46.94 kg/m². - Complicating assessment and treatment.  Placing patient at risk for multiple co-morbidities as well as early death and contributing to the patient's presentation.   - Counseled on weight loss.      DVT Prophylaxis: Lovenox  Diet: clears   Code Status: Full Code    Ongoing PT    Valeria Culver MD

## 2021-09-10 NOTE — PROGRESS NOTES
Central line cath removed without difficulty, tip intact was a 7 F. Patient lying flat for 30 minutes, no drng. Site without bruising.

## 2021-09-10 NOTE — PLAN OF CARE
Nutrition Problem #1: Inadequate oral intake  Intervention: Food and/or Nutrient Delivery: Continue Current Diet, Modify Oral Nutrition Supplement  Nutritional Goals: pt will continue to tolerate a PO diet and will advance to solid food without GI irratation

## 2021-09-10 NOTE — PROGRESS NOTES
report given and pt care transferred to [facility] [address]. Pt denies needs at this time. Call light within reach.

## 2021-09-10 NOTE — PROGRESS NOTES
Comprehensive Nutrition Assessment    Type and Reason for Visit:  Reassess    Nutrition Recommendations/Plan:   1. Continue CL  2. Reduced Ensure Clear to 1 x per day     Nutrition Assessment:    Patient is improving nutritionally AEB she has been advanced to CL however pat is  moderately malnourished AEB muscle and fat loss d/t she had a gastric sleeve done in April and she lost 90#. At risk for further nutrition compromise r/tPOD #5 S/P Right colectomy, incarcerated ventral, incisional hernia repair, omphalectomy, drainage of intraabdominal abscess. .  Will continue CL with 1 ensure clear qd . Malnutrition Assessment:  Malnutrition Status:   Moderate malnutrition    Context:  Acute Illness     Findings of the 6 clinical characteristics of malnutrition:  Energy Intake:  1 - 75% or less of estimated energy requirements for 7 or more days  Weight Loss:  7 - Greater than 7.5% over 3 months     Body Fat Loss:  7 - Moderate body fat loss Triceps, Orbital   Muscle Mass Loss:  7 - Moderate muscle mass loss Temples (temporalis), Hand (interosseous)  Fluid Accumulation:  7 - Moderate to Severe Extremities   Strength:  Not Performed    Estimated Daily Nutrient Needs:  Energy (kcal):  5286-2588 based ~ 22-25 kcal/kg ibw; Weight Used for Energy Requirements:  Ideal     Protein (g):  73-84 based ~ 1.4-1.6 gr/kg ibw; Weight Used for Protein Requirements:  Ideal        Fluid (ml/day):  6194-4465; Method Used for Fluid Requirements:  1 ml/kcal      Nutrition Related Findings:  pt sitting up in chair; POD #5 S/P Right colectomy, incarcerated ventral, incisional hernia repair, omphalectomy, drainage of intraabdominal abscess; reports that she is had gastric sleeve in April and starting weight was 355 and she was down to 265# but was admitted with increased fluid weight; tolerating small amount of clears currently; she was following keto based diet post gastric sleeve and connot tolerat high carb content foods ; + BM Wounds:  Surgical Incision       Current Nutrition Therapies:    ADULT DIET; Clear Liquid  Adult Oral Nutrition Supplement; Clear Liquid Oral Supplement    Anthropometric Measures:  · Height: 5' 3\" (160 cm)  · Current Body Weight: 310 lb 10 oz (140.9 kg)   · Admission Body Weight: 265 lb (120.2 kg) (stated)    · Usual Body Weight: 326 lb (147.9 kg) (april 2021)     · Ideal Body Weight: 115 lbs; % Ideal Body Weight 270.1 %   · BMI: 55  · Adjusted Body Weight:  ; No Adjustment   · BMI Categories: Obese Class 3 (BMI 40.0 or greater)       Nutrition Diagnosis:   · Inadequate oral intake related to altered GI function, altered GI structure as evidenced by NPO or clear liquid status due to medical condition      Nutrition Interventions:   Food and/or Nutrient Delivery:  Continue Current Diet, Modify Oral Nutrition Supplement  Nutrition Education/Counseling:  No recommendation at this time   Coordination of Nutrition Care:  Continue to monitor while inpatient    Goals:  pt will continue to tolerate a PO diet and will advance to solid food without GI irratation       Nutrition Monitoring and Evaluation:   Behavioral-Environmental Outcomes:  None Identified   Food/Nutrient Intake Outcomes:  Diet Advancement/Tolerance  Physical Signs/Symptoms Outcomes:  Biochemical Data, Fluid Status or Edema, Nutrition Focused Physical Findings, Weight, GI Status     Discharge Planning:     Too soon to determine     Electronically signed by Ketan Thornton RD, LD on 9/10/21 at 6:11 PM EDT    Contact: [phone]

## 2021-09-11 PROCEDURE — 6370000000 HC RX 637 (ALT 250 FOR IP): Performed by: INTERNAL MEDICINE

## 2021-09-11 PROCEDURE — 6360000002 HC RX W HCPCS: Performed by: INTERNAL MEDICINE

## 2021-09-11 PROCEDURE — 2500000003 HC RX 250 WO HCPCS: Performed by: NURSE PRACTITIONER

## 2021-09-11 PROCEDURE — 6370000000 HC RX 637 (ALT 250 FOR IP): Performed by: NURSE PRACTITIONER

## 2021-09-11 PROCEDURE — 6370000000 HC RX 637 (ALT 250 FOR IP): Performed by: HOSPITALIST

## 2021-09-11 PROCEDURE — 99024 POSTOP FOLLOW-UP VISIT: CPT | Performed by: SURGERY

## 2021-09-11 PROCEDURE — 1200000000 HC SEMI PRIVATE

## 2021-09-11 PROCEDURE — 2580000003 HC RX 258: Performed by: INTERNAL MEDICINE

## 2021-09-11 PROCEDURE — C9113 INJ PANTOPRAZOLE SODIUM, VIA: HCPCS | Performed by: INTERNAL MEDICINE

## 2021-09-11 PROCEDURE — 6360000002 HC RX W HCPCS: Performed by: HOSPITALIST

## 2021-09-11 RX ORDER — POTASSIUM CHLORIDE 20 MEQ/1
20 TABLET, EXTENDED RELEASE ORAL
Status: COMPLETED | OUTPATIENT
Start: 2021-09-11 | End: 2021-09-12

## 2021-09-11 RX ORDER — FUROSEMIDE 10 MG/ML
20 INJECTION INTRAMUSCULAR; INTRAVENOUS ONCE
Status: COMPLETED | OUTPATIENT
Start: 2021-09-11 | End: 2021-09-11

## 2021-09-11 RX ADMIN — PIPERACILLIN SODIUM AND TAZOBACTAM SODIUM 3375 MG: 3; .375 INJECTION, POWDER, LYOPHILIZED, FOR SOLUTION INTRAVENOUS at 14:40

## 2021-09-11 RX ADMIN — OXYCODONE HYDROCHLORIDE 10 MG: 5 TABLET ORAL at 17:10

## 2021-09-11 RX ADMIN — FLUCONAZOLE, SODIUM CHLORIDE 200 MG: 2 INJECTION INTRAVENOUS at 02:12

## 2021-09-11 RX ADMIN — FUROSEMIDE 20 MG: 10 INJECTION, SOLUTION INTRAMUSCULAR; INTRAVENOUS at 14:36

## 2021-09-11 RX ADMIN — TOPIRAMATE 100 MG: 100 TABLET, FILM COATED ORAL at 19:57

## 2021-09-11 RX ADMIN — SODIUM CHLORIDE, PRESERVATIVE FREE 10 ML: 5 INJECTION INTRAVENOUS at 02:43

## 2021-09-11 RX ADMIN — OXYCODONE HYDROCHLORIDE 10 MG: 5 TABLET ORAL at 02:41

## 2021-09-11 RX ADMIN — PIPERACILLIN SODIUM AND TAZOBACTAM SODIUM 3375 MG: 3; .375 INJECTION, POWDER, LYOPHILIZED, FOR SOLUTION INTRAVENOUS at 03:14

## 2021-09-11 RX ADMIN — ENOXAPARIN SODIUM 40 MG: 40 INJECTION SUBCUTANEOUS at 10:12

## 2021-09-11 RX ADMIN — POTASSIUM CHLORIDE, DEXTROSE MONOHYDRATE AND SODIUM CHLORIDE: 150; 5; 450 INJECTION, SOLUTION INTRAVENOUS at 10:09

## 2021-09-11 RX ADMIN — PANTOPRAZOLE SODIUM 40 MG: 40 INJECTION, POWDER, FOR SOLUTION INTRAVENOUS at 10:12

## 2021-09-11 RX ADMIN — OXYCODONE HYDROCHLORIDE 10 MG: 5 TABLET ORAL at 23:11

## 2021-09-11 RX ADMIN — PIPERACILLIN SODIUM AND TAZOBACTAM SODIUM 3375 MG: 3; .375 INJECTION, POWDER, LYOPHILIZED, FOR SOLUTION INTRAVENOUS at 20:03

## 2021-09-11 RX ADMIN — POTASSIUM CHLORIDE 20 MEQ: 1500 TABLET, EXTENDED RELEASE ORAL at 17:10

## 2021-09-11 RX ADMIN — SODIUM CHLORIDE, PRESERVATIVE FREE 10 ML: 5 INJECTION INTRAVENOUS at 23:12

## 2021-09-11 ASSESSMENT — PAIN SCALES - GENERAL
PAINLEVEL_OUTOF10: 5
PAINLEVEL_OUTOF10: 3
PAINLEVEL_OUTOF10: 0
PAINLEVEL_OUTOF10: 7

## 2021-09-11 NOTE — PROGRESS NOTES
Physical Therapy    Attempted PT treatment this AM. Pt reports just returning from commode and requesting to rest at this time. Pt reports performing LE/UE exercises independently. Will continue to follow-up as pt status allows. No charge.     Gato Matt, PT, DPT #[id number]  Bora Garcia, [address] [facility], OTR/L   [id number]

## 2021-09-11 NOTE — PLAN OF CARE
Problem: Pain:  Description: Pain management should include both nonpharmacologic and pharmacologic interventions.   Goal: Pain level will decrease  Outcome: Ongoing  Goal: Control of acute pain  Outcome: Ongoing  Goal: Control of chronic pain  Outcome: Ongoing  Goal: Patient's pain/discomfort is manageable  Outcome: Ongoing     Problem: Skin Integrity:  Goal: Will show no infection signs and symptoms  Outcome: Ongoing  Goal: Absence of new skin breakdown  Outcome: Ongoing     Problem: Falls - Risk of:  Goal: Will remain free from falls  Outcome: Ongoing  Goal: Absence of physical injury  Outcome: Ongoing     Problem: Nausea/Vomiting:  Goal: Absence of nausea/vomiting  Outcome: Ongoing  Goal: Able to drink  Outcome: Ongoing  Goal: Able to eat  Outcome: Ongoing  Goal: Ability to achieve adequate nutritional intake will improve  Outcome: Ongoing     Problem: Infection:  Goal: Will remain free from infection  Outcome: Ongoing     Problem: Safety:  Goal: Free from accidental physical injury  Outcome: Ongoing  Goal: Free from intentional harm  Outcome: Ongoing     Problem: Daily Care:  Goal: Daily care needs are met  Outcome: Ongoing     Problem: Skin Integrity:  Goal: Skin integrity will stabilize  Outcome: Ongoing     Problem: Discharge Planning:  Goal: Patients continuum of care needs are met  Outcome: Ongoing     Problem: Non-Violent Restraints  Goal: Removal from restraints as soon as assessed to be safe  Outcome: Ongoing  Goal: No harm/injury to patient while restraints in use  Outcome: Ongoing  Goal: Patient's dignity will be maintained  Outcome: Ongoing     Problem: Nutrition  Goal: Optimal nutrition therapy  Outcome: Ongoing

## 2021-09-11 NOTE — PROGRESS NOTES
[facility] GENERAL SURGERY    Surgery Progress Note           POD # 5    PATIENT NAME: Romana Gale Stem     TODAY'S DATE: 9/11/2021    INTERVAL HISTORY:    Pt having bms, no nausea. OBJECTIVE:   VITALS:  /71   Pulse 66   Temp 98.1 °F (36.7 °C) (Oral)   Resp 16   Ht 5' 3\" (1.6 m)   Wt (!) 310 lb 10.1 oz (140.9 kg)   LMP 12/24/2010   SpO2 95%   BMI 55.03 kg/m²     INTAKE/OUTPUT:    I/O last 3 completed shifts:  In: -   Out: 725 [Urine:700; Drains:25]  No intake/output data recorded. CONSTITUTIONAL:  awake and alert  ABDOMEN:   normal bowel sounds, soft, non-distended, tender, jps serous   INCISION: some purulent drainage inferior    Data:  CBC: Recent Labs     09/08/21  1020 09/09/21  0625 09/10/21  0530   WBC 16.2* 14.0* 15.3*   HGB 11.0* 10.6* 10.7*   HCT 34.1* 32.1* 32.1*    261 328     BMP:    Recent Labs     09/08/21  1020 09/10/21  0530    139   K 3.8 3.4*    107   CO2 20* 23   BUN 12 5*   CREATININE <0.5* <0.5*   GLUCOSE 85 104*     Hepatic:   Recent Labs     09/10/21  0530   AST 10*   ALT 8*   BILITOT 0.7   ALKPHOS 100     Mag:      Recent Labs     09/08/21  1020   MG 1.80      Phos:     Recent Labs     09/08/21  1020   PHOS 3.4      INR: No results for input(s): INR in the last 72 hours. Radiology Review:  NA    ASSESSMENT AND PLAN:  60 y.o. female status post right colectomy and hernia repair  1. Tolerating ng clamping - remove today  2. Continue clear liquid diet  3.   Wound care, continue current abx         Electronically signed by Mary Toussaint MD

## 2021-09-11 NOTE — PROGRESS NOTES
Pt. NGT removed and the pt. Tolerated well. Pt. Abdominal wound dressing was changed. 1 strip of AG silver was placed into the wound. Covered with guaze pads and secured with tape. Duyen wound assessment was red. Wound draining yellow, foul smelling pus.

## 2021-09-11 NOTE — PROGRESS NOTES
Bedside report given and pt care transferred to [facility]. Pt denies needs at this time. Call light within reach.

## 2021-09-11 NOTE — FLOWSHEET NOTE
09/11/21 1000   Vital Signs   Temp 98.3 °F (36.8 °C)   Temp Source Oral   Pulse 81   Heart Rate Source Monitor   Resp 18   /76   BP Location Left lower arm   Patient Position Semi fowlers   Level of Consciousness Alert (0)   MEWS Score 1   Oxygen Therapy   SpO2 99 %   O2 Device None (Room air)   AM assessment completed, see flow sheet. Pt is alert and oriented. Vital signs are WNL. Respirations are even & easy. Complaints of her throat hurting from the NGT. Pt denies needs at this time. SR up x 2, and bed in low position. Call light is within reach. Bedside Mobility Assessment Tool (BMAT):     Assessment Level 1- Sit and Shake    1. From a semi-reclined position, ask patient to sit up and rotate to a seated position at the side of the bed. Can use the bedrail. 2. Ask patient to reach out and grab your hand and shake making sure patient reaches across his/her midline. Pass- Patient is able to come to a seated position, maintain core strength. Maintains seated balance while reaching across midline. Move on to Assessment Level 2. Assessment Level 2- Stretch and Point   1. With patient in seated position at the side of the bed, have patient place both feet on the floor (or stool) with knees no higher than hips. 2. Ask patient to stretch one leg and straighten the knee, then bend the ankle/flex and point the toes. If appropriate, repeat with the other leg. Pass- Patient is able to demonstrate appropriate quad strength on intended weight bearing limb(s). Move onto Assessment Level 3. Assessment Level 3- Stand   1. Ask patient to elevate off the bed or chair (seated to standing) using an assistive device (cane, bedrail). 2. Patient should be able to raise buttocks off be and hold for a count of five. May repeat once. Pass- Patient maintains standing stability for at least 5 seconds, proceed to assessment level 4. Assessment Level 4- Walk   1. Ask patient to march in place at bedside. 2. Then ask patient to advance step and return each foot. Some medical conditions may render a patient from stepping backwards, use your best clinical judgement. Fail- Patient not able to complete tasks OR requires use of assistive device. Patient is MOBILITY LEVEL 3. Mobility Level- 3    Patient is able to demonstrate the ability to move from a reclining position to an upright position within the recliner.

## 2021-09-11 NOTE — PROGRESS NOTES
Admit: 2021    Name:  Marquise Crespo  Room:  [facility], [facility]24-  MRN:    [id number]        Daily Progress Note for 2021     Acute appendicitis with abscess s/p laparotomy with right colectomy, abscess drainage     Interval History:       Pt seen up in chair today .   Movement NG tube is out had tolerating clear liquid has complaining of some sore throat from the tube        Scheduled Meds:   potassium chloride  20 mEq Oral TID WC    pantoprazole  40 mg IntraVENous Daily    fluconazole  200 mg IntraVENous Q24H    topiramate  100 mg Oral Nightly    sodium chloride flush  5-40 mL IntraVENous 2 times per day    enoxaparin  40 mg SubCUTAneous Daily    piperacillin-tazobactam  3,375 mg IntraVENous Q8H       Continuous Infusions:   dextrose 5% and 0.45% NaCl with KCl 20 mEq 50 mL/hr at 21 1453    sodium chloride Stopped (21 1305)       PRN Meds:  morphine **OR** [DISCONTINUED] morphine, oxyCODONE **OR** oxyCODONE, sodium chloride, prochlorperazine, sodium chloride flush, sodium chloride, acetaminophen **OR** acetaminophen, potassium chloride **OR** potassium alternative oral replacement **OR** potassium chloride          Objective:     Temp  Av °F (36.7 °C)  Min: 97.1 °F (36.2 °C)  Max: 98.5 °F (36.9 °C)  Pulse  Av.8  Min: 66  Max: 81  BP  Min: 126/74  Max: 133/71  SpO2  Av.7 %  Min: 95 %  Max: 99 %  Patient Vitals for the past 4 hrs:   BP Temp Temp src Pulse Resp SpO2   21 1551 126/74 98.5 °F (36.9 °C) Oral 80 16 99 %         Intake/Output Summary (Last 24 hours) at 2021 1648  Last data filed at 2021 1453  Gross per 24 hour   Intake 3239.7 ml   Output --   Net 3239.7 ml       Physical Exam:  General: middle aged morbidly obese female  Up in chair   Appears to be not in any distress  Mucous Membranes:  Pink , anicteric  Right neck TLC  Neck: No JVD, no carotid bruit, no thyromegaly  Chest:  Clear to auscultation bilaterally, no added sounds  Cardiovascular:  RRR S1S2 heard, no murmurs or gallops  Abdomen:  Soft obese, surgical dressing dry , drains +   2+ lower ext brayan   Distal pulses well felt  Neurological : non focal today     Lab Data:  CBC:   Recent Labs     09/09/21  0625 09/10/21  0530   WBC 14.0* 15.3*   RBC 3.32* 3.35*   HGB 10.6* 10.7*   HCT 32.1* 32.1*   MCV 96.8 95.8   RDW 17.3* 16.9*    328     BMP:   Recent Labs     09/10/21  0530      K 3.4*      CO2 23   BUN 5*   CREATININE <0.5*     LIVER PROFILE:   Recent Labs     09/10/21  0530   AST 10*   ALT 8*   BILITOT 0.7   ALKPHOS 100           covid neg    Assessment & Plan:       Acute Appendicitis with Focal Perforation  - CT showed appendicitis with focal perforation, tiny amount of free air with no focal fluid, severe inflammatory changed in RLQ  - s.p emergent laparotomy with  right colectomy with abscess drainage POD 5  - continue  abx: Zosyn D#10, diflucan D6  Is up in chair tolerating clear liquid  Having bowel movement\  f sore throat from NG tube    Hx of DVT  - in 2015  - holding ASA, on prophylactic lovenox here        Chronic Pain Syndrome  - cont Cymbalta and Neurontin     FLORIAN  - Continue home CPAP     Morbid Obesity  S/p Laparoscopic Sleeve Gastrectomy    Stable fluid overload patient has gained weight will give IV Lasix and monitor electrolytes  -   DVT Prophylaxis: Lovenox  Diet: clears   Code Status: Full Code    Ongoing PT    Luciano Pace MD

## 2021-09-11 NOTE — PROGRESS NOTES
Shift assessment complete. See flow sheet. Scheduled meds given. See MAR. Patients head-toe complete, VS are logged, active bowel sound noted in all four quadrants. Dressing changed during the day shift clean dry and intact. Pt reports of pain treated as ordered with oxycodone for pain 7/10 rated on scale 0-10. No needs are noted at this time. Call light and bedside table are within reach. The bed is locked and is in the lowest position.     Ros Zhou RN

## 2021-09-11 NOTE — PROGRESS NOTES
Shift assessment complete. See flow sheet. Scheduled meds given. See MAR. Patients head-toe complete, VS are logged, active bowel sound noted in all four quadrants. Lungs sounds clear t/o. No needs are noted at this time. Call light and bedside table are within reach. The bed is locked and is in the lowest position.     Cortez Newberry RN

## 2021-09-12 LAB
ANAEROBIC CULTURE: ABNORMAL
ANISOCYTOSIS: ABNORMAL
BANDED NEUTROPHILS RELATIVE PERCENT: 1 % (ref 0–7)
BASOPHILS ABSOLUTE: 0 K/UL (ref 0–0.2)
BASOPHILS RELATIVE PERCENT: 0 %
EOSINOPHILS ABSOLUTE: 0.1 K/UL (ref 0–0.6)
EOSINOPHILS RELATIVE PERCENT: 1 %
GRAM STAIN RESULT: ABNORMAL
HCT VFR BLD CALC: 32.2 % (ref 36–48)
HEMATOLOGY PATH CONSULT: NO
HEMOGLOBIN: 10.5 G/DL (ref 12–16)
HYPOCHROMIA: ABNORMAL
LYMPHOCYTES ABSOLUTE: 1.6 K/UL (ref 1–5.1)
LYMPHOCYTES RELATIVE PERCENT: 20 %
MCH RBC QN AUTO: 31.9 PG (ref 26–34)
MCHC RBC AUTO-ENTMCNC: 32.6 G/DL (ref 31–36)
MCV RBC AUTO: 97.8 FL (ref 80–100)
MONOCYTES ABSOLUTE: 0.4 K/UL (ref 0–1.3)
MONOCYTES RELATIVE PERCENT: 5 %
NEUTROPHILS ABSOLUTE: 5.9 K/UL (ref 1.7–7.7)
NEUTROPHILS RELATIVE PERCENT: 73 %
ORGANISM: ABNORMAL
PDW BLD-RTO: 16.5 % (ref 12.4–15.4)
PLATELET # BLD: 420 K/UL (ref 135–450)
PLATELET SLIDE REVIEW: ADEQUATE
PMV BLD AUTO: 7.6 FL (ref 5–10.5)
RBC # BLD: 3.29 M/UL (ref 4–5.2)
SLIDE REVIEW: ABNORMAL
WBC # BLD: 8 K/UL (ref 4–11)
WOUND/ABSCESS: ABNORMAL
WOUND/ABSCESS: ABNORMAL

## 2021-09-12 PROCEDURE — 6370000000 HC RX 637 (ALT 250 FOR IP): Performed by: NURSE PRACTITIONER

## 2021-09-12 PROCEDURE — 6370000000 HC RX 637 (ALT 250 FOR IP): Performed by: INTERNAL MEDICINE

## 2021-09-12 PROCEDURE — 85025 COMPLETE CBC W/AUTO DIFF WBC: CPT

## 2021-09-12 PROCEDURE — 2580000003 HC RX 258: Performed by: INTERNAL MEDICINE

## 2021-09-12 PROCEDURE — 6370000000 HC RX 637 (ALT 250 FOR IP): Performed by: HOSPITALIST

## 2021-09-12 PROCEDURE — 6360000002 HC RX W HCPCS: Performed by: INTERNAL MEDICINE

## 2021-09-12 PROCEDURE — 99024 POSTOP FOLLOW-UP VISIT: CPT | Performed by: SURGERY

## 2021-09-12 PROCEDURE — 97110 THERAPEUTIC EXERCISES: CPT

## 2021-09-12 PROCEDURE — 1200000000 HC SEMI PRIVATE

## 2021-09-12 PROCEDURE — 6370000000 HC RX 637 (ALT 250 FOR IP): Performed by: SURGERY

## 2021-09-12 PROCEDURE — 36415 COLL VENOUS BLD VENIPUNCTURE: CPT

## 2021-09-12 RX ORDER — PANTOPRAZOLE SODIUM 40 MG/1
40 TABLET, DELAYED RELEASE ORAL
Status: DISCONTINUED | OUTPATIENT
Start: 2021-09-12 | End: 2021-09-13 | Stop reason: HOSPADM

## 2021-09-12 RX ADMIN — OXYCODONE HYDROCHLORIDE 10 MG: 5 TABLET ORAL at 15:26

## 2021-09-12 RX ADMIN — POTASSIUM CHLORIDE 20 MEQ: 1500 TABLET, EXTENDED RELEASE ORAL at 09:59

## 2021-09-12 RX ADMIN — FLUCONAZOLE, SODIUM CHLORIDE 200 MG: 2 INJECTION INTRAVENOUS at 02:19

## 2021-09-12 RX ADMIN — TOPIRAMATE 100 MG: 100 TABLET, FILM COATED ORAL at 21:05

## 2021-09-12 RX ADMIN — OXYCODONE HYDROCHLORIDE 10 MG: 5 TABLET ORAL at 22:50

## 2021-09-12 RX ADMIN — PIPERACILLIN SODIUM AND TAZOBACTAM SODIUM 3375 MG: 3; .375 INJECTION, POWDER, LYOPHILIZED, FOR SOLUTION INTRAVENOUS at 21:18

## 2021-09-12 RX ADMIN — POTASSIUM CHLORIDE 20 MEQ: 1500 TABLET, EXTENDED RELEASE ORAL at 18:14

## 2021-09-12 RX ADMIN — PANTOPRAZOLE SODIUM 40 MG: 40 TABLET, DELAYED RELEASE ORAL at 09:59

## 2021-09-12 RX ADMIN — PIPERACILLIN SODIUM AND TAZOBACTAM SODIUM 3375 MG: 3; .375 INJECTION, POWDER, LYOPHILIZED, FOR SOLUTION INTRAVENOUS at 13:48

## 2021-09-12 RX ADMIN — POTASSIUM CHLORIDE 20 MEQ: 1500 TABLET, EXTENDED RELEASE ORAL at 13:50

## 2021-09-12 RX ADMIN — SODIUM CHLORIDE, PRESERVATIVE FREE 10 ML: 5 INJECTION INTRAVENOUS at 21:06

## 2021-09-12 RX ADMIN — PIPERACILLIN SODIUM AND TAZOBACTAM SODIUM 3375 MG: 3; .375 INJECTION, POWDER, LYOPHILIZED, FOR SOLUTION INTRAVENOUS at 05:30

## 2021-09-12 RX ADMIN — ENOXAPARIN SODIUM 40 MG: 40 INJECTION SUBCUTANEOUS at 09:59

## 2021-09-12 ASSESSMENT — PAIN DESCRIPTION - PAIN TYPE: TYPE: ACUTE PAIN

## 2021-09-12 ASSESSMENT — PAIN SCALES - GENERAL
PAINLEVEL_OUTOF10: 7
PAINLEVEL_OUTOF10: 8
PAINLEVEL_OUTOF10: 0

## 2021-09-12 ASSESSMENT — PAIN DESCRIPTION - LOCATION: LOCATION: ABDOMEN;BACK

## 2021-09-12 NOTE — PROGRESS NOTES
Admit: 2021    Name:  Sonia Sender  Room:  [facility]4/0224-01  MRN:    [id number]        Daily Progress Note for 2021     Acute appendicitis with abscess s/p laparotomy with right colectomy, abscess drainage     Interval History:     tolerating clear liquid    to full liquid diet. Patient has diuresed a lot with IV Lasix yesterday    Scheduled Meds:   pantoprazole  40 mg Oral QAM AC    potassium chloride  20 mEq Oral TID WC    fluconazole  200 mg IntraVENous Q24H    topiramate  100 mg Oral Nightly    sodium chloride flush  5-40 mL IntraVENous 2 times per day    enoxaparin  40 mg SubCUTAneous Daily    piperacillin-tazobactam  3,375 mg IntraVENous Q8H       Continuous Infusions:   sodium chloride Stopped (21 1305)       PRN Meds:  phenol, morphine **OR** [DISCONTINUED] morphine, oxyCODONE **OR** oxyCODONE, sodium chloride, prochlorperazine, sodium chloride flush, sodium chloride, acetaminophen **OR** acetaminophen, potassium chloride **OR** potassium alternative oral replacement **OR** potassium chloride          Objective:     Temp  Av °F (37.2 °C)  Min: 98 °F (36.7 °C)  Max: 99.6 °F (37.6 °C)  Pulse  Av.8  Min: 65  Max: 90  BP  Min: 120/70  Max: 126/74  SpO2  Av.5 %  Min: 95 %  Max: 99 %  No data found.       Intake/Output Summary (Last 24 hours) at 2021 1422  Last data filed at 2021 1041  Gross per 24 hour   Intake 3839.7 ml   Output --   Net 3839.7 ml       Physical Exam:  General: middle aged morbidly obese female  Up in chair   Appears to be not in any distress  Mucous Membranes:  Pink , anicteric  Right neck TLC  Neck: No JVD, no carotid bruit, no thyromegaly  Chest:  Clear to auscultation bilaterally, no added sounds  Cardiovascular:  RRR S1S2 heard, no murmurs or gallops  Abdomen:  Soft obese, surgical dressing dry , drains +   2+ lower ext brayan   Distal pulses well felt  Neurological : non focal today     Lab Data:  CBC:   Recent Labs     09/10/21  0530 09/12/21  0532   WBC 15.3* 8.0   RBC 3.35* 3.29*   HGB 10.7* 10.5*   HCT 32.1* 32.2*   MCV 95.8 97.8   RDW 16.9* 16.5*    420     BMP:   Recent Labs     09/10/21  0530      K 3.4*      CO2 23   BUN 5*   CREATININE <0.5*     LIVER PROFILE:   Recent Labs     09/10/21  0530   AST 10*   ALT 8*   BILITOT 0.7   ALKPHOS 100           covid neg    Assessment & Plan:       Acute Appendicitis with Focal Perforation  - CT showed appendicitis with focal perforation, tiny amount of free air with no focal fluid, severe inflammatory changed in RLQ  - s.p emergent laparotomy with  right colectomy with abscess drainage POD 6  - continue  abx: Zosyn D#11, diflucan D6  Is up in chair tolerating clear liquid  Having bowel movement\  Sore throat from NG tube is improving  Culture is growing E. coli  Hx of DVT  - in 2015  - holding ASA, on prophylactic lovenox here        Chronic Pain Syndrome  - cont Cymbalta and Neurontin     FLORIAN  - Continue home CPAP     Morbid Obesity  S/p Laparoscopic Sleeve Gastrectomy    Stable fluid overload   patient has gained weight  Due to IV Lasix yesterday diuresed a lot patient still have quite a bit of edema refusing diuretics at this time  -   DVT Prophylaxis: Lovenox  Diet: full   Code Status: Full Code    Ongoing PT    Rosa Ocasio MD

## 2021-09-12 NOTE — PROGRESS NOTES
[facility] GENERAL SURGERY    Surgery Progress Note           POD # 6    PATIENT NAME: Tay Nesbitt     TODAY'S DATE: 9/12/2021    INTERVAL HISTORY:    Pt feeling better. Says she is having bms. OBJECTIVE:   VITALS:  /64   Pulse 68   Temp 99.4 °F (37.4 °C) (Oral)   Resp 17   Ht 5' 3\" (1.6 m)   Wt (!) 310 lb 10.1 oz (140.9 kg)   LMP 12/24/2010   SpO2 98%   BMI 55.03 kg/m²     INTAKE/OUTPUT:    I/O last 3 completed shifts: In: 3479.7 [P.O.:240; I.V.:2103; IV Piggyback:1136.7]  Out: -   No intake/output data recorded. CONSTITUTIONAL:  awake and alert  ABDOMEN:   normal bowel sounds, soft, non-distended, tender,  jps serous  INCISION: some purulent drainage inferior    Data:  CBC: Recent Labs     09/10/21  0530 09/12/21  0532   WBC 15.3* 8.0   HGB 10.7* 10.5*   HCT 32.1* 32.2*    420     BMP:    Recent Labs     09/10/21  0530      K 3.4*      CO2 23   BUN 5*   CREATININE <0.5*   GLUCOSE 104*     Hepatic:   Recent Labs     09/10/21  0530   AST 10*   ALT 8*   BILITOT 0.7   ALKPHOS 100     Mag:    No results for input(s): MG in the last 72 hours. Phos:   No results for input(s): PHOS in the last 72 hours. INR: No results for input(s): INR in the last 72 hours. Radiology Review:  NA    ASSESSMENT AND PLAN:  60 y.o. female status post right colectomy and hernia repair   1. Full liquid diet. Low fiber for dinner. 2.  Po pain control  3.   Wound care and abx continued        Electronically signed by Nora Panda MD

## 2021-09-12 NOTE — PROGRESS NOTES
Bedside report given and pt care transferred to [facility], operated by [facility]. Pt denies needs at this time. Call light within reach.

## 2021-09-12 NOTE — FLOWSHEET NOTE
4.    Assessment Level 4- Walk   1. Ask patient to march in place at bedside. 2. Then ask patient to advance step and return each foot. Some medical conditions may render a patient from stepping backwards, use your best clinical judgement. Fail- Patient not able to complete tasks OR requires use of assistive device. Patient is MOBILITY LEVEL 3. Mobility Level- 3    Patient is able to demonstrate the ability to move from a reclining position to an upright position within the recliner.

## 2021-09-12 NOTE — PROGRESS NOTES
Drsg change cleaned with NSS the FATOUMATA areas on both sides of abdomen, midline with staples, and open area below. Then pat dry added silver alginate then covered all area with sterile drsg. Dr. Chapman Note: s/o from Dr. Baig pending ct scan.  Radiology resident called, stated there is concern for SBO with transition point and mesenteric swirling raising concern for internal hernia.  Final report still pending.  Emergency consult surgeon and will place NGT. surgery placed NGT at bedside requesting admission to Dr. Fernandez. Anahi Springer PA-C

## 2021-09-12 NOTE — PROGRESS NOTES
Occupational Therapy Daily Treatment Note    Unit: 2 [place]  Date:  9/12/2021  Patient Name:    Tay Nesbitt  Admitting diagnosis:  Appendicitis [K37]  Acute appendicitis with perforation and generalized peritonitis, unspecified whether abscess present, unspecified whether gangrene present [K35.20]  Admit Date:  8/30/2021  Precautions/Restrictions:  Fall risk, Lines -IV, Cash catheter, NG tube and Drains (LLQ drain x 2), Telemetry, Continuous pulse oximetry and midline abdominal incision. Abdominal binder in room but not in use. Treatment Time:  9757-5990  Treatment number:  5   Total Treatment Time:   25 minutes    Patient Goals for Session:  \" to do some exercises \"      Discharge Recommendations: SNF  DME needs for discharge: defer to facility       Therapy recommendations for staff:  Assist of 1-2 with use of rolling walker and gait belt for all transfers to/from BSC/chair    History of Present Illness: ED note, 8/30/2021, [place]:   \" 66-year-old female history of gastric sleeve, who presents with right lower quadrant pain nausea and fevers.  Symptoms started yesterday.  Patient reports she has a moderate to severe right lower quadrant pain that is exacerbated with some movements and was painful going over bumps on the right ovary.  Patient reports \"I knew something was wrong I spike a fever.  I never get fevers\".  Nausea no vomiting passing stool passing flatus.  No chest pain or shortness of breath or back pain no pelvic symptoms.  No urinary symptoms. \"      PMHx: Acute renal failure (ARF) ([facility] [address] (May 4, 2015), Arthritis, Asthma, DDD (degenerative disc disease), cervical, Deep vein thrombosis (DVT) ([facility] [address] (07/2015), Degenerative disc disease, Family history of factor V deficiency, GERD (gastroesophageal reflux disease), Hypertension, Scoliosis, Sleep apnea, and Spinal stenosis.     Home Health S4 Level Recommendation:  NA    AM-PAC Score: AM-PAC Inpatient Daily Activity Raw Score: 12  Pt scored a 12/24 on the AM-PAC ADL Inpatient form. Current research shows that an AM-PAC score of 17 or less is typically not associated with a discharge to the patient's home setting. Subjective:  Pt seated in bedside chair upon therapist arrival. Pt agreeable to work with therapy this date. Pain   Yes  Rating: moderate and severe  Location:back   Pain Medicine Status: Received pain med prior to tx      Cognition:    A&O Person, Place, Time and Situation   Able to follow 2 step commands, consistently. Balance:  Functional Sitting Balance:  NT   Comments: NT  Functional Standing Balance:NT   Comments: NT    Bed mobility:    Supine to sit:   Not Tested  Sit to supine:   Not Tested  Rolling:    Not Tested  Scooting in sitting:  Not Tested  Scooting to head of bed:   Not Tested   Bridging:   Not Tested    Transfers:  Pt declined transfers at this time related to back pain. Sit to stand:  Not Tested  Stand to sit:  Not Tested  Bed to chair:   Not Tested  Standard toilet: Not Tested  Bed to [facility]:  Not Tested    Activities of Daily Living:   UB Dressing:   Not Tested  LB Dressing:    total assistance (100%)  UB Bathing:  Not Tested  LB Bathing:  Not Tested  Feeding:  Independent  Grooming:   Not Tested  Toileting:  Not Tested    Activity Tolerance:   Pt completed therapy session with No adverse symptoms noted w/activity    Therapeutic Exercise: The following completed with yellow therbernadined, as able. Shoulder flex/ext:  x15  Shoulder horizontal abd/add:  x15  shoulder elevation:  x15   Elbow flexion:  x15  Elbow extension:  x15    Patient Education:   Role of OT  Recommendations for DC    Positioning Needs:   Up in chair, call light and needs in reach. Alarm Set    Family Present:  No    Assessment: Pt with good progress this date. Demonstrated independence with HEP. Pt may benefit from continued skilled occupational therapy while in the hospital in order to progress to a safe and more indpt level of functioning. GOALS  To be met in 3 Visits:  1). Bed to toilet: minimal assistance (25%)     To be met in 5 Visits:  2). Upper Body Bathing:         minimal assistance (25%)  3). Lower Body Bathing:         maximal assistance (75%)  4). Upper Body Dressing:       minimal assistance (25%)  5). Lower Body Dressing:       maximal assistance (75%)  6).  Pt to demonstrate UE exs x 15 reps with minimal cues  (goal met and d/c 9/12/2021)    Plan: cont with [facility], MOT, OTR/L   [id number]       If patient discharges from this facility prior to next visit, this note will serve as the Discharge Summary

## 2021-09-12 NOTE — PROGRESS NOTES
Shift assessment complete. See flow sheet. Scheduled meds given. See MAR. Patients head-toe complete, VS are logged, active bowel sound noted in all four quadrants. Lungs sounds clr. At this time on clear liquid diet and tolerating well. No needs are noted at this time. Call light and bedside table are within reach. The bed is locked and is in the lowest position.     Veena Casillas RN

## 2021-09-12 NOTE — PLAN OF CARE
Problem: Pain:  Goal: Pain level will decrease  Description: Pain level will decrease  9/12/2021 1151 by Omar Carlin RN  Outcome: Ongoing  9/12/2021 0213 by Angeli Carroll RN  Outcome: Ongoing  Goal: Control of acute pain  Description: Control of acute pain  9/12/2021 1151 by Omar Carlin RN  Outcome: Ongoing  9/12/2021 0213 by Angeli Carroll RN  Outcome: Ongoing  Goal: Control of chronic pain  Description: Control of chronic pain  9/12/2021 0213 by Angeli Carroll RN  Outcome: Ongoing  Goal: Patient's pain/discomfort is manageable  Description: Patient's pain/discomfort is manageable  9/12/2021 0213 by Angeli Carroll RN  Outcome: Ongoing     Problem: Skin Integrity:  Goal: Will show no infection signs and symptoms  Description: Will show no infection signs and symptoms  9/12/2021 0213 by Angeli Carroll RN  Outcome: Ongoing  Goal: Absence of new skin breakdown  Description: Absence of new skin breakdown  9/12/2021 0213 by Angeli Carroll RN  Outcome: Ongoing     Problem: Falls - Risk of:  Goal: Will remain free from falls  Description: Will remain free from falls  9/12/2021 0213 by Angeli Carroll RN  Outcome: Ongoing  Goal: Absence of physical injury  Description: Absence of physical injury  9/12/2021 0213 by Angeli Carroll RN  Outcome: Ongoing     Problem: Nausea/Vomiting:  Goal: Absence of nausea/vomiting  Description: Absence of nausea/vomiting  9/12/2021 0213 by Angeli Carroll RN  Outcome: Ongoing  Goal: Able to drink  Description: Able to drink  9/12/2021 1151 by Omar Carlin RN  Outcome: Ongoing  9/12/2021 0213 by Angeli Carroll RN  Outcome: Ongoing  Goal: Able to eat  Description: Able to eat  9/12/2021 1151 by Omar Carlin RN  Outcome: Ongoing  9/12/2021 0213 by Angeli Carroll RN  Outcome: Ongoing  Goal: Ability to achieve adequate nutritional intake will improve  Description: Ability to achieve adequate nutritional intake will improve  9/12/2021 0213 by Angeli Carroll RN  Outcome: Ongoing Problem: Infection:  Goal: Will remain free from infection  Description: Will remain free from infection  9/12/2021 0213 by Robyn Montague RN  Outcome: Ongoing     Problem: Safety:  Goal: Free from accidental physical injury  Description: Free from accidental physical injury  9/12/2021 0213 by Robyn Montague RN  Outcome: Ongoing  Goal: Free from intentional harm  Description: Free from intentional harm  9/12/2021 0213 by Robyn Montague RN  Outcome: Ongoing     Problem: Daily Care:  Goal: Daily care needs are met  Description: Daily care needs are met  9/12/2021 0213 by Robyn Montague RN  Outcome: Ongoing     Problem: Skin Integrity:  Goal: Skin integrity will stabilize  Description: Skin integrity will stabilize  9/12/2021 0213 by Robyn Montague RN  Outcome: Ongoing     Problem: Discharge Planning:  Goal: Patients continuum of care needs are met  Description: Patients continuum of care needs are met  9/12/2021 0213 by Robyn Montague RN  Outcome: Ongoing     Problem: Non-Violent Restraints  Goal: Removal from restraints as soon as assessed to be safe  9/12/2021 0213 by Robyn Montague RN  Outcome: Ongoing  Goal: No harm/injury to patient while restraints in use  9/12/2021 0213 by Robyn Montague RN  Outcome: Ongoing  Goal: Patient's dignity will be maintained  9/12/2021 0213 by Robyn Montague RN  Outcome: Ongoing     Problem: Nutrition  Goal: Optimal nutrition therapy  9/12/2021 0213 by Robyn Montague RN  Outcome: Ongoing

## 2021-09-12 NOTE — FLOWSHEET NOTE
09/12/21 [address] [facility] [address] changed   Pt. Was bathed with bath wipes, and chlorhexidine wipes. New Interdry placed between abdominal folds, under the breasts and the back folds. Pt. Abdominal wound cleansed with normal saline, and wound was packed with silver AG ribbon, and covered by guaze secured with tape. Wound had a moderate amount of foul smelling drainage. Pt. Tolerated well.

## 2021-09-13 VITALS
DIASTOLIC BLOOD PRESSURE: 72 MMHG | RESPIRATION RATE: 16 BRPM | HEIGHT: 63 IN | SYSTOLIC BLOOD PRESSURE: 128 MMHG | BODY MASS INDEX: 51.91 KG/M2 | HEART RATE: 85 BPM | OXYGEN SATURATION: 98 % | WEIGHT: 293 LBS | TEMPERATURE: 98.9 F

## 2021-09-13 LAB
ANISOCYTOSIS: ABNORMAL
BASOPHILS ABSOLUTE: 0.1 K/UL (ref 0–0.2)
BASOPHILS RELATIVE PERCENT: 1 %
EOSINOPHILS ABSOLUTE: 0.1 K/UL (ref 0–0.6)
EOSINOPHILS RELATIVE PERCENT: 1 %
GLUCOSE BLD-MCNC: 75 MG/DL (ref 70–99)
GLUCOSE BLD-MCNC: 78 MG/DL (ref 70–99)
GLUCOSE BLD-MCNC: 82 MG/DL (ref 70–99)
HCT VFR BLD CALC: 31.8 % (ref 36–48)
HEMOGLOBIN: 10.4 G/DL (ref 12–16)
LYMPHOCYTES ABSOLUTE: 1.5 K/UL (ref 1–5.1)
LYMPHOCYTES RELATIVE PERCENT: 15 %
MCH RBC QN AUTO: 31.9 PG (ref 26–34)
MCHC RBC AUTO-ENTMCNC: 32.7 G/DL (ref 31–36)
MCV RBC AUTO: 97.5 FL (ref 80–100)
MONOCYTES ABSOLUTE: 0.4 K/UL (ref 0–1.3)
MONOCYTES RELATIVE PERCENT: 4 %
NEUTROPHILS ABSOLUTE: 7.8 K/UL (ref 1.7–7.7)
NEUTROPHILS RELATIVE PERCENT: 79 %
PDW BLD-RTO: 16.6 % (ref 12.4–15.4)
PERFORMED ON: NORMAL
PLATELET # BLD: 520 K/UL (ref 135–450)
PLATELET SLIDE REVIEW: ABNORMAL
PMV BLD AUTO: 7.3 FL (ref 5–10.5)
RBC # BLD: 3.26 M/UL (ref 4–5.2)
SLIDE REVIEW: ABNORMAL
WBC # BLD: 9.9 K/UL (ref 4–11)

## 2021-09-13 PROCEDURE — 6370000000 HC RX 637 (ALT 250 FOR IP): Performed by: NURSE PRACTITIONER

## 2021-09-13 PROCEDURE — 2580000003 HC RX 258: Performed by: INTERNAL MEDICINE

## 2021-09-13 PROCEDURE — 2700000000 HC OXYGEN THERAPY PER DAY

## 2021-09-13 PROCEDURE — 6360000002 HC RX W HCPCS: Performed by: INTERNAL MEDICINE

## 2021-09-13 PROCEDURE — 97116 GAIT TRAINING THERAPY: CPT

## 2021-09-13 PROCEDURE — 85025 COMPLETE CBC W/AUTO DIFF WBC: CPT

## 2021-09-13 PROCEDURE — 99239 HOSP IP/OBS DSCHRG MGMT >30: CPT | Performed by: INTERNAL MEDICINE

## 2021-09-13 PROCEDURE — 97110 THERAPEUTIC EXERCISES: CPT

## 2021-09-13 PROCEDURE — 99024 POSTOP FOLLOW-UP VISIT: CPT | Performed by: NURSE PRACTITIONER

## 2021-09-13 PROCEDURE — 94761 N-INVAS EAR/PLS OXIMETRY MLT: CPT

## 2021-09-13 PROCEDURE — 97530 THERAPEUTIC ACTIVITIES: CPT

## 2021-09-13 PROCEDURE — 36415 COLL VENOUS BLD VENIPUNCTURE: CPT

## 2021-09-13 PROCEDURE — 6370000000 HC RX 637 (ALT 250 FOR IP): Performed by: SURGERY

## 2021-09-13 RX ORDER — OXYCODONE HYDROCHLORIDE 5 MG/1
10 TABLET ORAL ONCE
Status: COMPLETED | OUTPATIENT
Start: 2021-09-13 | End: 2021-09-13

## 2021-09-13 RX ORDER — TOPIRAMATE 100 MG/1
100 TABLET, FILM COATED ORAL NIGHTLY
DISCHARGE
Start: 2021-09-13 | End: 2021-10-25

## 2021-09-13 RX ORDER — LACTOBACILLUS RHAMNOSUS GG 10B CELL
1 CAPSULE ORAL 2 TIMES DAILY
Qty: 30 CAPSULE | Refills: 0 | DISCHARGE
Start: 2021-09-13 | End: 2022-01-19 | Stop reason: CLARIF

## 2021-09-13 RX ORDER — OXYCODONE HYDROCHLORIDE AND ACETAMINOPHEN 5; 325 MG/1; MG/1
1 TABLET ORAL EVERY 6 HOURS PRN
Qty: 12 TABLET | Refills: 0 | Status: SHIPPED | OUTPATIENT
Start: 2021-09-13 | End: 2021-09-16

## 2021-09-13 RX ORDER — AMOXICILLIN AND CLAVULANATE POTASSIUM 875; 125 MG/1; MG/1
1 TABLET, FILM COATED ORAL 2 TIMES DAILY
Qty: 14 TABLET | Refills: 0 | DISCHARGE
Start: 2021-09-13 | End: 2021-09-20

## 2021-09-13 RX ADMIN — OXYCODONE HYDROCHLORIDE 10 MG: 5 TABLET ORAL at 09:20

## 2021-09-13 RX ADMIN — PANTOPRAZOLE SODIUM 40 MG: 40 TABLET, DELAYED RELEASE ORAL at 06:24

## 2021-09-13 RX ADMIN — ENOXAPARIN SODIUM 40 MG: 40 INJECTION SUBCUTANEOUS at 09:19

## 2021-09-13 RX ADMIN — PIPERACILLIN SODIUM AND TAZOBACTAM SODIUM 3375 MG: 3; .375 INJECTION, POWDER, LYOPHILIZED, FOR SOLUTION INTRAVENOUS at 06:23

## 2021-09-13 RX ADMIN — FLUCONAZOLE, SODIUM CHLORIDE 200 MG: 2 INJECTION INTRAVENOUS at 02:24

## 2021-09-13 RX ADMIN — OXYCODONE HYDROCHLORIDE 10 MG: 5 TABLET ORAL at 12:18

## 2021-09-13 RX ADMIN — PIPERACILLIN SODIUM AND TAZOBACTAM SODIUM 3375 MG: 3; .375 INJECTION, POWDER, LYOPHILIZED, FOR SOLUTION INTRAVENOUS at 14:17

## 2021-09-13 ASSESSMENT — PAIN SCALES - GENERAL
PAINLEVEL_OUTOF10: 2
PAINLEVEL_OUTOF10: 7
PAINLEVEL_OUTOF10: 7

## 2021-09-13 NOTE — PROGRESS NOTES

## 2021-09-13 NOTE — PROGRESS NOTES
Patient refuses to wear the abdominal binder, writer educated pt on the importance of wearing it. However, patient stated \"it does no good and rolls down\".

## 2021-09-13 NOTE — PROGRESS NOTES
General Surgery - Tricia Price, APRN - CNP, CNP  Daily Progress Note    Pt Name: Jennifer Gorman  Medical Record Number: [id number]  Date of Birth 1965   Today's Date: 9/13/2021    ASSESSMENT  1. POD #8 S/P Right colectomy, incarcerated ventral, incisional hernia repair, omphalectomy, drainage of intraabdominal abscess. 2. ABD: morbidly obese, staples remaining are intact: midline wound changed: creamy/pink drainage with slight odor noted: packed with 4x4 gauze and covered with 4x4 gauze, + incisional pain improved per pt and mostly with movement, FATOUMATA x 2 in place, NGT, + flatus, + BM, no N/V  3. Leuks normal: 9.9  4. Midline wound: E coli, mixed skin ariadne: e coli is pan-sensitive   5. VSS: afebrile, BP normal   6. FATOUMATA x 2: 20/20 ml: both serous: removed today   7. Pt states she \"feels a lot better and is ready to get home. \"      PLAN  1. Soft diet: advance as tolerated  2. Pain control: PO pain meds  3. IS q 1 hour while awake   4. PT/OT: recommending SNF  5. DVT proph: Lovenox  6. GI proph: Protonix   7. Zosyn and Diflucan. 8. BID midline dressing change  9. Pt looks good POD #8: Pt much improved. She can eb discharged to SNF from a surgical standpoint on PO antibiotics. F/U with Dr. Monica Guadalupe on 9/22 in the office. Rachel Garvin is improved from yesterday. Pain is well controlled. She has no nausea and no vomiting. She has passed flatus and has had a bowel movement. She is tolerating a soft diet. Current activity is up with assistance    OBJECTIVE  VITALS:  height is 5' 3\" (1.6 m) and weight is 310 lb 10.1 oz (140.9 kg) (abnormal). Her oral temperature is 98.9 °F (37.2 °C). Her blood pressure is 128/72 and her pulse is 85. Her respiration is 16 and oxygen saturation is 98%.    VITALS:  /72   Pulse 85   Temp 98.9 °F (37.2 °C) (Oral)   Resp 16   Ht 5' 3\" (1.6 m)   Wt (!) 310 lb 10.1 oz (140.9 kg)   LMP 12/24/2010   SpO2 98%   BMI 55.03 kg/m²   INTAKE/OUTPUT:      Intake/Output Summary (Last 24 hours) at 9/13/2021 1509  Last data filed at 9/13/2021 5985  Gross per 24 hour   Intake 500.74 ml   Output 40 ml   Net 460.74 ml     URINARY CATHETER OUTPUT (Cash):     GENERAL: alert, cooperative  I/O last 3 completed shifts: In: 500.7 [IV Piggyback:500.7]  Out: 40 [Drains:40]  No intake/output data recorded.     LABS  Recent Labs     09/13/21  0532   WBC 9.9   HGB 10.4*   HCT 31.8*   *     CBC with Differential:    Lab Results   Component Value Date    WBC 9.9 09/13/2021    RBC 3.26 09/13/2021    HGB 10.4 09/13/2021    HCT 31.8 09/13/2021     09/13/2021    MCV 97.5 09/13/2021    MCH 31.9 09/13/2021    MCHC 32.7 09/13/2021    RDW 16.6 09/13/2021    BANDSPCT 1 09/12/2021    METASPCT 1 09/05/2021    LYMPHOPCT 15.0 09/13/2021    PROMYELOPCT 1 09/05/2021    MONOPCT 4.0 09/13/2021    MYELOPCT 1 09/05/2021    BASOPCT 1.0 09/13/2021    MONOSABS 0.4 09/13/2021    LYMPHSABS 1.5 09/13/2021    EOSABS 0.1 09/13/2021    BASOSABS 0.1 09/13/2021     CMP:    Lab Results   Component Value Date     09/10/2021    K 3.4 09/10/2021    K 3.5 08/31/2021     09/10/2021    CO2 23 09/10/2021    BUN 5 09/10/2021    CREATININE <0.5 09/10/2021    GFRAA >60 09/10/2021    AGRATIO 0.5 09/10/2021    LABGLOM >60 09/10/2021    GLUCOSE 104 09/10/2021    PROT 5.4 09/10/2021    LABALBU 1.8 09/10/2021    CALCIUM 7.7 09/10/2021    BILITOT 0.7 09/10/2021    ALKPHOS 100 09/10/2021    AST 10 09/10/2021    ALT 8 09/10/2021         BANDAR Allen - ANNI  Electronically signed 9/13/2021 at 3:09 PM

## 2021-09-13 NOTE — FLOWSHEET NOTE
provided listening and support for patient, who states that she is worried about her  Francesco Sommers as he makes difficult decisions with his family re: moving pt's father-in-law into a skilled nursing facility, possibly permanently. Writer affirmed patient's support for her  by encouraging him to attend to his family while she is being transported to rehab facility this evening. Patient accepted 's offer to pray with her, requesting prayers for Deb Harness.     09/13/21 1527   Encounter Summary   Services provided to: Patient   Referral/Consult From: Rounding   Support System Spouse; Family members   Complexity of Encounter Moderate   Length of Encounter 45 minutes   Routine   Type Follow up   Assessment Approachable; Anxious; Coping   Intervention Active listening;Explored feelings, thoughts, concerns;Nurtured hope;Prayer;Sustaining presence/ Ministry of presence; Discussed belief system/Confucianism practices/charity;Discussed illness/injury and it's impact   Outcome Expressed gratitude;Engaged in conversation; Shared life review;Expressed feelings/needs/concerns;Coping;Tearful;Receptive

## 2021-09-13 NOTE — PROGRESS NOTES
Pt sitting in recliner with feet down eating breakfast, pt done with tray and requested help to reclining position in recliner, while assisting with raising foot of chair fresh serosnaguous fluid noted on pillow under pt's legs. Pt has drainage coming from bottom of dressings on ABD. Wiped fluid from pt's legs. Pt's assigned RN made aware.

## 2021-09-13 NOTE — FLOWSHEET NOTE
09/13/21 0928   Closed/Suction Drain Medial;Right RLQ Bulb   Placement Date/Time: 09/05/21 2316   Inserted by: DR. Green Prey Number: 1  Orientation: Medial;Right  Location: RLQ  Drain Tube Type: Bulb  Drain Reservoir Size (ml): 100 ml   Site Description Reddened   Dressing Status Old drainage; Changed   Drainage Appearance Serosanguinous   Status To bulb suction   Output (ml) 20 ml   Closed/Suction Drain Medial;Left LLQ Bulb   Placement Date/Time: 09/05/21 2317   Inserted by: DR. Green Prey Number: 2  Orientation: Medial;Left  Location: LLQ  Drain Tube Type: Bulb  Drain Reservoir Size (ml): 100 ml   Site Description Reddened   Dressing Status Old drainage; Changed   Drainage Appearance Serosanguinous   Status To bulb suction   Output (ml) 20 ml       RLQ FATOUMATA emptied and dressing changed,  LLQ FATOUMATA emptied and dressing changed.

## 2021-09-13 NOTE — DISCHARGE INSTR - OTHER ORDERS
Outpatient Wound Care Discharge Instructions      Patient Name:  Jennifer Gorman  YOB: 1965  Today's Date:  September 13, 2021  Medical Record Number:  [id number]  Provider:  Lashawn Thompson MD      Wound Cleansing:  Cleanse Wound:  with Normal saline    Topical Treatments:  Other:  Silver rope packing    Dressings:  Pack Wound:  with silver rope  and Change Dressing as ordered and as needed (PRN):  cover with gauze and ABD     Compression/Edema Control:  Other:  NA    Off-Loading:  Other:  NA    Dietary:  Supplement with:  meals    Activity:  Other:  up as tolerated    Discharge: Other:  remain from infection    Follow-up Appointment As Advised:  Wound and dressing supply provider:  Kei   Should you experience any significant changes in your wound(s) or have questions about your wound care, please contact Dr. Ras Be office. If you need help with your wound outside these hours and cannot wait until we are again available, contact your PCP or go to the hospital Emergency Room. PLEASE NOTE:  IF YOU ARE UNABLE TO OBTAIN WOUND SUPPLIES, CONTINUE TO USE THE SUPPLIES YOU HAVE AVAILABLE UNTIL YOU ARE ABLE TO REACH US. IT IS MOST IMPORTANT TO KEEP THE WOUND COVERED AT ALL TIMES.

## 2021-09-13 NOTE — CARE COORDINATION
INTERDISCIPLINARY PLAN OF CARE CONFERENCE    Date/Time: 9/13/2021 10:55 AM  Completed by: Lisa Veliz RN, Case Management      Patient Name:  Mike Nesbitt  YOB: 1965  Admitting Diagnosis: Appendicitis [K37]  Acute appendicitis with perforation and generalized peritonitis, unspecified whether abscess present, unspecified whether gangrene present [K35.20]     Admit Date/Time:  8/30/2021 10:47 AM    Chart reviewed. Interdisciplinary team contacted or reviewed plan related to patient progress and discharge plans. Disciplines included Case Management, Nursing, and Dietitian. Current Status: 08/30/2021  PT/OT recommendation for discharge plan of care:   Discharge Recommendations: SNF  Therapy recommendations for staff:  Assist of 1-2 with use of rolling walker and gait belt for all transfers to/from BSC/chair  Expected D/C Disposition:  SNF  Confirmed plan with patient  Discharge Plan Comments: Chart reviewed. Met with pt at bedside and explained the role of the CM. Plans to DC to [facility] + Pre-cert approved through 8/15. NGT removed. Possible DC to SNF later today. Pt will transport to [facility] [place] via private car. She states that she does have a CPAP at home but has not used it for a long time.        Home O2 in place on admit: No  Pt informed of need to bring portable home O2 tank on day of discharge for nursing to connect prior to leaving:  No  Verbalized agreement/Understanding:  No

## 2021-09-13 NOTE — PROGRESS NOTES
IV left hand removed. Catheter intact and dressing applied. Pt tolerated well. Assisted patient to [facility]. Mepilex on coccyx replaced. ABD pad placed over abdominal wound due to drainage. Pt provided with new gown, and belongings packed.

## 2021-09-13 NOTE — PROGRESS NOTES
MATTHEWM for receiving RN at [facility] to call for report, (message left with Rylan Larose).

## 2021-09-13 NOTE — FLOWSHEET NOTE
09/13/21 0930   Incision 09/05/21 Abdomen Lower;Medial   Date First Assessed/Time First Assessed: 09/05/21 2206   Present on Hospital Admission: No  Primary Wound Type: Surgical Type  Location: Abdomen  Wound Location Orientation: Lower;Medial   Dressing Status Breakthrough drainage noted; Old drainage noted;New drainage noted;New dressing applied   Incision Cleansed Cleansed with saline   Dressing/Treatment Dry dressing;Tape/Soft cloth adhesive tape;ABD pad;Gauze dressing/dressing sponge;Alginate with Ag   Incision Assessment Other (Comment)  (purulent green/brown drainage)   Drainage Amount Small   Drainage Description Brown;Green;Purulent   Odor Mild   Duyen-incision Assessment Intact       Bandages changed, wounds cleaned and pt tolerated well,

## 2021-09-13 NOTE — PLAN OF CARE
Problem: Pain:  Goal: Pain level will decrease  Description: Pain level will decrease  9/12/2021 2113 by Aruna Shannon RN  Outcome: Ongoing  9/12/2021 1151 by Stephanie Nicholas RN  Outcome: Ongoing  Goal: Control of acute pain  Description: Control of acute pain  9/12/2021 2113 by Aruna Shannon RN  Outcome: Ongoing  9/12/2021 1151 by Stephanie Nicholas RN  Outcome: Ongoing  Goal: Control of chronic pain  Description: Control of chronic pain  Outcome: Ongoing  Goal: Patient's pain/discomfort is manageable  Description: Patient's pain/discomfort is manageable  Outcome: Ongoing     Problem: Skin Integrity:  Goal: Will show no infection signs and symptoms  Description: Will show no infection signs and symptoms  Outcome: Ongoing  Goal: Absence of new skin breakdown  Description: Absence of new skin breakdown  Outcome: Ongoing     Problem: Falls - Risk of:  Goal: Will remain free from falls  Description: Will remain free from falls  Outcome: Ongoing  Goal: Absence of physical injury  Description: Absence of physical injury  Outcome: Ongoing     Problem: Nausea/Vomiting:  Goal: Absence of nausea/vomiting  Description: Absence of nausea/vomiting  Outcome: Ongoing  Goal: Able to drink  Description: Able to drink  9/12/2021 2113 by Aruna Shannon RN  Outcome: Ongoing  9/12/2021 1151 by Stephanie Nicholas RN  Outcome: Ongoing  Goal: Able to eat  Description: Able to eat  9/12/2021 2113 by Aruna Shannon RN  Outcome: Ongoing  9/12/2021 1151 by Stephanie Nicholas RN  Outcome: Ongoing  Goal: Ability to achieve adequate nutritional intake will improve  Description: Ability to achieve adequate nutritional intake will improve  Outcome: Ongoing     Problem: Infection:  Goal: Will remain free from infection  Description: Will remain free from infection  Outcome: Ongoing     Problem: Safety:  Goal: Free from accidental physical injury  Description: Free from accidental physical injury  Outcome: Ongoing  Goal: Free from intentional harm  Description: Free from intentional harm  Outcome: Ongoing     Problem: Daily Care:  Goal: Daily care needs are met  Description: Daily care needs are met  Outcome: Ongoing     Problem: Skin Integrity:  Goal: Skin integrity will stabilize  Description: Skin integrity will stabilize  Outcome: Ongoing     Problem: Discharge Planning:  Goal: Patients continuum of care needs are met  Description: Patients continuum of care needs are met  Outcome: Ongoing     Problem: Non-Violent Restraints  Goal: Removal from restraints as soon as assessed to be safe  Outcome: Ongoing  Goal: No harm/injury to patient while restraints in use  Outcome: Ongoing  Goal: Patient's dignity will be maintained  Outcome: Ongoing     Problem: Nutrition  Goal: Optimal nutrition therapy  Outcome: Ongoing

## 2021-09-13 NOTE — PROGRESS NOTES
Patient left the unit in stable condition in stretcher with [employer] transportation. Patient is discharged to [facility]. Patient left with all of their belongings.

## 2021-09-13 NOTE — PROGRESS NOTES
Inpatient Physical Therapy Daily Treatment Note    Unit: [facility]  Date:  9/13/2021  Patient Name:    Jenny Nesbitt  Admitting diagnosis:  Appendicitis [K37]  Acute appendicitis with perforation and generalized peritonitis, unspecified whether abscess present, unspecified whether gangrene present [K35.20]  Admit Date:  8/30/2021  Precautions/Restrictions/WB Status/ Lines/ Wounds/ Oxygen: Fall risk, Lines -IV, Cash catheter, NG tube and Drains (LLQ drain x 2), Telemetry, Continuous pulse oximetry and midline abdominal incision. Abdominal binder in room but not in use. Treatment Time: 10:56-11:38  Treatment Number:  5  Timed Code Treatment Minutes: 42 minutes  Total Treatment Minutes: 42  minutes     Patient Goals for Therapy: \" to go to rehab \"          Discharge Recommendations: SNF  DME needs for discharge: defer to facility       Therapy recommendation for EMS Transport: can transport by wheelchair. Therapy recommendations for staff:   Assist of 1 for bed mobility and EOB sitting. Assist of 1 for transfer bed<>chair and BSC with bariatric rolling walker. History Of Present Illness:    per HÉCTOR Escamilla (8/30/2021)  \"The patient is a 60 y.o. female with a PMH of DVT, asthma, GERD, FLORIAN, morbid obesity, history of laparoscopic sleeve gastrectomy who presented to [facility] ED with complaint of RLQ abdominal pain that started yesterday. Initially thought symptoms were related to gas pains. She had a BM yesterday which was soft, brown stool. Symptoms did not get any better. Woke up early this morning with sharp right lower quadrant abdominal pain and a fever. Denies any nausea or vomiting. Reports shaking chills. She decided to come to the ER. Work-up in the ED showed: Patient's temperature was 100.4. Blood pressures were stable. Potassium and magnesium were low. She had a leukocytosis of 14.3. Urine was negative. Covid was negative.   CT of the abdomen showed acute appendicitis with focal perforation and with RW    Gait gait completed as indicated below  Distance:      20 ft  Deviations (firm surface/linoleum):  decreased scottie and decreased step length bilaterally;  Assistive Device Used:    rolling walker (RW)  Level of Assist:    CGA   Comment: Pt shows improved step initiation today, still with minimal foot clearance. Moderately SOB upon completion but recovering fairly quickly with seated rest.    Stair Training deferred, pt unsafe/ not appropriate to complete stairs at this time     Activity Tolerance:   Pt completed therapy with no adverse symptoms or complaints. Positioning Needs:   Pt up in chair, no alarm needed, positioned in proper neutral alignment and pressure relief provided. Call light provided and all needs within reach    Exercises Initiated  all completed bilaterally unless indicated  5x sit to stand from EOB - CGA/SBA with walker. 10-15 sec rest btw last 3 reps. Other  None. Patient/Family Education   Pt educated on role of inpatient PT, POC, importance of continued activity, DC recommendations, safety awareness, transfer techniques, pursed lip breathing, energy conservation, pacing activity and calling for assist with mobility. Assessment  Pt seen for Physical Therapy follow-up treatment. Pt is progressing well with all mobility goals. Grossly at CGA/SBA for transfers. Pt tolerated increased ambulation up to 20' today with CGA using the walker. She is limited by fatigue and generalized weakness. Pt will benefit from skilled PT in acute setting to promote activity tolerance and independent functional mobility. Recommending SNF upon discharge as patient functioning well below baseline, demonstrates good rehab potential and unable to return home due to limited or no family support, inability to negotiate stairs to enter home/bedroom/bathroom, burden of care beyond caregiver ability and home environment not conducive to patient recovery. Goals :    To be met in 3 visits:  1). Independent with LE Ex x 10 reps - 9/9 goal met  2.) Bed to chair: SBA - 9/13 goal met    To be met in 6 visits:  1). Supine to/from sit: Min A - 9/10 goal met, upgrade to SBA  2). Sit to/from stand: Min A  - 9/10 goal met, upgrade to SBA  3). Bed to chair: Mod A  - 9/10 goal met, upgrade to SBA, 9/13 goal met, upgrade to Supervision  4). Gait: Ambulate 50 ft.  with  Mod A  and use of LRAD (least restrictive assistive device)  5). Tolerate B LE exercises 3 sets of 10-15 reps  6). Ascend/descend 3 steps with Mod A  with use of no handrail and LRAD (least restrictive assistive device)    Rehabilitation Potential: Good  Strengths for achieving goals include:   Pt motivated, PLOF, Family Support and Pt cooperative   Barriers to achieving goals include:    Pain and Weakness    Plan    To be seen 3-5 x / week  while in acute care setting for therapeutic exercises, bed mobility, transfers, progressive gait training, balance training, and family/patient education. Signature: Mkie Nance, PT, DPT    If patient discharges from this facility prior to next visit, this note will serve as the Discharge Summary.

## 2021-09-13 NOTE — DISCHARGE INSTR - COC
Continuity of Care Form    Patient Name: Ra Koenig   :  1965  MRN:  [id number]    Admit date:  2021  Discharge date:  2021    Code Status Order: Full Code   Advance Directives:      Admitting Physician:  Sara Farris MD  PCP: Marco A Mcneil MD    Discharging Nurse: [facility] Unit/Room#: 9555/0886-29  Discharging Unit Phone Number: [phone]    Emergency Contact:   Extended Emergency Contact Information  Primary Emergency Contact: Rosa Anguiano, [address] [place] [address] Phone: [phone]  Mobile Phone: [phone]  Relation: Brother/Sister  Secondary Emergency Contact: Aby Farmer  Address: [address] [place] [address] [address] [facility]  Home Phone: [phone]  Mobile Phone: [phone]  Relation: Domestic Partner    Past Surgical History:  Past Surgical History:   Procedure Laterality Date    CARPAL TUNNEL RELEASE      JOINT REPLACEMENT Bilateral     hips and knees    LAPAROTOMY N/A 2021    RIGHT COLECTOMY, INCARCERATED VENTRAL INCISIONAL HERNIA REPAIR, OMPHALECTOMY, DRAINAGE OF INTRA ABDOMINAL ABSCESS. performed by Kelsey Elena MD at [facility]      3x right, 2x left    SLEEVE GASTRECTOMY N/A 2021    LAPAROSCOPIC SLEEVE GASTRECTOMY - ETHICON performed by Chitra Rose MD at [facility] N/A 2020    EGD BIOPSY performed by Chitra Rose MD at [address] [facility] ENDOSCOPY       Immunization History:   Immunization History   Administered Date(s) Administered    COVID-19, Moderna, PF, 100mcg/0.5mL 2021, 2021    Influenza Vaccine, unspecified formulation 2014, 10/06/2015, 2016    Influenza, High Dose (Fluzone 65 yrs and older) 2014    Influenza, Intradermal, Quadrivalent, Preservative Free 2016    Influenza, Quadv, IM, (6 mo and older Fluzone, Flulaval, Fluarix and 3 yrs and older Afluria) 2019    Influenza, Quadv, Recombinant, IM PF (Flublok 18 yrs and older) 08/23/2019    Pneumococcal Polysaccharide ([id number]) 05/08/2015    Tdap (Boostrix, Adacel) 12/01/2014, 01/12/2018    Zoster Recombinant (Shingrix) 10/20/2020       Active Problems:  Patient Active Problem List   Diagnosis Code    Arthritis M19.90    Chronic GERD H87.7    Umbilical abnormality G22.6    History of DVT (deep vein thrombosis) Z86.718    Other chronic pain G89.29    Morbid obesity ([facility]) E66.01    Family history of factor V Leiden mutation Z83.2    Urinary incontinence, urge N39.41    Snoring R06.83    Incarcerated ventral hernia K43.6    Obstructive sleep apnea G47.33    S/P laparoscopic sleeve gastrectomy Z98.84    Appendicitis K37    Leukocytosis D72.829    Hypomagnesemia E83.42    Hypokalemia E87.6    Hypotension due to hypovolemia I95.89, E86.1    Right lower quadrant abdominal pain R10.31    Ileus ([facility]) K56.7    Perforated appendicitis K35.32    Septic shock ([facility]) A41.9, R65.21    Electrolyte disorder E87.8    Acute respiratory failure with hypoxia ([facility]) J96.01    Moderate protein-calorie malnutrition ([facility]) E44.0       Isolation/Infection:   Isolation            No Isolation          Patient Infection Status       None to display            Nurse Assessment:  Last Vital Signs: /71   Pulse 73   Temp 98.1 °F (36.7 °C) (Oral)   Resp 16   Ht 5' 3\" (1.6 m)   Wt (!) 310 lb 10.1 oz (140.9 kg)   LMP 12/24/2010   SpO2 99%   BMI 55.03 kg/m²     Last documented pain score (0-10 scale): Pain Level: 7  Last Weight:   Wt Readings from Last 1 Encounters:   09/06/21 (!) 310 lb 10.1 oz (140.9 kg)     Mental Status:  oriented and alert    IV Access:  - None    Nursing Mobility/ADLs:  Walking   Assisted  Transfer  Assisted  Bathing  Assisted  Dressing  Assisted  Toileting  Assisted  Feeding  Assisted  Med Admin  Assisted  Med Delivery   whole    Wound Care Documentation and Therapy:  Wound Coccyx (Active)   Wound Etiology Pressure Stage  1 09/10/21 0836   Dressing Status Clean;Dry 09/12/21 9357 Dressing/Treatment Foam 09/12/21 2112   Number of days:         Elimination:  Continence: Bowel: No  Bladder: No  Urinary Catheter: None   Colostomy/Ileostomy/Ileal Conduit: No       Date of Last BM: 9/12/2021    Intake/Output Summary (Last 24 hours) at 9/13/2021 1054  Last data filed at 9/12/2021 1827  Gross per 24 hour   Intake 500.74 ml   Output --   Net 500.74 ml     I/O last 3 completed shifts: In: 860.7 [P.O.:360; IV Piggyback:500.7]  Out: -     Safety Concerns: At Risk for Falls    Impairments/Disabilities:      Wound    Nutrition Therapy:  Current Nutrition Therapy:   - Oral Diet:  Low Fiber    Routes of Feeding: Oral  Liquids: No Restrictions  Daily Fluid Restriction: no  Last Modified Barium Swallow with Video (Video Swallowing Test): not done    Treatments at the Time of Hospital Discharge:   Respiratory Treatments: NA  Oxygen Therapy:  is not on home oxygen therapy.   Ventilator:    - No ventilator support    Rehab Therapies: Physical Therapy and Occupational Therapy  Weight Bearing Status/Restrictions: No weight bearing restirctions  Other Medical Equipment (for information only, NOT a DME order):  walker, bedside commode, hospital bed and Wound care  Other Treatments: NA    Patient's personal belongings (please select all that are sent with patient):  ximena Mckenna RN SIGNATURE:  Electronically signed by Mey Flowers RN on 7/74/06 at 1:53 PM EDT    CASE MANAGEMENT/SOCIAL WORK SECTION    Inpatient Status Date: 08/30/2021    Readmission Risk Assessment Score:  Readmission Risk              Risk of Unplanned Readmission:  15           Discharging to Facility/ Agency   Name: [facility]  Address: [address] [place] [address], [address]  Phone: [phone]  Fax: [phone]    Dialysis Facility (if applicable)   Name:  Address:  Dialysis Schedule:  Phone:  Fax:    / signature: Electronically signed by Sahara Hernandez RN on 9/13/21 at 10:55 AM EDT    PHYSICIAN SECTION    Prognosis: Good    Condition at Discharge: Stable    Rehab Potential (if transferring to Rehab): Good    Recommended Labs or Other Treatments After Discharge: PT/OT TID. Please change pt midline wound BID and as needed for excessive drainage. Please pack the open midline wound with 4x4 gauze and cover with 4x4 gauze. Pt is to follow-up with Dr. Leonel Rodriguez in office on 9/22. Call [phone] to make your follow-up appointment. Please call Dr. Thelma Ness office for return of severe abdominal pain, persistent vomiting and/or persistent fevers greater than 100. See d/c instructions for additional details. Physician Certification: I certify the above information and transfer of Fabricio Cervantes  is necessary for the continuing treatment of the diagnosis listed and that she requires [place] for less 30 days.      Update Admission H&P: No change in H&P    PHYSICIAN SIGNATURE:  Electronically signed by Karen Dorman MD on 9/13/21 at 12:56 PM EDT

## 2021-09-13 NOTE — FLOWSHEET NOTE
09/13/21 0726   Vital Signs   Temp 98.1 °F (36.7 °C)   Temp Source Oral   Pulse 73   Heart Rate Source Monitor   Resp 16   /71   BP Location Right lower arm   Patient Position Up in chair   Level of Consciousness Alert (0)   MEWS Score 1   Patient Currently in Pain No   Oxygen Therapy   SpO2 99 %   O2 Device None (Room air)   Shift assessment complete. See flow sheet. Scheduled medications given, See MAR. Head to toe complete. Vital signs logged and active bowel sounds in all 4 quadrants. Pt up in chair with complaints of pain, PRN pain medication given,  See MAR. Wounds cleansed and new dressing applied. No further needs noted at this time. Call light and bedside table within reach. Bed in lowest position, wheels locked and side rails up x2.      Bill Bedolla RN

## 2021-09-13 NOTE — DISCHARGE SUMMARY
Name:  Heron Turner  Room:  8322/6946-44  MRN:    [id number]    Discharge Summary      This discharge summary is in conjunction with a complete physical exam done on the day of discharge. Attending Physician: Dr. Michael Montgomery  Discharging Physician: Dr. Kasey Machado: 8/30/2021  Discharge:   9/13/2021    HPI:  The patient is a 60 y.o. female with a PMH of DVT, asthma, GERD, FLORIAN, morbid obesity, history of laparoscopic sleeve gastrectomy who presented to [facility] ED with complaint of RLQ abdominal pain that started yesterday. Initially thought symptoms were related to gas pains. She had a BM yesterday which was soft, brown stool. Symptoms did not get any better. Woke up early this morning with sharp right lower quadrant abdominal pain and a fever. Denies any nausea or vomiting. Reports shaking chills. She decided to come to the ER.     Work-up in the ED showed: Patient's temperature was 100.4. Blood pressures were stable. Potassium and magnesium were low. She had a leukocytosis of 14.3. Urine was negative. Covid was negative. CT of the abdomen showed acute appendicitis with focal perforation and inflammatory changes in the right lower quadrant. Patient was admitted to the medical surgical unit and general surgery was consulted. Diagnoses this Admission and Hospital Course:    Acute Appendicitis with Focal Perforation  - CT showed appendicitis with focal perforation, tiny amount of free air with no focal fluid, severe inflammatory changed in RLQ  - s.p emergent laparotomy with right colectomy with abscess drainage POD 7  - continued abx: Zosyn D#12, diflucan D7  - NG out   - Is up in chair,  Tolerating diet. clear liquid-> advanced to reg diet  - Having bowel movements   Stable today . discussed with general surgery,  discussed with discharge planning pre-CERT available patient can be discharged to SNF.    she is tolerating a regular diet now, abdominal drain to be pulled out by surgery team today .  mid abdominal wound healing well -Culture is growing E. Coli.   patient can be discharged on oral antibiotics Augmentin   follow-up with general surgery in 1 week    Hx of DVT  - in 2015  - held ASA, on prophylactic lovenox here  Resume ASA on DC     Chronic Pain Syndrome  - continued Cymbalta and Neurontin     FLORIAN  - Continued home CPAP     Morbid Obesity  S/p Laparoscopic Sleeve Gastrectomy     Fluid overload   improved with lasix IV pRn     DVT Prophylaxis: Lovenox  Code Status: Full Code     Ongoing PT       Procedures (Please Review Full Report for Details)  DATE OF PROCEDURE:  09/05/2021     PREOPERATIVE DIAGNOSES:  1. Perforated appendicitis. 2.  Incarcerated ventral incisional hernia.     POSTOPERATIVE DIAGNOSES:  1. Perforated appendicitis. 2.  Incarcerated ventral incisional hernia.     OPERATIONS PERFORMED:  1. Right colectomy. 2.  Primary repair of incarcerated ventral incisional hernia. 3.  Drainage of intra-abdominal abscess via laparotomy. 4.  Omphalectomy    Consults    General surgery  Pulmonology  Wound care RN      Physical Exam at Discharge:    /72   Pulse 85   Temp 98.9 °F (37.2 °C) (Oral)   Resp 16   Ht 5' 3\" (1.6 m)   Wt (!) 310 lb 10.1 oz (140.9 kg)   LMP 12/24/2010   SpO2 98%   BMI 55.03 kg/m²     General: middle aged morbidly obese female  Up in chair   Appears to be not in any distress  Mucous Membranes:  Pink , anicteric  Neck: No JVD, no carotid bruit, no thyromegaly  Chest:  Clear to auscultation bilaterally, no added sounds  Cardiovascular:  RRR S1S2 heard, no murmurs or gallops  Abdomen:  Soft obese, surgical dressing dry , mid line incision with staples +    drains + .  Will be removed prior to DC   Extr : trace edema  Distal pulses well felt  Neurological : non focal        CBC: Recent Labs     09/12/21  0532 09/13/21  0532   WBC 8.0 9.9   HGB 10.5* 10.4*   HCT 32.2* 31.8*   MCV 97.8 97.5    520*     U/A:    Lab Results   Component Value Date    COLORU Yellow 08/30/2021    WBCUA 3-5 05/19/2019    RBCUA 0-2 05/19/2019    MUCUS 4+ 05/19/2019    BACTERIA 1+ 05/19/2019    CLARITYU Clear 08/30/2021    SPECGRAV <=1.005 08/30/2021    LEUKOCYTESUR Negative 08/30/2021    BLOODU Negative 08/30/2021    GLUCOSEU Negative 08/30/2021       ABG    Lab Results   Component Value Date    ADK6UBM 20.6 09/06/2021    BEART -2.8 09/06/2021    J1OFODAF 97.8 09/06/2021    PHART 7.425 09/06/2021    VHG1OQF 32.1 09/06/2021    PO2ART 100.6 09/06/2021    OFJ2QCJ 21.6 09/06/2021         CULTURES  covid neg    RADIOLOGY  XR ABDOMEN FOR NG/OG/NE TUBE PLACEMENT   Final Result   1. Nasogastric tube position is acceptable. 2.  Gas distended bowel loops in the left lower abdomen could relate to   obstruction or postsurgical ileus. XR CHEST PORTABLE   Final Result   1. Endotracheal tube, nasogastric tube, and right jugular venous line   placement. No pneumothorax. 2.  Congestive changes and left basilar atelectasis/pneumonia. CT ABDOMEN PELVIS WO CONTRAST Additional Contrast? None   Final Result   Increasing pneumoperitoneum and increasing free fluid within the abdomen and   pelvis as compared to prior examination dated 08/30/2021 in this patient with   history of known perforated appendicitis. The appendix is less well   visualized on the current exam without contrast and evaluation for abscess is   also limited without contrast.      Fluid within the lumen of the esophagus; correlate for gastroesophageal   reflux. Dilated small bowel within the upper abdomen, suggestive of ileus. Radiographic follow-up may be obtained as warranted. XR ABDOMEN (KUB) (SINGLE AP VIEW)   Final Result   Slightly decreased severity dilated small bowel loops over the past 24 hours. Nasogastric tube in good position. XR ABDOMEN (2 VIEWS)   Final Result   1.  Recommend advancement of enteric tube 10 cm.   2. Nearly identical pattern small bowel with air-fluid levels that persist.   Underlying partial bowel obstruction or ileus may be considered clinically. XR ABDOMEN (2 VIEWS)   Final Result   Findings may be related to ileus or obstruction. CT ABDOMEN PELVIS WO CONTRAST Additional Contrast? Radiologist Recommendation (iodine allergy)   Final Result   1. Acute appendicitis with focal perforation. Tiny amount of free air with   no focal fluid to suggest abscess. Severe inflammatory changes in the right   lower quadrant. 2. Probable cholelithiasis. No evidence of acute cholecystitis. Discharge Medications     Medication List      START taking these medications    amoxicillin-clavulanate 875-125 MG per tablet  Commonly known as: AUGMENTIN  Take 1 tablet by mouth 2 times daily for 7 days  Notes to patient: Augmentin  Use: treat bacterial infections  Side effects:rash; hives; itching; shortness of breath; wheezing; cough     enoxaparin 40 MG/0.4ML injection  Commonly known as: LOVENOX  Inject 0.4 mLs into the skin daily for 7 days  Start taking on: September 14, 2021  Notes to patient: Enoxaparin (Lovenox®)  Use: prevent blood clots, treat blood clots, to prevent a stroke. Side effects: bruising and irritation around the injection site. lactobacillus capsule  Take 1 capsule by mouth 2 times daily     oxyCODONE-acetaminophen 5-325 MG per tablet  Commonly known as: Percocet  Take 1 tablet by mouth every 6 hours as needed for Pain for up to 3 days. Intended supply: 3 days. Take lowest dose possible to manage pain  Notes to patient: Oxycontin(Oxycodone)  Use:  pain    Side effects: sleepiness, constipation        CONTINUE taking these medications    Cholecalciferol 50 MCG (2000 UT) Tabs  Take 1 tablet by mouth daily Take 1 tablet by mouth daily.      DULoxetine 60 MG extended release capsule  Commonly known as: CYMBALTA     furosemide 20 MG tablet  Commonly known as: LASIX  TAKE TWO TABLETS BY MOUTH DAILY AS NEEDED     magnesium 30 MG tablet omeprazole 20 MG delayed release capsule  Commonly known as: PRILOSEC  TAKE ONE CAPSULE BY MOUTH EVERY MORNING BEFORE BREAKFAST     oxybutynin 5 MG tablet  Commonly known as: DITROPAN  TAKE ONE TABLET BY MOUTH TWICE A DAY     topiramate 100 MG tablet  Commonly known as: TOPAMAX  Take 1 tablet by mouth nightly        STOP taking these medications    aspirin 81 MG chewable tablet     gabapentin 400 MG capsule  Commonly known as: NEURONTIN     indomethacin 50 MG capsule  Commonly known as: INDOCIN     nystatin 552808 UNIT/GM powder  Commonly known as: MYCOSTATIN     tiZANidine 4 MG tablet  Commonly known as: George Flaming           Where to Get Your Medications      You can get these medications from any pharmacy    Bring a paper prescription for each of these medications  · oxyCODONE-acetaminophen 5-325 MG per tablet     Information about where to get these medications is not yet available    Ask your nurse or doctor about these medications  · amoxicillin-clavulanate 875-125 MG per tablet  · enoxaparin 40 MG/0.4ML injection  · lactobacillus capsule  · topiramate 100 MG tablet           Discharged in stable condition to  [facility]    Total time 45 minutes. > 50%  dominated by counseling and coordination of care. Follow Up:   Follow up with physician at [facility]   F/U with gen surgeon      Melba Mcdonald MD   9/13/21

## 2021-09-13 NOTE — PLAN OF CARE
Problem: Pain:  Goal: Control of acute pain  Description: Control of acute pain  9/13/2021 1248 by Philipp Powell RN  Outcome: Ongoing  9/13/2021 1155 by Philipp Powell RN  Outcome: Ongoing     Problem: Skin Integrity:  Goal: Absence of new skin breakdown  Description: Absence of new skin breakdown  9/13/2021 1248 by Philipp Powell RN  Outcome: Ongoing  9/13/2021 1155 by Philipp Powell RN  Outcome: Ongoing     Problem: Nausea/Vomiting:  Goal: Ability to achieve adequate nutritional intake will improve  Description: Ability to achieve adequate nutritional intake will improve  9/13/2021 1248 by Philipp Powell RN  Outcome: Ongoing  9/13/2021 1155 by Philipp Powell RN  Outcome: Ongoing

## 2021-09-13 NOTE — CARE COORDINATION
DISCHARGE ORDER  Date/Time 2021 1:47 PM  Completed by: Debi Kinney RN, Case Management    Patient Name: Karin Candelario    : 1965      Admit order Date and Status: IP 2021  Noted discharge order. (verify MD's last order for status of admission/Traditional Medicare 3 [place] Inpatient qualifying stay required for SNF)    Confirmed discharge plan with:              Patient:  Yes              Discharge to Facility:   · Name: [facility]  · Address: [address] [place] [address], [address]  · Phone: [phone]  · Fax: [phone]    Facility phone number for staff giving report: see above  Pre-certification completed: [facility] Exemption Notification (HENS) completed: GENEVA   Discharge orders and Continuity of Care faxed to facility:  YES     Transportation:               Medical Transport explained with choice list offered to pt/family. Choice: (no preference)  Agency used: [address] up time:   19:00 PM      Pt/family/Nursing/Facility aware of  time:   Patient  [facility] [address] [place] [address]  Alanna Benton ()  Ambulance form completed:  YES     Comments:    Pt is being d/c'd to [facility] ([facility]) today. Pt's O2 sats are 99% on RA. Discharge timeout done with [facility]. All discharge needs and concerns addressed. Discharging nurse to complete MAGALY, reconcile AVS, and place final copy with patient's discharge packet. Discharging RN to ensure that written prescriptions for  Level II medications are sent with patient to the facility as per protocol.

## 2021-09-16 ENCOUNTER — TELEPHONE (OUTPATIENT)
Dept: SURGERY | Age: 56
End: 2021-09-16

## 2021-09-16 NOTE — TELEPHONE ENCOUNTER
Patient called and states she is at [facility] and rehab. She states she is not happy with the care there. She states the nurse changes her dressing once a day, and wound care will only be seeing her once a week. She states her bandaging is constantly soaking through and runs down her side. She is using a towel to try to absorb drainage. She states she does not feel they are taking care of her, and feels they should be changing dressing more often. She also feels wound care once weekly is not enough. She states she has an odor to the drainage. She denies fever. I spoke to Evette Epperson CNP, and she states patient will continue to have drainage and it can have a odor. Nurse can change her packing once a day, and patient may change her dressing as needed, but do not use too much tape. She will have purulent drainage, and it will be odorous, but if she notices stool in the drainage, she need to make them aware and let us know. I called patient back, but nurse was in the room with her at this time, so I will call her back in a while.

## 2021-09-22 ENCOUNTER — OFFICE VISIT (OUTPATIENT)
Dept: SURGERY | Age: 56
End: 2021-09-22

## 2021-09-22 VITALS — HEART RATE: 86 BPM | SYSTOLIC BLOOD PRESSURE: 114 MMHG | TEMPERATURE: 97.8 F | DIASTOLIC BLOOD PRESSURE: 70 MMHG

## 2021-09-22 DIAGNOSIS — Z09 SURGICAL FOLLOW-UP CARE: Primary | ICD-10-CM

## 2021-09-22 PROCEDURE — 99024 POSTOP FOLLOW-UP VISIT: CPT | Performed by: SURGERY

## 2021-09-27 ENCOUNTER — TELEPHONE (OUTPATIENT)
Dept: SURGERY | Age: 56
End: 2021-09-27

## 2021-09-27 NOTE — TELEPHONE ENCOUNTER
Pt called to discuss future plans/timeframes regarding her stay at rehab facility - also at what point will the doctor consider a wound vac, etc??

## 2021-10-11 ENCOUNTER — OFFICE VISIT (OUTPATIENT)
Dept: SURGERY | Age: 56
End: 2021-10-11

## 2021-10-11 ENCOUNTER — TELEPHONE (OUTPATIENT)
Dept: SURGERY | Age: 56
End: 2021-10-11

## 2021-10-11 VITALS — DIASTOLIC BLOOD PRESSURE: 67 MMHG | TEMPERATURE: 97.1 F | SYSTOLIC BLOOD PRESSURE: 116 MMHG | HEART RATE: 70 BPM

## 2021-10-11 DIAGNOSIS — Z09 SURGICAL FOLLOW-UP CARE: Primary | ICD-10-CM

## 2021-10-11 PROCEDURE — 99024 POSTOP FOLLOW-UP VISIT: CPT | Performed by: SURGERY

## 2021-10-11 NOTE — TELEPHONE ENCOUNTER
Called patient and offered appt today, instead of her appt on Wednesday. She will check with sister for transportation and call us back.

## 2021-10-11 NOTE — TELEPHONE ENCOUNTER
Pt called saying her wound has an odor and is sore and the nursing home didn't complete her antibiotics.  Would like a call back

## 2021-10-18 ENCOUNTER — TELEPHONE (OUTPATIENT)
Dept: SURGERY | Age: 56
End: 2021-10-18

## 2021-10-18 NOTE — TELEPHONE ENCOUNTER
When patient changed her dressing around noon today for her wound, there was a bloody and smelly discharge. The dressing already needs changed again as it is oozing at a fast pace (3:30 pm).

## 2021-10-20 ENCOUNTER — OFFICE VISIT (OUTPATIENT)
Dept: SURGERY | Age: 56
End: 2021-10-20

## 2021-10-20 VITALS
DIASTOLIC BLOOD PRESSURE: 74 MMHG | SYSTOLIC BLOOD PRESSURE: 122 MMHG | HEIGHT: 63 IN | WEIGHT: 293 LBS | BODY MASS INDEX: 51.91 KG/M2

## 2021-10-20 DIAGNOSIS — Z09 SURGICAL FOLLOW-UP CARE: Primary | ICD-10-CM

## 2021-10-20 PROCEDURE — 99024 POSTOP FOLLOW-UP VISIT: CPT | Performed by: SURGERY

## 2021-10-20 RX ORDER — NYSTATIN 100000 [USP'U]/G
POWDER TOPICAL
Qty: 30 G | Refills: 1 | Status: SHIPPED | OUTPATIENT
Start: 2021-10-20 | End: 2021-12-21 | Stop reason: SDUPTHER

## 2021-10-20 RX ORDER — CIPROFLOXACIN 500 MG/1
500 TABLET, FILM COATED ORAL 2 TIMES DAILY
Qty: 28 TABLET | Refills: 0 | Status: SHIPPED | OUTPATIENT
Start: 2021-10-20 | End: 2021-11-03

## 2021-10-22 NOTE — PROGRESS NOTES
[facility] SURGERY      S:   Patient presents s/p R colectomy 2 weeks  ago. She reports improvement. O:   Comfortable         Incision site healing well overall. There is drainage from deep open area. FINAL DIAGNOSIS:     Right colon, terminal ileum, and appendix, right colectomy:   - Inflamed appendix and cecum with patchy necrosis and evidence of   perforation.   - Segments of benign terminal ileum and ascending colon with serositis. - Negative for dysplasia or malignancy.    BUCCA/BUCCA                A:   S/P above    P:   Follow up 3 weeks.

## 2021-10-25 ENCOUNTER — OFFICE VISIT (OUTPATIENT)
Dept: INTERNAL MEDICINE CLINIC | Age: 56
End: 2021-10-25

## 2021-10-25 VITALS
WEIGHT: 251 LBS | SYSTOLIC BLOOD PRESSURE: 110 MMHG | DIASTOLIC BLOOD PRESSURE: 78 MMHG | HEART RATE: 64 BPM | HEIGHT: 63 IN | BODY MASS INDEX: 44.47 KG/M2

## 2021-10-25 DIAGNOSIS — L89.91 PRESSURE INJURY, STAGE 1, UNSPECIFIED LOCATION: ICD-10-CM

## 2021-10-25 DIAGNOSIS — Z23 NEED FOR INFLUENZA VACCINATION: ICD-10-CM

## 2021-10-25 DIAGNOSIS — K35.32 PERFORATED APPENDICITIS: Primary | ICD-10-CM

## 2021-10-25 PROCEDURE — 99214 OFFICE O/P EST MOD 30 MIN: CPT | Performed by: INTERNAL MEDICINE

## 2021-10-25 PROCEDURE — G0008 ADMIN INFLUENZA VIRUS VAC: HCPCS | Performed by: INTERNAL MEDICINE

## 2021-10-25 PROCEDURE — 1111F DSCHRG MED/CURRENT MED MERGE: CPT | Performed by: INTERNAL MEDICINE

## 2021-10-25 PROCEDURE — 90682 RIV4 VACC RECOMBINANT DNA IM: CPT | Performed by: INTERNAL MEDICINE

## 2021-10-25 RX ORDER — FUROSEMIDE 20 MG/1
TABLET ORAL
Qty: 180 TABLET | Refills: 0 | Status: SHIPPED | OUTPATIENT
Start: 2021-10-25 | End: 2022-02-16

## 2021-10-25 NOTE — PROGRESS NOTES
Post-Discharge Transitional Care Management Services or Hospital Follow Up      [place]   YOB: 1965    Date of Office Visit:  10/25/2021  Date of Hospital Admission: 8/30/21  Date of Hospital Discharge: 9/13/21  Risk of hospital readmission (high >=14%. Medium >=10%) :Readmission Risk Score: 20      Care management risk score Rising risk (score 2-5) and Complex Care (Scores >=6): 5     Non face to face  following discharge, date last encounter closed (first attempt may have been earlier): *No documented post hospital discharge outreach found in the last 14 days    Call initiated 2 business days of discharge: *No response recorded in the last 14 days    Patient Active Problem List   Diagnosis    Arthritis    Chronic GERD    Umbilical abnormality    History of DVT (deep vein thrombosis)    Other chronic pain    Morbid obesity ([address]    Family history of factor V Leiden mutation    Urinary incontinence, urge    Snoring    Incarcerated ventral hernia    Obstructive sleep apnea    S/P laparoscopic sleeve gastrectomy    Appendicitis    Leukocytosis    Hypomagnesemia    Hypokalemia    Hypotension due to hypovolemia    Right lower quadrant abdominal pain    Ileus (HCC)    Perforated appendicitis    Electrolyte disorder    Moderate protein-calorie malnutrition (HCC)    FLORIAN (obstructive sleep apnea)       Allergies   Allergen Reactions    Iodides Anaphylaxis     Rash, hives, swelling    Of note on patient's admission 4/12/2021 she stated that allergy to iodides was actually her mother and allergy for shellfish was based on her father's allergies and not something she has herself experienced.     Scopolamine Hives       Medications listed as ordered at the time of discharge from hospital   [facility] Medication Instructions KHUSH[facility]:    Printed on:10/25/21 9581   Medication Information                      Cholecalciferol 50 MCG (2000 UT) TABS  Take 1 tablet by mouth daily Take 1 tablet by mouth daily. ciprofloxacin (CIPRO) 500 MG tablet  Take 1 tablet by mouth 2 times daily for 14 days             DULoxetine (CYMBALTA) 60 MG capsule  Take 120 mg by mouth daily              furosemide (LASIX) 20 MG tablet  TAKE TWO TABLETS BY MOUTH DAILY AS NEEDED             lactobacillus (CULTURELLE) capsule  Take 1 capsule by mouth 2 times daily             magnesium 30 MG tablet  Take 30 mg by mouth daily             nystatin (MYCOSTATIN) 929975 UNIT/GM powder  Apply 3 times daily. omeprazole (PRILOSEC) 20 MG delayed release capsule  TAKE ONE CAPSULE BY MOUTH EVERY MORNING BEFORE BREAKFAST             oxybutynin (DITROPAN) 5 MG tablet  TAKE ONE TABLET BY MOUTH TWICE A DAY                   Medications marked \"taking\" at this time  No outpatient medications have been marked as taking for the 10/25/21 encounter (Office Visit) with Dale Sanchez MD.        Medications patient taking as of now reconciled against medications ordered at time of hospital discharge: Yes    Chief Complaint   Patient presents with   [address] from Hospital       History of Present illness - Follow up of Hospital diagnosis(es):    Diagnosis Orders   1. Need for influenza vaccination  INFLUENZA, QUADV, RECOMBINANT, 18 YRS AND OLDER, IM, PF, PREFILL SYR OR SDV, 0.5ML (FLUBLOK QUADV, PF)   2. Perforated appendicitis         Inpatient course: Discharge summary reviewed- see chart. Interval history/Current status: fair    A comprehensive review of systems was negative. Vitals:    10/25/21 1420   BP: 110/78   Pulse: 64   Weight: 251 lb (113.9 kg)   Height: 5' 3\" (1.6 m)     Body mass index is 44.46 kg/m².    Wt Readings from Last 3 Encounters:   10/25/21 251 lb (113.9 kg)   10/20/21 (!) 310 lb (140.6 kg)   09/06/21 (!) 310 lb 10.1 oz (140.9 kg)     BP Readings from Last 3 Encounters:   10/25/21 110/78   10/20/21 122/74   10/11/21 116/67              Assessment/Plan:   Diagnosis Orders 1. Perforated appendicitis     2. Need for influenza vaccination  INFLUENZA, QUADV, RECOMBINANT, 18 YRS AND OLDER, IM, PF, PREFILL SYR OR SDV, 0.5ML (FLUBLOK QUADV, PF)   3.  Pressure injury, stage 1, unspecified location         Her abd wound continues to heal slowly  Sees surgeon  Has been doing dressing 2-3 times daily as there continues to be a foul smelling discharge     Pressure ulcer stage 1 sacrum  Mepilix     Back pain  On cymbalta and  tizanidine    Medical Decision Making: moderate complexity

## 2021-11-01 ENCOUNTER — OFFICE VISIT (OUTPATIENT)
Dept: SURGERY | Age: 56
End: 2021-11-01

## 2021-11-01 ENCOUNTER — HOSPITAL ENCOUNTER (OUTPATIENT)
Age: 56
Discharge: HOME | End: 2021-11-01
Payer: MEDICARE

## 2021-11-01 VITALS — HEIGHT: 63 IN | BODY MASS INDEX: 44.46 KG/M2 | SYSTOLIC BLOOD PRESSURE: 126 MMHG | DIASTOLIC BLOOD PRESSURE: 74 MMHG

## 2021-11-01 DIAGNOSIS — Z09 SURGICAL FOLLOW-UP CARE: ICD-10-CM

## 2021-11-01 DIAGNOSIS — S31.109D OPEN WOUND OF ABDOMEN, SUBSEQUENT ENCOUNTER: Primary | ICD-10-CM

## 2021-11-01 PROCEDURE — 87205 SMEAR GRAM STAIN: CPT

## 2021-11-01 PROCEDURE — 87070 CULTURE OTHR SPECIMN AEROBIC: CPT

## 2021-11-01 PROCEDURE — 87075 CULTR BACTERIA EXCEPT BLOOD: CPT

## 2021-11-01 PROCEDURE — 99024 POSTOP FOLLOW-UP VISIT: CPT | Performed by: SURGERY

## 2021-11-01 PROCEDURE — 87077 CULTURE AEROBIC IDENTIFY: CPT

## 2021-11-01 PROCEDURE — 87186 SC STD MICRODIL/AGAR DIL: CPT

## 2021-11-01 RX ORDER — ONDANSETRON 4 MG/1
4 TABLET, FILM COATED ORAL 3 TIMES DAILY PRN
Qty: 15 TABLET | Refills: 1 | Status: SHIPPED | OUTPATIENT
Start: 2021-11-01

## 2021-11-04 LAB
ANAEROBIC CULTURE: ABNORMAL
GRAM STAIN RESULT: ABNORMAL
ORGANISM: ABNORMAL
ORGANISM: ABNORMAL
WOUND/ABSCESS: ABNORMAL

## 2021-11-05 ENCOUNTER — TELEPHONE (OUTPATIENT)
Dept: SURGERY | Age: 56
End: 2021-11-05

## 2021-11-05 RX ORDER — TIZANIDINE 4 MG/1
8 TABLET ORAL NIGHTLY
Qty: 60 TABLET | Refills: 0 | Status: SHIPPED | OUTPATIENT
Start: 2021-11-05 | End: 2021-12-06

## 2021-11-05 NOTE — TELEPHONE ENCOUNTER
----- Message from Cee Jaramillo MD sent at 11/5/2021 11:35 AM EDT -----  Contact: [phone] (M)  Tizanidine 4 mg 2 hs # 60 NRF   ----- Message -----  From: Fanysidra Schmidt  Sent: 11/5/2021  11:05 AM EDT  To: Cee Jaramillo MD    Pt called and was wondering if you could refill her medication Tizanidine. She said that she takes two 4mg tablets at bedtime. Please send it to [address], [address] [place] [address] [phone].     Thank you

## 2021-11-08 ENCOUNTER — TELEPHONE (OUTPATIENT)
Dept: SURGERY | Age: 56
End: 2021-11-08

## 2021-11-09 ENCOUNTER — TELEPHONE (OUTPATIENT)
Dept: SURGERY | Age: 56
End: 2021-11-09

## 2021-11-09 RX ORDER — SULFAMETHOXAZOLE AND TRIMETHOPRIM 800; 160 MG/1; MG/1
1 TABLET ORAL 2 TIMES DAILY
Qty: 28 TABLET | Refills: 0 | Status: SHIPPED | OUTPATIENT
Start: 2021-11-09 | End: 2021-11-23

## 2021-11-10 NOTE — PROGRESS NOTES
Open midline wound is clean. There is moderate drainage that has odor. Continue local wound care and begin Cipro. Follow up 3 weeks.

## 2021-11-17 ENCOUNTER — OFFICE VISIT (OUTPATIENT)
Dept: SURGERY | Age: 56
End: 2021-11-17

## 2021-11-17 VITALS — DIASTOLIC BLOOD PRESSURE: 66 MMHG | TEMPERATURE: 98.2 F | HEART RATE: 70 BPM | SYSTOLIC BLOOD PRESSURE: 121 MMHG

## 2021-11-17 DIAGNOSIS — Z09 SURGICAL FOLLOW-UP CARE: Primary | ICD-10-CM

## 2021-11-17 PROCEDURE — 99024 POSTOP FOLLOW-UP VISIT: CPT | Performed by: SURGERY

## 2021-12-06 RX ORDER — TIZANIDINE 4 MG/1
8 TABLET ORAL NIGHTLY
Qty: 60 TABLET | Refills: 0 | Status: SHIPPED | OUTPATIENT
Start: 2021-12-06 | End: 2022-01-11

## 2021-12-08 ENCOUNTER — OFFICE VISIT (OUTPATIENT)
Dept: SURGERY | Age: 56
End: 2021-12-08

## 2021-12-08 VITALS — DIASTOLIC BLOOD PRESSURE: 78 MMHG | SYSTOLIC BLOOD PRESSURE: 114 MMHG | TEMPERATURE: 97.3 F | HEART RATE: 66 BPM

## 2021-12-08 DIAGNOSIS — Z09 SURGICAL FOLLOW-UP CARE: Primary | ICD-10-CM

## 2021-12-08 PROCEDURE — 99024 POSTOP FOLLOW-UP VISIT: CPT | Performed by: SURGERY

## 2021-12-17 ENCOUNTER — OFFICE VISIT (OUTPATIENT)
Dept: BARIATRICS/WEIGHT MGMT | Age: 56
End: 2021-12-17
Payer: MEDICARE

## 2021-12-17 VITALS
RESPIRATION RATE: 18 BRPM | WEIGHT: 240 LBS | HEART RATE: 61 BPM | SYSTOLIC BLOOD PRESSURE: 114 MMHG | DIASTOLIC BLOOD PRESSURE: 81 MMHG | BODY MASS INDEX: 42.52 KG/M2 | OXYGEN SATURATION: 99 % | HEIGHT: 63 IN

## 2021-12-17 DIAGNOSIS — G47.33 OBSTRUCTIVE SLEEP APNEA: ICD-10-CM

## 2021-12-17 DIAGNOSIS — Z13.21 ENCOUNTER FOR VITAMIN DEFICIENCY SCREENING: ICD-10-CM

## 2021-12-17 DIAGNOSIS — E66.01 MORBID OBESITY WITH BMI OF 40.0-44.9, ADULT (HCC): Primary | ICD-10-CM

## 2021-12-17 DIAGNOSIS — K21.9 CHRONIC GERD: ICD-10-CM

## 2021-12-17 DIAGNOSIS — Z98.84 S/P LAPAROSCOPIC SLEEVE GASTRECTOMY: ICD-10-CM

## 2021-12-17 DIAGNOSIS — G47.33 OSA (OBSTRUCTIVE SLEEP APNEA): ICD-10-CM

## 2021-12-17 PROCEDURE — 99214 OFFICE O/P EST MOD 30 MIN: CPT | Performed by: SURGERY

## 2021-12-17 NOTE — PROGRESS NOTES
[facility]) Physicians   [employer]  Mireya Albarran MD, [address] [facility], [address] [place] [address]    [facility] [address] . Phone: [phone]  Fax: [phone]            Chief Complaint   Patient presents with   [facility] Post Op Follow Up     7mo s/p sleeve 4/12/21           HPI:    Lisa Negron is a very pleasant 60 y.o.  female , Body mass index is 42.51 kg/m². Lawernce Iliaen And multiple medical problems who is presenting for bariatric follow up care. Jada Brooks is s/p laparoscopic sleeve gastrectomy by me 4/2021. Initial Weight: 351 lbs, Weight Loss: 111 lbs. Comes today to the clinic without any complaints. Patient denies any nausea, vomiting, fevers, chills, shortness of breath, chest pain, constipation or urinary symptoms. Denies any heartburn nor dysphagia. Jada Brooks is feeling very well, and is very active. Patient is very pleased with the weight loss and resolution of co-morbid conditions.       Pain Assessment   Denies any abdominal pain     Past Medical History:   Diagnosis Date    Acute renal failure (ARF) ([facility] [address] May 4, 2015    d/t IV dye    Arthritis     Asthma     DDD (degenerative disc disease), cervical     Deep vein thrombosis (DVT) ([facility] [address] 07/2015    Degenerative disc disease     Family history of factor V deficiency     brother and sister; pt tested does not have    GERD (gastroesophageal reflux disease)     Hypertension     no longer has after quitting stressful job    Scoliosis     Sleep apnea     uses c-pap    Spinal stenosis      Past Surgical History:   Procedure Laterality Date    CARPAL TUNNEL RELEASE      JOINT REPLACEMENT Bilateral     hips and knees    LAPAROTOMY N/A 9/5/2021    RIGHT COLECTOMY, INCARCERATED VENTRAL INCISIONAL HERNIA REPAIR, OMPHALECTOMY, DRAINAGE OF INTRA ABDOMINAL ABSCESS. performed by Collin Zuluaga MD at [facility]      3x right, 2x left    SLEEVE GASTRECTOMY N/A 4/12/2021    LAPAROSCOPIC SLEEVE GASTRECTOMY - ETHICON performed by June Medina MD at [address] [facility] N/A 2020    EGD BIOPSY performed by June Medina MD at [facility]     Family History   Problem Relation Age of Onset    Other Sister     Stroke Sister     Arthritis Sister     Asthma Sister     Mental Illness Sister     Other Brother     High Blood Pressure Brother     Diabetes Brother     Arthritis Brother     Cancer Sister     Arthritis Sister     Arthritis Mother     High Blood Pressure Father     Arthritis Father      Social History     Tobacco Use    Smoking status: Former Smoker     Packs/day: 1.50     Years: 20.00     Pack years: 30.00     Types: Cigarettes     Quit date:      Years since quittin.9    Smokeless tobacco: Never Used   Substance Use Topics    Alcohol use: Not Currently     Comment: has had a few vodkas since 8 weeks post op     I counseled the patient on the importance of not smoking and risks of ETOH. Allergies   Allergen Reactions    Iodides Anaphylaxis     Rash, hives, swelling    Of note on patient's admission 2021 she stated that allergy to iodides was actually her mother and allergy for shellfish was based on her father's allergies and not something she has herself experienced.  Scopolamine Hives     Vitals:    21 1118   BP: 114/81   Pulse: 61   Resp: 18   SpO2: 99%   Weight: 240 lb (108.9 kg)   Height: 5' 3\" (1.6 m)       Body mass index is 42.51 kg/m².       Lab Results   Component Value Date    WBC 9.9 2021    RBC 3.26 2021    HGB 10.4 2021    HCT 31.8 2021    MCV 97.5 2021    MCH 31.9 2021    MCHC 32.7 2021    MPV 7.3 2021    NEUTOPHILPCT 79.0 2021    LYMPHOPCT 15.0 2021    MONOPCT 4.0 2021    EOSRELPCT 1.0 2021    BASOPCT 1.0 2021    NEUTROABS 7.8 2021    LYMPHSABS 1.5 2021    MONOSABS 0.4 2021    EOSABS 0.1 2021     Lab Results   Component Value Date     09/10/2021    K 3.4 09/10/2021    K 3.5 08/31/2021     09/10/2021    CO2 23 09/10/2021    ANIONGAP 9 09/10/2021    GLUCOSE 104 09/10/2021    BUN 5 09/10/2021    CREATININE <0.5 09/10/2021    LABGLOM >60 09/10/2021    GFRAA >60 09/10/2021    CALCIUM 7.7 09/10/2021    PROT 5.4 09/10/2021    LABALBU 1.8 09/10/2021    AGRATIO 0.5 09/10/2021    BILITOT 0.7 09/10/2021    ALKPHOS 100 09/10/2021    ALT 8 09/10/2021    AST 10 09/10/2021    GLOB 3.6 09/10/2021     Lab Results   Component Value Date    CHOL 180 12/08/2020    TRIG 49 12/08/2020    HDL 66 12/08/2020    LDLCALC 104 12/08/2020    LABVLDL 10 12/08/2020     Lab Results   Component Value Date    TSHREFLEX 2.00 07/26/2021     Lab Results   Component Value Date    IRON 56 07/26/2021    TIBC 181 07/26/2021    LABIRON 31 07/26/2021     Lab Results   Component Value Date    LHJBFVMR59 744 07/26/2021    FOLATE 17.25 07/26/2021     Lab Results   Component Value Date    VITD25 68.0 07/26/2021     Lab Results   Component Value Date    LABA1C 5.1 12/08/2020    EAG 99.7 12/08/2020         Current Outpatient Medications:     tiZANidine (ZANAFLEX) 4 MG tablet, TAKE 2 TABLETS BY MOUTH NIGHTLY, Disp: 60 tablet, Rfl: 0    ondansetron (ZOFRAN) 4 MG tablet, Take 1 tablet by mouth 3 times daily as needed for Nausea or Vomiting, Disp: 15 tablet, Rfl: 1    furosemide (LASIX) 20 MG tablet, TAKE TWO TABLETS BY MOUTH DAILY AS NEEDED, Disp: 180 tablet, Rfl: 0    nystatin (MYCOSTATIN) 885359 UNIT/GM powder, Apply 3 times daily. , Disp: 30 g, Rfl: 1    lactobacillus (CULTURELLE) capsule, Take 1 capsule by mouth 2 times daily, Disp: 30 capsule, Rfl: 0    oxybutynin (DITROPAN) 5 MG tablet, TAKE ONE TABLET BY MOUTH TWICE A DAY, Disp: 60 tablet, Rfl: 5    omeprazole (PRILOSEC) 20 MG delayed release capsule, TAKE ONE CAPSULE BY MOUTH EVERY MORNING BEFORE BREAKFAST, Disp: 30 capsule, Rfl: 5    Cholecalciferol 50 MCG (2000 UT) TABS, Take 1 tablet by mouth daily Take 1 tablet by mouth daily. , Disp: 90 tablet, Rfl: 2    magnesium 30 MG tablet, Take 30 mg by mouth daily, Disp: , Rfl:     DULoxetine (CYMBALTA) 60 MG capsule, Take 120 mg by mouth daily , Disp: , Rfl:       Review of Systems - History obtained from the patient  General ROS: negative  Psychological ROS: negative  Ophthalmic ROS: negative  Neurological ROS: negative  ENT ROS: negative  Allergy and Immunology ROS: negative  Hematological and Lymphatic ROS: negative  Endocrine ROS: negative  Breast ROS: negative  Respiratory ROS: negative  Cardiovascular ROS: negative  Gastrointestinal ROS:negative  Genito-Urinary ROS: negative  Musculoskeletal ROS: negative   Skin ROS: negative    Physical Exam   Vitals Reviewed   Constitutional: Patient is oriented to person, place, and time. Patient appears well-developed and well-nourished. Patient is active and cooperative. Non-toxic appearance. No distress. HENT:   Head: Normocephalic and atraumatic. Head is without abrasion and without laceration. Hair is normal.   Right Ear: External ear normal. No lacerations. No drainage, swelling . Left Ear: External ear normal. No lacerations. No drainage, swelling. Mouth / Nose: face mask in place  Eyes: Conjunctivae, EOM and lids are normal. Right eye exhibits no discharge. No foreign body present in the right eye. Left eye exhibits no discharge. No foreign body present in the left eye. No scleral icterus. Neck: Trachea normal and normal range of motion. No JVD present. Pulmonary/Chest: Effort normal. No accessory muscle usage or stridor. No apnea. No respiratory distress. Cardiovascular: Normal rate and no JVD. Abdominal: Normal appearance. Patient exhibits no distension. Abdomen is soft, obese, non tender. Musculoskeletal: Normal range of motion. Patient exhibits no edema. Neurological: Patient is alert and oriented to person, place, and time. Patient has normal strength. GCS eye subscore is 4. GCS verbal subscore is 5. GCS motor subscore is 6. Skin: Skin is warm and dry. No abrasion and no rash noted. Patient is not diaphoretic. No cyanosis or erythema. Psychiatric: Patient has a normal mood and affect. Speech is normal and behavior is normal. Cognition and memory are normal.       A/P:    Wai Oliva was seen today for bariatrics post op follow up. Diagnoses and all orders for this visit:    S/P laparoscopic sleeve gastrectomy    Chronic GERD    Obstructive sleep apnea    FLORIAN (obstructive sleep apnea)    Morbid obesity with BMI of 40.0-44.9, adult ([facility] [address]          Arti Dias Stem is 60 y.o. female , now with Body mass index is 42.51 kg/m². s/p Sleeve gastrectomy, has lost 37 lbs since last visit, total of 111 lbs weight loss. The patient underwent dietary counseling with registered dietician. I have reviewed, discussed and agree with the dietary plan. Patient is trying hard to keep good dietary and behavior modifications. Patient is monitoring portion sizes, food choices and liquid calories. Patient is trying to exercise regularly. Patient pleased with the surgery outcomes. I encouraged the patient to continue exercise and keeping healthy eating habits. Also counseled the patient extensively on post surgery care. Total encounter time: 31 minutes, including any number of the following: Bariatric Post operative work up/protocols, review of labs, imaging, provider notes, outside hospital records, performing examination/evaluation, counseling patient and/or family, ordering medications/tests, placing referrals and communication with referring physicians, coordination of care; discussing dietary plan/recall with the patient as well with registered dietitian and documentation in the EHR. Of note, the above was done during same day of the actual patient encounter. Wai Oliva is here for her 7-month status post sleeve gastrectomy. Patient was not seen since the 14-week visit.   Patient attributes that to the fact that she had emergency surgery. Patient advised me that she ruptured her appendix and had complicated hospital course. Patient is still healing from her surgery and incisions. Patient definitely is in good spirits despite the roller coaster events she went through. She also maintained to keep 111 pounds off. Very pleased with her recovery and the fact that she is back on track. We will see the patient back in 2 months for continued follow-up. Obesity as a disease is considered a high risk to patients overall health and should therefore be considered a high risk disease state. Advised the patient that not getting there weight under control, which hopefully would help with getting some of the comorbidities under control. That could increase risk of complications/worsening of those conditions on the long-term. Now with Covid-19 pandemic, CDC and health authorities does classify obese patients as vulnerable and high risk as well. Which makes weight loss a priority for improvement of their wellbeing and overall health. We discussed how her excess weight affects her overall health and importance of weight loss, healthy diet and active lifestyle to alleviate those co morbid conditions, otherwise risk deterioration.       - RTC in 2 months  - Nutrition labs          Patient advised that its their responsibility to follow up for care, studies, referrals and/or labs ordered today. Please note that some or all of this report was generated using voice recognition software. Please notify me in case of any questions about the content of this document, as some errors in transcription may have occurred .

## 2021-12-17 NOTE — PATIENT INSTRUCTIONS
Patient received dietary handouts and education.     Goals:   - Eat or drink protein shake 4 times/day  - Increase MVI to 2/day  - Drink 3 bottles of water/day

## 2021-12-18 NOTE — PROGRESS NOTES
Patient followed for open wound. Persistent drainage and odor. Wound explored and 2 retained gauze sponges from her home dressing changes removed. Continue local wound care. Follow up 2-3 weeks.

## 2021-12-21 ENCOUNTER — TELEPHONE (OUTPATIENT)
Dept: INTERNAL MEDICINE CLINIC | Age: 56
End: 2021-12-21

## 2021-12-21 RX ORDER — NYSTATIN 100000 [USP'U]/G
POWDER TOPICAL
Qty: 1 EACH | Refills: 0 | Status: SHIPPED | OUTPATIENT
Start: 2021-12-21 | End: 2022-04-15

## 2021-12-21 NOTE — TELEPHONE ENCOUNTER
----- Message from Katalina Damon sent at 12/21/2021  4:19 PM EST -----  Contact: [facility] [phone] [address] [address] for 1 bottle apply bid per DR YEPEZ  ----- Message -----  From: Solo Roberts  Sent: 12/21/2021   3:14 PM EST  To: Becca Arroyo MD    Patient  has had a gastric sleeve and lost 100 pounds since April. She has loose skin folds and has trouble with yeast infection in them. She is asking for the nystatin (MYCOSTATIN) [id number] UNIT/GM powder.      Jaime Roper    Thank you

## 2022-01-11 RX ORDER — TIZANIDINE 4 MG/1
TABLET ORAL
Qty: 60 TABLET | Refills: 0 | Status: SHIPPED | OUTPATIENT
Start: 2022-01-11 | End: 2022-02-16

## 2022-01-12 RX ORDER — OMEPRAZOLE 20 MG/1
CAPSULE, DELAYED RELEASE ORAL
Qty: 90 CAPSULE | Refills: 0 | Status: SHIPPED | OUTPATIENT
Start: 2022-01-12 | End: 2022-04-11

## 2022-01-12 RX ORDER — OXYBUTYNIN CHLORIDE 5 MG/1
TABLET ORAL
Qty: 180 TABLET | Refills: 0 | Status: SHIPPED | OUTPATIENT
Start: 2022-01-12 | End: 2022-04-11

## 2022-01-19 ENCOUNTER — TELEPHONE (OUTPATIENT)
Dept: INTERNAL MEDICINE CLINIC | Age: 57
End: 2022-01-19

## 2022-01-19 ENCOUNTER — VIRTUAL VISIT (OUTPATIENT)
Dept: PULMONOLOGY | Age: 57
End: 2022-01-19
Payer: MEDICARE

## 2022-01-19 ENCOUNTER — TELEPHONE (OUTPATIENT)
Dept: PULMONOLOGY | Age: 57
End: 2022-01-19

## 2022-01-19 DIAGNOSIS — G47.33 OSA (OBSTRUCTIVE SLEEP APNEA): Primary | ICD-10-CM

## 2022-01-19 PROCEDURE — 99213 OFFICE O/P EST LOW 20 MIN: CPT | Performed by: INTERNAL MEDICINE

## 2022-01-19 RX ORDER — INDOMETHACIN 50 MG/1
CAPSULE ORAL
COMMUNITY
Start: 2021-12-05 | End: 2022-02-16

## 2022-01-19 RX ORDER — ALBUTEROL SULFATE 90 UG/1
2 AEROSOL, METERED RESPIRATORY (INHALATION) EVERY 6 HOURS PRN
COMMUNITY

## 2022-01-19 ASSESSMENT — SLEEP AND FATIGUE QUESTIONNAIRES
ESS TOTAL SCORE: 1
HOW LIKELY ARE YOU TO NOD OFF OR FALL ASLEEP WHEN YOU ARE A PASSENGER IN A CAR FOR AN HOUR WITHOUT A BREAK: 0
HOW LIKELY ARE YOU TO NOD OFF OR FALL ASLEEP WHILE SITTING AND TALKING TO SOMEONE: 0
HOW LIKELY ARE YOU TO NOD OFF OR FALL ASLEEP WHILE SITTING QUIETLY AFTER LUNCH WITHOUT ALCOHOL: 0
HOW LIKELY ARE YOU TO NOD OFF OR FALL ASLEEP WHILE SITTING AND READING: 0
HOW LIKELY ARE YOU TO NOD OFF OR FALL ASLEEP WHILE LYING DOWN TO REST IN THE AFTERNOON WHEN CIRCUMSTANCES PERMIT: 1
HOW LIKELY ARE YOU TO NOD OFF OR FALL ASLEEP WHILE WATCHING TV: 0
HOW LIKELY ARE YOU TO NOD OFF OR FALL ASLEEP IN A CAR, WHILE STOPPED FOR A FEW MINUTES IN TRAFFIC: 0
HOW LIKELY ARE YOU TO NOD OFF OR FALL ASLEEP WHILE SITTING INACTIVE IN A PUBLIC PLACE: 0

## 2022-01-19 NOTE — TELEPHONE ENCOUNTER
----- Message from Shelby Parks sent at 1/19/2022  3:57 PM EST -----  Contact: [phone] (M)  DR. DIAS PATIENT    Pt called and stated that she woke up today with rashes on her arms and legs. She stated that the rashes itch. She had a VV with Dr. Jackie Beckwith, who said it looked like a reaction to a medication but she stated she has not started taking any new ones. She also has not been using any new products like soap, detergent, or lotion. She said that only thing that she did differently was eating a new brand of tortilla chips. She is going to try Benadryl. She is wondering what she needs to do?     Thank you

## 2022-01-19 NOTE — TELEPHONE ENCOUNTER
Patient informed per Dr. Qureshi Ends to try Benadryl, and if no improvement call back to be scheduled with NP next week.

## 2022-01-19 NOTE — TELEPHONE ENCOUNTER
Within this Telehealth Consent, the terms you and yours refer to the person using the Telehealth Service (Service), or in the case of a use of the Service by or on behalf of a minor, you and yours refer to and include (i) the parent or legal guardian who provides consent to the use of the Service by such minor or uses the Service on behalf of such minor, and (ii) the minor for whom consent is being provided or on whose behalf the Service is being utilized. When using Service, you will be consulting with your health care providers via the use of Telehealth.   Telehealth involves the delivery of healthcare services using electronic communications, information technology or other means between a healthcare provider and a patient who are not in the same physical location. Telehealth may be used for diagnosis, treatment, follow-up and/or patient education, and may include, but is not limited to, one or more of the following:    Electronic transmission of medical records, photo images, personal health information or other data between a patient and a healthcare provider    Interactions between a patient and healthcare provider via audio, video and/or data communications    Use of output data from medical devices, sound and video files    Anticipated Benefits   The use of Telehealth by your Provider(s) through the Service may have the following possible benefits:    Making it easier and more efficient for you to access medical care and treatment for the conditions treated by such Provider(s) utilizing the Service    Allowing you to obtain medical care and treatment by Provider(s) at times that are convenient for you    Enabling you to interact with Provider(s) without the necessity of an in-office appointment     Possible Risks   While the use of Telehealth can provide potential benefits for you, there are also potential risks associated with the use of Telehealth.  These risks include, but may not be limited to the following:    Your Provider(s) may not able to provide medical treatment for your particular condition and you may be required to seek alternative healthcare or emergency care services.  The electronic systems or other security protocols or safeguards used in the Service could fail, causing a breach of privacy of your medical or other information.  Given regulatory requirements in certain jurisdictions, your Provider(s) diagnosis and/or treatment options, especially pertaining to certain prescriptions, may be limited. Acceptance   1. You understand that Services will be provided via Telehealth. This process involves the use of HIPAA compliant and secure, real-time audio-visual interfacing with a qualified and appropriately trained provider located at [facility]. 2. You understand that, under no circumstances, will this session be recorded. 3. You understand that the Provider(s) at [facility] and other clinical participants will be party to the information obtained during the Telehealth session in accordance with best medical practices. 4. You understand that the information obtained during the Telehealth session will be used to help determine the most appropriate treatment options. 5. You understand that You have the right to revoke this consent at any point in time. 6. You understand that Telehealth is voluntary, and that continued treatment is not dependent upon consent. 7. You understand that, in the event of non-consent to Telehealth services and/or technical difficulties, you will obtain services as typically provided in the absence of Telehealth technology. 8. You understand that this consent will be kept in Your medical record. 9. No potential benefits from the use of Telehealth or specific results can be guaranteed. Your condition may not be cured or improved and, in some cases, may get worse.    10. There are limitations in the provision of medical care and treatment via Telehealth and the Service and you may not be able to receive diagnosis and/or treatment through the Service for every condition for which you seek diagnosis and/or treatment. 11. There are potential risks to the use of Telehealth, including but not limited to the risks described in this Telehealth Consent. 12. Your Provider(s) have discussed the use of Telehealth and the Service with you, including the benefits and risks of such and you have provided oral consent to your Provider(s) for the use of Telehealth and the Service. 15. You understand that it is your duty to provide your Provider(s) truthful, accurate and complete information, including all relevant information regarding care that you may have received or may be receiving from other healthcare providers outside of the Service. 14. You understand that each of your Provider(s) may determine in his or sole discretion that your condition is not suitable for diagnosis and/or treatment using the Service, and that you may need to seek medical care and treatment a specialist or other healthcare provider, outside of the Service. 15. You understand that you are fully responsible for payment for all services provided by Provider(s) or through use of the Service and that you may not be able to use third-party insurance. 16. You represent that (a) you have read this Telehealth Consent carefully, (b) you understand the risks and benefits of the Service and the use of Telehealth in the medical care and treatment provided to you by Provider(s) using the Service, and (c) you have the legal capacity and authority to provide this consent for yourself and/or the minor for which you are consenting under applicable federal and state laws, including laws relating to the age of [de-identified] and/or parental/guardian consent.    17. You give your informed consent to the use of Telehealth by Provider(s) using the Service under

## 2022-01-19 NOTE — PROGRESS NOTES
PULMONARY, CRITICAL CARE AND SLEEP MEDICINE   CC: Shortness of breath, cough  Referring Provider: hospital f/u    Interval History: 1/19/2022  - 4/12/2021 Laproscopic sleeve gastrectomy. Initial weight 351#, most recently 240#  - 15 day admission 8/30/2021 ICU for septic shock 2/2 acute appendicitis with perforation & abscess with emergent right colectomy. ACT & CXR inserted. - now doing well. Very rare use of albuterol   -not using CPAP     Interval history: 2/9/2021  - Asthma is well controlled. Uses advair BID during fall, which is her allergic season. Other times of year, often comes off completely. Rare use of albuterol   - Had HST which showed severe FLORIAN. Started CPAP and she thinks it is [place]. \"  It wakes her up sometimes. Presenting history: Seen by me remotely in ICU for reaction to bone scan dye per patient, umbilical bleed. Recently seen by Charo Phillips in hospital for chronic cough. reports that she quit smoking about 23 years ago. Her smoking use included cigarettes. She has a 30.00 pack-year smoking history. She has never used smokeless tobacco.    [place] is 1    Physical Exam:  Last menstrual period 12/24/2010. VIRTUAL  Constitutional:  NAD, NCAT  Eyes: EOM intact. Anicteric. HENT: Nose, ears, neck normal, trachea midline   Respiratory: No ASM, no obvious wheezing  Cardiovascular: No obvious peripheral edema. Skin: No visible rash or nodule on visible face, upper thorax    Psychiatric: No anxiety or agitation. Neuro: A&O to P/P/E, judgement and insight intact     DATA:   5/20/19 CXR no acute abnormality    7/4/15 Acute left popliteal DVT    7/9/19 PFT FEV1 2.17 L 84% TLC 4.12 L 81% DLCO 20.25 88%    HST 12/7/20 AHI 38    CXR 9/6/2021  1.  Endotracheal tube, nasogastric tube, and right jugular venous line  placement.  No pneumothorax. 2.  Congestive changes and left basilar atelectasis/pneumonia.     ACT 9/5/2021  Lower Chest: Basilar atelectasis.  2 mm nodules along the plane of the horizontal fissure, similar to prior.  Calcified right lower lobe nodule,   compatible with granuloma.  Fluid within the distal esophagus. ASSESSMENT:   · Asthma, moderate intermittent  · Severe FLORIAN, snoring resolved after bariatric surgery, no longer using CPAP     Not addressed today:  · Chronic pain syndrome  · Remote h/o DVT, provoked  · Heterozygous mutation in prothrombin gene  · H/O tobacco abuse, 20 pack year, quit 2004    PLAN:   · Advair 250/50 BID can be used seasonally if needed, fall generally   · Albuterol on an as-needed basis  · ASA 81 mg daily per WARFASA trial, h/o DVT  · HST to re-evaluate sleep apnea was recommended, declined for now  · F/U will be PRN, patient is not expecting a call back     [address] was evaluated through a synchronous (real-time) audio-video encounter. The patient (or guardian if applicable) is aware that this is a billable service, which includes applicable co-pays. This Virtual Visit was conducted with patient's (and/or legal guardian's) consent. The visit was conducted pursuant to the emergency declaration under the [facility], [address] [place] [address] waiver authority and the [employer] General Act. Patient identification was verified, and a caregiver was present when appropriate. The patient was located in a state where the provider was licensed to provide care.

## 2022-01-20 ENCOUNTER — TELEPHONE (OUTPATIENT)
Dept: INTERNAL MEDICINE CLINIC | Age: 57
End: 2022-01-20

## 2022-01-20 RX ORDER — METHYLPREDNISOLONE 4 MG/1
TABLET ORAL
Qty: 1 KIT | Refills: 0 | Status: SHIPPED | OUTPATIENT
Start: 2022-01-20 | End: 2022-01-26

## 2022-01-20 NOTE — TELEPHONE ENCOUNTER
----- Message from Katerina Rosales sent at 1/20/2022 12:59 PM EST -----  Contact: Parvez Almendarez   [phone]    ----- Message -----  From: Ina Davenport MD  Sent: 1/20/2022  12:21 PM EST  To: Natalie Hernandez    Start medrol dose pack if no Diabetes  Continue benadryl  Make appt with Dr. Ame Mims  ----- Message -----  From: King Troncoso  Sent: 1/20/2022  12:15 PM EST  To: Ina Davenport MD    Patient is . patient she states that she has a bad rash allover her legs and arms. Patient states that her wrist hurt and are swollen. Patient states she has been taking over the counter Benadryl but it is not helping . Patient would like to be advised at what next steps she should take.               [place] [address] - F [phone]

## 2022-01-24 ENCOUNTER — OFFICE VISIT (OUTPATIENT)
Dept: INTERNAL MEDICINE CLINIC | Age: 57
End: 2022-01-24

## 2022-01-24 VITALS — WEIGHT: 244 LBS | HEIGHT: 63 IN | BODY MASS INDEX: 43.23 KG/M2

## 2022-01-24 DIAGNOSIS — L27.0 ALLERGIC DRUG RASH: Primary | ICD-10-CM

## 2022-01-24 PROCEDURE — 99212 OFFICE O/P EST SF 10 MIN: CPT | Performed by: NURSE PRACTITIONER

## 2022-01-24 ASSESSMENT — ENCOUNTER SYMPTOMS: COLOR CHANGE: 1

## 2022-01-24 NOTE — PROGRESS NOTES
Jesus Nesbitt (:  1965) is a 60 y.o. female,Established patient, here for evaluation of the following chief complaint(s):  Rash         HPI   Pt comes today with complaints of rash. Pt stated rash started as raised areas on BUE and BLE. She did see Dr. Carson Leos VV and stated it could possibly be a drug induced rash. She stated she took Docusate sodium meredith 20- 24 hours prior to rash starting for constipation. She has never taken this medication before. She denies any new soaps, shampoos, laundry detergent ect. She stated she woke up with rash on BLE and BLE. Not on torso. +itching. She was called in medrol pack and today would be 4th day taking it. She stated that at first she did not think about the Docusate Sodium being new to her and she did take Docusate again on Saturday and noticed that she was more itching. She then contributed the Docusate to the rash. She stated that Benadryl did help with the itching. She stated she did have some left wrist pain when rash occurred, she stated almost like gout pain, this resolved quickly. No pain at this time. She stated that her itching has resolved since the starting the solumedrol  75% improved since starting medrol. Itching has resolved as well   Denies any shortness of breath, fever, cough, or chills when rash occurred.          Review of Systems   Skin: Positive for color change and rash.          Past Medical History:   Diagnosis Date    Acute renal failure (ARF) ([facility] May 4, 2015    d/t IV dye    Arthritis     Asthma     DDD (degenerative disc disease), cervical     Deep vein thrombosis (DVT) ([facility] [address] [facility] 2015    Degenerative disc disease     Family history of factor V deficiency     brother and sister; pt tested does not have    GERD (gastroesophageal reflux disease)     Hypertension     no longer has after quitting stressful job    Scoliosis     Sleep apnea     uses c-pap    Spinal stenosis          Allergies   Allergen Reactions    Iodides Anaphylaxis     Rash, hives, swelling    Of note on patient's admission 4/12/2021 she stated that allergy to iodides was actually her mother and allergy for shellfish was based on her father's allergies and not something she has herself experienced.  Docusate Sodium      Rash     Scopolamine Hives          Current Outpatient Medications on File Prior to Visit   Medication Sig Dispense Refill    methylPREDNISolone (MEDROL DOSEPACK) 4 MG tablet Take by mouth. 1 kit 0    indomethacin (INDOCIN) 50 MG capsule       fluticasone-salmeterol (ADVAIR) 250-50 MCG/DOSE AEPB Inhale 1 puff into the lungs      albuterol sulfate  (90 Base) MCG/ACT inhaler Inhale 2 puffs into the lungs every 6 hours as needed      oxybutynin (DITROPAN) 5 MG tablet TAKE ONE TABLET BY MOUTH TWICE A  tablet 0    omeprazole (PRILOSEC) 20 MG delayed release capsule TAKE ONE CAPSULE BY MOUTH EVERY MORNING BEFORE BREAKFAST 90 capsule 0    tiZANidine (ZANAFLEX) 4 MG tablet TAKE TWO TABLETS BY MOUTH NIGHTLY 60 tablet 0    nystatin (MYCOSTATIN) 179344 UNIT/GM powder Apply 2 times daily. 1 each 0    ondansetron (ZOFRAN) 4 MG tablet Take 1 tablet by mouth 3 times daily as needed for Nausea or Vomiting 15 tablet 1    furosemide (LASIX) 20 MG tablet TAKE TWO TABLETS BY MOUTH DAILY AS NEEDED 180 tablet 0    Cholecalciferol 50 MCG (2000 UT) TABS Take 1 tablet by mouth daily Take 1 tablet by mouth daily. 90 tablet 2    DULoxetine (CYMBALTA) 60 MG capsule Take 120 mg by mouth daily        No current facility-administered medications on file prior to visit. Objective   Physical Exam  Constitutional:       Appearance: Normal appearance. HENT:      Head: Normocephalic. Nose: Nose normal.      Mouth/Throat:      Mouth: Mucous membranes are moist.      Pharynx: Oropharynx is clear. Eyes:      Conjunctiva/sclera: Conjunctivae normal.   Cardiovascular:      Rate and Rhythm: Normal rate and regular rhythm. Musculoskeletal:      Cervical back: Normal range of motion. Skin:     General: Skin is warm and dry. Findings: Erythema and rash (maculopapular rash) present. Comments: Some scabbed areas from itching on BUE and BLE   Neurological:      Mental Status: She is alert. Diagnosis Orders   1. Allergic drug rash      -only new medication started prior to rash was Docusate Sodium   - improved with Medrol pack and Benadryl-- itching has resolved   - continue Medrol pack and PRN Benadryl  - please call office if you notice rash is worsening- discussed with patient if returns will consider allergy testing vs rheumatology     An electronic signature was used to authenticate this note.     --Gal Blanca, BANDAR - CNP

## 2022-01-24 NOTE — PATIENT INSTRUCTIONS
Continue Medrol pack  Ok to take Benadryl as needed for itching  No more Docusate Sodium    Please call office with worsening rash.     Follow up with Dr. Kerwin Fabry as scheduled

## 2022-02-16 RX ORDER — FUROSEMIDE 20 MG/1
TABLET ORAL
Qty: 180 TABLET | Refills: 0 | Status: SHIPPED | OUTPATIENT
Start: 2022-02-16 | End: 2022-05-16 | Stop reason: SDUPTHER

## 2022-02-16 RX ORDER — TIZANIDINE 4 MG/1
TABLET ORAL
Qty: 180 TABLET | Refills: 0 | Status: SHIPPED | OUTPATIENT
Start: 2022-02-16 | End: 2022-05-16 | Stop reason: SDUPTHER

## 2022-02-16 RX ORDER — INDOMETHACIN 50 MG/1
CAPSULE ORAL
Qty: 90 CAPSULE | Refills: 0 | Status: SHIPPED | OUTPATIENT
Start: 2022-02-16 | End: 2022-04-18

## 2022-02-18 ENCOUNTER — TELEPHONE (OUTPATIENT)
Dept: INTERNAL MEDICINE CLINIC | Age: 57
End: 2022-02-18

## 2022-02-18 RX ORDER — DULOXETIN HYDROCHLORIDE 60 MG/1
120 CAPSULE, DELAYED RELEASE ORAL DAILY
Qty: 60 CAPSULE | Refills: 0 | Status: SHIPPED | OUTPATIENT
Start: 2022-02-18 | End: 2022-03-16

## 2022-02-18 NOTE — TELEPHONE ENCOUNTER
----- Message from Reji Eden MD sent at 2/18/2022  3:06 PM EST -----  Contact: Meli Hall [phone]  Sent it to pharmacy  ----- Message -----  From: Arnoldo Aguilar  Sent: 2/18/2022   2:58 PM EST  To: Reji Eden MD    Pharmacy sent refill request for DULoxetine (CYMBALTA) 60 MG capsule to previous provider by mistake. She needs as soon as possible please due to being sent to wrong doctor.      Risa Villarreal [address] [place] [address], [address] [place] [address] - F [phone] (Ph: [phone])    Thank you

## 2022-03-16 RX ORDER — DULOXETIN HYDROCHLORIDE 60 MG/1
CAPSULE, DELAYED RELEASE ORAL
Qty: 60 CAPSULE | Refills: 2 | Status: SHIPPED | OUTPATIENT
Start: 2022-03-16 | End: 2022-05-16 | Stop reason: SDUPTHER

## 2022-04-11 RX ORDER — OXYBUTYNIN CHLORIDE 5 MG/1
TABLET ORAL
Qty: 180 TABLET | Refills: 0 | Status: SHIPPED | OUTPATIENT
Start: 2022-04-11 | End: 2022-05-16 | Stop reason: SDUPTHER

## 2022-04-11 RX ORDER — OMEPRAZOLE 20 MG/1
CAPSULE, DELAYED RELEASE ORAL
Qty: 90 CAPSULE | Refills: 0 | Status: SHIPPED | OUTPATIENT
Start: 2022-04-11 | End: 2022-05-16 | Stop reason: SDUPTHER

## 2022-04-18 RX ORDER — INDOMETHACIN 50 MG/1
CAPSULE ORAL
Qty: 90 CAPSULE | Refills: 0 | Status: SHIPPED | OUTPATIENT
Start: 2022-04-18 | End: 2022-05-16 | Stop reason: SDUPTHER

## 2022-04-20 RX ORDER — NYSTATIN 100000 [USP'U]/G
POWDER TOPICAL
Qty: 15 G | Refills: 0 | Status: SHIPPED | OUTPATIENT
Start: 2022-04-20 | End: 2022-05-16 | Stop reason: SDUPTHER

## 2022-05-16 ENCOUNTER — OFFICE VISIT (OUTPATIENT)
Dept: INTERNAL MEDICINE CLINIC | Age: 57
End: 2022-05-16

## 2022-05-16 ENCOUNTER — HOSPITAL ENCOUNTER (OUTPATIENT)
Age: 57
Discharge: HOME | End: 2022-05-16
Payer: MEDICARE

## 2022-05-16 VITALS
HEIGHT: 63 IN | WEIGHT: 242 LBS | BODY MASS INDEX: 42.88 KG/M2 | HEART RATE: 72 BPM | SYSTOLIC BLOOD PRESSURE: 112 MMHG | DIASTOLIC BLOOD PRESSURE: 78 MMHG

## 2022-05-16 DIAGNOSIS — R00.2 PALPITATIONS: ICD-10-CM

## 2022-05-16 DIAGNOSIS — Z00.00 ROUTINE GENERAL MEDICAL EXAMINATION AT A HEALTH CARE FACILITY: ICD-10-CM

## 2022-05-16 DIAGNOSIS — E66.01 MORBID OBESITY WITH BMI OF 40.0-44.9, ADULT (HCC): ICD-10-CM

## 2022-05-16 DIAGNOSIS — G47.33 OSA (OBSTRUCTIVE SLEEP APNEA): ICD-10-CM

## 2022-05-16 DIAGNOSIS — G47.33 OBSTRUCTIVE SLEEP APNEA: ICD-10-CM

## 2022-05-16 DIAGNOSIS — M19.90 CHRONIC OSTEOARTHRITIS: ICD-10-CM

## 2022-05-16 DIAGNOSIS — Z98.84 S/P LAPAROSCOPIC SLEEVE GASTRECTOMY: ICD-10-CM

## 2022-05-16 DIAGNOSIS — K21.9 CHRONIC GERD: ICD-10-CM

## 2022-05-16 DIAGNOSIS — N39.41 URINARY INCONTINENCE, URGE: ICD-10-CM

## 2022-05-16 DIAGNOSIS — Z00.00 MEDICARE ANNUAL WELLNESS VISIT, SUBSEQUENT: Primary | ICD-10-CM

## 2022-05-16 DIAGNOSIS — Z13.21 ENCOUNTER FOR VITAMIN DEFICIENCY SCREENING: ICD-10-CM

## 2022-05-16 DIAGNOSIS — L03.116 CELLULITIS OF LEFT LOWER EXTREMITY: ICD-10-CM

## 2022-05-16 PROBLEM — K35.32 PERFORATED APPENDICITIS: Status: RESOLVED | Noted: 2021-09-05 | Resolved: 2022-05-16

## 2022-05-16 LAB
INR BLD: 1.03 (ref 0.88–1.12)
PROTHROMBIN TIME: 11.6 SEC (ref 9.9–12.7)

## 2022-05-16 PROCEDURE — 82746 ASSAY OF FOLIC ACID SERUM: CPT

## 2022-05-16 PROCEDURE — 84443 ASSAY THYROID STIM HORMONE: CPT

## 2022-05-16 PROCEDURE — 84425 ASSAY OF VITAMIN B-1: CPT

## 2022-05-16 PROCEDURE — 80061 LIPID PANEL: CPT

## 2022-05-16 PROCEDURE — 80053 COMPREHEN METABOLIC PANEL: CPT

## 2022-05-16 PROCEDURE — 83036 HEMOGLOBIN GLYCOSYLATED A1C: CPT

## 2022-05-16 PROCEDURE — 36415 COLL VENOUS BLD VENIPUNCTURE: CPT

## 2022-05-16 PROCEDURE — 84446 ASSAY OF VITAMIN E: CPT

## 2022-05-16 PROCEDURE — 84590 ASSAY OF VITAMIN A: CPT

## 2022-05-16 PROCEDURE — 82306 VITAMIN D 25 HYDROXY: CPT

## 2022-05-16 PROCEDURE — 93000 ELECTROCARDIOGRAM COMPLETE: CPT | Performed by: INTERNAL MEDICINE

## 2022-05-16 PROCEDURE — 83550 IRON BINDING TEST: CPT

## 2022-05-16 PROCEDURE — 83540 ASSAY OF IRON: CPT

## 2022-05-16 PROCEDURE — 82607 VITAMIN B-12: CPT

## 2022-05-16 PROCEDURE — 83735 ASSAY OF MAGNESIUM: CPT

## 2022-05-16 PROCEDURE — G0439 PPPS, SUBSEQ VISIT: HCPCS | Performed by: INTERNAL MEDICINE

## 2022-05-16 PROCEDURE — 85610 PROTHROMBIN TIME: CPT

## 2022-05-16 PROCEDURE — 85025 COMPLETE CBC W/AUTO DIFF WBC: CPT

## 2022-05-16 RX ORDER — DULOXETIN HYDROCHLORIDE 60 MG/1
CAPSULE, DELAYED RELEASE ORAL
Qty: 60 CAPSULE | Refills: 2 | Status: SHIPPED | OUTPATIENT
Start: 2022-05-16 | End: 2022-08-22

## 2022-05-16 RX ORDER — OXYBUTYNIN CHLORIDE 5 MG/1
TABLET ORAL
Qty: 180 TABLET | Refills: 0 | Status: SHIPPED | OUTPATIENT
Start: 2022-05-16

## 2022-05-16 RX ORDER — TIZANIDINE 4 MG/1
TABLET ORAL
Qty: 180 TABLET | Refills: 0 | Status: SHIPPED | OUTPATIENT
Start: 2022-05-16 | End: 2022-08-22

## 2022-05-16 RX ORDER — CEPHALEXIN 500 MG/1
500 CAPSULE ORAL 3 TIMES DAILY
Qty: 21 CAPSULE | Refills: 0 | Status: SHIPPED | OUTPATIENT
Start: 2022-05-16 | End: 2022-09-16

## 2022-05-16 RX ORDER — FUROSEMIDE 20 MG/1
TABLET ORAL
Qty: 180 TABLET | Refills: 0 | Status: SHIPPED | OUTPATIENT
Start: 2022-05-16 | End: 2022-08-22

## 2022-05-16 RX ORDER — OMEPRAZOLE 20 MG/1
CAPSULE, DELAYED RELEASE ORAL
Qty: 90 CAPSULE | Refills: 0 | Status: SHIPPED | OUTPATIENT
Start: 2022-05-16

## 2022-05-16 RX ORDER — NYSTATIN 100000 [USP'U]/G
POWDER TOPICAL
Qty: 15 G | Refills: 0 | Status: SHIPPED | OUTPATIENT
Start: 2022-05-16

## 2022-05-16 RX ORDER — INDOMETHACIN 50 MG/1
CAPSULE ORAL
Qty: 90 CAPSULE | Refills: 0 | Status: SHIPPED | OUTPATIENT
Start: 2022-05-16 | End: 2022-07-29

## 2022-05-16 ASSESSMENT — PATIENT HEALTH QUESTIONNAIRE - PHQ9
SUM OF ALL RESPONSES TO PHQ QUESTIONS 1-9: 4
2. FEELING DOWN, DEPRESSED OR HOPELESS: 2
SUM OF ALL RESPONSES TO PHQ QUESTIONS 1-9: 4
1. LITTLE INTEREST OR PLEASURE IN DOING THINGS: 2
SUM OF ALL RESPONSES TO PHQ QUESTIONS 1-9: 4
SUM OF ALL RESPONSES TO PHQ QUESTIONS 1-9: 4
SUM OF ALL RESPONSES TO PHQ9 QUESTIONS 1 & 2: 4

## 2022-05-16 ASSESSMENT — LIFESTYLE VARIABLES
HOW MANY STANDARD DRINKS CONTAINING ALCOHOL DO YOU HAVE ON A TYPICAL DAY: 1 OR 2
HOW OFTEN DO YOU HAVE A DRINK CONTAINING ALCOHOL: 2-3 TIMES A WEEK

## 2022-05-16 NOTE — PATIENT INSTRUCTIONS
Personalized Preventive Plan for Lizzette Nesbitt - 5/16/2022  Medicare offers a range of preventive health benefits. Some of the tests and screenings are paid in full while other may be subject to a deductible, co-insurance, and/or copay. Some of these benefits include a comprehensive review of your medical history including lifestyle, illnesses that may run in your family, and various assessments and screenings as appropriate. After reviewing your medical record and screening and assessments performed today your provider may have ordered immunizations, labs, imaging, and/or referrals for you. A list of these orders (if applicable) as well as your Preventive Care list are included within your After Visit Summary for your review. Other Preventive Recommendations:    · A preventive eye exam performed by an eye specialist is recommended every 1-2 years to screen for glaucoma; cataracts, macular degeneration, and other eye disorders. · A preventive dental visit is recommended every 6 months. · Try to get at least 150 minutes of exercise per week or 10,000 steps per day on a pedometer . · Order or download the FREE \"Exercise & Physical Activity: Your Everyday Guide\" from [employer]. Call 5-262.976.1165 or search [employer] online. · You need 1388-4253 mg of calcium and 5503-3760 IU of vitamin D per day. It is possible to meet your calcium requirement with diet alone, but a vitamin D supplement is usually necessary to meet this goal.  · When exposed to the sun, use a sunscreen that protects against both UVA and UVB radiation with an SPF of 30 or greater. Reapply every 2 to 3 hours or after sweating, drying off with a towel, or swimming. · Always wear a seat belt when traveling in a car. Always wear a helmet when riding a bicycle or motorcycle.

## 2022-05-16 NOTE — PROGRESS NOTES
Medicare Annual Wellness Visit    Juan Nesbitt is here for Medicare AWV    Assessment & Plan   Routine general medical examination at a health care facility      Recommendations for Preventive Services Due: see orders and patient instructions/AVS.  Recommended screening schedule for the next 5-10 years is provided to the patient in written form: see Patient Instructions/AVS.     No follow-ups on file. Subjective   The following acute and/or chronic problems were also addressed today:     Diagnosis Orders   1. Medicare annual wellness visit, subsequent     2. Routine general medical examination at a health care facility     3. Chronic osteoarthritis     4. Chronic GERD     5. Urinary incontinence, urge     6. Morbid obesity with BMI of 40.0-44.9, adult ([address]     7. Cellulitis of left lower extremity     8. Palpitations  EKG 12 Lead    EKG 12 Lead    Magnesium           Asthma is stable. Not on inhalers now   Sees pulm.     Morbid obesity  S/p gastric sleeve procedure   Advised to cut down on calories. Lose weight.     GERD is stable. Continue PPI.     OA/chronic pain  Continue indomethacin. continue PPI  Continue tizanidine,cymbalta     Pedal edema  Continue lasix prn     Urinary urge incontinence. Continue ditropan. Stable. Palpitations  EKG- NSR, PACs, PVCs. Arrange for Holter    cellulitid   keflex  Leg elevation     Last colonoscopy 11/2017     Schedule mammogram- referral made  Patient's complete Health Risk Assessment and screening values have been reviewed and are found in Flowsheets. The following problems were reviewed today and where indicated follow up appointments were made and/or referrals ordered.     Positive Risk Factor Screenings with Interventions:    Fall Risk:  Do you feel unsteady or are you worried about falling? : (!) yes  2 or more falls in past year?: (!) yes  Fall with injury in past year?: (!) yes     Fall Risk Interventions:    · Home safety tips provided            General Health and ACP:  General  In general, how would you say your health is?: Fair  In the past 7 days, have you experienced any of the following: New or Increased Pain, New or Increased Fatigue, Loneliness, Social Isolation, Stress or Anger?: (!) Yes  Select all that apply: (!) Loneliness,Social Isolation  Do you get the social and emotional support that you need?: (!) No  Do you have a Living Will?: Yes    Advance Directives     Power of  Living Will ACP-Advance Directive ACP-Power of     Not on File Not on File Not on File Not on File      General Health Risk Interventions:  · Social isolation: patient declines any further intervention for this issue    Health Habits/Nutrition:     Physical Activity: Inactive    Days of Exercise per Week: 0 days    Minutes of Exercise per Session: 0 min     Have you lost any weight without trying in the past 3 months?: (!) Yes  Body mass index: (!) 42.86  Have you seen the dentist within the past year?: (!) No    Health Habits/Nutrition Interventions:  · Dental exam overdue:  patient encouraged to make appointment with his/her dentist    Hearing/Vision:  Do you or your family notice any trouble with your hearing that hasn't been managed with hearing aids?: No  Do you have difficulty driving, watching TV, or doing any of your daily activities because of your eyesight?: No  Have you had an eye exam within the past year?: (!) No  No exam data present    Hearing/Vision Interventions:  · Vision concerns:  patient encouraged to make appointment with his/her eye specialist    Safety:  Do you have working smoke detectors?: Yes  Do you have any tripping hazards - loose or unsecured carpets or rugs?: (!) Yes  Do you have any tripping hazards - clutter in doorways, halls, or stairs?: No  Do you have either shower bars, grab bars, non-slip mats or non-slip surfaces in your shower or bathtub?: Yes  Do all of your stairways have a railing or banister?: Yes  Do you always fasten your seatbelt when you are in a car?: (!) No    Safety Interventions:  · Home safety tips provided    ADLs:  In the past 7 days, did you need help from others to perform any of the following everyday activities: Eating, dressing, grooming, bathing, toileting, or walking/balance?: No  In the past 7 days, did you need help from others to take care of any of the following: Laundry, housekeeping, banking/finances, shopping, telephone use, food preparation, transportation, or taking medications?: (!) Yes  Select all that apply: (!) Laundry    ADL Interventions:  · Patient declines any further evaluation/treatment for this issue          Objective   Vitals:    05/16/22 1446   BP: 112/78   Pulse: 72   Weight: 242 lb (109.8 kg)   Height: 5' 3\" (1.6 m)      Body mass index is 42.87 kg/m². General Appearance: alert and oriented to person, place and time, well developed and well- nourished, in no acute distress  Skin: warm and dry, no rash or erythema  Head: normocephalic and atraumatic    Pulmonary/Chest: clear to auscultation bilaterally- no wheezes, rales or rhonchi, normal air movement, no respiratory distress  Cardiovascular: normal rate, regular rhythm, missed beatsnormal S1 and S2, no murmurs, rubs, clicks, or gallops, distal pulses intact, no carotid bruits  Abdomen: soft, non-tender, non-distended, normal bowel sounds, no masses or organomegaly  Extremities:redness,warmth most of the left leg below knee, no swelling        Allergies   Allergen Reactions    Iodides Anaphylaxis     Rash, hives, swelling    Of note on patient's admission 4/12/2021 she stated that allergy to iodides was actually her mother and allergy for shellfish was based on her father's allergies and not something she has herself experienced.  Docusate Sodium      Rash     Scopolamine Hives     Prior to Visit Medications    Medication Sig Taking?  Authorizing Provider   nystatin ([address]) 534175 UNIT/GM powder APPLY TO AFFECTED AREA(S) TWO TIMES A DAY  Charley Jarvis MD   indomethacin (INDOCIN) 50 MG capsule TAKE ONE CAPSULE BY MOUTH THREE TIMES A DAY AS NEEDED FOR PAIN  Jayro Cartagena MD   omeprazole (PRILOSEC) 20 MG delayed release capsule TAKE ONE CAPSULE BY MOUTH EVERY Shola Roland MD   oxybutynin (DITROPAN) 5 MG tablet TAKE ONE TABLET BY MOUTH TWICE Janneth Adorno MD   DULoxetine (CYMBALTA) 60 MG extended release capsule TAKE TWO CAPSULES BY MOUTH Halle Tate MD   tiZANidine (ZANAFLEX) 4 MG tablet TAKE TWO TABLETS BY MOUTH NIGHTLY  Mariana Awan MD   furosemide (LASIX) 20 MG tablet TAKE TWO TABLETS BY MOUTH DAILY AS NEEDED  Jayro Cartagena MD   fluticasone-salmeterol (ADVAIR) 250-50 MCG/DOSE AEPB Inhale 1 puff into the lungs  Historical Provider, MD   albuterol sulfate  (90 Base) MCG/ACT inhaler Inhale 2 puffs into the lungs every 6 hours as needed  Historical Provider, MD   ondansetron (ZOFRAN) 4 MG tablet Take 1 tablet by mouth 3 times daily as needed for Nausea or Vomiting  Aliza Matias MD   Cholecalciferol 50 MCG (2000 UT) TABS Take 1 tablet by mouth daily Take 1 tablet by mouth daily. Dillon Carter, BANDAR - CNP       [facility] (Including outside providers/suppliers regularly involved in providing care):   Patient Care Team:  Jayro Cartagena MD as PCP - General (Internal Medicine)  Jayro Cartagena MD as PCP - [facility]  Pradeep Finney MD as Consulting Physician (Pulmonology)     Reviewed and updated this visit:  Tobacco  Allergies  Meds  Problems  Med Hx  Surg Hx  Soc Hx  Fam Hx                  Advance Care Planning   Advanced Care Planning: Discussed the patients choices for care and treatment in case of a health event that adversely affects decision-making abilities. Also discussed the patients long-term treatment options.  Reviewed with the patient the appropriate state-specific advance directive documents. Reviewed the process of designating a competent adult as an Agent (or -in-fact) that could take make health care decisions for the patient if incompetent. Patient was asked to complete the declaration forms, either acknowledge the forms by a public notary or an eligible witness and provide a signed copy to the practice office.   Time spent (minutes): 2

## 2022-05-17 ENCOUNTER — HOSPITAL ENCOUNTER (EMERGENCY)
Age: 57
Discharge: HOME | End: 2022-05-17
Attending: STUDENT IN AN ORGANIZED HEALTH CARE EDUCATION/TRAINING PROGRAM
Payer: MEDICARE

## 2022-05-17 VITALS
BODY MASS INDEX: 42.88 KG/M2 | DIASTOLIC BLOOD PRESSURE: 69 MMHG | SYSTOLIC BLOOD PRESSURE: 123 MMHG | TEMPERATURE: 97.2 F | HEART RATE: 65 BPM | OXYGEN SATURATION: 98 % | RESPIRATION RATE: 18 BRPM | WEIGHT: 242 LBS | HEIGHT: 63 IN

## 2022-05-17 DIAGNOSIS — I49.9 IRREGULAR HEART RHYTHM: ICD-10-CM

## 2022-05-17 DIAGNOSIS — E83.42 HYPOMAGNESEMIA: ICD-10-CM

## 2022-05-17 DIAGNOSIS — L03.116 CELLULITIS OF LEFT LOWER EXTREMITY: Primary | ICD-10-CM

## 2022-05-17 LAB
A/G RATIO: 1.3 (ref 1.1–2.2)
A/G RATIO: 1.3 (ref 1.1–2.2)
ALBUMIN SERPL-MCNC: 3.9 G/DL (ref 3.4–5)
ALBUMIN SERPL-MCNC: 4 G/DL (ref 3.4–5)
ALP BLD-CCNC: 116 U/L (ref 40–129)
ALP BLD-CCNC: 127 U/L (ref 40–129)
ALT SERPL-CCNC: 21 U/L (ref 10–40)
ALT SERPL-CCNC: 25 U/L (ref 10–40)
ANION GAP SERPL CALCULATED.3IONS-SCNC: 17 MMOL/L (ref 3–16)
ANION GAP SERPL CALCULATED.3IONS-SCNC: 22 MMOL/L (ref 3–16)
AST SERPL-CCNC: 25 U/L (ref 15–37)
AST SERPL-CCNC: 32 U/L (ref 15–37)
BANDED NEUTROPHILS RELATIVE PERCENT: 10 % (ref 0–7)
BASOPHILS ABSOLUTE: 0 K/UL (ref 0–0.2)
BASOPHILS ABSOLUTE: 0 K/UL (ref 0–0.2)
BASOPHILS RELATIVE PERCENT: 0 %
BASOPHILS RELATIVE PERCENT: 0.5 %
BILIRUB SERPL-MCNC: 0.3 MG/DL (ref 0–1)
BILIRUB SERPL-MCNC: 0.4 MG/DL (ref 0–1)
BUN BLDV-MCNC: 12 MG/DL (ref 7–20)
BUN BLDV-MCNC: 16 MG/DL (ref 7–20)
CALCIUM SERPL-MCNC: 8.9 MG/DL (ref 8.3–10.6)
CALCIUM SERPL-MCNC: 9.2 MG/DL (ref 8.3–10.6)
CHLORIDE BLD-SCNC: 100 MMOL/L (ref 99–110)
CHLORIDE BLD-SCNC: 97 MMOL/L (ref 99–110)
CHOLESTEROL, TOTAL: 192 MG/DL (ref 0–199)
CO2: 24 MMOL/L (ref 21–32)
CO2: 25 MMOL/L (ref 21–32)
CREAT SERPL-MCNC: 0.6 MG/DL (ref 0.6–1.1)
CREAT SERPL-MCNC: <0.5 MG/DL (ref 0.6–1.1)
EOSINOPHILS ABSOLUTE: 0.1 K/UL (ref 0–0.6)
EOSINOPHILS ABSOLUTE: 0.2 K/UL (ref 0–0.6)
EOSINOPHILS RELATIVE PERCENT: 1.7 %
EOSINOPHILS RELATIVE PERCENT: 3 %
FOLATE: >20 NG/ML (ref 4.78–24.2)
GFR AFRICAN AMERICAN: >60
GFR AFRICAN AMERICAN: >60
GFR NON-AFRICAN AMERICAN: >60
GFR NON-AFRICAN AMERICAN: >60
GLUCOSE BLD-MCNC: 145 MG/DL (ref 70–99)
GLUCOSE BLD-MCNC: 81 MG/DL (ref 70–99)
HCT VFR BLD CALC: 38.2 % (ref 36–48)
HCT VFR BLD CALC: 38.9 % (ref 36–48)
HDLC SERPL-MCNC: 70 MG/DL (ref 40–60)
HEMOGLOBIN: 12.9 G/DL (ref 12–16)
HEMOGLOBIN: 13.4 G/DL (ref 12–16)
IRON SATURATION: 20 % (ref 15–50)
IRON: 39 UG/DL (ref 37–145)
LACTIC ACID, SEPSIS: 0.8 MMOL/L (ref 0.4–1.9)
LDL CHOLESTEROL CALCULATED: 100 MG/DL
LYMPHOCYTES ABSOLUTE: 1.3 K/UL (ref 1–5.1)
LYMPHOCYTES ABSOLUTE: 1.5 K/UL (ref 1–5.1)
LYMPHOCYTES RELATIVE PERCENT: 21 %
LYMPHOCYTES RELATIVE PERCENT: 25.6 %
MAGNESIUM: 1.6 MG/DL (ref 1.8–2.4)
MAGNESIUM: 1.6 MG/DL (ref 1.8–2.4)
MCH RBC QN AUTO: 31.4 PG (ref 26–34)
MCH RBC QN AUTO: 32 PG (ref 26–34)
MCHC RBC AUTO-ENTMCNC: 33.7 G/DL (ref 31–36)
MCHC RBC AUTO-ENTMCNC: 34.4 G/DL (ref 31–36)
MCV RBC AUTO: 92.9 FL (ref 80–100)
MCV RBC AUTO: 93.1 FL (ref 80–100)
MONOCYTES ABSOLUTE: 0.7 K/UL (ref 0–1.3)
MONOCYTES ABSOLUTE: 0.7 K/UL (ref 0–1.3)
MONOCYTES RELATIVE PERCENT: 11 %
MONOCYTES RELATIVE PERCENT: 12.5 %
NEUTROPHILS ABSOLUTE: 3.5 K/UL (ref 1.7–7.7)
NEUTROPHILS ABSOLUTE: 4.1 K/UL (ref 1.7–7.7)
NEUTROPHILS RELATIVE PERCENT: 55 %
NEUTROPHILS RELATIVE PERCENT: 59.7 %
PDW BLD-RTO: 13.9 % (ref 12.4–15.4)
PDW BLD-RTO: 13.9 % (ref 12.4–15.4)
PLATELET # BLD: 138 K/UL (ref 135–450)
PLATELET # BLD: 193 K/UL (ref 135–450)
PLATELET SLIDE REVIEW: ABNORMAL
PMV BLD AUTO: 7.1 FL (ref 5–10.5)
PMV BLD AUTO: 7.6 FL (ref 5–10.5)
POTASSIUM REFLEX MAGNESIUM: 3.5 MMOL/L (ref 3.5–5.1)
POTASSIUM SERPL-SCNC: 3.5 MMOL/L (ref 3.5–5.1)
RBC # BLD: 4.1 M/UL (ref 4–5.2)
RBC # BLD: 4.18 M/UL (ref 4–5.2)
RBC # BLD: NORMAL 10*6/UL
SLIDE REVIEW: ABNORMAL
SODIUM BLD-SCNC: 142 MMOL/L (ref 136–145)
SODIUM BLD-SCNC: 143 MMOL/L (ref 136–145)
TOTAL IRON BINDING CAPACITY: 195 UG/DL (ref 260–445)
TOTAL PROTEIN: 7 G/DL (ref 6.4–8.2)
TOTAL PROTEIN: 7 G/DL (ref 6.4–8.2)
TRIGL SERPL-MCNC: 111 MG/DL (ref 0–150)
TSH REFLEX: 2.32 UIU/ML (ref 0.27–4.2)
VITAMIN B-12: 530 PG/ML (ref 211–911)
VITAMIN D 25-HYDROXY: 65.2 NG/ML
VLDLC SERPL CALC-MCNC: 22 MG/DL
WBC # BLD: 5.9 K/UL (ref 4–11)
WBC # BLD: 6.3 K/UL (ref 4–11)

## 2022-05-17 PROCEDURE — 93005 ELECTROCARDIOGRAM TRACING: CPT | Performed by: PHYSICIAN ASSISTANT

## 2022-05-17 PROCEDURE — 83735 ASSAY OF MAGNESIUM: CPT

## 2022-05-17 PROCEDURE — 6360000002 HC RX W HCPCS: Performed by: STUDENT IN AN ORGANIZED HEALTH CARE EDUCATION/TRAINING PROGRAM

## 2022-05-17 PROCEDURE — 87040 BLOOD CULTURE FOR BACTERIA: CPT

## 2022-05-17 PROCEDURE — 96365 THER/PROPH/DIAG IV INF INIT: CPT

## 2022-05-17 PROCEDURE — 99284 EMERGENCY DEPT VISIT MOD MDM: CPT

## 2022-05-17 PROCEDURE — 36415 COLL VENOUS BLD VENIPUNCTURE: CPT

## 2022-05-17 PROCEDURE — 96366 THER/PROPH/DIAG IV INF ADDON: CPT

## 2022-05-17 PROCEDURE — 96367 TX/PROPH/DG ADDL SEQ IV INF: CPT

## 2022-05-17 PROCEDURE — 83605 ASSAY OF LACTIC ACID: CPT

## 2022-05-17 PROCEDURE — 85025 COMPLETE CBC W/AUTO DIFF WBC: CPT

## 2022-05-17 PROCEDURE — 2580000003 HC RX 258: Performed by: PHYSICIAN ASSISTANT

## 2022-05-17 PROCEDURE — 80053 COMPREHEN METABOLIC PANEL: CPT

## 2022-05-17 PROCEDURE — 6360000002 HC RX W HCPCS: Performed by: PHYSICIAN ASSISTANT

## 2022-05-17 RX ORDER — 0.9 % SODIUM CHLORIDE 0.9 %
30 INTRAVENOUS SOLUTION INTRAVENOUS ONCE
Status: DISCONTINUED | OUTPATIENT
Start: 2022-05-17 | End: 2022-05-18 | Stop reason: HOSPADM

## 2022-05-17 RX ORDER — CLINDAMYCIN HYDROCHLORIDE 300 MG/1
300 CAPSULE ORAL 3 TIMES DAILY
Qty: 21 CAPSULE | Refills: 0 | Status: SHIPPED | OUTPATIENT
Start: 2022-05-17 | End: 2022-05-24

## 2022-05-17 RX ORDER — MAGNESIUM SULFATE IN WATER 40 MG/ML
2000 INJECTION, SOLUTION INTRAVENOUS ONCE
Status: COMPLETED | OUTPATIENT
Start: 2022-05-17 | End: 2022-05-17

## 2022-05-17 RX ORDER — SACCHAROMYCES BOULARDII 250 MG
250 CAPSULE ORAL 2 TIMES DAILY
Qty: 14 CAPSULE | Refills: 0 | Status: SHIPPED | OUTPATIENT
Start: 2022-05-17 | End: 2022-05-24

## 2022-05-17 RX ADMIN — VANCOMYCIN HYDROCHLORIDE 2000 MG: 10 INJECTION, POWDER, LYOPHILIZED, FOR SOLUTION INTRAVENOUS at 18:19

## 2022-05-17 RX ADMIN — CEFEPIME 2000 MG: 2 INJECTION, POWDER, FOR SOLUTION INTRAVENOUS at 20:45

## 2022-05-17 RX ADMIN — MAGNESIUM SULFATE HEPTAHYDRATE 2000 MG: 40 INJECTION, SOLUTION INTRAVENOUS at 18:38

## 2022-05-17 ASSESSMENT — PAIN - FUNCTIONAL ASSESSMENT: PAIN_FUNCTIONAL_ASSESSMENT: NONE - DENIES PAIN

## 2022-05-17 NOTE — ED PROVIDER NOTES
[address] ED  EMERGENCY DEPARTMENT ENCOUNTER        Pt Name: Moni Mac  MRN: [id number]  [place] 1965  Date of evaluation: 5/17/2022  Provider: HÉCTOR Allison  PCP: Harish Cochran MD    This patient was seen and evaluated by the attending physician Eduardo Oconnell MD.        CHIEF COMPLAINT       Chief Complaint   Patient presents with    Cellulitis     Patient says she saw Dr. Yolande Castillo yesterday for cellulitis and he started her on Keflex. She said she had gotten 3 doses of it in and she saw him again today and he wanted her. Said she had a fever Friday and Saturday, none since then. Denies N/V, said she did have diarrhea       HISTORY OF PRESENT ILLNESS   (Location/Symptom, Timing/Onset, Context/Setting, Quality, Duration, Modifying Factors, Severity)  Note limiting factors. Moni Mac is a 60 y.o. female with past medical history of obesity, history of DVT, arthritis, asthma, sleep apnea, spinal stenosis who presents via private vehicle from her home per referral from her PCP for evaluation of cellulitis. She notes that over the weekend around Friday she developed a bit of a cellulitis to her left calf. Over the weekend it stayed fairly stable, she had a family doctor visit yesterday, well check, she noted at that time that she was having some issues with feeling that her heart was skipping a beat. She was prescribed a 1 day Holter monitor and she was also started on Keflex for the cellulitis. She notes that over the weekend she did have some fevers and chills however she has not had fever or chills since starting the Keflex and has been off antipyretic. She returned to the Holter monitor today and her family doctor looked at her calf and looked at the cellulitis and told her to go to the ER for further evaluation. Patient notes that the cellulitis is gotten worse. She notes it has spread. She denies any distal numbness tingling or weakness.   Denies any fevers no nausea vomiting body aches or chills. Denies any chest pain or shortness of breath or syncope. She has only been on Keflex she endorses prior allergy to Bactrim. She denies past medical history of C. difficile. She denies freshwater exposure. Nursing Notes were all reviewed and agreed with or any disagreements were addressed  in the HPI. Pt was seen during the [place] 19 pandemic. Appropriate PPE worn by ME during patient encounters. Pt seen during a time with constrained hospital bed capacity and other potential inpatient and outpatient resources were constrained due to the viral pandemic. REVIEW OF SYSTEMS    (2-9 systems for level 4, 10 or more for level 5)     Review of Systems    Positives and Pertinent negatives as per HPI. Except as noted abovein the ROS, all other systems were reviewed and negative.        PAST MEDICAL HISTORY     Past Medical History:   Diagnosis Date    Acute renal failure (ARF) ([facility] [address] May 4, 2015    d/t IV dye    Arthritis     Asthma     DDD (degenerative disc disease), cervical     Deep vein thrombosis (DVT) ([facility] [address] 07/2015    Degenerative disc disease     Family history of factor V deficiency     brother and sister; pt tested does not have    GERD (gastroesophageal reflux disease)     Hypertension     no longer has after quitting stressful job    Scoliosis     Sleep apnea     uses c-pap    Spinal stenosis          SURGICAL HISTORY     Past Surgical History:   Procedure Laterality Date    CARPAL TUNNEL RELEASE      JOINT REPLACEMENT Bilateral     hips and knees    LAPAROTOMY N/A 9/5/2021    RIGHT COLECTOMY, INCARCERATED VENTRAL INCISIONAL HERNIA REPAIR, OMPHALECTOMY, DRAINAGE OF INTRA ABDOMINAL ABSCESS. performed by Kenisha Zhu MD at [address]      3x right, 2x left    SLEEVE GASTRECTOMY N/A 4/12/2021    LAPAROSCOPIC SLEEVE GASTRECTOMY - ETHICON performed by Jade Simental MD at [address] [facility] ENDOSCOPY N/A 12/28/2020    EGD BIOPSY performed by Mony Fields MD at [address]       Previous Medications    ALBUTEROL SULFATE  (90 BASE) MCG/ACT INHALER    Inhale 2 puffs into the lungs every 6 hours as needed    CEPHALEXIN (KEFLEX) 500 MG CAPSULE    Take 1 capsule by mouth 3 times daily    CHOLECALCIFEROL 50 MCG (2000 UT) TABS    Take 1 tablet by mouth daily Take 1 tablet by mouth daily. DULOXETINE (CYMBALTA) 60 MG EXTENDED RELEASE CAPSULE    TAKE TWO CAPSULES BY MOUTH DAILY    FLUTICASONE-SALMETEROL (ADVAIR) 250-50 MCG/DOSE AEPB    Inhale 1 puff into the lungs    FUROSEMIDE (LASIX) 20 MG TABLET    TAKE TWO TABLETS BY MOUTH DAILY AS NEEDED    INDOMETHACIN (INDOCIN) 50 MG CAPSULE    TAKE ONE CAPSULE BY MOUTH THREE TIMES A DAY AS NEEDED FOR PAIN    NYSTATIN ([address] [id number] UNIT/GM POWDER    APPLY TO AFFECTED AREA(S) TWO TIMES A DAY    OMEPRAZOLE (PRILOSEC) 20 MG DELAYED RELEASE CAPSULE    TAKE ONE CAPSULE BY MOUTH EVERY MORNING BEFORE BREAKFAST    ONDANSETRON (ZOFRAN) 4 MG TABLET    Take 1 tablet by mouth 3 times daily as needed for Nausea or Vomiting    OXYBUTYNIN (DITROPAN) 5 MG TABLET    TAKE ONE TABLET BY MOUTH TWICE A DAY    TIZANIDINE (ZANAFLEX) 4 MG TABLET    TAKE TWO TABLETS BY MOUTH NIGHTLY         ALLERGIES     Iodides, Docusate sodium, and Scopolamine    FAMILYHISTORY       Family History   Problem Relation Age of Onset    Other Sister     Stroke Sister     Arthritis Sister     Asthma Sister     Mental Illness Sister     Other Brother     High Blood Pressure Brother     Diabetes Brother     Arthritis Brother     Cancer Sister     Arthritis Sister     Arthritis Mother     High Blood Pressure Father     Arthritis Father           SOCIAL HISTORY       Social History     Socioeconomic History    Marital status:       Spouse name: None    Number of children: None    Years of education: None    Highest education level: None Occupational History    None   Tobacco Use    Smoking status: Former Smoker     Packs/day: 1.50     Years: 20.00     Pack years: 30.00     Types: Cigarettes     Quit date:      Years since quittin.3    Smokeless tobacco: Never Used   Vaping Use    Vaping Use: Never used   Substance and Sexual Activity    Alcohol use: Not Currently     Comment: couple times a week    Drug use: No    Sexual activity: Yes     Partners: Male   Other Topics Concern    None   Social History Narrative    None     Social Determinants of Health     Financial Resource Strain:     Difficulty of Paying Living Expenses: Not on file   Food Insecurity:     Worried About Running Out of Food in the Last Year: Not on file    Max of Food in the Last Year: Not on file   Transportation Needs:     Lack of Transportation (Medical): Not on file    Lack of Transportation (Non-Medical):  Not on file   Physical Activity: Inactive    Days of Exercise per Week: 0 days    Minutes of Exercise per Session: 0 min   Stress:     Feeling of Stress : Not on file   Social Connections:     Frequency of Communication with Friends and Family: Not on file    Frequency of Social Gatherings with Friends and Family: Not on file    Attends Faith Services: Not on file    Active Member of Clubs or Organizations: Not on file    Attends Club or Organization Meetings: Not on file    Marital Status: Not on file   Intimate Partner Violence:     Fear of Current or Ex-Partner: Not on file    Emotionally Abused: Not on file    Physically Abused: Not on file    Sexually Abused: Not on file   Housing Stability:     Unable to Pay for Housing in the Last Year: Not on file    Number of [place] in the Last Year: Not on file    Unstable Housing in the Last Year: Not on file       SCREENINGS    Veronique Coma Scale  Eye Opening: Spontaneous  Best Verbal Response: Oriented  Best Motor Response: Obeys commands  [place] Coma Scale Score: 15 PHYSICAL EXAM    (up to 7 for level 4, 8 or more for level 5)     ED Triage Vitals [05/17/22 1623]   BP Temp Temp src Pulse Resp SpO2 Height Weight   125/69 97.2 °F (36.2 °C) -- 52 20 97 % 5' 3\" (1.6 m) 242 lb (109.8 kg)       Physical Exam  PHYSICAL EXAM  /69   Pulse 52   Temp 97.2 °F (36.2 °C)   Resp 20   Ht 5' 3\" (1.6 m)   Wt 242 lb (109.8 kg)   LMP 12/24/2010   SpO2 97%   BMI 42.87 kg/m²   GENERAL APPEARANCE: Awake and alert. Cooperative. Adult female sitting upright in exam bed, nondiaphoretic breathing comfortably on room air showing no sign of acute respiratory distress. HEAD: Normocephalic. Atraumatic. EYES: PERRL. EOM's grossly intact. ENT: Mucous membranes are moist.. NECK: Supple. HEART: Regular rate, irregular rhythm. No murmurs. Radial pulses 2+ symmetric DP PT pulses 2+, symmetric. LUNGS: Respirations unlabored. CTAB. Good air exchange. Speaking comfortably in full sentences. ABDOMEN: Soft. Non-distended. Non-tender. EXTREMITIES: No peripheral edema. Moves all extremities equally. All extremities neurovascularly intact. SKIN: Warm and dry. Cellulitic rash to the left lower extremity. Skin otherwise intact without blisters. Calf and leg compartments are soft. No joint effusion to the knee or ankle full active range of motion to both joints. NEUROLOGICAL: Alert and oriented. CN grossly intact. No gross facial drooping. Power intact to UE and LE, sensation intact x 4. No tremors or ataxia. PSYCHIATRIC: Normal mood and affect.         DIAGNOSTIC RESULTS   LABS:    Labs Reviewed   COMPREHENSIVE METABOLIC PANEL W/ REFLEX TO MG FOR LOW K - Abnormal; Notable for the following components:       Result Value    Anion Gap 17 (*)     Glucose 145 (*)     All other components within normal limits   MAGNESIUM - Abnormal; Notable for the following components:    Magnesium 1.60 (*)     All other components within normal limits   CULTURE, BLOOD 1   CULTURE, BLOOD 2   CBC WITH AUTO DIFFERENTIAL   LACTATE, SEPSIS   LACTATE, SEPSIS       All other labs were within normal range or not returned as of this dictation. EKG: All EKG's are interpreted by the Emergency Department Physician who either signs orCo-signs this chart in the absence of a cardiologist.  Please see their note for interpretation of EKG. RADIOLOGY:   Non-plain film images such as CT, Ultrasound and MRI are read by the radiologist. Plain radiographic images are visualized andpreliminarily interpreted by the  ED Provider with the below findings:        Interpretation perthe Radiologist below, if available at the time of this note:    No orders to display     No results found. PROCEDURES   Unless otherwise noted below, none     Procedures    CRITICAL CARE TIME   N/A    CONSULTS:  None      EMERGENCY DEPARTMENT COURSE and DIFFERENTIALDIAGNOSIS/MDM:   Vitals:    Vitals:    05/17/22 1623   BP: 125/69   Pulse: 52   Resp: 20   Temp: 97.2 °F (36.2 °C)   SpO2: 97%   Weight: 242 lb (109.8 kg)   Height: 5' 3\" (1.6 m)       Patient was given thefollowing medications:  Medications   cefepime (MAXIPIME) 2000 mg IVPB minibag (has no administration in time range)   vancomycin (VANCOCIN) 2,000 mg in dextrose 5 % 500 mL IVPB (2,000 mg IntraVENous New Bag 5/17/22 1819)   0.9 % sodium chloride IV bolus 1,572 mL (1,572 mLs IntraVENous Not Given 5/17/22 1819)   magnesium sulfate 2000 mg in 50 mL IVPB premix (2,000 mg IntraVENous New Bag 5/17/22 1838)       PDMP Monitoring:    Last PDMP Jailene Ricketts as Reviewed [facility]):  Review User Review Instant Review Result   Carlos Mazariegos 4/13/2021 11:52 AM Reviewed PDMP [1]     Last Controlled Substance Monitoring Documentation      ED to Hosp-Admission (Discharged) from 5/19/2019 in [address] [facility]   Attestation The Prescription Monitoring Report for this patient was reviewed today.  filed at 05/19/2019 2121        Urine Drug Screenings (1 yr)     Drug screen multi urine  Collected: 5/4/2015 4:15 PM (Final result)    Complete Results              Medication Contract and Consent for Opioid Use Documents Filed      No documents found                MDM:   Patient seen and evaluated. Old records reviewed. Diagnostic testing reviewed and results discussed. 52-year-old female presents for evaluation of cellulitis. Work-up included blood work. No concern for necrotizing fasciitis. Patient started on IV antibiotics. Patient was presented to the hospitalist team however at this time patient is not meeting SIRS criteria no concern for acute septicemia or acute toxicity. She has not necessarily failed outpatient therapy as she has only had monotherapy without MRSA coverage, she will be transitioned to clindamycin after IV antibiotics completed in the department. This is reasonable I believe and patient is in agreement with this plan for continued outpatient management with instructions on close follow-up with her PCP for wound check in the next 2 days and strict ER return precautions. Pt is in agreement with the current plan and all questions were addressed. Discharge Time out:  CC Reviewed Yes   Test Results Yes     Vitals:    05/17/22 1623   BP: 125/69   Pulse: 52   Resp: 20   Temp: 97.2 °F (36.2 °C)   SpO2: 97%              FINAL IMPRESSION      1. Cellulitis of left lower extremity    2. Hypomagnesemia    3. Irregular heart rhythm          DISPOSITION/PLAN   DISPOSITION        PATIENT REFERREDTO:  No follow-up provider specified.     DISCHARGE MEDICATIONS:  New Prescriptions    No medications on file       DISCONTINUED MEDICATIONS:  Discontinued Medications    No medications on file              (Please note that portions ofthis note were completed with a voice recognition program.  Efforts were made to edit the dictations but occasionally words are mis-transcribed.)    Thompson Jack, [id number] (electronically signed)       Sukhjinder Thorpe  05/17/22 1948

## 2022-05-18 ENCOUNTER — TELEPHONE (OUTPATIENT)
Dept: INTERNAL MEDICINE CLINIC | Age: 57
End: 2022-05-18

## 2022-05-18 LAB
EKG ATRIAL RATE: 62 BPM
EKG DIAGNOSIS: NORMAL
EKG P AXIS: 60 DEGREES
EKG P-R INTERVAL: 132 MS
EKG Q-T INTERVAL: 432 MS
EKG QRS DURATION: 98 MS
EKG QTC CALCULATION (BAZETT): 438 MS
EKG R AXIS: -10 DEGREES
EKG T AXIS: 36 DEGREES
EKG VENTRICULAR RATE: 62 BPM

## 2022-05-18 PROCEDURE — 93010 ELECTROCARDIOGRAM REPORT: CPT | Performed by: INTERNAL MEDICINE

## 2022-05-18 RX ORDER — MAGNESIUM OXIDE 400 MG/1
400 TABLET ORAL DAILY
Qty: 90 TABLET | Refills: 0 | Status: SHIPPED | OUTPATIENT
Start: 2022-05-18

## 2022-05-18 NOTE — TELEPHONE ENCOUNTER
----- Message from Mireya Patel MD sent at 5/18/2022 11:22 AM EDT -----  Magnesium oxide 400 mg daily # 30 2 rf   ----- Message -----  From: Burton Zhu  Sent: 5/18/2022  11:08 AM EDT  To: Mireya Patel MD    Patient was given magnesium in the ER yesterday but was not prescribed any magnesium pills by any doctor. States she has taken a supplement. Would like magnesium sent to [facility]. Please advise.

## 2022-05-18 NOTE — TELEPHONE ENCOUNTER
----- Message from Chan Lane MD sent at 5/18/2022 10:42 AM EDT -----  Contact: [phone] (M  See PA in the next 2 weeks   ----- Message -----  From: Garett Espinoza MA  Sent: 5/18/2022  10:11 AM EDT  To: Chan Lane MD    Pt was seen in the ED last night for cellulites and needs an ER follow up.  She was just seen 5-16-22 not sure when you want to see her

## 2022-05-19 LAB
ALPHA-TOCOPHEROL: 12.2 MG/L (ref 5.5–18)
ESTIMATED AVERAGE GLUCOSE: 99.7 MG/DL
GAMMA-TOCOPHEROL: 2.2 MG/L (ref 0–6)
HBA1C MFR BLD: 5.1 %
RETINYL PALMITATE: 0.02 MG/L (ref 0–0.1)
VITAMIN A LEVEL: 0.25 MG/L (ref 0.3–1.2)
VITAMIN A, INTERP: ABNORMAL

## 2022-05-19 NOTE — ED PROVIDER NOTES
I independently examined and evaluated Dorinda Nesbitt. I personally saw the patient and performed a substantive portion of the visit including all aspects of the medical decision making    In brief, Barbara Forte is a 60 y.o. female with a past medical history of obstructive sleep apnea, DVT, obesity, who presents to the ED complaining of cellulitis. Patient was sent in by PCP. Patient noticed cellulitis on her left lower extremity on Friday. She was seen on Monday and started on Keflex. Symptoms have reportedly worsened since being placed on Keflex so PCP sent her to the emergency department for evaluation. She does report some fevers and chills over the weekend that had resolved since starting antibiotics. She denies numbness, weakness, tingling. She denies cough or dysuria. REVIEW OF SYSTEMS  All systems reviewed, pertinent positives per HPI otherwise noted to be negative. Focused exam revealed   PHYSICAL EXAM  /69   Pulse 65   Temp 97.2 °F (36.2 °C)   Resp 18   Ht 5' 3\" (1.6 m)   Wt 242 lb (109.8 kg)   LMP 12/24/2010   SpO2 98%   BMI 42.87 kg/m²    GENERAL APPEARANCE: Awake and alert. Cooperative. no distress. HENT: Normocephalic. Atraumatic. Mucous membranes are moist  NECK: Supple. Full range of motion of the neck without stiffness or pain. EYES: PERRL. EOM's grossly intact. HEART/CHEST: RRR. No murmurs. Chest wall is not tender to palpation. LUNGS: Respirations unlabored. CTAB. Good air exchange. Speaking comfortably in full sentences. ABDOMEN: No tenderness. Soft. Non-distended. No masses. No organomegaly. No guarding or rebound. MUSCULOSKELETAL: Equal bilaterally lower extremity edema. Compartments soft. No deformity. No tenderness in the extremities. All extremities neurovascularly intact. SKIN: Warm and dry. Right lower extremity erythema and mild tenderness to palpation. No crepitus. NEUROLOGICAL: Alert and oriented. No gross facial drooping.  Strength 5/5, sensation intact. PSYCHIATRIC: Normal mood and affect. ED course / MDM:   Overall well appearing patient, in no acute distress, presenting for left lower extremity cellulitis. Patient sent in by PCP after working on Keflex. Patient is only been on Keflex for 1 day. Physical exam remarkable for left lower extremity cellulitis. No evidence of neurovascular compromise or crepitus. I personally saw the patient and performed a substantive portion of the visit including all aspects of the medical decision making. Differential diagnosis includes but is not limited to: Cellulitis, low suspicion for necrotizing infection, DVT, abscess, sepsis      Workup showed: The Ekg interpreted by me shows  normal sinus rhythm with a rate of 62  Axis is   Left axis deviation  QTc is  within an acceptable range  Intervals and Durations are unremarkable. ST Segments: nonspecific changes  No significant change from prior EKG dated 9/6/21    ED Course as of 05/19/22 1614   Tue May 17, 2022   1841 No significant electrolyte abnormalities CMP or evidence of kidney dysfunction. [ER]   1841 Liver function testing unremarkable [ER]   1841 No leukocytosis, anemia, thrombocytopenia [ER]   1841 Hypomagnesemia 1.6, repletion given [ER]   1841 Lactate within normal [ER]      ED Course User Index  [ER] Michelle Cool MD     Patient does not meet SIRS criteria. Low suspicion for sepsis. Lactate within normal limits. Midlevel provider did discuss with hospitalist given that patient sent in by PCP who is a member of the hospitalist group. They did not feel that required admission at this time. At this time, I agree that this does not represent a failure of outpatient treatment patient is only been on Keflex for 1 day. Patient receiving IV antibiotics in the emergency department, expand antibiotic regimen for home with strict return precautions.   Patient was provided with prescriptions for clindamycin, patient reports intolerance of Bactrim. Return precautions given. Encouraged PCP follow-up in 2 days. Patient discharged in stable condition. CLINICAL IMPRESSION  1. Cellulitis of left lower extremity    2. Hypomagnesemia    3. Irregular heart rhythm        Blood pressure 123/69, pulse 65, temperature 97.2 °F (36.2 °C), resp. rate 18, height 5' 3\" (1.6 m), weight 242 lb (109.8 kg), last menstrual period 12/24/2010, SpO2 98 %. DISPOSITION  Lizzette Nesbitt was discharged to home in stable condition. All diagnostic, treatment, and disposition decisions were made by myself in conjunction with the advanced practice provider. For all further details of the patient's emergency department visit, please see the advanced practice provider's documentation. Comment: Please note this report has been produced using speech recognition software and may contain errors related to that system including errors in grammar, punctuation, and spelling, as well as words and phrases that may be inappropriate. If there are any questions or concerns please feel free to contact the dictating provider for clarification.         Radha Graham MD  05/19/22 6101

## 2022-05-20 LAB — VITAMIN B1 WHOLE BLOOD: 128 NMOL/L (ref 70–180)

## 2022-05-21 LAB
BLOOD CULTURE, ROUTINE: NORMAL
CULTURE, BLOOD 2: NORMAL

## 2022-06-21 NOTE — TELEPHONE ENCOUNTER
Handicap placard script mailed to pt. pt pmh of seizures  continue keppra 1250 mg BID (increased on this admission)  neuro following

## 2022-07-29 RX ORDER — INDOMETHACIN 50 MG/1
CAPSULE ORAL
Qty: 90 CAPSULE | Refills: 0 | Status: SHIPPED | OUTPATIENT
Start: 2022-07-29 | End: 2022-08-31

## 2022-08-03 ENCOUNTER — TELEPHONE (OUTPATIENT)
Dept: INTERNAL MEDICINE CLINIC | Age: 57
End: 2022-08-03

## 2022-08-03 NOTE — TELEPHONE ENCOUNTER
----- Message from Fanny Stratton MD sent at 8/3/2022  3:02 PM EDT -----  Contact: [phone] (M)  Make appt to discuss   ----- Message -----  From: Chen Quintanilla MA  Sent: 8/3/2022   2:31 PM EDT  To: Fanny Stratton MD    Pt called and would like to know if you can RX her gabapentin 400 mg twice daily, She was taking it before when she went to pain management but stopped to see if she could go with out, she recently started getting back pain again and would like to start taking it if you can prescribe it for her.  Thank you         [facility] [id number] [facility] [address], [address] [phone] (Ph: [phone])

## 2022-08-22 RX ORDER — DULOXETIN HYDROCHLORIDE 60 MG/1
CAPSULE, DELAYED RELEASE ORAL
Qty: 180 CAPSULE | Refills: 0 | Status: SHIPPED | OUTPATIENT
Start: 2022-08-22

## 2022-08-22 RX ORDER — TIZANIDINE 4 MG/1
TABLET ORAL
Qty: 180 TABLET | Refills: 0 | Status: SHIPPED | OUTPATIENT
Start: 2022-08-22

## 2022-08-22 RX ORDER — FUROSEMIDE 20 MG/1
TABLET ORAL
Qty: 180 TABLET | Refills: 0 | Status: SHIPPED | OUTPATIENT
Start: 2022-08-22

## 2022-08-31 RX ORDER — INDOMETHACIN 50 MG/1
CAPSULE ORAL
Qty: 90 CAPSULE | Refills: 0 | Status: SHIPPED | OUTPATIENT
Start: 2022-08-31

## 2022-09-16 ENCOUNTER — OFFICE VISIT (OUTPATIENT)
Dept: INTERNAL MEDICINE CLINIC | Age: 57
End: 2022-09-16

## 2022-09-16 VITALS
WEIGHT: 234 LBS | DIASTOLIC BLOOD PRESSURE: 72 MMHG | SYSTOLIC BLOOD PRESSURE: 110 MMHG | HEIGHT: 63 IN | HEART RATE: 72 BPM | BODY MASS INDEX: 41.46 KG/M2

## 2022-09-16 DIAGNOSIS — Z12.31 SCREENING MAMMOGRAM, ENCOUNTER FOR: ICD-10-CM

## 2022-09-16 DIAGNOSIS — E83.42 HYPOMAGNESEMIA: ICD-10-CM

## 2022-09-16 DIAGNOSIS — N39.41 URINARY INCONTINENCE, URGE: ICD-10-CM

## 2022-09-16 DIAGNOSIS — E66.01 MORBID OBESITY (HCC): ICD-10-CM

## 2022-09-16 DIAGNOSIS — Z23 NEED FOR INFLUENZA VACCINATION: ICD-10-CM

## 2022-09-16 DIAGNOSIS — K21.9 CHRONIC GERD: ICD-10-CM

## 2022-09-16 DIAGNOSIS — I49.3 PVC (PREMATURE VENTRICULAR CONTRACTION): ICD-10-CM

## 2022-09-16 DIAGNOSIS — Z98.84 S/P LAPAROSCOPIC SLEEVE GASTRECTOMY: ICD-10-CM

## 2022-09-16 DIAGNOSIS — M19.90 CHRONIC OSTEOARTHRITIS: Primary | ICD-10-CM

## 2022-09-16 PROBLEM — G47.33 OBSTRUCTIVE SLEEP APNEA: Status: RESOLVED | Noted: 2021-01-21 | Resolved: 2022-09-16

## 2022-09-16 PROBLEM — K37 APPENDICITIS: Status: RESOLVED | Noted: 2021-08-30 | Resolved: 2022-09-16

## 2022-09-16 PROBLEM — R06.83 SNORING: Status: RESOLVED | Noted: 2020-10-27 | Resolved: 2022-09-16

## 2022-09-16 PROCEDURE — 90682 RIV4 VACC RECOMBINANT DNA IM: CPT | Performed by: INTERNAL MEDICINE

## 2022-09-16 PROCEDURE — 99214 OFFICE O/P EST MOD 30 MIN: CPT | Performed by: INTERNAL MEDICINE

## 2022-09-16 PROCEDURE — G0008 ADMIN INFLUENZA VIRUS VAC: HCPCS | Performed by: INTERNAL MEDICINE

## 2022-09-16 RX ORDER — GABAPENTIN 400 MG/1
400 CAPSULE ORAL 3 TIMES DAILY
Qty: 90 CAPSULE | Refills: 3 | Status: SHIPPED | OUTPATIENT
Start: 2022-09-16 | End: 2022-10-16

## 2022-09-16 ASSESSMENT — ENCOUNTER SYMPTOMS
ABDOMINAL PAIN: 0
DIARRHEA: 0
VOMITING: 0
CHEST TIGHTNESS: 0
SHORTNESS OF BREATH: 0
NAUSEA: 0
WHEEZING: 0
BLOOD IN STOOL: 0
COUGH: 0

## 2022-09-16 NOTE — PROGRESS NOTES
Luis Angel Nesbitt (:  1965) is a 59 y.o. female,Established patient, here for evaluation of the following chief complaint(s):  Follow-up         ASSESSMENT/PLAN:  1. Chronic osteoarthritis  2. Need for influenza vaccination  -     Influenza, FLUBLOK, (age 25 y+), IM, Preservative Free, 0.5 mL  3. Chronic GERD  4. Morbid obesity ([address]  5. Urinary incontinence, urge  6. Hypomagnesemia  7. S/P laparoscopic sleeve gastrectomy  8. Screening mammogram, encounter for  -     Mammography screening bilateral; Future  9. PVC (premature ventricular contraction)  -     ECHO 2D WO Color Doppler Complete; Future              Morbid obesity  S/p gastric sleeve procedure   Advised to cut down on calories. Lose weight. GERD is stable. Continue PPI. OA/chronic pain  Continue indomethacin. continue PPI  Continue tizanidine,cymbalta  Gabapentin 40 0tid      Pedal edema  Continue lasix prn     Urinary urge incontinence. Continue ditropan. Stable. PVCs  Continue mg replacement   Arrange for ECHO     Last colonoscopy 2017     Schedule mammogram- referral made      Subjective   SUBJECTIVE/OBJECTIVE:  HPI  Is here for follow up. She has a prior h/o DVT,reactive airway disease,morbid obesity,OA,chronic pain and GERD. She sees a pain physician and is on oxycodone,topamax,tizanidine,gabapentin and Cymbalta. She also takes indomethacin. Asthma is stable on inhalers. uses albuterol  Very infrequently. Had gastric sleeve procedure 2021    Review of Systems   Constitutional:  Negative for fatigue, fever and unexpected weight change. Respiratory:  Negative for cough, chest tightness, shortness of breath and wheezing. Cardiovascular:  Negative for chest pain, palpitations and leg swelling. Gastrointestinal:  Negative for abdominal pain, blood in stool, diarrhea, nausea and vomiting. Genitourinary:  Negative for hematuria. Neurological:  Negative for light-headedness.         Objective   Physical Exam  Constitutional:       Appearance: She is well-developed. HENT:      Head: Normocephalic and atraumatic. Eyes:      Pupils: Pupils are equal, round, and reactive to light. Neck:      Thyroid: No thyromegaly. Cardiovascular:      Rate and Rhythm: Normal rate and regular rhythm. Heart sounds: Normal heart sounds. No murmur heard. No friction rub. No gallop. Comments: No carotid bruit  Pulmonary:      Effort: Pulmonary effort is normal. No respiratory distress. Breath sounds: Normal breath sounds. No wheezing or rales. Chest:      Chest wall: No tenderness. Abdominal:      General: Bowel sounds are normal. There is no distension. Palpations: Abdomen is soft. There is no mass. Tenderness: There is no abdominal tenderness. There is no guarding or rebound. Musculoskeletal:      Cervical back: Normal range of motion and neck supple. Neurological:      Mental Status: She is alert and oriented to person, place, and time. An electronic signature was used to authenticate this note.     --Tom Rao MD

## 2022-09-30 ENCOUNTER — TELEPHONE (OUTPATIENT)
Dept: INTERNAL MEDICINE CLINIC | Age: 57
End: 2022-09-30

## 2022-09-30 RX ORDER — OFLOXACIN 3 MG/ML
SOLUTION/ DROPS OPHTHALMIC
Qty: 1 EACH | Refills: 0 | Status: SHIPPED | OUTPATIENT
Start: 2022-09-30

## 2022-11-17 RX ORDER — FUROSEMIDE 20 MG/1
TABLET ORAL
Qty: 180 TABLET | Refills: 0 | Status: SHIPPED | OUTPATIENT
Start: 2022-11-17

## 2022-11-17 RX ORDER — OMEPRAZOLE 20 MG/1
CAPSULE, DELAYED RELEASE ORAL
Qty: 90 CAPSULE | Refills: 0 | Status: SHIPPED | OUTPATIENT
Start: 2022-11-17

## 2022-11-17 RX ORDER — DULOXETIN HYDROCHLORIDE 60 MG/1
CAPSULE, DELAYED RELEASE ORAL
Qty: 180 CAPSULE | Refills: 0 | Status: SHIPPED | OUTPATIENT
Start: 2022-11-17

## 2022-11-22 RX ORDER — INDOMETHACIN 50 MG/1
CAPSULE ORAL
Qty: 90 CAPSULE | Refills: 0 | Status: SHIPPED | OUTPATIENT
Start: 2022-11-22

## 2022-12-06 RX ORDER — TIZANIDINE 4 MG/1
TABLET ORAL
Qty: 180 TABLET | Refills: 0 | Status: SHIPPED | OUTPATIENT
Start: 2022-12-06

## 2023-01-09 RX ORDER — INDOMETHACIN 50 MG/1
CAPSULE ORAL
Qty: 90 CAPSULE | Refills: 0 | Status: SHIPPED | OUTPATIENT
Start: 2023-01-09

## 2023-02-10 RX ORDER — GABAPENTIN 400 MG/1
400 CAPSULE ORAL 3 TIMES DAILY
Qty: 90 CAPSULE | Refills: 0 | Status: SHIPPED | OUTPATIENT
Start: 2023-02-10 | End: 2023-03-12

## 2023-02-20 RX ORDER — OMEPRAZOLE 20 MG/1
CAPSULE, DELAYED RELEASE ORAL
Qty: 90 CAPSULE | Refills: 0 | Status: SHIPPED | OUTPATIENT
Start: 2023-02-20

## 2023-02-20 RX ORDER — FUROSEMIDE 20 MG/1
TABLET ORAL
Qty: 180 TABLET | Refills: 0 | Status: SHIPPED | OUTPATIENT
Start: 2023-02-20

## 2023-02-20 RX ORDER — DULOXETIN HYDROCHLORIDE 60 MG/1
CAPSULE, DELAYED RELEASE ORAL
Qty: 180 CAPSULE | Refills: 0 | Status: SHIPPED | OUTPATIENT
Start: 2023-02-20

## 2023-02-20 RX ORDER — OXYBUTYNIN CHLORIDE 5 MG/1
TABLET ORAL
Qty: 180 TABLET | Refills: 0 | Status: SHIPPED | OUTPATIENT
Start: 2023-02-20

## 2023-03-20 RX ORDER — INDOMETHACIN 50 MG/1
CAPSULE ORAL
Qty: 90 CAPSULE | Refills: 0 | Status: SHIPPED | OUTPATIENT
Start: 2023-03-20

## 2023-05-11 RX ORDER — INDOMETHACIN 50 MG/1
CAPSULE ORAL
Qty: 270 CAPSULE | Refills: 0 | Status: SHIPPED | OUTPATIENT
Start: 2023-05-11

## 2023-06-08 ENCOUNTER — HOSPITAL ENCOUNTER (OUTPATIENT)
Dept: GENERAL RADIOLOGY | Age: 58
Discharge: HOME | End: 2023-06-08
Payer: COMMERCIAL

## 2023-06-08 ENCOUNTER — TELEPHONE (OUTPATIENT)
Dept: INTERNAL MEDICINE CLINIC | Age: 58
End: 2023-06-08

## 2023-06-08 ENCOUNTER — HOSPITAL ENCOUNTER (OUTPATIENT)
Age: 58
Discharge: HOME | End: 2023-06-08
Payer: COMMERCIAL

## 2023-06-08 ENCOUNTER — OFFICE VISIT (OUTPATIENT)
Dept: INTERNAL MEDICINE CLINIC | Age: 58
End: 2023-06-08

## 2023-06-08 VITALS
HEIGHT: 63 IN | HEART RATE: 68 BPM | DIASTOLIC BLOOD PRESSURE: 84 MMHG | BODY MASS INDEX: 41.45 KG/M2 | SYSTOLIC BLOOD PRESSURE: 158 MMHG

## 2023-06-08 DIAGNOSIS — M54.2 NECK PAIN: ICD-10-CM

## 2023-06-08 DIAGNOSIS — K21.9 CHRONIC GERD: ICD-10-CM

## 2023-06-08 DIAGNOSIS — F33.41 RECURRENT MAJOR DEPRESSIVE DISORDER, IN PARTIAL REMISSION (HCC): ICD-10-CM

## 2023-06-08 DIAGNOSIS — G89.29 OTHER CHRONIC PAIN: ICD-10-CM

## 2023-06-08 DIAGNOSIS — N39.41 URINARY INCONTINENCE, URGE: ICD-10-CM

## 2023-06-08 DIAGNOSIS — E66.01 MORBID OBESITY (HCC): Primary | ICD-10-CM

## 2023-06-08 PROBLEM — F33.9 MAJOR DEPRESSIVE DISORDER, RECURRENT, UNSPECIFIED (HCC): Status: ACTIVE | Noted: 2023-06-08

## 2023-06-08 PROCEDURE — 72040 X-RAY EXAM NECK SPINE 2-3 VW: CPT

## 2023-06-08 PROCEDURE — 99214 OFFICE O/P EST MOD 30 MIN: CPT | Performed by: INTERNAL MEDICINE

## 2023-06-08 RX ORDER — NYSTATIN 100000 [USP'U]/G
POWDER TOPICAL
Qty: 60 G | Refills: 2 | Status: SHIPPED | OUTPATIENT
Start: 2023-06-08

## 2023-06-08 RX ORDER — NYSTATIN 100000 [USP'U]/G
POWDER TOPICAL
Qty: 15 G | Refills: 2 | Status: SHIPPED | OUTPATIENT
Start: 2023-06-08 | End: 2023-06-08 | Stop reason: SDUPTHER

## 2023-06-08 ASSESSMENT — ENCOUNTER SYMPTOMS
ABDOMINAL PAIN: 0
SHORTNESS OF BREATH: 0
WHEEZING: 0
BLOOD IN STOOL: 0
DIARRHEA: 0
CHEST TIGHTNESS: 0
COUGH: 0
VOMITING: 0
NAUSEA: 0

## 2023-06-08 NOTE — TELEPHONE ENCOUNTER
----- Message from Justyn Avalos sent at 6/8/2023  3:28 PM EDT -----    ----- Message -----  From: Franca Novoa MD  Sent: 6/8/2023   3:27 PM EDT  To: Josephine Montano    [place] dermatitis   ----- Message -----  From: Romana Gavia  Sent: 6/8/2023   3:20 PM EDT  To: Franca Novoa MD    DX for nystatin? mother

## 2023-06-08 NOTE — PROGRESS NOTES
Naa Nesbitt (:  1965) is a 59 y.o. female,Established patient, here for evaluation of the following chief complaint(s):  Follow-up         ASSESSMENT/PLAN:        Diagnosis Orders   1. Morbid obesity ([address]        2. Recurrent major depressive disorder, in partial remission ([address]        3. Chronic GERD        4. Urinary incontinence, urge        5. Other chronic pain        6. Neck pain  XR CERVICAL SPINE (2-3 VIEWS)          Morbid obesity  S/p gastric sleeve procedure   Advised to cut down on calories. Lose weight. Depression  On cymbalta 120 mg daily   Switch to Trintellix      GERD is stable. Continue PPI. OA/chronic pain  Continue indomethacin. continue PPI  Continue tizanidine  Gabapentin 400 tid     Elevated BP   Sam lfollow      Pedal edema  Continue lasix prn    Neck pain  Cx spine xray      Urinary urge incontinence. Continue ditropan. Stable. PVCs  Continue mg replacement   Arrange for ECHO     Last colonoscopy 2017     Schedule mammogram- referral made    Subjective   SUBJECTIVE/OBJECTIVE:  HPI  Is here for follow up. She has a prior h/o DVT,reactive airway disease,morbid obesity,OA,chronic pain and GERD. She sees a pain physician and is on oxycodone,topamax,tizanidine,gabapentin and Cymbalta. She also takes indomethacin. Asthma is stable on inhalers. uses albuterol  Very infrequently. Had gastric sleeve procedure 2021    C/o pain back of neck on the left side. No radiation. No paresthesia,numbness    Review of Systems   Constitutional:  Negative for fatigue, fever and unexpected weight change. Respiratory:  Negative for cough, chest tightness, shortness of breath and wheezing. Cardiovascular:  Negative for chest pain, palpitations and leg swelling. Gastrointestinal:  Negative for abdominal pain, blood in stool, diarrhea, nausea and vomiting. Genitourinary:  Negative for dysuria and hematuria. Neurological:  Negative for light-headedness.

## 2023-06-22 ENCOUNTER — HOSPITAL ENCOUNTER (OUTPATIENT)
Age: 58
Discharge: HOME | End: 2023-06-22
Payer: COMMERCIAL

## 2023-06-22 ENCOUNTER — TELEPHONE (OUTPATIENT)
Dept: INTERNAL MEDICINE CLINIC | Age: 58
End: 2023-06-22

## 2023-06-22 ENCOUNTER — HOSPITAL ENCOUNTER (OUTPATIENT)
Dept: GENERAL RADIOLOGY | Age: 58
Discharge: HOME | End: 2023-06-22
Payer: COMMERCIAL

## 2023-06-22 DIAGNOSIS — M54.2 NECK PAIN: ICD-10-CM

## 2023-06-22 DIAGNOSIS — F33.41 RECURRENT MAJOR DEPRESSIVE DISORDER, IN PARTIAL REMISSION (HCC): Primary | ICD-10-CM

## 2023-06-22 PROCEDURE — 72040 X-RAY EXAM NECK SPINE 2-3 VW: CPT

## 2023-06-22 NOTE — TELEPHONE ENCOUNTER
I spoke with patient and her significant other Vanessa Nayak. Pt stated that she has suicidal thoughts and Dr. Vandana Stinson said go to the ER. I informed patient and Ferrararamone Perrys that patient should go to the ER or call 911. Regarding her medication questions of not tolerating the new mental health med, she and Ferrara Holger were advised that the referral to psych was appropriate as Dr. Ned De Leon is a mental health provider that specializes in mental  health medication management. I verbalized that Dr. Vandana Stinson took appropriate steps with referral to ER and psychiatry.

## 2023-06-22 NOTE — TELEPHONE ENCOUNTER
----- Message from Zoe Dykes MD sent at 6/22/2023 11:10 AM EDT -----  Contact: Pt [phone]  See psych   ----- Message -----  From: Deepthi Patrickny  Sent: 6/22/2023   8:55 AM EDT  To: Zoe Dykes MD    Pt called very distressed states that VORTIoxetine (TRINTELLIX) 10 MG TABS tablet is not helping her depression at and actually making it worse. Please advise.              Dulce Paulino [id number] Javier Mari [address]   [address] , [place]   Phone:  [phone]  Fax:  [phone]

## 2023-06-26 ENCOUNTER — TELEPHONE (OUTPATIENT)
Dept: INTERNAL MEDICINE CLINIC | Age: 58
End: 2023-06-26

## 2023-06-26 RX ORDER — TIZANIDINE 4 MG/1
TABLET ORAL
Qty: 180 TABLET | Refills: 0 | Status: SHIPPED | OUTPATIENT
Start: 2023-06-26

## 2023-06-26 RX ORDER — GABAPENTIN 400 MG/1
CAPSULE ORAL
Qty: 21 CAPSULE | Refills: 0 | Status: SHIPPED | OUTPATIENT
Start: 2023-06-26 | End: 2023-07-03

## 2023-08-03 ENCOUNTER — TELEPHONE (OUTPATIENT)
Dept: INTERNAL MEDICINE CLINIC | Age: 58
End: 2023-08-03

## 2023-08-03 RX ORDER — DULOXETIN HYDROCHLORIDE 60 MG/1
CAPSULE, DELAYED RELEASE ORAL
Qty: 180 CAPSULE | Refills: 0 | Status: SHIPPED | OUTPATIENT
Start: 2023-08-03

## 2023-08-03 RX ORDER — DULOXETIN HYDROCHLORIDE 60 MG/1
120 CAPSULE, DELAYED RELEASE ORAL DAILY
Qty: 180 CAPSULE | Refills: 0 | Status: SHIPPED | OUTPATIENT
Start: 2023-08-03 | End: 2023-08-03

## 2023-08-09 RX ORDER — OXYBUTYNIN CHLORIDE 5 MG/1
TABLET ORAL
Qty: 180 TABLET | Refills: 0 | Status: SHIPPED | OUTPATIENT
Start: 2023-08-09

## 2023-08-09 RX ORDER — FUROSEMIDE 20 MG/1
TABLET ORAL
Qty: 180 TABLET | Refills: 0 | Status: SHIPPED | OUTPATIENT
Start: 2023-08-09

## 2023-08-09 RX ORDER — DULOXETIN HYDROCHLORIDE 60 MG/1
CAPSULE, DELAYED RELEASE ORAL
Qty: 180 CAPSULE | Refills: 0 | Status: SHIPPED | OUTPATIENT
Start: 2023-08-09

## 2023-08-09 RX ORDER — OMEPRAZOLE 20 MG/1
CAPSULE, DELAYED RELEASE ORAL
Qty: 90 CAPSULE | Refills: 0 | Status: SHIPPED | OUTPATIENT
Start: 2023-08-09

## 2023-08-23 RX ORDER — GABAPENTIN 400 MG/1
CAPSULE ORAL
Qty: 90 CAPSULE | Refills: 0 | Status: SHIPPED | OUTPATIENT
Start: 2023-08-23 | End: 2023-09-22

## 2023-09-21 ENCOUNTER — TELEPHONE (OUTPATIENT)
Dept: INTERNAL MEDICINE CLINIC | Age: 58
End: 2023-09-21

## 2023-09-21 RX ORDER — ACYCLOVIR 400 MG/1
400 TABLET ORAL 2 TIMES DAILY
Qty: 14 TABLET | Refills: 0 | Status: SHIPPED | OUTPATIENT
Start: 2023-09-21 | End: 2023-09-28

## 2023-09-21 NOTE — TELEPHONE ENCOUNTER
----- Message from Betty Powell sent at 9/21/2023  1:09 PM EDT -----  Contact: [phone]  Drug interaction with zanaflex.  ----- Message -----  From: Ag Kelly MD  Sent: 9/21/2023  12:55 PM EDT  To: Betty Powell    Acyclovir 400 bid # 14   ----- Message -----  From: Dorene Mckeon  Sent: 9/21/2023  12:32 PM EDT  To: Ag Kelly MD    Caller asking for recommendations on what to put on a cold sore she has on her bottom lip. Pt states it very painful. Please advise.      [facility] [employer]

## 2023-09-21 NOTE — TELEPHONE ENCOUNTER
----- Message from Rylan Arciniega MD sent at 9/21/2023 12:55 PM EDT -----  Contact: [phone]  Acyclovir 400 bid # 14   ----- Message -----  From: Mirian Santiaog  Sent: 9/21/2023  12:32 PM EDT  To: Rylan Arciniega MD    Caller asking for recommendations on what to put on a cold sore she has on her bottom lip. Pt states it very painful. Please advise.      [facility] [employer]

## 2023-10-09 RX ORDER — INDOMETHACIN 50 MG/1
CAPSULE ORAL
Qty: 270 CAPSULE | Refills: 0 | Status: SHIPPED | OUTPATIENT
Start: 2023-10-09

## 2023-10-10 RX ORDER — GABAPENTIN 400 MG/1
CAPSULE ORAL
Qty: 90 CAPSULE | Refills: 0 | Status: SHIPPED | OUTPATIENT
Start: 2023-10-10 | End: 2023-11-09
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
## 2023-11-20 RX ORDER — TIZANIDINE 4 MG/1
TABLET ORAL
Qty: 120 TABLET | Refills: 0 | Status: ON HOLD | OUTPATIENT
Start: 2023-11-20

## 2023-11-21 ENCOUNTER — HOSPITAL ENCOUNTER (OUTPATIENT)
Dept: VASCULAR LAB | Age: 58
Discharge: HOME | End: 2023-11-21
Payer: COMMERCIAL

## 2023-11-21 ENCOUNTER — OFFICE VISIT (OUTPATIENT)
Dept: INTERNAL MEDICINE CLINIC | Age: 58
End: 2023-11-21

## 2023-11-21 ENCOUNTER — HOSPITAL ENCOUNTER (OUTPATIENT)
Age: 58
Discharge: HOME | End: 2023-11-21
Payer: COMMERCIAL

## 2023-11-21 VITALS
BODY MASS INDEX: 41.45 KG/M2 | RESPIRATION RATE: 14 BRPM | HEART RATE: 72 BPM | HEIGHT: 63 IN | DIASTOLIC BLOOD PRESSURE: 70 MMHG | SYSTOLIC BLOOD PRESSURE: 130 MMHG

## 2023-11-21 DIAGNOSIS — M79.89 PAIN AND SWELLING OF RIGHT LOWER EXTREMITY: Primary | ICD-10-CM

## 2023-11-21 DIAGNOSIS — M79.89 PAIN AND SWELLING OF RIGHT LOWER EXTREMITY: ICD-10-CM

## 2023-11-21 DIAGNOSIS — L03.115 CELLULITIS OF RIGHT LOWER EXTREMITY: ICD-10-CM

## 2023-11-21 DIAGNOSIS — E83.42 HYPOMAGNESEMIA: ICD-10-CM

## 2023-11-21 DIAGNOSIS — M79.604 PAIN AND SWELLING OF RIGHT LOWER EXTREMITY: Primary | ICD-10-CM

## 2023-11-21 DIAGNOSIS — M79.604 PAIN AND SWELLING OF RIGHT LOWER EXTREMITY: ICD-10-CM

## 2023-11-21 PROCEDURE — 83735 ASSAY OF MAGNESIUM: CPT

## 2023-11-21 PROCEDURE — 99213 OFFICE O/P EST LOW 20 MIN: CPT | Performed by: NURSE PRACTITIONER

## 2023-11-21 PROCEDURE — 85025 COMPLETE CBC W/AUTO DIFF WBC: CPT

## 2023-11-21 PROCEDURE — 93971 EXTREMITY STUDY: CPT

## 2023-11-21 PROCEDURE — 36415 COLL VENOUS BLD VENIPUNCTURE: CPT

## 2023-11-21 PROCEDURE — 80053 COMPREHEN METABOLIC PANEL: CPT

## 2023-11-21 RX ORDER — DOXYCYCLINE HYCLATE 100 MG
100 TABLET ORAL 2 TIMES DAILY
Qty: 14 TABLET | Refills: 0 | Status: SHIPPED | OUTPATIENT
Start: 2023-11-21 | End: 2023-11-21

## 2023-11-21 RX ORDER — AMOXICILLIN 500 MG/1
500 CAPSULE ORAL 3 TIMES DAILY
Qty: 21 CAPSULE | Refills: 0 | Status: SHIPPED | OUTPATIENT
Start: 2023-11-21 | End: 2023-11-21

## 2023-11-21 RX ORDER — AMOXICILLIN 500 MG/1
500 CAPSULE ORAL 3 TIMES DAILY
Qty: 21 CAPSULE | Refills: 0 | Status: ON HOLD | OUTPATIENT
Start: 2023-11-21 | End: 2023-11-30 | Stop reason: HOSPADM

## 2023-11-21 RX ORDER — DOXYCYCLINE HYCLATE 100 MG
100 TABLET ORAL 2 TIMES DAILY
Qty: 14 TABLET | Refills: 0 | Status: ON HOLD | OUTPATIENT
Start: 2023-11-21 | End: 2023-11-30 | Stop reason: HOSPADM

## 2023-11-21 NOTE — PROGRESS NOTES
JOINT REPLACEMENT Bilateral     hips and knees    LAPAROTOMY N/A 9/5/2021    RIGHT COLECTOMY, INCARCERATED VENTRAL INCISIONAL HERNIA REPAIR, OMPHALECTOMY, DRAINAGE OF INTRA ABDOMINAL ABSCESS. performed by Frank Lance MD at [facility]      3x right, 2x left    SLEEVE GASTRECTOMY N/A 4/12/2021    LAPAROSCOPIC SLEEVE GASTRECTOMY - ETHICON performed by Troy Flores MD at [facility] N/A 12/28/2020    EGD BIOPSY performed by Troy Flores MD at [address] [place] [address] ENDOSCOPY       Physical Exam  Physical Exam  Constitutional:       Appearance: She is well-developed. HENT:      Head: Normocephalic and atraumatic. Eyes:      Pupils: Pupils are equal, round, and reactive to light. Neck:      Thyroid: No thyromegaly. Cardiovascular:      Rate and Rhythm: Normal rate and regular rhythm. Heart sounds: Normal heart sounds. No murmur heard. No friction rub. No gallop. Pulmonary:      Effort: Pulmonary effort is normal. No respiratory distress. Breath sounds: Normal breath sounds. No wheezing or rales. Chest:      Chest wall: No tenderness. Abdominal:      General: Bowel sounds are normal. There is no distension. Palpations: Abdomen is soft. There is no mass. Tenderness: There is no abdominal tenderness. There is no guarding or rebound. Musculoskeletal:      Cervical back: Normal range of motion and neck supple. Right lower leg: Edema present. Skin:     Findings: Erythema (RLE) present. Neurological:      Mental Status: She is alert and oriented to person, place, and time. Assessment and Plan    1. Pain and swelling of right lower extremity  - VL Extremity Venous Right; Future    2. Cellulitis of right lower extremity  - Comprehensive Metabolic Panel; Future  - CBC with Auto Differential; Future  - Given Rx for Doxycycline    3. Hypomagnesemia  - Magnesium;  Future      Return if symptoms worsen or fail to

## 2023-11-22 ENCOUNTER — TELEPHONE (OUTPATIENT)
Dept: INTERNAL MEDICINE CLINIC | Age: 58
End: 2023-11-22

## 2023-11-22 LAB
ALBUMIN SERPL-MCNC: 3.5 G/DL (ref 3.4–5)
ALBUMIN/GLOB SERPL: 1 {RATIO} (ref 1.1–2.2)
ALP SERPL-CCNC: 241 U/L (ref 40–129)
ALT SERPL-CCNC: 87 U/L (ref 10–40)
ANION GAP SERPL CALCULATED.3IONS-SCNC: 10 MMOL/L (ref 3–16)
AST SERPL-CCNC: 162 U/L (ref 15–37)
BASOPHILS # BLD: 0 K/UL (ref 0–0.2)
BASOPHILS NFR BLD: 0 %
BILIRUB SERPL-MCNC: 0.8 MG/DL (ref 0–1)
BUN SERPL-MCNC: 24 MG/DL (ref 7–20)
CALCIUM SERPL-MCNC: 8.9 MG/DL (ref 8.3–10.6)
CHLORIDE SERPL-SCNC: 100 MMOL/L (ref 99–110)
CO2 SERPL-SCNC: 25 MMOL/L (ref 21–32)
CREAT SERPL-MCNC: 1 MG/DL (ref 0.6–1.2)
DEPRECATED RDW RBC AUTO: 16.1 % (ref 12.4–15.4)
EOSINOPHIL # BLD: 0 K/UL (ref 0–0.6)
EOSINOPHIL NFR BLD: 0 %
GFR SERPLBLD CREATININE-BSD FMLA CKD-EPI: >60 ML/MIN/{1.73_M2}
GLUCOSE SERPL-MCNC: 131 MG/DL (ref 70–99)
HCT VFR BLD AUTO: 38.8 % (ref 36–48)
HGB BLD-MCNC: 12.9 G/DL (ref 12–16)
LYMPHOCYTES # BLD: 1.1 K/UL (ref 1–5.1)
LYMPHOCYTES NFR BLD: 10 %
MAGNESIUM SERPL-MCNC: 1.6 MG/DL (ref 1.8–2.4)
MCH RBC QN AUTO: 29.7 PG (ref 26–34)
MCHC RBC AUTO-ENTMCNC: 33.2 G/DL (ref 31–36)
MCV RBC AUTO: 89.3 FL (ref 80–100)
MONOCYTES # BLD: 0.2 K/UL (ref 0–1.3)
MONOCYTES NFR BLD: 2 %
MYELOCYTES NFR BLD MANUAL: 1 %
NEUTROPHILS # BLD: 9.6 K/UL (ref 1.7–7.7)
NEUTROPHILS NFR BLD: 85 %
NEUTS BAND NFR BLD MANUAL: 2 % (ref 0–7)
PLATELET # BLD AUTO: 148 K/UL (ref 135–450)
PLATELET BLD QL SMEAR: ADEQUATE
PMV BLD AUTO: 9 FL (ref 5–10.5)
POTASSIUM SERPL-SCNC: 3.4 MMOL/L (ref 3.5–5.1)
PROT SERPL-MCNC: 6.9 G/DL (ref 6.4–8.2)
RBC # BLD AUTO: 4.34 M/UL (ref 4–5.2)
RBC MORPH BLD: NORMAL
SLIDE REVIEW: ABNORMAL
SODIUM SERPL-SCNC: 135 MMOL/L (ref 136–145)
WBC # BLD AUTO: 10.9 K/UL (ref 4–11)

## 2023-11-22 NOTE — TELEPHONE ENCOUNTER
----- Message from Jensen Chávez sent at 11/22/2023  4:40 PM EST -----  Contact: patient [phone]  Pt is not on zaroxolyn. Please advise.  ----- Message -----  From: Angel Rehman MD  Sent: 11/22/2023   3:22 PM EST  To: Garylaurenada Chávez    Take 3 lasix a day and take an extra Zaroxolyn   ----- Message -----  From: Kerry Weston  Sent: 11/22/2023   2:26 PM EST  To: Angel Rehman MD    Patient was seen yesterday by Darshana Potter and sent for Doppler which shows no blood clots. Patient was given an antibiotic and doxycycline hyclate (VIBRA-TABS) 100 MG tablet yesterday. Patient now has blisters on her inner thighs of legs and wants to know how to care for them, due to leakage? Patient supposed to go town tomorrow. Patient wants to know should she increase her lasix?  Please advise        [address] [place] [address], [address] [place] [address],[address] [facility], [address] [place] [address]  Phone: [phone]  Fax: [phone]

## 2023-11-22 NOTE — TELEPHONE ENCOUNTER
----- Message from Mercy Valderrama MD sent at 11/22/2023  3:22 PM EST -----  Contact: patient [phone]  Take 3 lasix a day and take an extra Zaroxolyn   ----- Message -----  From: Ozzie Kristen  Sent: 11/22/2023   2:26 PM EST  To: Mercy Valderrama MD    Patient was seen yesterday by Tori Michaels and sent for Doppler which shows no blood clots. Patient was given an antibiotic and doxycycline hyclate (VIBRA-TABS) 100 MG tablet yesterday. Patient now has blisters on her inner thighs of legs and wants to know how to care for them, due to leakage? Patient supposed to go town tomorrow. Patient wants to know should she increase her lasix?  Please advise        [address] [place] [address], [address] [place] [address],[address]  [facility], [address] [place] [address]  Phone: [phone]  Fax: [phone]

## 2023-11-22 NOTE — TELEPHONE ENCOUNTER
Pt informed per Dr. Rufus Salinas to take 3 lasix a day and can use ace wrap for blisters, if gets worse go to ER.

## 2023-11-26 ENCOUNTER — APPOINTMENT (OUTPATIENT)
Dept: CT IMAGING | Age: 58
End: 2023-11-26
Payer: MEDICARE

## 2023-11-26 ENCOUNTER — HOSPITAL ENCOUNTER (INPATIENT)
Age: 58
LOS: 5 days | Discharge: NURSING, REHAB OR LONG-TERM CARE | End: 2023-12-01
Attending: STUDENT IN AN ORGANIZED HEALTH CARE EDUCATION/TRAINING PROGRAM | Admitting: INTERNAL MEDICINE
Payer: MEDICARE

## 2023-11-26 DIAGNOSIS — L97.911 ULCER OF RIGHT LEG, LIMITED TO BREAKDOWN OF SKIN (HCC): ICD-10-CM

## 2023-11-26 DIAGNOSIS — L03.115 CELLULITIS OF RIGHT LOWER EXTREMITY: Primary | ICD-10-CM

## 2023-11-26 PROBLEM — N17.9 ARF (ACUTE RENAL FAILURE) (HCC): Status: ACTIVE | Noted: 2023-11-26

## 2023-11-26 PROBLEM — R79.82 CRP ELEVATED: Status: ACTIVE | Noted: 2023-11-26

## 2023-11-26 PROBLEM — T14.8XXA BLISTERING OF SKIN: Status: ACTIVE | Noted: 2023-11-26

## 2023-11-26 PROBLEM — R79.89 ELEVATED LIVER FUNCTION TESTS: Status: ACTIVE | Noted: 2023-11-26

## 2023-11-26 LAB
ALBUMIN SERPL-MCNC: 3.5 G/DL (ref 3.4–5)
ALP SERPL-CCNC: 415 U/L (ref 40–129)
ALT SERPL-CCNC: 96 U/L (ref 10–40)
ANION GAP SERPL CALCULATED.3IONS-SCNC: 19 MMOL/L (ref 3–16)
AST SERPL-CCNC: 114 U/L (ref 15–37)
BASOPHILS # BLD: 0.2 K/UL (ref 0–0.2)
BASOPHILS NFR BLD: 1 %
BILIRUB DIRECT SERPL-MCNC: <0.2 MG/DL (ref 0–0.3)
BILIRUB INDIRECT SERPL-MCNC: ABNORMAL MG/DL (ref 0–1)
BILIRUB SERPL-MCNC: 0.5 MG/DL (ref 0–1)
BUN SERPL-MCNC: 62 MG/DL (ref 7–20)
CALCIUM SERPL-MCNC: 9.1 MG/DL (ref 8.3–10.6)
CHLORIDE SERPL-SCNC: 89 MMOL/L (ref 99–110)
CO2 SERPL-SCNC: 19 MMOL/L (ref 21–32)
CREAT SERPL-MCNC: 1.6 MG/DL (ref 0.6–1.2)
CRP SERPL-MCNC: 241.3 MG/L (ref 0–5.1)
DEPRECATED RDW RBC AUTO: 16.1 % (ref 12.4–15.4)
EOSINOPHIL # BLD: 0 K/UL (ref 0–0.6)
EOSINOPHIL NFR BLD: 0 %
ERYTHROCYTE [SEDIMENTATION RATE] IN BLOOD BY WESTERGREN METHOD: 102 MM/HR (ref 0–30)
GFR SERPLBLD CREATININE-BSD FMLA CKD-EPI: 37 ML/MIN/{1.73_M2}
GLUCOSE SERPL-MCNC: 102 MG/DL (ref 70–99)
HCT VFR BLD AUTO: 38.9 % (ref 36–48)
HGB BLD-MCNC: 13 G/DL (ref 12–16)
LACTATE BLDV-SCNC: 1.1 MMOL/L (ref 0.4–2)
LYMPHOCYTES # BLD: 1.2 K/UL (ref 1–5.1)
LYMPHOCYTES NFR BLD: 5 %
MAGNESIUM SERPL-MCNC: 1.7 MG/DL (ref 1.8–2.4)
MCH RBC QN AUTO: 29.1 PG (ref 26–34)
MCHC RBC AUTO-ENTMCNC: 33.5 G/DL (ref 31–36)
MCV RBC AUTO: 86.9 FL (ref 80–100)
METAMYELOCYTES NFR BLD MANUAL: 1 %
MONOCYTES # BLD: 3.2 K/UL (ref 0–1.3)
MONOCYTES NFR BLD: 13 %
MYELOCYTES NFR BLD MANUAL: 1 %
NEUTROPHILS # BLD: 19.8 K/UL (ref 1.7–7.7)
NEUTROPHILS NFR BLD: 65 %
NEUTS BAND NFR BLD MANUAL: 14 % (ref 0–7)
PLATELET # BLD AUTO: 359 K/UL (ref 135–450)
PMV BLD AUTO: 8 FL (ref 5–10.5)
POTASSIUM SERPL-SCNC: 4.1 MMOL/L (ref 3.5–5.1)
PROT SERPL-MCNC: 8.5 G/DL (ref 6.4–8.2)
RBC # BLD AUTO: 4.47 M/UL (ref 4–5.2)
RBC MORPH BLD: NORMAL
SODIUM SERPL-SCNC: 127 MMOL/L (ref 136–145)
WBC # BLD AUTO: 24.4 K/UL (ref 4–11)

## 2023-11-26 PROCEDURE — 73700 CT LOWER EXTREMITY W/O DYE: CPT

## 2023-11-26 PROCEDURE — 99285 EMERGENCY DEPT VISIT HI MDM: CPT

## 2023-11-26 PROCEDURE — 83605 ASSAY OF LACTIC ACID: CPT

## 2023-11-26 PROCEDURE — 1200000000 HC SEMI PRIVATE

## 2023-11-26 PROCEDURE — 96365 THER/PROPH/DIAG IV INF INIT: CPT

## 2023-11-26 PROCEDURE — 87040 BLOOD CULTURE FOR BACTERIA: CPT

## 2023-11-26 PROCEDURE — 80048 BASIC METABOLIC PNL TOTAL CA: CPT

## 2023-11-26 PROCEDURE — 96375 TX/PRO/DX INJ NEW DRUG ADDON: CPT

## 2023-11-26 PROCEDURE — 6360000002 HC RX W HCPCS: Performed by: STUDENT IN AN ORGANIZED HEALTH CARE EDUCATION/TRAINING PROGRAM

## 2023-11-26 PROCEDURE — 6360000002 HC RX W HCPCS: Performed by: INTERNAL MEDICINE

## 2023-11-26 PROCEDURE — 85025 COMPLETE CBC W/AUTO DIFF WBC: CPT

## 2023-11-26 PROCEDURE — 2580000003 HC RX 258: Performed by: STUDENT IN AN ORGANIZED HEALTH CARE EDUCATION/TRAINING PROGRAM

## 2023-11-26 PROCEDURE — 85652 RBC SED RATE AUTOMATED: CPT

## 2023-11-26 PROCEDURE — 74176 CT ABD & PELVIS W/O CONTRAST: CPT

## 2023-11-26 PROCEDURE — 6370000000 HC RX 637 (ALT 250 FOR IP): Performed by: INTERNAL MEDICINE

## 2023-11-26 PROCEDURE — 2580000003 HC RX 258: Performed by: INTERNAL MEDICINE

## 2023-11-26 PROCEDURE — 86140 C-REACTIVE PROTEIN: CPT

## 2023-11-26 PROCEDURE — 83735 ASSAY OF MAGNESIUM: CPT

## 2023-11-26 PROCEDURE — 36415 COLL VENOUS BLD VENIPUNCTURE: CPT

## 2023-11-26 PROCEDURE — 80076 HEPATIC FUNCTION PANEL: CPT

## 2023-11-26 RX ORDER — MAGNESIUM SULFATE IN WATER 40 MG/ML
2000 INJECTION, SOLUTION INTRAVENOUS PRN
Status: DISCONTINUED | OUTPATIENT
Start: 2023-11-26 | End: 2023-12-01 | Stop reason: HOSPADM

## 2023-11-26 RX ORDER — MORPHINE SULFATE 4 MG/ML
4 INJECTION, SOLUTION INTRAMUSCULAR; INTRAVENOUS EVERY 4 HOURS PRN
Status: DISCONTINUED | OUTPATIENT
Start: 2023-11-26 | End: 2023-11-29

## 2023-11-26 RX ORDER — ACETAMINOPHEN 325 MG/1
650 TABLET ORAL EVERY 6 HOURS PRN
Status: DISCONTINUED | OUTPATIENT
Start: 2023-11-26 | End: 2023-12-01 | Stop reason: HOSPADM

## 2023-11-26 RX ORDER — TIZANIDINE 4 MG/1
8 TABLET ORAL NIGHTLY
Status: DISCONTINUED | OUTPATIENT
Start: 2023-11-26 | End: 2023-12-01 | Stop reason: HOSPADM

## 2023-11-26 RX ORDER — MAGNESIUM SULFATE 1 G/100ML
1000 INJECTION INTRAVENOUS ONCE
Status: COMPLETED | OUTPATIENT
Start: 2023-11-26 | End: 2023-11-26

## 2023-11-26 RX ORDER — ACETAMINOPHEN 650 MG/1
650 SUPPOSITORY RECTAL EVERY 6 HOURS PRN
Status: DISCONTINUED | OUTPATIENT
Start: 2023-11-26 | End: 2023-12-01 | Stop reason: HOSPADM

## 2023-11-26 RX ORDER — KETOROLAC TROMETHAMINE 15 MG/ML
15 INJECTION, SOLUTION INTRAMUSCULAR; INTRAVENOUS ONCE
Status: COMPLETED | OUTPATIENT
Start: 2023-11-26 | End: 2023-11-26

## 2023-11-26 RX ORDER — SODIUM CHLORIDE 0.9 % (FLUSH) 0.9 %
5-40 SYRINGE (ML) INJECTION PRN
Status: DISCONTINUED | OUTPATIENT
Start: 2023-11-26 | End: 2023-12-01 | Stop reason: HOSPADM

## 2023-11-26 RX ORDER — ONDANSETRON 4 MG/1
4 TABLET, ORALLY DISINTEGRATING ORAL EVERY 8 HOURS PRN
Status: DISCONTINUED | OUTPATIENT
Start: 2023-11-26 | End: 2023-12-01 | Stop reason: HOSPADM

## 2023-11-26 RX ORDER — BISACODYL 5 MG/1
5 TABLET, DELAYED RELEASE ORAL DAILY PRN
Status: DISCONTINUED | OUTPATIENT
Start: 2023-11-26 | End: 2023-12-01 | Stop reason: HOSPADM

## 2023-11-26 RX ORDER — SODIUM CHLORIDE 9 MG/ML
INJECTION, SOLUTION INTRAVENOUS CONTINUOUS
Status: DISCONTINUED | OUTPATIENT
Start: 2023-11-26 | End: 2023-11-27

## 2023-11-26 RX ORDER — POTASSIUM CHLORIDE 7.45 MG/ML
10 INJECTION INTRAVENOUS PRN
Status: DISCONTINUED | OUTPATIENT
Start: 2023-11-26 | End: 2023-12-01 | Stop reason: HOSPADM

## 2023-11-26 RX ORDER — GABAPENTIN 400 MG/1
400 CAPSULE ORAL 3 TIMES DAILY
Status: ON HOLD | COMMUNITY
End: 2023-11-30 | Stop reason: HOSPADM

## 2023-11-26 RX ORDER — ONDANSETRON 2 MG/ML
4 INJECTION INTRAMUSCULAR; INTRAVENOUS EVERY 6 HOURS PRN
Status: DISCONTINUED | OUTPATIENT
Start: 2023-11-26 | End: 2023-12-01 | Stop reason: HOSPADM

## 2023-11-26 RX ORDER — OXYBUTYNIN CHLORIDE 5 MG/1
5 TABLET ORAL 2 TIMES DAILY
Status: DISCONTINUED | OUTPATIENT
Start: 2023-11-26 | End: 2023-12-01 | Stop reason: HOSPADM

## 2023-11-26 RX ORDER — NALOXONE HYDROCHLORIDE 0.4 MG/ML
0.4 INJECTION, SOLUTION INTRAMUSCULAR; INTRAVENOUS; SUBCUTANEOUS PRN
Status: DISCONTINUED | OUTPATIENT
Start: 2023-11-26 | End: 2023-12-01 | Stop reason: HOSPADM

## 2023-11-26 RX ORDER — POTASSIUM CHLORIDE 20 MEQ/1
40 TABLET, EXTENDED RELEASE ORAL PRN
Status: DISCONTINUED | OUTPATIENT
Start: 2023-11-26 | End: 2023-12-01 | Stop reason: HOSPADM

## 2023-11-26 RX ORDER — 0.9 % SODIUM CHLORIDE 0.9 %
1000 INTRAVENOUS SOLUTION INTRAVENOUS ONCE
Status: COMPLETED | OUTPATIENT
Start: 2023-11-26 | End: 2023-11-26

## 2023-11-26 RX ORDER — DULOXETIN HYDROCHLORIDE 60 MG/1
120 CAPSULE, DELAYED RELEASE ORAL DAILY
Status: DISCONTINUED | OUTPATIENT
Start: 2023-11-26 | End: 2023-12-01 | Stop reason: HOSPADM

## 2023-11-26 RX ORDER — MORPHINE SULFATE 2 MG/ML
2 INJECTION, SOLUTION INTRAMUSCULAR; INTRAVENOUS EVERY 4 HOURS PRN
Status: DISCONTINUED | OUTPATIENT
Start: 2023-11-26 | End: 2023-11-29

## 2023-11-26 RX ORDER — PANTOPRAZOLE SODIUM 40 MG/1
40 TABLET, DELAYED RELEASE ORAL
Status: DISCONTINUED | OUTPATIENT
Start: 2023-11-26 | End: 2023-12-01 | Stop reason: HOSPADM

## 2023-11-26 RX ORDER — ENOXAPARIN SODIUM 100 MG/ML
30 INJECTION SUBCUTANEOUS 2 TIMES DAILY
Status: DISCONTINUED | OUTPATIENT
Start: 2023-11-26 | End: 2023-12-01 | Stop reason: HOSPADM

## 2023-11-26 RX ORDER — FENTANYL CITRATE 50 UG/ML
50 INJECTION, SOLUTION INTRAMUSCULAR; INTRAVENOUS ONCE
Status: COMPLETED | OUTPATIENT
Start: 2023-11-26 | End: 2023-11-26

## 2023-11-26 RX ORDER — GABAPENTIN 400 MG/1
400 CAPSULE ORAL 3 TIMES DAILY
Status: DISCONTINUED | OUTPATIENT
Start: 2023-11-26 | End: 2023-12-01

## 2023-11-26 RX ORDER — SODIUM CHLORIDE 9 MG/ML
INJECTION, SOLUTION INTRAVENOUS PRN
Status: DISCONTINUED | OUTPATIENT
Start: 2023-11-26 | End: 2023-12-01 | Stop reason: HOSPADM

## 2023-11-26 RX ORDER — SODIUM CHLORIDE 0.9 % (FLUSH) 0.9 %
5-40 SYRINGE (ML) INJECTION EVERY 12 HOURS SCHEDULED
Status: DISCONTINUED | OUTPATIENT
Start: 2023-11-26 | End: 2023-12-01 | Stop reason: HOSPADM

## 2023-11-26 RX ADMIN — GABAPENTIN 400 MG: 400 CAPSULE ORAL at 20:26

## 2023-11-26 RX ADMIN — ENOXAPARIN SODIUM 30 MG: 100 INJECTION SUBCUTANEOUS at 08:13

## 2023-11-26 RX ADMIN — PANTOPRAZOLE SODIUM 40 MG: 40 TABLET, DELAYED RELEASE ORAL at 08:13

## 2023-11-26 RX ADMIN — VANCOMYCIN HYDROCHLORIDE 2000 MG: 10 INJECTION, POWDER, LYOPHILIZED, FOR SOLUTION INTRAVENOUS at 04:16

## 2023-11-26 RX ADMIN — DULOXETINE HYDROCHLORIDE 120 MG: 60 CAPSULE, DELAYED RELEASE ORAL at 08:13

## 2023-11-26 RX ADMIN — GABAPENTIN 400 MG: 400 CAPSULE ORAL at 08:13

## 2023-11-26 RX ADMIN — TIZANIDINE 8 MG: 4 TABLET ORAL at 20:25

## 2023-11-26 RX ADMIN — MORPHINE SULFATE 4 MG: 4 INJECTION, SOLUTION INTRAMUSCULAR; INTRAVENOUS at 06:57

## 2023-11-26 RX ADMIN — KETOROLAC TROMETHAMINE 15 MG: 15 INJECTION, SOLUTION INTRAMUSCULAR; INTRAVENOUS at 03:06

## 2023-11-26 RX ADMIN — ENOXAPARIN SODIUM 30 MG: 100 INJECTION SUBCUTANEOUS at 20:26

## 2023-11-26 RX ADMIN — OXYBUTYNIN CHLORIDE 5 MG: 5 TABLET ORAL at 08:13

## 2023-11-26 RX ADMIN — SODIUM CHLORIDE: 9 INJECTION, SOLUTION INTRAVENOUS at 11:28

## 2023-11-26 RX ADMIN — CEFEPIME 2000 MG: 2 INJECTION, POWDER, FOR SOLUTION INTRAVENOUS at 14:40

## 2023-11-26 RX ADMIN — OXYBUTYNIN CHLORIDE 5 MG: 5 TABLET ORAL at 20:25

## 2023-11-26 RX ADMIN — MORPHINE SULFATE 4 MG: 4 INJECTION, SOLUTION INTRAMUSCULAR; INTRAVENOUS at 20:28

## 2023-11-26 RX ADMIN — FENTANYL CITRATE 50 MCG: 50 INJECTION, SOLUTION INTRAMUSCULAR; INTRAVENOUS at 03:11

## 2023-11-26 RX ADMIN — MAGNESIUM SULFATE HEPTAHYDRATE 1000 MG: 1 INJECTION, SOLUTION INTRAVENOUS at 06:52

## 2023-11-26 RX ADMIN — Medication 10 ML: at 08:14

## 2023-11-26 RX ADMIN — GABAPENTIN 400 MG: 400 CAPSULE ORAL at 13:32

## 2023-11-26 RX ADMIN — SODIUM CHLORIDE 1000 ML: 9 INJECTION, SOLUTION INTRAVENOUS at 04:14

## 2023-11-26 RX ADMIN — MORPHINE SULFATE 4 MG: 4 INJECTION, SOLUTION INTRAMUSCULAR; INTRAVENOUS at 13:36

## 2023-11-26 RX ADMIN — CEFEPIME 2000 MG: 2 INJECTION, POWDER, FOR SOLUTION INTRAVENOUS at 03:19

## 2023-11-26 ASSESSMENT — PAIN DESCRIPTION - LOCATION
LOCATION: FOOT;LEG
LOCATION: LEG

## 2023-11-26 ASSESSMENT — PAIN - FUNCTIONAL ASSESSMENT
PAIN_FUNCTIONAL_ASSESSMENT: PREVENTS OR INTERFERES SOME ACTIVE ACTIVITIES AND ADLS
PAIN_FUNCTIONAL_ASSESSMENT: ACTIVITIES ARE NOT PREVENTED

## 2023-11-26 ASSESSMENT — PAIN DESCRIPTION - DESCRIPTORS
DESCRIPTORS: SHARP
DESCRIPTORS: ACHING
DESCRIPTORS: DISCOMFORT
DESCRIPTORS: ACHING

## 2023-11-26 ASSESSMENT — PAIN DESCRIPTION - ORIENTATION
ORIENTATION: RIGHT

## 2023-11-26 ASSESSMENT — PAIN SCALES - GENERAL
PAINLEVEL_OUTOF10: 8
PAINLEVEL_OUTOF10: 10
PAINLEVEL_OUTOF10: 7
PAINLEVEL_OUTOF10: 7

## 2023-11-26 ASSESSMENT — PAIN DESCRIPTION - PAIN TYPE: TYPE: ACUTE PAIN

## 2023-11-26 ASSESSMENT — PAIN DESCRIPTION - FREQUENCY: FREQUENCY: INTERMITTENT

## 2023-11-26 ASSESSMENT — LIFESTYLE VARIABLES
HOW MANY STANDARD DRINKS CONTAINING ALCOHOL DO YOU HAVE ON A TYPICAL DAY: PATIENT DOES NOT DRINK
HOW OFTEN DO YOU HAVE A DRINK CONTAINING ALCOHOL: NEVER

## 2023-11-26 ASSESSMENT — PAIN DESCRIPTION - ONSET: ONSET: SUDDEN

## 2023-11-26 NOTE — PROGRESS NOTES
Extended Infusion B-Lactam Antibiotics: Cefepime   Pharmacist Verification:  Review indication, allergies, suspected pathogens, drug interactions, and renal function   Automatically interchange intermittent infusion orders with extended infusion (unless exclusion criteria met)   Automatically adjust dose based on indication and renal function   Automatically adjust timing of antibiotics to avoid compatibility issues, if applicable   Exclusions:   Pediatric patients  Patients in blanka-operative settings   Patients in ambulatory clinics or infusion centers  If a patient has restricted IV access or drug compatibility concerns, patients may receive standard intermittent infusions of antibiotics to minimize antibiotic line time and avoid drug incompatibilities, following discussion between pharmacist and prescriber. Compatibility Information: For updated compatibility information please refer to [employer]'s IV Compatibility database in [address] [place]. Day:  Recent Labs     11/26/23  0140   CREATININE 1.6*     Estimated Creatinine Clearance: 44 mL/min (A) (based on SCr of 1.6 mg/dL (H)). Recent Labs     11/26/23  0140   WBC 24.4*       Cefepime-Extended Infusion (4-hour infusion) - Preferred Dosing Strategy   Renal Function (CrCl mL/min) ? 60 30 - 59 11 - 29 ? 10, HD PD CRRT   All indications - Loading dose of 2000 milligrams x 1 over 30 minutes or via IV push. Maintenance dose   should begin at the next regularly scheduled dosing interval based on indication/renal function.    Intra-abdominal infections, Skin and soft tissue infections, Urinary tract infections 2000mg q12h 2000mg q24h 1000mg q24h 500mg q24h 1000mg q24h 2000mg q12h   Bacteremia, CNS infections, Cystic fibrosis, Diabetic foot infections, Endocarditis, Febrile neutropenia, Healthcare-associated infections, Osteomyelitis/joint infections, Pneumonia, Sepsis, BMI > 40* 2000mg q8h 2000mg q12h 1000mg q12h 1000mg q24h 1000mg q24h 2000mg q8h   *Consider 2000mg q12h for

## 2023-11-26 NOTE — H&P
V2.0  History and Physical      Name:  Ni Morel /Age/Sex: 1965  (59 y.o. female)   MRN & CSN:  [id number] & [id number] Encounter Date/Time: 2023 4:46 AM EST   Location:   PCP: Marisela Alegria MD       Hospital Day: 1    Assessment and Plan:   Ni Morel is a 59 y.o. female with a pmh of LLE DVT, LLE cellulitis and asthma who presents with Cellulitis of right lower extremity    Hospital Problems             Last Modified POA    * (Principal) Cellulitis of right lower extremity 2023 Yes    ARF (acute renal failure) ([address]) 2023 Yes    Elevated liver function tests 2023 Yes    CRP elevated 2023 Yes    Blistering of skin 2023 Yes         Principal Problem:    Cellulitis of right lower extremity  Considered necrotizing fasciitis  Plan:   CT scan soft tissue right lower extremity  Continue vancomycin and cefepime  Keep leg elevated    Active Problems:    ARF (acute renal failure) (HCC)  In the setting of Lasix use most likely due to hypovolemia  Plan:   IV fluid resuscitation with normal saline  Avoid hypotension, further hypovolemia and nephrotoxic agents  Daily BMP      Elevated liver function tests  Plan:   CT abdomen and pelvis      CRP elevated  Plan:    May be simply due to infection but consider malignancy, vasculitis and autoimmune disorders      Blistering of skin  Plan:   Consider bullous pemphigoid           Disposition:   Current Living situation: Home  Expected Disposition: Home  Estimated D/C: 4 days    Diet Regular   DVT Prophylaxis Eliquis   Code Status Full code   Surrogate Decision Maker/ POA Unknown     Personally reviewed Lab Studies and Imaging     Drugs that require monitoring for toxicity include vancomycin and the method of monitoring was drug levels and BMP values        History from:     patient, Quality of history:  good historian    History of Present Illness:     Chief Complaint: Pain and swelling right lower Allergen Reactions    Iodides Anaphylaxis     Rash, hives, swelling    Of note on patient's admission 2021 she stated that allergy to iodides was actually her mother and allergy for shellfish was based on her father's allergies and not something she has herself experienced. Docusate Sodium      Rash     Scopolamine Hives     Fam HX:  family history includes Arthritis in her brother, father, mother, sister, and sister; Asthma in her sister; Cancer in her sister; Diabetes in her brother; High Blood Pressure in her brother and father; Mental Illness in her sister; Other in her brother and sister; Stroke in her sister. Soc HX:   Social History     Socioeconomic History    Marital status:     Tobacco Use    Smoking status: Former     Packs/day: 1.50     Years: 20.00     Additional pack years: 0.00     Total pack years: 30.00     Types: Cigarettes     Quit date:      Years since quittin.9    Smokeless tobacco: Never   Vaping Use    Vaping Use: Never used   Substance and Sexual Activity    Alcohol use: Not Currently     Comment: couple times a week    Drug use: No    Sexual activity: Yes     Partners: Male     Social Determinants of Health     Physical Activity: Inactive (2022)    Exercise Vital Sign     Days of Exercise per Week: 0 days     Minutes of Exercise per Session: 0 min       Medications:   Medications:    vancomycin  2,000 mg IntraVENous Once    [START ON 2023] cefepime  2,000 mg IntraVENous Q24H    magnesium sulfate  1,000 mg IntraVENous Once      Infusions:   PRN Meds: morphine, 2 mg, Q4H PRN   Or  morphine, 4 mg, Q4H PRN  naloxone, 0.4 mg, PRN        Labs      CBC:   Recent Labs     23  0140   WBC 24.4*   HGB 13.0        BMP:    Recent Labs     23  0140   *   K 4.1   CL 89*   CO2 19*   BUN 62*   CREATININE 1.6*   GLUCOSE 102*     Hepatic:   Recent Labs     23  0330   *   ALT 96*   BILITOT 0.5   ALKPHOS 415*     Lipids:   Lab Results

## 2023-11-26 NOTE — PROGRESS NOTES
4 Eyes Skin Assessment     NAME:  Chris Nesbitt  YOB: 1965  MEDICAL RECORD NUMBER:  [id number]    The patient is being assessed for  Admission    I agree that at least one RN has performed a thorough Head to Toe Skin Assessment on the patient. ALL assessment sites listed below have been assessed. Areas assessed by both nurses:    Head, Face, Ears, Shoulders, Back, Chest, Arms, Elbows, Hands, Sacrum. Buttock, Coccyx, Ischium, Legs. Feet and Heels, and Under Medical Devices         Does the Patient have a Wound? Yes wound(s) were present on assessment.  LDA wound assessment was Initiated and completed by RN       George Prevention initiated by RN: No  Wound Care Orders initiated by RN: No    Pressure Injury (Stage 3,4, Unstageable, DTI, NWPT, and Complex wounds) if present, place Wound referral order by RN under : No    New Ostomies, if present place, Ostomy referral order under : No     Nurse 1 eSignature: Electronically signed by Dharmesh Law RN on 11/26/23 at 7:16 AM EST  Nurse 2 Signed by David Faulkner RN     **SHARE this note so that the co-signing nurse can place an eSignature**    :

## 2023-11-26 NOTE — ED TRIAGE NOTES
Patient complained of right leg pain and swelling about 2 weeks ago. She noticed it after she tripped down.

## 2023-11-27 ENCOUNTER — APPOINTMENT (OUTPATIENT)
Dept: INTERVENTIONAL RADIOLOGY/VASCULAR | Age: 58
End: 2023-11-27
Payer: MEDICARE

## 2023-11-27 PROBLEM — L97.911 ULCER OF RIGHT LEG, LIMITED TO BREAKDOWN OF SKIN (HCC): Status: ACTIVE | Noted: 2023-11-26

## 2023-11-27 PROBLEM — M41.9 SCOLIOSIS: Status: ACTIVE | Noted: 2023-11-27

## 2023-11-27 PROBLEM — F33.9 MAJOR DEPRESSIVE DISORDER, RECURRENT, UNSPECIFIED (HCC): Status: RESOLVED | Noted: 2023-06-08 | Resolved: 2023-11-27

## 2023-11-27 PROBLEM — R79.82 CRP ELEVATED: Status: RESOLVED | Noted: 2023-11-26 | Resolved: 2023-11-27

## 2023-11-27 PROBLEM — F33.1 MODERATE RECURRENT MAJOR DEPRESSION (HCC): Chronic | Status: ACTIVE | Noted: 2018-07-16

## 2023-11-27 PROBLEM — K43.6 INCARCERATED VENTRAL HERNIA: Status: RESOLVED | Noted: 2020-10-27 | Resolved: 2023-11-27

## 2023-11-27 PROBLEM — M50.30 DDD (DEGENERATIVE DISC DISEASE), CERVICAL: Status: ACTIVE | Noted: 2023-11-27

## 2023-11-27 PROBLEM — A41.9 SEPSIS WITH ACUTE RENAL FAILURE WITHOUT SEPTIC SHOCK (HCC): Status: ACTIVE | Noted: 2023-11-26

## 2023-11-27 PROBLEM — J45.909 ASTHMA: Status: ACTIVE | Noted: 2019-05-19

## 2023-11-27 PROBLEM — E66.813 CLASS 3 SEVERE OBESITY DUE TO EXCESS CALORIES WITH SERIOUS COMORBIDITY AND BODY MASS INDEX (BMI) OF 50.0 TO 59.9 IN ADULT: Status: ACTIVE | Noted: 2021-12-17

## 2023-11-27 PROBLEM — E66.01 MORBID OBESITY (HCC): Status: RESOLVED | Noted: 2019-05-29 | Resolved: 2023-11-27

## 2023-11-27 PROBLEM — R65.20 SEPSIS WITH ACUTE RENAL FAILURE WITHOUT SEPTIC SHOCK (HCC): Status: ACTIVE | Noted: 2023-11-26

## 2023-11-27 LAB
ALBUMIN SERPL-MCNC: 2.9 G/DL (ref 3.4–5)
ALP SERPL-CCNC: 306 U/L (ref 40–129)
ALT SERPL-CCNC: 61 U/L (ref 10–40)
AMORPH SED URNS QL MICRO: ABNORMAL /HPF
ANION GAP SERPL CALCULATED.3IONS-SCNC: 13 MMOL/L (ref 3–16)
AST SERPL-CCNC: 41 U/L (ref 15–37)
BACTERIA URNS QL MICRO: ABNORMAL /HPF
BASOPHILS # BLD: 0.2 K/UL (ref 0–0.2)
BASOPHILS NFR BLD: 1 %
BILIRUB DIRECT SERPL-MCNC: <0.2 MG/DL (ref 0–0.3)
BILIRUB INDIRECT SERPL-MCNC: ABNORMAL MG/DL (ref 0–1)
BILIRUB SERPL-MCNC: 0.3 MG/DL (ref 0–1)
BILIRUB UR QL STRIP.AUTO: NEGATIVE
BUN SERPL-MCNC: 33 MG/DL (ref 7–20)
CALCIUM SERPL-MCNC: 8.6 MG/DL (ref 8.3–10.6)
CHLORIDE SERPL-SCNC: 103 MMOL/L (ref 99–110)
CLARITY UR: CLEAR
CO2 SERPL-SCNC: 20 MMOL/L (ref 21–32)
COLOR UR: YELLOW
CREAT SERPL-MCNC: 0.8 MG/DL (ref 0.6–1.2)
DEPRECATED RDW RBC AUTO: 16.6 % (ref 12.4–15.4)
EKG ATRIAL RATE: 89 BPM
EKG DIAGNOSIS: NORMAL
EKG P AXIS: 80 DEGREES
EKG P-R INTERVAL: 146 MS
EKG Q-T INTERVAL: 384 MS
EKG QRS DURATION: 104 MS
EKG QTC CALCULATION (BAZETT): 467 MS
EKG R AXIS: -28 DEGREES
EKG T AXIS: 59 DEGREES
EKG VENTRICULAR RATE: 89 BPM
EOSINOPHIL # BLD: 0.3 K/UL (ref 0–0.6)
EOSINOPHIL NFR BLD: 2 %
EPI CELLS #/AREA URNS HPF: ABNORMAL /HPF (ref 0–5)
GFR SERPLBLD CREATININE-BSD FMLA CKD-EPI: >60 ML/MIN/{1.73_M2}
GLUCOSE SERPL-MCNC: 112 MG/DL (ref 70–99)
GLUCOSE UR STRIP.AUTO-MCNC: NEGATIVE MG/DL
HCT VFR BLD AUTO: 35.1 % (ref 36–48)
HGB BLD-MCNC: 11.7 G/DL (ref 12–16)
HGB UR QL STRIP.AUTO: NEGATIVE
KETONES UR STRIP.AUTO-MCNC: NEGATIVE MG/DL
LEUKOCYTE ESTERASE UR QL STRIP.AUTO: NEGATIVE
LYMPHOCYTES # BLD: 1.7 K/UL (ref 1–5.1)
LYMPHOCYTES NFR BLD: 11 %
MAGNESIUM SERPL-MCNC: 1.8 MG/DL (ref 1.8–2.4)
MCH RBC QN AUTO: 29.5 PG (ref 26–34)
MCHC RBC AUTO-ENTMCNC: 33.4 G/DL (ref 31–36)
MCV RBC AUTO: 88.4 FL (ref 80–100)
MONOCYTES # BLD: 1.4 K/UL (ref 0–1.3)
MONOCYTES NFR BLD: 9 %
MUCOUS THREADS #/AREA URNS LPF: ABNORMAL /LPF
NEUTROPHILS # BLD: 11.6 K/UL (ref 1.7–7.7)
NEUTROPHILS NFR BLD: 75 %
NEUTS BAND NFR BLD MANUAL: 2 % (ref 0–7)
NITRITE UR QL STRIP.AUTO: NEGATIVE
PH UR STRIP.AUTO: 6 [PH] (ref 5–8)
PLATELET # BLD AUTO: 336 K/UL (ref 135–450)
PLATELET BLD QL SMEAR: ADEQUATE
PMV BLD AUTO: 7.3 FL (ref 5–10.5)
POTASSIUM SERPL-SCNC: 3.3 MMOL/L (ref 3.5–5.1)
PROT SERPL-MCNC: 7 G/DL (ref 6.4–8.2)
PROT UR STRIP.AUTO-MCNC: ABNORMAL MG/DL
RBC # BLD AUTO: 3.98 M/UL (ref 4–5.2)
RBC #/AREA URNS HPF: ABNORMAL /HPF (ref 0–4)
SLIDE REVIEW: ABNORMAL
SODIUM SERPL-SCNC: 136 MMOL/L (ref 136–145)
SP GR UR STRIP.AUTO: 1.01 (ref 1–1.03)
UA DIPSTICK W REFLEX MICRO PNL UR: YES
URN SPEC COLLECT METH UR: ABNORMAL
UROBILINOGEN UR STRIP-ACNC: 0.2 E.U./DL
VANCOMYCIN SERPL-MCNC: 10.6 UG/ML
WBC # BLD AUTO: 15.1 K/UL (ref 4–11)
WBC #/AREA URNS HPF: ABNORMAL /HPF (ref 0–5)

## 2023-11-27 PROCEDURE — 6360000002 HC RX W HCPCS: Performed by: INTERNAL MEDICINE

## 2023-11-27 PROCEDURE — 6370000000 HC RX 637 (ALT 250 FOR IP): Performed by: INTERNAL MEDICINE

## 2023-11-27 PROCEDURE — 05HD33Z INSERTION OF INFUSION DEVICE INTO RIGHT CEPHALIC VEIN, PERCUTANEOUS APPROACH: ICD-10-PCS | Performed by: INTERNAL MEDICINE

## 2023-11-27 PROCEDURE — 80076 HEPATIC FUNCTION PANEL: CPT

## 2023-11-27 PROCEDURE — 1200000000 HC SEMI PRIVATE

## 2023-11-27 PROCEDURE — 85025 COMPLETE CBC W/AUTO DIFF WBC: CPT

## 2023-11-27 PROCEDURE — 93010 ELECTROCARDIOGRAM REPORT: CPT | Performed by: INTERNAL MEDICINE

## 2023-11-27 PROCEDURE — 83735 ASSAY OF MAGNESIUM: CPT

## 2023-11-27 PROCEDURE — 93005 ELECTROCARDIOGRAM TRACING: CPT

## 2023-11-27 PROCEDURE — 99233 SBSQ HOSP IP/OBS HIGH 50: CPT | Performed by: INTERNAL MEDICINE

## 2023-11-27 PROCEDURE — 81001 URINALYSIS AUTO W/SCOPE: CPT

## 2023-11-27 PROCEDURE — 2580000003 HC RX 258: Performed by: INTERNAL MEDICINE

## 2023-11-27 PROCEDURE — 80202 ASSAY OF VANCOMYCIN: CPT

## 2023-11-27 PROCEDURE — 76937 US GUIDE VASCULAR ACCESS: CPT

## 2023-11-27 PROCEDURE — 36415 COLL VENOUS BLD VENIPUNCTURE: CPT

## 2023-11-27 PROCEDURE — 99222 1ST HOSP IP/OBS MODERATE 55: CPT | Performed by: INTERNAL MEDICINE

## 2023-11-27 PROCEDURE — C1751 CATH, INF, PER/CENT/MIDLINE: HCPCS

## 2023-11-27 PROCEDURE — 80048 BASIC METABOLIC PNL TOTAL CA: CPT

## 2023-11-27 PROCEDURE — 36410 VNPNXR 3YR/> PHY/QHP DX/THER: CPT

## 2023-11-27 RX ORDER — CLINDAMYCIN PHOSPHATE 900 MG/50ML
900 INJECTION, SOLUTION INTRAVENOUS EVERY 8 HOURS
Status: DISCONTINUED | OUTPATIENT
Start: 2023-11-27 | End: 2023-11-27 | Stop reason: SDUPTHER

## 2023-11-27 RX ORDER — CLINDAMYCIN PHOSPHATE 900 MG/50ML
900 INJECTION INTRAVENOUS EVERY 8 HOURS
Status: DISCONTINUED | OUTPATIENT
Start: 2023-11-27 | End: 2023-11-27

## 2023-11-27 RX ORDER — CLINDAMYCIN PHOSPHATE 900 MG/50ML
900 INJECTION INTRAVENOUS EVERY 8 HOURS
Status: DISCONTINUED | OUTPATIENT
Start: 2023-11-28 | End: 2023-11-30

## 2023-11-27 RX ADMIN — GABAPENTIN 400 MG: 400 CAPSULE ORAL at 22:11

## 2023-11-27 RX ADMIN — OXYBUTYNIN CHLORIDE 5 MG: 5 TABLET ORAL at 22:12

## 2023-11-27 RX ADMIN — CEFTRIAXONE SODIUM 2000 MG: 2 INJECTION, POWDER, FOR SOLUTION INTRAMUSCULAR; INTRAVENOUS at 17:31

## 2023-11-27 RX ADMIN — SODIUM CHLORIDE: 9 INJECTION, SOLUTION INTRAVENOUS at 08:44

## 2023-11-27 RX ADMIN — VANCOMYCIN HYDROCHLORIDE 1750 MG: 10 INJECTION, POWDER, LYOPHILIZED, FOR SOLUTION INTRAVENOUS at 08:48

## 2023-11-27 RX ADMIN — ENOXAPARIN SODIUM 30 MG: 100 INJECTION SUBCUTANEOUS at 08:54

## 2023-11-27 RX ADMIN — OXYBUTYNIN CHLORIDE 5 MG: 5 TABLET ORAL at 08:55

## 2023-11-27 RX ADMIN — ENOXAPARIN SODIUM 30 MG: 100 INJECTION SUBCUTANEOUS at 22:12

## 2023-11-27 RX ADMIN — Medication 10 ML: at 08:56

## 2023-11-27 RX ADMIN — CEFEPIME 2000 MG: 2 INJECTION, POWDER, FOR SOLUTION INTRAVENOUS at 03:07

## 2023-11-27 RX ADMIN — POTASSIUM CHLORIDE 40 MEQ: 1500 TABLET, EXTENDED RELEASE ORAL at 08:55

## 2023-11-27 RX ADMIN — DULOXETINE HYDROCHLORIDE 120 MG: 60 CAPSULE, DELAYED RELEASE ORAL at 08:55

## 2023-11-27 RX ADMIN — GABAPENTIN 400 MG: 400 CAPSULE ORAL at 13:30

## 2023-11-27 RX ADMIN — Medication 10 ML: at 22:12

## 2023-11-27 RX ADMIN — MORPHINE SULFATE 4 MG: 4 INJECTION, SOLUTION INTRAMUSCULAR; INTRAVENOUS at 11:15

## 2023-11-27 RX ADMIN — CLINDAMYCIN IN 5 PERCENT DEXTROSE 900 MG: 18 INJECTION, SOLUTION INTRAVENOUS at 18:09

## 2023-11-27 RX ADMIN — TIZANIDINE 8 MG: 4 TABLET ORAL at 22:11

## 2023-11-27 RX ADMIN — GABAPENTIN 400 MG: 400 CAPSULE ORAL at 08:55

## 2023-11-27 RX ADMIN — MORPHINE SULFATE 4 MG: 4 INJECTION, SOLUTION INTRAMUSCULAR; INTRAVENOUS at 03:09

## 2023-11-27 ASSESSMENT — PAIN DESCRIPTION - FREQUENCY
FREQUENCY: INTERMITTENT
FREQUENCY: INTERMITTENT

## 2023-11-27 ASSESSMENT — PAIN DESCRIPTION - LOCATION
LOCATION: LEG
LOCATION: FOOT;LEG
LOCATION: LEG

## 2023-11-27 ASSESSMENT — PAIN DESCRIPTION - PAIN TYPE
TYPE: ACUTE PAIN
TYPE: ACUTE PAIN

## 2023-11-27 ASSESSMENT — PAIN SCALES - GENERAL
PAINLEVEL_OUTOF10: 7
PAINLEVEL_OUTOF10: 6
PAINLEVEL_OUTOF10: 7
PAINLEVEL_OUTOF10: 5
PAINLEVEL_OUTOF10: 5
PAINLEVEL_OUTOF10: 8

## 2023-11-27 ASSESSMENT — PAIN DESCRIPTION - DESCRIPTORS
DESCRIPTORS: ACHING;SHARP
DESCRIPTORS: SHARP
DESCRIPTORS: ACHING;DISCOMFORT
DESCRIPTORS: DISCOMFORT
DESCRIPTORS: ACHING;SHARP

## 2023-11-27 ASSESSMENT — PAIN - FUNCTIONAL ASSESSMENT
PAIN_FUNCTIONAL_ASSESSMENT: ACTIVITIES ARE NOT PREVENTED
PAIN_FUNCTIONAL_ASSESSMENT: PREVENTS OR INTERFERES SOME ACTIVE ACTIVITIES AND ADLS
PAIN_FUNCTIONAL_ASSESSMENT: PREVENTS OR INTERFERES SOME ACTIVE ACTIVITIES AND ADLS
PAIN_FUNCTIONAL_ASSESSMENT: ACTIVITIES ARE NOT PREVENTED

## 2023-11-27 ASSESSMENT — PAIN SCALES - WONG BAKER
WONGBAKER_NUMERICALRESPONSE: 0
WONGBAKER_NUMERICALRESPONSE: 0

## 2023-11-27 ASSESSMENT — PAIN DESCRIPTION - ORIENTATION
ORIENTATION: RIGHT

## 2023-11-27 ASSESSMENT — PAIN DESCRIPTION - ONSET
ONSET: ON-GOING
ONSET: ON-GOING

## 2023-11-27 NOTE — CONSULTS
[facility] Infectious Disease Consult Note      Argenis Nesbitt     : 1965    DATE OF VISIT:  2023  DATE OF ADMISSION:  2023       Subjective:     Argenis Nesbitt is a 59 y.o. female whom I've been asked to see by HÉCTOR Godoy for a right leg cellulitis with bulla and ulcer formation. Chief Complaint   Patient presents with    Leg Swelling      HPI:  Mrs. Monica Greene has a Hx including a left leg DVT, an episode or two of relatively minor LLE cellulitis in the past, significant OA with BL TKRs and THRs, also morbid obesity with a gastric sleeve, and then an urgent surgery for a perforated appendix a couple of years ago, which was complicated by a post-op MRSA infection. She was in her usual state of health until about 6 days ago when she noted spontaneous onset of right inguinal pain. Within the next 12 to 24 hours she noticed her right lower extremity become swollen, and then within the next 12 hours or so she had multiple bullae appear on her right lower extremity, several of which ruptured, draining a large amount of serous fluid. She kept plans for a trip out of town over the holidays, and during that time developed some right lower extremity pain, redness, ongoing and progressive swelling, and ongoing drainage. She felt a bit worn out and tired, but not acutely ill (no fever, shaking chills, dyspnea, vomiting, diarrhea); she HAD just had a brief GI illness of cramps and diarrhea perhaps a week before, but that improved before the inguinal pain began. Right before she left [facility], she WAS seen by her physician, had a RLE venous Doppler to rule out an acute DVT, also received a course of oral amox + doxy for possible cellulitis. On her return home, her RLE symptoms were still progressing, so she came to the ER, had an assessment, some labs, imaging, was placed on empiric IV Abx.  I was asked to see her today for comments on ongoing mgmt of the cellulitis and a number of superficial

## 2023-11-27 NOTE — PROGRESS NOTES
Pharmacy Vancomycin Consult     Vancomycin Day: 2 of 5  Current Dosing: Pulse  Current indication: SSTI    Recent Labs     11/26/23  0140 11/27/23  0513   BUN 62* 33*   CREATININE 1.6* 0.8   WBC 24.4* 15.1*       Estimated Creatinine Clearance: 100 mL/min (based on SCr of 0.8 mg/dL). Trough: 10.6    Assessment/Plan:  Will dose with 1750 mg q24h. Expected AUC of 475, trough of 10, with 6% toxicity. Next trough: 11/27 @ 0600. Pharmacy will continue to monitor.       Annia Mora PharmD, [facility], 11/27/2023 7:22 AM

## 2023-11-27 NOTE — PROGRESS NOTES
Handoff report and transfer of care given at bedside to [email or web address]. Patient in stable condition, denies needs/concerns at this time. Call light within reach.

## 2023-11-27 NOTE — CARE COORDINATION
Case Management Assessment  Initial Evaluation    Date/Time of Evaluation: 11/27/2023 2:36 PM  Assessment Completed by: [place], [place]    If patient is discharged prior to next notation, then this note serves as note for discharge by case management. Patient Name: Erendira Golden                   YOB: 1965  Diagnosis: Cellulitis of right lower extremity [L03.115]                   Date / Time: 11/26/2023  1:32 AM    Patient Admission Status: Inpatient   Readmission Risk (Low < 19, Mod (19-27), High > 27): Readmission Risk Score: 12.2    Current PCP: Yancy Schmidt MD  PCP verified by CM? (P) Yes    Chart Reviewed: Yes      History Provided by: (P) Patient  Patient Orientation: (P) Alert and Oriented    Patient Cognition: (P) Alert    Hospitalization in the last 30 days (Readmission):  No    If yes, Readmission Assessment in  Navigator will be completed.     Advance Directives:      Code Status: Full Code   Patient's Primary Decision Maker is: (P) Legal Next of Kin    Primary Decision Maker: Shelly Living - Domestic Partner - [phone]    Discharge Planning:    Patient lives with: Spouse/Significant Other Type of Home: House  Primary Care Giver: (P) Self  Patient Support Systems include: (P) Spouse/Significant Other, Family Members   Current Financial resources: (P) None  Current community resources:    Current services prior to admission: None            Current DME:              Type of Home Care services:  None    ADLS  Prior functional level: (P) Independent in ADLs/IADLs  Current functional level: (P) Mobility, Assistance with the following:, Bathing, Dressing, Toileting    PT AM-PAC:   /24  OT AM-PAC:   /24    Family can provide assistance at DC: (P) Yes  Would you like Case Management to discuss the discharge plan with any other family members/significant others, and if so, who? (P) Yes (can answer questions from family)  Plans to Return to Present Housing: (P) Yes  Other was provided to:     Patient Representative Name:       The Patient and/or Patient Representative Agree with the Discharge Plan?       Claritza [place]  Case Management Department  Ph: [phone] Fax:

## 2023-11-28 LAB
ALBUMIN SERPL-MCNC: 2.5 G/DL (ref 3.4–5)
ALP SERPL-CCNC: 243 U/L (ref 40–129)
ALT SERPL-CCNC: 41 U/L (ref 10–40)
ANION GAP SERPL CALCULATED.3IONS-SCNC: 10 MMOL/L (ref 3–16)
AST SERPL-CCNC: 23 U/L (ref 15–37)
BASOPHILS # BLD: 0 K/UL (ref 0–0.2)
BASOPHILS NFR BLD: 0 %
BILIRUB DIRECT SERPL-MCNC: <0.2 MG/DL (ref 0–0.3)
BILIRUB INDIRECT SERPL-MCNC: ABNORMAL MG/DL (ref 0–1)
BILIRUB SERPL-MCNC: 0.3 MG/DL (ref 0–1)
BUN SERPL-MCNC: 16 MG/DL (ref 7–20)
CALCIUM SERPL-MCNC: 8.4 MG/DL (ref 8.3–10.6)
CHLORIDE SERPL-SCNC: 101 MMOL/L (ref 99–110)
CO2 SERPL-SCNC: 21 MMOL/L (ref 21–32)
CREAT SERPL-MCNC: 0.6 MG/DL (ref 0.6–1.2)
DEPRECATED RDW RBC AUTO: 16.9 % (ref 12.4–15.4)
EOSINOPHIL # BLD: 0.1 K/UL (ref 0–0.6)
EOSINOPHIL NFR BLD: 1 %
GFR SERPLBLD CREATININE-BSD FMLA CKD-EPI: >60 ML/MIN/{1.73_M2}
GLUCOSE SERPL-MCNC: 109 MG/DL (ref 70–99)
HCT VFR BLD AUTO: 33.9 % (ref 36–48)
HGB BLD-MCNC: 11.1 G/DL (ref 12–16)
LYMPHOCYTES # BLD: 1.6 K/UL (ref 1–5.1)
LYMPHOCYTES NFR BLD: 12 %
MAGNESIUM SERPL-MCNC: 1.8 MG/DL (ref 1.8–2.4)
MCH RBC QN AUTO: 29.4 PG (ref 26–34)
MCHC RBC AUTO-ENTMCNC: 32.9 G/DL (ref 31–36)
MCV RBC AUTO: 89.4 FL (ref 80–100)
MONOCYTES # BLD: 1.2 K/UL (ref 0–1.3)
MONOCYTES NFR BLD: 9 %
NEUTROPHILS # BLD: 10.5 K/UL (ref 1.7–7.7)
NEUTROPHILS NFR BLD: 76 %
NEUTS BAND NFR BLD MANUAL: 2 % (ref 0–7)
PLATELET # BLD AUTO: 376 K/UL (ref 135–450)
PLATELET BLD QL SMEAR: ADEQUATE
PMV BLD AUTO: 7.1 FL (ref 5–10.5)
POTASSIUM SERPL-SCNC: 3.4 MMOL/L (ref 3.5–5.1)
PROT SERPL-MCNC: 6.4 G/DL (ref 6.4–8.2)
RBC # BLD AUTO: 3.79 M/UL (ref 4–5.2)
SLIDE REVIEW: ABNORMAL
SODIUM SERPL-SCNC: 132 MMOL/L (ref 136–145)
WBC # BLD AUTO: 13.4 K/UL (ref 4–11)

## 2023-11-28 PROCEDURE — 80048 BASIC METABOLIC PNL TOTAL CA: CPT

## 2023-11-28 PROCEDURE — 99232 SBSQ HOSP IP/OBS MODERATE 35: CPT | Performed by: INTERNAL MEDICINE

## 2023-11-28 PROCEDURE — 2580000003 HC RX 258: Performed by: INTERNAL MEDICINE

## 2023-11-28 PROCEDURE — 1200000000 HC SEMI PRIVATE

## 2023-11-28 PROCEDURE — 83735 ASSAY OF MAGNESIUM: CPT

## 2023-11-28 PROCEDURE — 97530 THERAPEUTIC ACTIVITIES: CPT

## 2023-11-28 PROCEDURE — 6360000002 HC RX W HCPCS: Performed by: INTERNAL MEDICINE

## 2023-11-28 PROCEDURE — 80076 HEPATIC FUNCTION PANEL: CPT

## 2023-11-28 PROCEDURE — 97166 OT EVAL MOD COMPLEX 45 MIN: CPT

## 2023-11-28 PROCEDURE — 36415 COLL VENOUS BLD VENIPUNCTURE: CPT

## 2023-11-28 PROCEDURE — 97116 GAIT TRAINING THERAPY: CPT

## 2023-11-28 PROCEDURE — 6370000000 HC RX 637 (ALT 250 FOR IP): Performed by: INTERNAL MEDICINE

## 2023-11-28 PROCEDURE — 97535 SELF CARE MNGMENT TRAINING: CPT

## 2023-11-28 PROCEDURE — 97161 PT EVAL LOW COMPLEX 20 MIN: CPT

## 2023-11-28 PROCEDURE — 85025 COMPLETE CBC W/AUTO DIFF WBC: CPT

## 2023-11-28 PROCEDURE — 99232 SBSQ HOSP IP/OBS MODERATE 35: CPT

## 2023-11-28 RX ADMIN — MORPHINE SULFATE 2 MG: 2 INJECTION, SOLUTION INTRAMUSCULAR; INTRAVENOUS at 05:41

## 2023-11-28 RX ADMIN — CEFTRIAXONE SODIUM 2000 MG: 2 INJECTION, POWDER, FOR SOLUTION INTRAMUSCULAR; INTRAVENOUS at 15:48

## 2023-11-28 RX ADMIN — DULOXETINE HYDROCHLORIDE 120 MG: 60 CAPSULE, DELAYED RELEASE ORAL at 10:08

## 2023-11-28 RX ADMIN — GABAPENTIN 400 MG: 400 CAPSULE ORAL at 14:19

## 2023-11-28 RX ADMIN — CLINDAMYCIN IN 5 PERCENT DEXTROSE 900 MG: 18 INJECTION, SOLUTION INTRAVENOUS at 18:25

## 2023-11-28 RX ADMIN — OXYBUTYNIN CHLORIDE 5 MG: 5 TABLET ORAL at 20:51

## 2023-11-28 RX ADMIN — MORPHINE SULFATE 4 MG: 4 INJECTION, SOLUTION INTRAMUSCULAR; INTRAVENOUS at 14:05

## 2023-11-28 RX ADMIN — TIZANIDINE 8 MG: 4 TABLET ORAL at 20:51

## 2023-11-28 RX ADMIN — ENOXAPARIN SODIUM 30 MG: 100 INJECTION SUBCUTANEOUS at 10:07

## 2023-11-28 RX ADMIN — OXYBUTYNIN CHLORIDE 5 MG: 5 TABLET ORAL at 10:08

## 2023-11-28 RX ADMIN — Medication 10 ML: at 20:52

## 2023-11-28 RX ADMIN — GABAPENTIN 400 MG: 400 CAPSULE ORAL at 20:51

## 2023-11-28 RX ADMIN — POTASSIUM CHLORIDE 40 MEQ: 1500 TABLET, EXTENDED RELEASE ORAL at 10:09

## 2023-11-28 RX ADMIN — CLINDAMYCIN IN 5 PERCENT DEXTROSE 900 MG: 18 INJECTION, SOLUTION INTRAVENOUS at 10:20

## 2023-11-28 RX ADMIN — PANTOPRAZOLE SODIUM 40 MG: 40 TABLET, DELAYED RELEASE ORAL at 05:40

## 2023-11-28 RX ADMIN — MORPHINE SULFATE 4 MG: 4 INJECTION, SOLUTION INTRAMUSCULAR; INTRAVENOUS at 18:32

## 2023-11-28 RX ADMIN — CLINDAMYCIN IN 5 PERCENT DEXTROSE 900 MG: 18 INJECTION, SOLUTION INTRAVENOUS at 02:52

## 2023-11-28 RX ADMIN — GABAPENTIN 400 MG: 400 CAPSULE ORAL at 10:09

## 2023-11-28 RX ADMIN — ENOXAPARIN SODIUM 30 MG: 100 INJECTION SUBCUTANEOUS at 20:51

## 2023-11-28 RX ADMIN — MORPHINE SULFATE 4 MG: 4 INJECTION, SOLUTION INTRAMUSCULAR; INTRAVENOUS at 10:09

## 2023-11-28 ASSESSMENT — PAIN SCALES - GENERAL
PAINLEVEL_OUTOF10: 7
PAINLEVEL_OUTOF10: 8
PAINLEVEL_OUTOF10: 7
PAINLEVEL_OUTOF10: 8
PAINLEVEL_OUTOF10: 4

## 2023-11-28 ASSESSMENT — PAIN DESCRIPTION - DESCRIPTORS
DESCRIPTORS: ACHING
DESCRIPTORS: SHARP
DESCRIPTORS: ACHING;DISCOMFORT
DESCRIPTORS: ACHING;DISCOMFORT
DESCRIPTORS: ACHING;SHARP

## 2023-11-28 ASSESSMENT — PAIN - FUNCTIONAL ASSESSMENT: PAIN_FUNCTIONAL_ASSESSMENT: ACTIVITIES ARE NOT PREVENTED

## 2023-11-28 ASSESSMENT — PAIN DESCRIPTION - ORIENTATION
ORIENTATION: RIGHT;LOWER
ORIENTATION: RIGHT;LOWER
ORIENTATION: RIGHT
ORIENTATION: RIGHT;LOWER

## 2023-11-28 ASSESSMENT — PAIN DESCRIPTION - LOCATION
LOCATION: LEG

## 2023-11-28 ASSESSMENT — PAIN DESCRIPTION - PAIN TYPE
TYPE: ACUTE PAIN
TYPE: ACUTE PAIN

## 2023-11-28 ASSESSMENT — PAIN DESCRIPTION - ONSET
ONSET: ON-GOING
ONSET: ON-GOING

## 2023-11-28 NOTE — PROGRESS NOTES
Inpatient Physical Therapy Evaluation & Treatment    Unit: 2 [place]  Date:  11/28/2023  Patient Name:    Argenis Nesbitt  Admitting diagnosis:  Cellulitis of right lower extremity [T63.037]  Admit Date:  11/26/2023  Precautions/Restrictions/WB Status/ Lines/ Wounds/ Oxygen: Fall risk, Bed/chair alarm, and Lines (midline)      Pt seen for cotreatment this date due to patient endurance, acute illness/injury, and limited functional status information    Treatment Time:  910- 655  Treatment Number:  1   Timed Code Treatment Minutes: 25 minutes  Total Treatment Minutes:  35  minutes    Patient Stated Goals for Therapy: \" to get back home \"          Discharge Recommendations: SNF  DME needs for discharge: Defer to facility       Therapy recommendation for EMS Transport: can transport by wheelchair    Therapy recommendations for staff:   Assist of 1 for ambulation with use of rolling walker (RW) and gait belt to/from [facility]  to/from chair    History of Present Illness:   per Dr Chanel Gibson H&P 11/26/23:  \"VPKCT Complaint: Pain and swelling right lower extremity     Amanda Olguin is a 59 y.o. female with a pmh of LLE DVT, LLE cellulitis and asthma who presents with Cellulitis of right lower extremity     Patient began developing fluid-filled blisters proximately 10 days ago first in her right groin and then further down on the right lower extremity. Subsequent to this right lower extremity became warm, swollen and erythematous. Patient denies fevers, chills, or sweats. Patient saw PCP 11/21 for the same issue. Right lower extremity venous Doppler was negative at that time. Patient was prescribed amoxicillin and doxycycline. Additionally her Lasix was increased to accommodate the increased swelling in the leg. Patient's liver function tests were elevated at that time and magnesium was low at 1.6. Of note 2 weeks ago before all this began patient received a Medrol Dosepak for neck pain.      Evaluation emergency room today Evaluation & Treatment. Pt demonstrated decreased Activity tolerance, Balance, ROM, Safety, and Strength as well as decreased independence with Ambulation, Bed Mobility , and Transfers. Pt is a 58 y/o female who presents below her functional baseline. Pt with decreased tolerance to ambulation due to increased pain and seeping from R LE. Pt would continue to benefit from skilled PT services to promote increased strength, balance and functional activity tolerance. Recommend continued skilled PT services upon discharge. Recommending SNF upon discharge as patient functioning well below baseline, demonstrates good rehab potential and unable to return home due to limited or no family support, inability to negotiate stairs to enter home/bedroom/bathroom, burden of care beyond caregiver ability, home environment not conducive to patient recovery, and limited safety awareness. Goals : To be met in 3 visits:  1). Independent with LE Ex x 10 reps  2). Sit to/from stand: Independent  3). Bed to chair: Independent      To be met in 6 visits:  1). Supine to/from sit: Independent  2). Gait: Ambulate 150 ft.  with Independent and use of LRAD (least restrictive assistive device)  3). Tolerate B LE exercises 3 sets of 10-15 reps  4). Ascend/descend 12 steps with Independent with use of hand rail unilateral and LRAD (least restrictive assistive device)    Rehabilitation Potential: Good  Strengths for achieving goals include:   Pt motivated, PLOF, Family Support, and Pt cooperative   Barriers to achieving goals include:    Complexity of condition, Pain, and Weakness    Plan    To be seen 3-5 x / week  while in acute care setting for therapeutic exercises, bed mobility, transfers, progressive gait training, balance training, and family/patient education. Signature: Jose Enrique Anne PT     If patient discharges from this facility prior to next visit, this note will serve as the Discharge Summary.     CHF

## 2023-11-28 NOTE — PROGRESS NOTES
Changed dressing with moderate weeping at this time. Cleansed with NS, applied silver mepilex on wound. LORRIE, PRN Morphine 2mg given at this time. Patient tolerated well.   Stan Benjamin RN

## 2023-11-28 NOTE — PROGRESS NOTES
Inpatient Occupational Therapy Evaluation and Treatment    Unit: 2 [place]  Date:  11/28/2023  Patient Name:    Kadie Nesbitt  Admitting diagnosis:  Cellulitis of right lower extremity [L22.225]  Admit Date:  11/26/2023  Precautions/Restrictions/WB Status/ Lines/ Wounds/ Oxygen: Fall risk, Bed/chair alarm, and Lines (midline)    Pt seen for cotreatment this date due to patient safety and limited functional status information    Treatment Time:  990-279  Treatment Number:  1  Timed Code Treatment Minutes: 45 minutes  Total Treatment Minutes:  55  minutes    Patient Goals for Therapy: \"get out of bed\"         Discharge Recommendations: SNF  DME needs for discharge: Defer to facility       Therapy recommendations for staff:   Assist of 1 for ambulation with use of rolling walker (RW) and gait belt to/from [facility]  to/from chair  within room    History of Present Illness: per Dr Tasha Robert H&P 11/26/23:  \"SOJJY Complaint: Pain and swelling right lower extremity     Venkat Cardenas is a 59 y.o. female with a pmh of LLE DVT, LLE cellulitis and asthma who presents with Cellulitis of right lower extremity     Patient began developing fluid-filled blisters proximately 10 days ago first in her right groin and then further down on the right lower extremity. Subsequent to this right lower extremity became warm, swollen and erythematous. Patient denies fevers, chills, or sweats. Patient saw PCP 11/21 for the same issue. Right lower extremity venous Doppler was negative at that time. Patient was prescribed amoxicillin and doxycycline. Additionally her Lasix was increased to accommodate the increased swelling in the leg. Patient's liver function tests were elevated at that time and magnesium was low at 1.6. Of note 2 weeks ago before all this began patient received a Medrol Dosepak for neck pain. Evaluation emergency room today shows patient to have a white count of 24,000 with 14% bands.   Patient now shows evidence of acute Sit to stand:    CGA, With RW and gait belt, and With cues for hand placement  Stand to sit:    CGA, With RW and gait belt, and With cues for hand placement  Bed to chair:     Min A  and With RW and gait belt  Bed/ chair to standard toilet:  Not Tested  Bed/chair to BSC:   Min A  and With RW and gait belt  Functional Mobility:   Min A  and With RW and gait belt around foot of bed to chair on opposite side of bed, safety concerns regarding weeping RLE    See PT note for gait analysis. ADLs:  Dressing:      UE:   Not Tested  LE:    Max A don socks    Bathing:    UE:  Not Tested  LE:  Not Tested    Eating:   Independent    Toileting: Mod A pericare in stance after urinating in toilet (for thoroughness)    Grooming/hygiene: Not Tested    Activity Tolerance:   Pt completed therapy session with fatigue  pain     BP (mmHg) HR (bpm) SpO2 (%) on RA Comments   Supine at rest       Seated at EOB       Standing       End of session         Positioning Needs:   Pt reclined in chair, alarm set, call light provided and all needs within reach . Ther Ex / Activities Initiated:   N/A    Patient/Family Education:   Pt educated on role of inpatient OT, plan of care, importance of continued activity, DC recommendations, Functional transfer/mobility safety, and Calling for assist with mobility. CHF Education  N/A    Assessment:  Pt seen for Occupational therapy evaluation in acute care setting. Pt demonstrated decreased Activity tolerance, ADLs, IADLs, and Transfers. Pt functioning below baseline and will likely benefit from skilled occupational therapy services to maximize safety and independence. Recommending SNF upon discharge as patient functioning well below baseline, demonstrates good rehab potential and unable to return home due to limited or no family support, burden of care beyond caregiver ability, and home environment not conducive to patient recovery. Goal(s) :    To be met in 3 Visits:  Bed to toilet/BSC:

## 2023-11-28 NOTE — PROGRESS NOTES
[facility] Infectious Disease Progress Note      Talita Nesbitt     : 1965    DATE OF VISIT:  2023  DATE OF ADMISSION:  2023       Subjective:     Talita Nesbitt is a 59 y.o. female whom I've been seeing for a bullous cellulitis of the RLE, with some heavily weeping ulcers, clinically seems most likely Strep. Since I last saw her, she feels about the same. No F/C/D, still a sense of dry mouth, no side effects from Abx. Peripheral IV access was tenuous, so a midline was placed. RLE pain about the same, and most noticeable in the posterior thigh. Able to sit on the side of the bed by herself, with pain, and able to transfer to the bedside commode with assistance. Ms. Guille Chaudhary has a past medical history of Acute deep vein thrombosis (DVT) of left lower extremity ([address]), Acute renal failure (ARF) ([address]), Family history of factor V deficiency, Hypertension, Incarcerated ventral hernia, MRSA infection of surgical site, Perforated appendicitis, and Tobacco use.     Current Facility-Administered Medications: cefTRIAXone (ROCEPHIN) 2,000 mg in sodium chloride 0.9 % 50 mL IVPB (mini-bag), 2,000 mg, IntraVENous, Q24H  clindamycin (CLEOCIN) 900 mg in dextrose 5 % 50 mL IVPB, 900 mg, IntraVENous, Q8H  DULoxetine (CYMBALTA) extended release capsule 120 mg, 120 mg, Oral, Daily  gabapentin (NEURONTIN) capsule 400 mg, 400 mg, Oral, TID  pantoprazole (PROTONIX) tablet 40 mg, 40 mg, Oral, QAM AC  oxybutynin (DITROPAN) tablet 5 mg, 5 mg, Oral, BID  tiZANidine (ZANAFLEX) tablet 8 mg, 8 mg, Oral, Nightly  sodium chloride flush 0.9 % injection 5-40 mL, 5-40 mL, IntraVENous, 2 times per day  sodium chloride flush 0.9 % injection 5-40 mL, 5-40 mL, IntraVENous, PRN  0.9 % sodium chloride infusion, , IntraVENous, PRN  potassium chloride (KLOR-CON M) extended release tablet 40 mEq, 40 mEq, Oral, PRN **OR** potassium bicarb-citric acid (EFFER-K) effervescent tablet 40 mEq, 40 mEq, Oral, PRN **OR** potassium chloride fracture, cortical erosion, or periosteal reaction at the tibia and fibula. Right knee arthroplasty and moderate degenerative changes at the midfoot. CT FEMUR RIGHT WO CONTRAST    Result Date: 11/26/2023  Arthroplasty at the right hip and right knee producing beam hardening artifact at these levels. No acute bony fracture or destruction at the right femur. There is general edema of the subcutaneous fat in the mid right thigh down extending below the knee. There is no soft tissue emphysema or focal fluid collection at the thigh. The muscular compartment has mild fatty atrophy but no focal collection. CT ABDOMEN PELVIS WO CONTRAST Additional Contrast? None    Result Date: 11/26/2023  1. Evaluation of the organs is limited by the lack of contrast and large habitus. No obvious mass or surface nodularity at the unenhanced liver. No splenomegaly. 2.  Bilateral hip arthroplasty producing streak artifact across the pelvis. There is a 2.5 x 2.5 x 4 cm oval soft tissue density structure along the posterolateral left edge of the uterus which appears to be separate from the more superior left ovary. This could be a exophytic fibroid. In retrospect, structure was present on the old exam with a similar size and configuration. 3.  Previous sleeve gastrectomy. Previous hernia repair at the midline anterior abdomen but with a broad ventral bulge remaining at the site. No acute complication.       Assessment:     Patient Active Problem List   Diagnosis Code    Chronic osteoarthritis M19.90    Chronic GERD K21.9    Asthma J45.909    History of DVT (deep vein thrombosis) Z86.718    Other chronic pain G89.29    Family history of factor V Leiden mutation Z83.2    Urinary incontinence, urge N39.41    S/P laparoscopic sleeve gastrectomy Z98.84    Sleep apnea G47.30    Class 3 severe obesity due to excess calories with serious comorbidity and body mass index (BMI) of 50.0 to 59.9 in adult ([address]) E66.01, Z68.43    Cellulitis

## 2023-11-29 LAB
ANION GAP SERPL CALCULATED.3IONS-SCNC: 11 MMOL/L (ref 3–16)
BASOPHILS # BLD: 0 K/UL (ref 0–0.2)
BASOPHILS NFR BLD: 0 %
BUN SERPL-MCNC: 11 MG/DL (ref 7–20)
CALCIUM SERPL-MCNC: 8.7 MG/DL (ref 8.3–10.6)
CHLORIDE SERPL-SCNC: 104 MMOL/L (ref 99–110)
CO2 SERPL-SCNC: 17 MMOL/L (ref 21–32)
CREAT SERPL-MCNC: <0.5 MG/DL (ref 0.6–1.2)
DEPRECATED RDW RBC AUTO: 16.9 % (ref 12.4–15.4)
EOSINOPHIL # BLD: 0.1 K/UL (ref 0–0.6)
EOSINOPHIL NFR BLD: 1 %
GFR SERPLBLD CREATININE-BSD FMLA CKD-EPI: >60 ML/MIN/{1.73_M2}
GLUCOSE SERPL-MCNC: 103 MG/DL (ref 70–99)
HCT VFR BLD AUTO: 36.3 % (ref 36–48)
HGB BLD-MCNC: 11.8 G/DL (ref 12–16)
LYMPHOCYTES # BLD: 1.8 K/UL (ref 1–5.1)
LYMPHOCYTES NFR BLD: 12 %
MCH RBC QN AUTO: 29.3 PG (ref 26–34)
MCHC RBC AUTO-ENTMCNC: 32.4 G/DL (ref 31–36)
MCV RBC AUTO: 90.5 FL (ref 80–100)
MONOCYTES # BLD: 0.8 K/UL (ref 0–1.3)
MONOCYTES NFR BLD: 6 %
NEUTROPHILS # BLD: 11.1 K/UL (ref 1.7–7.7)
NEUTROPHILS NFR BLD: 80 %
PATH INTERP BLD-IMP: NO
PLATELET # BLD AUTO: 371 K/UL (ref 135–450)
PLATELET BLD QL SMEAR: ADEQUATE
PMV BLD AUTO: 7.1 FL (ref 5–10.5)
POTASSIUM SERPL-SCNC: 3.6 MMOL/L (ref 3.5–5.1)
RBC # BLD AUTO: 4.01 M/UL (ref 4–5.2)
RBC MORPH BLD: NORMAL
SLIDE REVIEW: ABNORMAL
SODIUM SERPL-SCNC: 132 MMOL/L (ref 136–145)
VARIANT LYMPHS NFR BLD MANUAL: 1 % (ref 0–6)
WBC # BLD AUTO: 13.9 K/UL (ref 4–11)

## 2023-11-29 PROCEDURE — 2580000003 HC RX 258: Performed by: INTERNAL MEDICINE

## 2023-11-29 PROCEDURE — 80048 BASIC METABOLIC PNL TOTAL CA: CPT

## 2023-11-29 PROCEDURE — 6370000000 HC RX 637 (ALT 250 FOR IP): Performed by: INTERNAL MEDICINE

## 2023-11-29 PROCEDURE — 2580000003 HC RX 258

## 2023-11-29 PROCEDURE — 6360000002 HC RX W HCPCS: Performed by: INTERNAL MEDICINE

## 2023-11-29 PROCEDURE — 1200000000 HC SEMI PRIVATE

## 2023-11-29 PROCEDURE — 99232 SBSQ HOSP IP/OBS MODERATE 35: CPT

## 2023-11-29 PROCEDURE — 36415 COLL VENOUS BLD VENIPUNCTURE: CPT

## 2023-11-29 PROCEDURE — 97535 SELF CARE MNGMENT TRAINING: CPT

## 2023-11-29 PROCEDURE — 97110 THERAPEUTIC EXERCISES: CPT

## 2023-11-29 PROCEDURE — 99232 SBSQ HOSP IP/OBS MODERATE 35: CPT | Performed by: INTERNAL MEDICINE

## 2023-11-29 PROCEDURE — 97530 THERAPEUTIC ACTIVITIES: CPT

## 2023-11-29 PROCEDURE — 85025 COMPLETE CBC W/AUTO DIFF WBC: CPT

## 2023-11-29 RX ORDER — OXYCODONE HYDROCHLORIDE AND ACETAMINOPHEN 5; 325 MG/1; MG/1
2 TABLET ORAL EVERY 4 HOURS PRN
Status: DISCONTINUED | OUTPATIENT
Start: 2023-11-29 | End: 2023-12-01 | Stop reason: HOSPADM

## 2023-11-29 RX ORDER — SODIUM CHLORIDE, SODIUM LACTATE, POTASSIUM CHLORIDE, CALCIUM CHLORIDE 600; 310; 30; 20 MG/100ML; MG/100ML; MG/100ML; MG/100ML
INJECTION, SOLUTION INTRAVENOUS CONTINUOUS
Status: ACTIVE | OUTPATIENT
Start: 2023-11-29 | End: 2023-11-30

## 2023-11-29 RX ORDER — MORPHINE SULFATE 2 MG/ML
1 INJECTION, SOLUTION INTRAMUSCULAR; INTRAVENOUS EVERY 4 HOURS PRN
Status: DISCONTINUED | OUTPATIENT
Start: 2023-11-29 | End: 2023-11-30

## 2023-11-29 RX ORDER — OXYCODONE HYDROCHLORIDE AND ACETAMINOPHEN 5; 325 MG/1; MG/1
1 TABLET ORAL EVERY 4 HOURS PRN
Status: DISCONTINUED | OUTPATIENT
Start: 2023-11-29 | End: 2023-12-01 | Stop reason: HOSPADM

## 2023-11-29 RX ADMIN — Medication 10 ML: at 10:10

## 2023-11-29 RX ADMIN — ENOXAPARIN SODIUM 30 MG: 100 INJECTION SUBCUTANEOUS at 10:02

## 2023-11-29 RX ADMIN — OXYCODONE AND ACETAMINOPHEN 2 TABLET: 5; 325 TABLET ORAL at 22:27

## 2023-11-29 RX ADMIN — OXYCODONE AND ACETAMINOPHEN 2 TABLET: 5; 325 TABLET ORAL at 11:39

## 2023-11-29 RX ADMIN — MORPHINE SULFATE 2 MG: 2 INJECTION, SOLUTION INTRAMUSCULAR; INTRAVENOUS at 06:51

## 2023-11-29 RX ADMIN — CEFTRIAXONE SODIUM 2000 MG: 2 INJECTION, POWDER, FOR SOLUTION INTRAMUSCULAR; INTRAVENOUS at 15:05

## 2023-11-29 RX ADMIN — DULOXETINE HYDROCHLORIDE 120 MG: 60 CAPSULE, DELAYED RELEASE ORAL at 10:02

## 2023-11-29 RX ADMIN — ENOXAPARIN SODIUM 30 MG: 100 INJECTION SUBCUTANEOUS at 22:22

## 2023-11-29 RX ADMIN — SODIUM CHLORIDE, POTASSIUM CHLORIDE, SODIUM LACTATE AND CALCIUM CHLORIDE: 600; 310; 30; 20 INJECTION, SOLUTION INTRAVENOUS at 17:41

## 2023-11-29 RX ADMIN — OXYCODONE AND ACETAMINOPHEN 2 TABLET: 5; 325 TABLET ORAL at 18:22

## 2023-11-29 RX ADMIN — GABAPENTIN 400 MG: 400 CAPSULE ORAL at 15:04

## 2023-11-29 RX ADMIN — CLINDAMYCIN IN 5 PERCENT DEXTROSE 900 MG: 18 INJECTION, SOLUTION INTRAVENOUS at 18:30

## 2023-11-29 RX ADMIN — GABAPENTIN 400 MG: 400 CAPSULE ORAL at 10:02

## 2023-11-29 RX ADMIN — CLINDAMYCIN IN 5 PERCENT DEXTROSE 900 MG: 18 INJECTION, SOLUTION INTRAVENOUS at 10:08

## 2023-11-29 RX ADMIN — PANTOPRAZOLE SODIUM 40 MG: 40 TABLET, DELAYED RELEASE ORAL at 06:52

## 2023-11-29 RX ADMIN — CLINDAMYCIN IN 5 PERCENT DEXTROSE 900 MG: 18 INJECTION, SOLUTION INTRAVENOUS at 02:25

## 2023-11-29 RX ADMIN — OXYBUTYNIN CHLORIDE 5 MG: 5 TABLET ORAL at 22:22

## 2023-11-29 RX ADMIN — OXYBUTYNIN CHLORIDE 5 MG: 5 TABLET ORAL at 10:02

## 2023-11-29 RX ADMIN — GABAPENTIN 400 MG: 400 CAPSULE ORAL at 22:22

## 2023-11-29 ASSESSMENT — PAIN DESCRIPTION - DESCRIPTORS
DESCRIPTORS: ACHING;DISCOMFORT
DESCRIPTORS: ACHING;SHARP;DISCOMFORT
DESCRIPTORS: ACHING;SHARP
DESCRIPTORS: SHARP
DESCRIPTORS: ACHING;DISCOMFORT

## 2023-11-29 ASSESSMENT — PAIN SCALES - GENERAL
PAINLEVEL_OUTOF10: 7
PAINLEVEL_OUTOF10: 6
PAINLEVEL_OUTOF10: 0
PAINLEVEL_OUTOF10: 7
PAINLEVEL_OUTOF10: 7
PAINLEVEL_OUTOF10: 8
PAINLEVEL_OUTOF10: 0

## 2023-11-29 ASSESSMENT — PAIN DESCRIPTION - LOCATION
LOCATION: LEG

## 2023-11-29 ASSESSMENT — PAIN DESCRIPTION - PAIN TYPE
TYPE: ACUTE PAIN
TYPE: ACUTE PAIN

## 2023-11-29 ASSESSMENT — PAIN DESCRIPTION - ORIENTATION
ORIENTATION: RIGHT;LOWER
ORIENTATION: RIGHT;LOWER
ORIENTATION: RIGHT
ORIENTATION: RIGHT;LOWER

## 2023-11-29 ASSESSMENT — PAIN - FUNCTIONAL ASSESSMENT
PAIN_FUNCTIONAL_ASSESSMENT: PREVENTS OR INTERFERES SOME ACTIVE ACTIVITIES AND ADLS
PAIN_FUNCTIONAL_ASSESSMENT: ACTIVITIES ARE NOT PREVENTED
PAIN_FUNCTIONAL_ASSESSMENT: ACTIVITIES ARE NOT PREVENTED

## 2023-11-29 ASSESSMENT — PAIN DESCRIPTION - FREQUENCY: FREQUENCY: INTERMITTENT

## 2023-11-29 ASSESSMENT — PAIN DESCRIPTION - ONSET: ONSET: GRADUAL

## 2023-11-29 NOTE — PROGRESS NOTES
mg, PRN  ondansetron, 4 mg, Q8H PRN   Or  ondansetron, 4 mg, Q6H PRN  acetaminophen, 650 mg, Q6H PRN   Or  acetaminophen, 650 mg, Q6H PRN  bisacodyl, 5 mg, Daily PRN  naloxone, 0.4 mg, PRN          Data:  CBC:   Recent Labs     11/27/23 0513 11/28/23 0518 11/29/23 0818   WBC 15.1* 13.4* 13.9*   HGB 11.7* 11.1* 11.8*   HCT 35.1* 33.9* 36.3   MCV 88.4 89.4 90.5    376 371     BMP:   Recent Labs     11/27/23 0513 11/28/23 0518 11/29/23 0818    132* 132*   K 3.3* 3.4* 3.6    101 104   CO2 20* 21 17*   BUN 33* 16 11   CREATININE 0.8 0.6 <0.5*     LIVER PROFILE:   Recent Labs     11/27/23 0513 11/28/23 0518   AST 41* 23   ALT 61* 41*   BILIDIR <0.2 <0.2   BILITOT 0.3 0.3   ALKPHOS 306* 243*     PT/INR: No results for input(s): \"PROTIME\", \"INR\" in the last 72 hours. CULTURES  Results       Procedure Component Value Units Date/Time    Blood Culture 1 [[id number]] Collected: 11/26/23 0255    Order Status: Completed Specimen: Blood Updated: 11/27/23 1115     Blood Culture, Routine No Growth to date. Any change in status will be called. Narrative:      ORDER#: [id number]                          ORDERED BY: ANA TAVAREZ  SOURCE: Blood                              COLLECTED:  11/26/23 02:55  ANTIBIOTICS AT [place].:                      RECEIVED :  11/26/23 10:16  If child <=2 yrs old please draw pediatric bottle. ~Blood Culture 1    Blood Culture 2 [[id number]] Collected: 11/26/23 0255    Order Status: Completed Specimen: Blood Updated: 11/27/23 1115     Culture, Blood 2 No Growth to date. Any change in status will be called. Narrative:      ORDER#: [id number]                          ORDERED BY: ANA TAVAREZ  SOURCE: Blood                              COLLECTED:  11/26/23 02:55  ANTIBIOTICS AT [place].:                      RECEIVED :  11/26/23 10:16  If child <=2 yrs old please draw pediatric bottle. ~Blood Culture #2               RADIOLOGY  IR MIDLINE CATH   Final Result      CT TIBIA FIBULA above   -IV vancomycin and cefepime   -morphine prn --> percocet prn   -had lower extremity dopplers 11/21/23 that were negative for DVT  --> antibiotics changed to rocephin and clindamycin D#3   -Dr Lupe Evans and wound care consulted   -has improved today   -WBC still elevated      #ANTHONY   #Hyponatremia   -IVF   Renal function stable today     Low bicarb  Hyponatremia   -LR today   -has not had good fluid intake      #Hypokalemia  #Hypomagnesemia    -replaced     #Transaminitis   -could be reactive 2/2 to infection   -continue to monitor  -CT abdomen without acute pathology   -much improved      #Asthma without AE   -not using inhalers at home      #FLORIAN   -home BIPAP/CPAP     #Chronic lower extremity edema  -on lasix prn at home   -compression on lower extremities      #Hx of VTE   [facility]      #Hx of sleeve gastrectomy      #Depression   -on cymbalta      #Morbid obesity   -recommend weight loss and dietary changes        PT/OT recommending SNF   Patient wants to talk to  in AM and decide      Note right lower extremity cellulitis makes patient higher risk for morbidity and mortality requiring testing and treatment. DVT Prophylaxis: Lovenox   Diet: ADULT DIET;  Regular  Code Status: Full Code    HÉCTOR Dietz  11/29/23  4:21 PM

## 2023-11-29 NOTE — PROGRESS NOTES
UE:  Not Tested  LE:  Not Tested    Eating:   Independent    Toileting: Mod A pericare in stance after urinating in toilet (for thoroughness)    Grooming/hygiene: Not Tested    Activity Tolerance:   Pt completed therapy session with fatigue  pain     BP (mmHg) HR (bpm) SpO2 (%) on RA Comments   Supine at rest       Seated at EOB       Standing       End of session         Positioning Needs:   Pt reclined in chair, alarm set, call light provided and all needs within reach . Ther Ex / Activities Initiated:   N/A    Patient/Family Education:   Pt educated on role of inpatient OT, plan of care, importance of continued activity, DC recommendations, Functional transfer/mobility safety, and Calling for assist with mobility. CHF Education  N/A    Assessment:  Pt seen for Occupational therapy treatment in acute care setting. Pt demonstrated decreased Activity tolerance, ADLs, IADLs, and Transfers. Pt functioning below baseline and will likely benefit from skilled occupational therapy services to maximize safety and independence. Recommending SNF upon discharge as patient functioning well below baseline, demonstrates good rehab potential and unable to return home due to limited or no family support, burden of care beyond caregiver ability, and home environment not conducive to patient recovery. Goal(s) : To be met in 3 Visits:  Bed to toilet/BSC:       SBA  Pt will complete 3/3 CHF goals     N/A    To be met in 5 Visits:  Supine to/from Sit in preparation for ADL task:   SBA  Toileting        SBA  Grooming       SBA  Upper Body Dressing:      SBA  Lower Body Dressing:      SBA  Pt to demonstrate UE therapeutic exs x 15 reps with minimal cues    Rehabilitation Potential: Good  Strengths for achieving goals include: Pt motivated, PLOF, Family Support, and Pt cooperative   Barriers to achieving goals include:  Complexity of condition    Plan:   To be seen 3-5 x/wk while in acute care setting for therapeutic exercises, bed mobility, transfers, family/patient education, ADL/IADL retraining, and energy conservation training.     Electronically signed by Aftab Peres OT on 11/29/2023 at 12:32 PM      If patient discharges from this facility prior to next visit, this note will serve as the Discharge Summary

## 2023-11-29 NOTE — PROGRESS NOTES
Inpatient Physical Therapy Treatment    Unit: 2 [id number]  Date:  11/29/2023  Patient Name:    Chiquita Nesbitt  Admitting diagnosis:  Cellulitis of right lower extremity [R80.932]  Admit Date:  11/26/2023  Precautions/Restrictions/WB Status/ Lines/ Wounds/ Oxygen: Fall risk, Bed/chair alarm, and Lines (midline)      Pt seen for cotreatment this date due to patient endurance, acute illness/injury, and limited functional status information    Treatment Time:  1500- 1527  Treatment Number:  2   Timed Code Treatment Minutes: 25 minutes  Total Treatment Minutes:  25  minutes    Patient Stated Goals for Therapy: \" to get back home \"          Discharge Recommendations: SNF  DME needs for discharge: Defer to facility       Therapy recommendation for EMS Transport: can transport by wheelchair    Therapy recommendations for staff:   Assist of 1 for ambulation with use of rolling walker (RW) and gait belt to/from [facility]  to/from chair    History of Present Illness:   per Dr Katie Piper H&P 11/26/23:  \"SUGKE Complaint: Pain and swelling right lower extremity     Catia Arciniega is a 59 y.o. female with a pmh of LLE DVT, LLE cellulitis and asthma who presents with Cellulitis of right lower extremity     Patient began developing fluid-filled blisters proximately 10 days ago first in her right groin and then further down on the right lower extremity. Subsequent to this right lower extremity became warm, swollen and erythematous. Patient denies fevers, chills, or sweats. Patient saw PCP 11/21 for the same issue. Right lower extremity venous Doppler was negative at that time. Patient was prescribed amoxicillin and doxycycline. Additionally her Lasix was increased to accommodate the increased swelling in the leg. Patient's liver function tests were elevated at that time and magnesium was low at 1.6. Of note 2 weeks ago before all this began patient received a Medrol Dosepak for neck pain.      Evaluation emergency room today shows patient to have a white count of 24,000 with 14% bands. Patient now shows evidence of acute renal failure. ER provider has initiated antibiotics, vancomycin and cefepime. \"    Home Health S4 Level Recommendation:  NA        AM-PAC Mobility Score       AM-PAC Inpatient Mobility without Stair Climbing Raw Score : 15      Subjective  Patient sitting on BSC with no family present. Pt agreeable to this PT session. Cognition    A&O x4   Able to follow 2 step commands    Pain   Yes  Location: R LE  Ratin /10  Pain Medicine Status: Received pain med prior to tx and No request made    Preadmission Environment:    Pt. Lives with spouse, works second shift; has 3 dogs  Home environment:  one story home  Steps to enter first floor:  3 steps to enter        Steps to second floor:  Full flight of 12-13 steps with handrail to basement (laundry)   Bathroom: tub/shower combo, shower bench, grab bars (suction cup)  and standard toilet  Equipment owned:  cane (SPC) and walker (Rollator)     Preadmission Status / PLOF:  History of falls            Yes, tripped over dog a week ago (no injury)  Pt. Able to drive          Yes  Pt Fully independent with ADL's         Yes  Pt. Required assistance from family for: Independent PTA    Pt sleeps in recliner. Pt. Fully independent for transfers and gait and walked with: Cane    Objective  Does this pt have an acute or acute on chronic diagnosis of CHF? No    Balance  Static Sitting:  Good  Supervision  Dynamic Sitting:  Good - ; Supervision   Comments: EOB and on commode    Static Standing: Fair +; CGA  Dynamic Standing: Fair ; CGA  Comments: RW and gait belt     Posture  Seated: WNL  Standing:  Forward flexed at hips/trunk    Bed Mobility   Supine to Sit:    Not Tested  Sit to Supine:   Min A  for R LE  Rolling:   Not Tested   Scooting in sitting: Supervision with assist for R LE  Scooting in supine:  Not Tested   Bridging:  Not Tested    Transfer Training     Sit to stand:   CGA   Stand to

## 2023-11-29 NOTE — CARE COORDINATION
Provided list of SNFs to consider. Will review list with  and we will talk in the morning     11/30-met with patient and she is interested in [facility].  [facility] is currently reviewing

## 2023-11-29 NOTE — PROGRESS NOTES
[facility] Infectious Disease Progress Note      Adriana Nesbitt     : 1965    DATE OF VISIT:  2023  DATE OF ADMISSION:  2023       Subjective:     Adriana Nesbitt is a 59 y.o. female whom I've been seeing for a RLE bullous cellulitis, most likely beta-Strep, with improving severe sepsis. Since I last saw her, she's feeling a bit better in terms of pain, and a bit more rested. No F/C/D, mouth still feels dry, brief nausea yesterday, no diarrhea, no midline pain. RLE drainage maybe slowing down. Had some PT and OT yesterday, got up to chair, walked with her walker in the room, and she tells me they're recommending short-term rehab as of now. Ms. Mohan Felix has a past medical history of Acute deep vein thrombosis (DVT) of left lower extremity ([address]), Acute renal failure (ARF) ([address]), Family history of factor V deficiency, Hypertension, Incarcerated ventral hernia, MRSA infection of surgical site, Perforated appendicitis, and Tobacco use.     Current Facility-Administered Medications: cefTRIAXone (ROCEPHIN) 2,000 mg in sodium chloride 0.9 % 50 mL IVPB (mini-bag), 2,000 mg, IntraVENous, Q24H  clindamycin (CLEOCIN) 900 mg in dextrose 5 % 50 mL IVPB, 900 mg, IntraVENous, Q8H  DULoxetine (CYMBALTA) extended release capsule 120 mg, 120 mg, Oral, Daily  gabapentin (NEURONTIN) capsule 400 mg, 400 mg, Oral, TID  pantoprazole (PROTONIX) tablet 40 mg, 40 mg, Oral, QAM AC  oxybutynin (DITROPAN) tablet 5 mg, 5 mg, Oral, BID  tiZANidine (ZANAFLEX) tablet 8 mg, 8 mg, Oral, Nightly  sodium chloride flush 0.9 % injection 5-40 mL, 5-40 mL, IntraVENous, 2 times per day  sodium chloride flush 0.9 % injection 5-40 mL, 5-40 mL, IntraVENous, PRN  0.9 % sodium chloride infusion, , IntraVENous, PRN  potassium chloride (KLOR-CON M) extended release tablet 40 mEq, 40 mEq, Oral, PRN **OR** potassium bicarb-citric acid (EFFER-K) effervescent tablet 40 mEq, 40 mEq, Oral, PRN **OR** potassium chloride 10 mEq/100 mL IVPB with acute renal failure without septic shock (HCC) A41.9, R65.20, N17.9    Elevated liver function tests R79.89    Ulcer of right leg, limited to breakdown of skin (formerly [facility]) L97.911    Anxiety disorder F41.9    Delayed gastric emptying K30    Moderate recurrent major depression (formerly [facility]) F33.1    Rotator cuff tear arthropathy of both shoulders M75.101, M12.811, M12.812, M75.102    Spinal stenosis of lumbar region at multiple levels M48.061    DDD (degenerative disc disease), cervical M50.30    Scoliosis M41.9     Assessment of today's active condition(s):      --          Background of morbid obesity, prior gastric sleeve, severe OA, BL THRs and TKRs, also a prior left lower extremity DVT, a couple of prior mild episodes of LLE cellulitis. --          About a week ago, spontaneous onset of right inguinal pain (I think lymphadenitis), followed by RLE swelling, then bulla formation, pain, redness, serous drainage. No acute DVT there, but symptoms persisted and in some ways worsened despite oral Abx. I don't think it's that the pathogen wasn't being covered, more likely that a standard oral dose of amoxicillin was not able to get ahead of a high-inoculum Strep infection, especially in the face of this much edema. The Abx might have helped her not to feel more systemically ill this past week, but she still did have an episode of severe sepsis (leukocytosis and bandemia, brief tachycardia, ANTHONY). The story does seem pretty classic for Strep (sudden onset, adenitis first, very widespread cellulitis, the bullous changes). Seems a bit better to me both yesterday and today. --          I think a good degree of her ANTHONY was also from third-spacing of what is likely at least 10-15 # of fluid in that right lower extremity. --          Elevated LFTs could also just be from sepsis (+/- always the possibility of medication effect, perhaps a degree of NAFLD?).  Improving.     -- Some muscle weakness and deconditioning from the

## 2023-11-30 PROBLEM — I89.0 LYMPHEDEMA OF RIGHT LOWER EXTREMITY: Status: ACTIVE | Noted: 2023-11-30

## 2023-11-30 LAB
ANION GAP SERPL CALCULATED.3IONS-SCNC: 9 MMOL/L (ref 3–16)
BACTERIA BLD CULT ORG #2: NORMAL
BACTERIA BLD CULT: NORMAL
BASOPHILS # BLD: 0.1 K/UL (ref 0–0.2)
BASOPHILS NFR BLD: 0.5 %
BUN SERPL-MCNC: 8 MG/DL (ref 7–20)
CALCIUM SERPL-MCNC: 8.5 MG/DL (ref 8.3–10.6)
CHLORIDE SERPL-SCNC: 100 MMOL/L (ref 99–110)
CO2 SERPL-SCNC: 23 MMOL/L (ref 21–32)
CREAT SERPL-MCNC: <0.5 MG/DL (ref 0.6–1.2)
DEPRECATED RDW RBC AUTO: 17.6 % (ref 12.4–15.4)
EOSINOPHIL # BLD: 0.2 K/UL (ref 0–0.6)
EOSINOPHIL NFR BLD: 1.5 %
GFR SERPLBLD CREATININE-BSD FMLA CKD-EPI: >60 ML/MIN/{1.73_M2}
GLUCOSE SERPL-MCNC: 93 MG/DL (ref 70–99)
HCT VFR BLD AUTO: 35.1 % (ref 36–48)
HGB BLD-MCNC: 10.7 G/DL (ref 12–16)
LYMPHOCYTES # BLD: 1.8 K/UL (ref 1–5.1)
LYMPHOCYTES NFR BLD: 14.8 %
MCH RBC QN AUTO: 29 PG (ref 26–34)
MCHC RBC AUTO-ENTMCNC: 30.3 G/DL (ref 31–36)
MCV RBC AUTO: 95.5 FL (ref 80–100)
MONOCYTES # BLD: 1.1 K/UL (ref 0–1.3)
MONOCYTES NFR BLD: 9.1 %
NEUTROPHILS # BLD: 9.2 K/UL (ref 1.7–7.7)
NEUTROPHILS NFR BLD: 74.1 %
PLATELET # BLD AUTO: 431 K/UL (ref 135–450)
PMV BLD AUTO: 6.9 FL (ref 5–10.5)
POTASSIUM SERPL-SCNC: 3.8 MMOL/L (ref 3.5–5.1)
RBC # BLD AUTO: 3.68 M/UL (ref 4–5.2)
SODIUM SERPL-SCNC: 132 MMOL/L (ref 136–145)
WBC # BLD AUTO: 12.4 K/UL (ref 4–11)

## 2023-11-30 PROCEDURE — 29581 APPL MULTLAYER CMPRN SYS LEG: CPT | Performed by: INTERNAL MEDICINE

## 2023-11-30 PROCEDURE — 6360000002 HC RX W HCPCS: Performed by: INTERNAL MEDICINE

## 2023-11-30 PROCEDURE — 99232 SBSQ HOSP IP/OBS MODERATE 35: CPT | Performed by: INTERNAL MEDICINE

## 2023-11-30 PROCEDURE — 6370000000 HC RX 637 (ALT 250 FOR IP): Performed by: INTERNAL MEDICINE

## 2023-11-30 PROCEDURE — 85025 COMPLETE CBC W/AUTO DIFF WBC: CPT

## 2023-11-30 PROCEDURE — 1200000000 HC SEMI PRIVATE

## 2023-11-30 PROCEDURE — 80048 BASIC METABOLIC PNL TOTAL CA: CPT

## 2023-11-30 PROCEDURE — 36415 COLL VENOUS BLD VENIPUNCTURE: CPT

## 2023-11-30 PROCEDURE — 2580000003 HC RX 258: Performed by: INTERNAL MEDICINE

## 2023-11-30 PROCEDURE — 99232 SBSQ HOSP IP/OBS MODERATE 35: CPT

## 2023-11-30 RX ORDER — OXYCODONE HYDROCHLORIDE AND ACETAMINOPHEN 5; 325 MG/1; MG/1
1 TABLET ORAL EVERY 6 HOURS PRN
Qty: 12 TABLET | Refills: 0 | Status: SHIPPED | OUTPATIENT
Start: 2023-11-30 | End: 2023-12-03

## 2023-11-30 RX ORDER — CLINDAMYCIN HYDROCHLORIDE 150 MG/1
300 CAPSULE ORAL EVERY 8 HOURS SCHEDULED
Status: DISCONTINUED | OUTPATIENT
Start: 2023-11-30 | End: 2023-12-01 | Stop reason: HOSPADM

## 2023-11-30 RX ORDER — AMOXICILLIN 500 MG/1
1000 CAPSULE ORAL EVERY 8 HOURS SCHEDULED
Status: DISCONTINUED | OUTPATIENT
Start: 2023-11-30 | End: 2023-12-01 | Stop reason: HOSPADM

## 2023-11-30 RX ORDER — AMOXICILLIN 500 MG/1
1000 CAPSULE ORAL 3 TIMES DAILY
Qty: 42 CAPSULE | Refills: 0 | DISCHARGE
Start: 2023-11-30 | End: 2023-12-07

## 2023-11-30 RX ORDER — CLINDAMYCIN HYDROCHLORIDE 300 MG/1
300 CAPSULE ORAL 3 TIMES DAILY
Qty: 21 CAPSULE | Refills: 0 | DISCHARGE
Start: 2023-11-30 | End: 2023-12-07

## 2023-11-30 RX ADMIN — DULOXETINE HYDROCHLORIDE 120 MG: 60 CAPSULE, DELAYED RELEASE ORAL at 09:31

## 2023-11-30 RX ADMIN — OXYBUTYNIN CHLORIDE 5 MG: 5 TABLET ORAL at 09:31

## 2023-11-30 RX ADMIN — CEFTRIAXONE SODIUM 2000 MG: 2 INJECTION, POWDER, FOR SOLUTION INTRAMUSCULAR; INTRAVENOUS at 15:52

## 2023-11-30 RX ADMIN — TIZANIDINE 8 MG: 4 TABLET ORAL at 21:21

## 2023-11-30 RX ADMIN — CLINDAMYCIN IN 5 PERCENT DEXTROSE 900 MG: 18 INJECTION, SOLUTION INTRAVENOUS at 11:22

## 2023-11-30 RX ADMIN — OXYCODONE AND ACETAMINOPHEN 2 TABLET: 5; 325 TABLET ORAL at 12:23

## 2023-11-30 RX ADMIN — CLINDAMYCIN HYDROCHLORIDE 300 MG: 150 CAPSULE ORAL at 23:24

## 2023-11-30 RX ADMIN — CLINDAMYCIN IN 5 PERCENT DEXTROSE 900 MG: 18 INJECTION, SOLUTION INTRAVENOUS at 02:49

## 2023-11-30 RX ADMIN — GABAPENTIN 400 MG: 400 CAPSULE ORAL at 21:21

## 2023-11-30 RX ADMIN — GABAPENTIN 400 MG: 400 CAPSULE ORAL at 14:36

## 2023-11-30 RX ADMIN — OXYCODONE AND ACETAMINOPHEN 2 TABLET: 5; 325 TABLET ORAL at 19:47

## 2023-11-30 RX ADMIN — ENOXAPARIN SODIUM 30 MG: 100 INJECTION SUBCUTANEOUS at 21:20

## 2023-11-30 RX ADMIN — ENOXAPARIN SODIUM 30 MG: 100 INJECTION SUBCUTANEOUS at 09:31

## 2023-11-30 RX ADMIN — MORPHINE SULFATE 1 MG: 2 INJECTION, SOLUTION INTRAMUSCULAR; INTRAVENOUS at 09:29

## 2023-11-30 RX ADMIN — AMOXICILLIN 1000 MG: 500 CAPSULE ORAL at 21:21

## 2023-11-30 RX ADMIN — OXYBUTYNIN CHLORIDE 5 MG: 5 TABLET ORAL at 21:21

## 2023-11-30 RX ADMIN — GABAPENTIN 400 MG: 400 CAPSULE ORAL at 09:31

## 2023-11-30 RX ADMIN — OXYCODONE AND ACETAMINOPHEN 2 TABLET: 5; 325 TABLET ORAL at 05:20

## 2023-11-30 RX ADMIN — PANTOPRAZOLE SODIUM 40 MG: 40 TABLET, DELAYED RELEASE ORAL at 05:20

## 2023-11-30 ASSESSMENT — PAIN SCALES - GENERAL
PAINLEVEL_OUTOF10: 0
PAINLEVEL_OUTOF10: 7
PAINLEVEL_OUTOF10: 8
PAINLEVEL_OUTOF10: 8
PAINLEVEL_OUTOF10: 0
PAINLEVEL_OUTOF10: 8

## 2023-11-30 ASSESSMENT — PAIN DESCRIPTION - FREQUENCY
FREQUENCY: CONTINUOUS
FREQUENCY: INTERMITTENT

## 2023-11-30 ASSESSMENT — PAIN DESCRIPTION - DESCRIPTORS
DESCRIPTORS: THROBBING
DESCRIPTORS: SHARP
DESCRIPTORS: ACHING;SHARP
DESCRIPTORS: SHARP

## 2023-11-30 ASSESSMENT — PAIN DESCRIPTION - LOCATION
LOCATION: LEG

## 2023-11-30 ASSESSMENT — PAIN DESCRIPTION - PAIN TYPE
TYPE: ACUTE PAIN
TYPE: ACUTE PAIN

## 2023-11-30 ASSESSMENT — PAIN DESCRIPTION - ORIENTATION
ORIENTATION: RIGHT
ORIENTATION: RIGHT
ORIENTATION: RIGHT;LOWER
ORIENTATION: RIGHT

## 2023-11-30 ASSESSMENT — PAIN DESCRIPTION - ONSET
ONSET: ON-GOING
ONSET: ON-GOING

## 2023-11-30 NOTE — PROGRESS NOTES
Shift assessment complete, see flow sheet, IV infusing without difficulty at this time. Pt VSS. Bed in lowest position, bed alarm activated for pt safety,assessed for bathroom needs at bedside commode at this time, Call light and bed side table within reach, pt educated on use of call light if needing assist., pt verbalized understanding, denies further questions at this time. Denies any needs at this time, continue to monitor.

## 2023-11-30 NOTE — DISCHARGE SUMMARY
Name:  Washington Leonardo  Room:  0201/0201-01  MRN:    [id number]    Discharge Summary      This discharge summary is in conjunction with a complete physical exam done on the day of discharge. Discharging Provider: Magdalena Lafleur PA-C  Discharging Attending Physician: Dr. Sandy Ace: 11/26/2023  Discharge:  11/30/2023    HPI taken from admission H&P:    Pain and swelling right lower extremity     Althea Reina is a 59 y.o. female with a pmh of LLE DVT, LLE cellulitis and asthma who presents with Cellulitis of right lower extremity     Patient began developing fluid-filled blisters proximately 10 days ago first in her right groin and then further down on the right lower extremity. Subsequent to this right lower extremity became warm, swollen and erythematous. Patient denies fevers, chills, or sweats. Patient saw PCP 11/21 for the same issue. Right lower extremity venous Doppler was negative at that time. Patient was prescribed amoxicillin and doxycycline. Additionally her Lasix was increased to accommodate the increased swelling in the leg. Patient's liver function tests were elevated at that time and magnesium was low at 1.6. Of note 2 weeks ago before all this began patient received a Medrol Dosepak for neck pain. Evaluation emergency room today shows patient to have a white count of 24,000 with 14% bands. Patient now shows evidence of acute renal failure. ER provider has initiated antibiotics, vancomycin and cefepime.         Diagnoses this Admission and Hospital Course   #Cellulitis of right lower extremity  #Leukocytosis   -blood cultures pending--> NGTD  -WBC trending down   -CT scan as above   -IV vancomycin and cefepime   -morphine prn --> percocet prn   -had lower extremity dopplers 11/21/23 that were negative for DVT  --> antibiotics changed to rocephin and clindamycin D#4   -Dr Vicenta Santos and wound care consulted   -has improved today   -WBC still elevated but trending down slowly   -dc to

## 2023-11-30 NOTE — FLOWSHEET NOTE
11/29/23 2224   Pain Assessment   Pain Assessment 0-10   Pain Level 7   Patient's Stated Pain Goal 0 - No pain   Pain Location Leg   Pain Orientation Right   Pain Descriptors Sharp   Functional Pain Assessment Prevents or interferes some active activities and ADLs   Pain Type Acute pain   Pain Radiating Towards n/a   Pain Frequency Intermittent   Pain Onset Gradual   Non-Pharmaceutical Pain Intervention(s) Repositioned;Elevation   Opioid-Induced Sedation   POSS Score 1     Pt C/O pain gave percocet per PRN orders

## 2023-11-30 NOTE — PROGRESS NOTES
Physical Therapy    Pt attempted to see pt this AM. Pt refusing at this time stating she just had her left wrapped and she is not up for moving right now. Will continue to follow and re attempt as appropriate and schedule permits.      Thanks  Roxi Gutierrez [place], DPT PT [id number]

## 2023-11-30 NOTE — DISCHARGE INSTR - COC
a wound-care / ID follow-up appointment time for next week. Electronically signed by Roslyn Dueñas MD on 11/30/2023 at 11:15 AM.     Physician Certification: I certify the above information and transfer of Maria D Clifton  is necessary for the continuing treatment of the diagnosis listed and that she requires [address] for greater 30 days.      Update Admission H&P: No change in H&P    PHYSICIAN SIGNATURE:  Electronically signed by HÉCTOR Ozuna on 11/30/23 at 11:41 AM EST

## 2023-11-30 NOTE — PROGRESS NOTES
Bedside Mobility Assessment Tool (BMAT):     Assessment Level 1- Sit and Shake    1. From a semi-reclined position, ask patient to sit up and rotate to a seated position at the side of the bed. Can use the bedrail. 2. Ask patient to reach out and grab your hand and shake making sure patient reaches across his/her midline. Fail- Patient is unable to perform tasks, patient is MOBILITY LEVEL 1. Assessment Level 2- Stretch and Point   1. With patient in seated position at the side of the bed, have patient place both feet on the floor (or stool) with knees no higher than hips. 2. Ask patient to stretch one leg and straighten the knee, then bend the ankle/flex and point the toes. If appropriate, repeat with the other leg. Pass- Patient is able to demonstrate appropriate quad strength on intended weight bearing limb(s). Move onto Assessment Level 3. Assessment Level 3- Stand   1. Ask patient to elevate off the bed or chair (seated to standing) using an assistive device (cane, bedrail). 2. Patient should be able to raise buttocks off be and hold for a count of five. May repeat once. Pass- Patient maintains standing stability for at least 5 seconds, proceed to assessment level 4. Assessment Level 4- Walk   1. Ask patient to march in place at bedside. 2. Then ask patient to advance step and return each foot. Some medical conditions may render a patient from stepping backwards, use your best clinical judgement. Pass- Patient demonstrates balance while shifting weight and ability to step, takes independent steps, does not use assistive device patient is MOBILITY LEVEL 4.       Mobility Level- 3

## 2023-12-01 VITALS
BODY MASS INDEX: 51.91 KG/M2 | HEART RATE: 92 BPM | WEIGHT: 293 LBS | OXYGEN SATURATION: 95 % | TEMPERATURE: 98.7 F | RESPIRATION RATE: 16 BRPM | HEIGHT: 63 IN | DIASTOLIC BLOOD PRESSURE: 57 MMHG | SYSTOLIC BLOOD PRESSURE: 119 MMHG

## 2023-12-01 LAB
ALBUMIN SERPL-MCNC: 2.4 G/DL (ref 3.4–5)
ALP SERPL-CCNC: 162 U/L (ref 40–129)
ALT SERPL-CCNC: 18 U/L (ref 10–40)
AST SERPL-CCNC: 13 U/L (ref 15–37)
BILIRUB DIRECT SERPL-MCNC: <0.2 MG/DL (ref 0–0.3)
BILIRUB INDIRECT SERPL-MCNC: ABNORMAL MG/DL (ref 0–1)
BILIRUB SERPL-MCNC: 0.3 MG/DL (ref 0–1)
PROT SERPL-MCNC: 6.1 G/DL (ref 6.4–8.2)

## 2023-12-01 PROCEDURE — 99238 HOSP IP/OBS DSCHRG MGMT 30/<: CPT

## 2023-12-01 PROCEDURE — 6370000000 HC RX 637 (ALT 250 FOR IP): Performed by: INTERNAL MEDICINE

## 2023-12-01 PROCEDURE — 99232 SBSQ HOSP IP/OBS MODERATE 35: CPT | Performed by: INTERNAL MEDICINE

## 2023-12-01 PROCEDURE — 80076 HEPATIC FUNCTION PANEL: CPT

## 2023-12-01 PROCEDURE — 6360000002 HC RX W HCPCS: Performed by: INTERNAL MEDICINE

## 2023-12-01 PROCEDURE — 36415 COLL VENOUS BLD VENIPUNCTURE: CPT

## 2023-12-01 RX ORDER — GABAPENTIN 300 MG/1
600 CAPSULE ORAL 3 TIMES DAILY
Qty: 180 CAPSULE | Refills: 0 | DISCHARGE
Start: 2023-12-01 | End: 2023-12-31

## 2023-12-01 RX ORDER — GABAPENTIN 300 MG/1
600 CAPSULE ORAL 3 TIMES DAILY
Status: DISCONTINUED | OUTPATIENT
Start: 2023-12-01 | End: 2023-12-01 | Stop reason: HOSPADM

## 2023-12-01 RX ORDER — CALCIUM CARBONATE 500 MG/1
500 TABLET, CHEWABLE ORAL 3 TIMES DAILY PRN
Status: DISCONTINUED | OUTPATIENT
Start: 2023-12-01 | End: 2023-12-01 | Stop reason: HOSPADM

## 2023-12-01 RX ADMIN — OXYBUTYNIN CHLORIDE 5 MG: 5 TABLET ORAL at 08:41

## 2023-12-01 RX ADMIN — ENOXAPARIN SODIUM 30 MG: 100 INJECTION SUBCUTANEOUS at 08:41

## 2023-12-01 RX ADMIN — AMOXICILLIN 1000 MG: 500 CAPSULE ORAL at 06:05

## 2023-12-01 RX ADMIN — OXYCODONE AND ACETAMINOPHEN 2 TABLET: 5; 325 TABLET ORAL at 05:48

## 2023-12-01 RX ADMIN — DULOXETINE HYDROCHLORIDE 120 MG: 60 CAPSULE, DELAYED RELEASE ORAL at 08:40

## 2023-12-01 RX ADMIN — CLINDAMYCIN HYDROCHLORIDE 300 MG: 150 CAPSULE ORAL at 05:49

## 2023-12-01 RX ADMIN — GABAPENTIN 600 MG: 300 CAPSULE ORAL at 08:41

## 2023-12-01 RX ADMIN — PANTOPRAZOLE SODIUM 40 MG: 40 TABLET, DELAYED RELEASE ORAL at 05:49

## 2023-12-01 RX ADMIN — CALCIUM CARBONATE 500 MG: 500 TABLET, CHEWABLE ORAL at 12:10

## 2023-12-01 RX ADMIN — OXYCODONE AND ACETAMINOPHEN 2 TABLET: 5; 325 TABLET ORAL at 13:37

## 2023-12-01 ASSESSMENT — PAIN - FUNCTIONAL ASSESSMENT
PAIN_FUNCTIONAL_ASSESSMENT: PREVENTS OR INTERFERES SOME ACTIVE ACTIVITIES AND ADLS
PAIN_FUNCTIONAL_ASSESSMENT: PREVENTS OR INTERFERES SOME ACTIVE ACTIVITIES AND ADLS

## 2023-12-01 ASSESSMENT — PAIN DESCRIPTION - DESCRIPTORS
DESCRIPTORS: STABBING;SHARP
DESCRIPTORS: SHARP

## 2023-12-01 ASSESSMENT — PAIN SCALES - GENERAL
PAINLEVEL_OUTOF10: 7
PAINLEVEL_OUTOF10: 7
PAINLEVEL_OUTOF10: 6

## 2023-12-01 ASSESSMENT — PAIN DESCRIPTION - ORIENTATION
ORIENTATION: RIGHT
ORIENTATION: RIGHT

## 2023-12-01 ASSESSMENT — PAIN DESCRIPTION - LOCATION
LOCATION: LEG
LOCATION: LEG

## 2023-12-01 ASSESSMENT — PAIN SCALES - WONG BAKER
WONGBAKER_NUMERICALRESPONSE: 0
WONGBAKER_NUMERICALRESPONSE: 0

## 2023-12-01 ASSESSMENT — PAIN DESCRIPTION - FREQUENCY: FREQUENCY: CONTINUOUS

## 2023-12-01 ASSESSMENT — PAIN DESCRIPTION - ONSET: ONSET: ON-GOING

## 2023-12-01 NOTE — DISCHARGE SUMMARY
Name:  Ezio Prieto  Room:  0201/0201-01  MRN:    [id number]    Discharge Summary      This discharge summary is in conjunction with a complete physical exam done on the day of discharge. Discharging Provider: Dario Gómze PA-C  Discharging Attending Physician: Dr. Venkat Cash: 11/26/2023  Discharge:  12/01/2023    HPI taken from admission H&P:    Pain and swelling right lower extremity     Felicity Guzman is a 59 y.o. female with a pmh of LLE DVT, LLE cellulitis and asthma who presents with Cellulitis of right lower extremity     Patient began developing fluid-filled blisters proximately 10 days ago first in her right groin and then further down on the right lower extremity. Subsequent to this right lower extremity became warm, swollen and erythematous. Patient denies fevers, chills, or sweats. Patient saw PCP 11/21 for the same issue. Right lower extremity venous Doppler was negative at that time. Patient was prescribed amoxicillin and doxycycline. Additionally her Lasix was increased to accommodate the increased swelling in the leg. Patient's liver function tests were elevated at that time and magnesium was low at 1.6. Of note 2 weeks ago before all this began patient received a Medrol Dosepak for neck pain. Evaluation emergency room today shows patient to have a white count of 24,000 with 14% bands. Patient now shows evidence of acute renal failure. ER provider has initiated antibiotics, vancomycin and cefepime.      Diagnoses this Admission and Hospital Course   #Cellulitis of right lower extremity  #Leukocytosis   -blood cultures pending--> NGTD  -WBC trending down   -CT scan as above   -IV vancomycin and cefepime   -morphine prn --> percocet prn   -had lower extremity dopplers 11/21/23 that were negative for DVT  --> antibiotics changed to rocephin and clindamycin D#4   -Dr Capo Humphrey and wound care consulted   -has improved today   -WBC still elevated but trending down slowly   -dc to SNF fluticasone-salmeterol 250-50 MCG/DOSE Aepb  Commonly known as: ADVAIR               Where to Get Your Medications        You can get these medications from any pharmacy    Bring a paper prescription for each of these medications  oxyCODONE-acetaminophen 5-325 MG per tablet       Information about where to get these medications is not yet available    Ask your nurse or doctor about these medications  amoxicillin 500 MG capsule  clindamycin 300 MG capsule  gabapentin 300 MG capsule           Discharged in stable condition to [facility]     Follow Up:   Follow up with  physician at [place], [place]  12/01/23  10:23 AM

## 2023-12-01 NOTE — DISCHARGE INSTRUCTIONS
Your information:  Name: Cedric Pittman  : 1965    Your instructions: Follow instructions below. What to do after you leave the hospital:    Recommended diet: regular diet    Recommended activity: activity as tolerated        The following personal items were collected during your admission and were returned to you:    Belongings  Dental Appliances: None  Vision - Corrective Lenses: Eyeglasses  Hearing Aid: None  Clothing: Sent home  Jewelry: Watch, Ring  Electronic Devices: Cell Phone,   Weapons (Notify Protective Services/Security): None  Home Medications: None  Valuables Given To: Patient  Provide Name(s) of Who Valuable(s) Were Given To: patient    Information obtained by:  By signing below, I understand that if any problems occur once I leave the hospital I am to contact MD.  I understand and acknowledge receipt of the instructions indicated above.

## 2023-12-01 NOTE — PROGRESS NOTES
[facility] Infectious Disease Progress Note      Gumaro Nesbitt     : 1965    DATE OF VISIT:  2023  DATE OF ADMISSION:  2023       Subjective:     Gumaro Nesbitt is a 59 y.o. female whom I've been seeing for a right lower extremity bullous cellulitis (presumed Strep), with a number of sizeable but superficial thigh and lower leg ulcers that started from those bullae. Since I last saw her, she's perhaps having more pain than a couple of days ago, more the lower leg than the thigh now; some of it sounds aching and heavy when she's up and walking, related to venous HTN and edema and compression, but then it sounds like there's also a sudden, sharp, more neuropathic sounding pain that happens at times. Midline catheter started leaking late yesterday, so was removed, Abx converted to oral.     Ms. Eunice Larson has a past medical history of Acute deep vein thrombosis (DVT) of left lower extremity ([address]), Acute renal failure (ARF) ([address]), Family history of factor V deficiency, Hypertension, Incarcerated ventral hernia, MRSA infection of surgical site, Perforated appendicitis, and Tobacco use.     Current Facility-Administered Medications: amoxicillin (AMOXIL) capsule 1,000 mg, 1,000 mg, Oral, 3 times per day  clindamycin (CLEOCIN) capsule 300 mg, 300 mg, Oral, 3 times per day  oxyCODONE-acetaminophen (PERCOCET) 5-325 MG per tablet 1 tablet, 1 tablet, Oral, Q4H PRN **OR** oxyCODONE-acetaminophen (PERCOCET) 5-325 MG per tablet 2 tablet, 2 tablet, Oral, Q4H PRN  DULoxetine (CYMBALTA) extended release capsule 120 mg, 120 mg, Oral, Daily  gabapentin (NEURONTIN) capsule 400 mg, 400 mg, Oral, TID  pantoprazole (PROTONIX) tablet 40 mg, 40 mg, Oral, QAM AC  oxybutynin (DITROPAN) tablet 5 mg, 5 mg, Oral, BID  tiZANidine (ZANAFLEX) tablet 8 mg, 8 mg, Oral, Nightly  sodium chloride flush 0.9 % injection 5-40 mL, 5-40 mL, IntraVENous, 2 times per day  sodium chloride flush 0.9 % injection 5-40 mL, 5-40 mL, neuropathic pain. Treatment recs:     Fine to just continue these oral Abx. Watch for thrush, allergy, Cdiff, etc.     Keep lower leg wrap and dressing in place unless her pain is more severe, or it slides down significantly, or it strikes through with a large amount of drainage. Thigh dressings might need to be changed if they are soaked, or very loose (Calmoseptine + Xeroform + bordered absorbent dressing, or some similar combination). Continue PT, await rehab placement. Increase gabapentin to 600 TID, at least in the short-term. Other analgesics PRN. F/U with me in the wound care center next week. D/W her 2W RN.  ___________________    I was not necessarily going to be in the hospital over the weekend to see Ms. Nesbitt. Will keep an eye on Epic from home.  Please call or text if there are any urgent concerns over the weekend with her clinical course, test results, etc.    Electronically signed by Isaias Aquino MD on 12/1/2023 at 6:39 AM.

## 2023-12-01 NOTE — PLAN OF CARE
Problem: ABCDS Injury Assessment  Goal: Absence of physical injury  Outcome: Progressing     Problem: Skin/Tissue Integrity - Adult  Goal: Skin integrity remains intact  Outcome: Progressing     Problem: Skin/Tissue Integrity - Adult  Goal: Incisions, wounds, or drain sites healing without S/S of infection  Outcome: Progressing     Problem: Infection - Adult  Goal: Absence of infection at discharge  Outcome: Progressing     Problem: Infection - Adult  Goal: Absence of infection during hospitalization  Outcome: Progressing     Problem: Skin/Tissue Integrity  Goal: Absence of new skin breakdown  Description: 1. Monitor for areas of redness and/or skin breakdown  2. Assess vascular access sites hourly  3. Every 4-6 hours minimum:  Change oxygen saturation probe site  4. Every 4-6 hours:  If on nasal continuous positive airway pressure, respiratory therapy assess nares and determine need for appliance change or resting period.   Outcome: Progressing
Problem: Discharge Planning  Goal: Discharge to home or other facility with appropriate resources  11/28/2023 1126 by Mary Garcia RN  Outcome: Progressing  11/28/2023 1125 by Mary Garcia RN  Flowsheets (Taken 11/28/2023 1125)  Discharge to home or other facility with appropriate resources: Identify barriers to discharge with patient and caregiver  11/28/2023 1124 by Mary Garcia RN  Outcome: Progressing  11/28/2023 0408 by Emma Finch RN  Outcome: Progressing     Problem: Pain  Goal: Verbalizes/displays adequate comfort level or baseline comfort level  11/28/2023 1126 by Mary Garcia RN  Outcome: Progressing  Flowsheets (Taken 11/28/2023 1126)  Verbalizes/displays adequate comfort level or baseline comfort level:   Encourage patient to monitor pain and request assistance   Assess pain using appropriate pain scale   Administer analgesics based on type and severity of pain and evaluate response   Implement non-pharmacological measures as appropriate and evaluate response   Consider cultural and social influences on pain and pain management   Notify Licensed Independent Practitioner if interventions unsuccessful or patient reports new pain  11/28/2023 1124 by Mary Garcia RN  Outcome: Progressing  11/28/2023 0408 by Emma Finch RN  Outcome: Progressing     Problem: Skin/Tissue Integrity - Adult  Goal: Skin integrity remains intact  Outcome: Progressing     Problem: ABCDS Injury Assessment  Goal: Absence of physical injury  11/28/2023 1126 by Mary Garcia RN  Outcome: Progressing  11/28/2023 0408 by Emma Finch RN  Outcome: Progressing     Problem: Infection - Adult  Goal: Absence of infection at discharge  Outcome: Progressing
Problem: Discharge Planning  Goal: Discharge to home or other facility with appropriate resources  11/30/2023 1356 by Ahmet Busch RN  Outcome: Progressing  Flowsheets (Taken 11/30/2023 1125)  Discharge to home or other facility with appropriate resources: Arrange for needed discharge resources and transportation as appropriate  11/30/2023 0434 by Clovis Gorman RN  Outcome: Progressing     Problem: Pain  Goal: Verbalizes/displays adequate comfort level or baseline comfort level  11/30/2023 0434 by Clovis Gorman RN  Outcome: Progressing     Problem: Safety - Adult  Goal: Free from fall injury  11/30/2023 1356 by Ahmet Busch RN  Outcome: Progressing  11/30/2023 0434 by Clovis Gorman RN  Outcome: Progressing     Problem: Chronic Conditions and Co-morbidities  Goal: Patient's chronic conditions and co-morbidity symptoms are monitored and maintained or improved  Recent Flowsheet Documentation  Taken 11/30/2023 1125 by Ahmet Busch RN  Care Plan - Patient's Chronic Conditions and Co-Morbidity Symptoms are Monitored and Maintained or Improved: Monitor and assess patient's chronic conditions and comorbid symptoms for stability, deterioration, or improvement  11/30/2023 0434 by Clovsi Gorman RN  Outcome: Progressing     Problem: Infection - Adult  Goal: Absence of infection at discharge  Recent Flowsheet Documentation  Taken 11/30/2023 1125 by Ahmet Busch RN  Absence of infection at discharge: Assess and monitor for signs and symptoms of infection  11/30/2023 0434 by Clovis Gorman RN  Outcome: Progressing     Problem: Skin/Tissue Integrity  Goal: Absence of new skin breakdown  Description: 1. Monitor for areas of redness and/or skin breakdown  2. Assess vascular access sites hourly  3. Every 4-6 hours minimum:  Change oxygen saturation probe site  4.   Every 4-6 hours:  If on nasal continuous positive airway pressure, respiratory therapy assess nares and
Problem: Discharge Planning  Goal: Discharge to home or other facility with appropriate resources  Outcome: Progressing     Problem: Pain  Goal: Verbalizes/displays adequate comfort level or baseline comfort level  Outcome: Progressing     Problem: Chronic Conditions and Co-morbidities  Goal: Patient's chronic conditions and co-morbidity symptoms are monitored and maintained or improved  Outcome: Progressing     Problem: Safety - Adult  Goal: Free from fall injury  Outcome: Progressing     Problem: ABCDS Injury Assessment  Goal: Absence of physical injury  Outcome: Progressing
Problem: Discharge Planning  Goal: Discharge to home or other facility with appropriate resources  Outcome: Progressing     Problem: Pain  Goal: Verbalizes/displays adequate comfort level or baseline comfort level  Outcome: Progressing     Problem: Chronic Conditions and Co-morbidities  Goal: Patient's chronic conditions and co-morbidity symptoms are monitored and maintained or improved  Outcome: Progressing     Problem: Safety - Adult  Goal: Free from fall injury  Outcome: Progressing     Problem: ABCDS Injury Assessment  Goal: Absence of physical injury  Outcome: Progressing     Problem: Skin/Tissue Integrity - Adult  Goal: Skin integrity remains intact  Outcome: Progressing  Goal: Incisions, wounds, or drain sites healing without S/S of infection  Outcome: Progressing     Problem: Infection - Adult  Goal: Absence of infection at discharge  Outcome: Progressing  Goal: Absence of infection during hospitalization  Outcome: Progressing     Problem: Skin/Tissue Integrity  Goal: Absence of new skin breakdown  Description: 1. Monitor for areas of redness and/or skin breakdown  2. Assess vascular access sites hourly  3. Every 4-6 hours minimum:  Change oxygen saturation probe site  4. Every 4-6 hours:  If on nasal continuous positive airway pressure, respiratory therapy assess nares and determine need for appliance change or resting period.   Outcome: Progressing
Problem: Discharge Planning  Goal: Discharge to home or other facility with appropriate resources  Outcome: Progressing     Problem: Pain  Goal: Verbalizes/displays adequate comfort level or baseline comfort level  Outcome: Progressing     Problem: Safety - Adult  Goal: Free from fall injury  Outcome: Progressing     Problem: Skin/Tissue Integrity - Adult  Goal: Skin integrity remains intact  Outcome: Progressing     Problem: Infection - Adult  Goal: Absence of infection at discharge  Outcome: Progressing
Problem: Discharge Planning  Goal: Discharge to home or other facility with appropriate resources  Outcome: Progressing     Problem: Pain  Goal: Verbalizes/displays adequate comfort level or baseline comfort level  Outcome: Progressing  Flowsheets  Taken 11/26/2023 1400 by John Price RN  Verbalizes/displays adequate comfort level or baseline comfort level:   Encourage patient to monitor pain and request assistance   Assess pain using appropriate pain scale   Administer analgesics based on type and severity of pain and evaluate response   Implement non-pharmacological measures as appropriate and evaluate response   Consider cultural and social influences on pain and pain management   Notify Licensed Independent Practitioner if interventions unsuccessful or patient reports new pain  Taken 11/26/2023 1345 by John Price RN  Verbalizes/displays adequate comfort level or baseline comfort level:   Encourage patient to monitor pain and request assistance   Assess pain using appropriate pain scale   Administer analgesics based on type and severity of pain and evaluate response   Implement non-pharmacological measures as appropriate and evaluate response   Consider cultural and social influences on pain and pain management   Notify Licensed Independent Practitioner if interventions unsuccessful or patient reports new pain     Problem: Chronic Conditions and Co-morbidities  Goal: Patient's chronic conditions and co-morbidity symptoms are monitored and maintained or improved  Outcome: Progressing     Problem: Safety - Adult  Goal: Free from fall injury  Outcome: Progressing
Problem: Discharge Planning  Goal: Discharge to home or other facility with appropriate resources  Outcome: Progressing  Flowsheets (Taken 11/26/2023 0807)  Discharge to home or other facility with appropriate resources:   Identify barriers to discharge with patient and caregiver   Arrange for needed discharge resources and transportation as appropriate   Identify discharge learning needs (meds, wound care, etc)   Arrange for interpreters to assist at discharge as needed   Refer to discharge planning if patient needs post-hospital services based on physician order or complex needs related to functional status, cognitive ability or social support system     Problem: Pain  Goal: Verbalizes/displays adequate comfort level or baseline comfort level  Outcome: Progressing  Flowsheets (Taken 11/26/2023 0745)  Verbalizes/displays adequate comfort level or baseline comfort level:   Encourage patient to monitor pain and request assistance   Assess pain using appropriate pain scale   Administer analgesics based on type and severity of pain and evaluate response   Implement non-pharmacological measures as appropriate and evaluate response   Consider cultural and social influences on pain and pain management   Notify Licensed Independent Practitioner if interventions unsuccessful or patient reports new pain     Problem: Chronic Conditions and Co-morbidities  Goal: Patient's chronic conditions and co-morbidity symptoms are monitored and maintained or improved  Outcome: Progressing
Progressing  11/27/2023 0049 by Emiliana Burns RN  Outcome: Progressing

## 2023-12-01 NOTE — PROGRESS NOTES
Shift assess and vitals completed at this time. PM medications given. Pleasant and cooperative, talkative. Denies problems/concerns; states she is going to a SNF for a week upon d/c.  Call light in reach

## 2023-12-04 ENCOUNTER — CARE COORDINATION (OUTPATIENT)
Dept: CASE MANAGEMENT | Age: 58
End: 2023-12-04

## 2023-12-04 DIAGNOSIS — L03.115 CELLULITIS OF RIGHT LOWER EXTREMITY: Primary | ICD-10-CM

## 2023-12-04 PROCEDURE — 1111F DSCHRG MED/CURRENT MED MERGE: CPT | Performed by: INTERNAL MEDICINE

## 2023-12-04 NOTE — DISCHARGE INSTRUCTIONS
[facility] Physician Orders and Discharge [address], [address] [place] [address], [address]  Telephone: [phone]      Fax: [phone]      Your home care company:  N/a . Your wound-care supplies will be provided by: Wound Care . NAME:  Carroll Boeck Stem   YOB: 1965  PRIMARY DIAGNOSIS FOR WOUND CARE CENTER:  Infection & Lymphedema . Wound cleansing:   Do not scrub or use excessive force. Wash hands with soap and water before and after dressing changes. Prior to applying a clean dressing, cleanse wound with normal saline, wound cleanser, or mild soap and water. Ask your physician or nurse before getting the wound(s) wet in the shower. Wound care for home:    Medial Right Buttock:  Apply Aquaphor to dry flaky area      Right thigh:  Wash gently with soap & water, pat dry   Moisturize dry skin with good moisturizer (Aquaphor, Vaseline, Eucerin)      Right lower leg:  Nexodyn spray  Xeroform to open wounds or fragile areas  Aquaphor to dry  Foam to anterior ankle  Specialist to build up as needed at ankle  AccuWrap Lite compression wrap (less compression)  Nylon  Leave in place for the week. Keep clean, dry & intact. Your physician has ordered Compression for treatment of venous ulcers, venous insufficiency,and/or Lymphedema. * Do not remove until your next scheduled visit in [facility]- do not get wet.     * If your wrap falls down, becomes painful or you have decreased sensation, and/or excessive drainage- please call the [facility] for RN visit to get your compression wrap changed   * You need to exercice as tolerated- walking is good   * Elevate your legs preferrably above level of your heart when sitting as much as possible   * The Goal of this therapy is to reduce Edema and get into long term compression garments to control venous insufficiency, lymphedema and reduce occurrence of venous ulcers       Please note, all wounds

## 2023-12-04 NOTE — CARE COORDINATION
well at home?: Yes  Care Transitions Interventions                                   Discussed follow-up appointments. If no appointment was previously scheduled, appointment scheduling offered: Yes. Is follow up appointment scheduled within 7 days of discharge? Yes. Follow Up  Future Appointments   Date Time Provider [address] [place] [address]   12/5/2023 11:15 AM All Wylie MD [address] [place] [address]   1/19/2024  2:20 PM MD Daniel Hunt       Care Transition Nurse provided contact information. Plan for follow-up call in 5-7 days based on severity of symptoms and risk factors.   Plan for next call: referral to ambulatory care manager-JUVENCIO AminN, RN   [address] Transition Nurse  [phone]

## 2023-12-05 ENCOUNTER — HOSPITAL ENCOUNTER (OUTPATIENT)
Dept: WOUND CARE | Age: 58
Discharge: HOME | End: 2023-12-05
Attending: INTERNAL MEDICINE
Payer: MEDICARE

## 2023-12-05 VITALS
RESPIRATION RATE: 16 BRPM | HEIGHT: 63 IN | SYSTOLIC BLOOD PRESSURE: 126 MMHG | DIASTOLIC BLOOD PRESSURE: 71 MMHG | WEIGHT: 293 LBS | HEART RATE: 97 BPM | TEMPERATURE: 97.9 F | BODY MASS INDEX: 51.91 KG/M2

## 2023-12-05 DIAGNOSIS — I89.0 LYMPHEDEMA OF RIGHT LOWER EXTREMITY: ICD-10-CM

## 2023-12-05 DIAGNOSIS — M79.604 ACUTE PAIN OF RIGHT LOWER EXTREMITY: ICD-10-CM

## 2023-12-05 DIAGNOSIS — L97.111 ULCER OF RIGHT THIGH, LIMITED TO BREAKDOWN OF SKIN (HCC): ICD-10-CM

## 2023-12-05 DIAGNOSIS — L03.115 CELLULITIS OF RIGHT LOWER EXTREMITY: ICD-10-CM

## 2023-12-05 DIAGNOSIS — M79.604 ACUTE LEG PAIN, RIGHT: Primary | ICD-10-CM

## 2023-12-05 DIAGNOSIS — L97.911 ULCER OF RIGHT LEG, LIMITED TO BREAKDOWN OF SKIN (HCC): ICD-10-CM

## 2023-12-05 PROCEDURE — 99213 OFFICE O/P EST LOW 20 MIN: CPT

## 2023-12-05 PROCEDURE — 29581 APPL MULTLAYER CMPRN SYS LEG: CPT | Performed by: INTERNAL MEDICINE

## 2023-12-05 PROCEDURE — 99214 OFFICE O/P EST MOD 30 MIN: CPT | Performed by: INTERNAL MEDICINE

## 2023-12-05 PROCEDURE — 29581 APPL MULTLAYER CMPRN SYS LEG: CPT

## 2023-12-05 RX ORDER — LIDOCAINE 40 MG/G
CREAM TOPICAL ONCE
OUTPATIENT
Start: 2023-12-05 | End: 2023-12-05

## 2023-12-05 RX ORDER — TRAMADOL HYDROCHLORIDE 50 MG/1
50 TABLET ORAL EVERY 6 HOURS PRN
Qty: 28 TABLET | Refills: 0 | Status: SHIPPED | OUTPATIENT
Start: 2023-12-05 | End: 2023-12-07

## 2023-12-05 RX ORDER — TRIAMCINOLONE ACETONIDE 1 MG/G
OINTMENT TOPICAL ONCE
Status: DISCONTINUED | OUTPATIENT
Start: 2023-12-05 | End: 2023-12-06 | Stop reason: HOSPADM

## 2023-12-05 RX ORDER — BACITRACIN ZINC AND POLYMYXIN B SULFATE 500; 1000 [USP'U]/G; [USP'U]/G
OINTMENT TOPICAL ONCE
Status: DISCONTINUED | OUTPATIENT
Start: 2023-12-05 | End: 2023-12-06 | Stop reason: HOSPADM

## 2023-12-05 RX ORDER — LIDOCAINE HYDROCHLORIDE 40 MG/ML
SOLUTION TOPICAL ONCE
Status: DISCONTINUED | OUTPATIENT
Start: 2023-12-05 | End: 2023-12-06 | Stop reason: HOSPADM

## 2023-12-05 RX ORDER — LIDOCAINE HYDROCHLORIDE 40 MG/ML
SOLUTION TOPICAL ONCE
OUTPATIENT
Start: 2023-12-05 | End: 2023-12-05

## 2023-12-05 RX ORDER — BACITRACIN ZINC AND POLYMYXIN B SULFATE 500; 1000 [USP'U]/G; [USP'U]/G
OINTMENT TOPICAL ONCE
OUTPATIENT
Start: 2023-12-05 | End: 2023-12-05

## 2023-12-05 RX ORDER — LIDOCAINE 50 MG/G
OINTMENT TOPICAL ONCE
Status: DISCONTINUED | OUTPATIENT
Start: 2023-12-05 | End: 2023-12-06 | Stop reason: HOSPADM

## 2023-12-05 RX ORDER — LIDOCAINE 40 MG/G
CREAM TOPICAL ONCE
Status: DISCONTINUED | OUTPATIENT
Start: 2023-12-05 | End: 2023-12-06 | Stop reason: HOSPADM

## 2023-12-05 RX ORDER — TRIAMCINOLONE ACETONIDE 1 MG/G
OINTMENT TOPICAL ONCE
OUTPATIENT
Start: 2023-12-05 | End: 2023-12-05

## 2023-12-05 RX ORDER — LIDOCAINE 50 MG/G
OINTMENT TOPICAL ONCE
OUTPATIENT
Start: 2023-12-05 | End: 2023-12-05

## 2023-12-05 NOTE — PLAN OF CARE
Pt. New to [address] [place] [address] today after recent hospitalization. Wounds on right lower leg. Thigh fragily healed with dry flaky skin, pt. To moisturize as needed. Leg wounds improved since hospitalization, no debridement. Will apply dressing per MD orders with compression wrap for edema control. Reinforced importance of exercise, elevation & compression to help with circulation, edema control & wound healing. Pt. Requesting medication for pain control, Dr Eual Kawasaki discussed options. Script for Tramadol sent to pharmacy per Dr Eual Kawasaki. F/u in [address] [place] [address] in 1 week as ordered, pt. Aware to call sooner with any changes or questions/concerns. Discharge instructions reviewed with patient & spouse, all questions answered, copy given to patient. Dressings were applied to all wounds per M.D. Instructions at this visit.

## 2023-12-06 NOTE — PROGRESS NOTES
Physician Progress Note      PATIENT:               Irwin Barrow  CSN #:                  [id number]  :                       1965  ADMIT DATE:       2023 1:32 AM  DISCH DATE:        2023 2:00 PM  RESPONDING  PROVIDER #:        Ricardo Grubbs          QUERY TEXT:    Pt admitted with right lower limb cellulitis. Pt noted to have WBC 24.4, HR 45   and bands 14. If possible, please document in the progress notes and   discharge summary if you are evaluating and /or treating any of the following: The medical record reflects the following:  Risk Factors: right lower limb cellulitis    Clinical Indicators: WBC 24.4, HR 45 and bands 14    infectious notes 23-The Abx might have helped her not to feel more   systemically ill this past week, but she still did have an episode of severe   sepsis (leukocytosis and bandemia, brief tachycardia, ANTHONY). Treatment: infectious disease consult, blood cultures, monitor labs, rocephin   and clindamycin    Thank You Stephan Paez RN, [email or web address]  Options provided:  -- Severe sepsis due to right lower limb cellulitis present on admission  -- Sepsis due to right lower limb cellulitis present on admission  -- Right lower limb cellulitis without Sepsis  -- Other - I will add my own diagnosis  -- Disagree - Not applicable / Not valid  -- Disagree - Clinically unable to determine / Unknown  -- Refer to Clinical Documentation Reviewer    PROVIDER RESPONSE TEXT:    This patient has sepsis due to right lower limb cellulitis which was present   on admission.     Query created by: Stephan Paez on 2023 12:04 PM      Electronically signed by:  Ricardo Grubbs 2023 6:46 PM

## 2023-12-07 RX ORDER — OXYCODONE HYDROCHLORIDE 5 MG/1
5 TABLET ORAL EVERY 6 HOURS PRN
Qty: 28 TABLET | Refills: 0 | Status: SHIPPED | OUTPATIENT
Start: 2023-12-07 | End: 2023-12-14

## 2023-12-07 NOTE — DISCHARGE INSTRUCTIONS
[facility] Physician Orders and Discharge [address], [address] [place] [address], [address]  Telephone: [phone]      Fax: [phone]        Your home care company:   N/a . Your wound-care supplies will be provided by: Wound Care . NAME:  Adriana Nesbitt   YOB: 1965  PRIMARY DIAGNOSIS FOR WOUND CARE CENTER:  Infection & Lymphedema . Wound cleansing:   Do not scrub or use excessive force. Wash hands with soap and water before and after dressing changes. Prior to applying a clean dressing, cleanse wound with normal saline, wound cleanser, or mild soap and water. Ask your physician or nurse before getting the wound(s) wet in the shower. Wound care for home:     Medial Right Buttock:  Apply Aquaphor to dry flaky area        Right thigh:  Wash gently with soap & water, pat dry   Moisturize dry skin with good moisturizer (Aquaphor, Vaseline, Eucerin)      Right lower leg: Healed 12/13/23  Aquaphor to dry flaky skin   Apply double layer medium compression medigrip from toes to knee  Apply first thing in the morning & may remove at bedtime  Continue this for the next 3-4 weeks. General comments for venous / lymphedema ulcers: *  Elevate your legs to the level of your heart for at least 30 minutes, several times daily. *  Walk as much as you can tolerate. Avoid standing for long periods of time, but if you must stand, regularly do heel-raises and calf-pump exercises. *  If you have compression garments that can be changed regularly, apply them first thing in the morning, and remove them when you go to bed. Moisturize your skin at bedtime, with Vaseline, Aquaphor, Aveeno, CeraVe, Cetaphil, Eucerin, Lubriderm, etc; but keep the skin between your toes dry. *  Be sure to adhere to any recommendations from your PCP about diuretics (water pills), diet, exercise, and maintaining a healthy weight.  If you have questions,

## 2023-12-08 ENCOUNTER — OFFICE VISIT (OUTPATIENT)
Dept: INTERNAL MEDICINE CLINIC | Age: 58
End: 2023-12-08

## 2023-12-08 VITALS
HEART RATE: 76 BPM | HEIGHT: 63 IN | BODY MASS INDEX: 52.75 KG/M2 | SYSTOLIC BLOOD PRESSURE: 122 MMHG | DIASTOLIC BLOOD PRESSURE: 78 MMHG

## 2023-12-08 DIAGNOSIS — N17.9 AKI (ACUTE KIDNEY INJURY) (HCC): ICD-10-CM

## 2023-12-08 DIAGNOSIS — Z09 HOSPITAL DISCHARGE FOLLOW-UP: ICD-10-CM

## 2023-12-08 DIAGNOSIS — L03.90 CELLULITIS, UNSPECIFIED CELLULITIS SITE: Primary | ICD-10-CM

## 2023-12-08 PROBLEM — Z98.84 S/P LAPAROSCOPIC SLEEVE GASTRECTOMY: Status: RESOLVED | Noted: 2021-04-12 | Resolved: 2023-12-08

## 2023-12-08 PROBLEM — A41.9 SEPSIS WITH ACUTE RENAL FAILURE WITHOUT SEPTIC SHOCK (HCC): Status: RESOLVED | Noted: 2023-11-26 | Resolved: 2023-12-08

## 2023-12-08 PROBLEM — Z86.718 HISTORY OF DVT (DEEP VEIN THROMBOSIS): Status: RESOLVED | Noted: 2019-05-29 | Resolved: 2023-12-08

## 2023-12-08 PROBLEM — Z83.2 FAMILY HISTORY OF FACTOR V LEIDEN MUTATION: Status: RESOLVED | Noted: 2019-05-29 | Resolved: 2023-12-08

## 2023-12-08 PROBLEM — M79.604 ACUTE PAIN OF RIGHT LOWER EXTREMITY: Status: ACTIVE | Noted: 2023-12-08

## 2023-12-08 PROBLEM — R65.20 SEPSIS WITH ACUTE RENAL FAILURE WITHOUT SEPTIC SHOCK (HCC): Status: RESOLVED | Noted: 2023-11-26 | Resolved: 2023-12-08

## 2023-12-08 PROBLEM — L97.111 ULCER OF RIGHT THIGH, LIMITED TO BREAKDOWN OF SKIN (HCC): Status: ACTIVE | Noted: 2023-12-08

## 2023-12-08 PROCEDURE — 1111F DSCHRG MED/CURRENT MED MERGE: CPT | Performed by: INTERNAL MEDICINE

## 2023-12-08 PROCEDURE — 99495 TRANSJ CARE MGMT MOD F2F 14D: CPT | Performed by: INTERNAL MEDICINE

## 2023-12-13 ENCOUNTER — HOSPITAL ENCOUNTER (OUTPATIENT)
Dept: WOUND CARE | Age: 58
Discharge: HOME | End: 2023-12-13
Attending: INTERNAL MEDICINE
Payer: MEDICARE

## 2023-12-13 ENCOUNTER — CARE COORDINATION (OUTPATIENT)
Dept: CARE COORDINATION | Age: 58
End: 2023-12-13

## 2023-12-13 VITALS
HEART RATE: 94 BPM | SYSTOLIC BLOOD PRESSURE: 149 MMHG | HEIGHT: 63 IN | TEMPERATURE: 97.9 F | DIASTOLIC BLOOD PRESSURE: 87 MMHG | BODY MASS INDEX: 52.75 KG/M2 | RESPIRATION RATE: 18 BRPM

## 2023-12-13 DIAGNOSIS — I89.0 LYMPHEDEMA OF RIGHT LOWER EXTREMITY: ICD-10-CM

## 2023-12-13 DIAGNOSIS — L03.115 CELLULITIS OF RIGHT LOWER EXTREMITY: Primary | ICD-10-CM

## 2023-12-13 DIAGNOSIS — L97.911 ULCER OF RIGHT LEG, LIMITED TO BREAKDOWN OF SKIN (HCC): ICD-10-CM

## 2023-12-13 PROCEDURE — 99213 OFFICE O/P EST LOW 20 MIN: CPT

## 2023-12-13 RX ORDER — BACITRACIN ZINC AND POLYMYXIN B SULFATE 500; 1000 [USP'U]/G; [USP'U]/G
OINTMENT TOPICAL ONCE
Status: DISCONTINUED | OUTPATIENT
Start: 2023-12-13 | End: 2023-12-13

## 2023-12-13 RX ORDER — TRIAMCINOLONE ACETONIDE 1 MG/G
OINTMENT TOPICAL ONCE
Status: CANCELLED | OUTPATIENT
Start: 2023-12-13 | End: 2023-12-13

## 2023-12-13 RX ORDER — LIDOCAINE 40 MG/G
CREAM TOPICAL ONCE
Status: DISCONTINUED | OUTPATIENT
Start: 2023-12-13 | End: 2023-12-13

## 2023-12-13 RX ORDER — TRIAMCINOLONE ACETONIDE 1 MG/G
OINTMENT TOPICAL ONCE
Status: DISCONTINUED | OUTPATIENT
Start: 2023-12-13 | End: 2023-12-13

## 2023-12-13 RX ORDER — LIDOCAINE 50 MG/G
OINTMENT TOPICAL ONCE
Status: DISCONTINUED | OUTPATIENT
Start: 2023-12-13 | End: 2023-12-13

## 2023-12-13 RX ORDER — LIDOCAINE 50 MG/G
OINTMENT TOPICAL ONCE
Status: CANCELLED | OUTPATIENT
Start: 2023-12-13 | End: 2023-12-13

## 2023-12-13 RX ORDER — LIDOCAINE HYDROCHLORIDE 40 MG/ML
SOLUTION TOPICAL ONCE
Status: DISCONTINUED | OUTPATIENT
Start: 2023-12-13 | End: 2023-12-13

## 2023-12-13 RX ORDER — LIDOCAINE HYDROCHLORIDE 40 MG/ML
SOLUTION TOPICAL ONCE
Status: CANCELLED | OUTPATIENT
Start: 2023-12-13 | End: 2023-12-13

## 2023-12-13 RX ORDER — AMOXICILLIN 500 MG/1
1000 CAPSULE ORAL 3 TIMES DAILY
Qty: 42 CAPSULE | Refills: 0 | Status: SHIPPED | OUTPATIENT
Start: 2023-12-13 | End: 2023-12-20

## 2023-12-13 RX ORDER — BACITRACIN ZINC AND POLYMYXIN B SULFATE 500; 1000 [USP'U]/G; [USP'U]/G
OINTMENT TOPICAL ONCE
Status: CANCELLED | OUTPATIENT
Start: 2023-12-13 | End: 2023-12-13

## 2023-12-13 RX ORDER — LIDOCAINE 40 MG/G
CREAM TOPICAL ONCE
Status: CANCELLED | OUTPATIENT
Start: 2023-12-13 | End: 2023-12-13

## 2023-12-13 ASSESSMENT — PAIN SCALES - GENERAL: PAINLEVEL_OUTOF10: 3

## 2023-12-13 ASSESSMENT — PAIN DESCRIPTION - DESCRIPTORS: DESCRIPTORS: SHARP

## 2023-12-13 ASSESSMENT — PAIN - FUNCTIONAL ASSESSMENT: PAIN_FUNCTIONAL_ASSESSMENT: ACTIVITIES ARE NOT PREVENTED

## 2023-12-13 ASSESSMENT — PAIN DESCRIPTION - LOCATION: LOCATION: LEG

## 2023-12-13 ASSESSMENT — PAIN DESCRIPTION - FREQUENCY: FREQUENCY: INTERMITTENT

## 2023-12-13 ASSESSMENT — PAIN DESCRIPTION - ONSET: ONSET: ON-GOING

## 2023-12-13 ASSESSMENT — PAIN DESCRIPTION - PAIN TYPE: TYPE: ACUTE PAIN

## 2023-12-13 ASSESSMENT — PAIN DESCRIPTION - ORIENTATION: ORIENTATION: RIGHT

## 2023-12-13 NOTE — CARE COORDINATION
ACM attempted outreach. Left message. Contact information for call back provided. My chart outreach also sent     [facility]  Left SNF AMA

## 2023-12-13 NOTE — PLAN OF CARE
Wound healed, no dressing needed at this time. Pt. To moisturize dry skin & use double layer medium compression medigrip for edema control. Pt. To wear medigrip for the next 3-4 weeks to help control edema. Reinforced importance of exercise, elevation & compression to help with circulation, edema control & wound healing. Script for Amoxicillin sent to pharmacy per Dr Eckert Both, pt. To hold & take only if needed & signs of recurrent infection noted, pt. Verbalizes understanding. No further follow up in [facility] needed at this time. Pt. To call if a new wound opens or with any questions/concerns. Discharge instructions reviewed with patient & spouse, all questions answered, copy given to patient. Dressings were applied to all wounds per M.D. Instructions at this visit.

## 2023-12-14 ENCOUNTER — TELEPHONE (OUTPATIENT)
Dept: WOUND CARE | Age: 58
End: 2023-12-14

## 2023-12-14 DIAGNOSIS — I89.0 LYMPHEDEMA OF RIGHT LOWER EXTREMITY: ICD-10-CM

## 2023-12-14 DIAGNOSIS — L03.115 CELLULITIS OF RIGHT LOWER EXTREMITY: Primary | ICD-10-CM

## 2023-12-14 DIAGNOSIS — L97.911 ULCER OF RIGHT LEG, LIMITED TO BREAKDOWN OF SKIN (HCC): ICD-10-CM

## 2023-12-14 NOTE — TELEPHONE ENCOUNTER
Patient called & states she has \"2 small boil like areas that need to be lanced\". Writer questioned if her leg was more swollen since changing to using the medigrip at discharge. Pt. States no, but her leg is too painful to apply the medigrip as ordered for edema control. Pt. Again states she thinks the areas need lanced because of the pain present. Writer spoke with Dr Karely Lopez & updated on above. Dr Karely Lopez advised for patient to go to the ER or urgent care for further evaluation as we are not able to see patient in [address] [place] [address] today or tomorrow. Writer updated patient on Dr Irma Johnson understanding.

## 2023-12-15 ENCOUNTER — HOSPITAL ENCOUNTER (OUTPATIENT)
Dept: WOUND CARE | Age: 58
Discharge: HOME | End: 2023-12-15
Attending: INTERNAL MEDICINE
Payer: MEDICARE

## 2023-12-15 VITALS
HEART RATE: 93 BPM | TEMPERATURE: 98.4 F | HEIGHT: 63 IN | RESPIRATION RATE: 20 BRPM | WEIGHT: 289 LBS | DIASTOLIC BLOOD PRESSURE: 69 MMHG | BODY MASS INDEX: 51.21 KG/M2 | SYSTOLIC BLOOD PRESSURE: 156 MMHG

## 2023-12-15 DIAGNOSIS — M79.604 ACUTE PAIN OF RIGHT LOWER EXTREMITY: ICD-10-CM

## 2023-12-15 DIAGNOSIS — L03.115 CELLULITIS OF RIGHT LOWER EXTREMITY: Primary | ICD-10-CM

## 2023-12-15 DIAGNOSIS — I89.0 LYMPHEDEMA OF RIGHT LOWER EXTREMITY: ICD-10-CM

## 2023-12-15 DIAGNOSIS — L97.911 ULCER OF RIGHT LEG, LIMITED TO BREAKDOWN OF SKIN (HCC): ICD-10-CM

## 2023-12-15 DIAGNOSIS — S80.11XA HEMATOMA OF RIGHT LOWER EXTREMITY, INITIAL ENCOUNTER: ICD-10-CM

## 2023-12-15 PROCEDURE — 10160 PNXR ASPIR ABSC HMTMA BULLA: CPT

## 2023-12-15 PROCEDURE — 29581 APPL MULTLAYER CMPRN SYS LEG: CPT

## 2023-12-15 RX ORDER — LIDOCAINE HYDROCHLORIDE 40 MG/ML
SOLUTION TOPICAL ONCE
OUTPATIENT
Start: 2023-12-15 | End: 2023-12-15

## 2023-12-15 RX ORDER — BACITRACIN ZINC AND POLYMYXIN B SULFATE 500; 1000 [USP'U]/G; [USP'U]/G
OINTMENT TOPICAL ONCE
OUTPATIENT
Start: 2023-12-15 | End: 2023-12-15

## 2023-12-15 RX ORDER — OXYCODONE HYDROCHLORIDE 5 MG/1
5 TABLET ORAL EVERY 6 HOURS PRN
Qty: 28 TABLET | Refills: 0 | Status: SHIPPED | OUTPATIENT
Start: 2023-12-15 | End: 2023-12-22

## 2023-12-15 RX ORDER — GABAPENTIN 300 MG/1
300 CAPSULE ORAL 3 TIMES DAILY
Qty: 90 CAPSULE | Refills: 0 | Status: SHIPPED | OUTPATIENT
Start: 2023-12-15 | End: 2024-01-14

## 2023-12-15 RX ORDER — LIDOCAINE 40 MG/G
CREAM TOPICAL ONCE
Status: DISCONTINUED | OUTPATIENT
Start: 2023-12-15 | End: 2023-12-16 | Stop reason: HOSPADM

## 2023-12-15 RX ORDER — TRIAMCINOLONE ACETONIDE 1 MG/G
OINTMENT TOPICAL ONCE
OUTPATIENT
Start: 2023-12-15 | End: 2023-12-15

## 2023-12-15 RX ORDER — LIDOCAINE 50 MG/G
OINTMENT TOPICAL ONCE
OUTPATIENT
Start: 2023-12-15 | End: 2023-12-15

## 2023-12-15 RX ORDER — LIDOCAINE 40 MG/G
CREAM TOPICAL ONCE
OUTPATIENT
Start: 2023-12-15 | End: 2023-12-15

## 2023-12-15 ASSESSMENT — PAIN DESCRIPTION - ORIENTATION: ORIENTATION: RIGHT

## 2023-12-15 ASSESSMENT — PAIN SCALES - GENERAL: PAINLEVEL_OUTOF10: 5

## 2023-12-15 ASSESSMENT — PAIN DESCRIPTION - DESCRIPTORS: DESCRIPTORS: SHARP

## 2023-12-15 ASSESSMENT — PAIN DESCRIPTION - LOCATION: LOCATION: LEG

## 2023-12-15 NOTE — DISCHARGE INSTRUCTIONS
[facility] Physician Orders and Discharge [address], [address] [place] [address]  [place], [address]  Telephone: [phone]      Fax: [phone]        Your home care company:   N/a . Your wound-care supplies will be provided by: Wound Care . NAME:  Kaylie Nesbitt   YOB: 1965  PRIMARY DIAGNOSIS FOR WOUND CARE CENTER:  Infection & Lymphedema . Wound cleansing:   Do not scrub or use excessive force. Wash hands with soap and water before and after dressing changes. Prior to applying a clean dressing, cleanse wound with normal saline, wound cleanser, or mild soap and water. Ask your physician or nurse before getting the wound(s) wet in the shower. Wound care for home:     Right lower leg: Foam Bolster applied per Dr. Karol Babb amount of Clobetasol to all nodules  Xeroform  4x4's inside ring of foam  Compri 2 Lite with Nylon  Keep in place for the week       General comments for venous / lymphedema ulcers: *  Elevate your legs to the level of your heart for at least 30 minutes, several times daily. *  Walk as much as you can tolerate. Avoid standing for long periods of time, but if you must stand, regularly do heel-raises and calf-pump exercises. *  If you have compression garments that can be changed regularly, apply them first thing in the morning, and remove them when you go to bed. Moisturize your skin at bedtime, with Vaseline, Aquaphor, Aveeno, CeraVe, Cetaphil, Eucerin, Lubriderm, etc; but keep the skin between your toes dry. *  Be sure to adhere to any recommendations from your PCP about diuretics (water pills), diet, exercise, and maintaining a healthy weight. If you have questions, please ask. New orders for this week (labs, imaging, medications, etc.):     12/15/23 - You may increase your Gabapentin to 700 mg tid to aid in pain relief.  New prescription for 300 mg tablets sent to your pharmacy -

## 2023-12-15 NOTE — PLAN OF CARE
Patient seen in [facility] for assessment of hematoma on right pretib. Patient was seen in [facility] on Wednesday and had a healed wound. There was a small elevated, painful area near the site of the healed wound at that time but no open wound or abscess noted. Patient complains of pain on and around area. Pain worse when legs dangling. Patient denies s/s of illness. Dr. Sven Fischer injected 2.5 ml of 2% Lidocaine to numb area. He then aspirated 1.5 ml serosanguinous drainage using 18g needle. Patient unable to tolerate compression therapy. Dr. Sven Fischer then placed foam bolsters around area to help off load pressure from the placed wraps. The possibility of Pyoderma, as the cause of this hematoma, discussed today. Patient to be seen back in [facility] on Wednesday. Discharge instructions reviewed with patient, all questions answered, copy given to patient. Dressings were applied to all wounds per M.D. Instructions at this visit.

## 2023-12-18 PROBLEM — L03.115 CELLULITIS OF RIGHT LOWER EXTREMITY: Status: RESOLVED | Noted: 2023-11-26 | Resolved: 2023-12-18

## 2023-12-20 ENCOUNTER — APPOINTMENT (OUTPATIENT)
Dept: WOUND CARE | Age: 58
End: 2023-12-20
Attending: INTERNAL MEDICINE
Payer: MEDICARE

## 2023-12-21 NOTE — DISCHARGE INSTRUCTIONS
[facility] Physician Orders and Discharge [address], [address] [place] [address], [address]  Telephone: [phone]      Fax: [phone]        Your home care company:   N/a . Your wound-care supplies will be provided by: [facility] . NAME:  Irina Nesbitt   YOB: 1965  PRIMARY DIAGNOSIS FOR WOUND CARE CENTER:  Infection & Lymphedema . Wound cleansing:   Do not scrub or use excessive force. Wash hands with soap and water before and after dressing changes. Prior to applying a clean dressing, cleanse wound with normal saline, wound cleanser, or mild soap and water. Ask your physician or nurse before getting the wound(s) wet in the shower. Wound care for home:     Right lower leg:   Nexodyn spray to all wounds  Superficial proximal wounds-Xeroform    Posterior Calf- Triad then Opticell Ag  Lateral Open ulcer-Triad, Opticell Ag rope into wound  Cover with ABD's  Specialist to build up as needed   Foam to anterior ankle  AccuWrap Lite compression wrap with Nylon (LITE compression)  Keep in place for the week. Keep clean, dry & intact        General comments for venous / lymphedema ulcers: *  Elevate your legs to the level of your heart for at least 30 minutes, several times daily. *  Walk as much as you can tolerate. Avoid standing for long periods of time, but if you must stand, regularly do heel-raises and calf-pump exercises. *  If you have compression garments that can be changed regularly, apply them first thing in the morning, and remove them when you go to bed. Moisturize your skin at bedtime, with Vaseline, Aquaphor, Aveeno, CeraVe, Cetaphil, Eucerin, Lubriderm, etc; but keep the skin between your toes dry. *  Be sure to adhere to any recommendations from your PCP about diuretics (water pills), diet, exercise, and maintaining a healthy weight. If you have questions, please ask.         New orders for this

## 2023-12-22 PROBLEM — L97.111 ULCER OF RIGHT THIGH, LIMITED TO BREAKDOWN OF SKIN (HCC): Status: RESOLVED | Noted: 2023-12-08 | Resolved: 2023-12-22

## 2023-12-26 PROBLEM — L97.912 ULCER OF RIGHT LEG, WITH FAT LAYER EXPOSED (HCC): Status: ACTIVE | Noted: 2023-11-26

## 2023-12-27 ENCOUNTER — HOSPITAL ENCOUNTER (OUTPATIENT)
Dept: WOUND CARE | Age: 58
Discharge: HOME | End: 2023-12-27
Attending: INTERNAL MEDICINE
Payer: MEDICARE

## 2023-12-27 VITALS
DIASTOLIC BLOOD PRESSURE: 92 MMHG | HEART RATE: 77 BPM | HEIGHT: 63 IN | SYSTOLIC BLOOD PRESSURE: 134 MMHG | TEMPERATURE: 98.1 F | BODY MASS INDEX: 51.91 KG/M2 | WEIGHT: 293 LBS | RESPIRATION RATE: 20 BRPM

## 2023-12-27 DIAGNOSIS — L97.912 ULCER OF RIGHT LEG, WITH FAT LAYER EXPOSED (HCC): ICD-10-CM

## 2023-12-27 DIAGNOSIS — I89.0 LYMPHEDEMA OF RIGHT LOWER EXTREMITY: Primary | ICD-10-CM

## 2023-12-27 DIAGNOSIS — S80.11XA HEMATOMA OF RIGHT LOWER EXTREMITY, INITIAL ENCOUNTER: ICD-10-CM

## 2023-12-27 DIAGNOSIS — M79.604 ACUTE PAIN OF RIGHT LOWER EXTREMITY: ICD-10-CM

## 2023-12-27 PROCEDURE — 11042 DBRDMT SUBQ TIS 1ST 20SQCM/<: CPT

## 2023-12-27 PROCEDURE — 29581 APPL MULTLAYER CMPRN SYS LEG: CPT

## 2023-12-27 RX ORDER — LIDOCAINE 50 MG/G
OINTMENT TOPICAL ONCE
Status: DISCONTINUED | OUTPATIENT
Start: 2023-12-27 | End: 2023-12-28 | Stop reason: HOSPADM

## 2023-12-27 RX ORDER — LIDOCAINE HYDROCHLORIDE 40 MG/ML
SOLUTION TOPICAL ONCE
OUTPATIENT
Start: 2023-12-27 | End: 2023-12-27

## 2023-12-27 RX ORDER — BACITRACIN ZINC AND POLYMYXIN B SULFATE 500; 1000 [USP'U]/G; [USP'U]/G
OINTMENT TOPICAL ONCE
Status: DISCONTINUED | OUTPATIENT
Start: 2023-12-27 | End: 2023-12-28 | Stop reason: HOSPADM

## 2023-12-27 RX ORDER — LIDOCAINE HYDROCHLORIDE 40 MG/ML
SOLUTION TOPICAL ONCE
Status: DISCONTINUED | OUTPATIENT
Start: 2023-12-27 | End: 2023-12-28 | Stop reason: HOSPADM

## 2023-12-27 RX ORDER — LIDOCAINE 40 MG/G
CREAM TOPICAL ONCE
OUTPATIENT
Start: 2023-12-27 | End: 2023-12-27

## 2023-12-27 RX ORDER — TRIAMCINOLONE ACETONIDE 1 MG/G
OINTMENT TOPICAL ONCE
OUTPATIENT
Start: 2023-12-27 | End: 2023-12-27

## 2023-12-27 RX ORDER — LIDOCAINE 50 MG/G
OINTMENT TOPICAL ONCE
OUTPATIENT
Start: 2023-12-27 | End: 2023-12-27

## 2023-12-27 RX ORDER — BACITRACIN ZINC AND POLYMYXIN B SULFATE 500; 1000 [USP'U]/G; [USP'U]/G
OINTMENT TOPICAL ONCE
OUTPATIENT
Start: 2023-12-27 | End: 2023-12-27

## 2023-12-27 RX ORDER — ASPIRIN 81 MG/1
81 TABLET, CHEWABLE ORAL DAILY
COMMUNITY

## 2023-12-27 RX ORDER — LIDOCAINE 40 MG/G
CREAM TOPICAL ONCE
Status: DISCONTINUED | OUTPATIENT
Start: 2023-12-27 | End: 2023-12-28 | Stop reason: HOSPADM

## 2023-12-27 RX ORDER — OXYCODONE HYDROCHLORIDE 5 MG/1
5 TABLET ORAL EVERY 6 HOURS PRN
Qty: 28 TABLET | Refills: 0 | Status: SHIPPED | OUTPATIENT
Start: 2023-12-27 | End: 2024-01-03

## 2023-12-27 RX ORDER — TRIAMCINOLONE ACETONIDE 1 MG/G
OINTMENT TOPICAL ONCE
Status: DISCONTINUED | OUTPATIENT
Start: 2023-12-27 | End: 2023-12-28 | Stop reason: HOSPADM

## 2023-12-27 NOTE — PLAN OF CARE
4 new wounds noted today, 2 areas caused by scratching. Debridement of original wound per MD. Dressing orders updated, cont. With compression wrap for edema control. Reinforced importance of elevation & compression to help with circulation, edema control & wound healing. F/u in [facility] in 1 week as ordered, pt. Aware to call sooner with any changes or questions/concerns. Discharge instructions reviewed with patient & spouse, all questions answered, copy given to patient. Dressings were applied to all wounds per M.D. Instructions at this visit.

## 2023-12-28 NOTE — DISCHARGE INSTRUCTIONS
Wound Care Center Physician Orders and Discharge Instructions  [facility]  [address], [address]  [place] [id number]  Telephone: [phone]      Fax: [phone]        Your home care company:   N/a .     Your wound-care supplies will be provided by:  Wound [facility] .     NAME:  Nohemi Nesbitt   YOB: 1965  PRIMARY DIAGNOSIS FOR WOUND CARE CENTER:  Infection & Lymphedema .     Wound cleansing:   Do not scrub or use excessive force.  Wash hands with soap and water before and after dressing changes.  Prior to applying a clean dressing, cleanse wound with normal saline, wound cleanser, or mild soap and water. Ask your physician or nurse before getting the wound(s) wet in the shower.                Wound care for home:     Right lower leg:   Nexodyn spray to all wounds  Superficial proximal wounds-Xeroform    Posterior Calf- Triad then Opticell Ag  3 Deeper Ulcers-Triad, Opticell Ag rope into wound  Cover with ABD's  Specialist to build up as needed   Foam to anterior ankle  AccuWrap Lite compression wrap with Nylon (LITE compression)  Keep in place for the week. Keep clean, dry & intact        General comments for venous / lymphedema ulcers:  *  Elevate your legs to the level of your heart for at least 30 minutes, several times daily.  *  Walk as much as you can tolerate. Avoid standing for long periods of time, but if you must stand, regularly do heel-raises and calf-pump exercises.  *  If you have compression garments that can be changed regularly, apply them first thing in the morning, and remove them when you go to bed. Moisturize your skin at bedtime, with Vaseline, Aquaphor, Aveeno, CeraVe, Cetaphil, Eucerin, Lubriderm, etc; but keep the skin between your toes dry.  *  Be sure to adhere to any recommendations from your PCP about diuretics (water pills), diet, exercise, and maintaining a healthy weight. If you have questions, please ask.        New orders for this week

## 2024-01-03 ENCOUNTER — APPOINTMENT (OUTPATIENT)
Dept: WOUND CARE | Age: 59
End: 2024-01-03
Attending: INTERNAL MEDICINE
Payer: MEDICARE

## 2024-01-03 ENCOUNTER — HOSPITAL ENCOUNTER (OUTPATIENT)
Dept: WOUND CARE | Age: 59
Discharge: HOME | End: 2024-01-03
Attending: INTERNAL MEDICINE
Payer: MEDICARE

## 2024-01-03 VITALS
SYSTOLIC BLOOD PRESSURE: 138 MMHG | HEIGHT: 63 IN | DIASTOLIC BLOOD PRESSURE: 74 MMHG | BODY MASS INDEX: 50 KG/M2 | TEMPERATURE: 97.9 F | HEART RATE: 57 BPM | RESPIRATION RATE: 18 BRPM | WEIGHT: 282.2 LBS

## 2024-01-03 DIAGNOSIS — I89.0 LYMPHEDEMA OF RIGHT LOWER EXTREMITY: Primary | ICD-10-CM

## 2024-01-03 DIAGNOSIS — L97.912 ULCER OF RIGHT LEG, WITH FAT LAYER EXPOSED (HCC): ICD-10-CM

## 2024-01-03 DIAGNOSIS — S80.11XA HEMATOMA OF RIGHT LOWER EXTREMITY, INITIAL ENCOUNTER: ICD-10-CM

## 2024-01-03 PROCEDURE — 11042 DBRDMT SUBQ TIS 1ST 20SQCM/<: CPT

## 2024-01-03 PROCEDURE — 29581 APPL MULTLAYER CMPRN SYS LEG: CPT

## 2024-01-03 RX ORDER — BACITRACIN ZINC AND POLYMYXIN B SULFATE 500; 1000 [USP'U]/G; [USP'U]/G
OINTMENT TOPICAL ONCE
OUTPATIENT
Start: 2024-01-03 | End: 2024-01-03

## 2024-01-03 RX ORDER — TRIAMCINOLONE ACETONIDE 1 MG/G
OINTMENT TOPICAL ONCE
OUTPATIENT
Start: 2024-01-03 | End: 2024-01-03

## 2024-01-03 RX ORDER — DULOXETIN HYDROCHLORIDE 60 MG/1
CAPSULE, DELAYED RELEASE ORAL
Qty: 180 CAPSULE | Refills: 0 | Status: SHIPPED | OUTPATIENT
Start: 2024-01-03

## 2024-01-03 RX ORDER — BACITRACIN ZINC AND POLYMYXIN B SULFATE 500; 1000 [USP'U]/G; [USP'U]/G
OINTMENT TOPICAL ONCE
Status: DISCONTINUED | OUTPATIENT
Start: 2024-01-03 | End: 2024-01-04 | Stop reason: HOSPADM

## 2024-01-03 RX ORDER — LIDOCAINE 50 MG/G
OINTMENT TOPICAL ONCE
OUTPATIENT
Start: 2024-01-03 | End: 2024-01-03

## 2024-01-03 RX ORDER — LIDOCAINE HYDROCHLORIDE 40 MG/ML
SOLUTION TOPICAL ONCE
OUTPATIENT
Start: 2024-01-03 | End: 2024-01-03

## 2024-01-03 RX ORDER — LIDOCAINE 40 MG/G
CREAM TOPICAL ONCE
OUTPATIENT
Start: 2024-01-03 | End: 2024-01-03

## 2024-01-03 RX ORDER — OMEPRAZOLE 20 MG/1
CAPSULE, DELAYED RELEASE ORAL
Qty: 90 CAPSULE | Refills: 0 | Status: SHIPPED | OUTPATIENT
Start: 2024-01-03

## 2024-01-03 RX ORDER — LIDOCAINE HYDROCHLORIDE 40 MG/ML
SOLUTION TOPICAL ONCE
Status: DISCONTINUED | OUTPATIENT
Start: 2024-01-03 | End: 2024-01-04 | Stop reason: HOSPADM

## 2024-01-03 RX ORDER — LIDOCAINE 50 MG/G
OINTMENT TOPICAL ONCE
Status: DISCONTINUED | OUTPATIENT
Start: 2024-01-03 | End: 2024-01-04 | Stop reason: HOSPADM

## 2024-01-03 RX ORDER — TRIAMCINOLONE ACETONIDE 1 MG/G
OINTMENT TOPICAL ONCE
Status: DISCONTINUED | OUTPATIENT
Start: 2024-01-03 | End: 2024-01-04 | Stop reason: HOSPADM

## 2024-01-03 RX ORDER — OXYBUTYNIN CHLORIDE 5 MG/1
TABLET ORAL
Qty: 180 TABLET | Refills: 0 | Status: SHIPPED | OUTPATIENT
Start: 2024-01-03

## 2024-01-03 RX ORDER — LIDOCAINE 40 MG/G
CREAM TOPICAL ONCE
Status: DISCONTINUED | OUTPATIENT
Start: 2024-01-03 | End: 2024-01-04 | Stop reason: HOSPADM

## 2024-01-03 RX ORDER — FUROSEMIDE 20 MG/1
TABLET ORAL
Qty: 180 TABLET | Refills: 0 | Status: SHIPPED | OUTPATIENT
Start: 2024-01-03

## 2024-01-03 ASSESSMENT — PAIN SCALES - GENERAL: PAINLEVEL_OUTOF10: 0

## 2024-01-03 NOTE — PLAN OF CARE
Pt. States she has had good improvement in pain this week. 2 wounds healed. Remaining wounds stable, debridement per MD as documented. Dressing orders updated per MD. Will cont. With compression wrap for edema control. Reinforced importance of exercise, elevation & compression to help with circulation, edema control & wound healing. Dr Wylie also discussed possibly thinking about skin substitutes in the future to further assist with wound healing, pt. Verbalizes understanding. F/u in [facility] in 1 week as ordered, pt. Aware to call sooner with any changes or questions/concerns. Discharge instructions reviewed with patient, all questions answered, copy given to patient. Dressings were applied to all wounds per M.D. Instructions at this visit.

## 2024-01-04 NOTE — DISCHARGE INSTRUCTIONS
Wound Care Center Physician Orders and Discharge Instructions  [facility]  [address], [address]  [place] [id number]  Telephone: [phone]      Fax: [phone]        Your home care company:   N/a .     Your wound-care supplies will be provided by:  Wound Care .     NAME:  Nohemi Nesbitt   YOB: 1965  PRIMARY DIAGNOSIS FOR WOUND CARE CENTER:  Infection & Lymphedema .     Wound cleansing:   Do not scrub or use excessive force.  Wash hands with soap and water before and after dressing changes.  Prior to applying a clean dressing, cleanse wound with normal saline, wound cleanser, or mild soap and water. Ask your physician or nurse before getting the wound(s) wet in the shower.                Wound care for home:     Right lower leg:   Triamcinolone to rash & any areas of itching proximal  Nexodyn spray to all wounds  Superficial proximal wounds-Xeroform    3 Deeper Ulcers-Triad, Opticell Ag rope into wound  Cover with ABD's  Specialist to build up as needed   Foam to anterior ankle  AccuWrap Lite compression wrap with Nylon (LITE compression)  Tensoplast  Keep in place for the week. Keep clean, dry & intact        General comments for venous / lymphedema ulcers:  *  Elevate your legs to the level of your heart for at least 30 minutes, several times daily.  *  Walk as much as you can tolerate. Avoid standing for long periods of time, but if you must stand, regularly do heel-raises and calf-pump exercises.  *  If you have compression garments that can be changed regularly, apply them first thing in the morning, and remove them when you go to bed. Moisturize your skin at bedtime, with Vaseline, Aquaphor, Aveeno, CeraVe, Cetaphil, Eucerin, Lubriderm, etc; but keep the skin between your toes dry.  *  Be sure to adhere to any recommendations from your PCP about diuretics (water pills), diet, exercise, and maintaining a healthy weight. If you have questions, please ask.

## 2024-01-10 ENCOUNTER — APPOINTMENT (OUTPATIENT)
Dept: WOUND CARE | Age: 59
End: 2024-01-10
Attending: INTERNAL MEDICINE
Payer: MEDICARE

## 2024-01-10 ENCOUNTER — HOSPITAL ENCOUNTER (OUTPATIENT)
Dept: WOUND CARE | Age: 59
Discharge: HOME | End: 2024-01-10
Attending: INTERNAL MEDICINE
Payer: MEDICARE

## 2024-01-10 VITALS
SYSTOLIC BLOOD PRESSURE: 155 MMHG | BODY MASS INDEX: 49.96 KG/M2 | RESPIRATION RATE: 18 BRPM | TEMPERATURE: 97.9 F | WEIGHT: 282 LBS | HEART RATE: 87 BPM | DIASTOLIC BLOOD PRESSURE: 85 MMHG | HEIGHT: 63 IN

## 2024-01-10 DIAGNOSIS — S80.11XA HEMATOMA OF RIGHT LOWER EXTREMITY, INITIAL ENCOUNTER: ICD-10-CM

## 2024-01-10 DIAGNOSIS — I89.0 LYMPHEDEMA OF RIGHT LOWER EXTREMITY: Primary | ICD-10-CM

## 2024-01-10 DIAGNOSIS — L97.912 ULCER OF RIGHT LEG, WITH FAT LAYER EXPOSED (HCC): ICD-10-CM

## 2024-01-10 PROCEDURE — 29581 APPL MULTLAYER CMPRN SYS LEG: CPT

## 2024-01-10 PROCEDURE — 97597 DBRDMT OPN WND 1ST 20 CM/<: CPT

## 2024-01-10 PROCEDURE — 11042 DBRDMT SUBQ TIS 1ST 20SQCM/<: CPT

## 2024-01-10 RX ORDER — LIDOCAINE HYDROCHLORIDE 40 MG/ML
SOLUTION TOPICAL ONCE
Status: DISCONTINUED | OUTPATIENT
Start: 2024-01-10 | End: 2024-01-11 | Stop reason: HOSPADM

## 2024-01-10 RX ORDER — LIDOCAINE 50 MG/G
OINTMENT TOPICAL ONCE
OUTPATIENT
Start: 2024-01-10 | End: 2024-01-10

## 2024-01-10 RX ORDER — TRIAMCINOLONE ACETONIDE 1 MG/G
OINTMENT TOPICAL ONCE
OUTPATIENT
Start: 2024-01-10 | End: 2024-01-10

## 2024-01-10 RX ORDER — LIDOCAINE 50 MG/G
OINTMENT TOPICAL ONCE
Status: DISCONTINUED | OUTPATIENT
Start: 2024-01-10 | End: 2024-01-11 | Stop reason: HOSPADM

## 2024-01-10 RX ORDER — BACITRACIN ZINC AND POLYMYXIN B SULFATE 500; 1000 [USP'U]/G; [USP'U]/G
OINTMENT TOPICAL ONCE
OUTPATIENT
Start: 2024-01-10 | End: 2024-01-10

## 2024-01-10 RX ORDER — GABAPENTIN 300 MG/1
300 CAPSULE ORAL 3 TIMES DAILY
Qty: 90 CAPSULE | Refills: 1 | Status: SHIPPED | OUTPATIENT
Start: 2024-01-10 | End: 2024-03-10

## 2024-01-10 RX ORDER — LIDOCAINE HYDROCHLORIDE 40 MG/ML
SOLUTION TOPICAL ONCE
OUTPATIENT
Start: 2024-01-10 | End: 2024-01-10

## 2024-01-10 RX ORDER — TRIAMCINOLONE ACETONIDE 1 MG/G
OINTMENT TOPICAL ONCE
Status: DISCONTINUED | OUTPATIENT
Start: 2024-01-10 | End: 2024-01-11 | Stop reason: HOSPADM

## 2024-01-10 RX ORDER — BACITRACIN ZINC AND POLYMYXIN B SULFATE 500; 1000 [USP'U]/G; [USP'U]/G
OINTMENT TOPICAL ONCE
Status: DISCONTINUED | OUTPATIENT
Start: 2024-01-10 | End: 2024-01-11 | Stop reason: HOSPADM

## 2024-01-10 RX ORDER — LIDOCAINE 40 MG/G
CREAM TOPICAL ONCE
OUTPATIENT
Start: 2024-01-10 | End: 2024-01-10

## 2024-01-10 RX ORDER — LIDOCAINE 40 MG/G
CREAM TOPICAL ONCE
Status: DISCONTINUED | OUTPATIENT
Start: 2024-01-10 | End: 2024-01-11 | Stop reason: HOSPADM

## 2024-01-10 ASSESSMENT — PAIN DESCRIPTION - ONSET: ONSET: ON-GOING

## 2024-01-10 ASSESSMENT — PAIN DESCRIPTION - LOCATION: LOCATION: LEG

## 2024-01-10 ASSESSMENT — PAIN SCALES - GENERAL: PAINLEVEL_OUTOF10: 2

## 2024-01-10 ASSESSMENT — PAIN - FUNCTIONAL ASSESSMENT: PAIN_FUNCTIONAL_ASSESSMENT: ACTIVITIES ARE NOT PREVENTED

## 2024-01-10 ASSESSMENT — PAIN DESCRIPTION - FREQUENCY: FREQUENCY: INTERMITTENT

## 2024-01-10 ASSESSMENT — PAIN DESCRIPTION - ORIENTATION: ORIENTATION: RIGHT

## 2024-01-10 ASSESSMENT — PAIN DESCRIPTION - DESCRIPTORS: DESCRIPTORS: SHARP

## 2024-01-10 ASSESSMENT — PAIN DESCRIPTION - PAIN TYPE: TYPE: ACUTE PAIN

## 2024-01-10 NOTE — PLAN OF CARE
Pt. States pain minimal through the week & tolerating Gabapentin without any problems. Refill for Gabapentin sent to pharmacy per Dr Wylie. Wounds stable, debridement per MD & pt. Tolerated well. Will add Triamcinolone for itching & rash. Wound care dressing orders updated per MD. Will cont. With compression wrap for edema control, will add Tensoplast to help prevent wrap from sliding down. Reinforced importance of exercise, elevation & compression to help with circulation, edema control & wound healing. F/u in [facility] in 1 week as ordered, pt. Aware to call sooner with any changes or questions/concerns. Discharge instructions reviewed with patient & brother-in-law, all questions answered, copy given to patient. Dressings were applied to all wounds per M.D. Instructions at this visit.

## 2024-01-11 NOTE — DISCHARGE INSTRUCTIONS
maintaining a healthy weight. If you have questions, please ask.        New orders for this week (labs, imaging, medications, etc.):      N/a      Additional instructions for specific diagnoses:     General comments for venous / lymphedema ulcers:  *  Elevate your legs to the level of your heart for at least 30 minutes, several times daily.  *  Walk as much as you can tolerate. Avoid standing for long periods of time, but if you must stand, regularly do heel-raises and calf-pump exercises.  *  If you have compression wraps designed to remain in place all week, be sure to keep them from getting wet.  *  If you have compression garments that can be changed regularly, apply them first thing in the morning, and remove them when you go to bed. Moisturize your skin at bedtime, with Vaseline, Aquaphor, Aveeno, CeraVe, Cetaphil, Eucerin, Lubriderm, etc; but keep the skin between your toes dry.  *  If you smoke, your wound can not heal properly -- please talk with us when you're ready to quit.   *  Be sure to adhere to any recommendations from your PCP about diuretics (water pills), diet, exercise, and maintaining a healthy weight. If you have questions, please ask.  *  If you have a compression pump, aim for at least two hours of use daily.                   Follow up with Dr. Wylie in 1 week on Wednesday____________________at _______________________      Your nurse  is SHILPI Lau.      If we applied slip-resistant hospital socks today, be sure to remove them at least once a day to inspect your toes or feet, even if you're not changing the wraps or dressings underneath. If you see anything concerning (redness, excess moisture, etc), please call and let us know right away.     Should you experience any significant changes in your wound(s) (including redness, increased warmth, increased pain, increased drainage, odor, or fever) or have questions about your wound care, please contact the [facility]

## 2024-01-12 RX ORDER — TIZANIDINE 4 MG/1
TABLET ORAL
Qty: 120 TABLET | Refills: 0 | Status: SHIPPED | OUTPATIENT
Start: 2024-01-12

## 2024-01-17 ENCOUNTER — HOSPITAL ENCOUNTER (OUTPATIENT)
Dept: WOUND CARE | Age: 59
Discharge: HOME | End: 2024-01-17
Attending: INTERNAL MEDICINE
Payer: MEDICARE

## 2024-01-17 ENCOUNTER — APPOINTMENT (OUTPATIENT)
Dept: WOUND CARE | Age: 59
End: 2024-01-17
Attending: INTERNAL MEDICINE
Payer: MEDICARE

## 2024-01-17 VITALS
TEMPERATURE: 97 F | DIASTOLIC BLOOD PRESSURE: 74 MMHG | HEART RATE: 59 BPM | WEIGHT: 289.4 LBS | BODY MASS INDEX: 51.28 KG/M2 | RESPIRATION RATE: 18 BRPM | SYSTOLIC BLOOD PRESSURE: 149 MMHG | HEIGHT: 63 IN

## 2024-01-17 DIAGNOSIS — S80.11XA HEMATOMA OF RIGHT LOWER EXTREMITY, INITIAL ENCOUNTER: ICD-10-CM

## 2024-01-17 DIAGNOSIS — I89.0 LYMPHEDEMA OF RIGHT LOWER EXTREMITY: Primary | ICD-10-CM

## 2024-01-17 DIAGNOSIS — L97.912 ULCER OF RIGHT LEG, WITH FAT LAYER EXPOSED (HCC): ICD-10-CM

## 2024-01-17 PROCEDURE — 11042 DBRDMT SUBQ TIS 1ST 20SQCM/<: CPT

## 2024-01-17 PROCEDURE — 97597 DBRDMT OPN WND 1ST 20 CM/<: CPT

## 2024-01-17 PROCEDURE — 29581 APPL MULTLAYER CMPRN SYS LEG: CPT

## 2024-01-17 RX ORDER — TRIAMCINOLONE ACETONIDE 1 MG/G
OINTMENT TOPICAL ONCE
Status: DISCONTINUED | OUTPATIENT
Start: 2024-01-17 | End: 2024-01-18 | Stop reason: HOSPADM

## 2024-01-17 RX ORDER — LIDOCAINE HYDROCHLORIDE 40 MG/ML
SOLUTION TOPICAL ONCE
OUTPATIENT
Start: 2024-01-17 | End: 2024-01-17

## 2024-01-17 RX ORDER — BACITRACIN ZINC AND POLYMYXIN B SULFATE 500; 1000 [USP'U]/G; [USP'U]/G
OINTMENT TOPICAL ONCE
OUTPATIENT
Start: 2024-01-17 | End: 2024-01-17

## 2024-01-17 RX ORDER — LIDOCAINE HYDROCHLORIDE 40 MG/ML
SOLUTION TOPICAL ONCE
Status: DISCONTINUED | OUTPATIENT
Start: 2024-01-17 | End: 2024-01-18 | Stop reason: HOSPADM

## 2024-01-17 RX ORDER — TRIAMCINOLONE ACETONIDE 1 MG/G
OINTMENT TOPICAL ONCE
OUTPATIENT
Start: 2024-01-17 | End: 2024-01-17

## 2024-01-17 RX ORDER — LIDOCAINE 40 MG/G
CREAM TOPICAL ONCE
Status: DISCONTINUED | OUTPATIENT
Start: 2024-01-17 | End: 2024-01-18 | Stop reason: HOSPADM

## 2024-01-17 RX ORDER — LIDOCAINE 40 MG/G
CREAM TOPICAL ONCE
OUTPATIENT
Start: 2024-01-17 | End: 2024-01-17

## 2024-01-17 RX ORDER — BACITRACIN ZINC AND POLYMYXIN B SULFATE 500; 1000 [USP'U]/G; [USP'U]/G
OINTMENT TOPICAL ONCE
Status: DISCONTINUED | OUTPATIENT
Start: 2024-01-17 | End: 2024-01-18 | Stop reason: HOSPADM

## 2024-01-17 RX ORDER — LIDOCAINE 50 MG/G
OINTMENT TOPICAL ONCE
OUTPATIENT
Start: 2024-01-17 | End: 2024-01-17

## 2024-01-17 RX ORDER — LIDOCAINE 50 MG/G
OINTMENT TOPICAL ONCE
Status: DISCONTINUED | OUTPATIENT
Start: 2024-01-17 | End: 2024-01-18 | Stop reason: HOSPADM

## 2024-01-17 ASSESSMENT — PAIN DESCRIPTION - LOCATION: LOCATION: LEG

## 2024-01-17 ASSESSMENT — PAIN DESCRIPTION - ORIENTATION: ORIENTATION: RIGHT

## 2024-01-17 ASSESSMENT — PAIN DESCRIPTION - ONSET: ONSET: ON-GOING

## 2024-01-17 ASSESSMENT — PAIN SCALES - GENERAL: PAINLEVEL_OUTOF10: 1

## 2024-01-17 ASSESSMENT — PAIN DESCRIPTION - PAIN TYPE: TYPE: CHRONIC PAIN

## 2024-01-17 ASSESSMENT — PAIN DESCRIPTION - FREQUENCY: FREQUENCY: INTERMITTENT

## 2024-01-17 ASSESSMENT — PAIN - FUNCTIONAL ASSESSMENT: PAIN_FUNCTIONAL_ASSESSMENT: ACTIVITIES ARE NOT PREVENTED

## 2024-01-17 ASSESSMENT — PAIN DESCRIPTION - DESCRIPTORS: DESCRIPTORS: ACHING

## 2024-01-17 NOTE — PLAN OF CARE
Patient states pain overall improved & tolerating Gabapentin. Wounds stable & showing some improvement, debridement per MD. Will change to using Multidex & collagen to anterior wound. Otherwise, cont. With current wound care regime with dressings & compression wrap for edema control. Reinforced importance of exercise, elevation & compression to help with circulation, edema control & wound healing. F/u in [facility] in 1 week as ordered, pt. Aware to call sooner with any changes or questions/concerns. Discharge instructions reviewed with patient, all questions answered, copy given to patient. Dressings were applied to all wounds per M.D. Instructions at this visit.

## 2024-01-18 NOTE — DISCHARGE INSTRUCTIONS
questions, please ask.        New orders for this week (labs, imaging, medications, etc.):      N/a      Additional instructions for specific diagnoses:     General comments for venous / lymphedema ulcers:  *  Elevate your legs to the level of your heart for at least 30 minutes, several times daily.  *  Walk as much as you can tolerate. Avoid standing for long periods of time, but if you must stand, regularly do heel-raises and calf-pump exercises.  *  If you have compression wraps designed to remain in place all week, be sure to keep them from getting wet.  *  If you have compression garments that can be changed regularly, apply them first thing in the morning, and remove them when you go to bed. Moisturize your skin at bedtime, with Vaseline, Aquaphor, Aveeno, CeraVe, Cetaphil, Eucerin, Lubriderm, etc; but keep the skin between your toes dry.  *  If you smoke, your wound can not heal properly -- please talk with us when you're ready to quit.   *  Be sure to adhere to any recommendations from your PCP about diuretics (water pills), diet, exercise, and maintaining a healthy weight. If you have questions, please ask.  *  If you have a compression pump, aim for at least two hours of use daily.                   Follow up with Dr. Wylie in 1 week on Wednesday____________________at _______________________      Your nurse  is SHILPI Lau.      If we applied slip-resistant hospital socks today, be sure to remove them at least once a day to inspect your toes or feet, even if you're not changing the wraps or dressings underneath. If you see anything concerning (redness, excess moisture, etc), please call and let us know right away.     Should you experience any significant changes in your wound(s) (including redness, increased warmth, increased pain, increased drainage, odor, or fever) or have questions about your wound care, please contact the [facility] at [phone] Monday-Thursday from

## 2024-01-20 PROBLEM — M79.604 ACUTE PAIN OF RIGHT LOWER EXTREMITY: Status: RESOLVED | Noted: 2023-12-08 | Resolved: 2024-01-20

## 2024-01-20 PROBLEM — S80.11XA HEMATOMA OF RIGHT LOWER EXTREMITY: Status: RESOLVED | Noted: 2023-12-15 | Resolved: 2024-01-20

## 2024-01-20 PROBLEM — L97.911 ULCER OF RIGHT LEG, LIMITED TO BREAKDOWN OF SKIN (HCC): Status: ACTIVE | Noted: 2024-01-20

## 2024-01-20 NOTE — PROGRESS NOTES
[facility] Wound Care Center Progress Note    Nohemi Nesbitt     : 1965    DATE OF VISIT:  2024    Subjective:     Nohemi Nesbitt is a 58 y.o. female who has a lymphedema and infection-associated ulcer located on the right lower leg. Significant symptoms or pertinent wound history since last visit: had a really good week this week, little pain, no oxycodone since last Wednesday, no fever, wrap stayed in place better, and pruritus much better.     Additional ulcer(s) noted? no.  One proximal ulcer just about healed, down to one anterior and two lateral otherwise.    Her current medication list consists of DULoxetine, Multiple Vitamin, aspirin, furosemide, gabapentin, indomethacin, omeprazole, oxyBUTYnin, and tiZANidine.    Allergies: Iodides, Docusate sodium, and Scopolamine    Objective:     Vitals:    24 0839   BP: (!) 149/74   Pulse: 59   Resp: 18   Temp: 97 °F (36.1 °C)   TempSrc: Oral   Weight: 131.3 kg (289 lb 6.4 oz)   Height: 1.6 m (5' 3\")     Constitutional:  well-developed, well-nourished, overweight, fatigued, not in as much pain   Psychiatric:  oriented to person, place and time; mood and affect appropriate for the situation   Cardiovascular:  bilateral pedal pulses palpable; mild left lower extremity edema, milder right lower leg, still at least moderate at the thigh, but slowly improving  Lymphatic:  no inguinal or popliteal adenopathy, no angitis, cellulitis resolved  Musculoskeletal:  no clubbing, cyanosis or petechiae; RLE and LLE with no gross effusions, joint misalignment or acute arthritis  Neuro: intact RLE sensation, allodynia mostly at the lateral lesions  Duyen-ulcer skin: indurated, pink to less karon, warm, dry, less flaking hyperkeratosis.   Ulcers: The primary anterior one has less undermining, no fat necrosis, a bit of biofilm, granulating more.   Larger lateral hematomas now opened up into full thickness ulcers as the anterior one had, some fat necrosis and old

## 2024-01-24 ENCOUNTER — HOSPITAL ENCOUNTER (OUTPATIENT)
Dept: WOUND CARE | Age: 59
Discharge: HOME | End: 2024-01-24
Attending: INTERNAL MEDICINE
Payer: MEDICARE

## 2024-01-24 VITALS
HEART RATE: 55 BPM | DIASTOLIC BLOOD PRESSURE: 69 MMHG | WEIGHT: 285 LBS | HEIGHT: 63 IN | RESPIRATION RATE: 20 BRPM | BODY MASS INDEX: 50.5 KG/M2 | TEMPERATURE: 97 F | SYSTOLIC BLOOD PRESSURE: 130 MMHG

## 2024-01-24 DIAGNOSIS — L97.912 ULCER OF RIGHT LEG, WITH FAT LAYER EXPOSED (HCC): ICD-10-CM

## 2024-01-24 DIAGNOSIS — I89.0 LYMPHEDEMA OF RIGHT LOWER EXTREMITY: Primary | ICD-10-CM

## 2024-01-24 DIAGNOSIS — L92.9 HYPERGRANULATION: ICD-10-CM

## 2024-01-24 PROCEDURE — 11042 DBRDMT SUBQ TIS 1ST 20SQCM/<: CPT

## 2024-01-24 PROCEDURE — 97597 DBRDMT OPN WND 1ST 20 CM/<: CPT | Performed by: INTERNAL MEDICINE

## 2024-01-24 PROCEDURE — 17250 CHEM CAUT OF GRANLTJ TISSUE: CPT

## 2024-01-24 PROCEDURE — 11042 DBRDMT SUBQ TIS 1ST 20SQCM/<: CPT | Performed by: INTERNAL MEDICINE

## 2024-01-24 PROCEDURE — 29581 APPL MULTLAYER CMPRN SYS LEG: CPT

## 2024-01-24 PROCEDURE — 97597 DBRDMT OPN WND 1ST 20 CM/<: CPT

## 2024-01-24 RX ORDER — LIDOCAINE 40 MG/G
CREAM TOPICAL ONCE
Status: DISCONTINUED | OUTPATIENT
Start: 2024-01-24 | End: 2024-01-25 | Stop reason: HOSPADM

## 2024-01-24 RX ORDER — LIDOCAINE 50 MG/G
OINTMENT TOPICAL ONCE
OUTPATIENT
Start: 2024-01-24 | End: 2024-01-24

## 2024-01-24 RX ORDER — LIDOCAINE HYDROCHLORIDE 40 MG/ML
SOLUTION TOPICAL ONCE
Status: DISCONTINUED | OUTPATIENT
Start: 2024-01-24 | End: 2024-01-25 | Stop reason: HOSPADM

## 2024-01-24 RX ORDER — TRIAMCINOLONE ACETONIDE 1 MG/G
OINTMENT TOPICAL ONCE
Status: DISCONTINUED | OUTPATIENT
Start: 2024-01-24 | End: 2024-01-25 | Stop reason: HOSPADM

## 2024-01-24 RX ORDER — LIDOCAINE 40 MG/G
CREAM TOPICAL ONCE
OUTPATIENT
Start: 2024-01-24 | End: 2024-01-24

## 2024-01-24 RX ORDER — BACITRACIN ZINC AND POLYMYXIN B SULFATE 500; 1000 [USP'U]/G; [USP'U]/G
OINTMENT TOPICAL ONCE
Status: DISCONTINUED | OUTPATIENT
Start: 2024-01-24 | End: 2024-01-25 | Stop reason: HOSPADM

## 2024-01-24 RX ORDER — LIDOCAINE 50 MG/G
OINTMENT TOPICAL ONCE
Status: DISCONTINUED | OUTPATIENT
Start: 2024-01-24 | End: 2024-01-25 | Stop reason: HOSPADM

## 2024-01-24 RX ORDER — LIDOCAINE HYDROCHLORIDE 40 MG/ML
SOLUTION TOPICAL ONCE
OUTPATIENT
Start: 2024-01-24 | End: 2024-01-24

## 2024-01-24 RX ORDER — TRIAMCINOLONE ACETONIDE 1 MG/G
OINTMENT TOPICAL ONCE
OUTPATIENT
Start: 2024-01-24 | End: 2024-01-24

## 2024-01-24 RX ORDER — BACITRACIN ZINC AND POLYMYXIN B SULFATE 500; 1000 [USP'U]/G; [USP'U]/G
OINTMENT TOPICAL ONCE
OUTPATIENT
Start: 2024-01-24 | End: 2024-01-24

## 2024-01-24 NOTE — PLAN OF CARE
Wounds overall showing improvement, debridement per MD & pt. Tolerated well. Wound care dressing orders upated, cont. With compression wrap for edema control. Reinforced importance of exercise, elevation & compression to help with circulation, edema control & wound healing. F/u in [facility] in 1 week as ordered, pt. Aware to call sooner with any changes or questions/concerns. Discharge instructions reviewed with patient & brother-in-law, all questions answered, copy given to patient. Dressings were applied to all wounds per M.D. Instructions at this visit.

## 2024-01-25 NOTE — DISCHARGE INSTRUCTIONS
Wound Care Center Physician Orders and Discharge Instructions  [facility]  [address], [address]  [place] [id number]  Telephone: [phone]      Fax: [phone]        Your home care company:   N/a .     Your wound-care supplies will be provided by:  Wound Care .     NAME:  Nohemi Nesbitt   YOB: 1965  PRIMARY DIAGNOSIS FOR WOUND CARE CENTER:  Infection & Lymphedema .     Wound cleansing:   Do not scrub or use excessive force.  Wash hands with soap and water before and after dressing changes.  Prior to applying a clean dressing, cleanse wound with normal saline, wound cleanser, or mild soap and water. Ask your physician or nurse before getting the wound(s) wet in the shower.                Wound care for home:     Right lower leg wounds:   Nexodyn spray to all wounds  Calmoseptine/Triamcinolone to rash  Opticell Ag to wounds  Cover with ABD  Specialist to build up as needed   Foam to anterior ankle  AccuWrap Lite compression wrap with Nylon (LITE compression)  Tensoplast  Keep in place for the week. Keep clean, dry & intact        General comments for venous / lymphedema ulcers:  *  Elevate your legs to the level of your heart for at least 30 minutes, several times daily.  *  Walk as much as you can tolerate. Avoid standing for long periods of time, but if you must stand, regularly do heel-raises and calf-pump exercises.  *  If you have compression garments that can be changed regularly, apply them first thing in the morning, and remove them when you go to bed. Moisturize your skin at bedtime, with Vaseline, Aquaphor, Aveeno, CeraVe, Cetaphil, Eucerin, Lubriderm, etc; but keep the skin between your toes dry.  *  Be sure to adhere to any recommendations from your PCP about diuretics (water pills), diet, exercise, and maintaining a healthy weight. If you have questions, please ask.        New orders for this week (labs, imaging, medications, etc.):      If your wrap

## 2024-01-27 PROBLEM — L92.9 HYPERGRANULATION: Status: ACTIVE | Noted: 2024-01-27

## 2024-01-27 NOTE — PROGRESS NOTES
obvious connect, have a bit of fat necrosis, biofilm, slowly more granulation, less depth, still quite tender this week, but one wound edge looks irritated and everted from wrap friction.  One prox posterior ulcer and two small prox anterior erosions are healed. Photos also saved in electronic chart.    Today's wound measurements, per RN documentation:  Wound 12/27/23 #4 Right lateral distal cluster, lymphedema, full thickness (onset 12,2023)-Wound Length (cm): 4.5 cm  Wound 12/05/23 #1, Right Lower Leg-Anterior, Lymphedema, Full Thickness, (Onset 11/2023)-Wound Length (cm): 1.1 cm  [REMOVED] Wound 12/27/23 #5 Right lateral proximal lower leg, lymphedema, full thickness, (onset 12,2023)-Wound Length (cm): 0 cm    Wound 12/27/23 #4 Right lateral distal cluster, lymphedema, full thickness (onset 12,2023)-Wound Width (cm): 1.4 cm  Wound 12/05/23 #1, Right Lower Leg-Anterior, Lymphedema, Full Thickness, (Onset 11/2023)-Wound Width (cm): 0.6 cm  [REMOVED] Wound 12/27/23 #5 Right lateral proximal lower leg, lymphedema, full thickness, (onset 12,2023)-Wound Width (cm): 0 cm    Wound 12/27/23 #4 Right lateral distal cluster, lymphedema, full thickness (onset 12,2023)-Wound Depth (cm): 0.4 cm  Wound 12/05/23 #1, Right Lower Leg-Anterior, Lymphedema, Full Thickness, (Onset 11/2023)-Wound Depth (cm): 0.1 cm  [REMOVED] Wound 12/27/23 #5 Right lateral proximal lower leg, lymphedema, full thickness, (onset 12,2023)-Wound Depth (cm): 0 cm    Assessment:     Patient Active Problem List   Diagnosis Code    Hypertension I10    Chronic osteoarthritis M19.90    Chronic GERD K21.9    Asthma J45.909    Other chronic pain G89.29    Urinary incontinence, urge N39.41    Sleep apnea G47.30    Class 3 severe obesity due to excess calories with serious comorbidity and body mass index (BMI) of 50.0 to 59.9 in adult (HCC) E66.01, Z68.43    Elevated liver function tests R79.89    Ulcer of right leg, with fat layer exposed (HCC) L97.912    Anxiety

## 2024-01-31 ENCOUNTER — HOSPITAL ENCOUNTER (OUTPATIENT)
Dept: WOUND CARE | Age: 59
Discharge: HOME | End: 2024-01-31
Attending: INTERNAL MEDICINE
Payer: MEDICARE

## 2024-01-31 VITALS
WEIGHT: 283.6 LBS | DIASTOLIC BLOOD PRESSURE: 68 MMHG | HEART RATE: 45 BPM | TEMPERATURE: 98.1 F | BODY MASS INDEX: 50.24 KG/M2 | SYSTOLIC BLOOD PRESSURE: 132 MMHG | RESPIRATION RATE: 20 BRPM

## 2024-01-31 DIAGNOSIS — L97.912 ULCER OF RIGHT LEG, WITH FAT LAYER EXPOSED (HCC): ICD-10-CM

## 2024-01-31 DIAGNOSIS — I89.0 LYMPHEDEMA OF RIGHT LOWER EXTREMITY: Primary | ICD-10-CM

## 2024-01-31 PROCEDURE — 97597 DBRDMT OPN WND 1ST 20 CM/<: CPT

## 2024-01-31 PROCEDURE — 29581 APPL MULTLAYER CMPRN SYS LEG: CPT

## 2024-01-31 PROCEDURE — 97597 DBRDMT OPN WND 1ST 20 CM/<: CPT | Performed by: INTERNAL MEDICINE

## 2024-01-31 RX ORDER — LIDOCAINE 40 MG/G
CREAM TOPICAL ONCE
Status: DISCONTINUED | OUTPATIENT
Start: 2024-01-31 | End: 2024-02-01 | Stop reason: HOSPADM

## 2024-01-31 RX ORDER — LIDOCAINE HYDROCHLORIDE 40 MG/ML
SOLUTION TOPICAL ONCE
OUTPATIENT
Start: 2024-01-31 | End: 2024-01-31

## 2024-01-31 RX ORDER — LIDOCAINE HYDROCHLORIDE 40 MG/ML
SOLUTION TOPICAL ONCE
Status: DISCONTINUED | OUTPATIENT
Start: 2024-01-31 | End: 2024-02-01 | Stop reason: HOSPADM

## 2024-01-31 RX ORDER — LIDOCAINE 50 MG/G
OINTMENT TOPICAL ONCE
Status: DISCONTINUED | OUTPATIENT
Start: 2024-01-31 | End: 2024-02-01 | Stop reason: HOSPADM

## 2024-01-31 RX ORDER — TRIAMCINOLONE ACETONIDE 1 MG/G
OINTMENT TOPICAL ONCE
Status: DISCONTINUED | OUTPATIENT
Start: 2024-01-31 | End: 2024-02-01 | Stop reason: HOSPADM

## 2024-01-31 RX ORDER — LIDOCAINE 50 MG/G
OINTMENT TOPICAL ONCE
OUTPATIENT
Start: 2024-01-31 | End: 2024-01-31

## 2024-01-31 RX ORDER — BACITRACIN ZINC AND POLYMYXIN B SULFATE 500; 1000 [USP'U]/G; [USP'U]/G
OINTMENT TOPICAL ONCE
Status: DISCONTINUED | OUTPATIENT
Start: 2024-01-31 | End: 2024-02-01 | Stop reason: HOSPADM

## 2024-01-31 RX ORDER — TRIAMCINOLONE ACETONIDE 1 MG/G
OINTMENT TOPICAL ONCE
OUTPATIENT
Start: 2024-01-31 | End: 2024-01-31

## 2024-01-31 RX ORDER — OXYCODONE HYDROCHLORIDE 5 MG/1
5 CAPSULE ORAL DAILY PRN
COMMUNITY

## 2024-01-31 RX ORDER — LIDOCAINE 40 MG/G
CREAM TOPICAL ONCE
OUTPATIENT
Start: 2024-01-31 | End: 2024-01-31

## 2024-01-31 RX ORDER — BACITRACIN ZINC AND POLYMYXIN B SULFATE 500; 1000 [USP'U]/G; [USP'U]/G
OINTMENT TOPICAL ONCE
OUTPATIENT
Start: 2024-01-31 | End: 2024-01-31

## 2024-01-31 ASSESSMENT — PAIN DESCRIPTION - LOCATION: LOCATION: LEG

## 2024-01-31 ASSESSMENT — PAIN SCALES - GENERAL: PAINLEVEL_OUTOF10: 2

## 2024-01-31 ASSESSMENT — PAIN DESCRIPTION - PAIN TYPE: TYPE: CHRONIC PAIN

## 2024-01-31 ASSESSMENT — PAIN DESCRIPTION - DESCRIPTORS: DESCRIPTORS: SHARP

## 2024-01-31 ASSESSMENT — PAIN DESCRIPTION - ORIENTATION: ORIENTATION: RIGHT

## 2024-01-31 NOTE — PLAN OF CARE
New rash noted today from the Mepilex Transfer Ag, will stop use today. Will apply Calmoseptine/Triamcinolone to rash. Wounds stable & showing improvement, debridement per MD. Wound care dressing orders updated. Will cont. With compression wrap for edema control. Reinforced importance of exercise, elevation & compression to help with circulation, edema control & wound healing. F/u in [facility] in 2 weeks as requested per pt. D/t cost. Pt. Aware to call sooner with any changes or questions/concerns. Discharge instructions reviewed with patient & family, all questions answered, copy given to patient. Dressings were applied to all wounds per M.D. Instructions at this visit.

## 2024-01-31 NOTE — PROGRESS NOTES
[facility] Wound Care Center Progress Note    Nohemi Nesbitt     : 1965    DATE OF VISIT:  2024    Subjective:     Nohemi Nesbitt is a 58 y.o. female who has a lymphedema and infection-associated ulcer located on the right, anterior, lateral lower leg. Significant symptoms or pertinent wound history since last visit: good news this week is that the anterior ulcer is almost healed, and the two lateral ones are more granular, less depth and undermining, and that wrap injury from last week is resolved. Bad news is that the Mepilex Transfer Ag caused a pretty significant area of dermatitis this week, with some itching and irritation starting over the weekend. This wrap slid down an inch or two again (despite Tensoplast and building up the ankle), but we'll need to try to protect the ulcers from that without the Transfer Ag.     She also voiced a concern about the cost of weekly visits, so asked if we could try to manage with every-other-week visits. We can certainly try that, but she also asked if we had to keep the same level of compression wrap in place, and that I do think we need to keep up with, for fear that if we stop decent compression, her edema will increase like it did last time, and wound healing will stall or regress.     Additional ulcer(s) noted? no.      Her current medication list consists of DULoxetine, Multiple Vitamin, aspirin, furosemide, gabapentin, indomethacin, omeprazole, oxyBUTYnin, oxyCODONE, and tiZANidine. Doing ok on current higher dose of gabapentin, and is only taking oxycodone around her weekly wound-care visits.     Allergies: Iodides, Docusate sodium, and Scopolamine    Objective:     Vitals:    24 0832   BP: 132/68   Pulse: (!) 45   Resp: 20   Temp: 98.1 °F (36.7 °C)   TempSrc: Temporal   Weight: 128.6 kg (283 lb 9.6 oz)     Constitutional:  well-developed, well-nourished, overweight, not in as much pain   Psychiatric:  oriented to person, place and time;

## 2024-02-07 ENCOUNTER — APPOINTMENT (OUTPATIENT)
Dept: WOUND CARE | Age: 59
End: 2024-02-07
Attending: INTERNAL MEDICINE
Payer: MEDICARE

## 2024-02-08 ENCOUNTER — OFFICE VISIT (OUTPATIENT)
Dept: INTERNAL MEDICINE CLINIC | Age: 59
End: 2024-02-08

## 2024-02-08 ENCOUNTER — TELEPHONE (OUTPATIENT)
Dept: INTERNAL MEDICINE CLINIC | Age: 59
End: 2024-02-08

## 2024-02-08 VITALS
DIASTOLIC BLOOD PRESSURE: 78 MMHG | BODY MASS INDEX: 51.03 KG/M2 | SYSTOLIC BLOOD PRESSURE: 136 MMHG | WEIGHT: 288 LBS | HEIGHT: 63 IN | HEART RATE: 60 BPM

## 2024-02-08 DIAGNOSIS — G89.29 OTHER CHRONIC PAIN: ICD-10-CM

## 2024-02-08 DIAGNOSIS — M19.90 CHRONIC OSTEOARTHRITIS: ICD-10-CM

## 2024-02-08 DIAGNOSIS — N39.41 URINARY INCONTINENCE, URGE: ICD-10-CM

## 2024-02-08 DIAGNOSIS — K21.9 CHRONIC GERD: Primary | ICD-10-CM

## 2024-02-08 DIAGNOSIS — F33.1 MODERATE RECURRENT MAJOR DEPRESSION (HCC): ICD-10-CM

## 2024-02-08 DIAGNOSIS — F33.41 RECURRENT MAJOR DEPRESSIVE DISORDER, IN PARTIAL REMISSION (HCC): ICD-10-CM

## 2024-02-08 PROCEDURE — 99214 OFFICE O/P EST MOD 30 MIN: CPT | Performed by: INTERNAL MEDICINE

## 2024-02-08 PROCEDURE — 1036F TOBACCO NON-USER: CPT | Performed by: INTERNAL MEDICINE

## 2024-02-08 PROCEDURE — 3078F DIAST BP <80 MM HG: CPT | Performed by: INTERNAL MEDICINE

## 2024-02-08 PROCEDURE — G8484 FLU IMMUNIZE NO ADMIN: HCPCS | Performed by: INTERNAL MEDICINE

## 2024-02-08 PROCEDURE — G8417 CALC BMI ABV UP PARAM F/U: HCPCS | Performed by: INTERNAL MEDICINE

## 2024-02-08 PROCEDURE — G8427 DOCREV CUR MEDS BY ELIG CLIN: HCPCS | Performed by: INTERNAL MEDICINE

## 2024-02-08 PROCEDURE — 3017F COLORECTAL CA SCREEN DOC REV: CPT | Performed by: INTERNAL MEDICINE

## 2024-02-08 PROCEDURE — 3075F SYST BP GE 130 - 139MM HG: CPT | Performed by: INTERNAL MEDICINE

## 2024-02-08 ASSESSMENT — PATIENT HEALTH QUESTIONNAIRE - PHQ9
SUM OF ALL RESPONSES TO PHQ QUESTIONS 1-9: 0
1. LITTLE INTEREST OR PLEASURE IN DOING THINGS: 0
SUM OF ALL RESPONSES TO PHQ9 QUESTIONS 1 & 2: 0
4. FEELING TIRED OR HAVING LITTLE ENERGY: 0
SUM OF ALL RESPONSES TO PHQ QUESTIONS 1-9: 0
8. MOVING OR SPEAKING SO SLOWLY THAT OTHER PEOPLE COULD HAVE NOTICED. OR THE OPPOSITE, BEING SO FIGETY OR RESTLESS THAT YOU HAVE BEEN MOVING AROUND A LOT MORE THAN USUAL: 0
3. TROUBLE FALLING OR STAYING ASLEEP: 0
2. FEELING DOWN, DEPRESSED OR HOPELESS: 0
9. THOUGHTS THAT YOU WOULD BE BETTER OFF DEAD, OR OF HURTING YOURSELF: 0
SUM OF ALL RESPONSES TO PHQ QUESTIONS 1-9: 0
5. POOR APPETITE OR OVEREATING: 0
7. TROUBLE CONCENTRATING ON THINGS, SUCH AS READING THE NEWSPAPER OR WATCHING TELEVISION: 0
SUM OF ALL RESPONSES TO PHQ QUESTIONS 1-9: 0
6. FEELING BAD ABOUT YOURSELF - OR THAT YOU ARE A FAILURE OR HAVE LET YOURSELF OR YOUR FAMILY DOWN: 0
10. IF YOU CHECKED OFF ANY PROBLEMS, HOW DIFFICULT HAVE THESE PROBLEMS MADE IT FOR YOU TO DO YOUR WORK, TAKE CARE OF THINGS AT HOME, OR GET ALONG WITH OTHER PEOPLE: 0

## 2024-02-08 ASSESSMENT — ENCOUNTER SYMPTOMS
DIARRHEA: 0
NAUSEA: 0
COUGH: 0
WHEEZING: 0
VOMITING: 0
BLOOD IN STOOL: 0
CHEST TIGHTNESS: 0
ABDOMINAL PAIN: 0
SHORTNESS OF BREATH: 0

## 2024-02-08 NOTE — PROGRESS NOTES
Nohemi Nesbitt (:  1965) is a 58 y.o. female,Established patient, here for evaluation of the following chief complaint(s):  No chief complaint on file.         ASSESSMENT/PLAN:        Diagnosis Orders   1. Chronic GERD        2. Body mass index (BMI) 50.0-59.9, adult (HCC)        3. Recurrent major depressive disorder, in partial remission (HCC)        4. Moderate recurrent major depression (HCC)        5. Chronic osteoarthritis        6. Other chronic pain        7. Urinary incontinence, urge              Morbid obesity  S/p gastric sleeve procedure   Advised to cut down on calories.  Lose weight.     Depression  Stable.  On cymbalta 120 mg daily      GERD is stable.  Continue PPI.     OA/chronic pain  Continue indomethacin.  continue PPI  Continue tizanidine  Gabapentin 700 tid      Elevated systolic BP   Sam lfollow      Pedal edema  Continue lasix prn     Urinary urge incontinence.  Continue ditropan.  Stable.    Low Mg  Continue replacement       Last colonoscopy 2017       Subjective   SUBJECTIVE/OBJECTIVE:  HPI  Is here for follow up.  She has a prior h/o DVT,reactive airway disease,morbid obesity,OA,chronic pain and GERD.  She sees a pain physician and is on oxycodone,topamax,tizanidine,gabapentin and Cymbalta.  She also takes indomethacin.     Asthma is stable on inhalers.uses albuterol  Very infrequently.     Had gastric sleeve procedure 2021     Seeing wound care clinic for ulcer right leg     Review of Systems   Constitutional:  Negative for fatigue, fever and unexpected weight change.   Respiratory:  Negative for cough, chest tightness, shortness of breath and wheezing.    Cardiovascular:  Negative for chest pain, palpitations and leg swelling.   Gastrointestinal:  Negative for abdominal pain, blood in stool, diarrhea, nausea and vomiting.   Genitourinary:  Negative for hematuria.   Neurological:  Negative for light-headedness and headaches.          Objective   Physical

## 2024-02-08 NOTE — DISCHARGE INSTRUCTIONS
Wound Care Center Physician Orders and Discharge Instructions  [facility]  [address], [address]  [place] [id number]  Telephone: [phone]      Fax: [phone]        Your home care company:   N/a .     Your wound-care supplies will be provided by:  Wound Care .     NAME:  Noheim Nesbitt   YOB: 1965  PRIMARY DIAGNOSIS FOR WOUND CARE CENTER:  Infection & Lymphedema .     Wound cleansing:   Do not scrub or use excessive force.  Wash hands with soap and water before and after dressing changes.  Prior to applying a clean dressing, cleanse wound with normal saline, wound cleanser, or mild soap and water. Ask your physician or nurse before getting the wound(s) wet in the shower.                Wound care for home:     Right lower leg wounds:   Nexodyn spray to all wounds  Aquaphor/Triamcinolone to rash  Opticell Ag to wounds  Cover with ABD  Specialist to build up as needed   Foam to anterior ankle  AccuWrap Lite compression wrap with Nylon (LITE compression)  Tensoplast  Keep in place for the week. Keep clean, dry & intact        General comments for venous / lymphedema ulcers:  *  Elevate your legs to the level of your heart for at least 30 minutes, several times daily.  *  Walk as much as you can tolerate. Avoid standing for long periods of time, but if you must stand, regularly do heel-raises and calf-pump exercises.  *  If you have compression garments that can be changed regularly, apply them first thing in the morning, and remove them when you go to bed. Moisturize your skin at bedtime, with Vaseline, Aquaphor, Aveeno, CeraVe, Cetaphil, Eucerin, Lubriderm, etc; but keep the skin between your toes dry.  *  Be sure to adhere to any recommendations from your PCP about diuretics (water pills), diet, exercise, and maintaining a healthy weight. If you have questions, please ask.        New orders for this week (labs, imaging, medications, etc.):      If your wrap slides

## 2024-02-08 NOTE — TELEPHONE ENCOUNTER
----- Message from Mariana Awan MD sent at 2/8/2024  1:42 PM EST -----  Contact: Anna [phone]  no  ----- Message -----  From: Magalis Prieto MA  Sent: 2/8/2024   1:24 PM EST  To: Mariana Awan MD    Pt was just seen today. She would like to know if you want her to complete an echocardiogram.

## 2024-02-14 ENCOUNTER — HOSPITAL ENCOUNTER (OUTPATIENT)
Dept: WOUND CARE | Age: 59
Discharge: HOME | End: 2024-02-14
Attending: INTERNAL MEDICINE
Payer: MEDICARE

## 2024-02-14 VITALS
RESPIRATION RATE: 20 BRPM | HEIGHT: 63 IN | DIASTOLIC BLOOD PRESSURE: 57 MMHG | SYSTOLIC BLOOD PRESSURE: 101 MMHG | WEIGHT: 286.4 LBS | BODY MASS INDEX: 50.75 KG/M2 | HEART RATE: 60 BPM | TEMPERATURE: 97 F

## 2024-02-14 DIAGNOSIS — L97.912 ULCER OF RIGHT LEG, WITH FAT LAYER EXPOSED (HCC): ICD-10-CM

## 2024-02-14 DIAGNOSIS — I89.0 LYMPHEDEMA OF RIGHT LOWER EXTREMITY: Primary | ICD-10-CM

## 2024-02-14 PROCEDURE — 29581 APPL MULTLAYER CMPRN SYS LEG: CPT

## 2024-02-14 PROCEDURE — 97597 DBRDMT OPN WND 1ST 20 CM/<: CPT

## 2024-02-14 RX ORDER — TRIAMCINOLONE ACETONIDE 1 MG/G
OINTMENT TOPICAL ONCE
OUTPATIENT
Start: 2024-02-14 | End: 2024-02-14

## 2024-02-14 RX ORDER — LIDOCAINE 40 MG/G
CREAM TOPICAL ONCE
OUTPATIENT
Start: 2024-02-14 | End: 2024-02-14

## 2024-02-14 RX ORDER — BACITRACIN ZINC AND POLYMYXIN B SULFATE 500; 1000 [USP'U]/G; [USP'U]/G
OINTMENT TOPICAL ONCE
OUTPATIENT
Start: 2024-02-14 | End: 2024-02-14

## 2024-02-14 RX ORDER — LIDOCAINE 40 MG/G
CREAM TOPICAL ONCE
Status: DISCONTINUED | OUTPATIENT
Start: 2024-02-14 | End: 2024-02-15 | Stop reason: HOSPADM

## 2024-02-14 RX ORDER — LIDOCAINE 50 MG/G
OINTMENT TOPICAL ONCE
OUTPATIENT
Start: 2024-02-14 | End: 2024-02-14

## 2024-02-14 RX ORDER — LIDOCAINE HYDROCHLORIDE 40 MG/ML
SOLUTION TOPICAL ONCE
OUTPATIENT
Start: 2024-02-14 | End: 2024-02-14

## 2024-02-14 NOTE — PLAN OF CARE
Patient seen in [facility] today for follow up. Patient reports feeling well overall. Wounds showing signs of improvement. Edema has increased this week. Patient states she has been on her feet more this week and has not been elevating as she should. Patient also admits to removing her wrap after one week. States wrap got wet in the shower. Wound debrided per Dr. Wylie and Dr. Carlin. Patient tolerated well.   F/u in [facility] in 2 weeks as ordered, pt. Aware to call sooner with any changes or questions/concerns.  Discharge instructions reviewed with patient, all questions answered, copy given to patient. Dressings were applied to all wounds per M.D. Instructions at this visit.

## 2024-02-15 RX ORDER — GABAPENTIN 400 MG/1
CAPSULE ORAL
Qty: 90 CAPSULE | Refills: 0 | Status: SHIPPED | OUTPATIENT
Start: 2024-02-26 | End: 2024-03-25

## 2024-02-17 NOTE — PROGRESS NOTES
[facility] Wound Care Center Progress Note    Nohemi Nesbitt     : 1965    DATE OF VISIT:  2024    Subjective:     Nohemi Nesbitt is a 58 y.o. female who has a lymphedema and infection-associated ulcer located on the right, anterior, lateral lower leg. Significant symptoms or pertinent wound history since last visit: about a week ago her wrap got wet in the shower, so she removed it, reapplied a new dressing and her tubular compression as recommended, but never called us about getting back in sooner, so this week her edema worsened, she had more acute stasis dermatitis, excoriations, a number of new tiny traumatic wounds on that basis, but the original wounds thankfully did pretty well. Small to moderate amount of drainage, no pus, no bleeding, no fever.      Additional ulcer(s) noted? no.      Her current medication list consists of DULoxetine, Multiple Vitamin, aspirin, furosemide, gabapentin, indomethacin, omeprazole, oxyBUTYnin, oxyCODONE, and tiZANidine.    Allergies: Iodides, Docusate sodium, and Scopolamine    Objective:     Vitals:    24 0841   BP: (!) 101/57   Pulse: 60   Resp: 20   Temp: 97 °F (36.1 °C)   TempSrc: Oral   Weight: 129.9 kg (286 lb 6.4 oz)   Height: 1.6 m (5' 3\")     Constitutional:  well-developed, well-nourished, overweight, not in much pain, but scratching at her right leg  Psychiatric:  oriented to person, place and time; mood and affect appropriate for the situation   Cardiovascular:  bilateral pedal pulses palpable; mild left lower extremity edema, more moderate right lower leg this week, thigh maybe slowly better  Lymphatic:  no inguinal or popliteal adenopathy, no angitis, no cellulitis   Musculoskeletal:  no clubbing, cyanosis or petechiae; RLE and LLE with no gross effusions, joint misalignment or acute arthritis  Neuro: intact RLE sensation, still allodynia at the lateral lesions  Duyen-ulcer skin: indurated, pink to redder, warm, dry, but acute stasis

## 2024-02-22 NOTE — DISCHARGE INSTRUCTIONS
moisture, etc), please call and let us know right away.     Should you experience any significant changes in your wound(s) (including redness, increased warmth, increased pain, increased drainage, odor, or fever) or have questions about your wound care, please contact the [facility] at [phone] Monday-Thursday from 8:00 am - 4:30 pm, or Friday from 8:00 am - 2:30 pm.  If you need help with your wound outside these hours and cannot wait until we are again available, contact your home-care company (if applicable), your PCP, or go to the nearest emergency room.

## 2024-02-28 ENCOUNTER — HOSPITAL ENCOUNTER (OUTPATIENT)
Dept: WOUND CARE | Age: 59
Discharge: HOME | End: 2024-02-28
Attending: INTERNAL MEDICINE
Payer: MEDICARE

## 2024-02-28 VITALS
DIASTOLIC BLOOD PRESSURE: 73 MMHG | TEMPERATURE: 97.8 F | HEART RATE: 63 BPM | HEIGHT: 63 IN | RESPIRATION RATE: 18 BRPM | WEIGHT: 289.4 LBS | BODY MASS INDEX: 51.28 KG/M2 | SYSTOLIC BLOOD PRESSURE: 124 MMHG

## 2024-02-28 DIAGNOSIS — L97.912 ULCER OF RIGHT LEG, WITH FAT LAYER EXPOSED (HCC): ICD-10-CM

## 2024-02-28 DIAGNOSIS — I89.0 LYMPHEDEMA OF RIGHT LOWER EXTREMITY: Primary | ICD-10-CM

## 2024-02-28 PROCEDURE — 29581 APPL MULTLAYER CMPRN SYS LEG: CPT

## 2024-02-28 PROCEDURE — 97597 DBRDMT OPN WND 1ST 20 CM/<: CPT | Performed by: INTERNAL MEDICINE

## 2024-02-28 PROCEDURE — 97597 DBRDMT OPN WND 1ST 20 CM/<: CPT

## 2024-02-28 RX ORDER — LIDOCAINE HYDROCHLORIDE 40 MG/ML
SOLUTION TOPICAL ONCE
OUTPATIENT
Start: 2024-02-28 | End: 2024-02-28

## 2024-02-28 RX ORDER — LIDOCAINE 50 MG/G
OINTMENT TOPICAL ONCE
Status: DISCONTINUED | OUTPATIENT
Start: 2024-02-28 | End: 2024-02-29 | Stop reason: HOSPADM

## 2024-02-28 RX ORDER — TRIAMCINOLONE ACETONIDE 1 MG/G
OINTMENT TOPICAL ONCE
OUTPATIENT
Start: 2024-02-28 | End: 2024-02-28

## 2024-02-28 RX ORDER — LIDOCAINE 40 MG/G
CREAM TOPICAL ONCE
OUTPATIENT
Start: 2024-02-28 | End: 2024-02-28

## 2024-02-28 RX ORDER — LIDOCAINE 40 MG/G
CREAM TOPICAL ONCE
Status: DISCONTINUED | OUTPATIENT
Start: 2024-02-28 | End: 2024-02-29 | Stop reason: HOSPADM

## 2024-02-28 RX ORDER — LIDOCAINE 50 MG/G
OINTMENT TOPICAL ONCE
OUTPATIENT
Start: 2024-02-28 | End: 2024-02-28

## 2024-02-28 RX ORDER — LIDOCAINE HYDROCHLORIDE 40 MG/ML
SOLUTION TOPICAL ONCE
Status: DISCONTINUED | OUTPATIENT
Start: 2024-02-28 | End: 2024-02-29 | Stop reason: HOSPADM

## 2024-02-28 RX ORDER — BACITRACIN ZINC AND POLYMYXIN B SULFATE 500; 1000 [USP'U]/G; [USP'U]/G
OINTMENT TOPICAL ONCE
OUTPATIENT
Start: 2024-02-28 | End: 2024-02-28

## 2024-02-28 RX ORDER — TRIAMCINOLONE ACETONIDE 1 MG/G
OINTMENT TOPICAL ONCE
Status: DISCONTINUED | OUTPATIENT
Start: 2024-02-28 | End: 2024-02-29 | Stop reason: HOSPADM

## 2024-02-28 RX ORDER — BACITRACIN ZINC AND POLYMYXIN B SULFATE 500; 1000 [USP'U]/G; [USP'U]/G
OINTMENT TOPICAL ONCE
Status: DISCONTINUED | OUTPATIENT
Start: 2024-02-28 | End: 2024-02-29 | Stop reason: HOSPADM

## 2024-02-28 NOTE — PLAN OF CARE
New superficial areas noted, pt. States she had itching & irritation & was scratching in her sleep & ended up removing her wrap. Wounds stable, debridement per MD & pt. Tolerated well. Will apply Xeroform to superficial areas, change to using collagen on wounds. Otherwise, cont. With current compression wrap for edema control. Reinforced importance of exercise, elevation & compression to help with circulation, edema control & wound healing. Dr Wylie also discussed gradually working on decreasing Gabapentin dosing, pt. Verbalizes understanding & agreeable. F/u in [facility] in 1 week as ordered, pt. Aware to call sooner with any changes or questions/concerns. Discharge instructions reviewed with patient, all questions answered, copy given to patient. Dressings were applied to all wounds per M.D. Instructions at this visit.

## 2024-02-29 NOTE — DISCHARGE INSTRUCTIONS
moisture, etc), please call and let us know right away.     Should you experience any significant changes in your wound(s) (including redness, increased warmth, increased pain, increased drainage, odor, or fever) or have questions about your wound care, please contact the [facility] Center at [phone] Monday-Thursday from 8:00 am - 4:30 pm, or Friday from 8:00 am - 2:30 pm.  If you need help with your wound outside these hours and cannot wait until we are again available, contact your home-care company (if applicable), your PCP, or go to the nearest emergency room.

## 2024-03-02 NOTE — PROGRESS NOTES
[facility] Wound Care Center Progress Note    Nohemi Nesbitt     : 1965    DATE OF VISIT:  2024    Subjective:     Nohemi Nesbitt is a 58 y.o. female who has a lymphedema and infection-associated ulcer located on the right, anterior, lateral lower leg. Significant symptoms or pertinent wound history since last visit: it's been 2 weeks since she was last here, didn't feel that she could keep the wrap in place for 2 weeks, removed it, edema went up again, acute stasis dermatitis, many more excoriations, etc. Thankfully the primary ulcers themselves did ok. Not a lot of pain, no fever, not much drainage.     Additional ulcer(s) noted? no.      Her current medication list consists of DULoxetine, Multiple Vitamin, aspirin, furosemide, gabapentin, indomethacin, omeprazole, oxyBUTYnin, oxyCODONE, and tiZANidine.    Allergies: Iodides, Docusate sodium, and Scopolamine    Objective:     Vitals:    24 0840   BP: 124/73   Pulse: 63   Resp: 18   Temp: 97.8 °F (36.6 °C)   TempSrc: Oral   Weight: 131.3 kg (289 lb 6.4 oz)   Height: 1.6 m (5' 3\")     Constitutional:  well-developed, well-nourished, overweight, not in much pain, but scratching at her right leg  Psychiatric:  oriented to person, place and time; mood and affect appropriate for the situation   Cardiovascular:  bilateral pedal pulses palpable; mild left lower extremity edema, more moderate right lower leg this week, thigh maybe slowly better  Lymphatic:  no inguinal or popliteal adenopathy, no angitis, no cellulitis   Musculoskeletal:  no clubbing, cyanosis or petechiae; RLE and LLE with no gross effusions, joint misalignment or acute arthritis  Neuro: intact RLE sensation, still allodynia at the lateral lesions  Duyen-ulcer skin: indurated, pink to red, warm, dry, but acute stasis dermatitis and more excoriations this week, lots of superficial erosions anterior because of that  Ulcers: The primary anterior one is stagnant this time, small,

## 2024-03-04 RX ORDER — TIZANIDINE 4 MG/1
TABLET ORAL
Qty: 180 TABLET | Refills: 0 | Status: SHIPPED | OUTPATIENT
Start: 2024-03-04

## 2024-03-04 RX ORDER — INDOMETHACIN 50 MG/1
CAPSULE ORAL
Qty: 270 CAPSULE | Refills: 0 | Status: SHIPPED | OUTPATIENT
Start: 2024-03-04

## 2024-03-06 ENCOUNTER — HOSPITAL ENCOUNTER (OUTPATIENT)
Dept: WOUND CARE | Age: 59
Discharge: HOME | End: 2024-03-06
Attending: INTERNAL MEDICINE

## 2024-03-07 ENCOUNTER — TELEPHONE (OUTPATIENT)
Dept: WOUND CARE | Age: 59
End: 2024-03-07

## 2024-03-07 DIAGNOSIS — L97.912 ULCER OF RIGHT LEG, WITH FAT LAYER EXPOSED (HCC): Primary | ICD-10-CM

## 2024-03-07 DIAGNOSIS — I89.0 LYMPHEDEMA OF RIGHT LOWER EXTREMITY: ICD-10-CM

## 2024-03-07 NOTE — TELEPHONE ENCOUNTER
Writer called patient to follow up re: NC/NS appt. On 3/6/24. Patient states she wasn't feeling well & thought she cancelled her appt. Via mychart. Writer advised that this method doesn't always clearly communicate with [facility] & recommends to call in the future, pt. Verbalizes understanding. Patient also states she removed her compression wrap & dressing yesterday d/t it sliding down & rubbing. Patient states she covered the wounds with a large bandage, but doesn't have any compression on at this time. Patient states she doesn't have any swelling in her leg or thigh like she used to. Writer advised patient to at least wear a medigrip if she has one available to use. Pt. States she has a stocking she could use. Writer advised to continue with compression at this time to assist with wound healing & edema control, pt. Verbalizes understanding. Patient aware of her next scheduled [facility] appt. On 3/13/24 at 0830.

## 2024-03-07 NOTE — DISCHARGE INSTRUCTIONS
today, be sure to remove them at least once a day to inspect your toes or feet, even if you're not changing the wraps or dressings underneath. If you see anything concerning (redness, excess moisture, etc), please call and let us know right away.     Should you experience any significant changes in your wound(s) (including redness, increased warmth, increased pain, increased drainage, odor, or fever) or have questions about your wound care, please contact the [facility] at [phone] Monday-Thursday from 8:00 am - 4:30 pm, or Friday from 8:00 am - 2:30 pm.  If you need help with your wound outside these hours and cannot wait until we are again available, contact your home-care company (if applicable), your PCP, or go to the nearest emergency room.

## 2024-03-13 ENCOUNTER — OFFICE VISIT (OUTPATIENT)
Dept: INTERNAL MEDICINE CLINIC | Age: 59
End: 2024-03-13

## 2024-03-13 ENCOUNTER — HOSPITAL ENCOUNTER (OUTPATIENT)
Dept: WOUND CARE | Age: 59
Discharge: HOME | End: 2024-03-13
Attending: INTERNAL MEDICINE
Payer: MEDICARE

## 2024-03-13 ENCOUNTER — HOSPITAL ENCOUNTER (OUTPATIENT)
Age: 59
Discharge: HOME | End: 2024-03-13
Payer: MEDICARE

## 2024-03-13 VITALS
BODY MASS INDEX: 51.91 KG/M2 | RESPIRATION RATE: 18 BRPM | HEIGHT: 63 IN | SYSTOLIC BLOOD PRESSURE: 145 MMHG | TEMPERATURE: 97.9 F | WEIGHT: 293 LBS | DIASTOLIC BLOOD PRESSURE: 78 MMHG | HEART RATE: 84 BPM

## 2024-03-13 VITALS — WEIGHT: 292 LBS | BODY MASS INDEX: 51.74 KG/M2 | HEIGHT: 63 IN

## 2024-03-13 DIAGNOSIS — I89.0 LYMPHEDEMA OF RIGHT LOWER EXTREMITY: Primary | ICD-10-CM

## 2024-03-13 DIAGNOSIS — L23.9 ALLERGIC DERMATITIS: Primary | ICD-10-CM

## 2024-03-13 DIAGNOSIS — L97.912 ULCER OF RIGHT LEG, WITH FAT LAYER EXPOSED (HCC): ICD-10-CM

## 2024-03-13 LAB
ALBUMIN SERPL-MCNC: 3.8 G/DL (ref 3.4–5)
ALBUMIN/GLOB SERPL: 1.2 {RATIO} (ref 1.1–2.2)
ALP SERPL-CCNC: 111 U/L (ref 40–129)
ALT SERPL-CCNC: 9 U/L (ref 10–40)
ANION GAP SERPL CALCULATED.3IONS-SCNC: 9 MMOL/L (ref 3–16)
AST SERPL-CCNC: 18 U/L (ref 15–37)
BASOPHILS # BLD: 0 K/UL (ref 0–0.2)
BASOPHILS NFR BLD: 0.8 %
BILIRUB SERPL-MCNC: <0.2 MG/DL (ref 0–1)
BUN SERPL-MCNC: 25 MG/DL (ref 7–20)
CALCIUM SERPL-MCNC: 8.7 MG/DL (ref 8.3–10.6)
CHLORIDE SERPL-SCNC: 104 MMOL/L (ref 99–110)
CO2 SERPL-SCNC: 28 MMOL/L (ref 21–32)
CREAT SERPL-MCNC: 0.7 MG/DL (ref 0.6–1.2)
DEPRECATED RDW RBC AUTO: 16 % (ref 12.4–15.4)
EOSINOPHIL # BLD: 0.3 K/UL (ref 0–0.6)
EOSINOPHIL NFR BLD: 5.1 %
GFR SERPLBLD CREATININE-BSD FMLA CKD-EPI: >60 ML/MIN/{1.73_M2}
GLUCOSE SERPL-MCNC: 88 MG/DL (ref 70–99)
HCT VFR BLD AUTO: 35.8 % (ref 36–48)
HGB BLD-MCNC: 11.7 G/DL (ref 12–16)
LYMPHOCYTES # BLD: 1.2 K/UL (ref 1–5.1)
LYMPHOCYTES NFR BLD: 24.3 %
MCH RBC QN AUTO: 29.4 PG (ref 26–34)
MCHC RBC AUTO-ENTMCNC: 32.7 G/DL (ref 31–36)
MCV RBC AUTO: 90.1 FL (ref 80–100)
MONOCYTES # BLD: 0.5 K/UL (ref 0–1.3)
MONOCYTES NFR BLD: 9 %
NEUTROPHILS # BLD: 3.1 K/UL (ref 1.7–7.7)
NEUTROPHILS NFR BLD: 60.8 %
PLATELET # BLD AUTO: 257 K/UL (ref 135–450)
PMV BLD AUTO: 7.3 FL (ref 5–10.5)
POTASSIUM SERPL-SCNC: 4 MMOL/L (ref 3.5–5.1)
PROT SERPL-MCNC: 7 G/DL (ref 6.4–8.2)
RBC # BLD AUTO: 3.97 M/UL (ref 4–5.2)
SODIUM SERPL-SCNC: 141 MMOL/L (ref 136–145)
WBC # BLD AUTO: 5.1 K/UL (ref 4–11)

## 2024-03-13 PROCEDURE — 97597 DBRDMT OPN WND 1ST 20 CM/<: CPT

## 2024-03-13 PROCEDURE — 36415 COLL VENOUS BLD VENIPUNCTURE: CPT

## 2024-03-13 PROCEDURE — G8417 CALC BMI ABV UP PARAM F/U: HCPCS | Performed by: INTERNAL MEDICINE

## 2024-03-13 PROCEDURE — 29581 APPL MULTLAYER CMPRN SYS LEG: CPT

## 2024-03-13 PROCEDURE — 85025 COMPLETE CBC W/AUTO DIFF WBC: CPT

## 2024-03-13 PROCEDURE — G8427 DOCREV CUR MEDS BY ELIG CLIN: HCPCS | Performed by: INTERNAL MEDICINE

## 2024-03-13 PROCEDURE — 99213 OFFICE O/P EST LOW 20 MIN: CPT | Performed by: INTERNAL MEDICINE

## 2024-03-13 PROCEDURE — 3017F COLORECTAL CA SCREEN DOC REV: CPT | Performed by: INTERNAL MEDICINE

## 2024-03-13 PROCEDURE — 80053 COMPREHEN METABOLIC PANEL: CPT

## 2024-03-13 PROCEDURE — G8484 FLU IMMUNIZE NO ADMIN: HCPCS | Performed by: INTERNAL MEDICINE

## 2024-03-13 PROCEDURE — 1036F TOBACCO NON-USER: CPT | Performed by: INTERNAL MEDICINE

## 2024-03-13 RX ORDER — BACITRACIN ZINC AND POLYMYXIN B SULFATE 500; 1000 [USP'U]/G; [USP'U]/G
OINTMENT TOPICAL ONCE
OUTPATIENT
Start: 2024-03-13 | End: 2024-03-13

## 2024-03-13 RX ORDER — TRIAMCINOLONE ACETONIDE 1 MG/G
OINTMENT TOPICAL ONCE
OUTPATIENT
Start: 2024-03-13 | End: 2024-03-13

## 2024-03-13 RX ORDER — TRIAMCINOLONE ACETONIDE 1 MG/G
OINTMENT TOPICAL ONCE
Status: DISCONTINUED | OUTPATIENT
Start: 2024-03-13 | End: 2024-03-14 | Stop reason: HOSPADM

## 2024-03-13 RX ORDER — LIDOCAINE 50 MG/G
OINTMENT TOPICAL ONCE
Status: DISCONTINUED | OUTPATIENT
Start: 2024-03-13 | End: 2024-03-14 | Stop reason: HOSPADM

## 2024-03-13 RX ORDER — LIDOCAINE HYDROCHLORIDE 40 MG/ML
SOLUTION TOPICAL ONCE
Status: DISCONTINUED | OUTPATIENT
Start: 2024-03-13 | End: 2024-03-14 | Stop reason: HOSPADM

## 2024-03-13 RX ORDER — LIDOCAINE 50 MG/G
OINTMENT TOPICAL ONCE
OUTPATIENT
Start: 2024-03-13 | End: 2024-03-13

## 2024-03-13 RX ORDER — LIDOCAINE HYDROCHLORIDE 40 MG/ML
SOLUTION TOPICAL ONCE
OUTPATIENT
Start: 2024-03-13 | End: 2024-03-13

## 2024-03-13 RX ORDER — LIDOCAINE 40 MG/G
CREAM TOPICAL ONCE
OUTPATIENT
Start: 2024-03-13 | End: 2024-03-13

## 2024-03-13 RX ORDER — LIDOCAINE 40 MG/G
CREAM TOPICAL ONCE
Status: DISCONTINUED | OUTPATIENT
Start: 2024-03-13 | End: 2024-03-14 | Stop reason: HOSPADM

## 2024-03-13 RX ORDER — PREDNISONE 20 MG/1
TABLET ORAL
Qty: 15 TABLET | Refills: 0 | Status: SHIPPED | OUTPATIENT
Start: 2024-03-13

## 2024-03-13 RX ORDER — BACITRACIN ZINC AND POLYMYXIN B SULFATE 500; 1000 [USP'U]/G; [USP'U]/G
OINTMENT TOPICAL ONCE
Status: DISCONTINUED | OUTPATIENT
Start: 2024-03-13 | End: 2024-03-14 | Stop reason: HOSPADM

## 2024-03-13 ASSESSMENT — ENCOUNTER SYMPTOMS
VOMITING: 0
ABDOMINAL PAIN: 0
BLOOD IN STOOL: 0
DIARRHEA: 0
CHEST TIGHTNESS: 0
WHEEZING: 0
NAUSEA: 0
SHORTNESS OF BREATH: 0
COUGH: 0

## 2024-03-13 ASSESSMENT — PAIN - FUNCTIONAL ASSESSMENT: PAIN_FUNCTIONAL_ASSESSMENT: ACTIVITIES ARE NOT PREVENTED

## 2024-03-13 ASSESSMENT — PAIN DESCRIPTION - ONSET: ONSET: ON-GOING

## 2024-03-13 ASSESSMENT — PAIN SCALES - GENERAL: PAINLEVEL_OUTOF10: 5

## 2024-03-13 ASSESSMENT — PAIN DESCRIPTION - ORIENTATION: ORIENTATION: RIGHT

## 2024-03-13 ASSESSMENT — PAIN DESCRIPTION - LOCATION: LOCATION: LEG

## 2024-03-13 ASSESSMENT — PAIN DESCRIPTION - PAIN TYPE: TYPE: CHRONIC PAIN

## 2024-03-13 ASSESSMENT — PAIN DESCRIPTION - FREQUENCY: FREQUENCY: CONTINUOUS

## 2024-03-13 ASSESSMENT — PAIN DESCRIPTION - DESCRIPTORS: DESCRIPTORS: SHARP;BURNING

## 2024-03-13 NOTE — PLAN OF CARE
Pt to the [facility] for wound assessment. Wounds stable. Wounds debrided today, pt tolerated well. LLE/lower abd/breast rash with itching noted (reference picture taken), pt states nothing new, no other s/s. Blood work obtained today. Pt to f/u with PCP, clobetasol to marked area on LLE. Pt to continue with documented plan of care and to follow up in the [facility] in 1 week. Pt. aware to call sooner with any problems or questions/concerns. MD orders and D/C instructions reviewed, pt verbalized understanding.

## 2024-03-13 NOTE — PROGRESS NOTES
Nhoemi Nesbitt (:  1965) is a 58 y.o. female,Established patient, here for evaluation of the following chief complaint(s):  Rash         ASSESSMENT/PLAN:        Diagnosis Orders   1. Allergic dermatitis          Pred taper   zyrtec    Subjective   SUBJECTIVE/OBJECTIVE:  Rash  Pertinent negatives include no cough, diarrhea, fatigue, fever, shortness of breath or vomiting.     C/o rash all over the body- 2 days  Itchy and burning  No fevers  No new meds,detergents      Review of Systems   Constitutional:  Negative for fatigue, fever and unexpected weight change.   Respiratory:  Negative for cough, chest tightness, shortness of breath and wheezing.    Cardiovascular:  Negative for chest pain, palpitations and leg swelling.   Gastrointestinal:  Negative for abdominal pain, blood in stool, diarrhea, nausea and vomiting.   Skin:  Positive for rash.   Neurological:  Negative for light-headedness.          Objective   Physical Exam  Constitutional:       Appearance: She is well-developed.   HENT:      Head: Normocephalic and atraumatic.   Eyes:      Pupils: Pupils are equal, round, and reactive to light.   Neck:      Thyroid: No thyromegaly.   Cardiovascular:      Rate and Rhythm: Normal rate and regular rhythm.      Heart sounds: Normal heart sounds. No murmur heard.     No friction rub. No gallop.      Comments: No carotid bruit  Pulmonary:      Effort: Pulmonary effort is normal. No respiratory distress.      Breath sounds: Normal breath sounds. No wheezing or rales.   Chest:      Chest wall: No tenderness.   Musculoskeletal:      Cervical back: Normal range of motion and neck supple.   Skin:     Comments: Diffuse  discreet papular red lesions in legs, abdomen,breasts    Neurological:      Mental Status: She is alert and oriented to person, place, and time.                  An electronic signature was used to authenticate this note.    --IVAN MARINO MD

## 2024-03-14 NOTE — DISCHARGE INSTRUCTIONS
Wound Care Center Physician Orders and Discharge Instructions  [facility]  [address], [address]  [place] [id number]  Telephone: [phone]      Fax: [phone]        Your home care company:   N/a .     Your wound-care supplies will be provided by:  Wound Care .     NAME:  Nohemi Nesbitt   YOB: 1965  PRIMARY DIAGNOSIS FOR WOUND CARE CENTER:  Infection & Lymphedema .     Wound cleansing:   Do not scrub or use excessive force.  Wash hands with soap and water before and after dressing changes.  Prior to applying a clean dressing, cleanse wound with normal saline, wound cleanser, or mild soap and water. Ask your physician or nurse before getting the wound(s) wet in the shower.                Wound care for home:     Right lower leg wounds:   Nexodyn spray to all wounds  Aquaphor to dry  Clobetasol/calmoseptine to wound edges and any purple irritation   Santyl to wound  Saline moistened 2x2's  Cover with 4x4's  Specialist to build up as needed   Foam to anterior ankle  AccuWrap Lite compression wrap with Nylon (LITE compression)  Tensoplast  Keep in place for the week. Keep clean, dry & intact     General comments for venous / lymphedema ulcers:  *  Elevate your legs to the level of your heart for at least 30 minutes, several times daily.  *  Walk as much as you can tolerate. Avoid standing for long periods of time, but if you must stand, regularly do heel-raises and calf-pump exercises.  *  If you have compression garments that can be changed regularly, apply them first thing in the morning, and remove them when you go to bed. Moisturize your skin at bedtime, with Vaseline, Aquaphor, Aveeno, CeraVe, Cetaphil, Eucerin, Lubriderm, etc; but keep the skin between your toes dry.  *  Be sure to adhere to any recommendations from your PCP about diuretics (water pills), diet, exercise, and maintaining a healthy weight. If you have questions, please ask.        New orders for

## 2024-03-20 ENCOUNTER — HOSPITAL ENCOUNTER (OUTPATIENT)
Dept: WOUND CARE | Age: 59
Discharge: HOME | End: 2024-03-20
Attending: INTERNAL MEDICINE
Payer: MEDICARE

## 2024-03-20 VITALS
SYSTOLIC BLOOD PRESSURE: 151 MMHG | TEMPERATURE: 98.1 F | HEART RATE: 67 BPM | BODY MASS INDEX: 51.84 KG/M2 | HEIGHT: 63 IN | DIASTOLIC BLOOD PRESSURE: 77 MMHG | WEIGHT: 292.6 LBS | RESPIRATION RATE: 18 BRPM

## 2024-03-20 DIAGNOSIS — I89.0 LYMPHEDEMA OF RIGHT LOWER EXTREMITY: Primary | ICD-10-CM

## 2024-03-20 DIAGNOSIS — L97.912 ULCER OF RIGHT LEG, WITH FAT LAYER EXPOSED (HCC): ICD-10-CM

## 2024-03-20 PROCEDURE — 29581 APPL MULTLAYER CMPRN SYS LEG: CPT

## 2024-03-20 RX ORDER — OXYBUTYNIN CHLORIDE 5 MG/1
TABLET ORAL
Qty: 180 TABLET | Refills: 0 | Status: SHIPPED | OUTPATIENT
Start: 2024-03-20

## 2024-03-20 RX ORDER — OMEPRAZOLE 20 MG/1
CAPSULE, DELAYED RELEASE ORAL
Qty: 90 CAPSULE | Refills: 0 | Status: SHIPPED | OUTPATIENT
Start: 2024-03-20

## 2024-03-20 RX ORDER — FUROSEMIDE 20 MG/1
TABLET ORAL
Qty: 180 TABLET | Refills: 0 | Status: SHIPPED | OUTPATIENT
Start: 2024-03-20

## 2024-03-20 RX ORDER — LIDOCAINE 40 MG/G
CREAM TOPICAL ONCE
Status: DISCONTINUED | OUTPATIENT
Start: 2024-03-20 | End: 2024-03-21 | Stop reason: HOSPADM

## 2024-03-20 RX ORDER — LIDOCAINE 50 MG/G
OINTMENT TOPICAL ONCE
OUTPATIENT
Start: 2024-03-20 | End: 2024-03-20

## 2024-03-20 RX ORDER — LIDOCAINE 50 MG/G
OINTMENT TOPICAL ONCE
Status: DISCONTINUED | OUTPATIENT
Start: 2024-03-20 | End: 2024-03-21 | Stop reason: HOSPADM

## 2024-03-20 RX ORDER — LIDOCAINE HYDROCHLORIDE 40 MG/ML
SOLUTION TOPICAL ONCE
OUTPATIENT
Start: 2024-03-20 | End: 2024-03-20

## 2024-03-20 RX ORDER — DULOXETIN HYDROCHLORIDE 60 MG/1
CAPSULE, DELAYED RELEASE ORAL
Qty: 180 CAPSULE | Refills: 0 | Status: SHIPPED | OUTPATIENT
Start: 2024-03-20

## 2024-03-20 RX ORDER — BACITRACIN ZINC AND POLYMYXIN B SULFATE 500; 1000 [USP'U]/G; [USP'U]/G
OINTMENT TOPICAL ONCE
Status: DISCONTINUED | OUTPATIENT
Start: 2024-03-20 | End: 2024-03-21 | Stop reason: HOSPADM

## 2024-03-20 RX ORDER — LIDOCAINE 40 MG/G
CREAM TOPICAL ONCE
OUTPATIENT
Start: 2024-03-20 | End: 2024-03-20

## 2024-03-20 RX ORDER — TRIAMCINOLONE ACETONIDE 1 MG/G
OINTMENT TOPICAL ONCE
Status: DISCONTINUED | OUTPATIENT
Start: 2024-03-20 | End: 2024-03-21 | Stop reason: HOSPADM

## 2024-03-20 RX ORDER — TRIAMCINOLONE ACETONIDE 1 MG/G
OINTMENT TOPICAL ONCE
OUTPATIENT
Start: 2024-03-20 | End: 2024-03-20

## 2024-03-20 RX ORDER — LIDOCAINE HYDROCHLORIDE 40 MG/ML
SOLUTION TOPICAL ONCE
Status: DISCONTINUED | OUTPATIENT
Start: 2024-03-20 | End: 2024-03-21 | Stop reason: HOSPADM

## 2024-03-20 RX ORDER — BACITRACIN ZINC AND POLYMYXIN B SULFATE 500; 1000 [USP'U]/G; [USP'U]/G
OINTMENT TOPICAL ONCE
OUTPATIENT
Start: 2024-03-20 | End: 2024-03-20

## 2024-03-20 ASSESSMENT — PAIN DESCRIPTION - DESCRIPTORS: DESCRIPTORS: SHARP

## 2024-03-20 ASSESSMENT — PAIN SCALES - GENERAL: PAINLEVEL_OUTOF10: 2

## 2024-03-20 ASSESSMENT — PAIN - FUNCTIONAL ASSESSMENT: PAIN_FUNCTIONAL_ASSESSMENT: ACTIVITIES ARE NOT PREVENTED

## 2024-03-20 ASSESSMENT — PAIN DESCRIPTION - PAIN TYPE: TYPE: CHRONIC PAIN

## 2024-03-20 ASSESSMENT — PAIN DESCRIPTION - LOCATION: LOCATION: LEG

## 2024-03-20 ASSESSMENT — PAIN DESCRIPTION - FREQUENCY: FREQUENCY: INTERMITTENT

## 2024-03-20 ASSESSMENT — PAIN DESCRIPTION - ORIENTATION: ORIENTATION: RIGHT

## 2024-03-20 NOTE — PLAN OF CARE
Pt to the [facility] for wound assessment. Wounds stable. Rash improved, pt remains on steroids, will biopsy next week if no further improvement. Pt to continue with documented plan of care and to follow up in the [facility] in 1 week. Pt. aware to call sooner with any problems or questions/concerns. MD orders and D/C instructions reviewed, pt verbalized understanding.

## 2024-03-21 NOTE — DISCHARGE INSTRUCTIONS
Wound Care Center Physician Orders and Discharge Instructions  [facility]  [address], [address]  [place] [id number]  Telephone: [phone]      Fax: [phone]        Your home care company:   N/a .     Your wound-care supplies will be provided by:  Wound Care .     NAME:  Nohemi Nesbitt   YOB: 1965  PRIMARY DIAGNOSIS FOR WOUND CARE CENTER:  Infection & Lymphedema .     Wound cleansing:   Do not scrub or use excessive force.  Wash hands with soap and water before and after dressing changes.  Prior to applying a clean dressing, cleanse wound with normal saline, wound cleanser, or mild soap and water. Ask your physician or nurse before getting the wound(s) wet in the shower.                Wound care for home:     Left Medial Calf Biopsy site:  Dr. Wylie used Silver Nitrate on your wound today.  The wound will be black or silver in appearance  for the next several days, this is normal.     Puracol Ag, PSO, adaptic, skin prep blanka-wound, Optifoam Gentle EX border applied per MD  Leave in place for the next couple of days     At Home after removing dressing: apply antibiotic ointment & cover with bandaide, change daily       Right 2nd toe:  If opens apply antibiotic ointment & tubular gauze, change daily     Right lower leg wounds:   Nexodyn spray to all wounds  Aquaphor to dry  Clobetasol/calmoseptine to wound edges and any purple irritation   Santyl to wound  Saline moistened 2x2's  Cover with 4x4's  Specialist to build up as needed   Foam to anterior ankle  AccuWrap Lite compression wrap with Nylon (LITE compression)  Tensoplast  Keep in place for the week. Keep clean, dry & intact     General comments for venous / lymphedema ulcers:  *  Elevate your legs to the level of your heart for at least 30 minutes, several times daily.  *  Walk as much as you can tolerate. Avoid standing for long periods of time, but if you must stand, regularly do heel-raises and calf-pump

## 2024-03-23 PROBLEM — L30.9 ACUTE DERMATITIS: Status: ACTIVE | Noted: 2024-03-23

## 2024-03-23 NOTE — PROGRESS NOTES
[facility] Wound Care Center Progress Note    Nohemi Nesbitt     : 1965    DATE OF VISIT:  3/20/2024    Subjective:     Nohemi Nesbitt is a 58 y.o. female who has a lymphedema and  post-infectious  ulcer located on the right lower leg. Significant symptoms or pertinent wound history since last visit: she was able to see her PCP just after leaving here last week; labs were unremarkable, thankfully; an approx 2-week course of tapering oral steroids was prescribed for the dermatitis, along with some Zyrtec (she didn't take the latter, took Benadryl instead). Pruritus definitely better this week, rash seems to be fading somewhat, especially the marked area on the left lower leg where she did apply some clobetasol this week. RLE pain maybe a bit better, not a lot of drainage.     Additional ulcer(s) noted? no.     Her current medication list consists of DULoxetine, Multiple Vitamin, aspirin, furosemide, gabapentin, indomethacin, omeprazole, oxyBUTYnin, predniSONE, and tiZANidine.    Allergies: Iodides, Docusate sodium, and Scopolamine    Objective:     Vitals:    24 0843   BP: (!) 151/77   Pulse: 67   Resp: 18   Temp: 98.1 °F (36.7 °C)   TempSrc: Oral   Weight: 132.7 kg (292 lb 9.6 oz)   Height: 1.6 m (5' 3\")     Constitutional:  well-developed, well-nourished, overweight, not in too much pain  Psychiatric:  oriented to person, place and time; mood and affect appropriate for the situation   Cardiovascular:  bilateral pedal pulses palpable; mild left lower extremity edema, milder right lower leg this week, thigh slowly better too  Lymphatic:  no inguinal or popliteal adenopathy, no angitis, no cellulitis   Still no icterus, thrush, oral ulcers, conjunctivitis  Musculoskeletal:  no clubbing, cyanosis or petechiae; RLE and LLE with no gross effusions, joint misalignment or acute arthritis  Neuro: intact RLE sensation, some  allodynia at the right leg lesions  General skin exam: LLE dermatitis perhaps

## 2024-03-27 ENCOUNTER — HOSPITAL ENCOUNTER (OUTPATIENT)
Dept: WOUND CARE | Age: 59
Discharge: HOME | End: 2024-03-27
Attending: INTERNAL MEDICINE
Payer: MEDICARE

## 2024-03-27 VITALS
SYSTOLIC BLOOD PRESSURE: 169 MMHG | HEART RATE: 100 BPM | RESPIRATION RATE: 20 BRPM | HEIGHT: 63 IN | BODY MASS INDEX: 51.91 KG/M2 | WEIGHT: 293 LBS | TEMPERATURE: 97.1 F | DIASTOLIC BLOOD PRESSURE: 91 MMHG

## 2024-03-27 DIAGNOSIS — I89.0 LYMPHEDEMA OF RIGHT LOWER EXTREMITY: Primary | ICD-10-CM

## 2024-03-27 DIAGNOSIS — L97.912 ULCER OF RIGHT LEG, WITH FAT LAYER EXPOSED (HCC): ICD-10-CM

## 2024-03-27 PROCEDURE — 88305 TISSUE EXAM BY PATHOLOGIST: CPT

## 2024-03-27 PROCEDURE — 29581 APPL MULTLAYER CMPRN SYS LEG: CPT

## 2024-03-27 PROCEDURE — 11104 PUNCH BX SKIN SINGLE LESION: CPT

## 2024-03-27 RX ORDER — LIDOCAINE HYDROCHLORIDE 40 MG/ML
SOLUTION TOPICAL ONCE
OUTPATIENT
Start: 2024-03-27 | End: 2024-03-27

## 2024-03-27 RX ORDER — TRIAMCINOLONE ACETONIDE 1 MG/G
OINTMENT TOPICAL ONCE
OUTPATIENT
Start: 2024-03-27 | End: 2024-03-27

## 2024-03-27 RX ORDER — LIDOCAINE 50 MG/G
OINTMENT TOPICAL ONCE
Status: DISCONTINUED | OUTPATIENT
Start: 2024-03-27 | End: 2024-03-28 | Stop reason: HOSPADM

## 2024-03-27 RX ORDER — LIDOCAINE 50 MG/G
OINTMENT TOPICAL ONCE
OUTPATIENT
Start: 2024-03-27 | End: 2024-03-27

## 2024-03-27 RX ORDER — LIDOCAINE 40 MG/G
CREAM TOPICAL ONCE
Status: DISCONTINUED | OUTPATIENT
Start: 2024-03-27 | End: 2024-03-28 | Stop reason: HOSPADM

## 2024-03-27 RX ORDER — BACITRACIN ZINC AND POLYMYXIN B SULFATE 500; 1000 [USP'U]/G; [USP'U]/G
OINTMENT TOPICAL ONCE
OUTPATIENT
Start: 2024-03-27 | End: 2024-03-27

## 2024-03-27 RX ORDER — TRIAMCINOLONE ACETONIDE 1 MG/G
OINTMENT TOPICAL ONCE
Status: DISCONTINUED | OUTPATIENT
Start: 2024-03-27 | End: 2024-03-28 | Stop reason: HOSPADM

## 2024-03-27 RX ORDER — BACITRACIN ZINC AND POLYMYXIN B SULFATE 500; 1000 [USP'U]/G; [USP'U]/G
OINTMENT TOPICAL ONCE
Status: DISCONTINUED | OUTPATIENT
Start: 2024-03-27 | End: 2024-03-28 | Stop reason: HOSPADM

## 2024-03-27 RX ORDER — LIDOCAINE 40 MG/G
CREAM TOPICAL ONCE
OUTPATIENT
Start: 2024-03-27 | End: 2024-03-27

## 2024-03-27 RX ORDER — LIDOCAINE HYDROCHLORIDE 40 MG/ML
SOLUTION TOPICAL ONCE
Status: DISCONTINUED | OUTPATIENT
Start: 2024-03-27 | End: 2024-03-28 | Stop reason: HOSPADM

## 2024-03-27 ASSESSMENT — PAIN DESCRIPTION - LOCATION: LOCATION: LEG

## 2024-03-27 ASSESSMENT — PAIN DESCRIPTION - ORIENTATION: ORIENTATION: RIGHT

## 2024-03-27 ASSESSMENT — PAIN SCALES - GENERAL: PAINLEVEL_OUTOF10: 5

## 2024-03-27 ASSESSMENT — PAIN DESCRIPTION - ONSET: ONSET: ON-GOING

## 2024-03-27 ASSESSMENT — PAIN DESCRIPTION - PAIN TYPE: TYPE: CHRONIC PAIN

## 2024-03-27 ASSESSMENT — PAIN DESCRIPTION - DESCRIPTORS: DESCRIPTORS: SHARP

## 2024-03-27 ASSESSMENT — PAIN - FUNCTIONAL ASSESSMENT: PAIN_FUNCTIONAL_ASSESSMENT: ACTIVITIES ARE NOT PREVENTED

## 2024-03-27 ASSESSMENT — PAIN DESCRIPTION - FREQUENCY: FREQUENCY: CONTINUOUS

## 2024-03-27 NOTE — PLAN OF CARE
New blistering, rash & erythema noted on right foot today. Pt. States the rash had improved then returned when Prednisone stopped. LLE injected with 2 cc's Lidocaine 1% per Dr Wylie & punch biopsy obtained. Pt. To start Amoxicillin as previously written 12/2023, pt. States she never took it & still has the full script-Dr Wylie advised to start as previously ordered, pt. Verbalizes understanding. Right leg wounds stable, no debridement, will cont. With current wound care regime with dressings & compression wrap for edema control. F/u in [facility] in 1 week as ordered, pt. Aware to call sooner with any changes or questions/concerns. Discharge instructions reviewed with patient & brother-in-law, all questions answered, copy given to patient. Dressings were applied to all wounds per M.D. Instructions at this visit.

## 2024-03-28 NOTE — DISCHARGE INSTRUCTIONS
Wound Care Center Physician Orders and Discharge Instructions  [facility]  [address], [address]  [place] [id number]  Telephone: [phone]      Fax: [phone]        Your home care company:   N/a .     Your wound-care supplies will be provided by:  Wound Care .     NAME:  Nohemi Nesbitt   YOB: 1965  PRIMARY DIAGNOSIS FOR WOUND CARE CENTER:  Infection & Lymphedema .     Wound cleansing:   Do not scrub or use excessive force.  Wash hands with soap and water before and after dressing changes.  Prior to applying a clean dressing, cleanse wound with normal saline, wound cleanser, or mild soap and water. Ask your physician or nurse before getting the wound(s) wet in the shower.                Wound care for home:     Left Medial Calf Biopsy site:  NO dressing needed at this time        Right lower leg wounds:   Nexodyn spray to all wounds  Clobetasol/calmoseptine to wound edges and any purple irritation   Santyl to wound  Saline moistened 2x2's  Cover with 4x4's  Specialist to build up as needed   Foam to anterior ankle  AccuWrap Lite compression wrap with Nylon (LITE compression)  Tensoplast  Keep in place for the week. Keep clean, dry & intact     General comments for venous / lymphedema ulcers:  *  Elevate your legs to the level of your heart for at least 30 minutes, several times daily.  *  Walk as much as you can tolerate. Avoid standing for long periods of time, but if you must stand, regularly do heel-raises and calf-pump exercises.  *  If you have compression garments that can be changed regularly, apply them first thing in the morning, and remove them when you go to bed. Moisturize your skin at bedtime, with Vaseline, Aquaphor, Aveeno, CeraVe, Cetaphil, Eucerin, Lubriderm, etc; but keep the skin between your toes dry.  *  Be sure to adhere to any recommendations from your PCP about diuretics (water pills), diet, exercise, and maintaining a healthy weight. If

## 2024-04-03 ENCOUNTER — HOSPITAL ENCOUNTER (OUTPATIENT)
Dept: WOUND CARE | Age: 59
Discharge: HOME | End: 2024-04-03
Attending: INTERNAL MEDICINE
Payer: MEDICARE

## 2024-04-03 VITALS
DIASTOLIC BLOOD PRESSURE: 81 MMHG | SYSTOLIC BLOOD PRESSURE: 150 MMHG | WEIGHT: 293 LBS | RESPIRATION RATE: 18 BRPM | TEMPERATURE: 97.9 F | BODY MASS INDEX: 51.91 KG/M2 | HEIGHT: 63 IN | HEART RATE: 71 BPM

## 2024-04-03 DIAGNOSIS — L97.912 ULCER OF RIGHT LEG, WITH FAT LAYER EXPOSED (HCC): ICD-10-CM

## 2024-04-03 DIAGNOSIS — I77.6 SMALL VESSEL VASCULITIS (HCC): Primary | ICD-10-CM

## 2024-04-03 DIAGNOSIS — I89.0 LYMPHEDEMA OF RIGHT LOWER EXTREMITY: ICD-10-CM

## 2024-04-03 DIAGNOSIS — L88 PYODERMA GANGRENOSUM: ICD-10-CM

## 2024-04-03 LAB
ALBUMIN SERPL-MCNC: 4.1 G/DL (ref 3.4–5)
ALBUMIN/GLOB SERPL: 1.3 {RATIO} (ref 1.1–2.2)
ALP SERPL-CCNC: 104 U/L (ref 40–129)
ALT SERPL-CCNC: 10 U/L (ref 10–40)
ANION GAP SERPL CALCULATED.3IONS-SCNC: 12 MMOL/L (ref 3–16)
AST SERPL-CCNC: 16 U/L (ref 15–37)
BASOPHILS # BLD: 0.1 K/UL (ref 0–0.2)
BASOPHILS NFR BLD: 1.1 %
BILIRUB SERPL-MCNC: <0.2 MG/DL (ref 0–1)
BILIRUB UR QL STRIP.AUTO: NEGATIVE
BUN SERPL-MCNC: 21 MG/DL (ref 7–20)
C3 SERPL-MCNC: 180.6 MG/DL (ref 90–180)
C4 SERPL-MCNC: 40.9 MG/DL (ref 10–40)
CALCIUM SERPL-MCNC: 9.6 MG/DL (ref 8.3–10.6)
CHLORIDE SERPL-SCNC: 104 MMOL/L (ref 99–110)
CLARITY UR: CLEAR
CO2 SERPL-SCNC: 26 MMOL/L (ref 21–32)
COLOR UR: YELLOW
CREAT SERPL-MCNC: 0.6 MG/DL (ref 0.6–1.2)
DEPRECATED RDW RBC AUTO: 15.4 % (ref 12.4–15.4)
EOSINOPHIL # BLD: 0.3 K/UL (ref 0–0.6)
EOSINOPHIL NFR BLD: 4.8 %
EPI CELLS #/AREA URNS HPF: ABNORMAL /HPF (ref 0–5)
GFR SERPLBLD CREATININE-BSD FMLA CKD-EPI: >90 ML/MIN/{1.73_M2}
GLUCOSE SERPL-MCNC: 88 MG/DL (ref 70–99)
GLUCOSE UR STRIP.AUTO-MCNC: NEGATIVE MG/DL
HBV SURFACE AB SERPL IA-ACNC: <3.5 MIU/ML
HBV SURFACE AG SERPL QL IA: NORMAL
HCT VFR BLD AUTO: 33.6 % (ref 36–48)
HCV AB SERPL QL IA: NORMAL
HGB BLD-MCNC: 11.4 G/DL (ref 12–16)
HGB UR QL STRIP.AUTO: NEGATIVE
KETONES UR STRIP.AUTO-MCNC: NEGATIVE MG/DL
LEUKOCYTE ESTERASE UR QL STRIP.AUTO: ABNORMAL
LYMPHOCYTES # BLD: 1.2 K/UL (ref 1–5.1)
LYMPHOCYTES NFR BLD: 21.2 %
MCH RBC QN AUTO: 29.5 PG (ref 26–34)
MCHC RBC AUTO-ENTMCNC: 33.9 G/DL (ref 31–36)
MCV RBC AUTO: 87.2 FL (ref 80–100)
MONOCYTES # BLD: 0.6 K/UL (ref 0–1.3)
MONOCYTES NFR BLD: 10 %
NEUTROPHILS # BLD: 3.6 K/UL (ref 1.7–7.7)
NEUTROPHILS NFR BLD: 62.9 %
NITRITE UR QL STRIP.AUTO: NEGATIVE
PH UR STRIP.AUTO: 6 [PH] (ref 5–8)
PLATELET # BLD AUTO: 264 K/UL (ref 135–450)
PMV BLD AUTO: 7.9 FL (ref 5–10.5)
POTASSIUM SERPL-SCNC: 4.1 MMOL/L (ref 3.5–5.1)
PROT SERPL-MCNC: 7.3 G/DL (ref 6.4–8.2)
PROT UR STRIP.AUTO-MCNC: NEGATIVE MG/DL
RBC # BLD AUTO: 3.85 M/UL (ref 4–5.2)
RBC #/AREA URNS HPF: ABNORMAL /HPF (ref 0–4)
RHEUMATOID FACT SER IA-ACNC: <10 IU/ML
SODIUM SERPL-SCNC: 142 MMOL/L (ref 136–145)
SP GR UR STRIP.AUTO: 1.01 (ref 1–1.03)
UA DIPSTICK W REFLEX MICRO PNL UR: YES
URN SPEC COLLECT METH UR: ABNORMAL
UROBILINOGEN UR STRIP-ACNC: 0.2 E.U./DL
WBC # BLD AUTO: 5.8 K/UL (ref 4–11)
WBC #/AREA URNS HPF: ABNORMAL /HPF (ref 0–5)

## 2024-04-03 PROCEDURE — 80053 COMPREHEN METABOLIC PANEL: CPT

## 2024-04-03 PROCEDURE — 85025 COMPLETE CBC W/AUTO DIFF WBC: CPT

## 2024-04-03 PROCEDURE — 86701 HIV-1ANTIBODY: CPT

## 2024-04-03 PROCEDURE — 86255 FLUORESCENT ANTIBODY SCREEN: CPT

## 2024-04-03 PROCEDURE — 86706 HEP B SURFACE ANTIBODY: CPT

## 2024-04-03 PROCEDURE — 82595 ASSAY OF CRYOGLOBULIN: CPT

## 2024-04-03 PROCEDURE — 86431 RHEUMATOID FACTOR QUANT: CPT

## 2024-04-03 PROCEDURE — 87340 HEPATITIS B SURFACE AG IA: CPT

## 2024-04-03 PROCEDURE — 86038 ANTINUCLEAR ANTIBODIES: CPT

## 2024-04-03 PROCEDURE — 86162 COMPLEMENT TOTAL (CH50): CPT

## 2024-04-03 PROCEDURE — 86702 HIV-2 ANTIBODY: CPT

## 2024-04-03 PROCEDURE — 81001 URINALYSIS AUTO W/SCOPE: CPT

## 2024-04-03 PROCEDURE — 36415 COLL VENOUS BLD VENIPUNCTURE: CPT

## 2024-04-03 PROCEDURE — 29581 APPL MULTLAYER CMPRN SYS LEG: CPT

## 2024-04-03 PROCEDURE — 87390 HIV-1 AG IA: CPT

## 2024-04-03 PROCEDURE — 86803 HEPATITIS C AB TEST: CPT

## 2024-04-03 PROCEDURE — 86704 HEP B CORE ANTIBODY TOTAL: CPT

## 2024-04-03 PROCEDURE — 86160 COMPLEMENT ANTIGEN: CPT

## 2024-04-03 RX ORDER — TRIAMCINOLONE ACETONIDE 1 MG/G
OINTMENT TOPICAL ONCE
Status: DISCONTINUED | OUTPATIENT
Start: 2024-04-03 | End: 2024-04-04 | Stop reason: HOSPADM

## 2024-04-03 RX ORDER — BACITRACIN ZINC AND POLYMYXIN B SULFATE 500; 1000 [USP'U]/G; [USP'U]/G
OINTMENT TOPICAL ONCE
Status: DISCONTINUED | OUTPATIENT
Start: 2024-04-03 | End: 2024-04-04 | Stop reason: HOSPADM

## 2024-04-03 RX ORDER — TRIAMCINOLONE ACETONIDE 1 MG/G
OINTMENT TOPICAL ONCE
OUTPATIENT
Start: 2024-04-03 | End: 2024-04-03

## 2024-04-03 RX ORDER — LIDOCAINE 40 MG/G
CREAM TOPICAL ONCE
OUTPATIENT
Start: 2024-04-03 | End: 2024-04-03

## 2024-04-03 RX ORDER — BACITRACIN ZINC AND POLYMYXIN B SULFATE 500; 1000 [USP'U]/G; [USP'U]/G
OINTMENT TOPICAL ONCE
OUTPATIENT
Start: 2024-04-03 | End: 2024-04-03

## 2024-04-03 RX ORDER — LIDOCAINE 50 MG/G
OINTMENT TOPICAL ONCE
OUTPATIENT
Start: 2024-04-03 | End: 2024-04-03

## 2024-04-03 RX ORDER — LIDOCAINE 40 MG/G
CREAM TOPICAL ONCE
Status: DISCONTINUED | OUTPATIENT
Start: 2024-04-03 | End: 2024-04-04 | Stop reason: HOSPADM

## 2024-04-03 RX ORDER — LIDOCAINE 50 MG/G
OINTMENT TOPICAL ONCE
Status: DISCONTINUED | OUTPATIENT
Start: 2024-04-03 | End: 2024-04-04 | Stop reason: HOSPADM

## 2024-04-03 RX ORDER — LIDOCAINE HYDROCHLORIDE 40 MG/ML
SOLUTION TOPICAL ONCE
OUTPATIENT
Start: 2024-04-03 | End: 2024-04-03

## 2024-04-03 RX ORDER — LIDOCAINE HYDROCHLORIDE 40 MG/ML
SOLUTION TOPICAL ONCE
Status: DISCONTINUED | OUTPATIENT
Start: 2024-04-03 | End: 2024-04-04 | Stop reason: HOSPADM

## 2024-04-03 NOTE — PLAN OF CARE
Dr Wylie discussed biopsy results today. Blood work & urine sample obtained today. Rash showing some improvement this week. Pt. To finish the Amoxicillin & Diflucan as directed. Wounds stable, no debridement. No dressing needed to biopsy site. Will cont. With current wound care regime & compression wrap for edema control to right lower leg. F/u in [facility] in 1 week as ordered, pt. Aware to call sooner with any changes or questions/concerns. Discharge instructions reviewed with patient, all questions answered, copy given to patient. Dressings were applied to all wounds per M.D. Instructions at this visit.

## 2024-04-04 LAB
ANA SER QL IA: NEGATIVE
COMPLEMENT ACTIVITY, TOTAL TURBIDIMETRIC: >95 U/ML (ref 38.7–89.9)
HBV CORE AB SERPL QL IA: NEGATIVE
HIV 1+2 AB+HIV1 P24 AG SERPL QL IA: NORMAL
HIV 2 AB SERPL QL IA: NORMAL
HIV1 AB SERPL QL IA: NORMAL
HIV1 P24 AG SERPL QL IA: NORMAL

## 2024-04-04 NOTE — DISCHARGE INSTRUCTIONS
Wound Care Center Physician Orders and Discharge Instructions  [facility]  [address], [address]  [place] [id number]  Telephone: [phone]      Fax: [phone]        Your home care company:   N/a .     Your wound-care supplies will be provided by:  [facility] .     NAME:  Nohemi Nesbitt   YOB: 1965  PRIMARY DIAGNOSIS FOR WOUND CARE CENTER:  Infection & Lymphedema .     Wound cleansing:   Do not scrub or use excessive force.  Wash hands with soap and water before and after dressing changes.  Prior to applying a clean dressing, cleanse wound with normal saline, wound cleanser, or mild soap and water. Ask your physician or nurse before getting the wound(s) wet in the shower.                Wound care for home:     Left leg biopsy site:  Apply antibiotic ointment, cover with bandaid, change once daily     Right lower leg wounds:   Nexodyn spray to all wounds  Clobetasol/calmoseptine to wound edges    Santyl to wound  Saline moistened 2x2's  Cover with 4x4's  Specialist to build up as needed   Foam to anterior ankle  AccuWrap Lite compression wrap with Nylon (LITE compression)  Tensoplast  Keep in place for the week. Keep clean, dry & intact     General comments for venous / lymphedema ulcers:  *  Elevate your legs to the level of your heart for at least 30 minutes, several times daily.  *  Walk as much as you can tolerate. Avoid standing for long periods of time, but if you must stand, regularly do heel-raises and calf-pump exercises.  *  If you have compression garments that can be changed regularly, apply them first thing in the morning, and remove them when you go to bed. Moisturize your skin at bedtime, with Vaseline, Aquaphor, Aveeno, CeraVe, Cetaphil, Eucerin, Lubriderm, etc; but keep the skin between your toes dry.  *  Be sure to adhere to any recommendations from your PCP about diuretics (water pills), diet, exercise, and maintaining a healthy weight. If you

## 2024-04-05 PROBLEM — M31.0 LEUKOCYTOCLASTIC VASCULITIS (HCC): Status: ACTIVE | Noted: 2024-03-23

## 2024-04-06 PROBLEM — L88 PYODERMA GANGRENOSUM: Status: ACTIVE | Noted: 2024-04-06

## 2024-04-09 LAB — CRYOGLOB SER QL: NORMAL

## 2024-04-10 ENCOUNTER — HOSPITAL ENCOUNTER (OUTPATIENT)
Age: 59
Discharge: HOME | End: 2024-04-10
Payer: MEDICARE

## 2024-04-10 ENCOUNTER — HOSPITAL ENCOUNTER (OUTPATIENT)
Dept: WOUND CARE | Age: 59
Discharge: HOME | End: 2024-04-10
Attending: INTERNAL MEDICINE
Payer: MEDICARE

## 2024-04-10 VITALS
SYSTOLIC BLOOD PRESSURE: 127 MMHG | TEMPERATURE: 97.3 F | BODY MASS INDEX: 51.17 KG/M2 | WEIGHT: 288.8 LBS | HEIGHT: 63 IN | HEART RATE: 73 BPM | RESPIRATION RATE: 18 BRPM | DIASTOLIC BLOOD PRESSURE: 75 MMHG

## 2024-04-10 DIAGNOSIS — L97.912 ULCER OF RIGHT LEG, WITH FAT LAYER EXPOSED (HCC): ICD-10-CM

## 2024-04-10 DIAGNOSIS — I77.6 SMALL VESSEL VASCULITIS (HCC): ICD-10-CM

## 2024-04-10 DIAGNOSIS — I89.0 LYMPHEDEMA OF RIGHT LOWER EXTREMITY: Primary | ICD-10-CM

## 2024-04-10 PROCEDURE — 29581 APPL MULTLAYER CMPRN SYS LEG: CPT

## 2024-04-10 PROCEDURE — 86255 FLUORESCENT ANTIBODY SCREEN: CPT

## 2024-04-10 RX ORDER — BACITRACIN ZINC AND POLYMYXIN B SULFATE 500; 1000 [USP'U]/G; [USP'U]/G
OINTMENT TOPICAL ONCE
OUTPATIENT
Start: 2024-04-10 | End: 2024-04-10

## 2024-04-10 RX ORDER — LIDOCAINE 50 MG/G
OINTMENT TOPICAL ONCE
Status: DISCONTINUED | OUTPATIENT
Start: 2024-04-10 | End: 2024-04-11 | Stop reason: HOSPADM

## 2024-04-10 RX ORDER — LIDOCAINE 40 MG/G
CREAM TOPICAL ONCE
OUTPATIENT
Start: 2024-04-10 | End: 2024-04-10

## 2024-04-10 RX ORDER — LIDOCAINE HYDROCHLORIDE 40 MG/ML
SOLUTION TOPICAL ONCE
OUTPATIENT
Start: 2024-04-10 | End: 2024-04-10

## 2024-04-10 RX ORDER — TRIAMCINOLONE ACETONIDE 1 MG/G
OINTMENT TOPICAL ONCE
OUTPATIENT
Start: 2024-04-10 | End: 2024-04-10

## 2024-04-10 RX ORDER — LIDOCAINE 40 MG/G
CREAM TOPICAL ONCE
Status: DISCONTINUED | OUTPATIENT
Start: 2024-04-10 | End: 2024-04-11 | Stop reason: HOSPADM

## 2024-04-10 RX ORDER — LIDOCAINE 50 MG/G
OINTMENT TOPICAL ONCE
OUTPATIENT
Start: 2024-04-10 | End: 2024-04-10

## 2024-04-10 RX ORDER — LIDOCAINE HYDROCHLORIDE 40 MG/ML
SOLUTION TOPICAL ONCE
Status: DISCONTINUED | OUTPATIENT
Start: 2024-04-10 | End: 2024-04-11 | Stop reason: HOSPADM

## 2024-04-10 RX ORDER — BACITRACIN ZINC AND POLYMYXIN B SULFATE 500; 1000 [USP'U]/G; [USP'U]/G
OINTMENT TOPICAL ONCE
Status: DISCONTINUED | OUTPATIENT
Start: 2024-04-10 | End: 2024-04-11 | Stop reason: HOSPADM

## 2024-04-10 RX ORDER — TRIAMCINOLONE ACETONIDE 1 MG/G
OINTMENT TOPICAL ONCE
Status: DISCONTINUED | OUTPATIENT
Start: 2024-04-10 | End: 2024-04-11 | Stop reason: HOSPADM

## 2024-04-10 NOTE — PLAN OF CARE
Pt. States itching from rash minimal this past week. Pt. Also states pain improved in right leg wound. Dr Wylie discussed lab results, pt. Verbalizes understanding. Pt. Will also have ANCA blood work obtained today that did not get completed last week. Dr Wylie also discussed taper down of Gabapentin, pt. Agreeable & verbalizes understanding of taper dose. Right leg wound stable & showing improvement, no debridement. Will cont. With current wound care regime with dressings & compression wrap for edema control on right leg. Reinforced importance of exercise, elevation & compression to help with circulation, edema control & wound healing. F/u in WCC in 1 week as ordered, pt. Aware to call sooner with any changes or questions/concerns. Discharge instructions reviewed with patient & spouse, all questions answered, copy given to patient. Dressings were applied to all wounds per M.D. Instructions at this visit.

## 2024-04-11 NOTE — DISCHARGE INSTRUCTIONS
hospital socks today, be sure to remove them at least once a day to inspect your toes or feet, even if you're not changing the wraps or dressings underneath. If you see anything concerning (redness, excess moisture, etc), please call and let us know right away.     Should you experience any significant changes in your wound(s) (including redness, increased warmth, increased pain, increased drainage, odor, or fever) or have questions about your wound care, please contact the [facility] at [phone] Monday-Thursday from 8:00 am - 4:30 pm, or Friday from 8:00 am - 2:30 pm.  If you need help with your wound outside these hours and cannot wait until we are again available, contact your home-care company (if applicable), your PCP, or go to the nearest emergency room.

## 2024-04-16 PROBLEM — L97.911 ULCER OF RIGHT LEG, LIMITED TO BREAKDOWN OF SKIN (HCC): Status: RESOLVED | Noted: 2024-01-20 | Resolved: 2024-04-16

## 2024-04-17 ENCOUNTER — HOSPITAL ENCOUNTER (OUTPATIENT)
Dept: WOUND CARE | Age: 59
Discharge: HOME | End: 2024-04-17
Attending: INTERNAL MEDICINE
Payer: MEDICARE

## 2024-04-17 VITALS
HEIGHT: 63 IN | TEMPERATURE: 98.2 F | SYSTOLIC BLOOD PRESSURE: 126 MMHG | DIASTOLIC BLOOD PRESSURE: 68 MMHG | BODY MASS INDEX: 51.88 KG/M2 | RESPIRATION RATE: 18 BRPM | WEIGHT: 292.8 LBS | HEART RATE: 60 BPM

## 2024-04-17 DIAGNOSIS — I89.0 LYMPHEDEMA OF RIGHT LOWER EXTREMITY: Primary | ICD-10-CM

## 2024-04-17 DIAGNOSIS — L97.912 ULCER OF RIGHT LEG, WITH FAT LAYER EXPOSED (HCC): ICD-10-CM

## 2024-04-17 PROCEDURE — 29581 APPL MULTLAYER CMPRN SYS LEG: CPT

## 2024-04-17 RX ORDER — LIDOCAINE HYDROCHLORIDE 40 MG/ML
SOLUTION TOPICAL ONCE
OUTPATIENT
Start: 2024-04-17 | End: 2024-04-17

## 2024-04-17 RX ORDER — LIDOCAINE 50 MG/G
OINTMENT TOPICAL ONCE
Status: DISCONTINUED | OUTPATIENT
Start: 2024-04-17 | End: 2024-04-18 | Stop reason: HOSPADM

## 2024-04-17 RX ORDER — BACITRACIN ZINC AND POLYMYXIN B SULFATE 500; 1000 [USP'U]/G; [USP'U]/G
OINTMENT TOPICAL ONCE
Status: DISCONTINUED | OUTPATIENT
Start: 2024-04-17 | End: 2024-04-18 | Stop reason: HOSPADM

## 2024-04-17 RX ORDER — LIDOCAINE 40 MG/G
CREAM TOPICAL ONCE
OUTPATIENT
Start: 2024-04-17 | End: 2024-04-17

## 2024-04-17 RX ORDER — LIDOCAINE 40 MG/G
CREAM TOPICAL ONCE
Status: DISCONTINUED | OUTPATIENT
Start: 2024-04-17 | End: 2024-04-18 | Stop reason: HOSPADM

## 2024-04-17 RX ORDER — BACITRACIN ZINC AND POLYMYXIN B SULFATE 500; 1000 [USP'U]/G; [USP'U]/G
OINTMENT TOPICAL ONCE
OUTPATIENT
Start: 2024-04-17 | End: 2024-04-17

## 2024-04-17 RX ORDER — TRIAMCINOLONE ACETONIDE 1 MG/G
OINTMENT TOPICAL ONCE
OUTPATIENT
Start: 2024-04-17 | End: 2024-04-17

## 2024-04-17 RX ORDER — LIDOCAINE 50 MG/G
OINTMENT TOPICAL ONCE
OUTPATIENT
Start: 2024-04-17 | End: 2024-04-17

## 2024-04-17 RX ORDER — TRIAMCINOLONE ACETONIDE 1 MG/G
OINTMENT TOPICAL ONCE
Status: DISCONTINUED | OUTPATIENT
Start: 2024-04-17 | End: 2024-04-18 | Stop reason: HOSPADM

## 2024-04-17 RX ORDER — LIDOCAINE HYDROCHLORIDE 40 MG/ML
SOLUTION TOPICAL ONCE
Status: DISCONTINUED | OUTPATIENT
Start: 2024-04-17 | End: 2024-04-18 | Stop reason: HOSPADM

## 2024-04-17 NOTE — PLAN OF CARE
Leg wound stable without much change this week, no debridement. Will cont. With current wound care regime with dressings & compression wrap for edema control. Reinforced importance of exercise, elevation & compression to help with circulation, edema control & wound healing. Dr Wylie discussed ordering velcro compression garment for the right leg to use when healed for long-term edema control. Patients insurance does not cover velcro compression garments. Dr Wylie will provide ordering information next week for patient to purchase a CircAid.     No new rash symptoms this week & pt. Denies any pain. Pt. States she did have the ANCA blood work drawn last week, but unable to see results. Dr Wylie will follow up with lab re: ANCA result.     F/u in C in 1 week as ordered, pt. Aware to call sooner with any changes or questions/concerns. Discharge instructions reviewed with patient & spouse, all questions answered, copy given to patient. Dressings were applied to all wounds per M.D. Instructions at this visit.

## 2024-04-18 NOTE — DISCHARGE INSTRUCTIONS
moisture, etc), please call and let us know right away.     Should you experience any significant changes in your wound(s) (including redness, increased warmth, increased pain, increased drainage, odor, or fever) or have questions about your wound care, please contact the [facility] at [phone] Monday-Thursday from 8:00 am - 4:30 pm, or Friday from 8:00 am - 2:30 pm.  If you need help with your wound outside these hours and cannot wait until we are again available, contact your home-care company (if applicable), your PCP, or go to the nearest emergency room.

## 2024-04-24 ENCOUNTER — HOSPITAL ENCOUNTER (OUTPATIENT)
Dept: WOUND CARE | Age: 59
Discharge: HOME | End: 2024-04-24
Attending: INTERNAL MEDICINE
Payer: MEDICARE

## 2024-04-24 VITALS
SYSTOLIC BLOOD PRESSURE: 129 MMHG | DIASTOLIC BLOOD PRESSURE: 81 MMHG | HEART RATE: 60 BPM | BODY MASS INDEX: 51.94 KG/M2 | RESPIRATION RATE: 18 BRPM | TEMPERATURE: 97.7 F | WEIGHT: 293 LBS

## 2024-04-24 DIAGNOSIS — I89.0 LYMPHEDEMA OF RIGHT LOWER EXTREMITY: Primary | ICD-10-CM

## 2024-04-24 DIAGNOSIS — L97.912 ULCER OF RIGHT LEG, WITH FAT LAYER EXPOSED (HCC): ICD-10-CM

## 2024-04-24 PROCEDURE — 29581 APPL MULTLAYER CMPRN SYS LEG: CPT

## 2024-04-24 RX ORDER — GABAPENTIN 400 MG/1
CAPSULE ORAL
Qty: 90 CAPSULE | Refills: 0 | Status: SHIPPED | OUTPATIENT
Start: 2024-04-24 | End: 2024-05-24

## 2024-04-24 RX ORDER — TRIAMCINOLONE ACETONIDE 1 MG/G
OINTMENT TOPICAL ONCE
OUTPATIENT
Start: 2024-04-24 | End: 2024-04-24

## 2024-04-24 RX ORDER — LIDOCAINE 40 MG/G
CREAM TOPICAL ONCE
Status: DISCONTINUED | OUTPATIENT
Start: 2024-04-24 | End: 2024-04-25 | Stop reason: HOSPADM

## 2024-04-24 RX ORDER — LIDOCAINE HYDROCHLORIDE 40 MG/ML
SOLUTION TOPICAL ONCE
Status: DISCONTINUED | OUTPATIENT
Start: 2024-04-24 | End: 2024-04-25 | Stop reason: HOSPADM

## 2024-04-24 RX ORDER — TRIAMCINOLONE ACETONIDE 1 MG/G
OINTMENT TOPICAL ONCE
Status: DISCONTINUED | OUTPATIENT
Start: 2024-04-24 | End: 2024-04-25 | Stop reason: HOSPADM

## 2024-04-24 RX ORDER — BACITRACIN ZINC AND POLYMYXIN B SULFATE 500; 1000 [USP'U]/G; [USP'U]/G
OINTMENT TOPICAL ONCE
OUTPATIENT
Start: 2024-04-24 | End: 2024-04-24

## 2024-04-24 RX ORDER — LIDOCAINE 50 MG/G
OINTMENT TOPICAL ONCE
OUTPATIENT
Start: 2024-04-24 | End: 2024-04-24

## 2024-04-24 RX ORDER — LIDOCAINE 40 MG/G
CREAM TOPICAL ONCE
OUTPATIENT
Start: 2024-04-24 | End: 2024-04-24

## 2024-04-24 RX ORDER — BACITRACIN ZINC AND POLYMYXIN B SULFATE 500; 1000 [USP'U]/G; [USP'U]/G
OINTMENT TOPICAL ONCE
Status: DISCONTINUED | OUTPATIENT
Start: 2024-04-24 | End: 2024-04-25 | Stop reason: HOSPADM

## 2024-04-24 RX ORDER — LIDOCAINE 50 MG/G
OINTMENT TOPICAL ONCE
Status: DISCONTINUED | OUTPATIENT
Start: 2024-04-24 | End: 2024-04-25 | Stop reason: HOSPADM

## 2024-04-24 RX ORDER — LIDOCAINE HYDROCHLORIDE 40 MG/ML
SOLUTION TOPICAL ONCE
OUTPATIENT
Start: 2024-04-24 | End: 2024-04-24

## 2024-04-24 NOTE — FLOWSHEET NOTE
Clobetasol/PSO to any scabbed or inflamed areas below knee, Xeroform to these same areas, Specialist cast pad toes to knee, Single layer medium compression medigrip toes to knee, Applied to left lower leg.

## 2024-04-24 NOTE — PLAN OF CARE
Wound stable, no debridement. New pinpoint inflammatory ulcers distal of right lower leg wound that is new this week-will treat this with Calmoseptine/Clobetasol. Pt. Also noted to have new scabbed areas on left leg & bilateral knees/lower thighs. Pt. States they started over the weekend as blisters & itching. This does not appear as the same rash as previously noted a few weeks ago. Will apply Clobetasol/PSO then Xeroform to any scabbed or inflamed areas on left leg & place specialist cast pad with single medigrip for the week. F/u in C in 1 week as ordered, pt. Aware to call sooner with any changes or questions/concerns. Discharge instructions reviewed with patient, all questions answered, copy given to patient. Dressings were applied to all wounds per M.D. Instructions at this visit.

## 2024-04-25 NOTE — DISCHARGE INSTRUCTIONS
or fever) or have questions about your wound care, please contact the [facility] at [phone] Monday-Thursday from 8:00 am - 4:30 pm, or Friday from 8:00 am - 2:30 pm.  If you need help with your wound outside these hours and cannot wait until we are again available, contact your home-care company (if applicable), your PCP, or go to the nearest emergency room.

## 2024-05-01 ENCOUNTER — HOSPITAL ENCOUNTER (OUTPATIENT)
Dept: WOUND CARE | Age: 59
Discharge: HOME | End: 2024-05-01
Attending: INTERNAL MEDICINE
Payer: MEDICARE

## 2024-05-01 VITALS
HEIGHT: 63 IN | BODY MASS INDEX: 51.91 KG/M2 | RESPIRATION RATE: 18 BRPM | DIASTOLIC BLOOD PRESSURE: 64 MMHG | HEART RATE: 70 BPM | SYSTOLIC BLOOD PRESSURE: 129 MMHG | TEMPERATURE: 98 F | WEIGHT: 293 LBS

## 2024-05-01 DIAGNOSIS — L97.912 ULCER OF RIGHT LEG, WITH FAT LAYER EXPOSED (HCC): ICD-10-CM

## 2024-05-01 DIAGNOSIS — I89.0 LYMPHEDEMA OF RIGHT LOWER EXTREMITY: Primary | ICD-10-CM

## 2024-05-01 PROCEDURE — 29581 APPL MULTLAYER CMPRN SYS LEG: CPT

## 2024-05-01 RX ORDER — LIDOCAINE HYDROCHLORIDE 40 MG/ML
SOLUTION TOPICAL ONCE
Status: DISCONTINUED | OUTPATIENT
Start: 2024-05-01 | End: 2024-05-02 | Stop reason: HOSPADM

## 2024-05-01 RX ORDER — LIDOCAINE 40 MG/G
CREAM TOPICAL ONCE
Status: DISCONTINUED | OUTPATIENT
Start: 2024-05-01 | End: 2024-05-02 | Stop reason: HOSPADM

## 2024-05-01 RX ORDER — BACITRACIN ZINC AND POLYMYXIN B SULFATE 500; 1000 [USP'U]/G; [USP'U]/G
OINTMENT TOPICAL ONCE
Status: DISCONTINUED | OUTPATIENT
Start: 2024-05-01 | End: 2024-05-02 | Stop reason: HOSPADM

## 2024-05-01 RX ORDER — LIDOCAINE HYDROCHLORIDE 40 MG/ML
SOLUTION TOPICAL ONCE
OUTPATIENT
Start: 2024-05-01 | End: 2024-05-01

## 2024-05-01 RX ORDER — LIDOCAINE 50 MG/G
OINTMENT TOPICAL ONCE
OUTPATIENT
Start: 2024-05-01 | End: 2024-05-01

## 2024-05-01 RX ORDER — LIDOCAINE 40 MG/G
CREAM TOPICAL ONCE
OUTPATIENT
Start: 2024-05-01 | End: 2024-05-01

## 2024-05-01 RX ORDER — TRIAMCINOLONE ACETONIDE 1 MG/G
OINTMENT TOPICAL ONCE
OUTPATIENT
Start: 2024-05-01 | End: 2024-05-01

## 2024-05-01 RX ORDER — BACITRACIN ZINC AND POLYMYXIN B SULFATE 500; 1000 [USP'U]/G; [USP'U]/G
OINTMENT TOPICAL ONCE
OUTPATIENT
Start: 2024-05-01 | End: 2024-05-01

## 2024-05-01 RX ORDER — TRIAMCINOLONE ACETONIDE 1 MG/G
OINTMENT TOPICAL ONCE
Status: DISCONTINUED | OUTPATIENT
Start: 2024-05-01 | End: 2024-05-02 | Stop reason: HOSPADM

## 2024-05-01 RX ORDER — LIDOCAINE 50 MG/G
OINTMENT TOPICAL ONCE
Status: DISCONTINUED | OUTPATIENT
Start: 2024-05-01 | End: 2024-05-02 | Stop reason: HOSPADM

## 2024-05-01 NOTE — PLAN OF CARE
Wound stable & showing some improvement, no debridement. Will stop Clobetasol this week. Otherwise, cont. With current wound care regime with dressings & compression wrap for edema control. Reinforced importance of exercise, elevation & compression to help with circulation, edema control & wound healing.     Left leg with new area of purple petechaie distal of knee. Pt. States this area started on Monday. Pt. To apply antibiotic ointment, Vaseline or Aquaphor to scabbed areas.     F/u in WCC in 1 week as ordered, pt. Aware to call sooner with any changes or questions/concerns. Discharge instructions reviewed with patient & spouse, all questions answered, copy given to patient. Dressings were applied to all wounds per M.D. Instructions at this visit.

## 2024-05-02 NOTE — DISCHARGE INSTRUCTIONS
Wound Care Center Physician Orders and Discharge Instructions  [facility]  [address], [address]  [place] [id number]  Telephone: [phone]      Fax: [phone]        Your home care company:   N/a .     Your wound-care supplies will be provided by:  Wound Care .     NAME:  Nohemi Nesbitt   YOB: 1965  PRIMARY DIAGNOSIS FOR WOUND CARE CENTER:  Infection & Lymphedema .     Wound cleansing:   Do not scrub or use excessive force.  Wash hands with soap and water before and after dressing changes.  Prior to applying a clean dressing, cleanse wound with normal saline, wound cleanser, or mild soap and water. Ask your physician or nurse before getting the wound(s) wet in the shower.                Wound care for home:     Left lower leg:  Antibiotic ointment, Vaseline or Aquaphor to scattered scabbed areas        Right lower leg wounds:   Nexodyn spray to all wounds  Clobetasol to anything purple  Triad/PSO to small open areas  Cover with 4x4's  Specialist to build up as needed   Foam to anterior ankle  AccuWrap Lite compression wrap with Nylon (LITE compression)  Tensoplast  Keep in place for the week. Keep clean, dry & intact     General comments for venous / lymphedema ulcers:  *  Elevate your legs to the level of your heart for at least 30 minutes, several times daily.  *  Walk as much as you can tolerate. Avoid standing for long periods of time, but if you must stand, regularly do heel-raises and calf-pump exercises.  *  If you have compression garments that can be changed regularly, apply them first thing in the morning, and remove them when you go to bed. Moisturize your skin at bedtime, with Vaseline, Aquaphor, Aveeno, CeraVe, Cetaphil, Eucerin, Lubriderm, etc; but keep the skin between your toes dry.  *  Be sure to adhere to any recommendations from your PCP about diuretics (water pills), diet, exercise, and maintaining a healthy weight. If you have questions,

## 2024-05-08 ENCOUNTER — HOSPITAL ENCOUNTER (OUTPATIENT)
Dept: WOUND CARE | Age: 59
Discharge: HOME | End: 2024-05-08
Attending: INTERNAL MEDICINE
Payer: MEDICARE

## 2024-05-08 VITALS
WEIGHT: 293 LBS | DIASTOLIC BLOOD PRESSURE: 58 MMHG | HEART RATE: 93 BPM | HEIGHT: 63 IN | SYSTOLIC BLOOD PRESSURE: 97 MMHG | BODY MASS INDEX: 51.91 KG/M2 | RESPIRATION RATE: 18 BRPM | TEMPERATURE: 100.2 F

## 2024-05-08 DIAGNOSIS — L97.912 ULCER OF RIGHT LEG, WITH FAT LAYER EXPOSED (HCC): ICD-10-CM

## 2024-05-08 DIAGNOSIS — I89.0 LYMPHEDEMA OF RIGHT LOWER EXTREMITY: Primary | ICD-10-CM

## 2024-05-08 PROCEDURE — 2W1QX6Z COMPRESSION OF RIGHT LOWER LEG USING PRESSURE DRESSING: ICD-10-PCS | Performed by: INTERNAL MEDICINE

## 2024-05-08 PROCEDURE — 29581 APPL MULTLAYER CMPRN SYS LEG: CPT

## 2024-05-08 RX ORDER — LIDOCAINE HYDROCHLORIDE 40 MG/ML
SOLUTION TOPICAL ONCE
Status: DISCONTINUED | OUTPATIENT
Start: 2024-05-08 | End: 2024-05-09 | Stop reason: HOSPADM

## 2024-05-08 RX ORDER — LIDOCAINE 50 MG/G
OINTMENT TOPICAL ONCE
Status: DISCONTINUED | OUTPATIENT
Start: 2024-05-08 | End: 2024-05-09 | Stop reason: HOSPADM

## 2024-05-08 RX ORDER — TRIAMCINOLONE ACETONIDE 1 MG/G
OINTMENT TOPICAL ONCE
OUTPATIENT
Start: 2024-05-08 | End: 2024-05-08

## 2024-05-08 RX ORDER — LIDOCAINE 50 MG/G
OINTMENT TOPICAL ONCE
OUTPATIENT
Start: 2024-05-08 | End: 2024-05-08

## 2024-05-08 RX ORDER — LIDOCAINE 40 MG/G
CREAM TOPICAL ONCE
Status: DISCONTINUED | OUTPATIENT
Start: 2024-05-08 | End: 2024-05-09 | Stop reason: HOSPADM

## 2024-05-08 RX ORDER — BACITRACIN ZINC AND POLYMYXIN B SULFATE 500; 1000 [USP'U]/G; [USP'U]/G
OINTMENT TOPICAL ONCE
OUTPATIENT
Start: 2024-05-08 | End: 2024-05-08

## 2024-05-08 RX ORDER — BACITRACIN ZINC AND POLYMYXIN B SULFATE 500; 1000 [USP'U]/G; [USP'U]/G
OINTMENT TOPICAL ONCE
Status: DISCONTINUED | OUTPATIENT
Start: 2024-05-08 | End: 2024-05-09 | Stop reason: HOSPADM

## 2024-05-08 RX ORDER — LIDOCAINE HYDROCHLORIDE 40 MG/ML
SOLUTION TOPICAL ONCE
OUTPATIENT
Start: 2024-05-08 | End: 2024-05-08

## 2024-05-08 RX ORDER — TRIAMCINOLONE ACETONIDE 1 MG/G
OINTMENT TOPICAL ONCE
Status: DISCONTINUED | OUTPATIENT
Start: 2024-05-08 | End: 2024-05-09 | Stop reason: HOSPADM

## 2024-05-08 RX ORDER — LIDOCAINE 40 MG/G
CREAM TOPICAL ONCE
OUTPATIENT
Start: 2024-05-08 | End: 2024-05-08

## 2024-05-08 ASSESSMENT — PAIN DESCRIPTION - FREQUENCY: FREQUENCY: CONTINUOUS

## 2024-05-08 ASSESSMENT — PAIN DESCRIPTION - LOCATION: LOCATION: LEG

## 2024-05-08 ASSESSMENT — PAIN DESCRIPTION - DESCRIPTORS: DESCRIPTORS: DISCOMFORT;SORE

## 2024-05-08 ASSESSMENT — PAIN DESCRIPTION - PAIN TYPE: TYPE: CHRONIC PAIN

## 2024-05-08 ASSESSMENT — PAIN DESCRIPTION - ONSET: ONSET: ON-GOING

## 2024-05-08 ASSESSMENT — PAIN SCALES - GENERAL: PAINLEVEL_OUTOF10: 5

## 2024-05-08 ASSESSMENT — PAIN - FUNCTIONAL ASSESSMENT: PAIN_FUNCTIONAL_ASSESSMENT: ACTIVITIES ARE NOT PREVENTED

## 2024-05-08 ASSESSMENT — PAIN DESCRIPTION - ORIENTATION: ORIENTATION: RIGHT

## 2024-05-08 NOTE — PLAN OF CARE
Pt. With new cellulitis to right thigh & knee area. Pt. States she had fever, chills, & overall not feeling well, leg hot to touch. Pt. To take Amoxicillin & Clindamycin as directed. Dr Wylie also advised to hold Lasix x2 days & increase fluid intake for the next couple of days, pt. Verbalizes understanding.     Right leg wound stable, no debridement. Will add Clobetasol to anything purple & change to using Triad/PSO to wound (d/t Santyl not being available). Continue with compression wrap for edema control. Reinforced importance of elevation & compression to help with circulation, edema control & wound healing.     F/u in WCC in 1 week as ordered, pt. Aware to call sooner with any changes or questions/concerns. Discharge instructions reviewed with patient, all questions answered, copy given to patient. Dressings were applied to all wounds per M.D. Instructions at this visit. are

## 2024-05-09 ENCOUNTER — HOSPITAL ENCOUNTER (INPATIENT)
Age: 59
LOS: 4 days | Discharge: HOME | DRG: 872 | End: 2024-05-13
Attending: STUDENT IN AN ORGANIZED HEALTH CARE EDUCATION/TRAINING PROGRAM | Admitting: INTERNAL MEDICINE
Payer: MEDICARE

## 2024-05-09 ENCOUNTER — APPOINTMENT (OUTPATIENT)
Dept: GENERAL RADIOLOGY | Age: 59
DRG: 872 | End: 2024-05-09
Payer: MEDICARE

## 2024-05-09 DIAGNOSIS — L88 PYODERMA GANGRENOSUM: ICD-10-CM

## 2024-05-09 DIAGNOSIS — A40.0 SEPSIS DUE TO GROUP A STREPTOCOCCUS WITHOUT ACUTE ORGAN DYSFUNCTION (HCC): ICD-10-CM

## 2024-05-09 DIAGNOSIS — R01.1 HEART MURMUR: ICD-10-CM

## 2024-05-09 DIAGNOSIS — I50.22 CHRONIC SYSTOLIC CONGESTIVE HEART FAILURE (HCC): ICD-10-CM

## 2024-05-09 DIAGNOSIS — L03.115 CELLULITIS OF RIGHT LOWER EXTREMITY: Primary | ICD-10-CM

## 2024-05-09 PROBLEM — E87.1 HYPONATREMIA: Status: ACTIVE | Noted: 2024-05-09

## 2024-05-09 PROBLEM — L03.90 CELLULITIS: Status: ACTIVE | Noted: 2024-05-09

## 2024-05-09 LAB
ALBUMIN SERPL-MCNC: 3 G/DL (ref 3.4–5)
ALBUMIN/GLOB SERPL: 0.8 {RATIO} (ref 1.1–2.2)
ALP SERPL-CCNC: 100 U/L (ref 40–129)
ALT SERPL-CCNC: 74 U/L (ref 10–40)
ANION GAP SERPL CALCULATED.3IONS-SCNC: 16 MMOL/L (ref 3–16)
AST SERPL-CCNC: 119 U/L (ref 15–37)
BACTERIA URNS QL MICRO: ABNORMAL /HPF
BASOPHILS # BLD: 0 K/UL (ref 0–0.2)
BASOPHILS NFR BLD: 0.1 %
BILIRUB SERPL-MCNC: 0.6 MG/DL (ref 0–1)
BILIRUB UR QL STRIP.AUTO: NEGATIVE
BUN SERPL-MCNC: 15 MG/DL (ref 7–20)
CALCIUM SERPL-MCNC: 8.8 MG/DL (ref 8.3–10.6)
CHLORIDE SERPL-SCNC: 99 MMOL/L (ref 99–110)
CLARITY UR: ABNORMAL
CO2 SERPL-SCNC: 18 MMOL/L (ref 21–32)
COLOR UR: YELLOW
CREAT SERPL-MCNC: 0.6 MG/DL (ref 0.6–1.2)
CRP SERPL-MCNC: 287.7 MG/L (ref 0–5.1)
DEPRECATED RDW RBC AUTO: 16.9 % (ref 12.4–15.4)
EOSINOPHIL # BLD: 0 K/UL (ref 0–0.6)
EOSINOPHIL NFR BLD: 0 %
EPI CELLS #/AREA URNS HPF: ABNORMAL /HPF (ref 0–5)
ERYTHROCYTE [SEDIMENTATION RATE] IN BLOOD BY WESTERGREN METHOD: 71 MM/HR (ref 0–30)
GFR SERPLBLD CREATININE-BSD FMLA CKD-EPI: >90 ML/MIN/{1.73_M2}
GLUCOSE SERPL-MCNC: 117 MG/DL (ref 70–99)
GLUCOSE UR STRIP.AUTO-MCNC: NEGATIVE MG/DL
HCT VFR BLD AUTO: 37.6 % (ref 36–48)
HGB BLD-MCNC: 12.1 G/DL (ref 12–16)
HGB UR QL STRIP.AUTO: NEGATIVE
HYALINE CASTS #/AREA URNS LPF: ABNORMAL /LPF (ref 0–2)
KETONES UR STRIP.AUTO-MCNC: 15 MG/DL
LACTATE BLDV-SCNC: 1.5 MMOL/L (ref 0.4–1.9)
LEFT VENTRICULAR EJECTION FRACTION MODE: NORMAL
LEUKOCYTE ESTERASE UR QL STRIP.AUTO: NEGATIVE
LV EF: NORMAL %
LYMPHOCYTES # BLD: 0.3 K/UL (ref 1–5.1)
LYMPHOCYTES NFR BLD: 2.7 %
MCH RBC QN AUTO: 27.9 PG (ref 26–34)
MCHC RBC AUTO-ENTMCNC: 32.3 G/DL (ref 31–36)
MCV RBC AUTO: 86.3 FL (ref 80–100)
MONOCYTES # BLD: 0.3 K/UL (ref 0–1.3)
MONOCYTES NFR BLD: 2 %
NEUTROPHILS # BLD: 11.8 K/UL (ref 1.7–7.7)
NEUTROPHILS NFR BLD: 95.2 %
NITRITE UR QL STRIP.AUTO: NEGATIVE
PH UR STRIP.AUTO: 6 [PH] (ref 5–8)
PLATELET # BLD AUTO: 228 K/UL (ref 135–450)
PMV BLD AUTO: 7.8 FL (ref 5–10.5)
POTASSIUM SERPL-SCNC: 3.8 MMOL/L (ref 3.5–5.1)
PROCALCITONIN SERPL IA-MCNC: 2.31 NG/ML (ref 0–0.15)
PROT SERPL-MCNC: 6.6 G/DL (ref 6.4–8.2)
PROT UR STRIP.AUTO-MCNC: 100 MG/DL
RBC # BLD AUTO: 4.35 M/UL (ref 4–5.2)
RBC #/AREA URNS HPF: ABNORMAL /HPF (ref 0–4)
RENAL EPI CELLS #/AREA UR COMP ASSIST: ABNORMAL /HPF (ref 0–1)
SARS-COV-2 RDRP RESP QL NAA+PROBE: NOT DETECTED
SODIUM SERPL-SCNC: 133 MMOL/L (ref 136–145)
SP GR UR STRIP.AUTO: 1.02 (ref 1–1.03)
UA COMPLETE W REFLEX CULTURE PNL UR: ABNORMAL
UA DIPSTICK W REFLEX MICRO PNL UR: YES
URN SPEC COLLECT METH UR: ABNORMAL
UROBILINOGEN UR STRIP-ACNC: 0.2 E.U./DL
WBC # BLD AUTO: 12.4 K/UL (ref 4–11)
WBC #/AREA URNS HPF: ABNORMAL /HPF (ref 0–5)

## 2024-05-09 PROCEDURE — 1200000000 HC SEMI PRIVATE

## 2024-05-09 PROCEDURE — 6360000002 HC RX W HCPCS

## 2024-05-09 PROCEDURE — 85025 COMPLETE CBC W/AUTO DIFF WBC: CPT

## 2024-05-09 PROCEDURE — 99223 1ST HOSP IP/OBS HIGH 75: CPT

## 2024-05-09 PROCEDURE — 73552 X-RAY EXAM OF FEMUR 2/>: CPT

## 2024-05-09 PROCEDURE — 85652 RBC SED RATE AUTOMATED: CPT

## 2024-05-09 PROCEDURE — 96366 THER/PROPH/DIAG IV INF ADDON: CPT

## 2024-05-09 PROCEDURE — 6370000000 HC RX 637 (ALT 250 FOR IP)

## 2024-05-09 PROCEDURE — 83605 ASSAY OF LACTIC ACID: CPT

## 2024-05-09 PROCEDURE — 36415 COLL VENOUS BLD VENIPUNCTURE: CPT

## 2024-05-09 PROCEDURE — 80053 COMPREHEN METABOLIC PANEL: CPT

## 2024-05-09 PROCEDURE — 2580000003 HC RX 258: Performed by: NURSE PRACTITIONER

## 2024-05-09 PROCEDURE — 2580000003 HC RX 258

## 2024-05-09 PROCEDURE — 96365 THER/PROPH/DIAG IV INF INIT: CPT

## 2024-05-09 PROCEDURE — 87040 BLOOD CULTURE FOR BACTERIA: CPT

## 2024-05-09 PROCEDURE — 86140 C-REACTIVE PROTEIN: CPT

## 2024-05-09 PROCEDURE — 81001 URINALYSIS AUTO W/SCOPE: CPT

## 2024-05-09 PROCEDURE — 96367 TX/PROPH/DG ADDL SEQ IV INF: CPT

## 2024-05-09 PROCEDURE — 99285 EMERGENCY DEPT VISIT HI MDM: CPT

## 2024-05-09 PROCEDURE — 6360000002 HC RX W HCPCS: Performed by: NURSE PRACTITIONER

## 2024-05-09 PROCEDURE — 87635 SARS-COV-2 COVID-19 AMP PRB: CPT

## 2024-05-09 PROCEDURE — 84145 PROCALCITONIN (PCT): CPT

## 2024-05-09 RX ORDER — MAGNESIUM SULFATE 1 G/100ML
1000 INJECTION INTRAVENOUS PRN
Status: DISCONTINUED | OUTPATIENT
Start: 2024-05-09 | End: 2024-05-13 | Stop reason: HOSPADM

## 2024-05-09 RX ORDER — PANTOPRAZOLE SODIUM 40 MG/1
40 TABLET, DELAYED RELEASE ORAL
Status: DISCONTINUED | OUTPATIENT
Start: 2024-05-10 | End: 2024-05-13 | Stop reason: HOSPADM

## 2024-05-09 RX ORDER — CLINDAMYCIN PHOSPHATE 600 MG/50ML
600 INJECTION, SOLUTION INTRAVENOUS EVERY 8 HOURS
Status: DISCONTINUED | OUTPATIENT
Start: 2024-05-09 | End: 2024-05-13

## 2024-05-09 RX ORDER — LACTOBACILLUS RHAMNOSUS GG 10B CELL
1 CAPSULE ORAL
Status: DISCONTINUED | OUTPATIENT
Start: 2024-05-10 | End: 2024-05-13 | Stop reason: HOSPADM

## 2024-05-09 RX ORDER — OXYBUTYNIN CHLORIDE 5 MG/1
5 TABLET ORAL 2 TIMES DAILY
Status: DISCONTINUED | OUTPATIENT
Start: 2024-05-09 | End: 2024-05-13 | Stop reason: HOSPADM

## 2024-05-09 RX ORDER — GABAPENTIN 400 MG/1
400 CAPSULE ORAL 3 TIMES DAILY
Status: DISCONTINUED | OUTPATIENT
Start: 2024-05-09 | End: 2024-05-13 | Stop reason: HOSPADM

## 2024-05-09 RX ORDER — OXYCODONE HYDROCHLORIDE AND ACETAMINOPHEN 5; 325 MG/1; MG/1
1 TABLET ORAL EVERY 6 HOURS PRN
Status: DISCONTINUED | OUTPATIENT
Start: 2024-05-09 | End: 2024-05-13 | Stop reason: HOSPADM

## 2024-05-09 RX ORDER — DULOXETIN HYDROCHLORIDE 60 MG/1
120 CAPSULE, DELAYED RELEASE ORAL DAILY
Status: DISCONTINUED | OUTPATIENT
Start: 2024-05-09 | End: 2024-05-13 | Stop reason: HOSPADM

## 2024-05-09 RX ORDER — TIZANIDINE 4 MG/1
8 TABLET ORAL NIGHTLY
Status: DISCONTINUED | OUTPATIENT
Start: 2024-05-09 | End: 2024-05-13 | Stop reason: HOSPADM

## 2024-05-09 RX ORDER — SODIUM CHLORIDE 0.9 % (FLUSH) 0.9 %
10 SYRINGE (ML) INJECTION PRN
Status: DISCONTINUED | OUTPATIENT
Start: 2024-05-09 | End: 2024-05-13 | Stop reason: HOSPADM

## 2024-05-09 RX ORDER — ASPIRIN 81 MG/1
81 TABLET, CHEWABLE ORAL DAILY
Status: DISCONTINUED | OUTPATIENT
Start: 2024-05-09 | End: 2024-05-13 | Stop reason: HOSPADM

## 2024-05-09 RX ORDER — ONDANSETRON 2 MG/ML
4 INJECTION INTRAMUSCULAR; INTRAVENOUS EVERY 6 HOURS PRN
Status: DISCONTINUED | OUTPATIENT
Start: 2024-05-09 | End: 2024-05-13 | Stop reason: HOSPADM

## 2024-05-09 RX ORDER — POTASSIUM CHLORIDE 7.45 MG/ML
10 INJECTION INTRAVENOUS PRN
Status: DISCONTINUED | OUTPATIENT
Start: 2024-05-09 | End: 2024-05-13 | Stop reason: HOSPADM

## 2024-05-09 RX ORDER — SODIUM CHLORIDE 0.9 % (FLUSH) 0.9 %
5-40 SYRINGE (ML) INJECTION EVERY 12 HOURS SCHEDULED
Status: DISCONTINUED | OUTPATIENT
Start: 2024-05-09 | End: 2024-05-13 | Stop reason: HOSPADM

## 2024-05-09 RX ORDER — ENOXAPARIN SODIUM 100 MG/ML
30 INJECTION SUBCUTANEOUS 2 TIMES DAILY
Status: DISCONTINUED | OUTPATIENT
Start: 2024-05-09 | End: 2024-05-13 | Stop reason: HOSPADM

## 2024-05-09 RX ORDER — BISACODYL 5 MG/1
5 TABLET, DELAYED RELEASE ORAL DAILY PRN
Status: DISCONTINUED | OUTPATIENT
Start: 2024-05-09 | End: 2024-05-13 | Stop reason: HOSPADM

## 2024-05-09 RX ORDER — SODIUM CHLORIDE 9 MG/ML
INJECTION, SOLUTION INTRAVENOUS CONTINUOUS
Status: ACTIVE | OUTPATIENT
Start: 2024-05-09 | End: 2024-05-10

## 2024-05-09 RX ORDER — DIPHENHYDRAMINE HYDROCHLORIDE 50 MG/ML
12.5 INJECTION INTRAMUSCULAR; INTRAVENOUS
Status: ACTIVE | OUTPATIENT
Start: 2024-05-09 | End: 2024-05-10

## 2024-05-09 RX ORDER — ACETAMINOPHEN 325 MG/1
650 TABLET ORAL EVERY 6 HOURS PRN
Status: DISCONTINUED | OUTPATIENT
Start: 2024-05-09 | End: 2024-05-13 | Stop reason: HOSPADM

## 2024-05-09 RX ORDER — SODIUM CHLORIDE 9 MG/ML
INJECTION, SOLUTION INTRAVENOUS PRN
Status: DISCONTINUED | OUTPATIENT
Start: 2024-05-09 | End: 2024-05-13 | Stop reason: HOSPADM

## 2024-05-09 RX ORDER — POTASSIUM CHLORIDE 20 MEQ/1
40 TABLET, EXTENDED RELEASE ORAL PRN
Status: DISCONTINUED | OUTPATIENT
Start: 2024-05-09 | End: 2024-05-13 | Stop reason: HOSPADM

## 2024-05-09 RX ORDER — ONDANSETRON 4 MG/1
4 TABLET, ORALLY DISINTEGRATING ORAL EVERY 8 HOURS PRN
Status: DISCONTINUED | OUTPATIENT
Start: 2024-05-09 | End: 2024-05-13 | Stop reason: HOSPADM

## 2024-05-09 RX ORDER — ACETAMINOPHEN 650 MG/1
650 SUPPOSITORY RECTAL EVERY 6 HOURS PRN
Status: DISCONTINUED | OUTPATIENT
Start: 2024-05-09 | End: 2024-05-13 | Stop reason: HOSPADM

## 2024-05-09 RX ADMIN — ENOXAPARIN SODIUM 30 MG: 100 INJECTION SUBCUTANEOUS at 22:14

## 2024-05-09 RX ADMIN — CLINDAMYCIN PHOSPHATE 600 MG: 600 INJECTION, SOLUTION INTRAVENOUS at 23:26

## 2024-05-09 RX ADMIN — ASPIRIN 81 MG: 81 TABLET, CHEWABLE ORAL at 18:09

## 2024-05-09 RX ADMIN — GABAPENTIN 400 MG: 400 CAPSULE ORAL at 22:13

## 2024-05-09 RX ADMIN — SODIUM CHLORIDE: 0.9 INJECTION, SOLUTION INTRAVENOUS at 21:30

## 2024-05-09 RX ADMIN — CEFEPIME 2000 MG: 2 INJECTION, POWDER, FOR SOLUTION INTRAVENOUS at 11:37

## 2024-05-09 RX ADMIN — DULOXETINE HYDROCHLORIDE 120 MG: 60 CAPSULE, DELAYED RELEASE ORAL at 18:09

## 2024-05-09 RX ADMIN — VANCOMYCIN HYDROCHLORIDE 2000 MG: 10 INJECTION, POWDER, LYOPHILIZED, FOR SOLUTION INTRAVENOUS at 12:26

## 2024-05-09 RX ADMIN — OXYBUTYNIN CHLORIDE 5 MG: 5 TABLET ORAL at 22:13

## 2024-05-09 RX ADMIN — CEFTRIAXONE SODIUM 1000 MG: 1 INJECTION, POWDER, FOR SOLUTION INTRAMUSCULAR; INTRAVENOUS at 22:22

## 2024-05-09 RX ADMIN — TIZANIDINE 8 MG: 4 TABLET ORAL at 22:13

## 2024-05-09 RX ADMIN — ACETAMINOPHEN 650 MG: 325 TABLET ORAL at 22:17

## 2024-05-09 RX ADMIN — GABAPENTIN 400 MG: 400 CAPSULE ORAL at 18:09

## 2024-05-09 ASSESSMENT — PAIN DESCRIPTION - ORIENTATION
ORIENTATION: RIGHT;LEFT
ORIENTATION: RIGHT
ORIENTATION: RIGHT;MID

## 2024-05-09 ASSESSMENT — PAIN SCALES - GENERAL
PAINLEVEL_OUTOF10: 2
PAINLEVEL_OUTOF10: 5
PAINLEVEL_OUTOF10: 2
PAINLEVEL_OUTOF10: 6
PAINLEVEL_OUTOF10: 5

## 2024-05-09 ASSESSMENT — PAIN DESCRIPTION - DESCRIPTORS
DESCRIPTORS: ACHING
DESCRIPTORS: ACHING;CRAMPING
DESCRIPTORS: ACHING;CRAMPING

## 2024-05-09 ASSESSMENT — PAIN - FUNCTIONAL ASSESSMENT
PAIN_FUNCTIONAL_ASSESSMENT: PREVENTS OR INTERFERES SOME ACTIVE ACTIVITIES AND ADLS
PAIN_FUNCTIONAL_ASSESSMENT: ACTIVITIES ARE NOT PREVENTED
PAIN_FUNCTIONAL_ASSESSMENT: 0-10

## 2024-05-09 ASSESSMENT — PAIN DESCRIPTION - PAIN TYPE: TYPE: ACUTE PAIN

## 2024-05-09 ASSESSMENT — PAIN DESCRIPTION - FREQUENCY: FREQUENCY: CONTINUOUS

## 2024-05-09 ASSESSMENT — PAIN DESCRIPTION - LOCATION
LOCATION: HEAD
LOCATION: BACK
LOCATION: LEG

## 2024-05-09 NOTE — ED PROVIDER NOTES
[facility] ED  EMERGENCY DEPARTMENT ENCOUNTER        Pt Name: Nohemi Nesbitt  MRN: [id number]  Birthdate 1965  Date of evaluation: 5/9/2024  Provider: BANDAR Brandt - CNP  PCP: Mariana Awan MD  Note Started: 10:05 AM EDT 5/9/24      [facility]. I have evaluated this patient.        CHIEF COMPLAINT       Chief Complaint   Patient presents with    Cellulitis     Pt to ED with complaint of cellulitis to right leg. Pt states symptoms started on Tuesday. Pt states she saw her wound care doctor yesterday and was instructed to be admitted, but patient decided to go home. Pt states she has been running fevers.        HISTORY OF PRESENT ILLNESS: 1 or more Elements     History From: Patient     Limitations to history : None    Social Determinants Significantly Affecting Health : None    Chief Complaint: Cellulitis of right leg     Nohemi Nesbitt is a 58 y.o. female who presents to the emergency department today with symptoms of cellulitis involving the right lower extremity.  Patient has symptoms of redness and swelling involving the thigh down to the shin.  States that she has a wound she is following wound care right lower leg.  States that the wound appears to be healing well.  Denies any drainage from it.  Is currently on amoxicillin.  Denies any neck pain or back pain.  Did have symptoms of fever.  No chest or abdominal pain.  No shortness of breath.    Nursing Notes were all reviewed and agreed with or any disagreements were addressed in the HPI.    REVIEW OF SYSTEMS :      Review of Systems    Positives and Pertinent negatives as per HPI.     SURGICAL HISTORY     Past Surgical History:   Procedure Laterality Date    CARPAL TUNNEL RELEASE Right     COLONOSCOPY  11/03/2017    IR MIDLINE CATH  11/27/2023    IR MIDLINE CATH 11/27/2023 [facility] SPECIAL PROCEDURES    LAPAROTOMY N/A 09/05/2021    RIGHT COLECTOMY, INCARCERATED VENTRAL INCISIONAL HERNIA REPAIR, OMPHALECTOMY, DRAINAGE OF INTRA

## 2024-05-09 NOTE — ED PROVIDER NOTES
THIS IS MY [facility] SUPERVISORY AND SHARED VISIT NOTE:    I personally saw the patient and made/approved the management plan and take responsibility for the patient management.    Labs Reviewed   CBC WITH AUTO DIFFERENTIAL - Abnormal; Notable for the following components:       Result Value    WBC 12.4 (*)     RDW 16.9 (*)     Neutrophils Absolute 11.8 (*)     Lymphocytes Absolute 0.3 (*)     All other components within normal limits   COMPREHENSIVE METABOLIC PANEL W/ REFLEX TO MG FOR LOW K - Abnormal; Notable for the following components:    Sodium 133 (*)     CO2 18 (*)     Glucose 117 (*)     Albumin 3.0 (*)     Albumin/Globulin Ratio 0.8 (*)     ALT 74 (*)      (*)     All other components within normal limits   SEDIMENTATION RATE - Abnormal; Notable for the following components:    Sed Rate 71 (*)     All other components within normal limits   C-REACTIVE PROTEIN - Abnormal; Notable for the following components:    .7 (*)     All other components within normal limits   COVID-19, RAPID   CULTURE, BLOOD 1   CULTURE, BLOOD 2   LACTATE, SEPSIS   URINALYSIS WITH REFLEX TO CULTURE     XR FEMUR RIGHT (MIN 2 VIEWS)   Final Result   1. No gas within the soft tissues.  No evidence of osteomyelitis.   2. Right hip and knee arthroplasties in good position.           History: Patient is a 58-year-old female, presenting with concerns for infection of the right leg.  Patient states symptoms been ongoing for the past 2 days and she initially saw her wound care doctor yesterday but did not come to the ER.  Has been having fevers and generalized malaise.    Exam: Patient is resting comfortably and is in no acute distress.  Heart regular rate and rhythm.  Lungs with auscultation bilaterally.  Does have erythema, warmth and tenderness to palpation of the right lower extremity from the ankle to the thigh.    MDM: Patient is a 58-year-old female, presenting with concerns for cellulitis to the right leg.  Upon arrival in

## 2024-05-09 NOTE — ED NOTES
Pt's  came up to this RN stating, \"are we going to get her admitted upstairs. What is taking so long. She obviously needs admitted\".  being very verbally aggressive with this RN. This RN informed that the patient was still currently an ER patient and once all the labs come back from our standpoint, our ER doc will talk to the hospital doctor and get her admitted to hospital. Pt  walked away from nurses station at this time.

## 2024-05-09 NOTE — ED NOTES
This RN attempted to collect second set of blood cultures. Pt is very difficult stick and this RN unsuccessful x 2. Pt states she has had midlines in the past. This RN to go ahead and start IV abx and additional RN to attempt for second set of blood cultures.

## 2024-05-09 NOTE — DISCHARGE INSTRUCTIONS
changing the wraps or dressings underneath. If you see anything concerning (redness, excess moisture, etc), please call and let us know right away.     Should you experience any significant changes in your wound(s) (including redness, increased warmth, increased pain, increased drainage, odor, or fever) or have questions about your wound care, please contact the [facility] at [phone] Monday-Thursday from 8:00 am - 4:30 pm, or Friday from 8:00 am - 2:30 pm.  If you need help with your wound outside these hours and cannot wait until we are again available, contact your home-care company (if applicable), your PCP, or go to the nearest emergency room.

## 2024-05-09 NOTE — H&P
[facility] Medicine History & Physical      PCP: Mariana Awan MD    Date of Admission: 5/9/2024    Date of Service: Pt seen/examined on 5/9/2024     Chief Complaint:    Chief Complaint   Patient presents with    Cellulitis     Pt to ED with complaint of cellulitis to right leg. Pt states symptoms started on Tuesday. Pt states she saw her wound care doctor yesterday and was instructed to be admitted, but patient decided to go home. Pt states she has been running fevers.          History Of Present Illness:      The patient is a 58 y.o. female with pmhx of cellulitis, lymphedema, FLORIAN, DVT who presented to [facility] ED with complaint of fever. Patient has been battling right lower extremity cellulitis and follows with Dr. Wylie at wound care, saw him yesterday and had low BP and fever. He sent her home on augmentin, then last night developed temp of 106. Also started having body aches, chills, poor appetite so she came into the ED today.   Does have mild pain in right leg with ambulation.       Past Medical History:        Diagnosis Date    Acute deep vein thrombosis (DVT) of left lower extremity ([facility]) 07/2015    Acute renal failure (ARF) ([facility]) 05/04/2015    d/t IV dye    Cellulitis of right lower extremity 11/26/2023    clinically very much seemed like Strep    Family history of factor V deficiency     brother and sister; pt tested does not have    Hematoma of right lower extremity 12/15/2023    following infection and superficial ulcers and increased lymphedema, + ASA therapy    Incarcerated ventral hernia 09/05/2021    MRSA infection of surgical site 11/01/2021    Perforated appendicitis 09/05/2021    Tobacco use        Past Surgical History:        Procedure Laterality Date    CARPAL TUNNEL RELEASE Right     COLONOSCOPY  11/03/2017    IR MIDLINE CATH  11/27/2023    IR MIDLINE CATH 11/27/2023 [facility] SPECIAL PROCEDURES    LAPAROTOMY N/A 09/05/2021    RIGHT COLECTOMY, INCARCERATED VENTRAL INCISIONAL

## 2024-05-10 ENCOUNTER — APPOINTMENT (OUTPATIENT)
Age: 59
DRG: 872 | End: 2024-05-10
Payer: MEDICARE

## 2024-05-10 PROBLEM — L03.119 RECURRENT CELLULITIS OF LOWER EXTREMITY: Status: ACTIVE | Noted: 2024-05-10

## 2024-05-10 PROBLEM — L03.119 RECURRENT CELLULITIS OF LOWER EXTREMITY: Status: ACTIVE | Noted: 2024-05-09

## 2024-05-10 PROBLEM — L03.115 CELLULITIS OF RIGHT LOWER EXTREMITY: Status: ACTIVE | Noted: 2024-05-09

## 2024-05-10 PROBLEM — R01.1 HEART MURMUR: Status: ACTIVE | Noted: 2024-05-10

## 2024-05-10 PROBLEM — L92.9 HYPERGRANULATION: Status: RESOLVED | Noted: 2024-01-27 | Resolved: 2024-05-10

## 2024-05-10 LAB
ALBUMIN SERPL-MCNC: 2.9 G/DL (ref 3.4–5)
ALP SERPL-CCNC: 99 U/L (ref 40–129)
ALT SERPL-CCNC: 53 U/L (ref 10–40)
ANION GAP SERPL CALCULATED.3IONS-SCNC: 10 MMOL/L (ref 3–16)
AST SERPL-CCNC: 57 U/L (ref 15–37)
BASOPHILS # BLD: 0 K/UL (ref 0–0.2)
BASOPHILS NFR BLD: 0.4 %
BILIRUB DIRECT SERPL-MCNC: <0.2 MG/DL (ref 0–0.3)
BILIRUB INDIRECT SERPL-MCNC: ABNORMAL MG/DL (ref 0–1)
BILIRUB SERPL-MCNC: 0.4 MG/DL (ref 0–1)
BUN SERPL-MCNC: 19 MG/DL (ref 7–20)
CALCIUM SERPL-MCNC: 8.6 MG/DL (ref 8.3–10.6)
CHLORIDE SERPL-SCNC: 100 MMOL/L (ref 99–110)
CO2 SERPL-SCNC: 24 MMOL/L (ref 21–32)
CREAT SERPL-MCNC: 0.8 MG/DL (ref 0.6–1.2)
DEPRECATED RDW RBC AUTO: 16.3 % (ref 12.4–15.4)
ECHO AO ROOT DIAM: 3.7 CM
ECHO AO ROOT INDEX: 1.62 CM/M2
ECHO AV MEAN GRADIENT: 7 MMHG
ECHO AV MEAN VELOCITY: 1.2 M/S
ECHO AV PEAK GRADIENT: 11 MMHG
ECHO AV PEAK VELOCITY: 1.6 M/S
ECHO AV VELOCITY RATIO: 0.81
ECHO AV VTI: 32.5 CM
ECHO BSA: 2.43 M2
ECHO EST RA PRESSURE: 8 MMHG
ECHO IVC EXP: 2 CM
ECHO LA AREA 2C: 29.2 CM2
ECHO LA AREA 4C: 27.8 CM2
ECHO LA MAJOR AXIS: 6.2 CM
ECHO LA MINOR AXIS: 7.7 CM
ECHO LA VOL BP: 103 ML (ref 22–52)
ECHO LA VOL MOD A2C: 89 ML (ref 22–52)
ECHO LA VOL MOD A4C: 97 ML (ref 22–52)
ECHO LA VOL/BSA BIPLANE: 45 ML/M2 (ref 16–34)
ECHO LA VOLUME INDEX MOD A2C: 39 ML/M2 (ref 16–34)
ECHO LA VOLUME INDEX MOD A4C: 43 ML/M2 (ref 16–34)
ECHO LV E' LATERAL VELOCITY: 12 CM/S
ECHO LV E' SEPTAL VELOCITY: 7 CM/S
ECHO LV FRACTIONAL SHORTENING: 21 % (ref 28–44)
ECHO LV INTERNAL DIMENSION DIASTOLE INDEX: 2.76 CM/M2
ECHO LV INTERNAL DIMENSION DIASTOLIC: 6.3 CM (ref 3.9–5.3)
ECHO LV INTERNAL DIMENSION SYSTOLIC INDEX: 2.19 CM/M2
ECHO LV INTERNAL DIMENSION SYSTOLIC: 5 CM
ECHO LV ISOVOLUMETRIC RELAXATION TIME (IVRT): 79 MS
ECHO LV IVSD: 0.9 CM (ref 0.6–0.9)
ECHO LV MASS 2D: 234.7 G (ref 67–162)
ECHO LV MASS INDEX 2D: 102.9 G/M2 (ref 43–95)
ECHO LV POSTERIOR WALL DIASTOLIC: 0.9 CM (ref 0.6–0.9)
ECHO LV RELATIVE WALL THICKNESS RATIO: 0.29
ECHO LVOT AV VTI INDEX: 0.7
ECHO LVOT MEAN GRADIENT: 4 MMHG
ECHO LVOT PEAK GRADIENT: 7 MMHG
ECHO LVOT PEAK VELOCITY: 1.3 M/S
ECHO LVOT VTI: 22.8 CM
ECHO MV A VELOCITY: 0.51 M/S
ECHO MV E DECELERATION TIME (DT): 129 MS
ECHO MV E VELOCITY: 0.78 M/S
ECHO MV E/A RATIO: 1.53
ECHO MV E/E' LATERAL: 6.5
ECHO MV E/E' RATIO (AVERAGED): 8.82
ECHO MV E/E' SEPTAL: 11.14
ECHO MV LVOT VTI INDEX: 0.89
ECHO MV MAX VELOCITY: 0.9 M/S
ECHO MV MEAN GRADIENT: 2 MMHG
ECHO MV MEAN VELOCITY: 0.6 M/S
ECHO MV PEAK GRADIENT: 3 MMHG
ECHO MV VTI: 20.2 CM
ECHO PV MAX VELOCITY: 0.9 M/S
ECHO PV MEAN GRADIENT: 2 MMHG
ECHO PV MEAN VELOCITY: 0.6 M/S
ECHO PV PEAK GRADIENT: 3 MMHG
ECHO PV VTI: 19.1 CM
ECHO RA AREA 4C: 18.2 CM2
ECHO RA END SYSTOLIC VOLUME APICAL 4 CHAMBER INDEX BSA: 22 ML/M2
ECHO RA VOLUME: 51 ML
ECHO RIGHT VENTRICULAR SYSTOLIC PRESSURE (RVSP): 48 MMHG
ECHO RV BASAL DIMENSION: 4.2 CM
ECHO RV FREE WALL PEAK S': 16 CM/S
ECHO RV LONGITUDINAL DIMENSION: 8.6 CM
ECHO RV MID DIMENSION: 3.5 CM
ECHO RV TAPSE: 3.2 CM (ref 1.7–?)
ECHO TV REGURGITANT MAX VELOCITY: 3.16 M/S
ECHO TV REGURGITANT PEAK GRADIENT: 40 MMHG
EOSINOPHIL # BLD: 0 K/UL (ref 0–0.6)
EOSINOPHIL NFR BLD: 0.1 %
GFR SERPLBLD CREATININE-BSD FMLA CKD-EPI: 84 ML/MIN/{1.73_M2}
GLUCOSE SERPL-MCNC: 95 MG/DL (ref 70–99)
HCT VFR BLD AUTO: 32.1 % (ref 36–48)
HGB BLD-MCNC: 10.7 G/DL (ref 12–16)
LYMPHOCYTES # BLD: 0.9 K/UL (ref 1–5.1)
LYMPHOCYTES NFR BLD: 10.4 %
MAGNESIUM SERPL-MCNC: 1.9 MG/DL (ref 1.8–2.4)
MCH RBC QN AUTO: 28.1 PG (ref 26–34)
MCHC RBC AUTO-ENTMCNC: 33.3 G/DL (ref 31–36)
MCV RBC AUTO: 84.3 FL (ref 80–100)
MONOCYTES # BLD: 0.4 K/UL (ref 0–1.3)
MONOCYTES NFR BLD: 4.3 %
NEUTROPHILS # BLD: 7 K/UL (ref 1.7–7.7)
NEUTROPHILS NFR BLD: 84.8 %
PLATELET # BLD AUTO: 203 K/UL (ref 135–450)
PMV BLD AUTO: 8 FL (ref 5–10.5)
POTASSIUM SERPL-SCNC: 3.5 MMOL/L (ref 3.5–5.1)
PROT SERPL-MCNC: 6.4 G/DL (ref 6.4–8.2)
RBC # BLD AUTO: 3.81 M/UL (ref 4–5.2)
SODIUM SERPL-SCNC: 134 MMOL/L (ref 136–145)
WBC # BLD AUTO: 8.3 K/UL (ref 4–11)

## 2024-05-10 PROCEDURE — 6360000002 HC RX W HCPCS

## 2024-05-10 PROCEDURE — 6370000000 HC RX 637 (ALT 250 FOR IP)

## 2024-05-10 PROCEDURE — 93306 TTE W/DOPPLER COMPLETE: CPT

## 2024-05-10 PROCEDURE — 1200000000 HC SEMI PRIVATE

## 2024-05-10 PROCEDURE — 2580000003 HC RX 258

## 2024-05-10 PROCEDURE — 85025 COMPLETE CBC W/AUTO DIFF WBC: CPT

## 2024-05-10 PROCEDURE — 93306 TTE W/DOPPLER COMPLETE: CPT | Performed by: INTERNAL MEDICINE

## 2024-05-10 PROCEDURE — 99221 1ST HOSP IP/OBS SF/LOW 40: CPT | Performed by: INTERNAL MEDICINE

## 2024-05-10 PROCEDURE — 97530 THERAPEUTIC ACTIVITIES: CPT

## 2024-05-10 PROCEDURE — 80048 BASIC METABOLIC PNL TOTAL CA: CPT

## 2024-05-10 PROCEDURE — 83735 ASSAY OF MAGNESIUM: CPT

## 2024-05-10 PROCEDURE — 99232 SBSQ HOSP IP/OBS MODERATE 35: CPT | Performed by: INTERNAL MEDICINE

## 2024-05-10 PROCEDURE — 97162 PT EVAL MOD COMPLEX 30 MIN: CPT

## 2024-05-10 PROCEDURE — 80076 HEPATIC FUNCTION PANEL: CPT

## 2024-05-10 PROCEDURE — 36415 COLL VENOUS BLD VENIPUNCTURE: CPT

## 2024-05-10 PROCEDURE — 97535 SELF CARE MNGMENT TRAINING: CPT

## 2024-05-10 PROCEDURE — 97166 OT EVAL MOD COMPLEX 45 MIN: CPT

## 2024-05-10 RX ADMIN — ENOXAPARIN SODIUM 30 MG: 100 INJECTION SUBCUTANEOUS at 21:28

## 2024-05-10 RX ADMIN — CLINDAMYCIN PHOSPHATE 600 MG: 600 INJECTION, SOLUTION INTRAVENOUS at 17:02

## 2024-05-10 RX ADMIN — TIZANIDINE 8 MG: 4 TABLET ORAL at 21:28

## 2024-05-10 RX ADMIN — ASPIRIN 81 MG: 81 TABLET, CHEWABLE ORAL at 08:47

## 2024-05-10 RX ADMIN — OXYBUTYNIN CHLORIDE 5 MG: 5 TABLET ORAL at 21:28

## 2024-05-10 RX ADMIN — OXYCODONE AND ACETAMINOPHEN 1 TABLET: 5; 325 TABLET ORAL at 21:27

## 2024-05-10 RX ADMIN — GABAPENTIN 400 MG: 400 CAPSULE ORAL at 13:56

## 2024-05-10 RX ADMIN — CLINDAMYCIN PHOSPHATE 600 MG: 600 INJECTION, SOLUTION INTRAVENOUS at 07:44

## 2024-05-10 RX ADMIN — OXYCODONE AND ACETAMINOPHEN 1 TABLET: 5; 325 TABLET ORAL at 13:54

## 2024-05-10 RX ADMIN — Medication 1 CAPSULE: at 08:47

## 2024-05-10 RX ADMIN — GABAPENTIN 400 MG: 400 CAPSULE ORAL at 08:48

## 2024-05-10 RX ADMIN — OXYBUTYNIN CHLORIDE 5 MG: 5 TABLET ORAL at 08:48

## 2024-05-10 RX ADMIN — ENOXAPARIN SODIUM 30 MG: 100 INJECTION SUBCUTANEOUS at 08:48

## 2024-05-10 RX ADMIN — CEFTRIAXONE SODIUM 1000 MG: 1 INJECTION, POWDER, FOR SOLUTION INTRAMUSCULAR; INTRAVENOUS at 17:50

## 2024-05-10 RX ADMIN — PANTOPRAZOLE SODIUM 40 MG: 40 TABLET, DELAYED RELEASE ORAL at 07:41

## 2024-05-10 RX ADMIN — DULOXETINE HYDROCHLORIDE 120 MG: 60 CAPSULE, DELAYED RELEASE ORAL at 08:47

## 2024-05-10 RX ADMIN — ACETAMINOPHEN 650 MG: 325 TABLET ORAL at 07:51

## 2024-05-10 RX ADMIN — GABAPENTIN 400 MG: 400 CAPSULE ORAL at 21:28

## 2024-05-10 ASSESSMENT — PAIN DESCRIPTION - LOCATION
LOCATION: HEAD
LOCATION: HEAD
LOCATION: LEG
LOCATION: LEG;HEAD

## 2024-05-10 ASSESSMENT — PAIN DESCRIPTION - DESCRIPTORS
DESCRIPTORS: ACHING
DESCRIPTORS: ACHING
DESCRIPTORS: ACHING;THROBBING

## 2024-05-10 ASSESSMENT — PAIN DESCRIPTION - ORIENTATION: ORIENTATION: RIGHT;LOWER

## 2024-05-10 ASSESSMENT — PAIN SCALES - GENERAL
PAINLEVEL_OUTOF10: 0
PAINLEVEL_OUTOF10: 7
PAINLEVEL_OUTOF10: 8

## 2024-05-10 ASSESSMENT — PAIN - FUNCTIONAL ASSESSMENT: PAIN_FUNCTIONAL_ASSESSMENT: ACTIVITIES ARE NOT PREVENTED

## 2024-05-10 NOTE — FLOWSHEET NOTE
05/09/24 1923   Vital Signs   Temp 99.1 °F (37.3 °C)   Temp Source Oral   Pulse 86   Heart Rate Source Monitor   Respirations 16   /65   MAP (Calculated) 80   BP Location Right lower arm   BP Method Automatic   Patient Position Semi fowlers   Opioid-Induced Sedation   POSS Score 1   Oxygen Therapy   SpO2 95 %   O2 Device None (Room air)     Shift assessment complete, see flow sheet, schedule medications given, see MAR. IV infusing without difficulty at this time. Pt VSS.  Bed in lowest position, bed alarm activated for pt safety,Call light and bed side table within reach, pt educated on use of call light if needing assist., pt verbalized understanding, denies further questions at this time. Denies any needs at this time, continue to monitor.  Bedside Mobility Assessment Tool (BMAT):     Assessment Level 1- Sit and Shake    1. From a semi-reclined position, ask patient to sit up and rotate to a seated position at the side of the bed. Can use the bedrail.    2. Ask patient to reach out and grab your hand and shake making sure patient reaches across his/her midline.   Pass- Patient is able to come to a seated position, maintain core strength. Maintains seated balance while reaching across midline. Move on to Assessment Level 2.     Assessment Level 2- Stretch and Point   1. With patient in seated position at the side of the bed, have patient place both feet on the floor (or stool) with knees no higher than hips.    2. Ask patient to stretch one leg and straighten the knee, then bend the ankle/flex and point the toes. If appropriate, repeat with the other leg.   Pass- Patient is able to demonstrate appropriate quad strength on intended weight bearing limb(s). Move onto Assessment Level 3.     Assessment Level 3- Stand   1. Ask patient to elevate off the bed or chair (seated to standing) using an assistive device (cane, bedrail).    2. Patient should be able to raise buttocks off be and hold for a count of five.

## 2024-05-10 NOTE — CARE COORDINATION
Case Management Assessment  Initial Evaluation    Date/Time of Evaluation: 5/10/2024 9:39 AM  Assessment Completed by: TONI Castro    If patient is discharged prior to next notation, then this note serves as note for discharge by case management.    Patient Name: Nohemi Nesbitt                   YOB: 1965  Diagnosis: Cellulitis [L03.90]  Heart murmur [R01.1]  Cellulitis of right lower extremity [L03.115]                   Date / Time: 5/9/2024  9:44 AM    Patient Admission Status: Inpatient   Readmission Risk (Low < 19, Mod (19-27), High > 27): Readmission Risk Score: 14.4    Current PCP: Mariana Awan MD  PCP verified by CM? (P) Yes    Chart Reviewed: Yes      History Provided by: (P) Patient  Patient Orientation: (P) Alert and Oriented, Person, Place, Situation, Self    Patient Cognition: (P) Alert    Hospitalization in the last 30 days (Readmission):  No    If yes, Readmission Assessment in CM Navigator will be completed.    Advance Directives:      Code Status: Full Code   Patient's Primary Decision Maker is: (P) Legal Next of Kin    Primary Decision Maker: Reid Moreno - Domestic Partner - [phone]    Discharge Planning:    Patient lives with: (P) Spouse/Significant Other Type of Home: (P) House  Primary Care Giver: (P) Self  Patient Support Systems include: (P) Spouse/Significant Other, Family Members   Current Financial resources: (P) Medicare  Current community resources: (P) None  Current services prior to admission: (P) Durable Medical Equipment            Current DME: (P) Walker, Cane, Other (Comment) (mobility scooter)            Type of Home Care services:  (P) None    ADLS  Prior functional level: (P) Assistance with the following:, Mobility, Shopping, Housework  Current functional level: (P) Assistance with the following:, Mobility, Shopping, Housework    PT AM-PAC:   /24  OT AM-PAC:   /24    Family can provide assistance at DC: (P) Yes  Would you like Case

## 2024-05-11 PROBLEM — I42.0 DILATED CARDIOMYOPATHY (HCC): Status: ACTIVE | Noted: 2024-05-11

## 2024-05-11 PROBLEM — I50.22 CHRONIC SYSTOLIC CONGESTIVE HEART FAILURE (HCC): Status: ACTIVE | Noted: 2024-05-11

## 2024-05-11 LAB
ANION GAP SERPL CALCULATED.3IONS-SCNC: 11 MMOL/L (ref 3–16)
BASOPHILS # BLD: 0 K/UL (ref 0–0.2)
BASOPHILS NFR BLD: 0.2 %
BUN SERPL-MCNC: 15 MG/DL (ref 7–20)
CALCIUM SERPL-MCNC: 8.7 MG/DL (ref 8.3–10.6)
CHLORIDE SERPL-SCNC: 100 MMOL/L (ref 99–110)
CO2 SERPL-SCNC: 22 MMOL/L (ref 21–32)
CREAT SERPL-MCNC: 0.6 MG/DL (ref 0.6–1.2)
DEPRECATED RDW RBC AUTO: 16.3 % (ref 12.4–15.4)
EOSINOPHIL # BLD: 0.1 K/UL (ref 0–0.6)
EOSINOPHIL NFR BLD: 2.4 %
GFR SERPLBLD CREATININE-BSD FMLA CKD-EPI: >90 ML/MIN/{1.73_M2}
GLUCOSE SERPL-MCNC: 88 MG/DL (ref 70–99)
HCT VFR BLD AUTO: 31.9 % (ref 36–48)
HGB BLD-MCNC: 10.6 G/DL (ref 12–16)
LYMPHOCYTES # BLD: 1 K/UL (ref 1–5.1)
LYMPHOCYTES NFR BLD: 17.4 %
MAGNESIUM SERPL-MCNC: 1.9 MG/DL (ref 1.8–2.4)
MCH RBC QN AUTO: 27.8 PG (ref 26–34)
MCHC RBC AUTO-ENTMCNC: 33.3 G/DL (ref 31–36)
MCV RBC AUTO: 83.5 FL (ref 80–100)
MONOCYTES # BLD: 0.6 K/UL (ref 0–1.3)
MONOCYTES NFR BLD: 9.5 %
NEUTROPHILS # BLD: 4.1 K/UL (ref 1.7–7.7)
NEUTROPHILS NFR BLD: 70.5 %
NT-PROBNP SERPL-MCNC: 781 PG/ML (ref 0–124)
PLATELET # BLD AUTO: 186 K/UL (ref 135–450)
PMV BLD AUTO: 7.8 FL (ref 5–10.5)
POTASSIUM SERPL-SCNC: 3.4 MMOL/L (ref 3.5–5.1)
RBC # BLD AUTO: 3.82 M/UL (ref 4–5.2)
SODIUM SERPL-SCNC: 133 MMOL/L (ref 136–145)
WBC # BLD AUTO: 5.8 K/UL (ref 4–11)

## 2024-05-11 PROCEDURE — 6370000000 HC RX 637 (ALT 250 FOR IP): Performed by: INTERNAL MEDICINE

## 2024-05-11 PROCEDURE — 1200000000 HC SEMI PRIVATE

## 2024-05-11 PROCEDURE — 6370000000 HC RX 637 (ALT 250 FOR IP)

## 2024-05-11 PROCEDURE — 99222 1ST HOSP IP/OBS MODERATE 55: CPT | Performed by: INTERNAL MEDICINE

## 2024-05-11 PROCEDURE — 85025 COMPLETE CBC W/AUTO DIFF WBC: CPT

## 2024-05-11 PROCEDURE — 2580000003 HC RX 258

## 2024-05-11 PROCEDURE — 97530 THERAPEUTIC ACTIVITIES: CPT

## 2024-05-11 PROCEDURE — 6360000002 HC RX W HCPCS

## 2024-05-11 PROCEDURE — 83735 ASSAY OF MAGNESIUM: CPT

## 2024-05-11 PROCEDURE — 83880 ASSAY OF NATRIURETIC PEPTIDE: CPT

## 2024-05-11 PROCEDURE — 99233 SBSQ HOSP IP/OBS HIGH 50: CPT | Performed by: INTERNAL MEDICINE

## 2024-05-11 PROCEDURE — 80048 BASIC METABOLIC PNL TOTAL CA: CPT

## 2024-05-11 PROCEDURE — 36415 COLL VENOUS BLD VENIPUNCTURE: CPT

## 2024-05-11 PROCEDURE — 97110 THERAPEUTIC EXERCISES: CPT

## 2024-05-11 RX ORDER — METOPROLOL SUCCINATE 25 MG/1
25 TABLET, EXTENDED RELEASE ORAL DAILY
Status: DISCONTINUED | OUTPATIENT
Start: 2024-05-11 | End: 2024-05-13 | Stop reason: HOSPADM

## 2024-05-11 RX ORDER — LOSARTAN POTASSIUM 25 MG/1
25 TABLET ORAL DAILY
Status: DISCONTINUED | OUTPATIENT
Start: 2024-05-11 | End: 2024-05-13

## 2024-05-11 RX ADMIN — ENOXAPARIN SODIUM 30 MG: 100 INJECTION SUBCUTANEOUS at 20:07

## 2024-05-11 RX ADMIN — Medication 10 ML: at 20:07

## 2024-05-11 RX ADMIN — ENOXAPARIN SODIUM 30 MG: 100 INJECTION SUBCUTANEOUS at 09:09

## 2024-05-11 RX ADMIN — ASPIRIN 81 MG: 81 TABLET, CHEWABLE ORAL at 09:09

## 2024-05-11 RX ADMIN — CLINDAMYCIN PHOSPHATE 600 MG: 600 INJECTION, SOLUTION INTRAVENOUS at 16:54

## 2024-05-11 RX ADMIN — DULOXETINE HYDROCHLORIDE 120 MG: 60 CAPSULE, DELAYED RELEASE ORAL at 09:09

## 2024-05-11 RX ADMIN — PANTOPRAZOLE SODIUM 40 MG: 40 TABLET, DELAYED RELEASE ORAL at 07:41

## 2024-05-11 RX ADMIN — POTASSIUM CHLORIDE 40 MEQ: 1500 TABLET, EXTENDED RELEASE ORAL at 09:17

## 2024-05-11 RX ADMIN — OXYBUTYNIN CHLORIDE 5 MG: 5 TABLET ORAL at 09:09

## 2024-05-11 RX ADMIN — CLINDAMYCIN PHOSPHATE 600 MG: 600 INJECTION, SOLUTION INTRAVENOUS at 09:08

## 2024-05-11 RX ADMIN — GABAPENTIN 400 MG: 400 CAPSULE ORAL at 14:28

## 2024-05-11 RX ADMIN — TIZANIDINE 8 MG: 4 TABLET ORAL at 20:06

## 2024-05-11 RX ADMIN — OXYCODONE AND ACETAMINOPHEN 1 TABLET: 5; 325 TABLET ORAL at 14:31

## 2024-05-11 RX ADMIN — OXYBUTYNIN CHLORIDE 5 MG: 5 TABLET ORAL at 20:06

## 2024-05-11 RX ADMIN — Medication 1 CAPSULE: at 09:09

## 2024-05-11 RX ADMIN — CLINDAMYCIN PHOSPHATE 600 MG: 600 INJECTION, SOLUTION INTRAVENOUS at 01:37

## 2024-05-11 RX ADMIN — GABAPENTIN 400 MG: 400 CAPSULE ORAL at 20:06

## 2024-05-11 RX ADMIN — GABAPENTIN 400 MG: 400 CAPSULE ORAL at 09:09

## 2024-05-11 RX ADMIN — METOPROLOL SUCCINATE 25 MG: 25 TABLET, EXTENDED RELEASE ORAL at 14:28

## 2024-05-11 RX ADMIN — LOSARTAN POTASSIUM 25 MG: 25 TABLET, FILM COATED ORAL at 14:28

## 2024-05-11 RX ADMIN — CEFTRIAXONE SODIUM 1000 MG: 1 INJECTION, POWDER, FOR SOLUTION INTRAMUSCULAR; INTRAVENOUS at 18:07

## 2024-05-11 ASSESSMENT — PAIN SCALES - GENERAL
PAINLEVEL_OUTOF10: 6
PAINLEVEL_OUTOF10: 3
PAINLEVEL_OUTOF10: 6
PAINLEVEL_OUTOF10: 3

## 2024-05-11 ASSESSMENT — PAIN DESCRIPTION - LOCATION
LOCATION: LEG
LOCATION: LEG

## 2024-05-11 ASSESSMENT — PAIN DESCRIPTION - PAIN TYPE
TYPE: CHRONIC PAIN
TYPE: CHRONIC PAIN

## 2024-05-11 ASSESSMENT — PAIN DESCRIPTION - ORIENTATION
ORIENTATION: RIGHT
ORIENTATION: RIGHT

## 2024-05-11 ASSESSMENT — PAIN DESCRIPTION - FREQUENCY
FREQUENCY: CONTINUOUS
FREQUENCY: CONTINUOUS

## 2024-05-11 ASSESSMENT — PAIN DESCRIPTION - ONSET
ONSET: ON-GOING
ONSET: ON-GOING

## 2024-05-11 ASSESSMENT — PAIN DESCRIPTION - DESCRIPTORS
DESCRIPTORS: DISCOMFORT
DESCRIPTORS: BURNING

## 2024-05-11 NOTE — FLOWSHEET NOTE
05/11/24 0759   Vital Signs   Temp 98 °F (36.7 °C)   Temp Source Oral   Pulse 68   Heart Rate Source Monitor   Respirations 16   /65   MAP (Calculated) 81   BP Location Left lower arm   BP Method Automatic   Patient Position High fowlers   Pain Assessment   Pain Assessment None - Denies Pain   Oxygen Therapy   SpO2 98 %   O2 Device None (Room air)     AM assessment complete. Gave am meds due see mar. PRN potassium given for Potassium 3.4; See MAR. Up as tolerated x 2 assist. A/O x 4. Denies pain. Ice given for tea. Tray table cleaned/within  reach. Bed check. Bed locked/lowest position. Call light within reach.

## 2024-05-11 NOTE — FLOWSHEET NOTE
05/11/24 1431   Pain Assessment   Pain Assessment 0-10   Pain Level 6   Patient's Stated Pain Goal 4   Pain Location Leg   Pain Orientation Right   Pain Descriptors Discomfort   Functional Pain Assessment Prevents or interferes some active activities and ADLs   Pain Type Chronic pain   Pain Frequency Continuous   Pain Onset On-going   Non-Pharmaceutical Pain Intervention(s) Elevation     PRN pain med given. See MAR.

## 2024-05-12 LAB
ANION GAP SERPL CALCULATED.3IONS-SCNC: 10 MMOL/L (ref 3–16)
BASOPHILS # BLD: 0 K/UL (ref 0–0.2)
BASOPHILS NFR BLD: 0.5 %
BUN SERPL-MCNC: 9 MG/DL (ref 7–20)
CALCIUM SERPL-MCNC: 8.6 MG/DL (ref 8.3–10.6)
CHLORIDE SERPL-SCNC: 105 MMOL/L (ref 99–110)
CO2 SERPL-SCNC: 22 MMOL/L (ref 21–32)
CREAT SERPL-MCNC: <0.5 MG/DL (ref 0.6–1.2)
DEPRECATED RDW RBC AUTO: 16.6 % (ref 12.4–15.4)
EOSINOPHIL # BLD: 0.2 K/UL (ref 0–0.6)
EOSINOPHIL NFR BLD: 3.3 %
GFR SERPLBLD CREATININE-BSD FMLA CKD-EPI: >90 ML/MIN/{1.73_M2}
GLUCOSE SERPL-MCNC: 93 MG/DL (ref 70–99)
HCT VFR BLD AUTO: 30.4 % (ref 36–48)
HGB BLD-MCNC: 10.3 G/DL (ref 12–16)
LYMPHOCYTES # BLD: 1.2 K/UL (ref 1–5.1)
LYMPHOCYTES NFR BLD: 22.4 %
MCH RBC QN AUTO: 28.4 PG (ref 26–34)
MCHC RBC AUTO-ENTMCNC: 33.8 G/DL (ref 31–36)
MCV RBC AUTO: 84.1 FL (ref 80–100)
MONOCYTES # BLD: 0.7 K/UL (ref 0–1.3)
MONOCYTES NFR BLD: 13 %
NEUTROPHILS # BLD: 3.2 K/UL (ref 1.7–7.7)
NEUTROPHILS NFR BLD: 60.8 %
PLATELET # BLD AUTO: 207 K/UL (ref 135–450)
PMV BLD AUTO: 7.7 FL (ref 5–10.5)
POTASSIUM SERPL-SCNC: 3.8 MMOL/L (ref 3.5–5.1)
RBC # BLD AUTO: 3.62 M/UL (ref 4–5.2)
SODIUM SERPL-SCNC: 137 MMOL/L (ref 136–145)
WBC # BLD AUTO: 5.3 K/UL (ref 4–11)

## 2024-05-12 PROCEDURE — 99232 SBSQ HOSP IP/OBS MODERATE 35: CPT | Performed by: INTERNAL MEDICINE

## 2024-05-12 PROCEDURE — 6360000002 HC RX W HCPCS

## 2024-05-12 PROCEDURE — 85025 COMPLETE CBC W/AUTO DIFF WBC: CPT

## 2024-05-12 PROCEDURE — 6370000000 HC RX 637 (ALT 250 FOR IP): Performed by: INTERNAL MEDICINE

## 2024-05-12 PROCEDURE — 2580000003 HC RX 258

## 2024-05-12 PROCEDURE — 6370000000 HC RX 637 (ALT 250 FOR IP)

## 2024-05-12 PROCEDURE — 36415 COLL VENOUS BLD VENIPUNCTURE: CPT

## 2024-05-12 PROCEDURE — 1200000000 HC SEMI PRIVATE

## 2024-05-12 PROCEDURE — 80048 BASIC METABOLIC PNL TOTAL CA: CPT

## 2024-05-12 RX ADMIN — GABAPENTIN 400 MG: 400 CAPSULE ORAL at 14:00

## 2024-05-12 RX ADMIN — ENOXAPARIN SODIUM 30 MG: 100 INJECTION SUBCUTANEOUS at 20:22

## 2024-05-12 RX ADMIN — ASPIRIN 81 MG: 81 TABLET, CHEWABLE ORAL at 08:45

## 2024-05-12 RX ADMIN — CLINDAMYCIN PHOSPHATE 600 MG: 600 INJECTION, SOLUTION INTRAVENOUS at 08:49

## 2024-05-12 RX ADMIN — OXYBUTYNIN CHLORIDE 5 MG: 5 TABLET ORAL at 20:23

## 2024-05-12 RX ADMIN — CLINDAMYCIN PHOSPHATE 600 MG: 600 INJECTION, SOLUTION INTRAVENOUS at 16:02

## 2024-05-12 RX ADMIN — PANTOPRAZOLE SODIUM 40 MG: 40 TABLET, DELAYED RELEASE ORAL at 05:36

## 2024-05-12 RX ADMIN — GABAPENTIN 400 MG: 400 CAPSULE ORAL at 08:45

## 2024-05-12 RX ADMIN — GABAPENTIN 400 MG: 400 CAPSULE ORAL at 20:23

## 2024-05-12 RX ADMIN — OXYBUTYNIN CHLORIDE 5 MG: 5 TABLET ORAL at 08:46

## 2024-05-12 RX ADMIN — Medication 1 CAPSULE: at 08:45

## 2024-05-12 RX ADMIN — Medication 10 ML: at 20:23

## 2024-05-12 RX ADMIN — LOSARTAN POTASSIUM 25 MG: 25 TABLET, FILM COATED ORAL at 08:44

## 2024-05-12 RX ADMIN — CLINDAMYCIN PHOSPHATE 600 MG: 600 INJECTION, SOLUTION INTRAVENOUS at 01:32

## 2024-05-12 RX ADMIN — TIZANIDINE 8 MG: 4 TABLET ORAL at 20:22

## 2024-05-12 RX ADMIN — ENOXAPARIN SODIUM 30 MG: 100 INJECTION SUBCUTANEOUS at 08:46

## 2024-05-12 RX ADMIN — METOPROLOL SUCCINATE 25 MG: 25 TABLET, EXTENDED RELEASE ORAL at 08:45

## 2024-05-12 RX ADMIN — CEFTRIAXONE SODIUM 1000 MG: 1 INJECTION, POWDER, FOR SOLUTION INTRAMUSCULAR; INTRAVENOUS at 17:06

## 2024-05-12 RX ADMIN — DULOXETINE HYDROCHLORIDE 120 MG: 60 CAPSULE, DELAYED RELEASE ORAL at 08:44

## 2024-05-12 NOTE — FLOWSHEET NOTE
05/12/24 0752   Vital Signs   Temp 97.5 °F (36.4 °C)   Temp Source Oral   Pulse 66   Heart Rate Source Monitor   Respirations 16   /71   MAP (Calculated) 87   BP Location Left lower arm   BP Method Automatic   Patient Position High fowlers   Pain Assessment   Pain Assessment None - Denies Pain   Oxygen Therapy   SpO2 97 %   O2 Device None (Room air)     AM assessment complete. Gave am meds due see mar. A/O x 4. Denies pain. Up as tolerated. SBA. Utilizes cane. Cleaned tray table.Gave ice water. Bed check. Applied new ace wrap RLE. Bed locked/lowest position. Call light within reach.

## 2024-05-12 NOTE — FLOWSHEET NOTE
05/11/24 2000   Vital Signs   Temp 97.9 °F (36.6 °C)   Temp Source Oral   Pulse 64   Heart Rate Source Monitor   Respirations 16   /64   MAP (Calculated) 79   BP Location Left lower arm   BP Method Automatic   Patient Position Semi fowlers   Oxygen Therapy   SpO2 96 %   O2 Device None (Room air)     Pt A/O assessment completed. Dressing noted to RLE CDI at this time. Right upper part of leg red, warm and swollen from cellulitis. Meds given per MAR. Pt denies any needs. Call light within reach

## 2024-05-13 VITALS
OXYGEN SATURATION: 94 % | SYSTOLIC BLOOD PRESSURE: 115 MMHG | WEIGHT: 293 LBS | RESPIRATION RATE: 16 BRPM | HEIGHT: 63 IN | HEART RATE: 57 BPM | BODY MASS INDEX: 51.91 KG/M2 | DIASTOLIC BLOOD PRESSURE: 65 MMHG | TEMPERATURE: 97.2 F

## 2024-05-13 PROBLEM — A41.9 SEPSIS (HCC): Status: RESOLVED | Noted: 2023-11-26 | Resolved: 2024-05-13

## 2024-05-13 PROBLEM — A40.0 SEPSIS DUE TO GROUP A STREPTOCOCCUS WITHOUT ACUTE ORGAN DYSFUNCTION (HCC): Status: ACTIVE | Noted: 2024-05-13

## 2024-05-13 PROBLEM — E87.1 HYPONATREMIA: Status: RESOLVED | Noted: 2024-05-09 | Resolved: 2024-05-13

## 2024-05-13 PROBLEM — I42.9 CARDIOMYOPATHY (HCC): Status: ACTIVE | Noted: 2024-05-11

## 2024-05-13 LAB
ANION GAP SERPL CALCULATED.3IONS-SCNC: 10 MMOL/L (ref 3–16)
BACTERIA BLD CULT ORG #2: NORMAL
BACTERIA BLD CULT: NORMAL
BASOPHILS # BLD: 0 K/UL (ref 0–0.2)
BASOPHILS NFR BLD: 0.5 %
BUN SERPL-MCNC: 5 MG/DL (ref 7–20)
CALCIUM SERPL-MCNC: 8.6 MG/DL (ref 8.3–10.6)
CHLORIDE SERPL-SCNC: 107 MMOL/L (ref 99–110)
CO2 SERPL-SCNC: 23 MMOL/L (ref 21–32)
CREAT SERPL-MCNC: <0.5 MG/DL (ref 0.6–1.2)
DEPRECATED RDW RBC AUTO: 16.6 % (ref 12.4–15.4)
EOSINOPHIL # BLD: 0.2 K/UL (ref 0–0.6)
EOSINOPHIL NFR BLD: 3.9 %
GFR SERPLBLD CREATININE-BSD FMLA CKD-EPI: >90 ML/MIN/{1.73_M2}
GLUCOSE SERPL-MCNC: 92 MG/DL (ref 70–99)
HCT VFR BLD AUTO: 29.8 % (ref 36–48)
HGB BLD-MCNC: 9.9 G/DL (ref 12–16)
LYMPHOCYTES # BLD: 1.3 K/UL (ref 1–5.1)
LYMPHOCYTES NFR BLD: 24.3 %
MCH RBC QN AUTO: 27.9 PG (ref 26–34)
MCHC RBC AUTO-ENTMCNC: 33.2 G/DL (ref 31–36)
MCV RBC AUTO: 83.9 FL (ref 80–100)
MONOCYTES # BLD: 0.6 K/UL (ref 0–1.3)
MONOCYTES NFR BLD: 11.7 %
NEUTROPHILS # BLD: 3.3 K/UL (ref 1.7–7.7)
NEUTROPHILS NFR BLD: 59.6 %
PLATELET # BLD AUTO: 241 K/UL (ref 135–450)
PMV BLD AUTO: 7.3 FL (ref 5–10.5)
POTASSIUM SERPL-SCNC: 3.9 MMOL/L (ref 3.5–5.1)
RBC # BLD AUTO: 3.55 M/UL (ref 4–5.2)
SODIUM SERPL-SCNC: 140 MMOL/L (ref 136–145)
WBC # BLD AUTO: 5.5 K/UL (ref 4–11)

## 2024-05-13 PROCEDURE — 85025 COMPLETE CBC W/AUTO DIFF WBC: CPT

## 2024-05-13 PROCEDURE — 6370000000 HC RX 637 (ALT 250 FOR IP): Performed by: INTERNAL MEDICINE

## 2024-05-13 PROCEDURE — 99232 SBSQ HOSP IP/OBS MODERATE 35: CPT | Performed by: INTERNAL MEDICINE

## 2024-05-13 PROCEDURE — 6370000000 HC RX 637 (ALT 250 FOR IP)

## 2024-05-13 PROCEDURE — 6360000002 HC RX W HCPCS

## 2024-05-13 PROCEDURE — 99232 SBSQ HOSP IP/OBS MODERATE 35: CPT | Performed by: STUDENT IN AN ORGANIZED HEALTH CARE EDUCATION/TRAINING PROGRAM

## 2024-05-13 PROCEDURE — 99232 SBSQ HOSP IP/OBS MODERATE 35: CPT

## 2024-05-13 PROCEDURE — 80048 BASIC METABOLIC PNL TOTAL CA: CPT

## 2024-05-13 PROCEDURE — 29581 APPL MULTLAYER CMPRN SYS LEG: CPT | Performed by: INTERNAL MEDICINE

## 2024-05-13 PROCEDURE — 36415 COLL VENOUS BLD VENIPUNCTURE: CPT

## 2024-05-13 RX ORDER — METOPROLOL SUCCINATE 25 MG/1
25 TABLET, EXTENDED RELEASE ORAL DAILY
Qty: 30 TABLET | Refills: 0 | Status: SHIPPED | OUTPATIENT
Start: 2024-05-14

## 2024-05-13 RX ORDER — LACTOBACILLUS RHAMNOSUS GG 10B CELL
1 CAPSULE ORAL
Qty: 10 CAPSULE | Refills: 0 | Status: SHIPPED | OUTPATIENT
Start: 2024-05-14 | End: 2024-05-24

## 2024-05-13 RX ORDER — SPIRONOLACTONE 25 MG/1
25 TABLET ORAL DAILY
Qty: 30 TABLET | Refills: 0 | Status: SHIPPED | OUTPATIENT
Start: 2024-05-13

## 2024-05-13 RX ORDER — SPIRONOLACTONE 25 MG/1
25 TABLET ORAL DAILY
Status: DISCONTINUED | OUTPATIENT
Start: 2024-05-13 | End: 2024-05-13 | Stop reason: HOSPADM

## 2024-05-13 RX ORDER — OXYCODONE HYDROCHLORIDE AND ACETAMINOPHEN 5; 325 MG/1; MG/1
1 TABLET ORAL EVERY 8 HOURS PRN
Qty: 9 TABLET | Refills: 0 | Status: SHIPPED | OUTPATIENT
Start: 2024-05-13 | End: 2024-05-16

## 2024-05-13 RX ORDER — AMOXICILLIN 500 MG/1
1000 CAPSULE ORAL EVERY 8 HOURS
Qty: 30 CAPSULE | Refills: 0 | OUTPATIENT
Start: 2024-05-13 | End: 2024-05-18

## 2024-05-13 RX ADMIN — LOSARTAN POTASSIUM 25 MG: 25 TABLET, FILM COATED ORAL at 08:50

## 2024-05-13 RX ADMIN — OXYBUTYNIN CHLORIDE 5 MG: 5 TABLET ORAL at 08:50

## 2024-05-13 RX ADMIN — DULOXETINE HYDROCHLORIDE 120 MG: 60 CAPSULE, DELAYED RELEASE ORAL at 08:50

## 2024-05-13 RX ADMIN — ENOXAPARIN SODIUM 30 MG: 100 INJECTION SUBCUTANEOUS at 08:50

## 2024-05-13 RX ADMIN — CLINDAMYCIN PHOSPHATE 600 MG: 600 INJECTION, SOLUTION INTRAVENOUS at 08:55

## 2024-05-13 RX ADMIN — METOPROLOL SUCCINATE 25 MG: 25 TABLET, EXTENDED RELEASE ORAL at 08:50

## 2024-05-13 RX ADMIN — CLINDAMYCIN PHOSPHATE 600 MG: 600 INJECTION, SOLUTION INTRAVENOUS at 00:40

## 2024-05-13 RX ADMIN — ASPIRIN 81 MG: 81 TABLET, CHEWABLE ORAL at 08:50

## 2024-05-13 RX ADMIN — PANTOPRAZOLE SODIUM 40 MG: 40 TABLET, DELAYED RELEASE ORAL at 04:55

## 2024-05-13 RX ADMIN — GABAPENTIN 400 MG: 400 CAPSULE ORAL at 08:50

## 2024-05-13 RX ADMIN — Medication 1 CAPSULE: at 08:50

## 2024-05-13 NOTE — DISCHARGE INSTRUCTIONS
--------------------------  From Dr. Wylie --    -- Resume your oral amoxicillin at home, two of the 500mg tablets every 8 hours.  -- Call us if any recurrent fever, any sore throat or mouth, new rash, significant GI upset, etc.   -- Keep the right leg dressing and wrap in place.  -- Continue some periodic leg elevation to keep swelling down.  -- This week's appointment with me changed from Wednesday to Friday, at 12:45 PM.    Electronically signed by Kentrell Wylie MD on 5/13/2024 at 10:32 AM  --------------------------    Heart Failure Resources:  Heart Failure Interactive Workbook:  Go to https://dilitronics.TeamPatent/[email or web address] for a Free Heart Failure Interactive Workbook provided by The [employer]. This interactive workbook will provide information on Healthier Living with Heart Failure. Please copy and paste link into search bar. Use your mouse to scroll through the pages.    HF [place] meredith:   Heart Failure Free smart phone meredith available for iPhone and Android download. Use your phone to track sodium intake, fluid intake, symptoms, and weight.     Low Sodium Diet / Recipes:  Go to www.[employer].org website for “renal” diet which is Low Sodium! [employer] is a dialysis company, but this website offers free seasonal cookbooks. Each quarter, they will release 25-30 new recipes with a breakdown of calories, sodium, and glucose. You can also go to www.[employer].TeamPatent/recipes website for free recipes.     Discharge Instruction Video:  Scan the QR code below with your camera and click the canva.com link to open the video and watch educational information on Heart Failure and Medications from one of our nurses.   [email or web address]/[email or web address]/[email or web address]/edit    Home Exercise Program:   Identification of Green/Yellow/Red zones:  You should be able to identify when you feel good (green zone), if you have 1-2 symptoms of HF (yellow zone), or if you are in need of medical

## 2024-05-13 NOTE — CARE COORDINATION
DISCHARGE ORDER  Date/Time 2024 2:09 PM  Completed by: Susan Mclaughlin, Case Management    Patient Name: Nohemi Nesbitt      : 1965  Admitting Diagnosis: Cellulitis [L03.90]  Heart murmur [R01.1]  Cellulitis of right lower extremity [L03.115]      Admit order Date and Status: 2024 Stable  (verify MD's last order for status of admission)      If applicable PT/OT recommendation at Discharge: Discharge Recommendations: Home with PRN assist and Home PT  DME needs for discharge: Needs Met    Confirmed discharge plan: Yes  with whom___Nohemi____________  If pt confirmed DC plan does family need to be contacted by CM No    Discharge Plan: Reviewed chart, noted DC order, met with pt at bedside. Role of discharge planner explained and patient verbalized understanding. Pt to be DC home today,  providing transportation. Denies and HC/DME at DC.         Date of Last IMM Given: 2024      Has Home O2 in place on admit:  No    Pt is being d/c'd to home today. Pt's O2 sats are 94% on RA.      Discharge timeout done with PT, CM, Mary Beth RN. All discharge needs and concerns addressed.

## 2024-05-13 NOTE — PROGRESS NOTES
AM assessment completed, see flow sheet. Pt is alert and oriented. Vital signs are WNL. Respirations are even & easy on RA. No complaints voiced. Pt denies needs at this time.  Pt up sitting in chair. Call light is within reach.  in room.     
Bedside Mobility Assessment Tool (BMAT):     Assessment Level 1- Sit and Shake    1. From a semi-reclined position, ask patient to sit up and rotate to a seated position at the side of the bed. Can use the bedrail.    2. Ask patient to reach out and grab your hand and shake making sure patient reaches across his/her midline.   Pass- Patient is able to come to a seated position, maintain core strength. Maintains seated balance while reaching across midline. Move on to Assessment Level 2.     Assessment Level 2- Stretch and Point   1. With patient in seated position at the side of the bed, have patient place both feet on the floor (or stool) with knees no higher than hips.    2. Ask patient to stretch one leg and straighten the knee, then bend the ankle/flex and point the toes. If appropriate, repeat with the other leg.   Pass- Patient is able to demonstrate appropriate quad strength on intended weight bearing limb(s). Move onto Assessment Level 3.     Assessment Level 3- Stand   1. Ask patient to elevate off the bed or chair (seated to standing) using an assistive device (cane, bedrail).    2. Patient should be able to raise buttocks off be and hold for a count of five. May repeat once.   Pass- Patient maintains standing stability for at least 5 seconds, proceed to assessment level 4.    Assessment Level 4- Walk   1. Ask patient to march in place at bedside.    2. Then ask patient to advance step and return each foot. Some medical conditions may render a patient from stepping backwards, use your best clinical judgement.   Fail- Patient not able to complete tasks OR requires use of assistive device. Patient is MOBILITY LEVEL 3.       Mobility Level- 3  
Bedside Mobility Assessment Tool (BMAT):     Assessment Level 1- Sit and Shake    1. From a semi-reclined position, ask patient to sit up and rotate to a seated position at the side of the bed. Can use the bedrail.    2. Ask patient to reach out and grab your hand and shake making sure patient reaches across his/her midline.   Pass- Patient is able to come to a seated position, maintain core strength. Maintains seated balance while reaching across midline. Move on to Assessment Level 2.     Assessment Level 2- Stretch and Point   1. With patient in seated position at the side of the bed, have patient place both feet on the floor (or stool) with knees no higher than hips.    2. Ask patient to stretch one leg and straighten the knee, then bend the ankle/flex and point the toes. If appropriate, repeat with the other leg.   Pass- Patient is able to demonstrate appropriate quad strength on intended weight bearing limb(s). Move onto Assessment Level 3.     Assessment Level 3- Stand   1. Ask patient to elevate off the bed or chair (seated to standing) using an assistive device (cane, bedrail).    2. Patient should be able to raise buttocks off be and hold for a count of five. May repeat once.   Pass- Patient maintains standing stability for at least 5 seconds, proceed to assessment level 4.    Assessment Level 4- Walk   1. Ask patient to march in place at bedside.    2. Then ask patient to advance step and return each foot. Some medical conditions may render a patient from stepping backwards, use your best clinical judgement.   Pass- Patient demonstrates balance while shifting weight and ability to step, takes independent steps, does not use assistive device patient is MOBILITY LEVEL 4.      Mobility Level- 4    
Bedside report given and pt care transferred from Eugenio RN. Pt denies needs at this time. Call light within reach.  
Bedside report given and pt care transferred to Ailin DOBBINS. Pt denies needs at this time. Call light within reach.  
Bedside report given and pt care transferred to Eugenio RN. Pt denies needs at this time. Call light within reach.  
Bedside report given and pt care transferred to Parris DOBBINS. Pt denies needs at this time. Call light within reach.  
Bedside report given to Precious DOBBINS  pt in stable condition no needs at this time. Call light within reach   
CHF education packet given and reviewed with pt. Pt indicated verbal understanding.   
Consult has been called to Dr. dior on 5/9/24. Spoke with dr dior via perfect serve. 5:06 PM    Denise Ziegler  5/9/2024  
Consult has been called to Dr. whiting on 5/11/24. Spoke with patel. 7:49 AM    Denise Ziegler  5/11/2024  
HEART FAILURE CARE PLAN:    Comorbidities Reviewed: Yes   Patient has a past medical history of Acute deep vein thrombosis (DVT) of left lower extremity (HCC), Acute renal failure (ARF) (HCC), Cellulitis of right lower extremity, Family history of factor V deficiency, Hematoma of right lower extremity, Incarcerated ventral hernia, MRSA infection of surgical site, Perforated appendicitis, Systolic CHF (HCC), and Tobacco use.     Weights Reviewed: Yes   Admission weight: 132.5 kg (292 lb)   Wt Readings from Last 3 Encounters:   05/10/24 133.4 kg (294 lb)   05/08/24 134.4 kg (296 lb 6.4 oz)   05/01/24 133.6 kg (294 lb 9.6 oz)     Intake & Output Reviewed: Yes     Intake/Output Summary (Last 24 hours) at 5/13/2024 1331  Last data filed at 5/13/2024 0843  Gross per 24 hour   Intake 1282.56 ml   Output --   Net 1282.56 ml       ECHOCARDIOGRAM Reviewed: Yes   Patient's Ejection Fraction (EF) is greater than 40%     Medications Reviewed: Yes   SCHEDULED HOSPITAL MEDICATIONS:   empagliflozin  10 mg Oral Daily    spironolactone  25 mg Oral Daily    metoprolol succinate  25 mg Oral Daily    aspirin  81 mg Oral Daily    DULoxetine  120 mg Oral Daily    gabapentin  400 mg Oral TID    pantoprazole  40 mg Oral QAM AC    oxyBUTYnin  5 mg Oral BID    tiZANidine  8 mg Oral Nightly    sodium chloride flush  5-40 mL IntraVENous 2 times per day    enoxaparin  30 mg SubCUTAneous BID    cefTRIAXone (ROCEPHIN) IV  1,000 mg IntraVENous Q24H    lactobacillus  1 capsule Oral Daily with breakfast     HOME MEDICATIONS:  Prior to Admission medications    Medication Sig Start Date End Date Taking? Authorizing Provider   amoxicillin (AMOXIL) 500 MG capsule Take 2 capsules by mouth every 8 (eight) hours for 5 days (Resume the supply you already have at home) 5/13/24 5/18/24 Yes Kentrell Wylie MD   gabapentin (NEURONTIN) 400 MG capsule TAKE 1 CAPSULE THREE TIMES DAILY 4/24/24 5/24/24  Mariana Awan MD   DULoxetine (CYMBALTA) 60 MG 
Handoff report and transfer of care given at bedside to Zainab RN.Patient in stable condition, denies needs/concerns at this time.  Call light within reach.     
IM Progress Note    Admit Date:  5/9/2024  1    Interval history:  recurrent cellulitis of LE , f/w wound care  High fever yesterday at home      Subjective:  Ms. Nesbitt seen up in chair, fevers resolved , pain controlled      Objective:   /65   Pulse 86   Temp 99.1 °F (37.3 °C) (Oral)   Resp 16   Ht 1.6 m (5' 3\")   Wt 133.7 kg (294 lb 11.2 oz)   LMP 12/24/2010   SpO2 95%   BMI 52.20 kg/m²   No intake or output data in the 24 hours ending 05/10/24 0651    Physical Exam:      General: middle aged female up in chair   Awake, alert and oriented. Appears to be not in any distress  Mucous Membranes:  Pink , anicteric  Neck: No JVD, no carotid bruit, no thyromegaly  Chest:  Clear to auscultation bilaterally, no added sounds  Cardiovascular:  RRR S1S2 heard, no murmurs or gallops  Abdomen:  Soft, undistended, non tender, no organomegaly, BS present  Extremities: chronic right leg wound dressing dry   Severe spreading cellulitis in right inner thigh with induration and tenderness noted  Trace edema to both LE . Distal pulses well felt  Neurological : grossly normal      Medications:   Scheduled Medications:    aspirin  81 mg Oral Daily    DULoxetine  120 mg Oral Daily    gabapentin  400 mg Oral TID    pantoprazole  40 mg Oral QAM AC    oxyBUTYnin  5 mg Oral BID    tiZANidine  8 mg Oral Nightly    sodium chloride flush  5-40 mL IntraVENous 2 times per day    enoxaparin  30 mg SubCUTAneous BID    cefTRIAXone (ROCEPHIN) IV  1,000 mg IntraVENous Q24H    clindamycin (CLEOCIN) IV  600 mg IntraVENous Q8H    lactobacillus  1 capsule Oral Daily with breakfast     I   sodium chloride       sodium chloride flush, sodium chloride, potassium chloride **OR** potassium alternative oral replacement **OR** potassium chloride, magnesium sulfate, ondansetron **OR** ondansetron, acetaminophen **OR** acetaminophen, bisacodyl, oxyCODONE-acetaminophen, diphenhydrAMINE, perflutren lipid microspheres    Lab Data:  Recent Labs     
IM Progress Note    Admit Date:  5/9/2024  2    Interval history:  recurrent cellulitis of LE , f/w wound care      Subjective:  Ms. Nesbitt seen up in bed, fevers resolved , pain controlled      Objective:   /74   Pulse 74   Temp 98.3 °F (36.8 °C) (Oral)   Resp 16   Ht 1.6 m (5' 3\")   Wt 133.4 kg (294 lb)   LMP 12/24/2010   SpO2 98%   BMI 52.08 kg/m²     Intake/Output Summary (Last 24 hours) at 5/11/2024 0715  Last data filed at 5/10/2024 1829  Gross per 24 hour   Intake --   Output 300 ml   Net -300 ml       Physical Exam:      General: middle aged female up in chair   Awake, alert and oriented. Appears to be not in any distress  Mucous Membranes:  Pink , anicteric  Neck: No JVD, no carotid bruit, no thyromegaly  Chest:  Clear to auscultation bilaterally, no added sounds  Cardiovascular:  RRR S1S2 heard, no murmurs or gallops  Abdomen:  Soft, undistended, non tender, no organomegaly, BS present  Extremities: chronic right leg wound dressing dry   Severe spreading cellulitis in right inner thigh with induration and tenderness noted  Trace edema to both LE . Distal pulses well felt  Neurological : grossly normal      Medications:   Scheduled Medications:    aspirin  81 mg Oral Daily    DULoxetine  120 mg Oral Daily    gabapentin  400 mg Oral TID    pantoprazole  40 mg Oral QAM AC    oxyBUTYnin  5 mg Oral BID    tiZANidine  8 mg Oral Nightly    sodium chloride flush  5-40 mL IntraVENous 2 times per day    enoxaparin  30 mg SubCUTAneous BID    cefTRIAXone (ROCEPHIN) IV  1,000 mg IntraVENous Q24H    clindamycin (CLEOCIN) IV  600 mg IntraVENous Q8H    lactobacillus  1 capsule Oral Daily with breakfast     I   sodium chloride       sodium chloride flush, sodium chloride, potassium chloride **OR** potassium alternative oral replacement **OR** potassium chloride, magnesium sulfate, ondansetron **OR** ondansetron, acetaminophen **OR** acetaminophen, bisacodyl, oxyCODONE-acetaminophen, perflutren lipid 
IM Progress Note    Admit Date:  5/9/2024  3    Interval history:  recurrent cellulitis of LE , f/w wound care      Subjective:  Ms. Nesbitt continues to have pain right thigh  No fever      Objective:   /64   Pulse 69   Temp 97.7 °F (36.5 °C) (Oral)   Resp 18   Ht 1.6 m (5' 3\")   Wt 133.4 kg (294 lb)   LMP 12/24/2010   SpO2 94%   BMI 52.08 kg/m²     Intake/Output Summary (Last 24 hours) at 5/12/2024 0732  Last data filed at 5/11/2024 1902  Gross per 24 hour   Intake 960 ml   Output --   Net 960 ml         Physical Exam:      General: middle aged female up in chair   Awake, alert and oriented. Appears to be not in any distress  Mucous Membranes:  Pink , anicteric  Neck: No JVD, no carotid bruit, no thyromegaly  Chest:  Clear to auscultation bilaterally, no added sounds  Cardiovascular:  RRR S1S2 heard, no murmurs or gallops  Abdomen:  Soft, undistended, non tender, no organomegaly, BS present  Extremities: chronic right leg wound dressing dry   Severe spreading cellulitis in right inner thigh with induration and tenderness noted  Trace edema to both LE . Distal pulses well felt  Neurological : grossly normal      Medications:   Scheduled Medications:    losartan  25 mg Oral Daily    metoprolol succinate  25 mg Oral Daily    aspirin  81 mg Oral Daily    DULoxetine  120 mg Oral Daily    gabapentin  400 mg Oral TID    pantoprazole  40 mg Oral QAM AC    oxyBUTYnin  5 mg Oral BID    tiZANidine  8 mg Oral Nightly    sodium chloride flush  5-40 mL IntraVENous 2 times per day    enoxaparin  30 mg SubCUTAneous BID    cefTRIAXone (ROCEPHIN) IV  1,000 mg IntraVENous Q24H    clindamycin (CLEOCIN) IV  600 mg IntraVENous Q8H    lactobacillus  1 capsule Oral Daily with breakfast     I   sodium chloride       sodium chloride flush, sodium chloride, potassium chloride **OR** potassium alternative oral replacement **OR** potassium chloride, magnesium sulfate, ondansetron **OR** ondansetron, acetaminophen **OR** 
Inpatient Physical Therapy Evaluation & Treatment    Unit: 2 [place]  Date:  5/10/2024  Patient Name:    Nohemi Nesbitt  Admitting diagnosis:  Cellulitis [L03.90]  Heart murmur [R01.1]  Cellulitis of right lower extremity [L03.115]  Admit Date:  5/9/2024  Precautions/Restrictions/WB Status/ Lines/ Wounds/ Oxygen: Fall risk, Bed/chair alarm, Lines (IV), and Isolation Precautions: Contact      Pt seen for cotreatment this date due to patient safety, patient endurance, complexity of condition, and acute illness/injury    Treatment Time:  11:20-12:09  Treatment Number:  1   Timed Code Treatment Minutes: 39 minutes  Total Treatment Minutes:  49  minutes    Patient Stated Goals for Therapy: \" to sit in the chair \"          Discharge Recommendations: Home with PRN assistance and Home PT  DME needs for discharge: Needs Met       Therapy recommendation for EMS Transport: can transport by wheelchair    Therapy recommendations for staff:   Assist of 1 for transfers with use of rolling walker (RW) and gait belt to/from BSC  to/from chair    History of Present Illness:     The patient is a 58 y.o. female with pmhx of cellulitis, lymphedema, FLORIAN, DVT who presented to [facility] ED with complaint of fever. Patient has been battling right lower extremity cellulitis and follows with Dr. Wylie at wound care, saw him yesterday and had low BP and fever. He sent her home on augmentin, then last night developed temp of 106. Also started having body aches, chills, poor appetite so she came into the ED today.   Does have mild pain in right leg with ambulation.         Past Medical History:    Past Medical History[]Expand by Default            Diagnosis Date    Acute deep vein thrombosis (DVT) of left lower extremity ([facility]) 07/2015    Acute renal failure (ARF) ([facility]) 05/04/2015     d/t IV dye    Cellulitis of right lower extremity 11/26/2023     clinically very much seemed like Strep    Family history of factor V deficiency       brother and 
Lying in bed awake watching tv. No complaints or needs at present time. Call light in reach.  
Patient admitted to room 201. Oriented to room and call light. Bed wheels locked. Call light within reach and use of call light explained to patient.  No other needs identified at this time. Will continue to monitor.    4 Eyes Skin Assessment     The patient is being assess for   Admission    I agree that 2 RN's have performed a thorough Head to Toe Skin Assessment on the patient. ALL assessment sites listed below have been assessed.      Areas assessed for pressure by both nurses:   [x]   Head, Face, and Ears   [x]   Shoulders, Back, and Chest, Abdomen  [x]   Arms, Elbows, and Hands   [x]   Coccyx, Sacrum, and Ischium  [x]   Legs, Feet, and Heels    Dressed wound to right calk. Redness to right knee and thigh        Skin Assessed Under all Medical Devices by both nurses:  N/a               All Mepilex Borders were peeled back and area peeked at by both nurses:  No: n/a  Please list where Mepilex Borders are located:               **SHARE this note so that the co-signing nurse is able to place an eSignature**    Co-signer eSignature: {Esignature:[id number]}    Does the Patient have Skin Breakdown related to pressure?  No     (Insert Photo here)         George Prevention initiated:  No   Wound Care Orders initiated:  No      C nurse consulted for Pressure Injury (Stage 3,4, Unstageable, DTI, NWPT, Complex wounds)and New or Established Ostomies:  No      Primary Nurse eSignature: Electronically signed by Milagro Billingsley RN on 5/9/24 at 6:44 PM EDT          Bedside Mobility Assessment Tool (BMAT):     Assessment Level 1- Sit and Shake    1. From a semi-reclined position, ask patient to sit up and rotate to a seated position at the side of the bed. Can use the bedrail.    2. Ask patient to reach out and grab your hand and shake making sure patient reaches across his/her midline.   Pass- Patient is able to come to a seated position, maintain core strength. Maintains seated balance while reaching across midline. Move on 
Percocet for c/o headache and right leg pain rated 8.  
Pt awake at this time. AM meds given. Pt complains of cough and would like cough drop. Informed pt that I would message Dr. Pt would rather wait to see rounding    
Pt resting. Resp e/e. Shift assessment completed and charted. No needs. Will monitor. Parris Woods RN     
Pt reused new medications here r/t  on way to pick her up. Pt stated will  RX and start medications today at home. Pt given written and verbal discharge instructions. Pt indicated understanding and received prescription and home medication instructions. Pt packed own belongings. Pt awaiting  for ride. .    
Report given at bedside to Mary Beth DOBBINS. Parris Woods RN     
Shift assessment complete. See flow sheet. Due medications administered per MD orders. See MAR. Vital signs stable. Patient is alert and oriented x 4. Pt denies any present needs/concerns. Call light explained and in reach.  
Tylenol 650mg po for c/o headache.  
educated on role of inpatient OT, plan of care, importance of continued activity, DC recommendations and Calling for assist with mobility.    CHF Education  N/A    Assessment:  Pt seen for Occupational therapy treatment session in acute care setting.  Pt continues to demonstrate decreased Activity tolerance, ADLs, Balance , Bed mobility, Strength, and Transfers. Pt continues to function below baseline and will likely benefit from skilled occupational therapy services to maximize safety and independence.     Patient seen for skilled acute care occupational therapy treatment session. Patient required stand by assistance with bed mobility, stand by assistance with functional transfer performance, and contact guard assistance with functional mobility during the session. Patient ambulated approximately 40 feet (x 2 trials) with the use of a RW and SPC with no LOB noted. Patient verbalized that she felt more steady today compared to last session. Patient also tolerated performing therapeutic exercises with minimal complaints of fatigue. Patient continues to function below baseline as she was with bed mobility, functional transfer performance, functional mobility, ADL performance, and she continues to require increased assistance with those tasks. Patient would benefit from skilled HHOT services upon discharge in order to return to PLOF. Patient would continue to benefit from skilled acute care occupational therapy services during her length of stay to increase independence with ADL performance and functional mobility in order to maximize her functional potential in the acute care setting.      Recommending Home PRN assist and with home OT upon discharge as patient functioning below baseline level and would benefit from continued therapy services    Goal(s) : all goals ongoing 5/11  To be met in 3 Visits:  Bed to toilet/BSC:       SBA  Pt will complete 3/3 CHF goals     N/A    To be met in 5 Visits:  Supine to/from Sit in 
pericare in stance    Grooming/hygiene: Not Tested    Activity Tolerance:   Pt completed therapy session with no adverse symptoms     BP (mmHg) HR (bpm) SpO2 (%) on RA Comments   Supine at rest 101/60 68 98%    Seated at EOB       Standing       End of session         Positioning Needs:   Pt up in chair, alarm set, call light provided and all needs within reach .     Ther Ex / Activities Initiated:   N/A    Patient/Family Education:   Pt educated on role of inpatient OT, plan of care, importance of continued activity, DC recommendations and Calling for assist with mobility.    CHF Education  N/A    Assessment:  Pt seen for Occupational therapy evaluation in acute care setting.  Pt demonstrated decreased Activity tolerance, ADLs, Balance , Bed mobility, Strength, and Transfers. Pt functioning below baseline and will likely benefit from skilled occupational therapy services to maximize safety and independence.     Patient seen for skilled acute care occupational therapy evaluation. Patient required stand by assistance with bed mobility, contact guard assistance with functional transfer performance, and contact guard assistance with functional mobility during the session. Patient ambulated a short distance within room with no LOB noted, however unsteady. Patient is currently functioning below baseline as she was with bed mobility, functional transfer performance, functional mobility, ADL performance, and she currently requires increased assistance with those tasks. Patient would benefit from skilled HHOT services upon discharge in order to return to PLOF. Patient would benefit from continued skilled acute care occupational therapy services during her length of stay to increase independence with ADL performance and functional mobility in order to maximize her functional potential in the acute care setting.      Recommending Home PRN assist and with home OT upon discharge as patient functioning below baseline level and 
test has been authorized by the FDA under an Emergency Use  Authorization (EUA) for use by authorized laboratories.    Fact sheet for Healthcare Providers:  https://www.fda.gov/media/046188/download  Fact sheet for Patients: https://www.fda.gov/media/779697/download    METHODOLOGY: Isothermal Nucleic Acid Amplification         Blood Culture 1 [[id number]] Collected: 05/09/24 1007    Order Status: Completed Specimen: Blood Updated: 05/10/24 1115     Blood Culture, Routine No Growth to date.  Any change in status will be called.    Narrative:      ORDER#: [id number]                          ORDERED BY: VAN GAONA  SOURCE: Blood No site given                COLLECTED:  05/09/24 10:07  ANTIBIOTICS AT [facility].:                      RECEIVED :  05/09/24 10:23  If child <=2 yrs old please draw pediatric bottle.~Blood Culture 1               RADIOLOGY  XR FEMUR RIGHT (MIN 2 VIEWS)   Final Result   1. No gas within the soft tissues.  No evidence of osteomyelitis.   2. Right hip and knee arthroplasties in good position.           Echocardiogram from 5/9/24  Left Ventricle: Mildly reduced left ventricular systolic function with a visually estimated EF of 40 - 45%. Left ventricle is severely dilated. Normal wall thickness. Mild hypokinesis of the following segments: basal anterolateral, basal anteroseptal, basal inferoseptal, basal inferolateral, mid anterolateral, mid anteroseptal, mid inferoseptal and mid inferolateral. Grade II diastolic dysfunction with increased LAP.    Mitral Valve: No leaflet thickening. Mild annular calcification of the mitral valve. Mild regurgitation.    Tricuspid Valve: Mild to moderate regurgitation. Mildly elevated PASP, consistent with mild pulmonary hypertension. Est RA pressure is 8 mmHg. The estimated RVSP is 48 mmHg.    Left Atrium: Left atrium is moderately dilated.    Right Atrium: Right atrium is moderately dilated.    Aorta: Mildly dilated aortic root. Ao root diameter is 3.7 cm.    Image 
both shoulders M75.101, M12.811, M12.812, M75.102    Spinal stenosis of lumbar region at multiple levels M48.061    DDD (degenerative disc disease), cervical M50.30    Scoliosis M41.9    Lymphedema of right lower extremity I89.0    Leukocytoclastic vasculitis (HCC) M31.0    Possible pyoderma gangrenosum L88    Cellulitis of right lower extremity L03.115    Hyponatremia E87.1    Recurrent Streptococcal cellulitis of lower extremity L03.119    Heart murmur R01.1    Chronic systolic congestive heart failure (HCC) I50.22    Dilated cardiomyopathy (HCC) I42.0     Assessment of today's active condition(s):     Background of a number of benign medical conditions (obesity, HTN, OA, GERD, asthma, prior LLE DVT, prior gastric band and a number of orthopedic surgeries); I think some subclinical lymphedema related to all of that, then a bullous (Strep-appearing) cellulitis around Thanksgiving, with significant epidermal lysis, needed IV Abx to improve, then those primary ulcers healed with local care and Abx and compression.      But then development of a few very tender dermal and SQ hematomas (from ASA and venous HTN and that prior cellulitis inflammation, I thought?), and then THOSE healed up. But then re-emergence of a more necrotic RLE ulcer, a couple of very distinctive tender and purple and inflamed patches that haven't looked like those old hematomas (more superficial, more inflamed, more irregular), which COULD be pyoderma gangrenosum. The purple areas are coming and going somewhat, but the main ulcer is at least more predictable week to week, though not healing quickly. Not debriding sharply there, for fear of pathergy. Nice improvement this week though.      Another recent right foot cellulitis / thigh angitis, probably Strep, turned around quickly with oral Abx.      And then a new mostly LLE dermatitis a month or two ago -- diagnosed on Bx as leukocytoclastic vasculitis. No obvious allergic triggers, some response 
intermediate (~8 mmHg).   Pericardium No pericardial effusion.   Extracardiac No pleural effusion.     Assessment/Plan:     Cardiomyopathy, severely dilated LV, mildly reduced LVEF  Regional wall motion changes   Cellulitis   Chronic lymphedema  Obstructive sleep apnea    -No anginal equivalents.   -Dry cough since starting Losartan. Although rare, dry cough can still occur in patients. Will stop. She reports ACEI cough in the past with lisinopril. Will avoid ARB/ARNI.   -not on telemetry but rates on exam in the 60s  -Continue toprol 25mg daily  -Add spironolactone 25mg daily and Jardiance 10mg daily.   -Repeat BMP in one week after starting MRA/SGLT2.   - Reviewed echocardiogram, mildly reduced LVEF around 45%. Poor endocardial border definition which makes regional variation difficult. As an outpatient, would plan on repeating echocardiogram (limited) with definity.   - Non urgent ischemic evaluation, can be considered as OP  - Volume status is difficulty to estimate due to body habitus, does not appear wet on exam. Warm/Dry.   - Cardiology will sign off. Will arrange follow up.     NOTE: This report was transcribed using voice recognition software. Every effort was made to ensure accuracy, however, inadvertent computerized transcription errors may be present.     Perry Jones DO  [facility] [place]   Office: [phone]  Fax: [phone]

## 2024-05-13 NOTE — PLAN OF CARE
Problem: Discharge Planning  Goal: Discharge to home or other facility with appropriate resources  5/10/2024 0433 by Niesha Dalal RN  Outcome: Progressing  5/10/2024 0432 by Niesha Dalal RN  Outcome: Progressing  Flowsheets (Taken 5/9/2024 2000)  Discharge to home or other facility with appropriate resources: Identify barriers to discharge with patient and caregiver  5/9/2024 1831 by Milagro Billingsley RN  Outcome: Progressing     Problem: Pain  Goal: Verbalizes/displays adequate comfort level or baseline comfort level  5/10/2024 0433 by Niesha Dalal RN  Outcome: Progressing  5/10/2024 0432 by Niesha Dalal RN  Outcome: Progressing  5/9/2024 1831 by Milagro Billingsley RN  Outcome: Progressing     Problem: Safety - Adult  Goal: Free from fall injury  5/10/2024 0433 by Niesha Dalal RN  Outcome: Progressing  5/10/2024 0432 by Niesha Dalal RN  Outcome: Progressing  5/9/2024 1831 by Milagro Billingsley RN  Outcome: Progressing     Problem: Musculoskeletal - Adult  Goal: Return mobility to safest level of function  Outcome: Progressing  Goal: Maintain proper alignment of affected body part  Outcome: Progressing     Problem: Musculoskeletal - Adult  Goal: Maintain proper alignment of affected body part  Outcome: Progressing     Problem: Infection - Adult  Goal: Absence of infection at discharge  Outcome: Progressing  Goal: Absence of infection during hospitalization  Outcome: Progressing     Problem: Skin/Tissue Integrity - Adult  Goal: Skin integrity remains intact  Outcome: Progressing  Goal: Incisions, wounds, or drain sites healing without S/S of infection  Outcome: Progressing     Problem: Skin/Tissue Integrity - Adult  Goal: Incisions, wounds, or drain sites healing without S/S of infection  Outcome: Progressing     
  Problem: Discharge Planning  Goal: Discharge to home or other facility with appropriate resources  5/10/2024 0922 by Zainab Cortez RN  Outcome: Progressing     Problem: Pain  Goal: Verbalizes/displays adequate comfort level or baseline comfort level  5/10/2024 0922 by Zainab Cortez RN  Outcome: Progressing     Problem: Safety - Adult  Goal: Free from fall injury  5/10/2024 0922 by Zainab Cortez RN  Outcome: Progressing     Problem: Musculoskeletal - Adult  Goal: Return mobility to safest level of function  5/10/2024 0922 by Zainab Cortez RN  Outcome: Progressing     Problem: Musculoskeletal - Adult  Goal: Maintain proper alignment of affected body part  5/10/2024 0922 by Zainab Cortez RN  Outcome: Progressing     Problem: Infection - Adult  Goal: Absence of infection at discharge  5/10/2024 0922 by Zainab Cortez RN  Outcome: Progressing     Problem: Infection - Adult  Goal: Absence of infection during hospitalization  5/10/2024 0922 by Zainab Cortez RN  Outcome: Progressing     Problem: Skin/Tissue Integrity - Adult  Goal: Skin integrity remains intact  5/10/2024 0922 by Zainab Cortez RN  Outcome: Progressing     Problem: Skin/Tissue Integrity - Adult  Goal: Incisions, wounds, or drain sites healing without S/S of infection  5/10/2024 0922 by Zainab Cortez RN  Outcome: Progressing     
  Problem: Discharge Planning  Goal: Discharge to home or other facility with appropriate resources  5/10/2024 2126 by Tari Montenegro RN  Outcome: Progressing  5/10/2024 0922 by Zainab Cortez RN  Outcome: Progressing     Problem: Pain  Goal: Verbalizes/displays adequate comfort level or baseline comfort level  5/10/2024 2126 by Tari Montenegro RN  Outcome: Progressing  5/10/2024 0922 by Zainab Cortez RN  Outcome: Progressing     Problem: Safety - Adult  Goal: Free from fall injury  5/10/2024 2126 by Tari Montenegro RN  Outcome: Progressing  5/10/2024 0922 by Zainab Cortez RN  Outcome: Progressing     Problem: Musculoskeletal - Adult  Goal: Return mobility to safest level of function  5/10/2024 2126 by Tari Montenegro RN  Outcome: Progressing  5/10/2024 0922 by Zainab Cortez RN  Outcome: Progressing  Goal: Maintain proper alignment of affected body part  5/10/2024 2126 by Tari Montenegro RN  Outcome: Progressing  5/10/2024 0922 by Zainab Cortez RN  Outcome: Progressing     Problem: Infection - Adult  Goal: Absence of infection at discharge  5/10/2024 2126 by Tari Montenegro RN  Outcome: Progressing  5/10/2024 0922 by Zainab Cortez RN  Outcome: Progressing  Goal: Absence of infection during hospitalization  5/10/2024 2126 by Tari Montenegro RN  Outcome: Progressing  5/10/2024 0922 by Zainab Cortez RN  Outcome: Progressing     Problem: Skin/Tissue Integrity - Adult  Goal: Skin integrity remains intact  5/10/2024 2126 by Tari Montenegro RN  Outcome: Progressing  5/10/2024 0922 by Zainab Cortez RN  Outcome: Progressing  Goal: Incisions, wounds, or drain sites healing without S/S of infection  5/10/2024 2126 by Tari Montenegro RN  Outcome: Progressing  5/10/2024 0922 by Zainab Cortez RN  Outcome: Progressing     Problem: Skin/Tissue Integrity  Goal: Absence of new skin breakdown  Description: 1.  Monitor for areas of redness and/or skin breakdown  2.  Assess vascular access sites hourly  3.  
  Problem: Discharge Planning  Goal: Discharge to home or other facility with appropriate resources  5/11/2024 2254 by Ailin Lion RN  Outcome: Progressing  5/11/2024 0855 by Precious Moeller RN  Outcome: Progressing     Problem: Pain  Goal: Verbalizes/displays adequate comfort level or baseline comfort level  5/11/2024 2254 by Ailin Lion RN  Outcome: Progressing  5/11/2024 0855 by Precious Moeller RN  Outcome: Progressing     Problem: Safety - Adult  Goal: Free from fall injury  5/11/2024 2254 by Ailin Lion RN  Outcome: Progressing  5/11/2024 0855 by Precious Moeller RN  Outcome: Progressing     Problem: Infection - Adult  Goal: Absence of infection at discharge  5/11/2024 0855 by Precious Moeller RN  Outcome: Progressing     Problem: Skin/Tissue Integrity - Adult  Goal: Skin integrity remains intact  5/11/2024 2254 by Ailin Lion RN  Outcome: Progressing  5/11/2024 0855 by Precious Moeller RN  Outcome: Progressing     
  Problem: Discharge Planning  Goal: Discharge to home or other facility with appropriate resources  Outcome: Progressing     Problem: Pain  Goal: Verbalizes/displays adequate comfort level or baseline comfort level  Outcome: Progressing     Problem: Safety - Adult  Goal: Free from fall injury  Outcome: Progressing     
  Problem: Infection - Adult  Goal: Absence of infection at discharge  5/11/2024 0855 by Precious Moeller RN  Outcome: Progressing  5/10/2024 2126 by Tari Montenegro RN  Outcome: Progressing  Goal: Absence of infection during hospitalization  5/11/2024 0855 by Precious Moeller RN  Outcome: Progressing  5/10/2024 2126 by Tari Montenegro RN  Outcome: Progressing     Problem: Skin/Tissue Integrity - Adult  Goal: Skin integrity remains intact  5/11/2024 0855 by Precious Moeller RN  Outcome: Progressing  5/10/2024 2126 by Tari Montenegro RN  Outcome: Progressing  Goal: Incisions, wounds, or drain sites healing without S/S of infection  5/11/2024 0855 by Precious Moeller RN  Outcome: Progressing  5/10/2024 2126 by Tari Montenegro RN  Outcome: Progressing     
  Problem: Infection - Adult  Goal: Absence of infection at discharge  Outcome: Progressing  Goal: Absence of infection during hospitalization  Outcome: Progressing     Problem: Skin/Tissue Integrity - Adult  Goal: Skin integrity remains intact  5/12/2024 0718 by Precious Moeller RN  Outcome: Progressing  5/11/2024 2254 by Ailin Lion RN  Outcome: Progressing  Goal: Incisions, wounds, or drain sites healing without S/S of infection  Outcome: Progressing     
5/12/2024 2025)  Incisions, Wounds, or Drain Sites Healing Without Sign and Symptoms of Infection: ADMISSION and DAILY: Assess and document risk factors for pressure ulcer development     Problem: Skin/Tissue Integrity  Goal: Absence of new skin breakdown  Description: 1.  Monitor for areas of redness and/or skin breakdown  2.  Assess vascular access sites hourly  3.  Every 4-6 hours minimum:  Change oxygen saturation probe site  4.  Every 4-6 hours:  If on nasal continuous positive airway pressure, respiratory therapy assess nares and determine need for appliance change or resting period.  Outcome: Progressing     Problem: Chronic Conditions and Co-morbidities  Goal: Patient's chronic conditions and co-morbidity symptoms are monitored and maintained or improved  Outcome: Progressing  Flowsheets (Taken 5/12/2024 2025)  Care Plan - Patient's Chronic Conditions and Co-Morbidity Symptoms are Monitored and Maintained or Improved: Monitor and assess patient's chronic conditions and comorbid symptoms for stability, deterioration, or improvement     Problem: Metabolic/Fluid and Electrolytes - Adult  Goal: Electrolytes maintained within normal limits  Outcome: Progressing     
tablets 5/13/24 5/16/24 Yes Tari Briggs PA   empagliflozin (JARDIANCE) 10 MG tablet Take 1 tablet by mouth daily 5/13/24  Yes Tari Briggs PA   lactobacillus (CULTURELLE) capsule Take 1 capsule by mouth daily (with breakfast) for 10 days 5/14/24 5/24/24 Yes Tari Briggs PA   spironolactone (ALDACTONE) 25 MG tablet Take 1 tablet by mouth daily 5/13/24  Yes Tari Briggs PA   metoprolol succinate (TOPROL XL) 25 MG extended release tablet Take 1 tablet by mouth daily 5/14/24  Yes Tari Briggs PA   gabapentin (NEURONTIN) 400 MG capsule TAKE 1 CAPSULE THREE TIMES DAILY 4/24/24 5/24/24  Mariana Awan MD   DULoxetine (CYMBALTA) 60 MG extended release capsule TAKE 2 CAPSULES EVERY DAY 3/20/24   Mariana Awan MD   furosemide (LASIX) 20 MG tablet TAKE 2 TABLETS EVERY DAY AS NEEDED 3/20/24   Mariana Awan MD   omeprazole (PRILOSEC) 20 MG delayed release capsule TAKE 1 CAPSULE EVERY MORNING BEFORE BREAKFAST 3/20/24   Mariana Awan MD   oxyBUTYnin (DITROPAN) 5 MG tablet TAKE 1 TABLET TWICE DAILY 3/20/24   Mariana Awan MD   indomethacin (INDOCIN) 50 MG capsule TAKE 1 CAPSULE THREE TIMES DAILY AS NEEDED FOR PAIN 3/4/24   Mariana Awan MD   tiZANidine (ZANAFLEX) 4 MG tablet TAKE 2 TABLETS EVERY NIGHT 3/4/24   Mariana Awan MD   aspirin 81 MG chewable tablet Take 1 tablet by mouth daily    ProviderJason MD   Multiple Vitamin (MULTIVITAMIN ADULT PO) Take by mouth daily    ProviderJason MD      Diet Reviewed: Yes   ADULT DIET; Regular    Goal of Care Reviewed: Yes   Patient and/or Family's stated Goal of Care this Admission: increase activity tolerance, better understand heart failure and disease management, be more comfortable, and reduce lower extremity edema prior to discharge.     Electronically signed by Deb Van RN on 5/13/2024 at 2:18 PM

## 2024-05-13 NOTE — CONSULTS
[facility]   HEART FAILURE PROGRAM      NAME:  Nohemi Nesbitt  AGE: 58 y.o.   GENDER: female  : 1965  TODAY'S DATE:  2024    Subjective:     VISIT TYPE: Evaluation / Consult    ADMIT DATE: 2024    PAST MEDICAL HISTORY:      Diagnosis Date    Acute deep vein thrombosis (DVT) of left lower extremity ([facility]) 2015    Acute renal failure (ARF) ([facility]) 2015    d/t IV dye    Cellulitis of right lower extremity 2023    clinically very much seemed like Strep    Family history of factor V deficiency     brother and sister; pt tested does not have    Hematoma of right lower extremity 12/15/2023    following infection and superficial ulcers and increased lymphedema, + ASA therapy    Incarcerated ventral hernia 2021    MRSA infection of surgical site 2021    Perforated appendicitis 2021    Systolic CHF ([facility])     Tobacco use      HOME MEDICATIONS:  Prior to Admission medications    Medication Sig Start Date End Date Taking? Authorizing Provider   amoxicillin (AMOXIL) 500 MG capsule Take 2 capsules by mouth every 8 (eight) hours for 5 days (Resume the supply you already have at home) 24 Yes Kentrell Wylie MD   oxyCODONE-acetaminophen (PERCOCET) 5-325 MG per tablet Take 1 tablet by mouth every 8 hours as needed for Pain for up to 3 days. Max Daily Amount: 3 tablets 24 Yes Tari Briggs PA   empagliflozin (JARDIANCE) 10 MG tablet Take 1 tablet by mouth daily 24  Yes Tari Briggs PA   lactobacillus (CULTURELLE) capsule Take 1 capsule by mouth daily (with breakfast) for 10 days 24 Yes Tari Briggs PA   spironolactone (ALDACTONE) 25 MG tablet Take 1 tablet by mouth daily 24  Yes Tari Briggs PA   metoprolol succinate (TOPROL XL) 25 MG extended release tablet Take 1 tablet by mouth daily 24  Yes Tari Briggs PA   gabapentin (NEURONTIN) 400 MG capsule TAKE 1 CAPSULE THREE TIMES DAILY 24 
[facility]   Cardiology Consultation     Date: 5/11/2024  Reason for Consultation: Heart failure  Consult Requesting Physician: Jostin Oneill,*     CC: Redness and swelling of right leg    HPI:   Nohemi Nesbitt is a 58 y.o. female history of chronic lymphedema of the right lower extremity, GERD, chronic osteoarthritis, depression, obesity, initially presented to the emergency department with complaints of fevers, right lower extremity redness, typically follows with wound care, recently started on Augmentin.  An echo was obtained as a part of this hospitalization where she was found to have mildly reduced LVEF of 40 to 45% and severely dilated left ventricle with regional wall motion abnormalities in the anterior to inferior lateral wall. No prior ECHO for comparison. She presented with unilateral swelling of her leg which is chronic, denies shortness of breath, PND/orthopnea.    Review of System:  Complete 10 point ROS performed and negative unless noted in above HPI or below    Prior to Admission medications    Medication Sig Start Date End Date Taking? Authorizing Provider   gabapentin (NEURONTIN) 400 MG capsule TAKE 1 CAPSULE THREE TIMES DAILY 4/24/24 5/24/24  Mariana Awan MD   DULoxetine (CYMBALTA) 60 MG extended release capsule TAKE 2 CAPSULES EVERY DAY 3/20/24   Mariana Awan MD   furosemide (LASIX) 20 MG tablet TAKE 2 TABLETS EVERY DAY AS NEEDED 3/20/24   Mariana Awan MD   omeprazole (PRILOSEC) 20 MG delayed release capsule TAKE 1 CAPSULE EVERY MORNING BEFORE BREAKFAST 3/20/24   Mariana Awan MD   oxyBUTYnin (DITROPAN) 5 MG tablet TAKE 1 TABLET TWICE DAILY 3/20/24   Mariana Awan MD   indomethacin (INDOCIN) 50 MG capsule TAKE 1 CAPSULE THREE TIMES DAILY AS NEEDED FOR PAIN 3/4/24   Mariana Awan MD   tiZANidine (ZANAFLEX) 4 MG tablet TAKE 2 TABLETS EVERY NIGHT 3/4/24   Mariana Awan MD 
Pharmacy Consult  Per  Martinez Weathers CNP    RE: Vancomycin    Dx:  Skin & soft tissue infection  Ht 63 inches  Wt 132.5 kg    Based on age & wt, dosed at 25 mg/kg (3312.5 mg)  rounded to 2000 mg IVPB x 1.      MICHAEL GuzmanPh.5/9/202410:11 AM      
cellulitis inflammation, I thought?), and then THOSE healed up. But then re-emergence of a more necrotic RLE ulcer, a couple of very distinctive tender and purple and inflamed patches that haven't looked like those old hematomas (more superficial, more inflamed, more irregular), which COULD be pyoderma gangrenosum. The purple areas are coming and going somewhat, but the main ulcer is at least more predictable week to week, though not healing quickly. Not debriding sharply there, for fear of pathergy.      Another recent right foot cellulitis / thigh angitis, probably Strep, turned around quickly with oral Abx.      And then a new mostly LLE dermatitis a month or two ago -- diagnosed on Bx as leukocytoclastic vasculitis. No obvious allergic triggers, some response to oral steroids for a time, but then it rebounded, quieted down on its own the last several weeks, and no lab concerns in terms of a more systemic process. Some of the ongoing pruritus and skin changes more recently could be from habitual self-excoriation.     But then another RLE cellulitis this week, clinically seems very likely Strep, more systemically ill than last time, high fever, borderline BP, probably a bit volume-depleted. Didn't improve with (only) 18 hours of oral amox, and unfortunately she didn't have oral clinda at home, but coming back in to the ER seems reasonable to me, with how poorly she felt, the dehydration concerns, etc. Just a bit better this AM than yesterday.     Treatment recs:     The contact isolation is for an MRSA WCx from 2-3 years ago. No suspicion of MRSA infection of the RLE now.     Continue current Abx for now.     IV fluids seem very reasonable for today also.    Watch for thrush, N/V, Cdiff, rash, IV phlebitis, etc.    We had specifically asked the ER not to remove her right lower leg dressing and wrap, but someone did anyway, and unfortunately I don't have the supplies or time this morning to put a proper dressing

## 2024-05-13 NOTE — DISCHARGE SUMMARY
Name:  Nohemi Nesbitt  Room:  0201/0201-01  MRN:    [id number]    Discharge Summary      This discharge summary is in conjunction with a complete physical exam done on the day of discharge.    Discharging Provider: Tari Briggs PA-C   Discharging Attending Physician: Dr. Powell       Admit: 5/9/2024  Discharge:  5/13/2024    HPI taken from admission H&P:    The patient is a 58 y.o. female with pmhx of cellulitis, lymphedema, FLORIAN, DVT who presented to [facility] ED with complaint of fever. Patient has been battling right lower extremity cellulitis and follows with Dr. Wylie at wound care, saw him yesterday and had low BP and fever. He sent her home on augmentin, then last night developed temp of 106. Also started having body aches, chills, poor appetite so she came into the ED today.   Does have mild pain in right leg with ambulation.      Diagnoses this Admission and Hospital Course   #Recurrent cellulitis of right lower extremity   #Chronic lymphedema  #Sepsis criteria   -temp of up to 106 at home ( per pt ), elev inflam markers, WBC, and source   -initially given vanc and cefepime in ED and developed flushing and rash of chest--> stopped and given benadryl   -follows at wound clinic with Dr. Wylie, seen yesterday and started on augmentin as outpt,   started on  IV clindamycin and rocephin   -blood cultures negative  -percocet prn   -Dr Wylie consulted and following, to evaluate today and then can dc home with antibiotics --> amoxicillin   -follow up in wound care later this week      #Hyponatremia-resolved  -IVF   -repeat BMP improving   -stopped IVF       Systolic CHF  ECHO 45%  Cardiology consult   Started on low dose losartan and Toprol XL--> has cough and possible wheezing from this, stop ARB, cardiology started aldactone and jardiance and continue BB   -Bmp in 1 week   Outpatient ischemic eval.      #Transaminitis  -could be 2/2 to infection vs recent augmentin use   -monitor     #FLORIAN   -home

## 2024-05-13 NOTE — DISCHARGE INSTRUCTIONS
Wound Care Center Physician Orders and Discharge Instructions  [facility]  [address], [address]  [place] [id number]  Telephone: [phone]      Fax: [phone]        Your home care company:   N/a .     Your wound-care supplies will be provided by:  [facility] .     NAME:  Nohemi Nesbitt   YOB: 1965  PRIMARY DIAGNOSIS FOR WOUND CARE CENTER:  Infection & Lymphedema .     Wound cleansing:   Do not scrub or use excessive force.  Wash hands with soap and water before and after dressing changes.  Prior to applying a clean dressing, cleanse wound with normal saline, wound cleanser, or mild soap and water. Ask your physician or nurse before getting the wound(s) wet in the shower.                Wound care for home:     Right lower leg wounds:   Nexodyn spray to all wounds  Clobetasol to anything purple  PSO to small open areas  Cover with 4x4's  Specialist to build up as needed   Foam to anterior ankle  AccuWrap Lite compression wrap with Nylon (LITE compression)  Tensoplast  Keep in place for the week. Keep clean, dry & intact     General comments for venous / lymphedema ulcers:  *  Elevate your legs to the level of your heart for at least 30 minutes, several times daily.  *  Walk as much as you can tolerate. Avoid standing for long periods of time, but if you must stand, regularly do heel-raises and calf-pump exercises.  *  If you have compression garments that can be changed regularly, apply them first thing in the morning, and remove them when you go to bed. Moisturize your skin at bedtime, with Vaseline, Aquaphor, Aveeno, CeraVe, Cetaphil, Eucerin, Lubriderm, etc; but keep the skin between your toes dry.  *  Be sure to adhere to any recommendations from your PCP about diuretics (water pills), diet, exercise, and maintaining a healthy weight. If you have questions, please ask.        New orders for this week (labs, imaging, medications, etc.):       5/17/24 -

## 2024-05-14 ENCOUNTER — CARE COORDINATION (OUTPATIENT)
Dept: CASE MANAGEMENT | Age: 59
End: 2024-05-14

## 2024-05-14 NOTE — CARE COORDINATION
Care Transitions Initial Follow Up Call    Call within 2 business days of discharge: Yes    Patient: Nohemi MOODY Stem Patient : 1965   MRN: [id number]  Reason for Admission: 3 days -> recurrent cellulitis of RLE, chronic lymphedema, sepsis, hypoNa, sCHF (EF 40-45%), transaminitis, FLORIAN on Bi/CPAP, Asthma-no AE, hx VTE-no AC, depression, morbid obesity, normocytic anemia -> home no services, DC weight 294#, BMP in 1 week (order in Epic)  Discharge Date: 24 RARS: Readmission Risk Score: 10.1    Last Discharge Facility       Date Complaint Diagnosis Description Type Department Provider    24 Cellulitis Cellulitis of right lower extremity ... ED to Hosp-Admission (Discharged) (ADMITTED) [facility] Jostin Rodney MD; [facility]...     CTN attempted follow-up outreach to patient. Message left including CTN contact information.     Follow Up  Future Appointments   Date Time Provider Department Center   2024  3:40 PM Betty Klein APRN - CNP [place] [facility] None   2024 12:45 PM Kentrell Wylie MD [facility] MARIA FERNANDA [place]   2024  8:30 AM Kentrell Wylie MD [facility] MARIA FERNANDA [facility]   2024  8:30 AM Kentrell Wylie MD [facility] MARIA FERNANDA [place] [facility]   6/10/2024  1:40 PM Mariana Awan MD [place] [facility] None   2024  2:00 PM Raymon Littlejohn APRN - CNP P CL[facility] CAR JUANY Damon, RN  Care Transition Nurse  [phone] mobile

## 2024-05-15 ENCOUNTER — CARE COORDINATION (OUTPATIENT)
Dept: CASE MANAGEMENT | Age: 59
End: 2024-05-15

## 2024-05-15 ENCOUNTER — HOSPITAL ENCOUNTER (OUTPATIENT)
Dept: WOUND CARE | Age: 59
Discharge: HOME | End: 2024-05-15
Attending: INTERNAL MEDICINE

## 2024-05-15 DIAGNOSIS — L03.115 CELLULITIS OF RIGHT LOWER EXTREMITY: Primary | ICD-10-CM

## 2024-05-15 PROCEDURE — 1111F DSCHRG MED/CURRENT MED MERGE: CPT | Performed by: INTERNAL MEDICINE

## 2024-05-15 NOTE — CARE COORDINATION
Care Transitions Initial Follow Up Call    Call within 2 business days of discharge: Yes    Patient Current Location: Home: [address] [place] [id number]    Care Transition Nurse contacted the patient by telephone to perform post hospital discharge assessment. Verified name and  with patient as identifiers. Provided introduction to self, and explanation of the Care Transition Nurse role.    Patient: Nohemi Nesbitt Patient : 1965   MRN: [id number]  Reason for Admission: 3 days -> recurrent R thigh cellulitis, chronic RLE lymphedema, sepsis, hypoNa, sCHF (EF 40-45%), transaminitis, FLORIAN on Bi/CPAP, Asthma-no AE, hx VTE-no AC, depression, morbid obesity, normocytic anemia -> home no services, DC weight 294#, BMP in 1 week (order in Epic), f/up [facility]-Dr Wylie   Discharge Date: 24 RARS: Readmission Risk Score: 10.1    Last Discharge Facility       Date Complaint Diagnosis Description Type Department Provider    24 Cellulitis Cellulitis of right lower extremity ... ED to Hosp-Admission (Discharged) (ADMITTED) [facility] Jostin Rodney MD; [facility]...     Challenges to be reviewed by the provider   Additional needs identified to be addressed with provider: No         Method of communication with provider: none.    Nohemi Nesbitt returned call. States she did not tolerate losartan because of cough. The cough has resolved. Tolerating new medications.     Reports her R thigh is still very swollen and hard. She sees PCP tomorrow and Dr Wylie on Friday. Denies numbness, tingling, discoloration to LE. Able to ambulate without difficulty.    She has not gotten on scale since home. Educated her on daily weights starting tomorrow morning - prior to PO, after void in same clothing daily. We are monitoring for fluid volume with goal for weight to decrease as fluid is removed.     CHF education:  -if you take a water pill - do not skip doses (taking furosemide and spironolactone)  -weigh

## 2024-05-15 NOTE — CARE COORDINATION
Care Transitions Initial Follow Up Call    Call within 2 business days of discharge: Yes    Patient: Nohemi MOODY Stem Patient : 1965   MRN: [id number]  Reason for Admission: 3 days -> recurrent R thigh cellulitis, chronic RLE lymphedema, sepsis, hypoNa, sCHF (EF 40-45%), transaminitis, FLORIAN on Bi/CPAP, Asthma-no AE, hx VTE-no AC, depression, morbid obesity, normocytic anemia -> home no services, DC weight 294#, BMP in 1 week (order in [facility]), f/up [facility]-Dr Wylie  Discharge Date: 24 RARS: Readmission Risk Score: 10.1    Last Discharge Facility       Date Complaint Diagnosis Description Type Department Provider    24 Cellulitis Cellulitis of right lower extremity ... ED to Hosp-Admission (Discharged) (ADMITTED) [address] Jostin Oneill MD; [facility]...     CTN attempted follow-up outreach to patient. Message left including CTN contact information. No further CTN outreach scheduled at this time.     Follow Up  Future Appointments   Date Time Provider Department Center   2024  3:40 PM Betty Klein APRN - CNP [place] [facility] None   2024 12:45 PM Kentrell Wylie MD [facility]   2024  8:30 AM [facility]   2024  8:30 AM [facility] [place] [facility]   6/10/2024  1:40 PM Mariana Awan MD [place] [facility] None   2024  2:00 PM Raymon Littlejohn APRN - CNP P [facility] CAR JUANY Damon, RN  Care Transition Nurse  [phone] mobile

## 2024-05-16 ENCOUNTER — OFFICE VISIT (OUTPATIENT)
Dept: INTERNAL MEDICINE CLINIC | Age: 59
End: 2024-05-16

## 2024-05-16 VITALS
BODY MASS INDEX: 51.91 KG/M2 | RESPIRATION RATE: 14 BRPM | DIASTOLIC BLOOD PRESSURE: 64 MMHG | HEART RATE: 68 BPM | HEIGHT: 63 IN | SYSTOLIC BLOOD PRESSURE: 106 MMHG | WEIGHT: 293 LBS

## 2024-05-16 DIAGNOSIS — L03.115 CELLULITIS OF RIGHT LOWER EXTREMITY: ICD-10-CM

## 2024-05-16 DIAGNOSIS — Z09 HOSPITAL DISCHARGE FOLLOW-UP: Primary | ICD-10-CM

## 2024-05-16 PROCEDURE — 1111F DSCHRG MED/CURRENT MED MERGE: CPT | Performed by: NURSE PRACTITIONER

## 2024-05-16 PROCEDURE — 1036F TOBACCO NON-USER: CPT | Performed by: NURSE PRACTITIONER

## 2024-05-16 PROCEDURE — 3074F SYST BP LT 130 MM HG: CPT | Performed by: NURSE PRACTITIONER

## 2024-05-16 PROCEDURE — 3017F COLORECTAL CA SCREEN DOC REV: CPT | Performed by: NURSE PRACTITIONER

## 2024-05-16 PROCEDURE — G8427 DOCREV CUR MEDS BY ELIG CLIN: HCPCS | Performed by: NURSE PRACTITIONER

## 2024-05-16 PROCEDURE — G8417 CALC BMI ABV UP PARAM F/U: HCPCS | Performed by: NURSE PRACTITIONER

## 2024-05-16 PROCEDURE — 3078F DIAST BP <80 MM HG: CPT | Performed by: NURSE PRACTITIONER

## 2024-05-16 PROCEDURE — 99214 OFFICE O/P EST MOD 30 MIN: CPT | Performed by: NURSE PRACTITIONER

## 2024-05-16 RX ORDER — INDOMETHACIN 50 MG/1
CAPSULE ORAL
Qty: 270 CAPSULE | Refills: 0 | Status: SHIPPED | OUTPATIENT
Start: 2024-05-16

## 2024-05-16 RX ORDER — TIZANIDINE 4 MG/1
TABLET ORAL
Qty: 180 TABLET | Refills: 0 | Status: SHIPPED | OUTPATIENT
Start: 2024-05-16

## 2024-05-16 NOTE — PROGRESS NOTES
Post-Discharge Transitional Care Follow Up      Nohemi Nesbitt   YOB: 1965    Date of Office Visit:  5/16/2024  Date of Hospital Admission: 5/9/24  Date of Hospital Discharge: 5/13/24  Readmission Risk Score (high >=14%. Medium >=10%):Readmission Risk Score: 10.1      Care management risk score Rising risk (score 2-5) and Complex Care (Scores >=6): No Risk Score On File     Non face to face  following discharge, date last encounter closed (first attempt may have been earlier): 05/15/2024     Call initiated 2 business days of discharge: Yes     Hospital discharge follow-up  -     MD DISCHARGE MEDS RECONCILED W/ CURRENT OUTPATIENT MED LIST  Cellulitis of right lower extremity      Medical Decision Making: low complexity  No follow-ups on file.    On this date 5/16/2024 I have spent 40 minutes reviewing previous notes, test results and face to face with the patient discussing the diagnosis and importance of compliance with the treatment plan as well as documenting on the day of the visit.       Subjective:   HPI    Inpatient course: Discharge summary reviewed- see chart.    Interval history/Current status: Patient presents for hospital follow up.  She developed a fever overnight.  Unable to make it to the kitchen that night. When she got up in the morning, she was able to make her way to the kitchen. Her temp was 106.1.  Took ibuprofen and waited an hour.  Came down to 103.5. presented [facility].  Ad[facility] to [facility] 5/9-5/13/24 and was treated for cellulitis   Still being treated for the wound with Dr. Wylie.   The initial wound is looking better than it had.       Patient Active Problem List   Diagnosis    Hypertension    Chronic osteoarthritis    Chronic GERD    Asthma    Other chronic pain    Urinary incontinence, urge    FLORIAN (obstructive sleep apnea)    Class 3 severe obesity due to excess calories with serious comorbidity and body mass index (BMI) of 50.0 to 59.9 in adult (HCC)    Elevated liver

## 2024-05-17 ENCOUNTER — HOSPITAL ENCOUNTER (OUTPATIENT)
Dept: WOUND CARE | Age: 59
Discharge: HOME | End: 2024-05-17
Attending: INTERNAL MEDICINE
Payer: MEDICARE

## 2024-05-17 VITALS
DIASTOLIC BLOOD PRESSURE: 76 MMHG | TEMPERATURE: 98.1 F | WEIGHT: 293 LBS | SYSTOLIC BLOOD PRESSURE: 120 MMHG | HEART RATE: 60 BPM | BODY MASS INDEX: 51.91 KG/M2 | RESPIRATION RATE: 18 BRPM | HEIGHT: 63 IN

## 2024-05-17 DIAGNOSIS — L97.912 ULCER OF RIGHT LEG, WITH FAT LAYER EXPOSED (HCC): ICD-10-CM

## 2024-05-17 DIAGNOSIS — I89.0 LYMPHEDEMA OF RIGHT LOWER EXTREMITY: Primary | ICD-10-CM

## 2024-05-17 PROCEDURE — 29581 APPL MULTLAYER CMPRN SYS LEG: CPT

## 2024-05-17 RX ORDER — LIDOCAINE 40 MG/G
CREAM TOPICAL ONCE
Status: DISCONTINUED | OUTPATIENT
Start: 2024-05-17 | End: 2024-05-18 | Stop reason: HOSPADM

## 2024-05-17 RX ORDER — LIDOCAINE 50 MG/G
OINTMENT TOPICAL ONCE
OUTPATIENT
Start: 2024-05-17 | End: 2024-05-17

## 2024-05-17 RX ORDER — BACITRACIN ZINC AND POLYMYXIN B SULFATE 500; 1000 [USP'U]/G; [USP'U]/G
OINTMENT TOPICAL ONCE
OUTPATIENT
Start: 2024-05-17 | End: 2024-05-17

## 2024-05-17 RX ORDER — PENICILLIN V POTASSIUM 500 MG/1
500 TABLET ORAL EVERY 12 HOURS SCHEDULED
Qty: 180 TABLET | Refills: 3 | Status: SHIPPED | OUTPATIENT
Start: 2024-05-17 | End: 2025-05-12

## 2024-05-17 RX ORDER — LIDOCAINE 40 MG/G
CREAM TOPICAL ONCE
OUTPATIENT
Start: 2024-05-17 | End: 2024-05-17

## 2024-05-17 RX ORDER — LIDOCAINE HYDROCHLORIDE 40 MG/ML
SOLUTION TOPICAL ONCE
OUTPATIENT
Start: 2024-05-17 | End: 2024-05-17

## 2024-05-17 RX ORDER — TRIAMCINOLONE ACETONIDE 1 MG/G
OINTMENT TOPICAL ONCE
OUTPATIENT
Start: 2024-05-17 | End: 2024-05-17

## 2024-05-17 ASSESSMENT — PAIN DESCRIPTION - LOCATION: LOCATION: LEG

## 2024-05-17 ASSESSMENT — PAIN DESCRIPTION - DESCRIPTORS: DESCRIPTORS: OTHER (COMMENT)

## 2024-05-17 ASSESSMENT — PAIN SCALES - GENERAL: PAINLEVEL_OUTOF10: 1

## 2024-05-17 ASSESSMENT — PAIN - FUNCTIONAL ASSESSMENT: PAIN_FUNCTIONAL_ASSESSMENT: PREVENTS OR INTERFERES SOME ACTIVE ACTIVITIES AND ADLS

## 2024-05-17 ASSESSMENT — PAIN DESCRIPTION - PAIN TYPE: TYPE: CHRONIC PAIN

## 2024-05-17 ASSESSMENT — PAIN DESCRIPTION - FREQUENCY: FREQUENCY: INTERMITTENT

## 2024-05-17 ASSESSMENT — PAIN DESCRIPTION - ORIENTATION: ORIENTATION: RIGHT;LOWER

## 2024-05-17 NOTE — PLAN OF CARE
Wrap in place until yesterday. Patient removed because it started rolling down and irritating her leg. Cellulitis improving. Patient states thigh is still sore. Amoxicillin without issues. No debridement.

## 2024-05-21 NOTE — DISCHARGE INSTRUCTIONS
Dejan    Information provided 5/22/24 to purchase CircAid Juxtalite velcro compression garment for long-term edema control    Will discuss further at future appts. possible referral to lymphedema therapy           Additional instructions for specific diagnoses:     General comments for venous / lymphedema ulcers:  *  Elevate your legs to the level of your heart for at least 30 minutes, several times daily.  *  Walk as much as you can tolerate. Avoid standing for long periods of time, but if you must stand, regularly do heel-raises and calf-pump exercises.  *  If you have compression wraps designed to remain in place all week, be sure to keep them from getting wet.  *  If you have compression garments that can be changed regularly, apply them first thing in the morning, and remove them when you go to bed. Moisturize your skin at bedtime, with Vaseline, Aquaphor, Aveeno, CeraVe, Cetaphil, Eucerin, Lubriderm, etc; but keep the skin between your toes dry.  *  If you smoke, your wound can not heal properly -- please talk with us when you're ready to quit.   *  Be sure to adhere to any recommendations from your PCP about diuretics (water pills), diet, exercise, and maintaining a healthy weight. If you have questions, please ask.  *  If you have a compression pump, aim for at least two hours of use daily.                   Follow up with Dr. Wylie in 1 week on Wednesday____________________at _______________________      Your nurse  is SHILPI Lau.      If we applied slip-resistant hospital socks today, be sure to remove them at least once a day to inspect your toes or feet, even if you're not changing the wraps or dressings underneath. If you see anything concerning (redness, excess moisture, etc), please call and let us know right away.     Should you experience any significant changes in your wound(s) (including redness, increased warmth, increased pain, increased drainage, odor, or fever) or have questions

## 2024-05-22 ENCOUNTER — HOSPITAL ENCOUNTER (OUTPATIENT)
Dept: WOUND CARE | Age: 59
Discharge: HOME | End: 2024-05-22
Attending: INTERNAL MEDICINE
Payer: MEDICARE

## 2024-05-22 ENCOUNTER — CARE COORDINATION (OUTPATIENT)
Dept: CASE MANAGEMENT | Age: 59
End: 2024-05-22

## 2024-05-22 VITALS
RESPIRATION RATE: 18 BRPM | HEART RATE: 52 BPM | WEIGHT: 293 LBS | SYSTOLIC BLOOD PRESSURE: 106 MMHG | DIASTOLIC BLOOD PRESSURE: 65 MMHG | BODY MASS INDEX: 52.75 KG/M2 | TEMPERATURE: 98.6 F

## 2024-05-22 DIAGNOSIS — L97.912 ULCER OF RIGHT LEG, WITH FAT LAYER EXPOSED (HCC): ICD-10-CM

## 2024-05-22 DIAGNOSIS — I89.0 LYMPHEDEMA OF RIGHT LOWER EXTREMITY: Primary | ICD-10-CM

## 2024-05-22 PROCEDURE — 29581 APPL MULTLAYER CMPRN SYS LEG: CPT

## 2024-05-22 PROCEDURE — 97597 DBRDMT OPN WND 1ST 20 CM/<: CPT

## 2024-05-22 RX ORDER — LIDOCAINE 50 MG/G
OINTMENT TOPICAL ONCE
Status: CANCELLED | OUTPATIENT
Start: 2024-05-22 | End: 2024-05-22

## 2024-05-22 RX ORDER — BACITRACIN ZINC AND POLYMYXIN B SULFATE 500; 1000 [USP'U]/G; [USP'U]/G
OINTMENT TOPICAL ONCE
Status: DISCONTINUED | OUTPATIENT
Start: 2024-05-22 | End: 2024-05-23 | Stop reason: HOSPADM

## 2024-05-22 RX ORDER — LIDOCAINE 50 MG/G
OINTMENT TOPICAL ONCE
Status: DISCONTINUED | OUTPATIENT
Start: 2024-05-22 | End: 2024-05-23 | Stop reason: HOSPADM

## 2024-05-22 RX ORDER — LIDOCAINE HYDROCHLORIDE 40 MG/ML
SOLUTION TOPICAL ONCE
Status: DISCONTINUED | OUTPATIENT
Start: 2024-05-22 | End: 2024-05-23 | Stop reason: HOSPADM

## 2024-05-22 RX ORDER — TRIAMCINOLONE ACETONIDE 1 MG/G
OINTMENT TOPICAL ONCE
Status: CANCELLED | OUTPATIENT
Start: 2024-05-22 | End: 2024-05-22

## 2024-05-22 RX ORDER — LIDOCAINE 40 MG/G
CREAM TOPICAL ONCE
Status: DISCONTINUED | OUTPATIENT
Start: 2024-05-22 | End: 2024-05-23 | Stop reason: HOSPADM

## 2024-05-22 RX ORDER — LIDOCAINE HYDROCHLORIDE 40 MG/ML
SOLUTION TOPICAL ONCE
Status: CANCELLED | OUTPATIENT
Start: 2024-05-22 | End: 2024-05-22

## 2024-05-22 RX ORDER — BACITRACIN ZINC AND POLYMYXIN B SULFATE 500; 1000 [USP'U]/G; [USP'U]/G
OINTMENT TOPICAL ONCE
Status: CANCELLED | OUTPATIENT
Start: 2024-05-22 | End: 2024-05-22

## 2024-05-22 RX ORDER — TRIAMCINOLONE ACETONIDE 1 MG/G
OINTMENT TOPICAL ONCE
Status: DISCONTINUED | OUTPATIENT
Start: 2024-05-22 | End: 2024-05-23 | Stop reason: HOSPADM

## 2024-05-22 RX ORDER — LIDOCAINE 40 MG/G
CREAM TOPICAL ONCE
Status: CANCELLED | OUTPATIENT
Start: 2024-05-22 | End: 2024-05-22

## 2024-05-22 NOTE — PLAN OF CARE
Wound stable & showing improvement, debridement per MD & pt. Tolerated well. Will cont. With current wound care regime with dressings & compression wrap for edema control. Reinforced importance of exercise, elevation & compression to help with circulation, edema control & wound healing. Dr Wylie also provided ordering information to purchase CircAid Juxtalite velcro compression garment for long-term edema control. Dr Wylie also discussed possibly sending a referral to lymphedema therapy in PT, will discuss further in the future when closer to healed. Pt. To continue PCN as directed. F/u in [facility] in 1 week as ordered, pt. Aware to call sooner with any changes or questions/concerns. Discharge instructions reviewed with patient, all questions answered, copy given to patient. Dressings were applied to all wounds per M.D. Instructions at this visit.

## 2024-05-22 NOTE — CARE COORDINATION
Care Transitions Follow Up Call    Patient Current Location: [address]: [address] [facility] [address]    Care Transition Nurse contacted the patient by telephone to follow up after recent hospital admission.  .    Patient: Nohemi MOODY Stem  Patient : 1965   MRN: [id number]  Reason for Admission: 3 days -> recurrent R thigh cellulitis, chronic RLE lymphedema, sepsis, hypoNa, sCHF (EF 40-45%), transaminitis, FLORIAN on Bi/CPAP, Asthma-no AE, hx VTE-no AC, depression, morbid obesity, normocytic anemia -> home no services, DC weight 294#, BMP in 1 week (order in Epic), f/up [facility]-Dr Wylie   Discharge Date: 24 RARS: Readmission Risk Score: 10.1    Needs to be reviewed by the provider   Additional needs identified to be addressed with provider: No         Method of communication with provider: none.    Completed OV with Dejan today and healing well. Has long term penicillin. Is taking probiotic. She will monitor for diarrhea, fever, chills, abd pain or other s/s infection. Noted BMP still active she will get in next day or two. Denies needs/concerns.      Addressed changes since last contact:  HFU with [facility]  Discussed follow-up appointments. If no appointment was previously scheduled, appointment scheduling offered: Yes.   Is follow up appointment scheduled within 7 days of discharge? Yes.    Follow Up  Future Appointments   Date Time Provider Department Center   2024  8:30 AM Kentrell Wylie MD [facility] [place] [facility]   2024  8:30 AM [facility] [place] [facility]   6/10/2024  1:40 PM Mariana Awan MD [place] [facility] None   2024  8:30 AM Kentrell Wylie MD [facility] [place] [facility]   2024  2:00 PM Raymon Littlejohn, APRN - CNP [facility]     External follow up appointment(s): JESS    Care Transition Nurse reviewed medical action plan and red flags with patient and discussed any barriers to care and/or understanding of plan of care after discharge. Discussed

## 2024-05-23 ENCOUNTER — CARE COORDINATION (OUTPATIENT)
Dept: CASE MANAGEMENT | Age: 59
End: 2024-05-23

## 2024-05-23 NOTE — CARE COORDINATION
Care Transition Follow Up    Challenges to be reviewed by the provider   Additional needs identified to be addressed with provider Yes  medications-Patient would like to know if she should be taking a probiotic in addition to her long term penicillin. If so, preferred pharmacy is [facility].           Patient contacted CTN to discuss taking a probiotic now that she is on penicillin long term. States she completed the probiotic she was initially on at hospital discharge and if one is recommended she would like rx to [facility].     CTN routing message to Dr Wylie for his input on this matter.     Patient states her LE edema has greatly improved so much that she has to go back to [facility] tomorrow to have her leg wrapped again because the current dressing is so loose it's falling off. Denies needs/concerns.     Jenae Damon RN  Care Transition Nurse  [phone] mobile    Future Appointments   Date Time Provider Department Center   5/24/2024 10:45 AM Kentrell Wylie MD [facility] [place] [facility]   5/29/2024  8:30 AM Kentrell Wylie MD [facility] [place] [facility]   6/5/2024  8:30 AM Kentrell Wylie MD [facility] [place] [facility]   6/10/2024  1:40 PM Mariana Awan MD [place] [facility] None   6/12/2024  8:30 AM Kentrell Wylie MD [facility] [place] [facility]   6/18/2024  2:00 PM Raymon Littlejohn, APRN - CNP [facility]

## 2024-05-23 NOTE — DISCHARGE INSTRUCTIONS
questions about your wound care, please contact the [facility] at [phone] Monday-Thursday from 8:00 am - 4:30 pm, or Friday from 8:00 am - 2:30 pm.  If you need help with your wound outside these hours and cannot wait until we are again available, contact your home-care company (if applicable), your PCP, or go to the nearest emergency room.

## 2024-05-24 ENCOUNTER — HOSPITAL ENCOUNTER (OUTPATIENT)
Dept: WOUND CARE | Age: 59
Discharge: HOME | End: 2024-05-24
Attending: INTERNAL MEDICINE
Payer: MEDICARE

## 2024-05-24 ENCOUNTER — HOSPITAL ENCOUNTER (OUTPATIENT)
Age: 59
Discharge: HOME | End: 2024-05-24
Payer: MEDICARE

## 2024-05-24 ENCOUNTER — CARE COORDINATION (OUTPATIENT)
Dept: CASE MANAGEMENT | Age: 59
End: 2024-05-24

## 2024-05-24 VITALS
DIASTOLIC BLOOD PRESSURE: 76 MMHG | SYSTOLIC BLOOD PRESSURE: 150 MMHG | TEMPERATURE: 98.4 F | HEART RATE: 79 BPM | RESPIRATION RATE: 18 BRPM

## 2024-05-24 DIAGNOSIS — L97.912 ULCER OF RIGHT LEG, WITH FAT LAYER EXPOSED (HCC): ICD-10-CM

## 2024-05-24 DIAGNOSIS — I50.22 CHRONIC SYSTOLIC CONGESTIVE HEART FAILURE (HCC): ICD-10-CM

## 2024-05-24 DIAGNOSIS — I89.0 LYMPHEDEMA OF RIGHT LOWER EXTREMITY: Primary | ICD-10-CM

## 2024-05-24 LAB
ANION GAP SERPL CALCULATED.3IONS-SCNC: 12 MMOL/L (ref 3–16)
BUN SERPL-MCNC: 16 MG/DL (ref 7–20)
CALCIUM SERPL-MCNC: 9 MG/DL (ref 8.3–10.6)
CHLORIDE SERPL-SCNC: 106 MMOL/L (ref 99–110)
CO2 SERPL-SCNC: 24 MMOL/L (ref 21–32)
CREAT SERPL-MCNC: 0.6 MG/DL (ref 0.6–1.2)
GFR SERPLBLD CREATININE-BSD FMLA CKD-EPI: >90 ML/MIN/{1.73_M2}
GLUCOSE SERPL-MCNC: 110 MG/DL (ref 70–99)
POTASSIUM SERPL-SCNC: 4.3 MMOL/L (ref 3.5–5.1)
SODIUM SERPL-SCNC: 142 MMOL/L (ref 136–145)

## 2024-05-24 PROCEDURE — 29581 APPL MULTLAYER CMPRN SYS LEG: CPT

## 2024-05-24 PROCEDURE — 80048 BASIC METABOLIC PNL TOTAL CA: CPT

## 2024-05-24 PROCEDURE — 36415 COLL VENOUS BLD VENIPUNCTURE: CPT

## 2024-05-24 RX ORDER — BACITRACIN ZINC AND POLYMYXIN B SULFATE 500; 1000 [USP'U]/G; [USP'U]/G
OINTMENT TOPICAL ONCE
Status: DISCONTINUED | OUTPATIENT
Start: 2024-05-24 | End: 2024-05-25 | Stop reason: HOSPADM

## 2024-05-24 RX ORDER — TRIAMCINOLONE ACETONIDE 1 MG/G
OINTMENT TOPICAL ONCE
OUTPATIENT
Start: 2024-05-24 | End: 2024-05-24

## 2024-05-24 RX ORDER — LIDOCAINE HYDROCHLORIDE 40 MG/ML
SOLUTION TOPICAL ONCE
Status: DISCONTINUED | OUTPATIENT
Start: 2024-05-24 | End: 2024-05-25 | Stop reason: HOSPADM

## 2024-05-24 RX ORDER — BACITRACIN ZINC AND POLYMYXIN B SULFATE 500; 1000 [USP'U]/G; [USP'U]/G
OINTMENT TOPICAL ONCE
OUTPATIENT
Start: 2024-05-24 | End: 2024-05-24

## 2024-05-24 RX ORDER — LIDOCAINE 40 MG/G
CREAM TOPICAL ONCE
OUTPATIENT
Start: 2024-05-24 | End: 2024-05-24

## 2024-05-24 RX ORDER — TRIAMCINOLONE ACETONIDE 1 MG/G
OINTMENT TOPICAL ONCE
Status: DISCONTINUED | OUTPATIENT
Start: 2024-05-24 | End: 2024-05-25 | Stop reason: HOSPADM

## 2024-05-24 RX ORDER — LIDOCAINE HYDROCHLORIDE 40 MG/ML
SOLUTION TOPICAL ONCE
OUTPATIENT
Start: 2024-05-24 | End: 2024-05-24

## 2024-05-24 RX ORDER — LIDOCAINE 40 MG/G
CREAM TOPICAL ONCE
Status: DISCONTINUED | OUTPATIENT
Start: 2024-05-24 | End: 2024-05-25 | Stop reason: HOSPADM

## 2024-05-24 RX ORDER — LIDOCAINE 50 MG/G
OINTMENT TOPICAL ONCE
OUTPATIENT
Start: 2024-05-24 | End: 2024-05-24

## 2024-05-24 RX ORDER — LIDOCAINE 50 MG/G
OINTMENT TOPICAL ONCE
Status: DISCONTINUED | OUTPATIENT
Start: 2024-05-24 | End: 2024-05-25 | Stop reason: HOSPADM

## 2024-05-24 NOTE — CARE COORDINATION
Care Transition Follow Up:    CTN contacted patient to review Dr Wylie's recommendation regarding probiotic. She voices understanding. Denies needs going into weekend. States she just completed a lab draw.     Jenae Damon, RN  Care Transition Nurse  [phone] mobile    Future Appointments   Date Time Provider Department Center   5/29/2024  8:30 AM Kentrell Wylie MD [facility] [place] [facility]   6/5/2024  8:30 AM [facility] [place]   6/10/2024  1:40 PM Mariana Awan MD [place] [facility] None   6/12/2024  8:30 AM Kentrell Wylie MD [facility] [place] [facility]   6/18/2024  2:00 PM Ryamon Littlejohn APRN - CNP [facility]

## 2024-05-24 NOTE — PLAN OF CARE
Pt to the [facility] for wound re-assessment, pt stated wrap fell after swelling decreased. Wounds stable, almost healed. Pt to continue with documented plan of care and to follow up in the [facility] in 1 week. Pt. aware to call sooner with any problems or questions/concerns. MD orders and D/C instructions reviewed, pt verbalized understanding.

## 2024-05-24 NOTE — DISCHARGE INSTRUCTIONS
about your wound care, please contact the [facility] at [phone] Monday-Thursday from 8:00 am - 4:30 pm, or Friday from 8:00 am - 2:30 pm.  If you need help with your wound outside these hours and cannot wait until we are again available, contact your home-care company (if applicable), your PCP, or go to the nearest emergency room.

## 2024-05-29 ENCOUNTER — HOSPITAL ENCOUNTER (OUTPATIENT)
Dept: WOUND CARE | Age: 59
Discharge: HOME | End: 2024-05-29
Attending: INTERNAL MEDICINE
Payer: MEDICARE

## 2024-05-29 VITALS
TEMPERATURE: 98 F | SYSTOLIC BLOOD PRESSURE: 124 MMHG | RESPIRATION RATE: 18 BRPM | BODY MASS INDEX: 51.91 KG/M2 | DIASTOLIC BLOOD PRESSURE: 58 MMHG | WEIGHT: 293 LBS | HEIGHT: 63 IN | HEART RATE: 49 BPM

## 2024-05-29 DIAGNOSIS — L97.912 ULCER OF RIGHT LEG, WITH FAT LAYER EXPOSED (HCC): ICD-10-CM

## 2024-05-29 DIAGNOSIS — I89.0 LYMPHEDEMA OF RIGHT LOWER EXTREMITY: Primary | ICD-10-CM

## 2024-05-29 PROCEDURE — 29581 APPL MULTLAYER CMPRN SYS LEG: CPT

## 2024-05-29 RX ORDER — BACITRACIN ZINC AND POLYMYXIN B SULFATE 500; 1000 [USP'U]/G; [USP'U]/G
OINTMENT TOPICAL ONCE
Status: DISCONTINUED | OUTPATIENT
Start: 2024-05-29 | End: 2024-05-30 | Stop reason: HOSPADM

## 2024-05-29 RX ORDER — LIDOCAINE 50 MG/G
OINTMENT TOPICAL ONCE
Status: DISCONTINUED | OUTPATIENT
Start: 2024-05-29 | End: 2024-05-30 | Stop reason: HOSPADM

## 2024-05-29 RX ORDER — LIDOCAINE HYDROCHLORIDE 40 MG/ML
SOLUTION TOPICAL ONCE
Status: DISCONTINUED | OUTPATIENT
Start: 2024-05-29 | End: 2024-05-30 | Stop reason: HOSPADM

## 2024-05-29 RX ORDER — BACITRACIN ZINC AND POLYMYXIN B SULFATE 500; 1000 [USP'U]/G; [USP'U]/G
OINTMENT TOPICAL ONCE
OUTPATIENT
Start: 2024-05-29 | End: 2024-05-29

## 2024-05-29 RX ORDER — LIDOCAINE 40 MG/G
CREAM TOPICAL ONCE
OUTPATIENT
Start: 2024-05-29 | End: 2024-05-29

## 2024-05-29 RX ORDER — LIDOCAINE 40 MG/G
CREAM TOPICAL ONCE
Status: DISCONTINUED | OUTPATIENT
Start: 2024-05-29 | End: 2024-05-30 | Stop reason: HOSPADM

## 2024-05-29 RX ORDER — TRIAMCINOLONE ACETONIDE 1 MG/G
OINTMENT TOPICAL ONCE
Status: DISCONTINUED | OUTPATIENT
Start: 2024-05-29 | End: 2024-05-30 | Stop reason: HOSPADM

## 2024-05-29 RX ORDER — LIDOCAINE 50 MG/G
OINTMENT TOPICAL ONCE
OUTPATIENT
Start: 2024-05-29 | End: 2024-05-29

## 2024-05-29 RX ORDER — TRIAMCINOLONE ACETONIDE 1 MG/G
OINTMENT TOPICAL ONCE
OUTPATIENT
Start: 2024-05-29 | End: 2024-05-29

## 2024-05-29 RX ORDER — LIDOCAINE HYDROCHLORIDE 40 MG/ML
SOLUTION TOPICAL ONCE
OUTPATIENT
Start: 2024-05-29 | End: 2024-05-29

## 2024-05-29 NOTE — PLAN OF CARE
Wound fragily healed today, will apply compression wrap for an additional week for edema control & to protect. Pt. Has information to order velcro compression garment for long-term edema control & will plan to purchase in the near future. Reinforced importance of exercise, elevation & compression to help with circulation, edema control & prevention of new wounds, pt. Verbalizes understanding. Continue PCN as ordered & denies any side effects. F/u in [facility] in 1 week as ordered, pt. Aware to call sooner with any changes or questions/concerns. Discharge instructions reviewed with patient, all questions answered, copy given to patient. Dressings were applied to all wounds per M.D. Instructions at this visit.

## 2024-05-30 NOTE — DISCHARGE INSTRUCTIONS
Wound Care Center Physician Orders and Discharge Instructions  [facility]  [address], [address]  [place] [id number]  Telephone: [phone]      Fax: [phone]           NAME:  Nohemi Nesbitt   YOB: 1965  PRIMARY DIAGNOSIS FOR WOUND CARE CENTER:  Infection & Lymphedema .     Wound cleansing:   Do not scrub or use excessive force.  Wash hands with soap and water before and after dressing changes.  Prior to applying a clean dressing, cleanse wound with normal saline, wound cleanser, or mild soap and water. Ask your physician or nurse before getting the wound(s) wet in the shower.                Wound care for home:     Right lower leg wounds:   Apply CircAid Juxtalite velcro compression garment daily for at least the next 2-3 months.     Apply first thing in the morning, may remove at bedtime.        New orders for this week (labs, imaging, medications, etc.):       Continue Penicillin as directed per Dr Wylie      CONGRATULATIONS!   You have completed your treatment program!  We have created a list of general reminders to assist you in preventing future wounds:     1.   Check your skin daily for reddened areas, abrasions, or sores. If a new wound is noted, call the Wound Care Center at [phone].   2.   Clean your using mild soap and warm (not hot) water, daily.   3.   If your skin appears dry, apply lotion as needed, but not between the toes.   4.   Avoid pressure and trauma to recently healed wounds. The skin is still very fragile and will break down easily.     5.   Always wear socks and well-fitting shoes. Never walk barefoot.   6.   Maintain a nutritious diet, minimizing alcohol, caffeine, and excess sugar. We can give you recommendations for increasing the amount of protein in your diet, if you're interested.   7. Probably the best thing you can do to prevent wounds in the future is to stop smoking, if you are a current smoker. If we've not discussed this

## 2024-05-31 ENCOUNTER — CARE COORDINATION (OUTPATIENT)
Dept: CASE MANAGEMENT | Age: 59
End: 2024-05-31

## 2024-05-31 NOTE — CARE COORDINATION
Care Transitions Follow Up Call    Patient Current Location: [address] [facility] [address]    Care Transition Nurse contacted the patient by telephone to follow up after recent hospital admission.      Patient: Nohemi MOODY Stem  Patient : 1965   MRN: [id number]  Reason for Admission: 3 days -> recurrent R thigh cellulitis, chronic RLE lymphedema, sepsis, hypoNa, sCHF (EF 40-45%), transaminitis, FLORIAN on Bi/CPAP, Asthma-no AE, hx VTE-no AC, depression, morbid obesity, normocytic anemia -> home no services, DC weight 294#, BMP in 1 week (order in [facility]), f/up [facility]-Dr Wylie   Discharge Date: 24 RARS: Readmission Risk Score: 10.1    Needs to be reviewed by the provider   Additional needs identified to be addressed with provider: No         Method of communication with provider: none.    Wound is healing well and next scheduled appt with Dr Wylie she anticipates to be the last. She has a new compression wrap she can manage herself. Swelling improved in upper thigh. Overall feels well. Will stay on long term penicillin ~ 1 year or so. Reviewed upcoming appts. Expects repeat echo. No issues with transportation. Denies needs.     Addressed changes since last contact:  [facility] note reviewed  Discussed follow-up appointments. If no appointment was previously scheduled, appointment scheduling offered: Yes.   Is follow up appointment scheduled within 7 days of discharge? Yes.    Follow Up  Future Appointments   Date Time Provider Department Center   2024  8:30 AM Kentrell Wylie MD [facility] [place] [facility]   6/10/2024  1:40 PM Mariana Awan MD [place] [facility] None   2024  8:30 AM Kentrell Wylie MD [facility] [place] [facility]   2024  2:00 PM Raymon Littlejohn APRN - CNP [facility]   2024  8:30 AM Kentrell Wylie MD [facility] [place] [facility]   2024  8:30 AM Kentrell Wylie MD [facility] [place] [facility]   7/3/2024  8:30 AM Kentrell Wylie MD [facility] [place] [facility]

## 2024-06-01 PROBLEM — I50.22 CHRONIC SYSTOLIC CONGESTIVE HEART FAILURE (HCC): Status: RESOLVED | Noted: 2024-05-11 | Resolved: 2024-06-01

## 2024-06-01 PROBLEM — B37.31 CANDIDA VAGINITIS: Status: ACTIVE | Noted: 2024-06-01

## 2024-06-01 PROBLEM — A40.0 SEPSIS DUE TO GROUP A STREPTOCOCCUS WITHOUT ACUTE ORGAN DYSFUNCTION (HCC): Status: RESOLVED | Noted: 2024-05-13 | Resolved: 2024-06-01

## 2024-06-01 PROBLEM — L03.115 CELLULITIS OF RIGHT LOWER EXTREMITY: Status: RESOLVED | Noted: 2024-05-09 | Resolved: 2024-06-01

## 2024-06-01 NOTE — PROGRESS NOTES
[facility] W[facility] Care [facility] Progress Note    Nohemi Nesbitt     : 1965    DATE OF VISIT:  2024    Subjective:     Nohemi Nesbitt is a 58 y.o. female who has a lymphedema and  pyoderma ulcer located on the right lower leg. Significant symptoms or pertinent wound history since last visit: feeling well, doing ok on PCN, vaginal symptoms resolved, wrap stayed in place, mild pruritus, thigh feeling softer this week.     Additional ulcer(s) noted? no.      Her current medication list consists of DULoxetine, Multiple Vitamin, aspirin, empagliflozin, furosemide, gabapentin, indomethacin, metoprolol succinate, omeprazole, oxyBUTYnin, penicillin v potassium, spironolactone, and tiZANidine.    Allergies: Iodides, Docusate sodium, Scopolamine, and Vancomycin    Objective:     Vitals:    24 0841   BP: (!) 124/58   Pulse: (!) 49   Resp: 18   Temp: 98 °F (36.7 °C)   TempSrc: Oral   Weight: 132.9 kg (293 lb)   Height: 1.6 m (5' 3\")     Constitutional:  well-developed, well-nourished, NAD, conversant  Psychiatric:  oriented to person, place and time; mood and affect appropriate for the situation   Eyes:  pupils equal, round and reactive to light; sclerae anicteric, conjunctivae not pale  ENT:  atraumatic; oral mucosa moist, no thrush or ulcers  Cardiovascular:  foot warm, palpable distal pulse, good cap refill  GI:  abdomen soft, NT, ND, normal bowel sounds, no palpable masses or organomegaly  Musculoskeletal:  no clubbing, cyanosis or petechiae; extremities with no gross effusions, joint misalignment or acute arthritis  Lymph: no obvious inguinal nodes; no angitis; fading and receding cellulitis, milder lower leg edema, less indurated and softer right thigh lymphedema  Skin: warm, dry, normal turgor; no drug rash; some persistent excoriated papules / small nodules mostly LLE, no active vasculitis rash now. RLE ulcer delicately healed, maybe very mild pale purple inflammation there.    Today's Wound

## 2024-06-03 RX ORDER — OMEPRAZOLE 20 MG/1
CAPSULE, DELAYED RELEASE ORAL
Qty: 90 CAPSULE | Refills: 0 | Status: SHIPPED | OUTPATIENT
Start: 2024-06-03

## 2024-06-03 RX ORDER — OXYBUTYNIN CHLORIDE 5 MG/1
TABLET ORAL
Qty: 180 TABLET | Refills: 0 | Status: SHIPPED | OUTPATIENT
Start: 2024-06-03

## 2024-06-03 RX ORDER — DULOXETIN HYDROCHLORIDE 60 MG/1
CAPSULE, DELAYED RELEASE ORAL
Qty: 180 CAPSULE | Refills: 0 | Status: SHIPPED | OUTPATIENT
Start: 2024-06-03

## 2024-06-03 RX ORDER — FUROSEMIDE 20 MG/1
40 TABLET ORAL DAILY
Qty: 180 TABLET | Refills: 0 | Status: SHIPPED | OUTPATIENT
Start: 2024-06-03 | End: 2024-09-01

## 2024-06-05 ENCOUNTER — HOSPITAL ENCOUNTER (OUTPATIENT)
Dept: WOUND CARE | Age: 59
Discharge: HOME | End: 2024-06-05
Attending: INTERNAL MEDICINE
Payer: MEDICARE

## 2024-06-05 VITALS
SYSTOLIC BLOOD PRESSURE: 130 MMHG | TEMPERATURE: 98.6 F | HEART RATE: 65 BPM | BODY MASS INDEX: 50.82 KG/M2 | DIASTOLIC BLOOD PRESSURE: 79 MMHG | HEIGHT: 63 IN | WEIGHT: 286.8 LBS | RESPIRATION RATE: 18 BRPM

## 2024-06-05 DIAGNOSIS — I89.0 LYMPHEDEMA OF RIGHT LOWER EXTREMITY: Primary | ICD-10-CM

## 2024-06-05 DIAGNOSIS — L97.912 ULCER OF RIGHT LEG, WITH FAT LAYER EXPOSED (HCC): ICD-10-CM

## 2024-06-05 PROCEDURE — 99212 OFFICE O/P EST SF 10 MIN: CPT

## 2024-06-05 RX ORDER — TRIAMCINOLONE ACETONIDE 1 MG/G
OINTMENT TOPICAL ONCE
Status: CANCELLED | OUTPATIENT
Start: 2024-06-05 | End: 2024-06-05

## 2024-06-05 RX ORDER — LIDOCAINE 50 MG/G
OINTMENT TOPICAL ONCE
Status: DISCONTINUED | OUTPATIENT
Start: 2024-06-05 | End: 2024-06-05

## 2024-06-05 RX ORDER — LIDOCAINE 40 MG/G
CREAM TOPICAL ONCE
Status: CANCELLED | OUTPATIENT
Start: 2024-06-05 | End: 2024-06-05

## 2024-06-05 RX ORDER — BACITRACIN ZINC AND POLYMYXIN B SULFATE 500; 1000 [USP'U]/G; [USP'U]/G
OINTMENT TOPICAL ONCE
Status: CANCELLED | OUTPATIENT
Start: 2024-06-05 | End: 2024-06-05

## 2024-06-05 RX ORDER — LIDOCAINE HYDROCHLORIDE 40 MG/ML
SOLUTION TOPICAL ONCE
Status: CANCELLED | OUTPATIENT
Start: 2024-06-05 | End: 2024-06-05

## 2024-06-05 RX ORDER — LIDOCAINE 40 MG/G
CREAM TOPICAL ONCE
Status: DISCONTINUED | OUTPATIENT
Start: 2024-06-05 | End: 2024-06-05

## 2024-06-05 RX ORDER — LIDOCAINE 50 MG/G
OINTMENT TOPICAL ONCE
Status: CANCELLED | OUTPATIENT
Start: 2024-06-05 | End: 2024-06-05

## 2024-06-05 RX ORDER — LIDOCAINE HYDROCHLORIDE 40 MG/ML
SOLUTION TOPICAL ONCE
Status: DISCONTINUED | OUTPATIENT
Start: 2024-06-05 | End: 2024-06-05

## 2024-06-05 RX ORDER — TRIAMCINOLONE ACETONIDE 1 MG/G
OINTMENT TOPICAL ONCE
Status: DISCONTINUED | OUTPATIENT
Start: 2024-06-05 | End: 2024-06-05

## 2024-06-05 RX ORDER — BACITRACIN ZINC AND POLYMYXIN B SULFATE 500; 1000 [USP'U]/G; [USP'U]/G
OINTMENT TOPICAL ONCE
Status: DISCONTINUED | OUTPATIENT
Start: 2024-06-05 | End: 2024-06-05

## 2024-06-05 NOTE — PLAN OF CARE
Wound healed. Pt to transition to wearing CircAid Juxtalilte velcro compression garment for long-term edema control. Dr Wylie advised to wear daily for the next 2-3 months, pt. Verbalizes understanding. Pt. To continue PCN as directed per Dr Wylie. No further f/u in [facility] needed at this time. Pt. Aware to call if a new wound opens or with any further questions/concerns. Discharge instructions reviewed with patient, all questions answered, copy given to patient. Garment applied per M.D. Instructions at this visit.   
Denies tobacco or recreational drug use. Rare EtOH at special occasions. Coming from physical rehabilitation facility.

## 2024-06-07 RX ORDER — GABAPENTIN 400 MG/1
CAPSULE ORAL
Qty: 90 CAPSULE | Refills: 2 | Status: SHIPPED | OUTPATIENT
Start: 2024-06-07 | End: 2024-07-06

## 2024-06-10 ENCOUNTER — OFFICE VISIT (OUTPATIENT)
Dept: INTERNAL MEDICINE CLINIC | Age: 59
End: 2024-06-10

## 2024-06-10 VITALS
HEART RATE: 68 BPM | SYSTOLIC BLOOD PRESSURE: 114 MMHG | DIASTOLIC BLOOD PRESSURE: 78 MMHG | BODY MASS INDEX: 49.96 KG/M2 | HEIGHT: 63 IN | WEIGHT: 282 LBS

## 2024-06-10 DIAGNOSIS — G47.33 OSA (OBSTRUCTIVE SLEEP APNEA): ICD-10-CM

## 2024-06-10 DIAGNOSIS — Z00.00 ROUTINE GENERAL MEDICAL EXAMINATION AT A HEALTH CARE FACILITY: ICD-10-CM

## 2024-06-10 DIAGNOSIS — I10 PRIMARY HYPERTENSION: ICD-10-CM

## 2024-06-10 DIAGNOSIS — I89.0 LYMPHEDEMA OF RIGHT LOWER EXTREMITY: ICD-10-CM

## 2024-06-10 DIAGNOSIS — F33.1 MODERATE RECURRENT MAJOR DEPRESSION (HCC): Chronic | ICD-10-CM

## 2024-06-10 DIAGNOSIS — I50.22 CHRONIC SYSTOLIC CONGESTIVE HEART FAILURE (HCC): ICD-10-CM

## 2024-06-10 DIAGNOSIS — D64.9 ANEMIA, UNSPECIFIED TYPE: ICD-10-CM

## 2024-06-10 DIAGNOSIS — Z00.00 MEDICARE ANNUAL WELLNESS VISIT, SUBSEQUENT: Primary | ICD-10-CM

## 2024-06-10 DIAGNOSIS — E66.01 CLASS 3 SEVERE OBESITY DUE TO EXCESS CALORIES WITH SERIOUS COMORBIDITY AND BODY MASS INDEX (BMI) OF 50.0 TO 59.9 IN ADULT (HCC): ICD-10-CM

## 2024-06-10 PROCEDURE — 3017F COLORECTAL CA SCREEN DOC REV: CPT | Performed by: INTERNAL MEDICINE

## 2024-06-10 PROCEDURE — 3078F DIAST BP <80 MM HG: CPT | Performed by: INTERNAL MEDICINE

## 2024-06-10 PROCEDURE — G0439 PPPS, SUBSEQ VISIT: HCPCS | Performed by: INTERNAL MEDICINE

## 2024-06-10 PROCEDURE — 3074F SYST BP LT 130 MM HG: CPT | Performed by: INTERNAL MEDICINE

## 2024-06-10 RX ORDER — SPIRONOLACTONE 25 MG/1
25 TABLET ORAL DAILY
Qty: 90 TABLET | Refills: 0 | Status: SHIPPED | OUTPATIENT
Start: 2024-06-10

## 2024-06-10 RX ORDER — METOPROLOL SUCCINATE 25 MG/1
25 TABLET, EXTENDED RELEASE ORAL DAILY
Qty: 90 TABLET | Refills: 0 | Status: SHIPPED | OUTPATIENT
Start: 2024-06-10

## 2024-06-10 ASSESSMENT — PATIENT HEALTH QUESTIONNAIRE - PHQ9
1. LITTLE INTEREST OR PLEASURE IN DOING THINGS: SEVERAL DAYS
SUM OF ALL RESPONSES TO PHQ9 QUESTIONS 1 & 2: 2
2. FEELING DOWN, DEPRESSED OR HOPELESS: SEVERAL DAYS
SUM OF ALL RESPONSES TO PHQ QUESTIONS 1-9: 2

## 2024-06-10 ASSESSMENT — LIFESTYLE VARIABLES
HOW OFTEN DO YOU HAVE A DRINK CONTAINING ALCOHOL: 2-4 TIMES A MONTH
HOW MANY STANDARD DRINKS CONTAINING ALCOHOL DO YOU HAVE ON A TYPICAL DAY: 3 OR 4

## 2024-06-10 NOTE — PATIENT INSTRUCTIONS

## 2024-06-10 NOTE — PROGRESS NOTES
Medicare Annual Wellness Visit    Nohemi Nesbitt is here for Medicare AWV    Assessment & Plan   Routine general medical examination at a health care facility  Primary hypertension  Lymphedema of right lower extremity  FLORIAN (obstructive sleep apnea)  Moderate recurrent major depression (HCC)  Class 3 severe obesity due to excess calories with serious comorbidity and body mass index (BMI) of 50.0 to 59.9 in adult (HCC)  Chronic systolic congestive heart failure (HCC)    Recommendations for Preventive Services Due: see orders and patient instructions/AVS.  Recommended screening schedule for the next 5-10 years is provided to the patient in written form: see Patient Instructions/AVS.     No follow-ups on file.     Subjective   The following acute and/or chronic problems were also addressed today:   Diagnosis Orders   1. Routine general medical examination at a health care facility        2. Primary hypertension        3. Lymphedema of right lower extremity        4. FLORIAN (obstructive sleep apnea)        5. Moderate recurrent major depression (HCC)        6. Class 3 severe obesity due to excess calories with serious comorbidity and body mass index (BMI) of 50.0 to 59.9 in adult (HCC)        7. Chronic systolic congestive heart failure (HCC)          AWV    Morbid obesity  S/p gastric sleeve procedure   Advised to cut down on calories.  Lose weight.     Depression  Stable.  On cymbalta 120 mg daily      GERD is stable.  Continue PPI.     OA/chronic pain  Continue indomethacin bid   continue PPI  Continue tizanidine  Gabapentin 400 tid     Chronic systolic CHF   Continue lasix,aldactone, Toprol XL and jardiance       Urinary urge incontinence.  Continue ditropan.  Stable.     Low Mg  Continue replacement       Last colonoscopy 11/2017    Patient's complete Health Risk Assessment and screening values have been reviewed and are found in Flowsheets. The following problems were reviewed today and where indicated follow up

## 2024-06-11 ENCOUNTER — CARE COORDINATION (OUTPATIENT)
Dept: CASE MANAGEMENT | Age: 59
End: 2024-06-11

## 2024-06-11 LAB
BASOPHILS # BLD: 0.1 K/UL (ref 0–0.2)
BASOPHILS NFR BLD: 0.8 %
DEPRECATED RDW RBC AUTO: 16.9 % (ref 12.4–15.4)
EOSINOPHIL # BLD: 0.4 K/UL (ref 0–0.6)
EOSINOPHIL NFR BLD: 3.5 %
HCT VFR BLD AUTO: 37.8 % (ref 36–48)
HGB BLD-MCNC: 12.6 G/DL (ref 12–16)
LYMPHOCYTES # BLD: 2 K/UL (ref 1–5.1)
LYMPHOCYTES NFR BLD: 19.2 %
MCH RBC QN AUTO: 27.9 PG (ref 26–34)
MCHC RBC AUTO-ENTMCNC: 33.3 G/DL (ref 31–36)
MCV RBC AUTO: 83.9 FL (ref 80–100)
MONOCYTES # BLD: 0.7 K/UL (ref 0–1.3)
MONOCYTES NFR BLD: 6.8 %
NEUTROPHILS # BLD: 7.3 K/UL (ref 1.7–7.7)
NEUTROPHILS NFR BLD: 69.7 %
PLATELET # BLD AUTO: 313 K/UL (ref 135–450)
PMV BLD AUTO: 8.2 FL (ref 5–10.5)
RBC # BLD AUTO: 4.51 M/UL (ref 4–5.2)
WBC # BLD AUTO: 10.5 K/UL (ref 4–11)

## 2024-06-11 NOTE — CARE COORDINATION
Care Transitions Note    Final Call     Patient Current Location:  Home: [address]  [place] [address]    Care Transition Nurse contacted the patient by telephone.     Patient graduated from the Care Transitions program on 6/11/24.  Patient/family verbalizes confidence in the ability to self-manage at this time..      Advance Care Planning:   Does patient have an Advance Directive: reviewed during previous call, see note. .    Handoff:   Patient was not referred to the ACM team due to no additional needs identified.       Care Summary Note:   No longer requires wound care clinic visits. Reports doing well and back to baseline. Reviewed upcoming cardio appt. She is aware. Denies needs. Has CTN contact info for future needs.     Assessments:  No changes since last call    Upcoming Appointments:    Future Appointments         Provider Specialty Dept Phone    6/18/2024 2:00 PM Raymon Littlejohn, APRN - CNP Cardiology [phone]    10/21/2024 1:40 PM Mariana Awan MD Internal Medicine [phone]          Patient will contact primary care provider and/or specialist for any further questions, concerns, or needs.    Jenae Damon RN

## 2024-06-15 PROBLEM — L97.912 ULCER OF RIGHT LEG, WITH FAT LAYER EXPOSED (HCC): Status: RESOLVED | Noted: 2023-11-26 | Resolved: 2024-06-15

## 2024-06-18 ENCOUNTER — OFFICE VISIT (OUTPATIENT)
Dept: CARDIOLOGY CLINIC | Age: 59
End: 2024-06-18
Payer: MEDICARE

## 2024-06-18 VITALS
OXYGEN SATURATION: 94 % | HEART RATE: 73 BPM | DIASTOLIC BLOOD PRESSURE: 68 MMHG | BODY MASS INDEX: 49.96 KG/M2 | WEIGHT: 282 LBS | HEIGHT: 63 IN | SYSTOLIC BLOOD PRESSURE: 112 MMHG

## 2024-06-18 DIAGNOSIS — I42.9 CARDIOMYOPATHY, UNSPECIFIED TYPE (HCC): Primary | ICD-10-CM

## 2024-06-18 DIAGNOSIS — I89.0 LYMPHEDEMA OF RIGHT LOWER EXTREMITY: ICD-10-CM

## 2024-06-18 PROCEDURE — 1036F TOBACCO NON-USER: CPT | Performed by: NURSE PRACTITIONER

## 2024-06-18 PROCEDURE — 3017F COLORECTAL CA SCREEN DOC REV: CPT | Performed by: NURSE PRACTITIONER

## 2024-06-18 PROCEDURE — 3078F DIAST BP <80 MM HG: CPT | Performed by: NURSE PRACTITIONER

## 2024-06-18 PROCEDURE — 99214 OFFICE O/P EST MOD 30 MIN: CPT | Performed by: NURSE PRACTITIONER

## 2024-06-18 PROCEDURE — G8427 DOCREV CUR MEDS BY ELIG CLIN: HCPCS | Performed by: NURSE PRACTITIONER

## 2024-06-18 PROCEDURE — G8417 CALC BMI ABV UP PARAM F/U: HCPCS | Performed by: NURSE PRACTITIONER

## 2024-06-18 PROCEDURE — 3074F SYST BP LT 130 MM HG: CPT | Performed by: NURSE PRACTITIONER

## 2024-06-18 ASSESSMENT — ENCOUNTER SYMPTOMS
RESPIRATORY NEGATIVE: 1
GASTROINTESTINAL NEGATIVE: 1

## 2024-06-18 NOTE — PATIENT INSTRUCTIONS
Echocardiogram to recheck heart function, call [phone]  Continue current medications  Low sodium diet, fluids around 64 ounces, monitor weight daily  Stay active along with a healthy diet  Follow up in 2-3 months with Dr. Jones

## 2024-06-18 NOTE — PROGRESS NOTES
alcohol. She reports that she does not use drugs.     Family History: family history includes Arthritis in her brother, father, mother, sister, and sister; Asthma in her sister; Cancer in her sister; Diabetes in her brother; High Blood Pressure in her brother and father; Mental Illness in her sister; Other in her brother and sister; Stroke in her sister.    Review of Systems   Review of Systems   Constitutional: Negative.    Respiratory: Negative.     Cardiovascular:  Positive for leg swelling. Negative for chest pain.   Gastrointestinal: Negative.    Neurological: Negative.      OBJECTIVE:    Vital signs:    /68   Pulse 73   Ht 1.6 m (5' 2.99\")   Wt 127.9 kg (282 lb)   LMP 12/24/2010   SpO2 94%   BMI 49.97 kg/m²      Physical Exam:  Constitutional:  Comfortable and alert, NAD, appears stated age, obese  Eyes: PERRL, sclera nonicteric  Neck:  Supple, no masses, no thyroidmegaly, no JVD  Skin:  Warm and dry; no rash or lesions  Heart:  Regular, normal apex, S1 and S2 normal, no M/G/R  Lungs:  Normal respiratory effort; clear; no wheezing/rhonchi/rales  Abdomen: soft, non tender, + bowel sounds  Extremities: 1+ RLE edema/redness, no LLE edema  Neuro: alert and oriented, moves legs and arms equally, normal mood and affect    Data Reviewed:      Echo 5/2024:    Left Ventricle: Mildly reduced left ventricular systolic function with a visually estimated EF of 40 - 45%. Left ventricle is severely dilated. Normal wall thickness. Mild hypokinesis of the following segments: basal anterolateral, basal anteroseptal, basal inferoseptal, basal inferolateral, mid anterolateral, mid anteroseptal, mid inferoseptal and mid inferolateral. Grade II diastolic dysfunction with increased LAP.    Mitral Valve: No leaflet thickening. Mild annular calcification of the mitral valve. Mild regurgitation.    Tricuspid Valve: Mild to moderate regurgitation. Mildly elevated PASP, consistent with mild pulmonary hypertension. Est RA

## 2024-06-19 ENCOUNTER — TELEPHONE (OUTPATIENT)
Dept: CARDIOLOGY CLINIC | Age: 59
End: 2024-06-19

## 2024-06-19 DIAGNOSIS — R94.31 ABNORMAL ELECTROCARDIOGRAM (ECG) (EKG): ICD-10-CM

## 2024-06-19 DIAGNOSIS — I42.9 CARDIOMYOPATHY, UNSPECIFIED TYPE (HCC): Primary | ICD-10-CM

## 2024-06-19 NOTE — TELEPHONE ENCOUNTER
MADHAV  please advise.      Pt contacted office stating she contacted [facility] to schedule echo and they informed her the DX code would not be covered by her insurance and is requesting it be changed.

## 2024-06-19 NOTE — TELEPHONE ENCOUNTER
Pt contacted office stating she contacted [facility] to schedule echo and they informed her the DX code would not be covered by her insurance and is requesting it be changed.

## 2024-07-29 RX ORDER — INDOMETHACIN 50 MG/1
CAPSULE ORAL
Qty: 270 CAPSULE | Refills: 0 | Status: SHIPPED | OUTPATIENT
Start: 2024-07-29

## 2024-07-29 RX ORDER — TIZANIDINE 4 MG/1
TABLET ORAL
Qty: 180 TABLET | Refills: 0 | Status: SHIPPED | OUTPATIENT
Start: 2024-07-29

## 2024-08-16 DIAGNOSIS — F32.A DEPRESSION, UNSPECIFIED DEPRESSION TYPE: Primary | ICD-10-CM

## 2024-08-16 RX ORDER — OMEPRAZOLE 20 MG/1
CAPSULE, DELAYED RELEASE ORAL
Qty: 90 CAPSULE | Refills: 0 | Status: SHIPPED | OUTPATIENT
Start: 2024-08-16

## 2024-08-16 RX ORDER — DULOXETIN HYDROCHLORIDE 60 MG/1
CAPSULE, DELAYED RELEASE ORAL
Qty: 180 CAPSULE | Refills: 0 | Status: SHIPPED | OUTPATIENT
Start: 2024-08-16

## 2024-08-16 RX ORDER — OXYBUTYNIN CHLORIDE 5 MG/1
TABLET ORAL
Qty: 180 TABLET | Refills: 0 | Status: SHIPPED | OUTPATIENT
Start: 2024-08-16

## 2024-08-16 RX ORDER — FUROSEMIDE 20 MG/1
TABLET ORAL
Qty: 180 TABLET | Refills: 0 | Status: SHIPPED | OUTPATIENT
Start: 2024-08-16

## 2024-08-27 ENCOUNTER — HOSPITAL ENCOUNTER (OUTPATIENT)
Age: 59
Discharge: HOME | End: 2024-08-29
Payer: MEDICARE

## 2024-08-27 VITALS
HEIGHT: 63 IN | BODY MASS INDEX: 49.96 KG/M2 | WEIGHT: 282 LBS | SYSTOLIC BLOOD PRESSURE: 147 MMHG | DIASTOLIC BLOOD PRESSURE: 97 MMHG

## 2024-08-27 DIAGNOSIS — R94.31 ABNORMAL ELECTROCARDIOGRAM (ECG) (EKG): ICD-10-CM

## 2024-08-27 DIAGNOSIS — I42.9 CARDIOMYOPATHY, UNSPECIFIED TYPE (HCC): ICD-10-CM

## 2024-08-27 LAB
ECHO AO ROOT DIAM: 3.3 CM
ECHO AO ROOT INDEX: 1.47 CM/M2
ECHO BSA: 2.38 M2
ECHO IVC EXP: 0.7 CM
ECHO LV EDV A2C: 103 ML
ECHO LV EDV A4C: 139 ML
ECHO LV EDV INDEX A4C: 62 ML/M2
ECHO LV EDV NDEX A2C: 46 ML/M2
ECHO LV EJECTION FRACTION A2C: 51 %
ECHO LV EJECTION FRACTION A4C: 41 %
ECHO LV EJECTION FRACTION BIPLANE: 46 % (ref 55–100)
ECHO LV ESV A2C: 50 ML
ECHO LV ESV A4C: 83 ML
ECHO LV ESV INDEX A2C: 22 ML/M2
ECHO LV ESV INDEX A4C: 37 ML/M2
ECHO LV FRACTIONAL SHORTENING: 26 % (ref 28–44)
ECHO LV INTERNAL DIMENSION DIASTOLE INDEX: 2.59 CM/M2
ECHO LV INTERNAL DIMENSION DIASTOLIC: 5.8 CM (ref 3.9–5.3)
ECHO LV INTERNAL DIMENSION SYSTOLIC INDEX: 1.92 CM/M2
ECHO LV INTERNAL DIMENSION SYSTOLIC: 4.3 CM
ECHO LV IVSD: 0.9 CM (ref 0.6–0.9)
ECHO LV MASS 2D: 189.3 G (ref 67–162)
ECHO LV MASS INDEX 2D: 84.5 G/M2 (ref 43–95)
ECHO LV POSTERIOR WALL DIASTOLIC: 0.8 CM (ref 0.6–0.9)
ECHO LV RELATIVE WALL THICKNESS RATIO: 0.28
ECHO RV FREE WALL PEAK S': 18 CM/S

## 2024-08-27 PROCEDURE — 93325 DOPPLER ECHO COLOR FLOW MAPG: CPT | Performed by: INTERNAL MEDICINE

## 2024-08-27 PROCEDURE — 93321 DOPPLER ECHO F-UP/LMTD STD: CPT | Performed by: INTERNAL MEDICINE

## 2024-08-27 PROCEDURE — 93308 TTE F-UP OR LMTD: CPT

## 2024-08-27 PROCEDURE — 93308 TTE F-UP OR LMTD: CPT | Performed by: INTERNAL MEDICINE

## 2024-09-04 DIAGNOSIS — F32.A DEPRESSION, UNSPECIFIED DEPRESSION TYPE: ICD-10-CM

## 2024-09-04 RX ORDER — OXYBUTYNIN CHLORIDE 5 MG/1
5 TABLET ORAL 2 TIMES DAILY
Qty: 180 TABLET | Refills: 0 | Status: SHIPPED | OUTPATIENT
Start: 2024-09-04

## 2024-09-04 RX ORDER — METOPROLOL SUCCINATE 25 MG/1
25 TABLET, EXTENDED RELEASE ORAL DAILY
Qty: 90 TABLET | Refills: 0 | Status: SHIPPED | OUTPATIENT
Start: 2024-09-04

## 2024-09-04 RX ORDER — INDOMETHACIN 50 MG/1
CAPSULE ORAL
Qty: 270 CAPSULE | Refills: 0 | Status: SHIPPED | OUTPATIENT
Start: 2024-09-04

## 2024-09-04 RX ORDER — FUROSEMIDE 20 MG
40 TABLET ORAL 2 TIMES DAILY
Qty: 180 TABLET | Refills: 0 | Status: SHIPPED | OUTPATIENT
Start: 2024-09-04

## 2024-09-04 RX ORDER — SPIRONOLACTONE 25 MG/1
25 TABLET ORAL DAILY
Qty: 90 TABLET | Refills: 0 | Status: SHIPPED | OUTPATIENT
Start: 2024-09-04

## 2024-09-04 RX ORDER — GABAPENTIN 400 MG/1
CAPSULE ORAL
Qty: 90 CAPSULE | Refills: 2 | Status: SHIPPED | OUTPATIENT
Start: 2024-09-04 | End: 2024-10-03

## 2024-09-04 RX ORDER — DULOXETIN HYDROCHLORIDE 60 MG/1
120 CAPSULE, DELAYED RELEASE ORAL DAILY
Qty: 180 CAPSULE | Refills: 0 | Status: SHIPPED | OUTPATIENT
Start: 2024-09-04

## 2024-09-04 NOTE — TELEPHONE ENCOUNTER
----- Message from Keila MCDONOUGH sent at 9/4/2024  9:15 AM EDT -----  [facility] requesting all patients medication to be filled through them. Please contact patient to make sure she would like to switch pharmacies. Please advise

## 2024-09-13 RX ORDER — FUROSEMIDE 20 MG/1
TABLET ORAL
Qty: 180 TABLET | Refills: 11 | OUTPATIENT
Start: 2024-09-13

## 2024-09-16 PROBLEM — B37.31 CANDIDA VAGINITIS: Status: RESOLVED | Noted: 2024-06-01 | Resolved: 2024-09-16

## 2024-09-18 ENCOUNTER — HOSPITAL ENCOUNTER (OUTPATIENT)
Dept: WOUND CARE | Age: 59
Discharge: HOME | End: 2024-09-18
Attending: INTERNAL MEDICINE
Payer: MEDICARE

## 2024-09-18 VITALS
BODY MASS INDEX: 49.96 KG/M2 | HEART RATE: 64 BPM | RESPIRATION RATE: 16 BRPM | HEIGHT: 63 IN | WEIGHT: 282 LBS | SYSTOLIC BLOOD PRESSURE: 122 MMHG | DIASTOLIC BLOOD PRESSURE: 77 MMHG | TEMPERATURE: 97.9 F

## 2024-09-18 DIAGNOSIS — T21.21XA: Primary | ICD-10-CM

## 2024-09-18 PROCEDURE — 16020 DRESS/DEBRID P-THICK BURN S: CPT

## 2024-09-18 PROCEDURE — 99213 OFFICE O/P EST LOW 20 MIN: CPT

## 2024-09-18 RX ORDER — BACITRACIN ZINC AND POLYMYXIN B SULFATE 500; 1000 [USP'U]/G; [USP'U]/G
OINTMENT TOPICAL ONCE
OUTPATIENT
Start: 2024-09-18 | End: 2024-09-18

## 2024-09-18 RX ORDER — LIDOCAINE 40 MG/G
CREAM TOPICAL ONCE
OUTPATIENT
Start: 2024-09-18 | End: 2024-09-18

## 2024-09-18 RX ORDER — LIDOCAINE 50 MG/G
OINTMENT TOPICAL ONCE
Status: DISCONTINUED | OUTPATIENT
Start: 2024-09-18 | End: 2024-09-19 | Stop reason: HOSPADM

## 2024-09-18 RX ORDER — LIDOCAINE HYDROCHLORIDE 40 MG/ML
SOLUTION TOPICAL ONCE
OUTPATIENT
Start: 2024-09-18 | End: 2024-09-18

## 2024-09-18 RX ORDER — LIDOCAINE HYDROCHLORIDE 40 MG/ML
SOLUTION TOPICAL ONCE
Status: DISCONTINUED | OUTPATIENT
Start: 2024-09-18 | End: 2024-09-19 | Stop reason: HOSPADM

## 2024-09-18 RX ORDER — LIDOCAINE 50 MG/G
OINTMENT TOPICAL ONCE
OUTPATIENT
Start: 2024-09-18 | End: 2024-09-18

## 2024-09-18 RX ORDER — BACITRACIN ZINC AND POLYMYXIN B SULFATE 500; 1000 [USP'U]/G; [USP'U]/G
OINTMENT TOPICAL ONCE
Status: DISCONTINUED | OUTPATIENT
Start: 2024-09-18 | End: 2024-09-19 | Stop reason: HOSPADM

## 2024-09-18 RX ORDER — LIDOCAINE 40 MG/G
CREAM TOPICAL ONCE
Status: DISCONTINUED | OUTPATIENT
Start: 2024-09-18 | End: 2024-09-19 | Stop reason: HOSPADM

## 2024-09-18 RX ORDER — TRIAMCINOLONE ACETONIDE 1 MG/G
OINTMENT TOPICAL ONCE
OUTPATIENT
Start: 2024-09-18 | End: 2024-09-18

## 2024-09-18 RX ORDER — SILVER SULFADIAZINE 10 MG/G
CREAM TOPICAL
Qty: 400 G | Refills: 1 | Status: SHIPPED | OUTPATIENT
Start: 2024-09-18

## 2024-09-18 RX ORDER — FUROSEMIDE 20 MG
40 TABLET ORAL 2 TIMES DAILY
Qty: 180 TABLET | Refills: 0 | Status: SHIPPED | OUTPATIENT
Start: 2024-09-18

## 2024-09-18 RX ORDER — TRIAMCINOLONE ACETONIDE 1 MG/G
OINTMENT TOPICAL ONCE
Status: DISCONTINUED | OUTPATIENT
Start: 2024-09-18 | End: 2024-09-19 | Stop reason: HOSPADM

## 2024-09-18 ASSESSMENT — PAIN DESCRIPTION - DESCRIPTORS: DESCRIPTORS: ACHING

## 2024-09-18 ASSESSMENT — PAIN DESCRIPTION - LOCATION: LOCATION: BREAST

## 2024-09-18 ASSESSMENT — PAIN DESCRIPTION - ONSET: ONSET: ON-GOING

## 2024-09-18 ASSESSMENT — PAIN - FUNCTIONAL ASSESSMENT: PAIN_FUNCTIONAL_ASSESSMENT: ACTIVITIES ARE NOT PREVENTED

## 2024-09-18 ASSESSMENT — PAIN DESCRIPTION - FREQUENCY: FREQUENCY: INTERMITTENT

## 2024-09-18 ASSESSMENT — PAIN SCALES - GENERAL: PAINLEVEL_OUTOF10: 1

## 2024-09-18 ASSESSMENT — PAIN DESCRIPTION - ORIENTATION: ORIENTATION: RIGHT

## 2024-09-19 PROBLEM — L88 PYODERMA GANGRENOSUM: Status: RESOLVED | Noted: 2024-04-06 | Resolved: 2024-09-19

## 2024-09-19 PROBLEM — M31.0 LEUKOCYTOCLASTIC VASCULITIS (HCC): Status: RESOLVED | Noted: 2024-03-23 | Resolved: 2024-09-19

## 2024-09-24 ENCOUNTER — OFFICE VISIT (OUTPATIENT)
Dept: CARDIOLOGY CLINIC | Age: 59
End: 2024-09-24
Payer: MEDICARE

## 2024-09-24 VITALS
HEIGHT: 63 IN | HEART RATE: 71 BPM | DIASTOLIC BLOOD PRESSURE: 78 MMHG | WEIGHT: 281.6 LBS | OXYGEN SATURATION: 93 % | SYSTOLIC BLOOD PRESSURE: 120 MMHG | BODY MASS INDEX: 49.89 KG/M2

## 2024-09-24 DIAGNOSIS — R01.1 HEART MURMUR: ICD-10-CM

## 2024-09-24 DIAGNOSIS — I10 PRIMARY HYPERTENSION: Primary | ICD-10-CM

## 2024-09-24 DIAGNOSIS — G47.33 OSA (OBSTRUCTIVE SLEEP APNEA): ICD-10-CM

## 2024-09-24 DIAGNOSIS — I42.6 ALCOHOLIC CARDIOMYOPATHY (HCC): ICD-10-CM

## 2024-09-24 PROCEDURE — 3078F DIAST BP <80 MM HG: CPT | Performed by: STUDENT IN AN ORGANIZED HEALTH CARE EDUCATION/TRAINING PROGRAM

## 2024-09-24 PROCEDURE — 3074F SYST BP LT 130 MM HG: CPT | Performed by: STUDENT IN AN ORGANIZED HEALTH CARE EDUCATION/TRAINING PROGRAM

## 2024-09-24 PROCEDURE — 99204 OFFICE O/P NEW MOD 45 MIN: CPT | Performed by: STUDENT IN AN ORGANIZED HEALTH CARE EDUCATION/TRAINING PROGRAM

## 2024-09-24 RX ORDER — FUROSEMIDE 40 MG
40 TABLET ORAL DAILY
Qty: 90 TABLET | Refills: 3
Start: 2024-09-24

## 2024-09-25 ENCOUNTER — TELEPHONE (OUTPATIENT)
Dept: CARDIOLOGY CLINIC | Age: 59
End: 2024-09-25

## 2024-09-25 ENCOUNTER — HOSPITAL ENCOUNTER (OUTPATIENT)
Dept: WOUND CARE | Age: 59
Discharge: HOME | End: 2024-09-25
Attending: INTERNAL MEDICINE
Payer: MEDICARE

## 2024-09-25 VITALS
DIASTOLIC BLOOD PRESSURE: 82 MMHG | TEMPERATURE: 97.9 F | WEIGHT: 284.6 LBS | SYSTOLIC BLOOD PRESSURE: 133 MMHG | HEART RATE: 74 BPM | BODY MASS INDEX: 50.41 KG/M2 | RESPIRATION RATE: 18 BRPM

## 2024-09-25 DIAGNOSIS — T21.21XA: Primary | ICD-10-CM

## 2024-09-25 PROCEDURE — 16020 DRESS/DEBRID P-THICK BURN S: CPT

## 2024-09-25 RX ORDER — TRIAMCINOLONE ACETONIDE 1 MG/G
OINTMENT TOPICAL ONCE
Status: DISCONTINUED | OUTPATIENT
Start: 2024-09-25 | End: 2024-09-26 | Stop reason: HOSPADM

## 2024-09-25 RX ORDER — BACITRACIN ZINC AND POLYMYXIN B SULFATE 500; 1000 [USP'U]/G; [USP'U]/G
OINTMENT TOPICAL ONCE
Status: DISCONTINUED | OUTPATIENT
Start: 2024-09-25 | End: 2024-09-26 | Stop reason: HOSPADM

## 2024-09-25 RX ORDER — LIDOCAINE 50 MG/G
OINTMENT TOPICAL ONCE
OUTPATIENT
Start: 2024-09-25 | End: 2024-09-25

## 2024-09-25 RX ORDER — LIDOCAINE 40 MG/G
CREAM TOPICAL ONCE
OUTPATIENT
Start: 2024-09-25 | End: 2024-09-25

## 2024-09-25 RX ORDER — BACITRACIN ZINC AND POLYMYXIN B SULFATE 500; 1000 [USP'U]/G; [USP'U]/G
OINTMENT TOPICAL ONCE
OUTPATIENT
Start: 2024-09-25 | End: 2024-09-25

## 2024-09-25 RX ORDER — LIDOCAINE HYDROCHLORIDE 40 MG/ML
SOLUTION TOPICAL ONCE
Status: DISCONTINUED | OUTPATIENT
Start: 2024-09-25 | End: 2024-09-26 | Stop reason: HOSPADM

## 2024-09-25 RX ORDER — TRIAMCINOLONE ACETONIDE 1 MG/G
OINTMENT TOPICAL ONCE
OUTPATIENT
Start: 2024-09-25 | End: 2024-09-25

## 2024-09-25 RX ORDER — LIDOCAINE HYDROCHLORIDE 40 MG/ML
SOLUTION TOPICAL ONCE
OUTPATIENT
Start: 2024-09-25 | End: 2024-09-25

## 2024-09-25 RX ORDER — LIDOCAINE 50 MG/G
OINTMENT TOPICAL ONCE
Status: DISCONTINUED | OUTPATIENT
Start: 2024-09-25 | End: 2024-09-26 | Stop reason: HOSPADM

## 2024-09-25 RX ORDER — LIDOCAINE 40 MG/G
CREAM TOPICAL ONCE
Status: DISCONTINUED | OUTPATIENT
Start: 2024-09-25 | End: 2024-09-26 | Stop reason: HOSPADM
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
## 2024-10-09 ENCOUNTER — HOSPITAL ENCOUNTER (OUTPATIENT)
Dept: WOUND CARE | Age: 59
Discharge: HOME | End: 2024-10-09
Attending: INTERNAL MEDICINE

## 2024-10-15 RX ORDER — FUROSEMIDE 20 MG/1
40 TABLET ORAL 2 TIMES DAILY
Qty: 180 TABLET | Refills: 11 | OUTPATIENT
Start: 2024-10-15

## 2024-10-21 PROBLEM — T21.21XA: Status: RESOLVED | Noted: 2024-09-18 | Resolved: 2024-10-21

## 2024-11-04 RX ORDER — FUROSEMIDE 20 MG/1
40 TABLET ORAL 2 TIMES DAILY
Qty: 180 TABLET | Refills: 0 | Status: SHIPPED | OUTPATIENT
Start: 2024-11-04

## 2024-11-12 DIAGNOSIS — F32.A DEPRESSION, UNSPECIFIED DEPRESSION TYPE: ICD-10-CM

## 2024-11-13 RX ORDER — GABAPENTIN 400 MG/1
CAPSULE ORAL
Qty: 90 CAPSULE | Refills: 0 | Status: SHIPPED | OUTPATIENT
Start: 2024-11-13 | End: 2024-12-12

## 2024-11-13 RX ORDER — INDOMETHACIN 50 MG/1
CAPSULE ORAL
Qty: 270 CAPSULE | Refills: 0 | Status: SHIPPED | OUTPATIENT
Start: 2024-11-13

## 2024-11-13 RX ORDER — EMPAGLIFLOZIN 10 MG/1
10 TABLET, FILM COATED ORAL DAILY
Qty: 90 TABLET | Refills: 0 | Status: SHIPPED | OUTPATIENT
Start: 2024-11-13

## 2024-11-13 RX ORDER — FUROSEMIDE 20 MG/1
40 TABLET ORAL 2 TIMES DAILY
Qty: 180 TABLET | Refills: 0 | Status: SHIPPED | OUTPATIENT
Start: 2024-11-13

## 2024-11-13 RX ORDER — SPIRONOLACTONE 25 MG/1
25 TABLET ORAL DAILY
Qty: 90 TABLET | Refills: 0 | Status: SHIPPED | OUTPATIENT
Start: 2024-11-13

## 2024-11-13 RX ORDER — DULOXETIN HYDROCHLORIDE 60 MG/1
120 CAPSULE, DELAYED RELEASE ORAL DAILY
Qty: 180 CAPSULE | Refills: 0 | Status: SHIPPED | OUTPATIENT
Start: 2024-11-13

## 2024-11-13 RX ORDER — METOPROLOL SUCCINATE 25 MG/1
25 TABLET, EXTENDED RELEASE ORAL DAILY
Qty: 90 TABLET | Refills: 0 | Status: SHIPPED | OUTPATIENT
Start: 2024-11-13

## 2024-11-13 RX ORDER — OXYBUTYNIN CHLORIDE 5 MG/1
5 TABLET ORAL 2 TIMES DAILY
Qty: 180 TABLET | Refills: 0 | Status: SHIPPED | OUTPATIENT
Start: 2024-11-13

## 2024-12-16 RX ORDER — GABAPENTIN 400 MG/1
CAPSULE ORAL
Qty: 90 CAPSULE | Refills: 0 | Status: SHIPPED | OUTPATIENT
Start: 2025-01-03 | End: 2025-02-02

## 2024-12-16 RX ORDER — FUROSEMIDE 20 MG/1
40 TABLET ORAL 2 TIMES DAILY
Qty: 180 TABLET | Refills: 2 | Status: SHIPPED | OUTPATIENT
Start: 2024-12-16

## 2025-01-10 RX ORDER — GABAPENTIN 400 MG/1
CAPSULE ORAL
Qty: 90 CAPSULE | Refills: 0 | Status: SHIPPED | OUTPATIENT
Start: 2025-01-10 | End: 2025-02-09

## 2025-02-09 DIAGNOSIS — F32.A DEPRESSION, UNSPECIFIED DEPRESSION TYPE: ICD-10-CM

## 2025-02-10 RX ORDER — GABAPENTIN 400 MG/1
CAPSULE ORAL
Qty: 90 CAPSULE | Refills: 11 | OUTPATIENT
Start: 2025-02-10

## 2025-02-10 RX ORDER — SPIRONOLACTONE 25 MG/1
25 TABLET ORAL DAILY
Qty: 90 TABLET | Refills: 0 | Status: SHIPPED | OUTPATIENT
Start: 2025-02-10

## 2025-02-10 RX ORDER — INDOMETHACIN 50 MG/1
CAPSULE ORAL
Qty: 270 CAPSULE | Refills: 0 | Status: SHIPPED | OUTPATIENT
Start: 2025-02-10

## 2025-02-12 DIAGNOSIS — F32.A DEPRESSION, UNSPECIFIED DEPRESSION TYPE: ICD-10-CM

## 2025-02-12 RX ORDER — DULOXETIN HYDROCHLORIDE 60 MG/1
120 CAPSULE, DELAYED RELEASE ORAL DAILY
Qty: 180 CAPSULE | Refills: 0 | Status: SHIPPED | OUTPATIENT
Start: 2025-02-12

## 2025-02-12 RX ORDER — EMPAGLIFLOZIN 10 MG/1
10 TABLET, FILM COATED ORAL DAILY
Qty: 90 TABLET | Refills: 0 | Status: SHIPPED | OUTPATIENT
Start: 2025-02-12

## 2025-02-12 RX ORDER — OXYBUTYNIN CHLORIDE 5 MG/1
5 TABLET ORAL 2 TIMES DAILY
Qty: 180 TABLET | Refills: 0 | Status: SHIPPED | OUTPATIENT
Start: 2025-02-12

## 2025-02-12 RX ORDER — METOPROLOL SUCCINATE 25 MG/1
25 TABLET, EXTENDED RELEASE ORAL DAILY
Qty: 90 TABLET | Refills: 0 | Status: SHIPPED | OUTPATIENT
Start: 2025-02-12

## 2025-03-04 RX ORDER — GABAPENTIN 400 MG/1
CAPSULE ORAL
Qty: 90 CAPSULE | Refills: 0 | Status: SHIPPED | OUTPATIENT
Start: 2025-03-05 | End: 2025-04-04

## 2025-03-13 RX ORDER — GABAPENTIN 400 MG/1
400 CAPSULE ORAL 3 TIMES DAILY
Qty: 90 CAPSULE | Refills: 11 | OUTPATIENT
Start: 2025-03-13

## 2025-03-20 NOTE — PROGRESS NOTES
CARDIOLOGY OFFICE NOTE        Patient Name: Nohemi Nesbitt  Primary Care physician: Mariana Awan MD    Reason for Referral/Chief Complaint: Nohemi Nesbitt is a 59 y.o. patient who presents today for a cardiology follow up.     History of Present Illness:   Nohemi Nesbitt is a 59 y.o.  female with a history of undefined cardiomyopathy, HFmEF chronic lymphedema, lower extremity wounds, FLORIAN, former remote smoker, former alcohol abuse.  She was admitted 5/2024 for RLE redness and treated for cellulitis.  Echo showed EF 40-45% with wall motion abnormalities.  Started on Toprol, spironolactone, Jardiance at discharge.  OV 9/24/24 she presents with a cane in a wheelchair.  She uses a wheelchair for long distances due to stenosis and scoliosis in her back.  Daniel her husbands brother is present.  She reports she is feeling good and denies any symptoms.        Today she is here with her significant other. She presents in a wheelchair, uses a cane. She is doing well. She reports that Dr. Awan stopped her aspirin and indomethacin due to iron deficiency anemia referral placed to GI.  Patient denies any hematochezia or melena.  Denies any other bleeding.  She reports she also started iron tablets but is experiencing constipation with this.  Patient reports she is called since the GI has not heard back.  She is frustrated and would like a referral to another gastroenterologist.  Will take care of this today.  Otherwise, the patient denies chest pain, shortness of breath at rest and dyspnea with exertion. Denies palpitations, dizziness, near-syncope or anuja syncope. Denies paroxysmal nocturnal dyspnea, orthopnea, bendopnea, increasing lower extremity edema or weight gain.      Quit smoking about 30 yrs ago in her mid 30's.  Denies drugs.  Occasional drinking.  Used to be heavy drinker 6 months ago. Drank 1/5 a day of vodka.     Past Medical History:   has a past medical history of Acute deep vein

## 2025-03-25 RX ORDER — GABAPENTIN 400 MG/1
CAPSULE ORAL
Qty: 90 CAPSULE | Refills: 0 | Status: SHIPPED | OUTPATIENT
Start: 2025-04-06 | End: 2025-05-06

## 2025-03-25 NOTE — TELEPHONE ENCOUNTER
[facility] shows Gabapentin was last filled on 3/7/25 for 30 day supply. New script will be due 4/6/25.

## 2025-03-25 NOTE — TELEPHONE ENCOUNTER
----- Message from Una TURCIOS sent at 3/25/2025 10:06 AM EDT -----  Contact: ADRIAN [phone]  Adrian with [facility] states they have not received the below script, therefore requesting a refill on the below medication. Please advise     gabapentin (NEURONTIN) 400 MG capsul    [facility] [place] - [facility] [place] - [address] [place] [address] -  [phone] - F [phone] [address] [place] [address] [place] [address]  Phone: [phone]  Fax: [phone]

## 2025-03-31 ENCOUNTER — OFFICE VISIT (OUTPATIENT)
Dept: INTERNAL MEDICINE CLINIC | Age: 60
End: 2025-03-31

## 2025-03-31 VITALS
BODY MASS INDEX: 50.14 KG/M2 | DIASTOLIC BLOOD PRESSURE: 80 MMHG | HEIGHT: 63 IN | WEIGHT: 283 LBS | SYSTOLIC BLOOD PRESSURE: 122 MMHG | HEART RATE: 72 BPM

## 2025-03-31 DIAGNOSIS — E83.42 HYPOMAGNESEMIA: ICD-10-CM

## 2025-03-31 DIAGNOSIS — G47.00 INSOMNIA, UNSPECIFIED TYPE: ICD-10-CM

## 2025-03-31 DIAGNOSIS — G47.33 OSA (OBSTRUCTIVE SLEEP APNEA): ICD-10-CM

## 2025-03-31 DIAGNOSIS — K21.9 CHRONIC GERD: ICD-10-CM

## 2025-03-31 DIAGNOSIS — I10 PRIMARY HYPERTENSION: ICD-10-CM

## 2025-03-31 DIAGNOSIS — Z00.00 ROUTINE GENERAL MEDICAL EXAMINATION AT A HEALTH CARE FACILITY: ICD-10-CM

## 2025-03-31 DIAGNOSIS — E66.01 CLASS 3 SEVERE OBESITY DUE TO EXCESS CALORIES WITH SERIOUS COMORBIDITY AND BODY MASS INDEX (BMI) OF 50.0 TO 59.9 IN ADULT: ICD-10-CM

## 2025-03-31 DIAGNOSIS — M19.90 CHRONIC OSTEOARTHRITIS: ICD-10-CM

## 2025-03-31 DIAGNOSIS — E66.813 CLASS 3 SEVERE OBESITY DUE TO EXCESS CALORIES WITH SERIOUS COMORBIDITY AND BODY MASS INDEX (BMI) OF 50.0 TO 59.9 IN ADULT: ICD-10-CM

## 2025-03-31 DIAGNOSIS — Z12.31 SCREENING MAMMOGRAM, ENCOUNTER FOR: ICD-10-CM

## 2025-03-31 DIAGNOSIS — I50.22 CHRONIC SYSTOLIC CONGESTIVE HEART FAILURE (HCC): Primary | ICD-10-CM

## 2025-03-31 DIAGNOSIS — F33.1 MODERATE RECURRENT MAJOR DEPRESSION (HCC): ICD-10-CM

## 2025-03-31 PROCEDURE — 3074F SYST BP LT 130 MM HG: CPT | Performed by: INTERNAL MEDICINE

## 2025-03-31 PROCEDURE — 3079F DIAST BP 80-89 MM HG: CPT | Performed by: INTERNAL MEDICINE

## 2025-03-31 PROCEDURE — 99214 OFFICE O/P EST MOD 30 MIN: CPT | Performed by: INTERNAL MEDICINE

## 2025-03-31 RX ORDER — ZOLPIDEM TARTRATE 5 MG/1
5 TABLET ORAL NIGHTLY PRN
Qty: 30 TABLET | Refills: 0 | Status: SHIPPED | OUTPATIENT
Start: 2025-03-31 | End: 2025-04-30

## 2025-03-31 ASSESSMENT — ENCOUNTER SYMPTOMS
DIARRHEA: 0
NAUSEA: 0
SHORTNESS OF BREATH: 0
ABDOMINAL PAIN: 0
BLOOD IN STOOL: 0
VOMITING: 0
COUGH: 0
WHEEZING: 0
CHEST TIGHTNESS: 0

## 2025-03-31 NOTE — PROGRESS NOTES
Nohemi Nesbitt (:  1965) is a 59 y.o. female,Established patient, here for evaluation of the following chief complaint(s):  Follow-up      Assessment & Plan   ASSESSMENT/PLAN:        Diagnosis Orders   1. Chronic systolic congestive heart failure (HCC)        2. Primary hypertension  Basic Metabolic Panel    CBC with Auto Differential      3. Routine general medical examination at a health care facility  Hemoglobin A1C    Lipid Panel    Hepatic Function Panel      4. Class 3 severe obesity due to excess calories with serious comorbidity and body mass index (BMI) of 50.0 to 59.9 in adult        5. Moderate recurrent major depression (HCC)        6. FLORIAN (obstructive sleep apnea)        7. Chronic GERD        8. Chronic osteoarthritis        9. Hypomagnesemia        10. Screening mammogram, encounter for  Mammography screening bilateral              Morbid obesity  S/p gastric sleeve procedure   Advised to cut down on calories.  Lose weight.     Depression  Stable.  On cymbalta 120 mg daily      GERD is stable.  Continue PPI.     OA/chronic pain  Continue indomethacin bid   continue PPI  Continue tizanidine  Gabapentin 400 tid      Chronic systolic CHF  Last EF 45-50%   Continue lasix ,aldactone, Toprol XL and jardiance   She quit drinking a few years ago    H/o cellulitis   On PCN chronically       Urinary urge incontinence.  Continue ditropan.  Stable.    Insomnia   OTC melatonin not helping   Ambien 5 mg hs prn     Low Mg  Continue replacement       Last colonoscopy 2017       Subjective   SUBJECTIVE/OBJECTIVE:  HPI  Is here for follow up.  She has a prior h/o DVT,reactive airway disease,morbid obesity,OA,chronic pain and GERD.  She sees a pain physician and is on oxycodone,topamax,tizanidine,gabapentin and Cymbalta.  She also takes indomethacin.     Asthma is stable on inhalers.uses albuterol  Very infrequently.     Had gastric sleeve procedure 2021     Seeing wound care clinic for ulcer right leg

## 2025-04-01 ENCOUNTER — RESULTS FOLLOW-UP (OUTPATIENT)
Dept: INTERNAL MEDICINE CLINIC | Age: 60
End: 2025-04-01

## 2025-04-01 DIAGNOSIS — D64.9 LOW HEMOGLOBIN: Primary | ICD-10-CM

## 2025-04-01 LAB
ALBUMIN SERPL-MCNC: 4.2 G/DL (ref 3.4–5)
ALP SERPL-CCNC: 136 U/L (ref 40–129)
ALT SERPL-CCNC: 15 U/L (ref 10–40)
ANION GAP SERPL CALCULATED.3IONS-SCNC: 14 MMOL/L (ref 3–16)
AST SERPL-CCNC: 19 U/L (ref 15–37)
BASOPHILS # BLD: 0.1 K/UL (ref 0–0.2)
BASOPHILS NFR BLD: 1.3 %
BILIRUB DIRECT SERPL-MCNC: <0.1 MG/DL (ref 0–0.3)
BILIRUB INDIRECT SERPL-MCNC: 0.2 MG/DL (ref 0–1)
BILIRUB SERPL-MCNC: 0.3 MG/DL (ref 0–1)
BUN SERPL-MCNC: 34 MG/DL (ref 7–20)
CALCIUM SERPL-MCNC: 9.7 MG/DL (ref 8.3–10.6)
CHLORIDE SERPL-SCNC: 100 MMOL/L (ref 99–110)
CHOLEST SERPL-MCNC: 199 MG/DL (ref 0–199)
CO2 SERPL-SCNC: 26 MMOL/L (ref 21–32)
CREAT SERPL-MCNC: 1 MG/DL (ref 0.6–1.2)
DEPRECATED RDW RBC AUTO: 17 % (ref 12.4–15.4)
EOSINOPHIL # BLD: 0.3 K/UL (ref 0–0.6)
EOSINOPHIL NFR BLD: 4.3 %
EST. AVERAGE GLUCOSE BLD GHB EST-MCNC: 128.4 MG/DL
GFR SERPLBLD CREATININE-BSD FMLA CKD-EPI: 64 ML/MIN/{1.73_M2}
GLUCOSE SERPL-MCNC: 96 MG/DL (ref 70–99)
HBA1C MFR BLD: 6.1 %
HCT VFR BLD AUTO: 31.3 % (ref 36–48)
HDLC SERPL-MCNC: 60 MG/DL (ref 40–60)
HGB BLD-MCNC: 9.6 G/DL (ref 12–16)
LDLC SERPL CALC-MCNC: 119 MG/DL
LYMPHOCYTES # BLD: 1.7 K/UL (ref 1–5.1)
LYMPHOCYTES NFR BLD: 22.1 %
MCH RBC QN AUTO: 21.8 PG (ref 26–34)
MCHC RBC AUTO-ENTMCNC: 30.7 G/DL (ref 31–36)
MCV RBC AUTO: 70.9 FL (ref 80–100)
MONOCYTES # BLD: 0.7 K/UL (ref 0–1.3)
MONOCYTES NFR BLD: 8.6 %
NEUTROPHILS # BLD: 4.9 K/UL (ref 1.7–7.7)
NEUTROPHILS NFR BLD: 63.7 %
PLATELET # BLD AUTO: 395 K/UL (ref 135–450)
PMV BLD AUTO: 7.8 FL (ref 5–10.5)
POTASSIUM SERPL-SCNC: 4.5 MMOL/L (ref 3.5–5.1)
PROT SERPL-MCNC: 7.2 G/DL (ref 6.4–8.2)
RBC # BLD AUTO: 4.41 M/UL (ref 4–5.2)
SODIUM SERPL-SCNC: 140 MMOL/L (ref 136–145)
TRIGL SERPL-MCNC: 101 MG/DL (ref 0–150)
VLDLC SERPL CALC-MCNC: 20 MG/DL
WBC # BLD AUTO: 7.7 K/UL (ref 4–11)

## 2025-04-03 ENCOUNTER — OFFICE VISIT (OUTPATIENT)
Dept: CARDIOLOGY CLINIC | Age: 60
End: 2025-04-03

## 2025-04-03 VITALS
DIASTOLIC BLOOD PRESSURE: 86 MMHG | HEIGHT: 63 IN | BODY MASS INDEX: 49.08 KG/M2 | OXYGEN SATURATION: 96 % | WEIGHT: 277 LBS | SYSTOLIC BLOOD PRESSURE: 122 MMHG | HEART RATE: 88 BPM

## 2025-04-03 DIAGNOSIS — E66.01 CLASS 3 SEVERE OBESITY DUE TO EXCESS CALORIES WITH SERIOUS COMORBIDITY AND BODY MASS INDEX (BMI) OF 45.0 TO 49.9 IN ADULT: ICD-10-CM

## 2025-04-03 DIAGNOSIS — I42.9 CARDIOMYOPATHY, UNSPECIFIED TYPE (HCC): ICD-10-CM

## 2025-04-03 DIAGNOSIS — I87.2 VENOUS INSUFFICIENCY: ICD-10-CM

## 2025-04-03 DIAGNOSIS — I36.1 NONRHEUMATIC TRICUSPID VALVE REGURGITATION: ICD-10-CM

## 2025-04-03 DIAGNOSIS — G47.33 OSA (OBSTRUCTIVE SLEEP APNEA): ICD-10-CM

## 2025-04-03 DIAGNOSIS — E66.813 CLASS 3 SEVERE OBESITY DUE TO EXCESS CALORIES WITH SERIOUS COMORBIDITY AND BODY MASS INDEX (BMI) OF 45.0 TO 49.9 IN ADULT: ICD-10-CM

## 2025-04-03 DIAGNOSIS — R79.89 ABNORMAL CBC: ICD-10-CM

## 2025-04-03 DIAGNOSIS — Z00.00 ANNUAL PHYSICAL EXAM: Primary | ICD-10-CM

## 2025-04-03 DIAGNOSIS — I34.0 NONRHEUMATIC MITRAL VALVE REGURGITATION: ICD-10-CM

## 2025-04-03 DIAGNOSIS — I50.22 HEART FAILURE WITH MILDLY REDUCED EJECTION FRACTION (HFMREF) (HCC): ICD-10-CM

## 2025-04-03 NOTE — PATIENT INSTRUCTIONS
Continue taking jardiance 10mg daily, metoprolol 25mg daily, spironolactone 25mg daily, lasix 20mg two in the morning and one in the evening.   Schedule echo  Referral to GI   Your provider has ordered testing for further evaluation.  An order/prescription has been included in your paper work.   To schedule outpatient testing, contact Central Scheduling by calling [address] [place] [address] ([phone]).

## 2025-04-11 RX ORDER — FUROSEMIDE 20 MG/1
40 TABLET ORAL 2 TIMES DAILY
Qty: 360 TABLET | Refills: 0 | Status: SHIPPED | OUTPATIENT
Start: 2025-04-11

## 2025-04-11 RX ORDER — GABAPENTIN 400 MG/1
400 CAPSULE ORAL 3 TIMES DAILY
Qty: 90 CAPSULE | Refills: 11 | OUTPATIENT
Start: 2025-04-11

## 2025-04-15 ENCOUNTER — ANESTHESIA EVENT (OUTPATIENT)
Dept: ENDOSCOPY | Age: 60
End: 2025-04-15
Payer: MEDICARE

## 2025-04-16 ENCOUNTER — HOSPITAL ENCOUNTER (OUTPATIENT)
Age: 60
Setting detail: OUTPATIENT SURGERY
Discharge: HOME | End: 2025-04-16
Attending: INTERNAL MEDICINE | Admitting: INTERNAL MEDICINE
Payer: MEDICARE

## 2025-04-16 ENCOUNTER — ANESTHESIA (OUTPATIENT)
Dept: ENDOSCOPY | Age: 60
End: 2025-04-16
Payer: MEDICARE

## 2025-04-16 VITALS
TEMPERATURE: 97.8 F | SYSTOLIC BLOOD PRESSURE: 134 MMHG | WEIGHT: 278 LBS | HEART RATE: 70 BPM | RESPIRATION RATE: 14 BRPM | BODY MASS INDEX: 49.26 KG/M2 | DIASTOLIC BLOOD PRESSURE: 98 MMHG | HEIGHT: 63 IN | OXYGEN SATURATION: 99 %

## 2025-04-16 DIAGNOSIS — D64.9 ANEMIA, UNSPECIFIED TYPE: ICD-10-CM

## 2025-04-16 PROCEDURE — 3609012400 HC EGD TRANSORAL BIOPSY SINGLE/MULTIPLE: Performed by: INTERNAL MEDICINE

## 2025-04-16 PROCEDURE — 6360000002 HC RX W HCPCS: Performed by: NURSE ANESTHETIST, CERTIFIED REGISTERED

## 2025-04-16 PROCEDURE — 88305 TISSUE EXAM BY PATHOLOGIST: CPT

## 2025-04-16 PROCEDURE — 7100000011 HC PHASE II RECOVERY - ADDTL 15 MIN: Performed by: INTERNAL MEDICINE

## 2025-04-16 PROCEDURE — 2709999900 HC NON-CHARGEABLE SUPPLY: Performed by: INTERNAL MEDICINE

## 2025-04-16 PROCEDURE — 7100000010 HC PHASE II RECOVERY - FIRST 15 MIN: Performed by: INTERNAL MEDICINE

## 2025-04-16 PROCEDURE — 3700000001 HC ADD 15 MINUTES (ANESTHESIA): Performed by: INTERNAL MEDICINE

## 2025-04-16 PROCEDURE — 3609027000 HC COLONOSCOPY: Performed by: INTERNAL MEDICINE

## 2025-04-16 PROCEDURE — 2580000003 HC RX 258: Performed by: NURSE ANESTHETIST, CERTIFIED REGISTERED

## 2025-04-16 PROCEDURE — 3700000000 HC ANESTHESIA ATTENDED CARE: Performed by: INTERNAL MEDICINE

## 2025-04-16 PROCEDURE — 88342 IMHCHEM/IMCYTCHM 1ST ANTB: CPT

## 2025-04-16 RX ORDER — SODIUM CHLORIDE 0.9 % (FLUSH) 0.9 %
5-40 SYRINGE (ML) INJECTION PRN
Status: DISCONTINUED | OUTPATIENT
Start: 2025-04-16 | End: 2025-04-16 | Stop reason: HOSPADM

## 2025-04-16 RX ORDER — PROPOFOL 10 MG/ML
INJECTION, EMULSION INTRAVENOUS
Status: DISCONTINUED | OUTPATIENT
Start: 2025-04-16 | End: 2025-04-16 | Stop reason: SDUPTHER

## 2025-04-16 RX ORDER — SODIUM CHLORIDE 0.9 % (FLUSH) 0.9 %
5-40 SYRINGE (ML) INJECTION EVERY 12 HOURS SCHEDULED
Status: DISCONTINUED | OUTPATIENT
Start: 2025-04-16 | End: 2025-04-16 | Stop reason: HOSPADM

## 2025-04-16 RX ORDER — SODIUM CHLORIDE, SODIUM LACTATE, POTASSIUM CHLORIDE, CALCIUM CHLORIDE 600; 310; 30; 20 MG/100ML; MG/100ML; MG/100ML; MG/100ML
INJECTION, SOLUTION INTRAVENOUS
Status: DISCONTINUED | OUTPATIENT
Start: 2025-04-16 | End: 2025-04-16 | Stop reason: SDUPTHER

## 2025-04-16 RX ORDER — LIDOCAINE HYDROCHLORIDE 10 MG/ML
0.3 INJECTION, SOLUTION EPIDURAL; INFILTRATION; INTRACAUDAL; PERINEURAL
Status: DISCONTINUED | OUTPATIENT
Start: 2025-04-16 | End: 2025-04-16 | Stop reason: HOSPADM

## 2025-04-16 RX ORDER — SODIUM CHLORIDE, SODIUM LACTATE, POTASSIUM CHLORIDE, CALCIUM CHLORIDE 600; 310; 30; 20 MG/100ML; MG/100ML; MG/100ML; MG/100ML
INJECTION, SOLUTION INTRAVENOUS CONTINUOUS
Status: DISCONTINUED | OUTPATIENT
Start: 2025-04-16 | End: 2025-04-16 | Stop reason: HOSPADM

## 2025-04-16 RX ORDER — LIDOCAINE HYDROCHLORIDE 20 MG/ML
INJECTION, SOLUTION EPIDURAL; INFILTRATION; INTRACAUDAL; PERINEURAL
Status: DISCONTINUED | OUTPATIENT
Start: 2025-04-16 | End: 2025-04-16 | Stop reason: SDUPTHER

## 2025-04-16 RX ORDER — SODIUM CHLORIDE 9 MG/ML
INJECTION, SOLUTION INTRAVENOUS PRN
Status: DISCONTINUED | OUTPATIENT
Start: 2025-04-16 | End: 2025-04-16 | Stop reason: HOSPADM

## 2025-04-16 RX ADMIN — PROPOFOL 50 MG: 10 INJECTION, EMULSION INTRAVENOUS at 12:08

## 2025-04-16 RX ADMIN — PROPOFOL 200 MG: 10 INJECTION, EMULSION INTRAVENOUS at 11:48

## 2025-04-16 RX ADMIN — PROPOFOL 200 MG: 10 INJECTION, EMULSION INTRAVENOUS at 11:58

## 2025-04-16 RX ADMIN — SODIUM CHLORIDE, POTASSIUM CHLORIDE, SODIUM LACTATE AND CALCIUM CHLORIDE: 600; 310; 30; 20 INJECTION, SOLUTION INTRAVENOUS at 11:41

## 2025-04-16 RX ADMIN — LIDOCAINE HYDROCHLORIDE 60 MG: 20 INJECTION, SOLUTION EPIDURAL; INFILTRATION; INTRACAUDAL; PERINEURAL at 11:48

## 2025-04-16 ASSESSMENT — PAIN SCALES - GENERAL
PAINLEVEL_OUTOF10: 0
PAINLEVEL_OUTOF10: 0

## 2025-04-16 ASSESSMENT — PAIN - FUNCTIONAL ASSESSMENT: PAIN_FUNCTIONAL_ASSESSMENT: 0-10

## 2025-04-16 NOTE — ANESTHESIA POSTPROCEDURE EVALUATION
Department of Anesthesiology  Postprocedure Note    Patient: Nohemi Nesbitt  MRN: [id number]  YOB: 1965  Date of evaluation: 4/16/2025    Procedure Summary       Date: 04/16/25 Room / Location: [address]    Anesthesia Start: 1145 Anesthesia Stop: 1218    Procedures:       ESOPHAGOGASTRODUODENOSCOPY BIOPSY      COLON W/ANES. Diagnosis:       Anemia, unspecified type      (Anemia, unspecified type [D64.9])    Surgeons: Lg Christopher MD Responsible Provider: Leoncio Tanner MD    Anesthesia Type: general ASA Status: 3            Anesthesia Type: No value filed.    Starr Phase I: Starr Score: 10    Starr Phase II: Starr Score: 10    Anesthesia Post Evaluation    Comments: Postoperative Anesthesia Note    Name:    Nohemi Nesbitt  MRN:      [id number]    Patient Vitals in the past 12 hrs:  04/16/25 1237, BP:(!) 134/98, Temp:97.8 °F (36.6 °C), Temp src:Axillary, Pulse:70, Resp:14, SpO2:99 %  04/16/25 1219, BP:106/69, Temp:97.7 °F (36.5 °C), Temp src:Oral, Pulse:71, Resp:16, SpO2:100 %  04/16/25 1009, BP:(!) 144/63, Temp:98.4 °F (36.9 °C), Temp src:Temporal, Pulse:71, Resp:16, SpO2:94 %, Height:1.6 m (5' 3\"), Weight:126.1 kg (278 lb)     LABS:    CBC  Lab Results       Component                Value               Date/Time                  WBC                      7.7                 03/31/2025 01:44 PM        HGB                      9.6 (L)             03/31/2025 01:44 PM        HCT                      31.3 (L)            03/31/2025 01:44 PM        PLT                      395                 03/31/2025 01:44 PM   RENAL  Lab Results       Component                Value               Date/Time                  NA                       140                 03/31/2025 01:44 PM        K                        4.5                 03/31/2025 01:44 PM        K                        3.9                 05/13/2024 10:07 AM        CL                       100
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
## 2025-04-16 NOTE — ANESTHESIA PRE PROCEDURE
Department of Anesthesiology  Preprocedure Note       Name:  Nohemi Nesbitt   Age:  59 y.o.  :  1965                                          MRN:  [id number]         Date:  2025      Surgeon: Surgeon(s):  Lg Christopher MD    Procedure: Procedure(s):  EGD W/ANES.  COLON W/ANES.    Medications prior to admission:   Prior to Admission medications    Medication Sig Start Date End Date Taking? Authorizing Provider   furosemide (LASIX) 20 MG tablet TAKE TWO TABLETS BY MOUTH TWICE DAILY 25  Yes Betty Klein, BANDAR - CNP   zolpidem (AMBIEN) 5 MG tablet Take 1 tablet by mouth nightly as needed for Sleep for up to 30 days. Max Daily Amount: 5 mg 3/31/25 4/30/25 Yes Mariana Awan MD   gabapentin (NEURONTIN) 400 MG capsule TAKE ONE CAPSULE BY MOUTH THREE TIMES DAILY 25 Yes Mariana Awan MD   metoprolol succinate (TOPROL XL) 25 MG extended release tablet TAKE ONE TABLET BY MOUTH DAILY 25  Yes Mariana Awan MD   DULoxetine (CYMBALTA) 60 MG extended release capsule TAKE TWO CAPSULES BY MOUTH DAILY 25  Yes Mariana Awan MD   oxyBUTYnin (DITROPAN) 5 MG tablet TAKE ONE TABLET BY MOUTH TWICE DAILY 25  Yes Mariana Awan MD   omeprazole (PRILOSEC) 20 MG delayed release capsule TAKE ONE CAPSULE BY MOUTH DAILY AT 9 AM BEFORE BREAKFAST 2/10/25  Yes Mariana Awan MD   tiZANidine (ZANAFLEX) 4 MG tablet TAKE TWO TABLETS BY MOUTH EVERY NIGHT 2/10/25  Yes Mariana Awan MD   spironolactone (ALDACTONE) 25 MG tablet TAKE ONE TABLET BY MOUTH DAILY 2/10/25  Yes Mariana Awan MD   penicillin v potassium (VEETID) 500 MG tablet Take 1 tablet by mouth every 12 hours 24 Yes Kentrell Wylie MD   Multiple Vitamin (MULTIVITAMIN ADULT PO) Take by mouth daily   Yes Provider, Historical, MD   JARDIANCE 10 MG tablet TAKE ONE TABLET BY MOUTH DAILY 25   Emperatriz

## 2025-04-16 NOTE — DISCHARGE INSTRUCTIONS
PATIENT INSTRUCTIONS  POST-SEDATION    Nohemi Nesbitt          IMMEDIATELY FOLLOWING PROCEDURE:    Do not drive or operate machinery for the first twenty four hours after surgery.     Do not make any important decisions for twenty four hours after surgery or while taking narcotic pain medications or sedatives.     You should NOT BE LEFT UNATTENDED OR ALONE. A responsible adult should be with you for the rest of the day of your procedure and also during the night for your protection and safety.    You may experience some light headedness. Rest at home with activity as tolerated. You may not need to go to bed, but it is important to rest for the next 24 hours. You should not engage in athletic sports such as basketball, volleyball, jogging, skating, or activities requiring refined motor skills for 24 hours.   If you develop intractable nausea and vomiting or a severe headache please notify your doctor immediately.   You are not expected to have any fever, but if you feel warm, take your temperature. If you have a fever 101 degrees or higher, call your doctor.     If you have had an Endoscopy:   *Eat lightly for your first meal and gradually resume your normal / prescribed diet. DO NOT eat or drink until your gag reflex returns.   *If you have a sore throat you may use lozenges, or salt water gargles.   *If you have had a colonoscopy, do not expect a normal bowel movement for approximately three days due to the cleansing of the large intestine prior to colonoscopy.    ONCE YOU ARE HOME, IF YOU SHOULD HAVE:  Difficulty in breathing, persistent nausea or vomiting, bleeding you feel is excessive, or pain that is unusual, increased abdominal bloating, or any swelling, fever / chills, call your physician. If you cannot contact your physician, but feel that your signs and symptoms need a physician's attention, go to the Emergency Department.      FOLLOW-UP:    Please follow up with PCP as scheduled or needed.

## 2025-04-21 ENCOUNTER — TELEPHONE (OUTPATIENT)
Dept: INTERNAL MEDICINE CLINIC | Age: 60
End: 2025-04-21

## 2025-04-21 DIAGNOSIS — D50.9 IRON DEFICIENCY ANEMIA, UNSPECIFIED IRON DEFICIENCY ANEMIA TYPE: Primary | ICD-10-CM

## 2025-04-21 NOTE — TELEPHONE ENCOUNTER
----- Message from Caorl SARAH sent at 4/21/2025 10:38 AM EDT -----  Contact: patient [phone]  Per Dr Awan-repeat CBC, iron studies.  If still low she will have to go back to GI for additional testing.  ----- Message -----  From: Liza Mata  Sent: 4/18/2025   3:55 PM EDT  To: Mariana Awan MD    Patient states she had the Endo and Colonoscopy completed with normal results.  They only increased her Omeprazole to 2 times daily.  Patient is wanting to know what is the next step, where do you want to go from here?  Please advise        [facility] [address] [place] [address] Drive - P [phone] - F [phone]

## 2025-04-22 ENCOUNTER — LAB (OUTPATIENT)
Dept: INTERNAL MEDICINE CLINIC | Age: 60
End: 2025-04-22

## 2025-04-22 DIAGNOSIS — I10 PRIMARY HYPERTENSION: Primary | ICD-10-CM

## 2025-04-22 DIAGNOSIS — I10 PRIMARY HYPERTENSION: ICD-10-CM

## 2025-04-22 DIAGNOSIS — D50.9 IRON DEFICIENCY ANEMIA, UNSPECIFIED IRON DEFICIENCY ANEMIA TYPE: ICD-10-CM

## 2025-04-22 LAB
BASOPHILS # BLD: 0.1 K/UL (ref 0–0.2)
BASOPHILS NFR BLD: 1.3 %
DEPRECATED RDW RBC AUTO: 18.5 % (ref 12.4–15.4)
EOSINOPHIL # BLD: 0.2 K/UL (ref 0–0.6)
EOSINOPHIL NFR BLD: 3.5 %
FERRITIN SERPL IA-MCNC: 10.2 NG/ML (ref 15–150)
HCT VFR BLD AUTO: 31.2 % (ref 36–48)
HGB BLD-MCNC: 9.5 G/DL (ref 12–16)
IRON SATN MFR SERPL: 6 % (ref 15–50)
IRON SERPL-MCNC: 25 UG/DL (ref 37–145)
LYMPHOCYTES # BLD: 1.3 K/UL (ref 1–5.1)
LYMPHOCYTES NFR BLD: 21.8 %
MCH RBC QN AUTO: 21.4 PG (ref 26–34)
MCHC RBC AUTO-ENTMCNC: 30.4 G/DL (ref 31–36)
MCV RBC AUTO: 70.2 FL (ref 80–100)
MONOCYTES # BLD: 0.5 K/UL (ref 0–1.3)
MONOCYTES NFR BLD: 8.6 %
NEUTROPHILS # BLD: 4 K/UL (ref 1.7–7.7)
NEUTROPHILS NFR BLD: 64.8 %
PLATELET # BLD AUTO: 396 K/UL (ref 135–450)
PMV BLD AUTO: 7.7 FL (ref 5–10.5)
RBC # BLD AUTO: 4.45 M/UL (ref 4–5.2)
TIBC SERPL-MCNC: 418 UG/DL (ref 260–445)
TSH SERPL DL<=0.005 MIU/L-ACNC: 2.34 UIU/ML (ref 0.27–4.2)
WBC # BLD AUTO: 6.1 K/UL (ref 4–11)

## 2025-04-22 NOTE — TELEPHONE ENCOUNTER
----- Message from Keila MCDONOUGH sent at 4/22/2025  3:29 PM EDT -----  Contact: [facility]armacy requesting refill for patient       gabapentin (NEURONTIN) 400 MG capsule      Pharmacy    [facility] [place] - [facility] [place] - [address] [place] [address] [place] - P [phone] - F [phone]

## 2025-04-23 ENCOUNTER — RESULTS FOLLOW-UP (OUTPATIENT)
Dept: INTERNAL MEDICINE CLINIC | Age: 60
End: 2025-04-23

## 2025-04-23 LAB
FOLATE SERPL-MCNC: 22.7 NG/ML (ref 4.78–24.2)
VIT B12 SERPL-MCNC: 302 PG/ML (ref 211–911)

## 2025-04-23 RX ORDER — GABAPENTIN 400 MG/1
CAPSULE ORAL
Qty: 90 CAPSULE | Refills: 0 | Status: SHIPPED | OUTPATIENT
Start: 2025-05-03 | End: 2025-06-02

## 2025-04-24 ENCOUNTER — TELEPHONE (OUTPATIENT)
Dept: INTERNAL MEDICINE CLINIC | Age: 60
End: 2025-04-24

## 2025-04-24 NOTE — TELEPHONE ENCOUNTER
----- Message from Dr. Mariana Awan MD sent at 4/24/2025  9:26 AM EDT -----  Contact: SARAH [phone]  Make sure she takes 2 iron pills daily  ----- Message -----  From: Una Carlson  Sent: 4/24/2025   8:47 AM EDT  To: Mariana Awan MD    Patient has an appointment with GI on 5/21, but patient wanted to confirm she is safe to wait that long due to her lab results being low. Please advise

## 2025-04-25 ENCOUNTER — HOSPITAL ENCOUNTER (OUTPATIENT)
Age: 60
Discharge: HOME | End: 2025-04-25
Payer: MEDICARE

## 2025-04-25 LAB
BASOPHILS # BLD: 0.1 K/UL (ref 0–0.2)
BASOPHILS NFR BLD: 1.3 %
DEPRECATED RDW RBC AUTO: 19.4 % (ref 12.4–15.4)
EOSINOPHIL # BLD: 0.3 K/UL (ref 0–0.6)
EOSINOPHIL NFR BLD: 4 %
ERYTHROCYTE [SEDIMENTATION RATE] IN BLOOD BY WESTERGREN METHOD: 114 MM/HR (ref 0–30)
HCT VFR BLD AUTO: 31.8 % (ref 36–48)
HGB BLD-MCNC: 10 G/DL (ref 12–16)
LYMPHOCYTES # BLD: 1.5 K/UL (ref 1–5.1)
LYMPHOCYTES NFR BLD: 22 %
MCH RBC QN AUTO: 22.3 PG (ref 26–34)
MCHC RBC AUTO-ENTMCNC: 31.4 G/DL (ref 31–36)
MCV RBC AUTO: 71.1 FL (ref 80–100)
MONOCYTES # BLD: 0.8 K/UL (ref 0–1.3)
MONOCYTES NFR BLD: 11 %
NEUTROPHILS # BLD: 4.3 K/UL (ref 1.7–7.7)
NEUTROPHILS NFR BLD: 61.7 %
PLATELET # BLD AUTO: 393 K/UL (ref 135–450)
PMV BLD AUTO: 7.3 FL (ref 5–10.5)
RBC # BLD AUTO: 4.48 M/UL (ref 4–5.2)
WBC # BLD AUTO: 7 K/UL (ref 4–11)

## 2025-04-25 PROCEDURE — 36415 COLL VENOUS BLD VENIPUNCTURE: CPT

## 2025-04-25 PROCEDURE — 85652 RBC SED RATE AUTOMATED: CPT

## 2025-04-25 PROCEDURE — 85025 COMPLETE CBC W/AUTO DIFF WBC: CPT

## 2025-04-25 PROCEDURE — 86140 C-REACTIVE PROTEIN: CPT

## 2025-04-26 LAB — CRP SERPL-MCNC: 6.5 MG/L (ref 0–5.1)

## 2025-05-05 ENCOUNTER — TELEPHONE (OUTPATIENT)
Dept: INTERNAL MEDICINE CLINIC | Age: 60
End: 2025-05-05

## 2025-05-05 NOTE — TELEPHONE ENCOUNTER
----- Message from Dr. Jostin Oneill MD sent at 5/5/2025 10:41 AM EDT -----  Contact: SARAH [phone]  No just follow up with ortho surgeon  ----- Message -----  From: Una Carlson  Sent: 5/5/2025  10:29 AM EDT  To: Jostin Oneill MD    Patient was admitted to [facility] for 5 days due to a fluid pocket in the C3 and C4 vertebrae, unsure if the fluid is caused by infection but fluid is putting pressure on the spine. Pt currently doing a PICC line and antibiotics for 6 weeks. Pt following up with a ortho surgeon and GI, but would like confirmation on wether or not she is in need of a hospital follow-up with our office. Please advise

## 2025-05-06 DIAGNOSIS — G47.00 INSOMNIA, UNSPECIFIED TYPE: ICD-10-CM

## 2025-05-06 RX ORDER — ZOLPIDEM TARTRATE 5 MG/1
TABLET ORAL
Qty: 30 TABLET | Refills: 0 | Status: SHIPPED | OUTPATIENT
Start: 2025-05-06 | End: 2025-06-05

## 2025-05-08 ENCOUNTER — HOSPITAL ENCOUNTER (OUTPATIENT)
Age: 60
Setting detail: SPECIMEN
Discharge: HOME | End: 2025-05-08
Payer: MEDICARE

## 2025-05-08 LAB
ALBUMIN SERPL-MCNC: 3.6 G/DL (ref 3.4–5)
ALBUMIN/GLOB SERPL: 1.1 {RATIO} (ref 1.1–2.2)
ALP SERPL-CCNC: 124 U/L (ref 40–129)
ALT SERPL-CCNC: 14 U/L (ref 10–40)
ANION GAP SERPL CALCULATED.3IONS-SCNC: 14 MMOL/L (ref 3–16)
AST SERPL-CCNC: 18 U/L (ref 15–37)
BILIRUB SERPL-MCNC: 0.3 MG/DL (ref 0–1)
BUN SERPL-MCNC: 30 MG/DL (ref 7–20)
CALCIUM SERPL-MCNC: 9.5 MG/DL (ref 8.3–10.6)
CHLORIDE SERPL-SCNC: 103 MMOL/L (ref 99–110)
CO2 SERPL-SCNC: 21 MMOL/L (ref 21–32)
CREAT SERPL-MCNC: 0.9 MG/DL (ref 0.6–1.2)
GFR SERPLBLD CREATININE-BSD FMLA CKD-EPI: 72 ML/MIN/{1.73_M2}
GLUCOSE SERPL-MCNC: 101 MG/DL (ref 70–99)
POTASSIUM SERPL-SCNC: 3.9 MMOL/L (ref 3.5–5.1)
PROT SERPL-MCNC: 6.8 G/DL (ref 6.4–8.2)
SODIUM SERPL-SCNC: 138 MMOL/L (ref 136–145)

## 2025-05-08 PROCEDURE — 80053 COMPREHEN METABOLIC PANEL: CPT

## 2025-05-09 ENCOUNTER — TELEPHONE (OUTPATIENT)
Dept: INTERNAL MEDICINE CLINIC | Age: 60
End: 2025-05-09

## 2025-05-09 RX ORDER — FUROSEMIDE 20 MG/1
40 TABLET ORAL 2 TIMES DAILY
Qty: 360 TABLET | Refills: 0 | Status: SHIPPED | OUTPATIENT
Start: 2025-05-09

## 2025-05-09 NOTE — TELEPHONE ENCOUNTER
----- Message from Una TURCIOS sent at 5/9/2025  4:18 PM EDT -----  Contact: FANNIE [phone]  Fannie with [facility] is requesting a refill on the below script. Please advise     furosemide (LASIX) 20 MG tablet       [facility] [place] - [facility] [place]  [address] [facility] -  [phone] - F [phone] [address] [place] [address] [place] [address]  Phone: [phone]  Fax: [phone]

## 2025-05-21 ENCOUNTER — TELEPHONE (OUTPATIENT)
Dept: INTERNAL MEDICINE CLINIC | Age: 60
End: 2025-05-21

## 2025-05-21 NOTE — TELEPHONE ENCOUNTER
Per Dr DIAS-Iron is low. Take OTC ferrous sulfate 325 mg BID.  If it causes her constipation then take it once daily.     Left message to return call

## 2025-05-21 NOTE — TELEPHONE ENCOUNTER
----- Message from Dr. Mariana Awan MD sent at 5/21/2025  9:43 AM EDT -----  Contact: [phone]  What's the H/H?  ----- Message -----  From: Carol Hernandez  Sent: 5/20/2025   2:12 PM EDT  To: Mariana Awan MD    Yes CBC was done  ----- Message -----  From: Mariana Awan MD  Sent: 5/20/2025   2:11 PM EDT  To: Carol Hernandez    Check if there is a cbc  If not order  ----- Message -----  From: Carol Hernandez  Sent: 5/20/2025   1:42 PM EDT  To: Mariana Awan MD      ----- Message -----  From: Keila Sher  Sent: 5/20/2025  12:24 PM EDT  To: Mustapha Cary MD    Pt states she had blood work done yesterday from a home nurse from [facility]. Pt states recently she has been feeling weaker than normal and asking if you can add orders to have her iron checked with her blood? Please advise

## 2025-05-21 NOTE — TELEPHONE ENCOUNTER
----- Message from Carol SMITH sent at 5/21/2025  9:47 AM EDT -----  Contact: [phone]  Per Dr DIAS-Iron is low. Take OTC ferrous sulfate 325 mg BID.  If it causes her constipation then take it once daily.     Left message to return call  ----- Message -----  From: Mariana Awan MD  Sent: 5/21/2025   9:43 AM EDT  To: Carol Hernandez    What's the H/H?  ----- Message -----  From: Carol Hernandez  Sent: 5/20/2025   2:12 PM EDT  To: Mariana Awan MD    Yes CBC was done  ----- Message -----  From: Mariana Awan MD  Sent: 5/20/2025   2:11 PM EDT  To: Carol Hernandez    Check if there is a cbc  If not order  ----- Message -----  From: Carol Hernandez  Sent: 5/20/2025   1:42 PM EDT  To: Mariana Awan MD      ----- Message -----  From: Keila Sher  Sent: 5/20/2025  12:24 PM EDT  To: Mustapha Cary MD    Pt states she had blood work done yesterday from a home nurse from [facility]. Pt states recently she has been feeling weaker than normal and asking if you can add orders to have her iron checked with her blood? Please advise

## 2025-05-21 NOTE — TELEPHONE ENCOUNTER
Patient is currently taking the iron BID.     Per Dr. Awan continue the iron.    Patient informed.

## 2025-05-22 ENCOUNTER — HOSPITAL ENCOUNTER (OUTPATIENT)
Age: 60
Setting detail: SPECIMEN
Discharge: HOME | End: 2025-05-22

## 2025-05-22 LAB
ANION GAP SERPL CALCULATED.3IONS-SCNC: 14 MMOL/L (ref 3–16)
BACTERIA URNS QL MICRO: ABNORMAL /HPF
BILIRUB UR QL STRIP.AUTO: NEGATIVE
BUN SERPL-MCNC: 41 MG/DL (ref 7–20)
CALCIUM SERPL-MCNC: 8.6 MG/DL (ref 8.3–10.6)
CHLORIDE SERPL-SCNC: 101 MMOL/L (ref 99–110)
CK SERPL-CCNC: 674 U/L (ref 26–192)
CLARITY UR: ABNORMAL
CO2 SERPL-SCNC: 23 MMOL/L (ref 21–32)
COARSE GRAN CASTS #/AREA URNS LPF: ABNORMAL /LPF (ref 0–2)
COLOR UR: YELLOW
CREAT SERPL-MCNC: 1.2 MG/DL (ref 0.6–1.2)
EPI CELLS #/AREA URNS HPF: ABNORMAL /HPF (ref 0–5)
GFR SERPLBLD CREATININE-BSD FMLA CKD-EPI: 51 ML/MIN/{1.73_M2}
GLUCOSE SERPL-MCNC: 113 MG/DL (ref 70–99)
GLUCOSE UR STRIP.AUTO-MCNC: NEGATIVE MG/DL
HGB UR QL STRIP.AUTO: ABNORMAL
KETONES UR STRIP.AUTO-MCNC: NEGATIVE MG/DL
LEUKOCYTE ESTERASE UR QL STRIP.AUTO: ABNORMAL
NITRITE UR QL STRIP.AUTO: NEGATIVE
PH UR STRIP.AUTO: 6 [PH] (ref 5–8)
POTASSIUM SERPL-SCNC: 4.4 MMOL/L (ref 3.5–5.1)
PROT UR STRIP.AUTO-MCNC: 100 MG/DL
RBC #/AREA URNS HPF: ABNORMAL /HPF (ref 0–4)
RENAL EPI CELLS #/AREA UR COMP ASSIST: ABNORMAL /HPF (ref 0–1)
SODIUM SERPL-SCNC: 138 MMOL/L (ref 136–145)
SP GR UR STRIP.AUTO: 1.02 (ref 1–1.03)
UA COMPLETE W REFLEX CULTURE PNL UR: ABNORMAL
UA DIPSTICK W REFLEX MICRO PNL UR: YES
URN SPEC COLLECT METH UR: ABNORMAL
UROBILINOGEN UR STRIP-ACNC: 0.2 E.U./DL
WBC #/AREA URNS HPF: ABNORMAL /HPF (ref 0–5)

## 2025-05-23 ENCOUNTER — TRANSCRIBE ORDERS (OUTPATIENT)
Dept: ADMINISTRATIVE | Age: 60
End: 2025-05-23

## 2025-05-23 DIAGNOSIS — D50.9 IRON DEFICIENCY ANEMIA, UNSPECIFIED IRON DEFICIENCY ANEMIA TYPE: Primary | ICD-10-CM

## 2025-05-27 ENCOUNTER — HOSPITAL ENCOUNTER (OUTPATIENT)
Age: 60
Setting detail: SPECIMEN
Discharge: HOME | End: 2025-05-27
Payer: MEDICARE

## 2025-05-27 LAB
ALBUMIN SERPL-MCNC: 3.9 G/DL (ref 3.4–5)
ALBUMIN/GLOB SERPL: 1.5 {RATIO} (ref 1.1–2.2)
ALP SERPL-CCNC: 110 U/L (ref 40–129)
ALT SERPL-CCNC: 21 U/L (ref 10–40)
ANION GAP SERPL CALCULATED.3IONS-SCNC: 15 MMOL/L (ref 3–16)
AST SERPL-CCNC: 21 U/L (ref 15–37)
BASOPHILS # BLD: 0.1 K/UL (ref 0–0.2)
BASOPHILS NFR BLD: 1.2 %
BILIRUB SERPL-MCNC: <0.2 MG/DL (ref 0–1)
BUN SERPL-MCNC: 50 MG/DL (ref 7–20)
CALCIUM SERPL-MCNC: 8.9 MG/DL (ref 8.3–10.6)
CHLORIDE SERPL-SCNC: 96 MMOL/L (ref 99–110)
CK SERPL-CCNC: 101 U/L (ref 26–192)
CO2 SERPL-SCNC: 22 MMOL/L (ref 21–32)
CREAT SERPL-MCNC: 1.5 MG/DL (ref 0.6–1.2)
CRP SERPL-MCNC: 4.1 MG/L (ref 0–5.1)
DEPRECATED RDW RBC AUTO: 20.5 % (ref 12.4–15.4)
EOSINOPHIL # BLD: 0.3 K/UL (ref 0–0.6)
EOSINOPHIL NFR BLD: 4.8 %
ERYTHROCYTE [SEDIMENTATION RATE] IN BLOOD BY WESTERGREN METHOD: 85 MM/HR (ref 0–30)
FERRITIN SERPL IA-MCNC: 22.1 NG/ML (ref 15–150)
GFR SERPLBLD CREATININE-BSD FMLA CKD-EPI: 39 ML/MIN/{1.73_M2}
GLUCOSE SERPL-MCNC: 84 MG/DL (ref 70–99)
HCT VFR BLD AUTO: 31.4 % (ref 36–48)
HGB BLD-MCNC: 10 G/DL (ref 12–16)
IRON SATN MFR SERPL: 28 % (ref 15–50)
IRON SERPL-MCNC: 113 UG/DL (ref 37–145)
LYMPHOCYTES # BLD: 1.1 K/UL (ref 1–5.1)
LYMPHOCYTES NFR BLD: 19.2 %
MCH RBC QN AUTO: 22.9 PG (ref 26–34)
MCHC RBC AUTO-ENTMCNC: 31.9 G/DL (ref 31–36)
MCV RBC AUTO: 71.9 FL (ref 80–100)
MONOCYTES # BLD: 0.6 K/UL (ref 0–1.3)
MONOCYTES NFR BLD: 9.6 %
NEUTROPHILS # BLD: 3.8 K/UL (ref 1.7–7.7)
NEUTROPHILS NFR BLD: 65.2 %
PLATELET # BLD AUTO: 274 K/UL (ref 135–450)
PMV BLD AUTO: 7.9 FL (ref 5–10.5)
POTASSIUM SERPL-SCNC: 4.7 MMOL/L (ref 3.5–5.1)
PROT SERPL-MCNC: 6.5 G/DL (ref 6.4–8.2)
RBC # BLD AUTO: 4.36 M/UL (ref 4–5.2)
SODIUM SERPL-SCNC: 133 MMOL/L (ref 136–145)
TIBC SERPL-MCNC: 401 UG/DL (ref 260–445)
WBC # BLD AUTO: 5.8 K/UL (ref 4–11)

## 2025-05-27 PROCEDURE — 82607 VITAMIN B-12: CPT

## 2025-05-27 PROCEDURE — 82728 ASSAY OF FERRITIN: CPT

## 2025-05-27 PROCEDURE — 85025 COMPLETE CBC W/AUTO DIFF WBC: CPT

## 2025-05-27 PROCEDURE — 82550 ASSAY OF CK (CPK): CPT

## 2025-05-27 PROCEDURE — 80053 COMPREHEN METABOLIC PANEL: CPT

## 2025-05-27 PROCEDURE — 86140 C-REACTIVE PROTEIN: CPT

## 2025-05-27 PROCEDURE — 82746 ASSAY OF FOLIC ACID SERUM: CPT

## 2025-05-27 PROCEDURE — 85652 RBC SED RATE AUTOMATED: CPT

## 2025-05-27 PROCEDURE — 83550 IRON BINDING TEST: CPT

## 2025-05-27 PROCEDURE — 83540 ASSAY OF IRON: CPT

## 2025-05-28 LAB
FOLATE SERPL-MCNC: 11.5 NG/ML (ref 4.78–24.2)
VIT B12 SERPL-MCNC: 318 PG/ML (ref 211–911)

## 2025-06-02 ENCOUNTER — HOSPITAL ENCOUNTER (OUTPATIENT)
Age: 60
Setting detail: SPECIMEN
Discharge: HOME | End: 2025-06-02
Payer: MEDICARE

## 2025-06-02 DIAGNOSIS — F32.A DEPRESSION, UNSPECIFIED DEPRESSION TYPE: ICD-10-CM

## 2025-06-02 LAB
ALBUMIN SERPL-MCNC: 3.9 G/DL (ref 3.4–5)
ALBUMIN/GLOB SERPL: 1.2 {RATIO} (ref 1.1–2.2)
ALP SERPL-CCNC: 113 U/L (ref 40–129)
ALT SERPL-CCNC: 14 U/L (ref 10–40)
ANION GAP SERPL CALCULATED.3IONS-SCNC: 15 MMOL/L (ref 3–16)
AST SERPL-CCNC: 17 U/L (ref 15–37)
BASOPHILS # BLD: 0.1 K/UL (ref 0–0.2)
BASOPHILS NFR BLD: 1.3 %
BILIRUB SERPL-MCNC: <0.2 MG/DL (ref 0–1)
BUN SERPL-MCNC: 58 MG/DL (ref 7–20)
CALCIUM SERPL-MCNC: 9.1 MG/DL (ref 8.3–10.6)
CHLORIDE SERPL-SCNC: 101 MMOL/L (ref 99–110)
CO2 SERPL-SCNC: 26 MMOL/L (ref 21–32)
CREAT SERPL-MCNC: 1.2 MG/DL (ref 0.6–1.2)
CRP SERPL-MCNC: <3 MG/L (ref 0–5.1)
DEPRECATED RDW RBC AUTO: 22.5 % (ref 12.4–15.4)
EOSINOPHIL # BLD: 0.3 K/UL (ref 0–0.6)
EOSINOPHIL NFR BLD: 4.2 %
ERYTHROCYTE [SEDIMENTATION RATE] IN BLOOD BY WESTERGREN METHOD: 78 MM/HR (ref 0–30)
GFR SERPLBLD CREATININE-BSD FMLA CKD-EPI: 51 ML/MIN/{1.73_M2}
GLUCOSE SERPL-MCNC: 99 MG/DL (ref 70–99)
HCT VFR BLD AUTO: 33.6 % (ref 36–48)
HGB BLD-MCNC: 10.6 G/DL (ref 12–16)
LYMPHOCYTES # BLD: 1.4 K/UL (ref 1–5.1)
LYMPHOCYTES NFR BLD: 23 %
MCH RBC QN AUTO: 23.2 PG (ref 26–34)
MCHC RBC AUTO-ENTMCNC: 31.6 G/DL (ref 31–36)
MCV RBC AUTO: 73.4 FL (ref 80–100)
MONOCYTES # BLD: 0.5 K/UL (ref 0–1.3)
MONOCYTES NFR BLD: 8.7 %
NEUTROPHILS # BLD: 3.7 K/UL (ref 1.7–7.7)
NEUTROPHILS NFR BLD: 62.8 %
PLATELET # BLD AUTO: 287 K/UL (ref 135–450)
PMV BLD AUTO: 8.1 FL (ref 5–10.5)
POTASSIUM SERPL-SCNC: 5.5 MMOL/L (ref 3.5–5.1)
PROT SERPL-MCNC: 7.2 G/DL (ref 6.4–8.2)
RBC # BLD AUTO: 4.58 M/UL (ref 4–5.2)
SODIUM SERPL-SCNC: 142 MMOL/L (ref 136–145)
WBC # BLD AUTO: 5.9 K/UL (ref 4–11)

## 2025-06-02 PROCEDURE — 85025 COMPLETE CBC W/AUTO DIFF WBC: CPT

## 2025-06-02 PROCEDURE — 36415 COLL VENOUS BLD VENIPUNCTURE: CPT

## 2025-06-02 PROCEDURE — 86140 C-REACTIVE PROTEIN: CPT

## 2025-06-02 PROCEDURE — 80053 COMPREHEN METABOLIC PANEL: CPT

## 2025-06-02 PROCEDURE — 85652 RBC SED RATE AUTOMATED: CPT

## 2025-06-03 RX ORDER — OMEPRAZOLE 20 MG/1
CAPSULE, DELAYED RELEASE ORAL
Qty: 90 CAPSULE | Refills: 0 | Status: SHIPPED | OUTPATIENT
Start: 2025-06-03

## 2025-06-03 RX ORDER — EMPAGLIFLOZIN 10 MG/1
10 TABLET, FILM COATED ORAL DAILY
Qty: 90 TABLET | Refills: 0 | Status: SHIPPED | OUTPATIENT
Start: 2025-06-03

## 2025-06-03 RX ORDER — DULOXETIN HYDROCHLORIDE 60 MG/1
120 CAPSULE, DELAYED RELEASE ORAL DAILY
Qty: 180 CAPSULE | Refills: 0 | Status: SHIPPED | OUTPATIENT
Start: 2025-06-03

## 2025-06-03 RX ORDER — GABAPENTIN 400 MG/1
400 CAPSULE ORAL 3 TIMES DAILY
Qty: 90 CAPSULE | Refills: 0 | Status: SHIPPED | OUTPATIENT
Start: 2025-06-03 | End: 2025-06-26 | Stop reason: SDUPTHER

## 2025-06-03 RX ORDER — METOPROLOL SUCCINATE 25 MG/1
25 TABLET, EXTENDED RELEASE ORAL DAILY
Qty: 90 TABLET | Refills: 0 | Status: SHIPPED | OUTPATIENT
Start: 2025-06-03 | End: 2025-07-23 | Stop reason: SDUPTHER

## 2025-06-03 RX ORDER — SPIRONOLACTONE 25 MG/1
25 TABLET ORAL DAILY
Qty: 90 TABLET | Refills: 0 | Status: SHIPPED | OUTPATIENT
Start: 2025-06-03

## 2025-06-03 RX ORDER — OXYBUTYNIN CHLORIDE 5 MG/1
5 TABLET ORAL 2 TIMES DAILY
Qty: 180 TABLET | Refills: 0 | Status: SHIPPED | OUTPATIENT
Start: 2025-06-03

## 2025-06-03 RX ORDER — INDOMETHACIN 50 MG/1
CAPSULE ORAL
Qty: 270 CAPSULE | Refills: 0 | OUTPATIENT
Start: 2025-06-03

## 2025-06-06 DIAGNOSIS — G47.00 INSOMNIA, UNSPECIFIED TYPE: ICD-10-CM

## 2025-06-06 RX ORDER — ZOLPIDEM TARTRATE 5 MG/1
5 TABLET ORAL NIGHTLY PRN
Qty: 30 TABLET | Refills: 0 | Status: SHIPPED | OUTPATIENT
Start: 2025-06-06 | End: 2025-07-06

## 2025-06-10 RX ORDER — GABAPENTIN 400 MG/1
CAPSULE ORAL
Qty: 90 CAPSULE | Refills: 11 | OUTPATIENT
Start: 2025-06-10

## 2025-06-11 ENCOUNTER — TELEPHONE (OUTPATIENT)
Dept: CARDIOLOGY CLINIC | Age: 60
End: 2025-06-11

## 2025-06-11 DIAGNOSIS — I42.9 CARDIOMYOPATHY, UNSPECIFIED TYPE (HCC): Primary | ICD-10-CM

## 2025-06-11 DIAGNOSIS — Z01.810 PRE-OPERATIVE CARDIOVASCULAR EXAMINATION: ICD-10-CM

## 2025-06-11 NOTE — TELEPHONE ENCOUNTER
CARDIAC CLEARANCE REQUEST    What type of procedure are you having:  Cervical spine surgery  Are you taking any blood thinners:  no  Type on anesthesia:  Yes- unknown  When is your procedure scheduled for:  7.21.25  What physician is performing your procedure:  Dr. Iraj Olivares [facility]  Phone Number:  [phone]  Fax number to send the letter:  [phone]    Last ov 4.3.25 AMP  Next ov 7.31.25 AMP

## 2025-06-26 RX ORDER — GABAPENTIN 400 MG/1
400 CAPSULE ORAL 3 TIMES DAILY
Qty: 90 CAPSULE | Refills: 0 | Status: SHIPPED | OUTPATIENT
Start: 2025-07-06 | End: 2025-08-05

## 2025-06-26 NOTE — TELEPHONE ENCOUNTER
----- Message from Keila MCDONOUGH sent at 6/26/2025 12:59 PM EDT -----  Pharmacy requesting refill for patient       gabapentin (NEURONTIN) 400 MG capsule      Pharmacy    [facility] [place] - [facility] [place]  [address] [place] [address] [place] - P [phone] - F [phone]

## 2025-06-27 ENCOUNTER — HOSPITAL ENCOUNTER (OUTPATIENT)
Age: 60
Discharge: HOME | End: 2025-06-29
Attending: STUDENT IN AN ORGANIZED HEALTH CARE EDUCATION/TRAINING PROGRAM
Payer: MEDICARE

## 2025-06-27 ENCOUNTER — HOSPITAL ENCOUNTER (OUTPATIENT)
Dept: CT IMAGING | Age: 60
Discharge: HOME | End: 2025-06-27
Attending: INTERNAL MEDICINE
Payer: MEDICARE

## 2025-06-27 VITALS
DIASTOLIC BLOOD PRESSURE: 76 MMHG | SYSTOLIC BLOOD PRESSURE: 141 MMHG | BODY MASS INDEX: 49.26 KG/M2 | HEIGHT: 63 IN | WEIGHT: 278 LBS

## 2025-06-27 DIAGNOSIS — I50.22 HEART FAILURE WITH MILDLY REDUCED EJECTION FRACTION (HFMREF) (HCC): ICD-10-CM

## 2025-06-27 DIAGNOSIS — D50.9 IRON DEFICIENCY ANEMIA, UNSPECIFIED IRON DEFICIENCY ANEMIA TYPE: ICD-10-CM

## 2025-06-27 DIAGNOSIS — I34.0 NONRHEUMATIC MITRAL VALVE REGURGITATION: ICD-10-CM

## 2025-06-27 DIAGNOSIS — I36.1 NONRHEUMATIC TRICUSPID VALVE REGURGITATION: ICD-10-CM

## 2025-06-27 DIAGNOSIS — I42.9 CARDIOMYOPATHY, UNSPECIFIED TYPE (HCC): ICD-10-CM

## 2025-06-27 LAB
ECHO AO ASC DIAM: 3.7 CM
ECHO AO ASCENDING AORTA INDEX: 1.67 CM/M2
ECHO AO ROOT DIAM: 3.6 CM
ECHO AO ROOT INDEX: 1.62 CM/M2
ECHO AV MEAN GRADIENT: 4 MMHG
ECHO AV MEAN GRADIENT: 4 MMHG
ECHO AV MEAN VELOCITY: 1 M/S
ECHO AV PEAK GRADIENT: 7 MMHG
ECHO AV PEAK VELOCITY: 1.3 M/S
ECHO AV VELOCITY RATIO: 0.69
ECHO AV VTI: 26.7 CM
ECHO BSA: 2.37 M2
ECHO EST RA PRESSURE: 3 MMHG
ECHO LA AREA 2C: 26.1 CM2
ECHO LA AREA 4C: 25.6 CM2
ECHO LA MAJOR AXIS: 6.3 CM
ECHO LA MINOR AXIS: 6.2 CM
ECHO LA VOL BP: 85 ML (ref 22–52)
ECHO LA VOL MOD A2C: 87 ML (ref 22–52)
ECHO LA VOL MOD A4C: 84 ML (ref 22–52)
ECHO LA VOL/BSA BIPLANE: 38 ML/M2 (ref 16–34)
ECHO LA VOLUME INDEX MOD A2C: 39 ML/M2 (ref 16–34)
ECHO LA VOLUME INDEX MOD A4C: 38 ML/M2 (ref 16–34)
ECHO LV E' LATERAL VELOCITY: 8.49 CM/S
ECHO LV E' SEPTAL VELOCITY: 6.74 CM/S
ECHO LV EDV A2C: 89 ML
ECHO LV EDV A4C: 100 ML
ECHO LV EDV INDEX A4C: 45 ML/M2
ECHO LV EDV NDEX A2C: 40 ML/M2
ECHO LV EF PHYSICIAN: 45 %
ECHO LV EJECTION FRACTION A2C: 45 %
ECHO LV EJECTION FRACTION A4C: 46 %
ECHO LV EJECTION FRACTION BIPLANE: 45 % (ref 55–100)
ECHO LV ESV A2C: 49 ML
ECHO LV ESV A4C: 54 ML
ECHO LV ESV INDEX A2C: 22 ML/M2
ECHO LV ESV INDEX A4C: 24 ML/M2
ECHO LV FRACTIONAL SHORTENING: 27 % (ref 28–44)
ECHO LV INTERNAL DIMENSION DIASTOLE INDEX: 2.84 CM/M2
ECHO LV INTERNAL DIMENSION DIASTOLIC: 6.3 CM (ref 3.9–5.3)
ECHO LV INTERNAL DIMENSION SYSTOLIC INDEX: 2.07 CM/M2
ECHO LV INTERNAL DIMENSION SYSTOLIC: 4.6 CM
ECHO LV ISOVOLUMETRIC RELAXATION TIME (IVRT): 100 MS
ECHO LV IVSD: 1 CM (ref 0.6–0.9)
ECHO LV MASS 2D: 268.3 G (ref 67–162)
ECHO LV MASS INDEX 2D: 120.9 G/M2 (ref 43–95)
ECHO LV POSTERIOR WALL DIASTOLIC: 1 CM (ref 0.6–0.9)
ECHO LV RELATIVE WALL THICKNESS RATIO: 0.32
ECHO LVOT AV VTI INDEX: 0.76
ECHO LVOT MEAN GRADIENT: 2 MMHG
ECHO LVOT PEAK GRADIENT: 3 MMHG
ECHO LVOT PEAK VELOCITY: 0.9 M/S
ECHO LVOT VTI: 20.3 CM
ECHO MV A VELOCITY: 0.59 M/S
ECHO MV E DECELERATION TIME (DT): 280 MS
ECHO MV E VELOCITY: 0.43 M/S
ECHO MV E/A RATIO: 0.73
ECHO MV E/E' LATERAL: 5.06
ECHO MV E/E' RATIO (AVERAGED): 5.72
ECHO MV E/E' SEPTAL: 6.38
ECHO MV LVOT VTI INDEX: 0.74
ECHO MV MAX VELOCITY: 0.6 M/S
ECHO MV MEAN GRADIENT: 1 MMHG
ECHO MV MEAN VELOCITY: 0.3 M/S
ECHO MV PEAK GRADIENT: 1 MMHG
ECHO MV VTI: 15.1 CM
ECHO PV MAX VELOCITY: 1 M/S
ECHO PV MEAN GRADIENT: 2 MMHG
ECHO PV MEAN VELOCITY: 0.7 M/S
ECHO PV PEAK GRADIENT: 4 MMHG
ECHO PV VTI: 19.6 CM
ECHO RA AREA 4C: 18.9 CM2
ECHO RA END SYSTOLIC VOLUME APICAL 4 CHAMBER INDEX BSA: 20 ML/M2
ECHO RA VOLUME: 45 ML
ECHO RIGHT VENTRICULAR SYSTOLIC PRESSURE (RVSP): 32 MMHG
ECHO RV BASAL DIMENSION: 3.7 CM
ECHO RV FREE WALL PEAK S': 17.1 CM/S
ECHO RV MID DIMENSION: 2.5 CM
ECHO RV TAPSE: 2.2 CM (ref 1.7–?)
ECHO TV REGURGITANT MAX VELOCITY: 2.68 M/S
ECHO TV REGURGITANT PEAK GRADIENT: 29 MMHG

## 2025-06-27 PROCEDURE — 93306 TTE W/DOPPLER COMPLETE: CPT

## 2025-06-27 PROCEDURE — 93306 TTE W/DOPPLER COMPLETE: CPT | Performed by: INTERNAL MEDICINE

## 2025-06-27 PROCEDURE — 74176 CT ABD & PELVIS W/O CONTRAST: CPT

## 2025-06-30 NOTE — TELEPHONE ENCOUNTER
Left message for patient to return call  - stress test is ordered.     Perry Jones, Chikis Ford, RN  Caller: Unspecified (2 weeks ago)  Heart function is stable at 45 to 50% but the echo suggest LV's further dilated.  If we have quit drinking alcohol LV function should have improved by now.  Need to arrange a stress test prior to having neck surgery.  Recommend [facility] for further risk stratification.

## 2025-06-30 NOTE — TELEPHONE ENCOUNTER
Image quality is technically difficult.    Left Ventricle: Mildly reduced left ventricular systolic function with a visually estimated EF of 45 - 50%. EF by 2D Simpsons Biplane is 45%. Left ventricle is severely dilated (LVIDd 6.3 cm). Mildly increased wall thickness. Findings consistent with mild eccentric hypertrophy. Global hypokinesis present. Grade I diastolic dysfunction with normal LAP.    Right Ventricle: Right ventricle size is normal. Normal systolic function. TAPSE is 2.2 cm. RV Free Wall Peak S' is 17.1 cm/s.    Left Atrium: Left atrium is mildly dilated. Left atrial volume index is mildly increased (35-41 mL/m2) mL/m2.    Right Atrium: Right atrium is mildly dilated.    Aorta: Normal sized aortic root. Mildly dilated ascending aorta. Ao ascending diameter is 3.7 cm.    Aortic Valve: Trileaflet valve. Mild annular calcification. No regurgitation. No stenosis.    Mitral Valve: Valve structure is normal. No regurgitation. No stenosis noted.    Tricuspid Valve: Valve structure is normal. Trace regurgitation. Normal RVSP. TR Peak Gradient is 29 mmHg. Est. RA Pressure is 3 mmHg. RVSP is 32 mmHg.

## 2025-07-01 NOTE — TELEPHONE ENCOUNTER
Patient called office back. Relayed AMP message she VU. Mychart message sent. Patient provided instructions, she MALDONADO.

## 2025-07-02 DIAGNOSIS — F32.A DEPRESSION, UNSPECIFIED DEPRESSION TYPE: ICD-10-CM

## 2025-07-02 RX ORDER — INDOMETHACIN 50 MG/1
CAPSULE ORAL
Qty: 270 CAPSULE | Refills: 0 | OUTPATIENT
Start: 2025-07-02

## 2025-07-03 ENCOUNTER — TRANSCRIBE ORDERS (OUTPATIENT)
Dept: ADMISSION | Age: 60
End: 2025-07-03

## 2025-07-03 ENCOUNTER — OFFICE VISIT (OUTPATIENT)
Dept: INTERNAL MEDICINE CLINIC | Age: 60
End: 2025-07-03

## 2025-07-03 ENCOUNTER — TELEPHONE (OUTPATIENT)
Dept: CARDIOLOGY CLINIC | Age: 60
End: 2025-07-03

## 2025-07-03 ENCOUNTER — HOSPITAL ENCOUNTER (OUTPATIENT)
Dept: LAB | Age: 60
Discharge: HOME | End: 2025-07-03
Payer: MEDICARE

## 2025-07-03 ENCOUNTER — HOSPITAL ENCOUNTER (OUTPATIENT)
Dept: GENERAL RADIOLOGY | Age: 60
Discharge: HOME | End: 2025-07-03
Payer: MEDICARE

## 2025-07-03 ENCOUNTER — HOSPITAL ENCOUNTER (OUTPATIENT)
Age: 60
Discharge: HOME | End: 2025-07-03
Payer: MEDICARE

## 2025-07-03 VITALS
HEIGHT: 63 IN | BODY MASS INDEX: 49.43 KG/M2 | WEIGHT: 279 LBS | SYSTOLIC BLOOD PRESSURE: 110 MMHG | DIASTOLIC BLOOD PRESSURE: 74 MMHG | HEART RATE: 68 BPM

## 2025-07-03 DIAGNOSIS — Z01.818 PREOPERATIVE GENERAL PHYSICAL EXAMINATION: ICD-10-CM

## 2025-07-03 DIAGNOSIS — G95.9 CERVICAL MYELOPATHY (HCC): Primary | ICD-10-CM

## 2025-07-03 DIAGNOSIS — Z01.818 PRE-OP TESTING: Primary | ICD-10-CM

## 2025-07-03 DIAGNOSIS — Z01.818 PRE-OP TESTING: ICD-10-CM

## 2025-07-03 DIAGNOSIS — G47.00 INSOMNIA, UNSPECIFIED TYPE: ICD-10-CM

## 2025-07-03 DIAGNOSIS — I50.22 CHRONIC SYSTOLIC CONGESTIVE HEART FAILURE (HCC): ICD-10-CM

## 2025-07-03 LAB
ANION GAP SERPL CALCULATED.3IONS-SCNC: 14 MMOL/L (ref 3–16)
APTT BLD: 32.1 SEC (ref 22.8–35.8)
BASOPHILS # BLD: 0.1 K/UL (ref 0–0.2)
BASOPHILS NFR BLD: 1.1 %
BUN SERPL-MCNC: 27 MG/DL (ref 7–20)
CALCIUM SERPL-MCNC: 9.8 MG/DL (ref 8.3–10.6)
CHLORIDE SERPL-SCNC: 100 MMOL/L (ref 99–110)
CO2 SERPL-SCNC: 26 MMOL/L (ref 21–32)
CREAT SERPL-MCNC: 0.9 MG/DL (ref 0.6–1.2)
DEPRECATED RDW RBC AUTO: 21.6 % (ref 12.4–15.4)
EKG DIAGNOSIS: NORMAL
EKG Q-T INTERVAL: 472 MS
EKG QRS DURATION: 96 MS
EKG QTC CALCULATION (BAZETT): 459 MS
EKG R AXIS: -2 DEGREES
EKG T AXIS: 32 DEGREES
EKG VENTRICULAR RATE: 57 BPM
EOSINOPHIL # BLD: 0.2 K/UL (ref 0–0.6)
EOSINOPHIL NFR BLD: 3.3 %
EST. AVERAGE GLUCOSE BLD GHB EST-MCNC: 111.2 MG/DL
GFR SERPLBLD CREATININE-BSD FMLA CKD-EPI: 72 ML/MIN/{1.73_M2}
GLUCOSE SERPL-MCNC: 114 MG/DL (ref 70–99)
HBA1C MFR BLD: 5.5 %
HCT VFR BLD AUTO: 35 % (ref 36–48)
HGB BLD-MCNC: 11.3 G/DL (ref 12–16)
INR PPP: 0.99 (ref 0.86–1.14)
LYMPHOCYTES # BLD: 1.2 K/UL (ref 1–5.1)
LYMPHOCYTES NFR BLD: 18.7 %
MCH RBC QN AUTO: 24.4 PG (ref 26–34)
MCHC RBC AUTO-ENTMCNC: 32.2 G/DL (ref 31–36)
MCV RBC AUTO: 75.6 FL (ref 80–100)
MONOCYTES # BLD: 0.5 K/UL (ref 0–1.3)
MONOCYTES NFR BLD: 7.3 %
NEUTROPHILS # BLD: 4.6 K/UL (ref 1.7–7.7)
NEUTROPHILS NFR BLD: 69.6 %
PLATELET # BLD AUTO: 327 K/UL (ref 135–450)
PMV BLD AUTO: 7.1 FL (ref 5–10.5)
POTASSIUM SERPL-SCNC: 4.4 MMOL/L (ref 3.5–5.1)
PROTHROMBIN TIME: 13.4 SEC (ref 12.1–14.9)
RBC # BLD AUTO: 4.63 M/UL (ref 4–5.2)
SODIUM SERPL-SCNC: 140 MMOL/L (ref 136–145)
WBC # BLD AUTO: 6.7 K/UL (ref 4–11)

## 2025-07-03 PROCEDURE — 80048 BASIC METABOLIC PNL TOTAL CA: CPT

## 2025-07-03 PROCEDURE — 85025 COMPLETE CBC W/AUTO DIFF WBC: CPT

## 2025-07-03 PROCEDURE — 3078F DIAST BP <80 MM HG: CPT | Performed by: INTERNAL MEDICINE

## 2025-07-03 PROCEDURE — 93005 ELECTROCARDIOGRAM TRACING: CPT | Performed by: ORTHOPAEDIC SURGERY

## 2025-07-03 PROCEDURE — 83036 HEMOGLOBIN GLYCOSYLATED A1C: CPT

## 2025-07-03 PROCEDURE — 3074F SYST BP LT 130 MM HG: CPT | Performed by: INTERNAL MEDICINE

## 2025-07-03 PROCEDURE — 99214 OFFICE O/P EST MOD 30 MIN: CPT | Performed by: INTERNAL MEDICINE

## 2025-07-03 PROCEDURE — 93010 ELECTROCARDIOGRAM REPORT: CPT | Performed by: INTERNAL MEDICINE

## 2025-07-03 PROCEDURE — 71046 X-RAY EXAM CHEST 2 VIEWS: CPT

## 2025-07-03 PROCEDURE — 85730 THROMBOPLASTIN TIME PARTIAL: CPT

## 2025-07-03 PROCEDURE — 85610 PROTHROMBIN TIME: CPT

## 2025-07-03 RX ORDER — ZOLPIDEM TARTRATE 5 MG/1
5 TABLET ORAL NIGHTLY PRN
Qty: 30 TABLET | Refills: 0 | Status: SHIPPED | OUTPATIENT
Start: 2025-07-07 | End: 2025-08-06

## 2025-07-03 ASSESSMENT — PATIENT HEALTH QUESTIONNAIRE - PHQ9
10. IF YOU CHECKED OFF ANY PROBLEMS, HOW DIFFICULT HAVE THESE PROBLEMS MADE IT FOR YOU TO DO YOUR WORK, TAKE CARE OF THINGS AT HOME, OR GET ALONG WITH OTHER PEOPLE: SOMEWHAT DIFFICULT
2. FEELING DOWN, DEPRESSED OR HOPELESS: SEVERAL DAYS
SUM OF ALL RESPONSES TO PHQ QUESTIONS 1-9: 7
5. POOR APPETITE OR OVEREATING: SEVERAL DAYS
SUM OF ALL RESPONSES TO PHQ QUESTIONS 1-9: 7
SUM OF ALL RESPONSES TO PHQ QUESTIONS 1-9: 7
7. TROUBLE CONCENTRATING ON THINGS, SUCH AS READING THE NEWSPAPER OR WATCHING TELEVISION: SEVERAL DAYS
9. THOUGHTS THAT YOU WOULD BE BETTER OFF DEAD, OR OF HURTING YOURSELF: NOT AT ALL
4. FEELING TIRED OR HAVING LITTLE ENERGY: SEVERAL DAYS
SUM OF ALL RESPONSES TO PHQ QUESTIONS 1-9: 7
1. LITTLE INTEREST OR PLEASURE IN DOING THINGS: SEVERAL DAYS
6. FEELING BAD ABOUT YOURSELF - OR THAT YOU ARE A FAILURE OR HAVE LET YOURSELF OR YOUR FAMILY DOWN: SEVERAL DAYS
3. TROUBLE FALLING OR STAYING ASLEEP: SEVERAL DAYS

## 2025-07-03 NOTE — TELEPHONE ENCOUNTER
[facility] requesting cardiac clearance for c3-4 corpectomy spinal fusin on 7/21 and 7/24 with Dr. Galo    Fax [phone] for Gregg Toscano  Fax [phone] Dr. Galo
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
## 2025-07-03 NOTE — TELEPHONE ENCOUNTER
Pt called and stated that AMP requesting pt have ECHO with Contrast done.  Pt stated that she if highly allergic to contrast die used with testing.  Last time contrast die used for testing, pt went into a coma and had renal failure.  Please contact pt to discuss other options.

## 2025-07-07 NOTE — TELEPHONE ENCOUNTER
Pt called today and is concerned about getting the chemical with a stress test.  Called and spoke with AMP.  Verified that the chemical in the stress test is not the same contrast that pt previously reported having a reaction to.  Pt is scheduled for the testing on 7/17.  Pt is aware that AMP will not clear her for surgery without testing.  Pt verbalized understanding.

## 2025-07-17 ENCOUNTER — HOSPITAL ENCOUNTER (OUTPATIENT)
Dept: NUCLEAR MEDICINE | Age: 60
Discharge: HOME | End: 2025-07-17
Attending: STUDENT IN AN ORGANIZED HEALTH CARE EDUCATION/TRAINING PROGRAM
Payer: MEDICARE

## 2025-07-17 ENCOUNTER — HOSPITAL ENCOUNTER (OUTPATIENT)
Age: 60
Discharge: HOME | End: 2025-07-19
Attending: STUDENT IN AN ORGANIZED HEALTH CARE EDUCATION/TRAINING PROGRAM
Payer: MEDICARE

## 2025-07-17 DIAGNOSIS — Z01.810 PRE-OPERATIVE CARDIOVASCULAR EXAMINATION: ICD-10-CM

## 2025-07-17 DIAGNOSIS — I42.9 CARDIOMYOPATHY, UNSPECIFIED TYPE (HCC): ICD-10-CM

## 2025-07-17 PROCEDURE — 93017 CV STRESS TEST TRACING ONLY: CPT

## 2025-07-17 PROCEDURE — 78452 HT MUSCLE IMAGE SPECT MULT: CPT

## 2025-07-17 PROCEDURE — A9502 TC99M TETROFOSMIN: HCPCS | Performed by: STUDENT IN AN ORGANIZED HEALTH CARE EDUCATION/TRAINING PROGRAM

## 2025-07-17 PROCEDURE — 3430000000 HC RX DIAGNOSTIC RADIOPHARMACEUTICAL: Performed by: STUDENT IN AN ORGANIZED HEALTH CARE EDUCATION/TRAINING PROGRAM

## 2025-07-17 RX ADMIN — TETROFOSMIN 32 MILLICURIE: 1.38 INJECTION, POWDER, LYOPHILIZED, FOR SOLUTION INTRAVENOUS at 08:10

## 2025-07-17 NOTE — NURSING NOTE
Patient arrived to stress lab for lexiscan/myoview stress test. Patient was educated on procedure, all questions answered, and consent verified. 2 day test, resting scan today since patient had caffeine yesterday evening, pt to return for stress and post stress scan tomorrow 7/18/25.

## 2025-07-18 ENCOUNTER — HOSPITAL ENCOUNTER (OUTPATIENT)
Age: 60
Discharge: HOME | End: 2025-07-20
Attending: STUDENT IN AN ORGANIZED HEALTH CARE EDUCATION/TRAINING PROGRAM
Payer: MEDICARE

## 2025-07-18 ENCOUNTER — HOSPITAL ENCOUNTER (OUTPATIENT)
Dept: NUCLEAR MEDICINE | Age: 60
Discharge: HOME | End: 2025-07-18
Attending: STUDENT IN AN ORGANIZED HEALTH CARE EDUCATION/TRAINING PROGRAM
Payer: MEDICARE

## 2025-07-18 ENCOUNTER — RESULTS FOLLOW-UP (OUTPATIENT)
Dept: CARDIOLOGY | Age: 60
End: 2025-07-18

## 2025-07-18 LAB
NUC STRESS EJECTION FRACTION: 58 %
NUC STRESS LV EDV: 153 ML (ref 56–104)
NUC STRESS LV ESV: 65 ML (ref 19–49)
NUC STRESS LV MASS: 165 G
STRESS BASELINE DIAS BP: 90 MMHG
STRESS BASELINE HR: 47 BPM
STRESS BASELINE SYS BP: 139 MMHG
STRESS ESTIMATED WORKLOAD: 1 METS
STRESS PEAK DIAS BP: 90 MMHG
STRESS PEAK SYS BP: 159 MMHG
STRESS PERCENT HR ACHIEVED: 53 %
STRESS POST PEAK HR: 85 BPM
STRESS RATE PRESSURE PRODUCT: ABNORMAL BPM*MMHG
STRESS TARGET HR: 159 BPM

## 2025-07-18 PROCEDURE — 3430000000 HC RX DIAGNOSTIC RADIOPHARMACEUTICAL: Performed by: STUDENT IN AN ORGANIZED HEALTH CARE EDUCATION/TRAINING PROGRAM

## 2025-07-18 PROCEDURE — A9502 TC99M TETROFOSMIN: HCPCS | Performed by: STUDENT IN AN ORGANIZED HEALTH CARE EDUCATION/TRAINING PROGRAM

## 2025-07-18 PROCEDURE — 6360000002 HC RX W HCPCS: Performed by: STUDENT IN AN ORGANIZED HEALTH CARE EDUCATION/TRAINING PROGRAM

## 2025-07-18 RX ORDER — REGADENOSON 0.08 MG/ML
0.4 INJECTION, SOLUTION INTRAVENOUS
Status: COMPLETED | OUTPATIENT
Start: 2025-07-18 | End: 2025-07-18

## 2025-07-18 RX ADMIN — TETROFOSMIN 33.5 MILLICURIE: 1.38 INJECTION, POWDER, LYOPHILIZED, FOR SOLUTION INTRAVENOUS at 08:41

## 2025-07-18 RX ADMIN — REGADENOSON 0.4 MG: 0.08 INJECTION, SOLUTION INTRAVENOUS at 08:41

## 2025-07-18 NOTE — NURSING NOTE
Patient arrived to stress lab for lexiscan/myoview stress test. Patient was educated on procedure, all questions answered, and consent verified. Stress today, resting portion completed yesterday 7/17/25.

## 2025-07-21 ENCOUNTER — TELEPHONE (OUTPATIENT)
Dept: CARDIOLOGY CLINIC | Age: 60
End: 2025-07-21

## 2025-07-21 DIAGNOSIS — R94.39 ABNORMAL STRESS TEST: Primary | ICD-10-CM

## 2025-07-21 NOTE — TELEPHONE ENCOUNTER
Procedure:  [facility]  Doctor:  Dr. Kimball (Robert)  Date:  7/31/25  Time:  8am  Arrival:  6:30am  Reps:  n/a  Anesthesia:  n/a      Spoke with patient. Please have patient arrive to the main entrance of [facility] ([address] [place] [id number]) and check in with the registration desk.  They will be directed to the Cath Lab.  Remind patient to be NPO after midnight (8 hours prior). Do not apply lotions/creams on skin the day of procedure.    [facility].    AMP - fyi only.

## 2025-07-21 NOTE — TELEPHONE ENCOUNTER
Please review; surgery is 7/24     Stress Combined Conclusion: Moderate sized reversible defect lateral wall c/w ischemia. LV function normal EF 58% with uniform wall motion. Lower risk abnormal study.    Stress Test: A pharmacological stress test was performed using regadenoson (Lexiscan). PO caffeine given as a reversal agent.    Resting ECG: marked sinus bradycardia 47bpm    Stress ECG: The stress ECG was not diagnostic due to failure to achieve target heart rate.       Stress Test: A pharmacological stress test was performed using regadenoson   (Lexiscan). PO caffeine given as a reversal agent.       Stress Test: A pharmacological stress test was performed using regadenoson      (Lexiscan). PO caffeine given as a reversal agent.     See final dictacted report in [facility]  Confirmed by NOLAN RODRIGUES MD ([id number]) on 7/18/2025 1:20:46 PM

## 2025-07-21 NOTE — TELEPHONE ENCOUNTER
Cardiac Cath orders: Left Heart Catheterization and Possible Percutaneous Coronary Intervention with moderate sedation    Ordering Provider: AMP  Performing Provider: (If not ordering provider)  Length of time for procedure:  IV Sedation: Y  Reps needed:  Specific equip needed: (or NA)  Medications to hold:   - the day before and the day of hold jardiance   - the morning of hold lasix and vitamins   Pt aware of meds to hold?: N  Urgent? (Need time frame):

## 2025-07-23 ENCOUNTER — TELEPHONE (OUTPATIENT)
Dept: INTERNAL MEDICINE CLINIC | Age: 60
End: 2025-07-23

## 2025-07-23 RX ORDER — METOPROLOL SUCCINATE 25 MG/1
25 TABLET, EXTENDED RELEASE ORAL DAILY
Qty: 90 TABLET | Refills: 0 | Status: SHIPPED | OUTPATIENT
Start: 2025-07-23

## 2025-07-23 NOTE — TELEPHONE ENCOUNTER
----- Message from Liza MUÑIZ sent at 7/23/2025 10:08 AM EDT -----  Contact: Zainab [phone]  [facility] requesting refill on the following medications for patient:      tiZANidine (ZANAFLEX) 4 MG tablet  metoprolol succinate (TOPROL XL) 25 MG extended release tablet      [facility] - [facility] [place] - [address] [place] [address] [place]  MARY -016-2021 - F [phone]

## 2025-07-31 ENCOUNTER — HOSPITAL ENCOUNTER (OUTPATIENT)
Age: 60
Discharge: HOME | End: 2025-07-31
Attending: INTERNAL MEDICINE | Admitting: INTERNAL MEDICINE
Payer: MEDICARE

## 2025-07-31 ENCOUNTER — TELEPHONE (OUTPATIENT)
Dept: CARDIOLOGY CLINIC | Age: 60
End: 2025-07-31

## 2025-07-31 VITALS
SYSTOLIC BLOOD PRESSURE: 142 MMHG | BODY MASS INDEX: 46.41 KG/M2 | RESPIRATION RATE: 20 BRPM | WEIGHT: 262 LBS | DIASTOLIC BLOOD PRESSURE: 68 MMHG | HEART RATE: 61 BPM | OXYGEN SATURATION: 95 %

## 2025-07-31 DIAGNOSIS — R94.39 ABNORMAL STRESS TEST: ICD-10-CM

## 2025-07-31 DIAGNOSIS — Z01.818 PREOP TESTING: Primary | ICD-10-CM

## 2025-07-31 LAB
ANION GAP SERPL CALCULATED.3IONS-SCNC: 13 MMOL/L (ref 3–16)
BUN SERPL-MCNC: 35 MG/DL (ref 7–20)
CALCIUM SERPL-MCNC: 9.7 MG/DL (ref 8.3–10.6)
CHLORIDE SERPL-SCNC: 103 MMOL/L (ref 99–110)
CHOLEST SERPL-MCNC: 191 MG/DL (ref 0–199)
CO2 SERPL-SCNC: 26 MMOL/L (ref 21–32)
CREAT SERPL-MCNC: 1.1 MG/DL (ref 0.6–1.2)
DEPRECATED RDW RBC AUTO: 18.3 % (ref 12.4–15.4)
EKG DIAGNOSIS: NORMAL
EKG Q-T INTERVAL: 446 MS
EKG QRS DURATION: 92 MS
EKG QTC CALCULATION (BAZETT): 441 MS
EKG R AXIS: -5 DEGREES
EKG T AXIS: 47 DEGREES
EKG VENTRICULAR RATE: 59 BPM
GFR SERPLBLD CREATININE-BSD FMLA CKD-EPI: 57 ML/MIN/{1.73_M2}
GLUCOSE SERPL-MCNC: 137 MG/DL (ref 70–99)
HCT VFR BLD AUTO: 36.6 % (ref 36–48)
HDLC SERPL-MCNC: 62 MG/DL (ref 40–60)
HGB BLD-MCNC: 11.7 G/DL (ref 12–16)
INR PPP: 0.99 (ref 0.86–1.14)
LDLC SERPL CALC-MCNC: 120 MG/DL
MCH RBC QN AUTO: 25 PG (ref 26–34)
MCHC RBC AUTO-ENTMCNC: 32.1 G/DL (ref 31–36)
MCV RBC AUTO: 78 FL (ref 80–100)
PLATELET # BLD AUTO: 369 K/UL (ref 135–450)
PMV BLD AUTO: 7.2 FL (ref 5–10.5)
POTASSIUM SERPL-SCNC: 4.6 MMOL/L (ref 3.5–5.1)
PROTHROMBIN TIME: 13.4 SEC (ref 12.1–14.9)
RBC # BLD AUTO: 4.69 M/UL (ref 4–5.2)
SODIUM SERPL-SCNC: 142 MMOL/L (ref 136–145)
TRIGL SERPL-MCNC: 47 MG/DL (ref 0–150)
VLDLC SERPL CALC-MCNC: 9 MG/DL
WBC # BLD AUTO: 7.5 K/UL (ref 4–11)

## 2025-07-31 PROCEDURE — 85610 PROTHROMBIN TIME: CPT

## 2025-07-31 PROCEDURE — 85027 COMPLETE CBC AUTOMATED: CPT

## 2025-07-31 PROCEDURE — 6360000002 HC RX W HCPCS: Performed by: INTERNAL MEDICINE

## 2025-07-31 PROCEDURE — 2500000003 HC RX 250 WO HCPCS: Performed by: INTERNAL MEDICINE

## 2025-07-31 PROCEDURE — 99152 MOD SED SAME PHYS/QHP 5/>YRS: CPT | Performed by: INTERNAL MEDICINE

## 2025-07-31 PROCEDURE — 6360000004 HC RX CONTRAST MEDICATION: Performed by: INTERNAL MEDICINE

## 2025-07-31 PROCEDURE — 7100000011 HC PHASE II RECOVERY - ADDTL 15 MIN: Performed by: INTERNAL MEDICINE

## 2025-07-31 PROCEDURE — C1894 INTRO/SHEATH, NON-LASER: HCPCS | Performed by: INTERNAL MEDICINE

## 2025-07-31 PROCEDURE — 93010 ELECTROCARDIOGRAM REPORT: CPT | Performed by: INTERNAL MEDICINE

## 2025-07-31 PROCEDURE — 6370000000 HC RX 637 (ALT 250 FOR IP): Performed by: INTERNAL MEDICINE

## 2025-07-31 PROCEDURE — C1769 GUIDE WIRE: HCPCS | Performed by: INTERNAL MEDICINE

## 2025-07-31 PROCEDURE — 2709999900 HC NON-CHARGEABLE SUPPLY: Performed by: INTERNAL MEDICINE

## 2025-07-31 PROCEDURE — 93005 ELECTROCARDIOGRAM TRACING: CPT | Performed by: INTERNAL MEDICINE

## 2025-07-31 PROCEDURE — 7100000010 HC PHASE II RECOVERY - FIRST 15 MIN: Performed by: INTERNAL MEDICINE

## 2025-07-31 PROCEDURE — 2580000003 HC RX 258: Performed by: INTERNAL MEDICINE

## 2025-07-31 PROCEDURE — 93458 L HRT ARTERY/VENTRICLE ANGIO: CPT | Performed by: INTERNAL MEDICINE

## 2025-07-31 PROCEDURE — 80061 LIPID PANEL: CPT

## 2025-07-31 PROCEDURE — 80048 BASIC METABOLIC PNL TOTAL CA: CPT

## 2025-07-31 RX ORDER — SODIUM CHLORIDE 0.9 % (FLUSH) 0.9 %
5-40 SYRINGE (ML) INJECTION EVERY 12 HOURS SCHEDULED
Status: DISCONTINUED | OUTPATIENT
Start: 2025-07-31 | End: 2025-07-31 | Stop reason: HOSPADM

## 2025-07-31 RX ORDER — MIDAZOLAM HYDROCHLORIDE 1 MG/ML
INJECTION, SOLUTION INTRAMUSCULAR; INTRAVENOUS PRN
Status: DISCONTINUED | OUTPATIENT
Start: 2025-07-31 | End: 2025-07-31 | Stop reason: HOSPADM

## 2025-07-31 RX ORDER — SODIUM CHLORIDE 0.9 % (FLUSH) 0.9 %
5-40 SYRINGE (ML) INJECTION PRN
Status: DISCONTINUED | OUTPATIENT
Start: 2025-07-31 | End: 2025-07-31 | Stop reason: HOSPADM

## 2025-07-31 RX ORDER — ASPIRIN 325 MG
325 TABLET ORAL ONCE
Status: COMPLETED | OUTPATIENT
Start: 2025-07-31 | End: 2025-07-31

## 2025-07-31 RX ORDER — IOPAMIDOL 755 MG/ML
INJECTION, SOLUTION INTRAVASCULAR PRN
Status: DISCONTINUED | OUTPATIENT
Start: 2025-07-31 | End: 2025-07-31 | Stop reason: HOSPADM

## 2025-07-31 RX ORDER — SODIUM CHLORIDE 9 MG/ML
INJECTION, SOLUTION INTRAVENOUS PRN
Status: DISCONTINUED | OUTPATIENT
Start: 2025-07-31 | End: 2025-07-31 | Stop reason: HOSPADM

## 2025-07-31 RX ORDER — FENTANYL CITRATE 50 UG/ML
INJECTION, SOLUTION INTRAMUSCULAR; INTRAVENOUS PRN
Status: DISCONTINUED | OUTPATIENT
Start: 2025-07-31 | End: 2025-07-31 | Stop reason: HOSPADM

## 2025-07-31 RX ORDER — ACETAMINOPHEN 325 MG/1
650 TABLET ORAL EVERY 4 HOURS PRN
Status: DISCONTINUED | OUTPATIENT
Start: 2025-07-31 | End: 2025-07-31 | Stop reason: HOSPADM

## 2025-07-31 RX ORDER — LORAZEPAM 0.5 MG/1
0.5 TABLET ORAL
Status: DISCONTINUED | OUTPATIENT
Start: 2025-07-31 | End: 2025-07-31 | Stop reason: HOSPADM

## 2025-07-31 RX ORDER — ONDANSETRON 2 MG/ML
4 INJECTION INTRAMUSCULAR; INTRAVENOUS EVERY 6 HOURS PRN
Status: DISCONTINUED | OUTPATIENT
Start: 2025-07-31 | End: 2025-07-31 | Stop reason: HOSPADM

## 2025-07-31 RX ORDER — HEPARIN SODIUM 1000 [USP'U]/ML
INJECTION, SOLUTION INTRAVENOUS; SUBCUTANEOUS PRN
Status: DISCONTINUED | OUTPATIENT
Start: 2025-07-31 | End: 2025-07-31 | Stop reason: HOSPADM

## 2025-07-31 RX ORDER — DIPHENHYDRAMINE HYDROCHLORIDE 50 MG/ML
50 INJECTION, SOLUTION INTRAMUSCULAR; INTRAVENOUS ONCE
Status: COMPLETED | OUTPATIENT
Start: 2025-07-31 | End: 2025-07-31

## 2025-07-31 RX ADMIN — ASPIRIN 325 MG: 325 TABLET ORAL at 07:48

## 2025-07-31 RX ADMIN — SODIUM CHLORIDE: 0.9 INJECTION, SOLUTION INTRAVENOUS at 07:48

## 2025-07-31 RX ADMIN — DIPHENHYDRAMINE HYDROCHLORIDE 50 MG: 50 INJECTION INTRAMUSCULAR; INTRAVENOUS at 07:50

## 2025-07-31 NOTE — TELEPHONE ENCOUNTER
Fax [phone] for Gregg Toscano  Fax [phone] Dr. Galo    Letter created and faxed     Please call patient

## 2025-07-31 NOTE — ASSESSMENT & PLAN NOTE
Patient is undergoing spine surgery, nuclear stress test is abnormal, has limited functional capacity, reasonable to proceed with a cardiac catheterization for further evaluation.  All the risks including but not limited to vascular injury, heart attack, stroke, death discussed with the patient, she understood and consented for the procedure.

## 2025-07-31 NOTE — DISCHARGE INSTRUCTIONS
Cath Labs at  [facility]   Discharge Instructions        7/31/2025  Nohemi Nesbitt   Date of Birth 1965       Activity:  No driving for 24 hours.  In 24 hours you may remove dressing and shower, wash site gently with soap and water and leave open to air  Avoid submerging your arm in sitting water for 5 days.  Do not use your right hand for 24 hours, then  No lifting more than 5 pounds for 5 days.   No lotions, powders, or ointments near site for 5 days.   No work/school for 5 days unless instructed otherwise by your cardiologist.    Diet:   Resume previous diet, if a cardiac diet is specified you will receive a handout with  general guidelines.   Drink extra non-alcoholic/decaffienated fluids for first 24 hours after your procedure.    Arm Management:  If bleeding occurs from the site or a hematoma (lump) begins to increase in size, apply pressure directly over the site, call 911 to return to the hospital.    Special Instructions:  Report any coolness or numbness in the arm  Report any chills, fever, itching, red bumps or rash   Report any of the following to the MD: drainage from the site, redness and/or swelling at the site, increased tenderness at the site   If you are currently taking Metformin or Metformin combination medications for Diabetes, hold your dose for 48 hours after your procedure.  Consult your Cardiologist before taking any NSAIDS, vitamin supplements, estrogen, or estrogen plus progestin.  Do not stop taking Plavix, Brilinta or Effient, without first consulting your cardiologist.    Sedation Discharge Instructions:  For the next 24 hours do not drive a car, operate machinery, power tools or kitchen appliances.    Do not drink alcohol; including beer or wine.    Do not make any important decisions or sign any important papers.  For the next 24 hours you can expect drowsiness, light-headed or dizziness, nausea/ vomiting, inability to concentrate, fatigue and desire to sleep.  We strongly
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
## 2025-07-31 NOTE — ANESTHESIA PRE-OP
Brief Pre-Op Note/Sedation Assessment      Nohemi Nesbitt  1965  [id number]  8:04 AM    Planned Procedure: Cardiac Catheterization Procedure  Post Procedure Plan: Return to same level of care  Consent: I have discussed with the patient and/or the patient representative the indication, alternatives, and the possible risks and/or complications of the planned procedure and the anesthesia methods. The patient and/or patient representative appear to understand and agree to proceed.        Chief Complaint:   Pre-Op Clearance      Indications for Cath Procedure:  Preop, abnormal stress test    Prior Ischemic Workup/Eval:      Does Patient need surgery?  Cath Valve Surgery:  No    Pre-Procedure Medical History:  Vital Signs:  /64   Pulse 57   Wt 118.8 kg (262 lb)   LMP 12/24/2010   SpO2 94%   BMI 46.41 kg/m²     Allergies:    Allergies   Allergen Reactions    Cefepime     Docusate Sodium      Rash     Lisinopril Cough    Losartan Cough    Scopolamine Hives    Vancomycin Other (See Comments)     Became red like she had a sunburn     Medications:    Current Facility-Administered Medications   Medication Dose Route Frequency Provider Last Rate Last Admin    sodium chloride flush 0.9 % injection 5-40 mL  5-40 mL IntraVENous 2 times per day Douglas Kimball MD        sodium chloride flush 0.9 % injection 5-40 mL  5-40 mL IntraVENous PRN Douglas Kimball MD        0.9 % sodium chloride infusion   IntraVENous PRN Douglas Kimball  mL/hr at 07/31/25 0748 New Bag at 07/31/25 0748    ondansetron (ZOFRAN) injection 4 mg  4 mg IntraVENous Q6H PRN Douglas Kimball MD        LORazepam (ATIVAN) tablet 0.5 mg  0.5 mg Oral Once PRN Douglas Kimball MD           Past Medical History:    Past Medical History:   Diagnosis Date    Acute deep vein thrombosis (DVT) of left lower extremity (HCC) 07/2015    Acute renal failure (ARF) 05/04/2015    d/t IV dye    Asthma     Cardiomyopathy (HCC)     Cellulitis of right

## 2025-07-31 NOTE — TELEPHONE ENCOUNTER
----- Message from Dr. Perry Jones DO sent at 7/31/2025 10:20 AM EDT -----  Thank you!    Chikis, please draft a letter and send to her surgeon.  Left heart cath showed minimal nonobstructive coronary artery disease.  False positive stress test.  Patient intermediate but acceptable cardiovascular risk for high risk neck surgery.  Proceed to the OR at discretion of the surgeon.  ----- Message -----  From: Archana Gannon MD  Sent: 7/31/2025   9:03 AM EDT  To: Perry Jones DO; Viral Cates MD; #    Hi team,  Left heart cath showed minor luminal irregularities without obstructive disease, thanks.

## 2025-08-01 RX ORDER — GABAPENTIN 400 MG/1
CAPSULE ORAL
Qty: 90 CAPSULE | Refills: 0 | Status: SHIPPED | OUTPATIENT
Start: 2025-08-01 | End: 2025-08-31

## 2025-08-06 DIAGNOSIS — G47.00 INSOMNIA, UNSPECIFIED TYPE: ICD-10-CM

## 2025-08-07 DIAGNOSIS — F32.A DEPRESSION, UNSPECIFIED DEPRESSION TYPE: ICD-10-CM

## 2025-08-07 RX ORDER — ZOLPIDEM TARTRATE 5 MG/1
TABLET ORAL
Qty: 30 TABLET | Refills: 0 | Status: SHIPPED | OUTPATIENT
Start: 2025-08-07 | End: 2025-09-06

## 2025-08-08 DIAGNOSIS — F32.A DEPRESSION, UNSPECIFIED DEPRESSION TYPE: ICD-10-CM

## 2025-08-08 RX ORDER — GABAPENTIN 400 MG/1
CAPSULE ORAL
Qty: 90 CAPSULE | Refills: 11 | OUTPATIENT
Start: 2025-08-08 | End: 2025-09-07

## 2025-08-08 RX ORDER — DULOXETIN HYDROCHLORIDE 60 MG/1
120 CAPSULE, DELAYED RELEASE ORAL DAILY
Qty: 180 CAPSULE | Refills: 0 | Status: SHIPPED | OUTPATIENT
Start: 2025-08-08

## 2025-08-08 RX ORDER — FUROSEMIDE 20 MG/1
TABLET ORAL
Qty: 360 TABLET | Refills: 0 | Status: SHIPPED | OUTPATIENT
Start: 2025-08-08

## 2025-08-08 RX ORDER — SPIRONOLACTONE 25 MG/1
25 TABLET ORAL DAILY
Qty: 90 TABLET | Refills: 0 | Status: SHIPPED | OUTPATIENT
Start: 2025-08-08

## 2025-08-08 RX ORDER — OMEPRAZOLE 20 MG/1
CAPSULE, DELAYED RELEASE ORAL
Qty: 90 CAPSULE | Refills: 0 | Status: SHIPPED | OUTPATIENT
Start: 2025-08-08

## 2025-08-08 RX ORDER — OXYBUTYNIN CHLORIDE 5 MG/1
5 TABLET ORAL 2 TIMES DAILY
Qty: 180 TABLET | Refills: 0 | Status: SHIPPED | OUTPATIENT
Start: 2025-08-08

## 2025-08-13 RX ORDER — FUROSEMIDE 20 MG/1
TABLET ORAL
Qty: 360 TABLET | Refills: 0 | Status: SHIPPED | OUTPATIENT
Start: 2025-08-13

## 2025-08-14 ENCOUNTER — OFFICE VISIT (OUTPATIENT)
Dept: INTERNAL MEDICINE CLINIC | Age: 60
End: 2025-08-14

## 2025-08-14 VITALS
HEART RATE: 80 BPM | DIASTOLIC BLOOD PRESSURE: 82 MMHG | HEIGHT: 63 IN | SYSTOLIC BLOOD PRESSURE: 138 MMHG | BODY MASS INDEX: 46.41 KG/M2

## 2025-08-14 DIAGNOSIS — I10 PRIMARY HYPERTENSION: ICD-10-CM

## 2025-08-14 DIAGNOSIS — G95.9 CERVICAL MYELOPATHY (HCC): Primary | ICD-10-CM

## 2025-08-14 DIAGNOSIS — I50.22 CHRONIC SYSTOLIC CONGESTIVE HEART FAILURE (HCC): ICD-10-CM

## 2025-08-14 DIAGNOSIS — Z01.818 PREOP EXAM FOR INTERNAL MEDICINE: ICD-10-CM

## 2025-08-18 ENCOUNTER — TELEPHONE (OUTPATIENT)
Dept: WOUND CARE | Age: 60
End: 2025-08-18

## 2025-08-22 ENCOUNTER — OFFICE VISIT (OUTPATIENT)
Dept: CARDIOLOGY CLINIC | Age: 60
End: 2025-08-22
Payer: MEDICARE

## 2025-08-22 VITALS
DIASTOLIC BLOOD PRESSURE: 78 MMHG | WEIGHT: 278 LBS | OXYGEN SATURATION: 97 % | BODY MASS INDEX: 49.26 KG/M2 | HEIGHT: 63 IN | SYSTOLIC BLOOD PRESSURE: 114 MMHG | HEART RATE: 58 BPM

## 2025-08-22 DIAGNOSIS — R94.39 ABNORMAL STRESS TEST: ICD-10-CM

## 2025-08-22 DIAGNOSIS — Z09 ENCOUNTER FOR EXAMINATION FOLLOWING TREATMENT AT HOSPITAL: Primary | ICD-10-CM

## 2025-08-22 DIAGNOSIS — I25.10 MILD CAD: ICD-10-CM

## 2025-08-22 DIAGNOSIS — I50.21 ACUTE HEART FAILURE WITH MILDLY REDUCED EJECTION FRACTION (HFMREF, 41-49%) (HCC): ICD-10-CM

## 2025-08-22 PROCEDURE — 3078F DIAST BP <80 MM HG: CPT | Performed by: NURSE PRACTITIONER

## 2025-08-22 PROCEDURE — 3074F SYST BP LT 130 MM HG: CPT | Performed by: NURSE PRACTITIONER

## 2025-08-22 PROCEDURE — 99214 OFFICE O/P EST MOD 30 MIN: CPT | Performed by: NURSE PRACTITIONER

## 2025-08-22 RX ORDER — ATORVASTATIN CALCIUM 40 MG/1
40 TABLET, FILM COATED ORAL NIGHTLY
Qty: 30 TABLET | Refills: 2 | Status: SHIPPED | OUTPATIENT
Start: 2025-08-22

## 2025-08-29 RX ORDER — GABAPENTIN 400 MG/1
CAPSULE ORAL
Qty: 90 CAPSULE | Refills: 0 | Status: SHIPPED | OUTPATIENT
Start: 2025-09-05 | End: 2025-10-05

## 2025-08-30 PROBLEM — Z01.818 PREOP TESTING: Status: RESOLVED | Noted: 2025-07-31 | Resolved: 2025-08-30

## 2025-09-03 ENCOUNTER — TELEPHONE (OUTPATIENT)
Dept: INTERNAL MEDICINE CLINIC | Age: 60
End: 2025-09-03

## 2025-09-03 RX ORDER — FUROSEMIDE 20 MG/1
TABLET ORAL
Qty: 360 TABLET | Refills: 0 | Status: SHIPPED | OUTPATIENT
Start: 2025-09-03

## (undated) DEVICE — GUIDEWIRE VASC L260CM DIA0.035IN RAD 3MM J TIP L7CM PTFE

## (undated) DEVICE — SHEATH INTRO 17GA L8IN TUNN DISP ON-Q

## (undated) DEVICE — AIR SHEET,LAT,COMFORT GLIDE, BLEND 40X80: Brand: MEDLINE

## (undated) DEVICE — STERILE POLYISOPRENE POWDER-FREE SURGICAL GLOVES: Brand: PROTEXIS

## (undated) DEVICE — CATH LAB PACK: Brand: MEDLINE INDUSTRIES, INC.

## (undated) DEVICE — RELOAD STPL 2.5MM L60MM 0DEG VASC TISS TAN TI 6 ROW LIN

## (undated) DEVICE — GLOVE ORANGE PI 8 1/2   MSG9085

## (undated) DEVICE — SEALER ENDOSCP NANO COAT OPN DIV CRV L JAW LIGASURE IMPACT

## (undated) DEVICE — TRAY CATH 16FR F INCLUDE LUB DRNGE BG STATLOK STBL DEV

## (undated) DEVICE — SOLUTION IV IRRIG WATER 500ML POUR BRL ST 2F7113

## (undated) DEVICE — SOLUTION IV HEPARIN SODIUM SODIUM CHL 0.9% 500 ML INJ VIAFLX

## (undated) DEVICE — TROCAR: Brand: KII OPTICAL ACCESS SYSTEM

## (undated) DEVICE — BOWL MED L 32OZ PLAS W/ MOLD GRAD EZ OPN PEEL PCH

## (undated) DEVICE — SOLUTION IV IRRIG 500ML 0.9% SODIUM CHL 2F7123

## (undated) DEVICE — DEVICE SUT W/ SZ 0 L48IN VLT POLYSRB SUT DISP ES-9 ENDO

## (undated) DEVICE — TROCAR: Brand: KII FIOS FIRST ENTRY

## (undated) DEVICE — GOWN SIRUS NONREIN XL W/TWL: Brand: MEDLINE INDUSTRIES, INC.

## (undated) DEVICE — STAPLER SKIN H3.9MM WIRE DIA0.58MM CRWN 6.9MM 35 STPL FIX

## (undated) DEVICE — CONMED ACCESSORY ELECTRODE, 6 INCH (15.24 CM) FLAT BLADE: Brand: CONMED

## (undated) DEVICE — NEEDLE INSUF L150MM DIA2MM DISP FOR PNEUMOPERI ENDOPATH

## (undated) DEVICE — SHEARS ENDOSCP L36CM DIA5MM ULTRASONIC CRV TIP HARM

## (undated) DEVICE — LAPAROSCOPY PACK: Brand: MEDLINE INDUSTRIES, INC.

## (undated) DEVICE — SPONGE LAP W18XL18IN WHT COT 4 PLY FLD STRUNG RADPQ DISP ST

## (undated) DEVICE — LOTION PREP REMV 5OZ IODO CLR TINC OF BENZ DURAPREP

## (undated) DEVICE — TROCARS: Brand: KII® OPTICAL ACCESS SYSTEM

## (undated) DEVICE — YANKAUER,BULB TIP,W/O VENT,RIGID,STERILE: Brand: MEDLINE

## (undated) DEVICE — SPONGE,LAP,4"X18",XR,ST,5/PK,40PK/CS: Brand: MEDLINE INDUSTRIES, INC.

## (undated) DEVICE — RADIFOCUS OPTITORQUE ANGIOGRAPHIC CATHETER: Brand: OPTITORQUE

## (undated) DEVICE — KIT HND CTRL 3 W STPCOCK ROT END 54IN PREM HI PRSS TBNG AT

## (undated) DEVICE — RELOAD STPL 3.5MM L60MM 0DEG UNIV TISS PUR TI 6 ROW LIN

## (undated) DEVICE — SUTURE PERMAHAND SZ 3-0 L18IN NONABSORBABLE BLK L26MM SH C013D

## (undated) DEVICE — CANNULA,OXY,ADULT,SUPERSOFT,W/7'TUB,SC: Brand: MEDLINE INDUSTRIES, INC.

## (undated) DEVICE — 30977 SEE SHARP - ENHANCED INTRAOPERATIVE LAPAROSCOPE CLEANING & DEFOGGING: Brand: 30977 SEE SHARP - ENHANCED INTRAOPERATIVE LAPAROSCOPE CLEANING & DEFOGGING

## (undated) DEVICE — PROCEDURE KIT ENDOSCP CUST

## (undated) DEVICE — KIT EVAC 100CC W10MMXL20CM SIL FULL PERF HUBLESS FLAT DRN

## (undated) DEVICE — SET VLV 3 PC AWS DISPOSABLE GRDIAN SCOPEVALET

## (undated) DEVICE — GOWN,AURORA,NONREINF,RAGLAN,XXL,STERILE: Brand: MEDLINE

## (undated) DEVICE — APPLICATOR PREP 26ML 0.7% IOD POVACRYLEX 74% ISO ALC ST

## (undated) DEVICE — ARM CRADLE: Brand: DEVON

## (undated) DEVICE — FORCEPS BX L240CM WRK CHN 2.8MM STD CAP W/ NDL MIC MESH

## (undated) DEVICE — SHELL STPL PWR FOR SIGNIA HNDL STPL SYS

## (undated) DEVICE — FORCEP BX STD CAP 240CM RAD JAW 4

## (undated) DEVICE — NEEDLE HYPO 22GA L1.5IN BLK POLYPR HUB S STL REG BVL STR

## (undated) DEVICE — DEVICE SUT SHFT L34CM DIA 10MM 2 JAW LD UNIT ENDOSTCH

## (undated) DEVICE — CRADLE ANK AND FT ELEV FLAT END POLY FOAM W/ VENT H NEUT

## (undated) DEVICE — SHEET,DRAPE,40X58,STERILE: Brand: MEDLINE

## (undated) DEVICE — MAJOR SET UP PK

## (undated) DEVICE — TR BAND RADIAL ARTERY COMPRESSION DEVICE: Brand: TR BAND

## (undated) DEVICE — MOUTHPIECE ENDOSCP L CTRL OPN AND SIDE PORTS DISP

## (undated) DEVICE — ENDOSCOPIC KIT 2 12 FT OP4 DE2 GWN SYR

## (undated) DEVICE — SUTURE PERMAHAND SZ 2-0 L30IN NONABSORBABLE BLK SILK W/O A305H

## (undated) DEVICE — CATHETER DIAG AD 6FR L100CM COR GRN HYDRPHLC NYL JR 4 W/O

## (undated) DEVICE — RELOAD STPL L75MM OPN H3.8MM CLS 1.5MM WIRE DIA0.2MM REG

## (undated) DEVICE — CIRCUIT ANES L72IN 3L BACT AND VIR FLTR EL CONN SGL LIMB

## (undated) DEVICE — PACK,UNIVERSAL,SPLIT,II,AURORA: Brand: MEDLINE

## (undated) DEVICE — STAPLER INT L75MM CUT LN L73MM STPL LN L77MM BLU B FRM 8

## (undated) DEVICE — CONTROL SYRINGE LUER-LOCK TIP: Brand: MONOJECT

## (undated) DEVICE — CATHETER COR DIAG PIGTAILS PIG 145 CRV 5FR 110CM 6 SIDE H

## (undated) DEVICE — SUTURE VCRL SZ 2-0 L18IN ABSRB UD CT-1 L36MM 1/2 CIR J839D

## (undated) DEVICE — ELECTRODE,RADIOTRANSLUCENT,FOAM,3PK: Brand: MEDLINE

## (undated) DEVICE — KIT MFLD ISOLATN NACL CNTRST PRT TBNG SPIK W/ PRSS TRNSDUC

## (undated) DEVICE — BW-412T DISP COMBO CLEANING BRUSH: Brand: SINGLE USE COMBINATION CLEANING BRUSH

## (undated) DEVICE — TUBING, SUCTION, 3/16" X 10', STRAIGHT: Brand: MEDLINE

## (undated) DEVICE — MERCY FAIRFIELD TURNOVER KIT: Brand: MEDLINE INDUSTRIES, INC.

## (undated) DEVICE — SUTURE VCRL SZ 0 L54IN ABSRB UD POLYGLACTIN 910 COAT BRAID J608H

## (undated) DEVICE — DRAPE,LAP,CHOLE,W/TROUGHS,STERILE: Brand: MEDLINE

## (undated) DEVICE — GLIDESHEATH SLENDER STAINLESS STEEL KIT: Brand: GLIDESHEATH SLENDER

## (undated) DEVICE — ADHESIVE SKIN CLSR 0.7ML TOP DERMBND ADV

## (undated) DEVICE — PMI DISPOSABLE PUNCTURE CLOSURE DEVICE / SUTURE GRASPER: Brand: PMI

## (undated) DEVICE — LAPAROSCOPIC SCISSORS: Brand: EPIX LAPAROSCOPIC SCISSORS

## (undated) DEVICE — TRUE CONTENT TO BE POPULATED AS PART OF REBRANDING: Brand: ARGYLE

## (undated) DEVICE — ELECTRODE PT RET AD L9FT HI MOIST COND ADH HYDRGEL CORDED

## (undated) DEVICE — ENDO CARRY-ON PROCEDURE KIT INCLUDES SUCTION TUBING, LUBRICANT, GAUZE, BIOHAZARD STICKER, TRANSPORT PAD AND INTERCEPT BEDSIDE KIT.: Brand: ENDO CARRY-ON PROCEDURE KIT

## (undated) DEVICE — PASSIVE LAPSCP FLTR W/ 1/4INX24 TBNG AND M LUER LCK FIT - U

## (undated) DEVICE — SUTURE VCRL SZ 4-0 L18IN ABSRB UD L19MM PS-2 3/8 CIR PRIM J496H

## (undated) DEVICE — GAUZE,SPONGE,4"X4",8PLY,STRL,LF,10/TRAY: Brand: MEDLINE

## (undated) DEVICE — CATHETER ANGIO JL3.5 0.056 INX6 FRX100 CM THRULUMEN EXPO

## (undated) DEVICE — STAPLER INT L60MM REG TISS BLU B FRM 8 FIRING 2 ROW AUTO

## (undated) DEVICE — BLANKET WRM W29.9XL79.1IN UP BODY FORC AIR MISTRAL-AIR

## (undated) DEVICE — KIT INFUS PMP 270ML 4ML/HR 2ML/SITE SOAK CATH L5IN N NARC

## (undated) DEVICE — SHEET,DRAPE,53X77,STERILE: Brand: MEDLINE

## (undated) DEVICE — CONMED SCOPE SAVER BITE BLOCK, 20X27 MM: Brand: SCOPE SAVER

## (undated) DEVICE — SUTURE PROL SZ 1 L30IN NONABSORBABLE BLU CTX L48MM 1/2 CIR 8455H

## (undated) DEVICE — SOLUTION IV IRRIG POUR BRL 0.9% SODIUM CHL 2F7124

## (undated) DEVICE — SUTURE PERMA-HAND SZ 2-0 L30IN NONABSORBABLE BLK L26MM SH K833H